# Patient Record
Sex: MALE | Race: WHITE | Employment: FULL TIME | URBAN - METROPOLITAN AREA
[De-identification: names, ages, dates, MRNs, and addresses within clinical notes are randomized per-mention and may not be internally consistent; named-entity substitution may affect disease eponyms.]

---

## 2017-01-20 ENCOUNTER — INFUSION THERAPY VISIT (OUTPATIENT)
Dept: ONCOLOGY | Facility: CLINIC | Age: 52
End: 2017-01-20
Attending: INTERNAL MEDICINE
Payer: COMMERCIAL

## 2017-01-20 ENCOUNTER — CARE COORDINATION (OUTPATIENT)
Dept: ONCOLOGY | Facility: CLINIC | Age: 52
End: 2017-01-20

## 2017-01-20 VITALS
OXYGEN SATURATION: 97 % | HEART RATE: 58 BPM | TEMPERATURE: 98.6 F | BODY MASS INDEX: 30.86 KG/M2 | DIASTOLIC BLOOD PRESSURE: 81 MMHG | WEIGHT: 197.09 LBS | RESPIRATION RATE: 18 BRPM | SYSTOLIC BLOOD PRESSURE: 141 MMHG

## 2017-01-20 DIAGNOSIS — C61 MALIGNANT NEOPLASM OF PROSTATE (H): ICD-10-CM

## 2017-01-20 DIAGNOSIS — C79.51 BONE METASTASIS: ICD-10-CM

## 2017-01-20 DIAGNOSIS — C61 MALIGNANT NEOPLASM OF PROSTATE (H): Primary | ICD-10-CM

## 2017-01-20 LAB
ALBUMIN SERPL-MCNC: 4.2 G/DL (ref 3.4–5)
ALP SERPL-CCNC: 48 U/L (ref 40–150)
ALT SERPL W P-5'-P-CCNC: 31 U/L (ref 0–70)
ANION GAP SERPL CALCULATED.3IONS-SCNC: 5 MMOL/L (ref 3–14)
AST SERPL W P-5'-P-CCNC: 16 U/L (ref 0–45)
BASOPHILS # BLD AUTO: 0 10E9/L (ref 0–0.2)
BASOPHILS NFR BLD AUTO: 0.6 %
BILIRUB SERPL-MCNC: 1 MG/DL (ref 0.2–1.3)
BUN SERPL-MCNC: 17 MG/DL (ref 7–30)
CALCIUM SERPL-MCNC: 9.2 MG/DL (ref 8.5–10.1)
CHLORIDE SERPL-SCNC: 106 MMOL/L (ref 94–109)
CO2 SERPL-SCNC: 30 MMOL/L (ref 20–32)
CREAT SERPL-MCNC: 0.81 MG/DL (ref 0.66–1.25)
DIFFERENTIAL METHOD BLD: ABNORMAL
EOSINOPHIL # BLD AUTO: 0.1 10E9/L (ref 0–0.7)
EOSINOPHIL NFR BLD AUTO: 2 %
ERYTHROCYTE [DISTWIDTH] IN BLOOD BY AUTOMATED COUNT: 12.7 % (ref 10–15)
GFR SERPL CREATININE-BSD FRML MDRD: NORMAL ML/MIN/1.7M2
GLUCOSE SERPL-MCNC: 97 MG/DL (ref 70–99)
HCT VFR BLD AUTO: 35.1 % (ref 40–53)
HGB BLD-MCNC: 11.9 G/DL (ref 13.3–17.7)
IMM GRANULOCYTES # BLD: 0 10E9/L (ref 0–0.4)
IMM GRANULOCYTES NFR BLD: 0.4 %
LDH SERPL L TO P-CCNC: 216 U/L (ref 85–227)
LYMPHOCYTES # BLD AUTO: 2.2 10E9/L (ref 0.8–5.3)
LYMPHOCYTES NFR BLD AUTO: 44.8 %
MCH RBC QN AUTO: 31.1 PG (ref 26.5–33)
MCHC RBC AUTO-ENTMCNC: 33.9 G/DL (ref 31.5–36.5)
MCV RBC AUTO: 92 FL (ref 78–100)
MONOCYTES # BLD AUTO: 0.4 10E9/L (ref 0–1.3)
MONOCYTES NFR BLD AUTO: 7.5 %
NEUTROPHILS # BLD AUTO: 2.2 10E9/L (ref 1.6–8.3)
NEUTROPHILS NFR BLD AUTO: 44.7 %
NRBC # BLD AUTO: 0 10*3/UL
NRBC BLD AUTO-RTO: 0 /100
PLATELET # BLD AUTO: 256 10E9/L (ref 150–450)
POTASSIUM SERPL-SCNC: 4.4 MMOL/L (ref 3.4–5.3)
PROT SERPL-MCNC: 7.4 G/DL (ref 6.8–8.8)
PSA SERPL-MCNC: 75.43 UG/L (ref 0–4)
RBC # BLD AUTO: 3.83 10E12/L (ref 4.4–5.9)
SODIUM SERPL-SCNC: 141 MMOL/L (ref 133–144)
WBC # BLD AUTO: 4.9 10E9/L (ref 4–11)

## 2017-01-20 PROCEDURE — 83615 LACTATE (LD) (LDH) ENZYME: CPT | Performed by: INTERNAL MEDICINE

## 2017-01-20 PROCEDURE — 96401 CHEMO ANTI-NEOPL SQ/IM: CPT

## 2017-01-20 PROCEDURE — 36415 COLL VENOUS BLD VENIPUNCTURE: CPT

## 2017-01-20 PROCEDURE — 25000128 H RX IP 250 OP 636: Mod: ZF | Performed by: INTERNAL MEDICINE

## 2017-01-20 PROCEDURE — 80053 COMPREHEN METABOLIC PANEL: CPT | Performed by: INTERNAL MEDICINE

## 2017-01-20 PROCEDURE — 84153 ASSAY OF PSA TOTAL: CPT | Performed by: INTERNAL MEDICINE

## 2017-01-20 PROCEDURE — 85025 COMPLETE CBC W/AUTO DIFF WBC: CPT | Performed by: INTERNAL MEDICINE

## 2017-01-20 RX ADMIN — DENOSUMAB 120 MG: 120 INJECTION SUBCUTANEOUS at 15:12

## 2017-01-20 NOTE — NURSING NOTE
Chief Complaint   Patient presents with     Lab Only     blood draw   Vitals done and labs drawn peripherally from left arm

## 2017-01-20 NOTE — PROGRESS NOTES
Received phone call from pt requesting to come in for xgeva today. Last received xgeva on 11/2.  States he had been receiving xgeva every 6 weeks but is a little late for this xgeva injection because he wanted to see his dentist first.  Scheduled pt for labs and xgeva injection at 1:30pm today.  Pt will receive his next xgeva injection when he sees Dr. Valladares on 3/1.

## 2017-01-20 NOTE — NURSING NOTE
Patient presents to the [facility] for Xgeva.  Order written by Dr. Valladares was completed today. Name and  verified with patient. See MAR for medication details. Medication was given in the right arm subcutaneously. Patient tolerated injection well and was discharged to home.  Gabby Crandall CMA

## 2017-01-23 NOTE — PROGRESS NOTES
Patient presents to the [facility] infusion for Xgeva.  Order written by Dr. Valladares was completed today. Name and  verified with patient. See MAR for medication details. Medication was given Right arm. Patient tolerated injection well and was discharged to home.  Gabby Crandall CMA

## 2017-02-22 ENCOUNTER — MYC MEDICAL ADVICE (OUTPATIENT)
Dept: ONCOLOGY | Facility: CLINIC | Age: 52
End: 2017-02-22

## 2017-02-22 DIAGNOSIS — E03.9 HYPOTHYROIDISM: Primary | ICD-10-CM

## 2017-03-01 ENCOUNTER — ONCOLOGY VISIT (OUTPATIENT)
Dept: ONCOLOGY | Facility: CLINIC | Age: 52
End: 2017-03-01
Attending: INTERNAL MEDICINE
Payer: COMMERCIAL

## 2017-03-01 VITALS
TEMPERATURE: 97.8 F | SYSTOLIC BLOOD PRESSURE: 136 MMHG | OXYGEN SATURATION: 97 % | HEART RATE: 68 BPM | BODY MASS INDEX: 31.17 KG/M2 | DIASTOLIC BLOOD PRESSURE: 88 MMHG | WEIGHT: 199 LBS | RESPIRATION RATE: 16 BRPM

## 2017-03-01 DIAGNOSIS — C61 MALIGNANT NEOPLASM OF PROSTATE (H): ICD-10-CM

## 2017-03-01 DIAGNOSIS — C79.51 BONE METASTASIS: ICD-10-CM

## 2017-03-01 DIAGNOSIS — E03.9 HYPOTHYROIDISM: ICD-10-CM

## 2017-03-01 DIAGNOSIS — G62.9 NEUROPATHY: Primary | ICD-10-CM

## 2017-03-01 DIAGNOSIS — N52.2 DRUG-INDUCED ERECTILE DYSFUNCTION: ICD-10-CM

## 2017-03-01 LAB
ALBUMIN SERPL-MCNC: 4.3 G/DL (ref 3.4–5)
ALP SERPL-CCNC: 54 U/L (ref 40–150)
ALT SERPL W P-5'-P-CCNC: 34 U/L (ref 0–70)
ANION GAP SERPL CALCULATED.3IONS-SCNC: 9 MMOL/L (ref 3–14)
AST SERPL W P-5'-P-CCNC: 20 U/L (ref 0–45)
BASOPHILS # BLD AUTO: 0 10E9/L (ref 0–0.2)
BASOPHILS NFR BLD AUTO: 0.7 %
BILIRUB SERPL-MCNC: 0.5 MG/DL (ref 0.2–1.3)
BUN SERPL-MCNC: 18 MG/DL (ref 7–30)
CALCIUM SERPL-MCNC: 9.1 MG/DL (ref 8.5–10.1)
CHLORIDE SERPL-SCNC: 105 MMOL/L (ref 94–109)
CO2 SERPL-SCNC: 28 MMOL/L (ref 20–32)
CREAT SERPL-MCNC: 0.89 MG/DL (ref 0.66–1.25)
DIFFERENTIAL METHOD BLD: ABNORMAL
EOSINOPHIL # BLD AUTO: 0.1 10E9/L (ref 0–0.7)
EOSINOPHIL NFR BLD AUTO: 1.7 %
ERYTHROCYTE [DISTWIDTH] IN BLOOD BY AUTOMATED COUNT: 12.6 % (ref 10–15)
GFR SERPL CREATININE-BSD FRML MDRD: NORMAL ML/MIN/1.7M2
GLUCOSE SERPL-MCNC: 85 MG/DL (ref 70–99)
HCT VFR BLD AUTO: 37.4 % (ref 40–53)
HGB BLD-MCNC: 12.9 G/DL (ref 13.3–17.7)
IMM GRANULOCYTES # BLD: 0 10E9/L (ref 0–0.4)
IMM GRANULOCYTES NFR BLD: 0.4 %
LDH SERPL L TO P-CCNC: 205 U/L (ref 85–227)
LYMPHOCYTES # BLD AUTO: 2.2 10E9/L (ref 0.8–5.3)
LYMPHOCYTES NFR BLD AUTO: 41 %
MCH RBC QN AUTO: 31.1 PG (ref 26.5–33)
MCHC RBC AUTO-ENTMCNC: 34.5 G/DL (ref 31.5–36.5)
MCV RBC AUTO: 90 FL (ref 78–100)
MONOCYTES # BLD AUTO: 0.4 10E9/L (ref 0–1.3)
MONOCYTES NFR BLD AUTO: 8.2 %
NEUTROPHILS # BLD AUTO: 2.6 10E9/L (ref 1.6–8.3)
NEUTROPHILS NFR BLD AUTO: 48 %
NRBC # BLD AUTO: 0 10*3/UL
NRBC BLD AUTO-RTO: 0 /100
PLATELET # BLD AUTO: 268 10E9/L (ref 150–450)
POTASSIUM SERPL-SCNC: 5 MMOL/L (ref 3.4–5.3)
PROT SERPL-MCNC: 8.1 G/DL (ref 6.8–8.8)
PSA SERPL-MCNC: 86.81 UG/L (ref 0–4)
RBC # BLD AUTO: 4.15 10E12/L (ref 4.4–5.9)
SODIUM SERPL-SCNC: 141 MMOL/L (ref 133–144)
T4 FREE SERPL-MCNC: 0.82 NG/DL (ref 0.76–1.46)
TSH SERPL DL<=0.005 MIU/L-ACNC: 5.28 MU/L (ref 0.4–4)
WBC # BLD AUTO: 5.3 10E9/L (ref 4–11)

## 2017-03-01 PROCEDURE — 25000128 H RX IP 250 OP 636: Mod: ZF | Performed by: INTERNAL MEDICINE

## 2017-03-01 PROCEDURE — 83615 LACTATE (LD) (LDH) ENZYME: CPT | Performed by: INTERNAL MEDICINE

## 2017-03-01 PROCEDURE — 84153 ASSAY OF PSA TOTAL: CPT | Performed by: INTERNAL MEDICINE

## 2017-03-01 PROCEDURE — 96401 CHEMO ANTI-NEOPL SQ/IM: CPT

## 2017-03-01 PROCEDURE — 84439 ASSAY OF FREE THYROXINE: CPT | Performed by: INTERNAL MEDICINE

## 2017-03-01 PROCEDURE — 85025 COMPLETE CBC W/AUTO DIFF WBC: CPT | Performed by: INTERNAL MEDICINE

## 2017-03-01 PROCEDURE — 99214 OFFICE O/P EST MOD 30 MIN: CPT | Mod: ZP | Performed by: INTERNAL MEDICINE

## 2017-03-01 PROCEDURE — 84443 ASSAY THYROID STIM HORMONE: CPT | Performed by: INTERNAL MEDICINE

## 2017-03-01 PROCEDURE — 96372 THER/PROPH/DIAG INJ SC/IM: CPT

## 2017-03-01 PROCEDURE — 80053 COMPREHEN METABOLIC PANEL: CPT | Performed by: INTERNAL MEDICINE

## 2017-03-01 PROCEDURE — 99212 OFFICE O/P EST SF 10 MIN: CPT | Mod: ZF

## 2017-03-01 RX ORDER — BICALUTAMIDE 50 MG/1
50 TABLET, FILM COATED ORAL DAILY
Qty: 30 TABLET | Refills: 11 | Status: SHIPPED | OUTPATIENT
Start: 2017-03-01 | End: 2017-03-24

## 2017-03-01 RX ADMIN — DENOSUMAB 120 MG: 120 INJECTION SUBCUTANEOUS at 18:04

## 2017-03-01 NOTE — LETTER
3/1/2017       RE: Albert Amaya  [address] [facility] [address]     Dear Colleague,    Thank you for referring your patient, Albert Amaya, to the [facility]. Please see a copy of my visit note below.              [facility]  FOLLOW-UP VISIT NOTE  Date of visit: Mar 1, 2017         REASON FOR VISIT:   Metastatic Prostate CA treated    - s/p 6 cycles of taxotere 75mg/m2   - s/p orchiectomy on 3/18/2016   - s/p Provenge 5/13, 5/27, 6/10    HPI: Albert is a 51 year old gentleman with metastatic prostate cancer.  Albert felt a cramp in his gluteus muscle and could barely walk after doing some remodeling at home and unloading heavy truck at work. He tried flexeril and prednisone initially but eventually presented to ED on 10/5/15. He feels that initially he was nearly labeled as drug seeker since he was in lot of discomfort and flexeril was not working. But during course of ED visits labs were drawn and he had marked elevation in Alk Phosphatase (over 1000). CT did suggest some ileus with some sclerotic bone lesions. He was admitted to the hospital. His PSA was checked and was markedly elevated at 5760 (10/6/15), repeat value 5563.     Urology and Medical Oncology consults were placed. He was unhappy with the progress in hospital and felt no better and left the following day on 10/6. He did follow up with Dr. Freire and had transrectal US guided biopsy of prostate on 10/8/15. They have the results through my chart which confirms prostate adenocarcinoma - enrico 4+3 involving majority (60-90%) portion of every single core.  He started on taxotere on 10/23 and completed 6 cycles of therapy in 2/2016.  He then underwent orchiectomy on 3/18/2016.     Throughout spring of 2016 Curtis PSA started to progress and after three elevations there was concern about him being hormone refractory.  He was started on Provenge and enrolled in the trial including  Indoximod. He presents for the end of study visit today.     INTERVAL HISTORY:   Albert is accompanied by his wife and is being seen for his metastatic disease. Overall things are stable.   He continues to remain active but does not have the same energy as before cancer.     10-point ROS otherwise negative.      MEDS:   Current Outpatient Prescriptions   Medication Sig     tadalafil (CIALIS) 20 MG tablet Take 2 tablets (40 mg) by mouth daily as needed for erectile dysfunction     IBUPROFEN PO Take by mouth as needed for moderate pain     EPINEPHrine (EPIPEN 2-CHARITY) 0.3 MG/0.3ML injection 2-pack Inject 0.3 mLs (0.3 mg) into the muscle once as needed for anaphylaxis     SYNTHROID 50 MCG tablet TAKE 1 TABLET(50 MCG) BY MOUTH DAILY     study - indoximod 200 mg vs placebo, IDS# [id number], (1-MT) capsule Take 6 capsules (1,200 mg) by mouth 2 times daily Take twelve hours apart with or without food. No antacids while on study medication.     sildenafil (VIAGRA) 100 MG tablet Take 1 tablet (100 mg) by mouth daily as needed for erectile dysfunction Take 30 min to 4 hours before intercourse.  Never use with nitroglycerin, terazosin or doxazosin.     Acetaminophen (TYLENOL PO) Take 325 mg by mouth every 8 hours as needed for mild pain or fever     Ginger, Zingiber officinalis, (GINGER ROOT) 250 MG CAPS Take 250 mg by mouth as needed     LORazepam (ATIVAN) 0.5 MG tablet Take 1 tablet (0.5 mg) by mouth every 4 hours as needed (Anxiety, Nausea/Vomiting or Sleep)     Probiotic Product (PROBIOTIC DAILY PO) Take by mouth as needed      No current facility-administered medications for this visit.         EXAM:  ECOG 1  Wt Readings from Last 4 Encounters:   03/01/17 90.3 kg (199 lb)   01/20/17 89.4 kg (197 lb 1.5 oz)   12/21/16 88.9 kg (195 lb 14.4 oz)   11/22/16 88.7 kg (195 lb 8 oz)     Vital signs were reviewed.   Patient alert and oriented x3.   PERRLA. EOMI. No scleral icterus noted. OP without thrush/sores.  Neck exam: No palpable  cervical, supraclavicular nodes.   Heart: RRR no murmurs noted.   Lungs: clear to auscultation bilaterally.  No crackles or wheezing.   Abd: positive bowel sounds in all four quadrants.  No tenderness to palpation.  No hepatomegaly.   Extremities: No lower extremity edema.   MSK: No tenderness to spine   Neuro: grossly intact.   Mood and affect is stable.     Labs/Imaging:  Recent Labs   Lab Test  03/01/17   1603  01/20/17   1406  12/21/16   1643  11/22/16   1221  10/26/16   1150   NA  141  141  137  141  140   POTASSIUM  5.0  4.4  4.5  4.7  4.3   CHLORIDE  105  106  106  107  104   CO2  28  30  24  29  29   ANIONGAP  9  5  8  5  7   BUN  18  17  20  14  13   CR  0.89  0.81  0.87  0.85  0.89   GLC  85  97  98  92  88   WILL  9.1  9.2  9.0  8.4*  8.9     Recent Labs   Lab Test  12/21/16   1643  11/22/16   1221  10/26/16   1150  09/28/16   1137  08/17/16   1355   05/03/16   0955   MAG  2.2  2.4*  2.2  2.3  2.0   < >  2.0   PHOS   --    --    --    --    --    --   4.1    < > = values in this interval not displayed.     Recent Labs   Lab Test  03/01/17   1603  01/20/17   1406  12/21/16   1643  11/22/16   1221  10/26/16   1150   WBC  5.3  4.9  5.0  4.0  3.8*   HGB  12.9*  11.9*  11.8*  11.3*  11.8*   PLT  268  256  257  219  219   MCV  90  92  94  93  93   NEUTROPHIL  48.0  44.7  47.8  43.1  34.2     Recent Labs   Lab Test  03/01/17   1603  01/20/17   1406  12/21/16   1643   BILITOTAL  0.5  1.0  0.5   ALKPHOS  54  48  49   ALT  34  31  34   AST  20  16  24   ALBUMIN  4.3  4.2  4.1   LDH  205  216  301*     TSH   Date Value Ref Range Status   03/01/2017 5.28 (H) 0.40 - 4.00 mU/L Final   11/22/2016 4.62 (H) 0.40 - 4.00 mU/L Final   10/26/2016 5.98 (H) 0.40 - 4.00 mU/L Final     Recent Labs   Lab Test  03/01/17   1603  01/20/17   1406  12/21/16   1643  11/22/16   1222  11/22/16   1221  10/26/16   1150   05/03/16   0955   PSA  86.81*  75.43*  60.68*   --   56.68*  48.57*   < >  67.04*   TESTOSTTOTAL   --    --   <2.*  2*    --    --    --  <2.*    < > = values in this interval not displayed.          ASSESSMENT/PLAN: 51 year old  with a diagnosis of metastatic prostate to the bone.  He is s/p 6 cycles of docetaxol (CHAARTED study) and recent bilateral orchiectomy on 3/18/2016. Completed clinical trial with Provenge followed by Indoximod versus placebo.   ECOG PS 0  Hypothyroidism  Hot flashes  Weight gain    He continues to do well. He has fair appetite and energy. He has been eating healthy. He has not lost any weight despite mostly eating salads and cutting down on red meat. He is not as physically active as this is winter.     He has hot flashes but it is not bothering him.     Jose has been trying a 3 mushroom combination with shitaki and other mushrooms. I do express ignorance on health benefits of mushrooms. We could hold further therapy and follow PSA and labs on mushroom. But she is not keen to follow on this. His PSA has been slowly rising. But it would be hard to know if this is post immunotherapy on study or mushrooms or neither.     His PSA has continued to steadily rise. I would suggest that we start casodex 50 mg po daily. I have filled in prescription. I reviewed gynecomastia. I have asked him to give us a call if he does have this side effect. We could radiate his breast and this would alleviate the pain.     He has an area of tenderness in left breast above the level of nipple. He feels that he has a lipoma since he has been 24 y/o. He would like to have this resected at some point in time. I reviewed actual images and we could not locate the lesion on any of his images.       He is hypothyroid since on immunotherapy study and his TSH is higher at 5.28. I will increase his levothyroxine to 75 mcg daily.     He had last denosumab 3 months ago. He will get his next dose after this clinic visit.     I will see him in 2 months with labs prior to clinic visit.       Over 25 min of direct face to face time spent with  patient with more than 50% time spent in counseling and coordinating care.          Again, thank you for allowing me to participate in the care of your patient.      Sincerely,    Tyrel Valladares MD

## 2017-03-01 NOTE — NURSING NOTE
"Albert Amaya is a 51 year old male who presents for:  Chief Complaint   Patient presents with     Blood Draw     Oncology Clinic Visit     Pt is here for a FU appt on Prostate Ca        Initial Vitals:  /88  Pulse 68  Temp 97.8  F (36.6  C) (Oral)  Resp 16  Wt 90.3 kg (199 lb)  SpO2 97%  BMI 31.17 kg/m2 Estimated body mass index is 31.17 kg/(m^2) as calculated from the following:    Height as of 11/22/16: 1.702 m (5' 7\").    Weight as of this encounter: 90.3 kg (199 lb).. Body surface area is 2.07 meters squared. BP completed using cuff size: NA (Not Taken)  Data Unavailable No LMP for male patient. Allergies and medications reviewed.     Medications: Medication refills not needed today.  Pharmacy name entered into [employer]:    [id number] - [place], [place] - [address] [facility] - [place], [place] - [address] [facility] [place], [place] - [address] [facility] - [place], [place] - [address] [place] [address] [id number] ([place]) - [place], [place] - [address] AT [facility] [address]    Comments: Vitals taken in lab.    6 minutes for nursing intake (face to face time)   Selma Jarrell CMA        "

## 2017-03-01 NOTE — NURSING NOTE
Chief Complaint   Patient presents with     Blood Draw     Vitals done and labs drawn peripherally from right arm.    Lynda Porter, GRIFFINN

## 2017-03-01 NOTE — PROGRESS NOTES
[facility]  FOLLOW-UP VISIT NOTE  Date of visit: Mar 1, 2017         REASON FOR VISIT:   Metastatic Prostate CA treated    - s/p 6 cycles of taxotere 75mg/m2   - s/p orchiectomy on 3/18/2016   - s/p Provenge 5/13, 5/27, 6/10    HPI: Albert is a 51 year old gentleman with metastatic prostate cancer.  Albert felt a cramp in his gluteus muscle and could barely walk after doing some remodeling at home and unloading heavy truck at work. He tried flexeril and prednisone initially but eventually presented to ED on 10/5/15. He feels that initially he was nearly labeled as drug seeker since he was in lot of discomfort and flexeril was not working. But during course of ED visits labs were drawn and he had marked elevation in Alk Phosphatase (over 1000). CT did suggest some ileus with some sclerotic bone lesions. He was admitted to the hospital. His PSA was checked and was markedly elevated at 5760 (10/6/15), repeat value 5563.     Urology and Medical Oncology consults were placed. He was unhappy with the progress in hospital and felt no better and left the following day on 10/6. He did follow up with Dr. Freire and had transrectal US guided biopsy of prostate on 10/8/15. They have the results through my chart which confirms prostate adenocarcinoma - enrico 4+3 involving majority (60-90%) portion of every single core.  He started on taxotere on 10/23 and completed 6 cycles of therapy in 2/2016.  He then underwent orchiectomy on 3/18/2016.     Throughout spring of 2016 Curtis PSA started to progress and after three elevations there was concern about him being hormone refractory.  He was started on Provenge and enrolled in the trial including Indoximod. He presents for the end of study visit today.     INTERVAL HISTORY:   Albert is accompanied by his wife and is being seen for his metastatic disease. Overall things are stable.   He continues to remain active but does not have the same energy as  before cancer.     10-point ROS otherwise negative.      MEDS:   Current Outpatient Prescriptions   Medication Sig     tadalafil (CIALIS) 20 MG tablet Take 2 tablets (40 mg) by mouth daily as needed for erectile dysfunction     IBUPROFEN PO Take by mouth as needed for moderate pain     EPINEPHrine (EPIPEN 2-CHARITY) 0.3 MG/0.3ML injection 2-pack Inject 0.3 mLs (0.3 mg) into the muscle once as needed for anaphylaxis     SYNTHROID 50 MCG tablet TAKE 1 TABLET(50 MCG) BY MOUTH DAILY     study - indoximod 200 mg vs placebo, IDS# 4136, (1-MT) capsule Take 6 capsules (1,200 mg) by mouth 2 times daily Take twelve hours apart with or without food. No antacids while on study medication.     sildenafil (VIAGRA) 100 MG tablet Take 1 tablet (100 mg) by mouth daily as needed for erectile dysfunction Take 30 min to 4 hours before intercourse.  Never use with nitroglycerin, terazosin or doxazosin.     Acetaminophen (TYLENOL PO) Take 325 mg by mouth every 8 hours as needed for mild pain or fever     Ginger, Zingiber officinalis, (GINGER ROOT) 250 MG CAPS Take 250 mg by mouth as needed     LORazepam (ATIVAN) 0.5 MG tablet Take 1 tablet (0.5 mg) by mouth every 4 hours as needed (Anxiety, Nausea/Vomiting or Sleep)     Probiotic Product (PROBIOTIC DAILY PO) Take by mouth as needed      No current facility-administered medications for this visit.         EXAM:  ECOG 1  Wt Readings from Last 4 Encounters:   03/01/17 90.3 kg (199 lb)   01/20/17 89.4 kg (197 lb 1.5 oz)   12/21/16 88.9 kg (195 lb 14.4 oz)   11/22/16 88.7 kg (195 lb 8 oz)     Vital signs were reviewed.   Patient alert and oriented x3.   PERRLA. EOMI. No scleral icterus noted. OP without thrush/sores.  Neck exam: No palpable cervical, supraclavicular nodes.   Heart: RRR no murmurs noted.   Lungs: clear to auscultation bilaterally.  No crackles or wheezing.   Abd: positive bowel sounds in all four quadrants.  No tenderness to palpation.  No hepatomegaly.   Extremities: No lower  extremity edema.   MSK: No tenderness to spine   Neuro: grossly intact.   Mood and affect is stable.     Labs/Imaging:  Recent Labs   Lab Test  03/01/17   1603  01/20/17   1406  12/21/16   1643  11/22/16   1221  10/26/16   1150   NA  141  141  137  141  140   POTASSIUM  5.0  4.4  4.5  4.7  4.3   CHLORIDE  105  106  106  107  104   CO2  28  30  24  29  29   ANIONGAP  9  5  8  5  7   BUN  18  17  20  14  13   CR  0.89  0.81  0.87  0.85  0.89   GLC  85  97  98  92  88   WILL  9.1  9.2  9.0  8.4*  8.9     Recent Labs   Lab Test  12/21/16   1643  11/22/16   1221  10/26/16   1150  09/28/16   1137  08/17/16   1355   05/03/16   0955   MAG  2.2  2.4*  2.2  2.3  2.0   < >  2.0   PHOS   --    --    --    --    --    --   4.1    < > = values in this interval not displayed.     Recent Labs   Lab Test  03/01/17   1603  01/20/17   1406  12/21/16   1643  11/22/16   1221  10/26/16   1150   WBC  5.3  4.9  5.0  4.0  3.8*   HGB  12.9*  11.9*  11.8*  11.3*  11.8*   PLT  268  256  257  219  219   MCV  90  92  94  93  93   NEUTROPHIL  48.0  44.7  47.8  43.1  34.2     Recent Labs   Lab Test  03/01/17   1603  01/20/17   1406  12/21/16   1643   BILITOTAL  0.5  1.0  0.5   ALKPHOS  54  48  49   ALT  34  31  34   AST  20  16  24   ALBUMIN  4.3  4.2  4.1   LDH  205  216  301*     TSH   Date Value Ref Range Status   03/01/2017 5.28 (H) 0.40 - 4.00 mU/L Final   11/22/2016 4.62 (H) 0.40 - 4.00 mU/L Final   10/26/2016 5.98 (H) 0.40 - 4.00 mU/L Final     Recent Labs   Lab Test  03/01/17   1603  01/20/17   1406  12/21/16   1643  11/22/16   1222  11/22/16   1221  10/26/16   1150   05/03/16   0955   PSA  86.81*  75.43*  60.68*   --   56.68*  48.57*   < >  67.04*   TESTOSTTOTAL   --    --   <2.*  2*   --    --    --  <2.*    < > = values in this interval not displayed.          ASSESSMENT/PLAN: 51 year old  with a diagnosis of metastatic prostate to the bone.  He is s/p 6 cycles of docetaxol (CHAARTED study) and recent bilateral orchiectomy on 3/18/2016.  Completed clinical trial with Provenge followed by Indoximod versus placebo.   ECOG PS 0  Hypothyroidism  Hot flashes  Weight gain    He continues to do well. He has fair appetite and energy. He has been eating healthy. He has not lost any weight despite mostly eating salads and cutting down on red meat. He is not as physically active as this is winter.     He has hot flashes but it is not bothering him.     Jose has been trying a 3 mushroom combination with shitaki and other mushrooms. I do express ignorance on health benefits of mushrooms. We could hold further therapy and follow PSA and labs on mushroom. But she is not keen to follow on this. His PSA has been slowly rising. But it would be hard to know if this is post immunotherapy on study or mushrooms or neither.     His PSA has continued to steadily rise. I would suggest that we start casodex 50 mg po daily. I have filled in prescription. I reviewed gynecomastia. I have asked him to give us a call if he does have this side effect. We could radiate his breast and this would alleviate the pain.     He has an area of tenderness in left breast above the level of nipple. He feels that he has a lipoma since he has been 24 y/o. He would like to have this resected at some point in time. I reviewed actual images and we could not locate the lesion on any of his images.       He is hypothyroid since on immunotherapy study and his TSH is higher at 5.28. I will increase his levothyroxine to 75 mcg daily.     He had last denosumab 3 months ago. He will get his next dose after this clinic visit.     I will see him in 2 months with labs prior to clinic visit.       Over 25 min of direct face to face time spent with patient with more than 50% time spent in counseling and coordinating care.

## 2017-03-01 NOTE — MR AVS SNAPSHOT
After Visit Summary   3/1/2017    Albert Amaya    MRN: [id number]           Patient Information     Date Of Birth          1965        Visit Information        Provider Department      3/1/2017 4:15 PM Tyrel Valladares MD [facility]        Today's Diagnoses     Neuropathy (H)    -  1    Drug-induced erectile dysfunction        Bone metastasis (H)        Malignant neoplasm of prostate (H)           Follow-ups after your visit        Your next 10 appointments already scheduled     May 03, 2017  3:45 PM CDT   [facility] Draw with  [facility] DRAW   [facility] Draw ([facility])    [address] [place] [id number] [phone]            May 03, 2017  4:15 PM CDT   (Arrive by 4:00 PM)   Return Visit with Tyrel Valladares MD   [facility] ([facility])    [address] [place] [id number] [phone]              Who to contact     If you have questions or need follow up information about today's clinic visit or your schedule please contact [facility] directly at [phone].  Normal or non-critical lab and imaging results will be communicated to you by MyChart, letter or phone within 4 business days after the clinic has received the results. If you do not hear from us within 7 days, please contact the clinic through investUPhart or phone. If you have a critical or abnormal lab result, we will notify you by phone as soon as possible.  Submit refill requests through Retsly or call your pharmacy and they will forward the refill request to us. Please allow 3 business days for your refill to be completed.          Additional Information About Your Visit        investUPhart Information     Retsly gives you secure access to your electronic health record. If you see a primary care provider, you can also send messages to your care  team and make appointments. If you have questions, please call your primary care clinic.  If you do not have a primary care provider, please call [phone] and they will assist you.        Care EveryWhere ID     This is your Care EveryWhere ID. This could be used by other organizations to access your [facility] medical records  [id number]        Your Vitals Were     Pulse Temperature Respirations Pulse Oximetry BMI (Body Mass Index)       68 97.8  F (36.6  C) (Oral) 16 97% 31.17 kg/m2        Blood Pressure from Last 3 Encounters:   03/01/17 136/88   01/20/17 141/81   12/21/16 128/87    Weight from Last 3 Encounters:   03/01/17 90.3 kg (199 lb)   01/20/17 89.4 kg (197 lb 1.5 oz)   12/21/16 88.9 kg (195 lb 14.4 oz)              We Performed the Following     MD Instruction for Protocol     Nursing Communication 1     Treatment Conditions          Today's Medication Changes          These changes are accurate as of: 3/1/17 11:59 PM.  If you have any questions, ask your nurse or doctor.               Start taking these medicines.        Dose/Directions    bicalutamide 50 MG tablet   Commonly known as:  CASODEX   Used for:  Neuropathy (H), Drug-induced erectile dysfunction   Started by:  Tyrel Valladares MD        Dose:  50 mg   Take 1 tablet (50 mg) by mouth daily   Quantity:  30 tablet   Refills:  11         Stop taking these medicines if you haven't already. Please contact your care team if you have questions.     study - indoximod 200 mg vs placebo (IDS# [id number]) capsule   Commonly known as:  1-MT   Stopped by:  Tyrel Valladares MD                Where to get your medicines      These medications were sent to [facility] - [place], [place] - [address]  [address], [facility] [address]    Hours:  TRANSPLANT PHONE NUMBER [phone] Phone:  [phone]     bicalutamide 50 MG tablet                Primary Care Provider Office Phone # Fax #    [facility] [phone]       [address]  [facility] [address]        Thank you!     Thank you for choosing [facility]  for your care. Our goal is always to provide you with excellent care. Hearing back from our patients is one way we can continue to improve our services. Please take a few minutes to complete the written survey that you may receive in the mail after your visit with us. Thank you!             Your Updated Medication List - Protect others around you: Learn how to safely use, store and throw away your medicines at www.disposemymeds.org.          This list is accurate as of: 3/1/17 11:59 PM.  Always use your most recent med list.                   Brand Name Dispense Instructions for use    bicalutamide 50 MG tablet    CASODEX    30 tablet    Take 1 tablet (50 mg) by mouth daily       EPINEPHrine 0.3 MG/0.3ML injection    EPIPEN 2-CHARITY    0.6 mL    Inject 0.3 mLs (0.3 mg) into the muscle once as needed for anaphylaxis       Ginger Root 250 MG Caps      Take 250 mg by mouth as needed       IBUPROFEN PO      Take by mouth as needed for moderate pain       LORazepam 0.5 MG tablet    ATIVAN    30 tablet    Take 1 tablet (0.5 mg) by mouth every 4 hours as needed (Anxiety, Nausea/Vomiting or Sleep)       PROBIOTIC DAILY PO      Take by mouth as needed       sildenafil 100 MG cap/tab    REVATIO/VIAGRA    6 tablet    Take 1 tablet (100 mg) by mouth daily as needed for erectile dysfunction Take 30 min to 4 hours before intercourse.  Never use with nitroglycerin, terazosin or doxazosin.       SYNTHROID 50 MCG tablet   Generic drug:  levothyroxine     90 tablet    TAKE 1 TABLET(50 MCG) BY MOUTH DAILY       tadalafil 20 MG tablet    CIALIS    30 tablet    Take 2 tablets (40 mg) by mouth daily as needed for erectile dysfunction       TYLENOL PO      Take 325 mg by mouth every 8 hours as needed for mild pain or fever

## 2017-03-02 NOTE — NURSING NOTE
1 Xgeva injection given in left arm subcutaneously. Patient tolerated injection.    Mihai Rich MA

## 2017-03-14 ENCOUNTER — TELEPHONE (OUTPATIENT)
Dept: ONCOLOGY | Facility: CLINIC | Age: 52
End: 2017-03-14

## 2017-03-14 NOTE — TELEPHONE ENCOUNTER
Completed FMLA for intermittent time off for wife Jose. Faxed to [email or web address] [phone] per patient request. Mailed copy to patient.

## 2017-03-22 ENCOUNTER — TELEPHONE (OUTPATIENT)
Dept: ONCOLOGY | Facility: CLINIC | Age: 52
End: 2017-03-22

## 2017-03-22 NOTE — TELEPHONE ENCOUNTER
"Pt and spouse call to report pt has had jazmine water in the toilet after having BMs for the past 2 weeks. Pt says it's not urine causing the discoloration. He reports his stools have \"varied in appearance\" the past 2 weeks as well.  He was unable to describe, just saying they're not normal.  Pt decided he will monitor, (maybe take pictures) and have his wife monitor appearance of BMs as well. He will increase his fluid intake and call back with update in a few days or sooner for new or worsening concerns. He does have fatigue and hot flashes he attributes to casodex.  Will update care team.  "

## 2017-03-23 ENCOUNTER — TELEPHONE (OUTPATIENT)
Dept: ONCOLOGY | Facility: CLINIC | Age: 52
End: 2017-03-23

## 2017-03-23 DIAGNOSIS — C61 MALIGNANT NEOPLASM OF PROSTATE (H): Primary | ICD-10-CM

## 2017-03-23 NOTE — TELEPHONE ENCOUNTER
Patient called yesterday and spoke with Triage RN about having jazmine colored stools for the past 2 weeks and the appearance of his stools have also varied. Wife called today to let us know patient is stopping his casodex because he feels this is causing the symptoms. He is also requesting to have a colonoscopy done.     Per ELVIRA Jara,  it is ok for patient to be seen in clinic tomorrow and to enter an order for colonoscopy, cmp, cbc, and psa.     Patient notified of appt times.     WOODY Swann

## 2017-03-24 ENCOUNTER — APPOINTMENT (OUTPATIENT)
Dept: LAB | Facility: CLINIC | Age: 52
End: 2017-03-24
Attending: PHYSICIAN ASSISTANT
Payer: COMMERCIAL

## 2017-03-24 ENCOUNTER — ONCOLOGY VISIT (OUTPATIENT)
Dept: ONCOLOGY | Facility: CLINIC | Age: 52
End: 2017-03-24
Attending: PHYSICIAN ASSISTANT
Payer: COMMERCIAL

## 2017-03-24 ENCOUNTER — HOSPITAL ENCOUNTER (OUTPATIENT)
Facility: CLINIC | Age: 52
End: 2017-03-24
Attending: COLON & RECTAL SURGERY | Admitting: COLON & RECTAL SURGERY

## 2017-03-24 VITALS
BODY MASS INDEX: 31.03 KG/M2 | RESPIRATION RATE: 16 BRPM | HEART RATE: 55 BPM | OXYGEN SATURATION: 98 % | TEMPERATURE: 97.3 F | SYSTOLIC BLOOD PRESSURE: 122 MMHG | WEIGHT: 198.1 LBS | DIASTOLIC BLOOD PRESSURE: 76 MMHG

## 2017-03-24 DIAGNOSIS — G62.9 NEUROPATHY: ICD-10-CM

## 2017-03-24 DIAGNOSIS — N52.2 DRUG-INDUCED ERECTILE DYSFUNCTION: ICD-10-CM

## 2017-03-24 DIAGNOSIS — C61 MALIGNANT NEOPLASM OF PROSTATE (H): ICD-10-CM

## 2017-03-24 DIAGNOSIS — M25.50 MULTIPLE JOINT PAIN: ICD-10-CM

## 2017-03-24 DIAGNOSIS — R61 NIGHT SWEATS: Primary | ICD-10-CM

## 2017-03-24 LAB
ALBUMIN SERPL-MCNC: 4.1 G/DL (ref 3.4–5)
ALP SERPL-CCNC: 52 U/L (ref 40–150)
ALT SERPL W P-5'-P-CCNC: 29 U/L (ref 0–70)
ANION GAP SERPL CALCULATED.3IONS-SCNC: 7 MMOL/L (ref 3–14)
AST SERPL W P-5'-P-CCNC: 17 U/L (ref 0–45)
BASOPHILS # BLD AUTO: 0 10E9/L (ref 0–0.2)
BASOPHILS NFR BLD AUTO: 0.4 %
BILIRUB SERPL-MCNC: 0.5 MG/DL (ref 0.2–1.3)
BUN SERPL-MCNC: 22 MG/DL (ref 7–30)
CALCIUM SERPL-MCNC: 8.9 MG/DL (ref 8.5–10.1)
CHLORIDE SERPL-SCNC: 106 MMOL/L (ref 94–109)
CO2 SERPL-SCNC: 28 MMOL/L (ref 20–32)
CREAT SERPL-MCNC: 0.88 MG/DL (ref 0.66–1.25)
DIFFERENTIAL METHOD BLD: ABNORMAL
EOSINOPHIL # BLD AUTO: 0.1 10E9/L (ref 0–0.7)
EOSINOPHIL NFR BLD AUTO: 1.8 %
ERYTHROCYTE [DISTWIDTH] IN BLOOD BY AUTOMATED COUNT: 12.8 % (ref 10–15)
GFR SERPL CREATININE-BSD FRML MDRD: ABNORMAL ML/MIN/1.7M2
GLUCOSE SERPL-MCNC: 132 MG/DL (ref 70–99)
HCT VFR BLD AUTO: 35.8 % (ref 40–53)
HGB BLD-MCNC: 12 G/DL (ref 13.3–17.7)
IMM GRANULOCYTES # BLD: 0 10E9/L (ref 0–0.4)
IMM GRANULOCYTES NFR BLD: 0.2 %
LYMPHOCYTES # BLD AUTO: 2.1 10E9/L (ref 0.8–5.3)
LYMPHOCYTES NFR BLD AUTO: 43.2 %
MCH RBC QN AUTO: 30.8 PG (ref 26.5–33)
MCHC RBC AUTO-ENTMCNC: 33.5 G/DL (ref 31.5–36.5)
MCV RBC AUTO: 92 FL (ref 78–100)
MONOCYTES # BLD AUTO: 0.5 10E9/L (ref 0–1.3)
MONOCYTES NFR BLD AUTO: 11.1 %
NEUTROPHILS # BLD AUTO: 2.1 10E9/L (ref 1.6–8.3)
NEUTROPHILS NFR BLD AUTO: 43.3 %
NRBC # BLD AUTO: 0 10*3/UL
NRBC BLD AUTO-RTO: 0 /100
PLATELET # BLD AUTO: 227 10E9/L (ref 150–450)
POTASSIUM SERPL-SCNC: 4.7 MMOL/L (ref 3.4–5.3)
PROT SERPL-MCNC: 7.3 G/DL (ref 6.8–8.8)
PSA SERPL-MCNC: 78.49 UG/L (ref 0–4)
RBC # BLD AUTO: 3.9 10E12/L (ref 4.4–5.9)
SODIUM SERPL-SCNC: 140 MMOL/L (ref 133–144)
WBC # BLD AUTO: 4.9 10E9/L (ref 4–11)

## 2017-03-24 PROCEDURE — 99215 OFFICE O/P EST HI 40 MIN: CPT | Mod: ZP | Performed by: PHYSICIAN ASSISTANT

## 2017-03-24 PROCEDURE — 84153 ASSAY OF PSA TOTAL: CPT | Performed by: PHYSICIAN ASSISTANT

## 2017-03-24 PROCEDURE — 85025 COMPLETE CBC W/AUTO DIFF WBC: CPT | Performed by: PHYSICIAN ASSISTANT

## 2017-03-24 PROCEDURE — 99212 OFFICE O/P EST SF 10 MIN: CPT | Mod: ZF

## 2017-03-24 PROCEDURE — 36415 COLL VENOUS BLD VENIPUNCTURE: CPT

## 2017-03-24 PROCEDURE — 80053 COMPREHEN METABOLIC PANEL: CPT | Performed by: PHYSICIAN ASSISTANT

## 2017-03-24 RX ORDER — VENLAFAXINE HYDROCHLORIDE 37.5 MG/1
37.5 CAPSULE, EXTENDED RELEASE ORAL DAILY
Qty: 30 CAPSULE | Refills: 3 | Status: SHIPPED | OUTPATIENT
Start: 2017-03-24 | End: 2017-05-23

## 2017-03-24 ASSESSMENT — PAIN SCALES - GENERAL: PAINLEVEL: NO PAIN (0)

## 2017-03-24 NOTE — MR AVS SNAPSHOT
After Visit Summary   3/24/2017    Albert Amaya    MRN: [id number]           Patient Information     Date Of Birth          1965        Visit Information        Provider Department      3/24/2017 7:00 AM Anay Dan PA-C [facility]        Today's Diagnoses     Night sweats    -  1    Malignant neoplasm of prostate (H)        Multiple joint pain        Neuropathy (H)        Drug-induced erectile dysfunction           Follow-ups after your visit        Your next 10 appointments already scheduled     Mar 28, 2017   Procedure with Guru Tomasz Liriano MD   [facility] [place], [facility] ([facility])    [address] [id number]   [phone]           The [facility] is located on the corner of [address] [facility] [address] and [facility] [address] on the [facility]. It is easily accessible from virtually any point in the [facility] [place] area, via [address] and [address].            May 03, 2017  3:45 PM CDT   [facility] Draw with  [facility] DRAW   [facility] Draw ([facility])    [address] [place] [address] [id number] [phone]            May 03, 2017  4:15 PM CDT   (Arrive by 4:00 PM)   Return Visit with Tyrel Valladares MD   [facility] ([facility])    [address] [place] [id number] [phone]              Who to contact     If you have questions or need follow up information about today's clinic visit or your schedule please contact [facility] directly at [phone].  Normal or non-critical lab and imaging results will be communicated to you by MyChart, letter or phone within 4 business days after the clinic has received the results. If you do not hear from us within 7 days, please contact the clinic through MyChart or phone. If you have  a critical or abnormal lab result, we will notify you by phone as soon as possible.  Submit refill requests through Chewse or call your pharmacy and they will forward the refill request to us. Please allow 3 business days for your refill to be completed.          Additional Information About Your Visit        Qbixhart Information     Chewse gives you secure access to your electronic health record. If you see a primary care provider, you can also send messages to your care team and make appointments. If you have questions, please call your primary care clinic.  If you do not have a primary care provider, please call [phone] and they will assist you.        Care EveryWhere ID     This is your Care EveryWhere ID. This could be used by other organizations to access your [facility] medical records  [id number]        Your Vitals Were     Pulse Temperature Respirations Pulse Oximetry BMI (Body Mass Index)       55 97.3  F (36.3  C) 16 98% 31.03 kg/m2        Blood Pressure from Last 3 Encounters:   03/24/17 122/76   03/01/17 136/88   01/20/17 141/81    Weight from Last 3 Encounters:   03/24/17 89.9 kg (198 lb 1.6 oz)   03/01/17 90.3 kg (199 lb)   01/20/17 89.4 kg (197 lb 1.5 oz)              We Performed the Following     *CBC with platelets differential     Comprehensive metabolic panel     PSA tumor marker          Today's Medication Changes          These changes are accurate as of: 3/24/17 11:59 PM.  If you have any questions, ask your nurse or doctor.               Start taking these medicines.        Dose/Directions    diclofenac 1 % Gel topical gel   Commonly known as:  VOLTAREN   Used for:  Multiple joint pain   Started by:  Anay Dan PA-C        Apply 2-4 grams to knees 2-3 x day prn pain   Quantity:  100 g   Refills:  1       venlafaxine 37.5 MG 24 hr capsule   Commonly known as:  EFFEXOR-XR   Used for:  Night sweats, Malignant neoplasm of prostate (H)   Started by:  Anay Dan PA-C         Dose:  37.5 mg   Take 1 capsule (37.5 mg) by mouth daily   Quantity:  30 capsule   Refills:  3            Where to get your medicines      These medications were sent to [facility] ([place]) - [place], [place] - [address]  [address], [facility] [address]     Phone:  [phone]     diclofenac 1 % Gel topical gel    venlafaxine 37.5 MG 24 hr capsule                Primary Care Provider Office Phone # Fax #    [facility] [phone]       [address]  [facility] [address]        Thank you!     Thank you for choosing [facility]  for your care. Our goal is always to provide you with excellent care. Hearing back from our patients is one way we can continue to improve our services. Please take a few minutes to complete the written survey that you may receive in the mail after your visit with us. Thank you!             Your Updated Medication List - Protect others around you: Learn how to safely use, store and throw away your medicines at www.disposemymeds.org.          This list is accurate as of: 3/24/17 11:59 PM.  Always use your most recent med list.                   Brand Name Dispense Instructions for use    bicalutamide 50 MG tablet    CASODEX    30 tablet    Take 1 tablet (50 mg) by mouth daily       diclofenac 1 % Gel topical gel    VOLTAREN    100 g    Apply 2-4 grams to knees 2-3 x day prn pain       EPINEPHrine 0.3 MG/0.3ML injection    EPIPEN 2-CHARITY    0.6 mL    Inject 0.3 mLs (0.3 mg) into the muscle once as needed for anaphylaxis       Ginger Root 250 MG Caps      Take 250 mg by mouth as needed       IBUPROFEN PO      Take by mouth as needed for moderate pain       LORazepam 0.5 MG tablet    ATIVAN    30 tablet    Take 1 tablet (0.5 mg) by mouth every 4 hours as needed (Anxiety, Nausea/Vomiting or Sleep)       PROBIOTIC DAILY PO      Take by mouth as needed       sildenafil 100 MG  cap/tab    REVATIO/VIAGRA    6 tablet    Take 1 tablet (100 mg) by mouth daily as needed for erectile dysfunction Take 30 min to 4 hours before intercourse.  Never use with nitroglycerin, terazosin or doxazosin.       SYNTHROID 50 MCG tablet   Generic drug:  levothyroxine     90 tablet    TAKE 1 TABLET(50 MCG) BY MOUTH DAILY       tadalafil 20 MG tablet    CIALIS    30 tablet    Take 2 tablets (40 mg) by mouth daily as needed for erectile dysfunction       TYLENOL PO      Take 325 mg by mouth every 8 hours as needed for mild pain or fever       venlafaxine 37.5 MG 24 hr capsule    EFFEXOR-XR    30 capsule    Take 1 capsule (37.5 mg) by mouth daily

## 2017-03-24 NOTE — NURSING NOTE
Chief Complaint   Patient presents with     Labs Only     venipuncture, vitals checked     Unable to get BP, please recheck in clinic

## 2017-03-24 NOTE — NURSING NOTE
"Albert Amaya is a 51 year old male who presents for:  Chief Complaint   Patient presents with     Labs Only     venipuncture, vitals checked     Oncology Clinic Visit     pt unsure of visit        Initial Vitals:  /76  Pulse 55  Temp 97.3  F (36.3  C)  Resp 16  Wt 89.9 kg (198 lb 1.6 oz)  SpO2 98%  BMI 31.03 kg/m2 Estimated body mass index is 31.03 kg/(m^2) as calculated from the following:    Height as of 11/22/16: 1.702 m (5' 7\").    Weight as of this encounter: 89.9 kg (198 lb 1.6 oz).. Body surface area is 2.06 meters squared. BP completed using cuff size: NA (Not Taken)  No Pain (0) No LMP for male patient. Allergies and medications reviewed.     Medications: Medication refills not needed today.  Pharmacy name entered into EPIC:    [id number] - [place], [place] - [address] [facility] - [place], [place] - [address] [facility] - [place], [place] - [address] [facility] [place], [place] - [address] [place] [address] [id number] ([place]) - [place], [place] - [address] AT [facility] [address]    Comments: pt steffi tee    5 minutes for nursing intake (face to face time)   Mihai Rich CMA        "

## 2017-03-26 RX ORDER — BICALUTAMIDE 50 MG/1
50 TABLET, FILM COATED ORAL DAILY
Qty: 30 TABLET | Refills: 11 | COMMUNITY
Start: 2017-03-26 | End: 2017-05-18

## 2017-03-26 NOTE — PROGRESS NOTES
[facility]  FOLLOW-UP VISIT NOTE  Date of visit: Mar 24, 2017         REASON FOR VISIT:   Metastatic Prostate CA treated    - s/p 6 cycles of taxotere 75mg/m2   - s/p orchiectomy on 3/18/2016   - s/p Provenge 5/13, 5/27, 6/10   - currently taking Casodex 50 mg dialy    HPI: Albert is a 51 year old gentleman with metastatic prostate cancer.  Albert felt a cramp in his gluteus muscle and could barely walk after doing some remodeling at home and unloading heavy truck at work. He tried flexeril and prednisone initially but eventually presented to ED on 10/5/15. He feels that initially he was nearly labeled as drug seeker since he was in lot of discomfort and flexeril was not working. But during course of ED visits labs were drawn and he had marked elevation in Alk Phosphatase (over 1000). CT did suggest some ileus with some sclerotic bone lesions. He was admitted to the hospital. His PSA was checked and was markedly elevated at 5760 (10/6/15), repeat value 5563.     Urology and Medical Oncology consults were placed. He was unhappy with the progress in hospital and felt no better and left the following day on 10/6. He did follow up with Dr. Freire and had transrectal US guided biopsy of prostate on 10/8/15. They have the results through my chart which confirms prostate adenocarcinoma - enrico 4+3 involving majority (60-90%) portion of every single core.  He started on taxotere on 10/23 and completed 6 cycles of therapy in 2/2016.  He then underwent orchiectomy on 3/18/2016.     Throughout spring of 2016 Curtis PSA started to progress and after three elevations there was concern about him being hormone refractory.  He was started on Provenge and enrolled in the trial including Indoximod. He presents for the end of study visit today.     INTERVAL HISTORY:   Albert is accompanied by his wife and is being seen for his metastatic disease. He is concerned with new blood in his stool 3 x this  last week.  He has been taking Casodex for 3 weeks now and he noted it was listed in the side effects.  He denies diarrhea, constipation, rectal pain or any abdominal pain.  He has been feeling well and going to th gym to work out.  Doing cardio and just started lifting weights.  Eating healthy and trying to lose weight.  Also has been taking Ibuprofen 400 mg at bedtime for hip/knee pains, also with gabapentin which isn't working well for his hot flashes.     10-point ROS otherwise negative.      MEDS:   Current Outpatient Prescriptions   Medication Sig     venlafaxine (EFFEXOR-XR) 37.5 MG 24 hr capsule Take 1 capsule (37.5 mg) by mouth daily     diclofenac (VOLTAREN) 1 % GEL topical gel Apply 2-4 grams to knees 2-3 x day prn pain     tadalafil (CIALIS) 20 MG tablet Take 2 tablets (40 mg) by mouth daily as needed for erectile dysfunction     IBUPROFEN PO Take by mouth as needed for moderate pain     EPINEPHrine (EPIPEN 2-CHARITY) 0.3 MG/0.3ML injection 2-pack Inject 0.3 mLs (0.3 mg) into the muscle once as needed for anaphylaxis     SYNTHROID 50 MCG tablet TAKE 1 TABLET(50 MCG) BY MOUTH DAILY     sildenafil (VIAGRA) 100 MG tablet Take 1 tablet (100 mg) by mouth daily as needed for erectile dysfunction Take 30 min to 4 hours before intercourse.  Never use with nitroglycerin, terazosin or doxazosin.     Acetaminophen (TYLENOL PO) Take 325 mg by mouth every 8 hours as needed for mild pain or fever     Ginger, Zingiber officinalis, (GINGER ROOT) 250 MG CAPS Take 250 mg by mouth as needed     LORazepam (ATIVAN) 0.5 MG tablet Take 1 tablet (0.5 mg) by mouth every 4 hours as needed (Anxiety, Nausea/Vomiting or Sleep)     Probiotic Product (PROBIOTIC DAILY PO) Take by mouth as needed      No current facility-administered medications for this visit.         EXAM:  ECOG 1  Wt Readings from Last 4 Encounters:   03/24/17 89.9 kg (198 lb 1.6 oz)   03/01/17 90.3 kg (199 lb)   01/20/17 89.4 kg (197 lb 1.5 oz)   12/21/16 88.9 kg (195  lb 14.4 oz)     Vital signs were reviewed.   Patient alert and oriented x3.   PERRLA. EOMI. No scleral icterus noted. OP without thrush/sores.  Neck exam: No palpable cervical, supraclavicular nodes.   Heart: RRR no murmurs noted.   Lungs: clear to auscultation bilaterally.  No crackles or wheezing.   Abd: positive bowel sounds in all four quadrants.  No tenderness to palpation.  No hepatomegaly.   Extremities: No lower extremity edema.   MSK: No tenderness to spine   Neuro: grossly intact.   Mood and affect is stable.     Labs/Imagin2016 16:43 2017 14:06 3/1/2017 16:03 3/24/2017 07:10   Sodium 137 141 141 140   Potassium 4.5 4.4 5.0 4.7   Chloride 106 106 105 106   Carbon Dioxide 24 30 28 28   Urea Nitrogen 20 17 18 22   Creatinine 0.87 0.81 0.89 0.88   GFR Estimate >90... >90... >90... >90...   GFR Estimate If Black >90... >90... >90... >90...   Calcium 9.0 9.2 9.1 8.9   Anion Gap 8 5 9 7   Magnesium 2.2      Albumin 4.1 4.2 4.3 4.1   Protein Total 7.4 7.4 8.1 7.3   Bilirubin Total 0.5 1.0 0.5 0.5   Alkaline Phosphatase 49 48 54 52   ALT 34 31 34 29   AST 24 16 20 17   Lactate Dehydrogenase 301 (H) 216 205    PSA 60.68 (H) 75.43 (H) 86.81 (H) 78.49 (H)   T4 Free   0.82    TSH   5.28 (H)    Glucose 98 97 85 132 (H)   WBC 5.0 4.9 5.3 4.9   Hemoglobin 11.8 (L) 11.9 (L) 12.9 (L) 12.0 (L)   Hematocrit 34.8 (L) 35.1 (L) 37.4 (L) 35.8 (L)   Platelet Count 257 256 268 227   RBC Count 3.72 (L) 3.83 (L) 4.15 (L) 3.90 (L)   MCV 94 92 90 92       ASSESSMENT/PLAN: 51 year old  with a diagnosis of metastatic prostate to the bone.  He is s/p 6 cycles of docetaxol (CHAARTED study) and recent bilateral orchiectomy on 3/18/2016. Completed clinical trial with Provenge followed by Indoximod versus placebo.  His PSA has been trending up and he was started on Casodex 50 mg in 2017.     ECOG PS 0  Hypothyroidism  Hot flashes  Weight gain  Blood in stool    Albert is concerned with his PSA progression and new  blood in stool.  It appears in the bowel and not in the stool (he brought a picture to review).  There is no pain involved and they are likely internal hemmhoroids.  I am concerned that with the new Ibuprofen at night and lifting weights this is likely the cause.  However, his disease has progressed recently (by psa only- not on imaging) and there exists some concern that this could be disease in the rectum.  We will pursue colonoscopy next week by our GI team to tessa.  OK to stay on casodex, but stop Ibuprofen.  Will switch him to Effexor for his hot flashes and stop the gabapentin.     His PSA has continued to steadily rise. I would suggest that we start casodex 50 mg po daily. I have filled in prescription. I reviewed gynecomastia. I have asked him to give us a call if he does have this side effect. We could radiate his breast and this would alleviate the pain.         Over 40 min of direct face to face time spent with patient with more than 50% time spent in counseling and coordinating care.      Melanie Dan PA-C

## 2017-03-27 ENCOUNTER — TELEPHONE (OUTPATIENT)
Dept: GASTROENTEROLOGY | Facility: CLINIC | Age: 52
End: 2017-03-27

## 2017-03-27 DIAGNOSIS — Z12.11 ENCOUNTER FOR SCREENING COLONOSCOPY: Primary | ICD-10-CM

## 2017-03-27 NOTE — TELEPHONE ENCOUNTER
Patient scheduled for Colonoscopy    Indication for procedure. Malignant neoplasm of prostate    Referring Provider. Anay Dan PA-C    ? Not Needed    Arrival time verified? Yes, 1400    Facility location verified? Yes, [address]    Instructions given regarding prep and procedure    Prep Type Golytely    Are you taking any anticoagulants or blood thinners? No    Instructions given? N/A    Electronic implanted devices? No    Barriers to learning? No    Pre procedure teaching completed? Yes    Transportation from procedure? Wife, Wife will stay with patient after procedure    H&P / Pre op physical completed? N/A

## 2017-03-28 ENCOUNTER — HOSPITAL ENCOUNTER (OUTPATIENT)
Facility: CLINIC | Age: 52
Discharge: HOME | End: 2017-03-28
Attending: INTERNAL MEDICINE | Admitting: INTERNAL MEDICINE
Payer: COMMERCIAL

## 2017-03-28 VITALS
RESPIRATION RATE: 14 BRPM | SYSTOLIC BLOOD PRESSURE: 102 MMHG | DIASTOLIC BLOOD PRESSURE: 88 MMHG | OXYGEN SATURATION: 97 %

## 2017-03-28 LAB — COLONOSCOPY: NORMAL

## 2017-03-28 PROCEDURE — 25000128 H RX IP 250 OP 636: Performed by: INTERNAL MEDICINE

## 2017-03-28 PROCEDURE — 25000125 ZZHC RX 250: Performed by: INTERNAL MEDICINE

## 2017-03-28 PROCEDURE — G0500 MOD SEDAT ENDO SERVICE >5YRS: HCPCS | Performed by: INTERNAL MEDICINE

## 2017-03-28 PROCEDURE — 45378 DIAGNOSTIC COLONOSCOPY: CPT | Performed by: INTERNAL MEDICINE

## 2017-03-28 RX ORDER — FENTANYL CITRATE 50 UG/ML
INJECTION, SOLUTION INTRAMUSCULAR; INTRAVENOUS PRN
Status: DISCONTINUED | OUTPATIENT
Start: 2017-03-28 | End: 2017-04-03 | Stop reason: HOSPADM

## 2017-03-28 NOTE — IP AVS SNAPSHOT
[facility], [place], Endoscopy    [address] [address]    Phone:  [phone]                                       After Visit Summary   3/28/2017    Albert Amaya    MRN: [id number]           After Visit Summary Signature Page     I have received my discharge instructions, and my questions have been answered. I have discussed any challenges I see with this plan with the nurse or doctor.    ..........................................................................................................................................  Patient/Patient Representative Signature      ..........................................................................................................................................  Patient Representative Print Name and Relationship to Patient    ..................................................               ................................................  Date                                            Time    ..........................................................................................................................................  Reviewed by Signature/Title    ...................................................              ..............................................  Date                                                            Time

## 2017-03-28 NOTE — IP AVS SNAPSHOT
MRN:[id number]                      After Visit Summary   3/28/2017    Albert Amaya    MRN: [id number]           Thank you!     Thank you for choosing [facility] for your care. Our goal is always to provide you with excellent care. Hearing back from our patients is one way we can continue to improve our services. Please take a few minutes to complete the written survey that you may receive in the mail after you visit with us. Thank you!        Patient Information     Date Of Birth          1965        About your hospital stay     You were admitted on:  March 28, 2017 You last received care in the:  [facility], Endoscopy    You were discharged on:  March 28, 2017       Who to Call     For medical emergencies, please call 911.  For non-urgent questions about your medical care, please call your primary care provider or clinic, [phone]  For questions related to your surgery, please call your surgery clinic        Attending Provider     Provider Guru Tomasz Ventura MD Gastroenterology       Primary Care Provider Office Phone # Fax #    [facility] [phone]       [address]  [facility] [address]        Your next 10 appointments already scheduled     May 03, 2017  3:45 PM CDT   [facility] with  [facility]   [facility] Lab Draw ([facility])    [address] [place] [address] [phone]            May 03, 2017  4:15 PM CDT   (Arrive by 4:00 PM)   Return Visit with Tyrel Valladares MD   [facility] ([facility])    [address] [place] [address] [phone]              Further instructions from your care team       Discharge Instructions after Colonoscopy  or Sigmoidoscopy    Today you had a _x___ Colonoscopy     Activity and Diet  You were given medicine for pain. You may  be dizzy or sleepy.  For 24 hours:    Do not drive or use heavy equipment.    Do not make important decisions.    Do not drink any alcohol.  You may return to your normal diet and medicines.    Discomfort    Air was placed in your colon during the exam in order to see it. Walking helps to pass the air.    You may take Tylenol (acetaminophen) for pain unless your doctor has told you not to.  Follow-up    As needed    When to call:    Call right away if you have:    Unusual pain in belly or chest pain not relieved with passing air.    More than 1 to 2 Tablespoons of bleeding from your rectum.    Fever above 100.6  F (37.5  C).    If you have severe pain, bleeding, or shortness of breath, go to an emergency room.    If you have questions, call:  Monday to Friday, 7 a.m. to 4:30 p.m.  Endoscopy: [phone] (We may have to call you back)    After hours  Hospital: [phone] (Ask for the GI fellow on call)    Pending Results     No orders found from 3/26/2017 to 3/29/2017.            Admission Information     Date & Time Provider Department Dept. Phone    3/28/2017 Guru Tomasz Liriano MD [facility], [place], Endoscopy [phone]      Your Vitals Were     Blood Pressure Respirations Pulse Oximetry             104/63 15 96%         MyChart Information     [facility] gives you secure access to your electronic health record. If you see a primary care provider, you can also send messages to your care team and make appointments. If you have questions, please call your primary care clinic.  If you do not have a primary care provider, please call [phone] and they will assist you.        Care EveryWhere ID     This is your Care EveryWhere ID. This could be used by other organizations to access your [place] medical records  [id number]           Review of your medicines      UNREVIEWED medicines. Ask your doctor about these medicines        Dose / Directions    bicalutamide 50 MG tablet   Commonly known as:   "CASODEX   Used for:  Neuropathy (H), Drug-induced erectile dysfunction        Dose:  50 mg   Take 1 tablet (50 mg) by mouth daily   Quantity:  30 tablet   Refills:  11       diclofenac 1 % Gel topical gel   Commonly known as:  VOLTAREN   Used for:  Multiple joint pain        Apply 2-4 grams to knees 2-3 x day prn pain   Quantity:  100 g   Refills:  1       EPINEPHrine 0.3 MG/0.3ML injection   Commonly known as:  EPIPEN 2-CHARITY   Used for:  Malignant neoplasm of prostate (H), Bone metastasis (H)        Dose:  0.3 mg   Inject 0.3 mLs (0.3 mg) into the muscle once as needed for anaphylaxis   Quantity:  0.6 mL   Refills:  1       Ginger Root 250 MG Caps        Dose:  250 mg   Take 250 mg by mouth as needed   Refills:  0       IBUPROFEN PO        Take by mouth as needed for moderate pain   Refills:  0       LORazepam 0.5 MG tablet   Commonly known as:  ATIVAN   Used for:  Malignant neoplasm of prostate (H), Bone metastasis (H)        Dose:  0.5 mg   Take 1 tablet (0.5 mg) by mouth every 4 hours as needed (Anxiety, Nausea/Vomiting or Sleep)   Quantity:  30 tablet   Refills:  5       polyethylene glycol 236 G suspension   Commonly known as:  GoLYTELY/NuLYTELY   Used for:  Encounter for screening colonoscopy   Ask about: Should I take this medication?        Dose:  4 L   Take 4,000 mLs (4 L) by mouth once for 1 dose Refer to \"Getting Ready for a Colonoscopy\" instruction handout   Quantity:  4000 mL   Refills:  0       PROBIOTIC DAILY PO   Used for:  Malignant neoplasm of prostate (H), Bone metastasis (H)        Take by mouth as needed   Refills:  0       sildenafil 100 MG cap/tab   Commonly known as:  REVATIO/VIAGRA   Used for:  Erectile dysfunction due to arterial insufficiency, Malignant neoplasm of prostate (H), Bone metastasis (H), Drug-induced erectile dysfunction        Dose:  100 mg   Take 1 tablet (100 mg) by mouth daily as needed for erectile dysfunction Take 30 min to 4 hours before intercourse.  Never use with " nitroglycerin, terazosin or doxazosin.   Quantity:  6 tablet   Refills:  6       SYNTHROID 50 MCG tablet   Used for:  Hypothyroidism   Generic drug:  levothyroxine        TAKE 1 TABLET(50 MCG) BY MOUTH DAILY   Quantity:  90 tablet   Refills:  1       tadalafil 20 MG tablet   Commonly known as:  CIALIS   Used for:  Malignant neoplasm of prostate (H), Bone metastasis (H)        Dose:  40 mg   Take 2 tablets (40 mg) by mouth daily as needed for erectile dysfunction   Quantity:  30 tablet   Refills:  6       TYLENOL PO        Dose:  325 mg   Take 325 mg by mouth every 8 hours as needed for mild pain or fever   Refills:  0       venlafaxine 37.5 MG 24 hr capsule   Commonly known as:  EFFEXOR-XR   Used for:  Night sweats, Malignant neoplasm of prostate (H)        Dose:  37.5 mg   Take 1 capsule (37.5 mg) by mouth daily   Quantity:  30 capsule   Refills:  3                Protect others around you: Learn how to safely use, store and throw away your medicines at www.disposemymeds.org.             Medication List: This is a list of all your medications and when to take them. Check marks below indicate your daily home schedule. Keep this list as a reference.      Medications           Morning Afternoon Evening Bedtime As Needed    bicalutamide 50 MG tablet   Commonly known as:  CASODEX   Take 1 tablet (50 mg) by mouth daily                                diclofenac 1 % Gel topical gel   Commonly known as:  VOLTAREN   Apply 2-4 grams to knees 2-3 x day prn pain                                EPINEPHrine 0.3 MG/0.3ML injection   Commonly known as:  EPIPEN 2-CHARITY   Inject 0.3 mLs (0.3 mg) into the muscle once as needed for anaphylaxis                                Ginger Root 250 MG Caps   Take 250 mg by mouth as needed                                IBUPROFEN PO   Take by mouth as needed for moderate pain                                LORazepam 0.5 MG tablet   Commonly known as:  ATIVAN   Take 1 tablet (0.5 mg) by mouth every  "4 hours as needed (Anxiety, Nausea/Vomiting or Sleep)                                PROBIOTIC DAILY PO   Take by mouth as needed                                sildenafil 100 MG cap/tab   Commonly known as:  REVATIO/VIAGRA   Take 1 tablet (100 mg) by mouth daily as needed for erectile dysfunction Take 30 min to 4 hours before intercourse.  Never use with nitroglycerin, terazosin or doxazosin.                                SYNTHROID 50 MCG tablet   TAKE 1 TABLET(50 MCG) BY MOUTH DAILY   Generic drug:  levothyroxine                                tadalafil 20 MG tablet   Commonly known as:  CIALIS   Take 2 tablets (40 mg) by mouth daily as needed for erectile dysfunction                                TYLENOL PO   Take 325 mg by mouth every 8 hours as needed for mild pain or fever                                venlafaxine 37.5 MG 24 hr capsule   Commonly known as:  EFFEXOR-XR   Take 1 capsule (37.5 mg) by mouth daily                                  ASK your doctor about these medications           Morning Afternoon Evening Bedtime As Needed    polyethylene glycol 236 G suspension   Commonly known as:  GoLYTELY/NuLYTELY   Take 4,000 mLs (4 L) by mouth once for 1 dose Refer to \"Getting Ready for a Colonoscopy\" instruction handout   Ask about: Should I take this medication?                                  "

## 2017-03-28 NOTE — DISCHARGE INSTRUCTIONS
Discharge Instructions after Colonoscopy  or Sigmoidoscopy    Today you had a _x___ Colonoscopy     Activity and Diet  You were given medicine for pain. You may be dizzy or sleepy.  For 24 hours:    Do not drive or use heavy equipment.    Do not make important decisions.    Do not drink any alcohol.  You may return to your normal diet and medicines.    Discomfort    Air was placed in your colon during the exam in order to see it. Walking helps to pass the air.    You may take Tylenol (acetaminophen) for pain unless your doctor has told you not to.  Follow-up    As needed    When to call:    Call right away if you have:    Unusual pain in belly or chest pain not relieved with passing air.    More than 1 to 2 Tablespoons of bleeding from your rectum.    Fever above 100.6  F (37.5  C).    If you have severe pain, bleeding, or shortness of breath, go to an emergency room.    If you have questions, call:  Monday to Friday, 7 a.m. to 4:30 p.m.  Endoscopy: [phone] (We may have to call you back)    After hours  Hospital: [phone] (Ask for the GI fellow on call)

## 2017-04-03 DIAGNOSIS — E03.9 HYPOTHYROIDISM, UNSPECIFIED TYPE: ICD-10-CM

## 2017-04-03 RX ORDER — LEVOTHYROXINE SODIUM 75 UG/1
75 TABLET ORAL DAILY
Qty: 90 TABLET | Refills: 1 | Status: SHIPPED | OUTPATIENT
Start: 2017-04-03 | End: 2018-02-07

## 2017-04-03 NOTE — TELEPHONE ENCOUNTER
Levothyroxine (Synthroid) 75 mcg tablet     Last Written Prescription Date: 9/7/2016  Last Quantity: 90, # refills: 1  Last Office Visit with G, [facility] or [facility] prescribing provider: 3/24/2017 with ELVIRA Castellano  Next Appt: 5/3/2017 with Dr. Valladares        TSH   Date Value Ref Range Status   03/01/2017 5.28 (H) 0.40 - 4.00 mU/L Final

## 2017-05-03 ENCOUNTER — ONCOLOGY VISIT (OUTPATIENT)
Dept: ONCOLOGY | Facility: CLINIC | Age: 52
End: 2017-05-03
Attending: INTERNAL MEDICINE
Payer: COMMERCIAL

## 2017-05-03 ENCOUNTER — APPOINTMENT (OUTPATIENT)
Dept: LAB | Facility: CLINIC | Age: 52
End: 2017-05-03
Attending: INTERNAL MEDICINE
Payer: COMMERCIAL

## 2017-05-03 VITALS
SYSTOLIC BLOOD PRESSURE: 122 MMHG | TEMPERATURE: 97.4 F | DIASTOLIC BLOOD PRESSURE: 86 MMHG | HEART RATE: 96 BPM | RESPIRATION RATE: 16 BRPM | WEIGHT: 196.6 LBS | OXYGEN SATURATION: 95 % | BODY MASS INDEX: 30.79 KG/M2

## 2017-05-03 DIAGNOSIS — C79.51 BONE METASTASIS: ICD-10-CM

## 2017-05-03 DIAGNOSIS — C61 MALIGNANT NEOPLASM OF PROSTATE (H): ICD-10-CM

## 2017-05-03 LAB
ALBUMIN SERPL-MCNC: 4.4 G/DL (ref 3.4–5)
ALP SERPL-CCNC: 52 U/L (ref 40–150)
ALT SERPL W P-5'-P-CCNC: 30 U/L (ref 0–70)
ANION GAP SERPL CALCULATED.3IONS-SCNC: 7 MMOL/L (ref 3–14)
AST SERPL W P-5'-P-CCNC: 19 U/L (ref 0–45)
BASOPHILS # BLD AUTO: 0 10E9/L (ref 0–0.2)
BASOPHILS NFR BLD AUTO: 0.7 %
BILIRUB SERPL-MCNC: 0.6 MG/DL (ref 0.2–1.3)
BUN SERPL-MCNC: 13 MG/DL (ref 7–30)
CALCIUM SERPL-MCNC: 9.1 MG/DL (ref 8.5–10.1)
CHLORIDE SERPL-SCNC: 106 MMOL/L (ref 94–109)
CO2 SERPL-SCNC: 28 MMOL/L (ref 20–32)
CREAT SERPL-MCNC: 0.78 MG/DL (ref 0.66–1.25)
DIFFERENTIAL METHOD BLD: ABNORMAL
EOSINOPHIL # BLD AUTO: 0.2 10E9/L (ref 0–0.7)
EOSINOPHIL NFR BLD AUTO: 2.6 %
ERYTHROCYTE [DISTWIDTH] IN BLOOD BY AUTOMATED COUNT: 12.9 % (ref 10–15)
GFR SERPL CREATININE-BSD FRML MDRD: NORMAL ML/MIN/1.7M2
GLUCOSE SERPL-MCNC: 85 MG/DL (ref 70–99)
HCT VFR BLD AUTO: 36.1 % (ref 40–53)
HGB BLD-MCNC: 11.9 G/DL (ref 13.3–17.7)
IMM GRANULOCYTES # BLD: 0 10E9/L (ref 0–0.4)
IMM GRANULOCYTES NFR BLD: 0.3 %
LDH SERPL L TO P-CCNC: 207 U/L (ref 85–227)
LYMPHOCYTES # BLD AUTO: 2.6 10E9/L (ref 0.8–5.3)
LYMPHOCYTES NFR BLD AUTO: 45.2 %
MCH RBC QN AUTO: 30.3 PG (ref 26.5–33)
MCHC RBC AUTO-ENTMCNC: 33 G/DL (ref 31.5–36.5)
MCV RBC AUTO: 92 FL (ref 78–100)
MONOCYTES # BLD AUTO: 0.4 10E9/L (ref 0–1.3)
MONOCYTES NFR BLD AUTO: 7.2 %
NEUTROPHILS # BLD AUTO: 2.6 10E9/L (ref 1.6–8.3)
NEUTROPHILS NFR BLD AUTO: 44 %
NRBC # BLD AUTO: 0 10*3/UL
NRBC BLD AUTO-RTO: 0 /100
PLATELET # BLD AUTO: 247 10E9/L (ref 150–450)
POTASSIUM SERPL-SCNC: 4.2 MMOL/L (ref 3.4–5.3)
PROT SERPL-MCNC: 7.9 G/DL (ref 6.8–8.8)
PSA SERPL-MCNC: 106 UG/L (ref 0–4)
RBC # BLD AUTO: 3.93 10E12/L (ref 4.4–5.9)
SODIUM SERPL-SCNC: 141 MMOL/L (ref 133–144)
WBC # BLD AUTO: 5.8 10E9/L (ref 4–11)

## 2017-05-03 PROCEDURE — 85025 COMPLETE CBC W/AUTO DIFF WBC: CPT | Performed by: INTERNAL MEDICINE

## 2017-05-03 PROCEDURE — 99215 OFFICE O/P EST HI 40 MIN: CPT | Mod: ZP | Performed by: INTERNAL MEDICINE

## 2017-05-03 PROCEDURE — 99212 OFFICE O/P EST SF 10 MIN: CPT | Mod: ZF

## 2017-05-03 PROCEDURE — 84153 ASSAY OF PSA TOTAL: CPT | Performed by: INTERNAL MEDICINE

## 2017-05-03 PROCEDURE — 36415 COLL VENOUS BLD VENIPUNCTURE: CPT

## 2017-05-03 PROCEDURE — 83615 LACTATE (LD) (LDH) ENZYME: CPT | Performed by: INTERNAL MEDICINE

## 2017-05-03 PROCEDURE — 80053 COMPREHEN METABOLIC PANEL: CPT | Performed by: INTERNAL MEDICINE

## 2017-05-03 ASSESSMENT — PAIN SCALES - GENERAL: PAINLEVEL: NO PAIN (0)

## 2017-05-03 NOTE — PROGRESS NOTES
[facility] CANCER CLINIC  FOLLOW-UP VISIT NOTE  Date of visit: May 3, 2017         REASON FOR VISIT:   Metastatic Prostate CA treated    - s/p 6 cycles of taxotere 75mg/m2   - s/p orchiectomy on 3/18/2016   - s/p Provenge 5/13, 5/27, 6/10   - currently taking Casodex 50 mg dialy    HPI: Albert is a 51 year old gentleman with metastatic prostate cancer.  Albert felt a cramp in his gluteus muscle and could barely walk after doing some remodeling at home and unloading heavy truck at work. He tried flexeril and prednisone initially but eventually presented to ED on 10/5/15. He feels that initially he was nearly labeled as drug seeker since he was in lot of discomfort and flexeril was not working. But during course of ED visits labs were drawn and he had marked elevation in Alk Phosphatase (over 1000). CT did suggest some ileus with some sclerotic bone lesions. He was admitted to the hospital. His PSA was checked and was markedly elevated at 5760 (10/6/15), repeat value 5563.     Urology and Medical Oncology consults were placed. He was unhappy with the progress in hospital and felt no better and left the following day on 10/6. He did follow up with Dr. Freire and had transrectal US guided biopsy of prostate on 10/8/15. They have the results through my chart which confirms prostate adenocarcinoma - enrico 4+3 involving majority (60-90%) portion of every single core.  He started on taxotere on 10/23 and completed 6 cycles of therapy in 2/2016.  He then underwent orchiectomy on 3/18/2016.     Throughout spring of 2016 Curtis PSA started to progress and after three elevations there was concern about him being hormone refractory.  He was started on Provenge and enrolled in the trial including Indoximod. He presents for the end of study visit today.     INTERVAL HISTORY:   Albert is accompanied by his wife and is being seen for his metastatic prostate cancer.     He has been doing well and has no new  complains. He continues to have hot flashes. He has gained some weight.     10-point ROS otherwise negative.      MEDS:   Current Outpatient Prescriptions   Medication Sig     levothyroxine (SYNTHROID/LEVOTHROID) 75 MCG tablet Take 1 tablet (75 mcg) by mouth daily     bicalutamide (CASODEX) 50 MG tablet Take 1 tablet (50 mg) by mouth daily     venlafaxine (EFFEXOR-XR) 37.5 MG 24 hr capsule Take 1 capsule (37.5 mg) by mouth daily     diclofenac (VOLTAREN) 1 % GEL topical gel Apply 2-4 grams to knees 2-3 x day prn pain     tadalafil (CIALIS) 20 MG tablet Take 2 tablets (40 mg) by mouth daily as needed for erectile dysfunction     IBUPROFEN PO Take by mouth as needed for moderate pain     EPINEPHrine (EPIPEN 2-CHARITY) 0.3 MG/0.3ML injection 2-pack Inject 0.3 mLs (0.3 mg) into the muscle once as needed for anaphylaxis     sildenafil (VIAGRA) 100 MG tablet Take 1 tablet (100 mg) by mouth daily as needed for erectile dysfunction Take 30 min to 4 hours before intercourse.  Never use with nitroglycerin, terazosin or doxazosin.     Acetaminophen (TYLENOL PO) Take 325 mg by mouth every 8 hours as needed for mild pain or fever     Ginger, Zingiber officinalis, (GINGER ROOT) 250 MG CAPS Take 250 mg by mouth as needed     LORazepam (ATIVAN) 0.5 MG tablet Take 1 tablet (0.5 mg) by mouth every 4 hours as needed (Anxiety, Nausea/Vomiting or Sleep)     Probiotic Product (PROBIOTIC DAILY PO) Take by mouth as needed      No current facility-administered medications for this visit.         EXAM:  ECOG 1  Wt Readings from Last 4 Encounters:   05/03/17 89.2 kg (196 lb 9.6 oz)   03/24/17 89.9 kg (198 lb 1.6 oz)   03/01/17 90.3 kg (199 lb)   01/20/17 89.4 kg (197 lb 1.5 oz)     Vital signs were reviewed.   Patient alert and oriented x3.   PERRLA. EOMI. No scleral icterus noted. OP without thrush/sores.  Neck exam: No palpable cervical, supraclavicular nodes.   Heart: RRR no murmurs noted.   Lungs: clear to auscultation bilaterally.  No  crackles or wheezing.   Abd: positive bowel sounds in all four quadrants.  No tenderness to palpation.  No hepatomegaly.   Extremities: No lower extremity edema.   MSK: No tenderness to spine   Neuro: grossly intact.   Mood and affect is stable.     Labs/Imaging:  Recent Labs   Lab Test  05/03/17   1529  03/24/17   0710  03/01/17   1603  01/20/17   1406  12/21/16   1643   NA  141  140  141  141  137   POTASSIUM  4.2  4.7  5.0  4.4  4.5   CHLORIDE  106  106  105  106  106   CO2  28  28  28  30  24   ANIONGAP  7  7  9  5  8   BUN  13  22  18  17  20   CR  0.78  0.88  0.89  0.81  0.87   GLC  85  132*  85  97  98   WILL  9.1  8.9  9.1  9.2  9.0     Recent Labs   Lab Test  12/21/16   1643  11/22/16   1221  10/26/16   1150  09/28/16   1137  08/17/16   1355   05/03/16   0955   MAG  2.2  2.4*  2.2  2.3  2.0   < >  2.0   PHOS   --    --    --    --    --    --   4.1    < > = values in this interval not displayed.     Recent Labs   Lab Test  05/03/17   1529  03/24/17   0710  03/01/17   1603  01/20/17   1406  12/21/16   1643   WBC  5.8  4.9  5.3  4.9  5.0   HGB  11.9*  12.0*  12.9*  11.9*  11.8*   PLT  247  227  268  256  257   MCV  92  92  90  92  94   NEUTROPHIL  44.0  43.3  48.0  44.7  47.8     Recent Labs   Lab Test  05/03/17   1529  03/24/17   0710  03/01/17   1603  01/20/17   1406   BILITOTAL  0.6  0.5  0.5  1.0   ALKPHOS  52  52  54  48   ALT  30  29  34  31   AST  19  17  20  16   ALBUMIN  4.4  4.1  4.3  4.2   LDH  207   --   205  216     TSH   Date Value Ref Range Status   03/01/2017 5.28 (H) 0.40 - 4.00 mU/L Final   11/22/2016 4.62 (H) 0.40 - 4.00 mU/L Final   10/26/2016 5.98 (H) 0.40 - 4.00 mU/L Final     No results for input(s): CEA in the last 72781 hours.  Results for orders placed or performed during the hospital encounter of 11/22/16   CT Chest/Abdomen/Pelvis w Contrast    Narrative    EXAMINATION: CT CHEST/ABDOMEN/PELVIS W CONTRAST, 11/22/2016 11:32 AM    TECHNIQUE:  Helical CT images from the thoracic inlet  through the  symphysis pubis were obtained  with contrast. Contrast dose: 119 mL  [id number]    COMPARISON: Bone scan dated 8/15/2016. CT of the chest, abdomen and  pelvis on 8/15/2016.    HISTORY: Prostate cancer. Restaging.    FINDINGS:    Chest: Cardiac size is within normal limits. No pericardial effusion.  No central pulmonary embolism. No pathologically enlarged mediastinal,  hilar or axillary lymph nodes.    No pleural effusion or pneumothorax. Trace bibasilar dependent  opacities consistent with atelectasis. 3 mm wedge-shaped nodule in the  right minor fissure likely represents an intrapulmonary lymph node.    Abdomen and pelvis: The liver, gallbladder, adrenal glands, kidneys,  pancreas and spleen are unremarkable. The stomach, small bowel and  colon are within normal limits. The appendix is normal. No  extraluminal air or free fluid. No suspicious adenopathy. Bladder is  minimally distended and otherwise unremarkable.    Bones and soft tissues: Stable appearance of diffuse sclerosis  throughout the visualized spine, sacrum and pelvis. Prominent  Schmorl's deformities in superior endplates of T6, T8 and T12.  Unchanged diffuse patchy sclerosis of the ribs, scapula and femurs.  Redemonstration of patchy sclerotic foci within the sternum.      Impression    IMPRESSION: In this patient with history of metastatic prostate cancer  stable appearance of diffuse osteoblastic metastases throughout the  axial and visualized appendicular skeleton.    I have personally reviewed the examination and initial interpretation  and I agree with the findings.    PATRICIA WELDON MD     Recent Labs   Lab Test  05/03/17   1529  03/24/17   0710  03/01/17   1603  01/20/17   1406  12/21/16   1643  11/22/16   1222   05/03/16   0955   PSA  106.00*  78.49*  86.81*  75.43*  60.68*   --    < >  67.04*   TESTOST   --    --    --    --   <2*  2*   --   <2*    < > = values in this interval not displayed.          ASSESSMENT/PLAN: 51  year old  with a diagnosis of metastatic prostate to the bone.  He is s/p 6 cycles of docetaxol (CHAARTED study) and recent bilateral orchiectomy on 3/18/2016. Completed clinical trial with Provenge followed by Indoximod versus placebo.  His PSA has been trending up and he was started on Casodex 50 mg in [facility] of 2017.     ECOG PS 0  Hypothyroidism  Hot flashes  Weight gain    I had a lengthy discussion with Albert in presence of his wife. The mood got quite somber after I reviewed the PSA results with him. He had been started on bicalutamide (casodex) in mid March. He seems to be progressing and had no response with this therapy. I think it is reasonable to move to next line of therapy with abiraterone and prednisone.     Abiraterone inhibits CYP 17 alpha hydroxylase:lyase enzyme which is a rate limiting (crucial enzyme) in steroid and therefore androgen biosynthesis. Abiraterone inhibits androgen including testosterone synthesis in adrenal glands, testes and possibly also at tumor site. In a  double-blind study (COU-AA - II ), 1088 chemotherapy naive patients with castration resistant prostate cancer were randomized 1:1 to abiraterone acetate (1000 mg) plus prednisone (5 mg twice daily) or placebo plus prednisone (N Engl J Med. Bandar 10, 2013; 368(2): 138-148). The study was unblinded after first planned interim analysis as treatment with abiraterone acetate-prednisone resulted in a 57% reduction in the risk of radiographic progression or death (hazard ratio [HR], 0.43; 95% confidence interval [CI]: 0.35 to 0.52; P<0.001. In a similar study (COU-AA-I) in patients who had previously progressed on chemotherapy, treatment with abiraterone led to improved overall survival as compared to placebo (14.8 months vs. 10.9 months; hazard ratio, 0.65; 95% confidence interval, 0.54 to 0.77; P<0.001) (N Engl J Med. 2011 May 26;364(21):0633-1307).   Abiraterone is well tolerated. It is recommended to be taken on an empty stomach (no  food 2 hrs prior to and 1 hr after medication). Fatty food might increase the absorption of abiraterone. This is a relatively safe drug and I would suggest taking the drug rather than missing doses. Abiraterone can cause nausea, vomiting, fatigue, hypokalemia, edema, LFT abnormalities - though most of my patients have not complained of a thing.   Prednisone which is prescribed as a physiologic steroid replacement on the other hand is taken with food.     I have consulted our pharmacist to follow him while on therapy as a part of our outpatient drug monitoring program. I will have him return in 4 weeks with labs to ensure that he is tolerating this well and also LFT are stable.     I would recommend restaging scans for him to establish new baseline as his last scans were over 6 months ago. He was not happy with this thought. I explained him that it would serve as a new baseline as his previous PSA was nearly half its current value.     He was also unhappy with the need for prednisone. I explained him that it is physiologic replacement. We could attempt 5 mg dose daily to begin with and follow him closely.     He is worried about the kids and the stress all of this would place on them.     I will see him in 4-6 weeks time with labs to assess how well he is tolerating therapy.     Over 45 min of direct face to face time spent with patient with more than 50% time spent in counseling and coordinating care.

## 2017-05-03 NOTE — NURSING NOTE
"Oncology Rooming Note    May 3, 2017 4:20 PM   Albert Amaya is a 51 year old male who presents for:    Chief Complaint   Patient presents with     Blood Draw     Labs drawn by RN from T. VS taken.      Oncology Clinic Visit     Malignant neoplasm of prostate (H)     Initial Vitals: /86  Pulse 96  Temp 97.4  F (36.3  C) (Oral)  Resp 16  Wt 89.2 kg (196 lb 9.6 oz)  SpO2 95%  BMI 30.79 kg/m2 Estimated body mass index is 30.79 kg/(m^2) as calculated from the following:    Height as of 11/22/16: 1.702 m (5' 7\").    Weight as of this encounter: 89.2 kg (196 lb 9.6 oz). Body surface area is 2.05 meters squared.  No Pain (0) Comment: Data Unavailable   No LMP for male patient.  Allergies reviewed: Yes  Medications reviewed: Yes    Medications: MEDICATION REFILLS NEEDED TODAY. Provider was notified.  Pharmacy name entered into The Rounds:    [id number] - [place], [place] - [address] [facility] - [place], [place] - [address] [facility] - [place], [place] - [address] [facility] - [place], [place] - [address] [place] [address] DRUG STORE [facility] ([place]) - [place], [place] - [address] AT [facility] [address]    Clinical concerns: BP was checked manually with large cuff: 122/86  Dr Valladares was notified.    6 minutes for nursing intake (face to face time)     Enriqueta Shabazz CMA              "

## 2017-05-03 NOTE — NURSING NOTE
Chief Complaint   Patient presents with     Blood Draw     Labs drawn by RN from VPT. VS taken.      Labs drawn from R AC.  Elevated /103- pt refused recheck with automatic VS machine, requesting recheck in clinic with manual cuff. MD notified.   Fabiana Lewis RN

## 2017-05-03 NOTE — MR AVS SNAPSHOT
After Visit Summary   5/3/2017    Albert Amaya    MRN: [id number]           Patient Information     Date Of Birth          1965        Visit Information        Provider Department      5/3/2017 4:15 PM Tyrel Valladares MD [facility]        Today's Diagnoses     Malignant neoplasm of prostate (H)        Bone metastasis (H)           Follow-ups after your visit        Your next 10 appointments already scheduled     May 08, 2017  8:00 AM CDT   NM INJECTION with UUNMINJ1   [facility], [place], Nuclear Medicine ([facility])    [address] [facility] [place] [address] [phone]            May 08, 2017  8:40 AM CDT   CT CHEST/ABDOMEN/PELVIS W CONTRAST with UUCT1   [facility] [place], CT ([facility])    [address] [place] [id number] [phone]           Please bring any scans or X-rays taken at other hospitals, if similar tests were done. Also bring a list of your medicines, including vitamins, minerals and over-the-counter drugs. It is safest to leave personal items at home.  Be sure to tell your doctor:   If you have any allergies.   If there s any chance you are pregnant.   If you are breastfeeding.   If you have any special needs.  You may have contrast for this exam. To prepare:   Do not eat or drink for 2 hours before your exam. If you need to take medicine, you may take it with small sips of water. (We may ask you to take liquid medicine as well.)   The day before your exam, drink extra fluids at least six 8-ounce glasses (unless your doctor tells you to restrict your fluids).  Patients over 70 or patients with diabetes or kidney problems:   If you haven t had a blood test (creatinine test) within the last 30 days, go to your clinic or Diagnostic Imaging Department for this test.  If you have diabetes:   If your kidney function is normal, continue taking your  metformin (Avandamet, Glucophage, Glucovance, Metaglip) on the day of your exam.   If your kidney function is abnormal, wait 48 hours before restarting this medicine.  You will have oral contrast for this exam:   You will drink the contrast at home. Get this from your clinic or Diagnostic Imaging Department. Please follow the directions given.  Please wear loose clothing, such as a sweat suit or jogging clothes. Avoid snaps, zippers and other metal. We may ask you to undress and put on a hospital gown.  If you have any questions, please call the Imaging Department where you will have your exam.            May 08, 2017 11:00 AM CDT   NM BONE SCAN WHOLE BODY with UUNM3   [facility], [place], Nuclear Medicine ([facility])    [address] [facility] [address] [phone]           Please bring a list of your medicines to the exam. (Include vitamins, minerals and over-the-counter drugs.) You should wear comfortable clothes. Leave your valuables at home. Please bring related prior results and films. Tell your doctor:   If you are breastfeeding or may be pregnant.   If you have had a barium test within the past few days. Barium may change the results of certain exams.   If you think you may need sedation (medicine to help you relax).  You may eat and drink as normal.  Drink plenty of fluids and empty bladder frequently between injection and scans.            May 08, 2017 12:00 PM CDT   (Arrive by 11:45 AM)   INJECTION with  Oncology Injection Nurse   [facility] ([facility])    [address] [place] [address]  2nd [facility] [address]   [phone]            Jun 07, 2017  4:15 PM CDT   [facility] Lab Draw with  [place] LAB DRAW   [facility]    [address] [address]   [phone]            Jun 07, 2017  4:45 PM CDT   (Arrive by  4:30 PM)   Return Visit with Tyrel Valladares MD   [facility] ([facility])    [address] [facility]  2nd Floor  [facility] [address] [phone]              Future tests that were ordered for you today     Open Future Orders        Priority Expected Expires Ordered    CT Chest/Abdomen/Pelvis w Contrast Routine 5/3/2017 7/3/2017 5/3/2017    NM Bone Scan Whole Body Routine 5/3/2017 7/3/2017 5/3/2017            Who to contact     If you have questions or need follow up information about today's clinic visit or your schedule please contact [facility] directly at [phone].  Normal or non-critical lab and imaging results will be communicated to you by [employer], letter or phone within 4 business days after the clinic has received the results. If you do not hear from us within 7 days, please contact the clinic through [employer] or phone. If you have a critical or abnormal lab result, we will notify you by phone as soon as possible.  Submit refill requests through [employer] or call your pharmacy and they will forward the refill request to us. Please allow 3 business days for your refill to be completed.          Additional Information About Your Visit        Be[employer]ar Information     [employer] gives you secure access to your electronic health record. If you see a primary care provider, you can also send messages to your care team and make appointments. If you have questions, please call your primary care clinic.  If you do not have a primary care provider, please call [phone] and they will assist you.        Care EveryWhere ID     This is your Care EveryWhere ID. This could be used by other organizations to access your [place] medical records  [id number]        Your Vitals Were     Pulse Temperature Respirations Pulse Oximetry BMI (Body Mass Index)       96 97.4  F (36.3  C) (Oral) 16 95% 30.79 kg/m2        Blood Pressure from Last 3 Encounters:    05/03/17 122/86   03/28/17 102/88   03/24/17 122/76    Weight from Last 3 Encounters:   05/03/17 89.2 kg (196 lb 9.6 oz)   03/24/17 89.9 kg (198 lb 1.6 oz)   03/01/17 90.3 kg (199 lb)              We Performed the Following     CBC with platelets differential     Comprehensive metabolic panel     Lactate Dehydrogenase     PSA tumor marker        Primary Care Provider Office Phone # Fax #    [facility] [phone]       [address]  [facility] [address]        Thank you!     Thank you for choosing [facility]  for your care. Our goal is always to provide you with excellent care. Hearing back from our patients is one way we can continue to improve our services. Please take a few minutes to complete the written survey that you may receive in the mail after your visit with us. Thank you!             Your Updated Medication List - Protect others around you: Learn how to safely use, store and throw away your medicines at www.disposemymeds.org.          This list is accurate as of: 5/3/17  5:32 PM.  Always use your most recent med list.                   Brand Name Dispense Instructions for use    bicalutamide 50 MG tablet    CASODEX    30 tablet    Take 1 tablet (50 mg) by mouth daily       diclofenac 1 % Gel topical gel    VOLTAREN    100 g    Apply 2-4 grams to knees 2-3 x day prn pain       EPINEPHrine 0.3 MG/0.3ML injection    EPIPEN 2-CHARITY    0.6 mL    Inject 0.3 mLs (0.3 mg) into the muscle once as needed for anaphylaxis       Ginger Root 250 MG Caps      Take 250 mg by mouth as needed       IBUPROFEN PO      Take by mouth as needed for moderate pain       levothyroxine 75 MCG tablet    SYNTHROID/LEVOTHROID    90 tablet    Take 1 tablet (75 mcg) by mouth daily       LORazepam 0.5 MG tablet    ATIVAN    30 tablet    Take 1 tablet (0.5 mg) by mouth every 4 hours as needed (Anxiety, Nausea/Vomiting or Sleep)       PROBIOTIC DAILY PO      Take by mouth as needed        sildenafil 100 MG cap/tab    REVATIO/VIAGRA    6 tablet    Take 1 tablet (100 mg) by mouth daily as needed for erectile dysfunction Take 30 min to 4 hours before intercourse.  Never use with nitroglycerin, terazosin or doxazosin.       tadalafil 20 MG tablet    CIALIS    30 tablet    Take 2 tablets (40 mg) by mouth daily as needed for erectile dysfunction       TYLENOL PO      Take 325 mg by mouth every 8 hours as needed for mild pain or fever       venlafaxine 37.5 MG 24 hr capsule    EFFEXOR-XR    30 capsule    Take 1 capsule (37.5 mg) by mouth daily

## 2017-05-03 NOTE — LETTER
5/3/2017       RE: Albert Amaya  [address] [facility] [place] [address]     Dear Colleague,    Thank you for referring your patient, Albert Amaya, to the [facility]. Please see a copy of my visit note below.    [facility]  FOLLOW-UP VISIT NOTE  Date of visit: May 3, 2017         REASON FOR VISIT:   Metastatic Prostate CA treated    - s/p 6 cycles of taxotere 75mg/m2   - s/p orchiectomy on 3/18/2016   - s/p Provenge 5/13, 5/27, 6/10   - currently taking Casodex 50 mg dialy    HPI: Albert is a 51 year old gentleman with metastatic prostate cancer.  Albert felt a cramp in his gluteus muscle and could barely walk after doing some remodeling at home and unloading heavy truck at work. He tried flexeril and prednisone initially but eventually presented to ED on 10/5/15. He feels that initially he was nearly labeled as drug seeker since he was in lot of discomfort and flexeril was not working. But during course of ED visits labs were drawn and he had marked elevation in Alk Phosphatase (over 1000). CT did suggest some ileus with some sclerotic bone lesions. He was admitted to the hospital. His PSA was checked and was markedly elevated at 5760 (10/6/15), repeat value 5563.     Urology and Medical Oncology consults were placed. He was unhappy with the progress in hospital and felt no better and left the following day on 10/6. He did follow up with Dr. Freire and had transrectal US guided biopsy of prostate on 10/8/15. They have the results through my chart which confirms prostate adenocarcinoma - enrico 4+3 involving majority (60-90%) portion of every single core.  He started on taxotere on 10/23 and completed 6 cycles of therapy in 2/2016.  He then underwent orchiectomy on 3/18/2016.     Throughout spring of 2016 Curtis PSA started to progress and after three elevations there was concern about him being hormone refractory.  He was started on Provenge and enrolled  in the trial including Indoximod. He presents for the end of study visit today.     INTERVAL HISTORY:   Albert is accompanied by his wife and is being seen for his metastatic prostate cancer.     He has been doing well and has no new complains. He continues to have hot flashes. He has gained some weight.     10-point ROS otherwise negative.      MEDS:   Current Outpatient Prescriptions   Medication Sig     levothyroxine (SYNTHROID/LEVOTHROID) 75 MCG tablet Take 1 tablet (75 mcg) by mouth daily     bicalutamide (CASODEX) 50 MG tablet Take 1 tablet (50 mg) by mouth daily     venlafaxine (EFFEXOR-XR) 37.5 MG 24 hr capsule Take 1 capsule (37.5 mg) by mouth daily     diclofenac (VOLTAREN) 1 % GEL topical gel Apply 2-4 grams to knees 2-3 x day prn pain     tadalafil (CIALIS) 20 MG tablet Take 2 tablets (40 mg) by mouth daily as needed for erectile dysfunction     IBUPROFEN PO Take by mouth as needed for moderate pain     EPINEPHrine (EPIPEN 2-CHARITY) 0.3 MG/0.3ML injection 2-pack Inject 0.3 mLs (0.3 mg) into the muscle once as needed for anaphylaxis     sildenafil (VIAGRA) 100 MG tablet Take 1 tablet (100 mg) by mouth daily as needed for erectile dysfunction Take 30 min to 4 hours before intercourse.  Never use with nitroglycerin, terazosin or doxazosin.     Acetaminophen (TYLENOL PO) Take 325 mg by mouth every 8 hours as needed for mild pain or fever     Ginger, Zingiber officinalis, (GINGER ROOT) 250 MG CAPS Take 250 mg by mouth as needed     LORazepam (ATIVAN) 0.5 MG tablet Take 1 tablet (0.5 mg) by mouth every 4 hours as needed (Anxiety, Nausea/Vomiting or Sleep)     Probiotic Product (PROBIOTIC DAILY PO) Take by mouth as needed      No current facility-administered medications for this visit.         EXAM:  ECOG 1  Wt Readings from Last 4 Encounters:   05/03/17 89.2 kg (196 lb 9.6 oz)   03/24/17 89.9 kg (198 lb 1.6 oz)   03/01/17 90.3 kg (199 lb)   01/20/17 89.4 kg (197 lb 1.5 oz)     Vital signs were reviewed.   Patient  alert and oriented x3.   PERRLA. EOMI. No scleral icterus noted. OP without thrush/sores.  Neck exam: No palpable cervical, supraclavicular nodes.   Heart: RRR no murmurs noted.   Lungs: clear to auscultation bilaterally.  No crackles or wheezing.   Abd: positive bowel sounds in all four quadrants.  No tenderness to palpation.  No hepatomegaly.   Extremities: No lower extremity edema.   MSK: No tenderness to spine   Neuro: grossly intact.   Mood and affect is stable.     Labs/Imaging:  Recent Labs   Lab Test  05/03/17   1529  03/24/17   0710  03/01/17   1603  01/20/17   1406  12/21/16   1643   NA  141  140  141  141  137   POTASSIUM  4.2  4.7  5.0  4.4  4.5   CHLORIDE  106  106  105  106  106   CO2  28  28  28  30  24   ANIONGAP  7  7  9  5  8   BUN  13  22  18  17  20   CR  0.78  0.88  0.89  0.81  0.87   GLC  85  132*  85  97  98   WILL  9.1  8.9  9.1  9.2  9.0     Recent Labs   Lab Test  12/21/16   1643  11/22/16   1221  10/26/16   1150  09/28/16   1137  08/17/16   1355   05/03/16   0955   MAG  2.2  2.4*  2.2  2.3  2.0   < >  2.0   PHOS   --    --    --    --    --    --   4.1    < > = values in this interval not displayed.     Recent Labs   Lab Test  05/03/17   1529  03/24/17   0710  03/01/17   1603  01/20/17   1406  12/21/16   1643   WBC  5.8  4.9  5.3  4.9  5.0   HGB  11.9*  12.0*  12.9*  11.9*  11.8*   PLT  247  227  268  256  257   MCV  92  92  90  92  94   NEUTROPHIL  44.0  43.3  48.0  44.7  47.8     Recent Labs   Lab Test  05/03/17   1529  03/24/17   0710  03/01/17   1603  01/20/17   1406   BILITOTAL  0.6  0.5  0.5  1.0   ALKPHOS  52  52  54  48   ALT  30  29  34  31   AST  19  17  20  16   ALBUMIN  4.4  4.1  4.3  4.2   LDH  207   --   205  216     TSH   Date Value Ref Range Status   03/01/2017 5.28 (H) 0.40 - 4.00 mU/L Final   11/22/2016 4.62 (H) 0.40 - 4.00 mU/L Final   10/26/2016 5.98 (H) 0.40 - 4.00 mU/L Final     No results for input(s): CEA in the last 25719 hours.  Results for orders placed or  performed during the hospital encounter of 11/22/16   CT Chest/Abdomen/Pelvis w Contrast    Narrative    EXAMINATION: CT CHEST/ABDOMEN/PELVIS W CONTRAST, 11/22/2016 11:32 AM    TECHNIQUE:  Helical CT images from the thoracic inlet through the  symphysis pubis were obtained  with contrast. Contrast dose: 119 mL  Isovue-[id number]    COMPARISON: Bone scan dated 8/15/2016. CT of the chest, abdomen and  pelvis on 8/15/2016.    HISTORY: Prostate cancer. Restaging.    FINDINGS:    Chest: Cardiac size is within normal limits. No pericardial effusion.  No central pulmonary embolism. No pathologically enlarged mediastinal,  hilar or axillary lymph nodes.    No pleural effusion or pneumothorax. Trace bibasilar dependent  opacities consistent with atelectasis. 3 mm wedge-shaped nodule in the  right minor fissure likely represents an intrapulmonary lymph node.    Abdomen and pelvis: The liver, gallbladder, adrenal glands, kidneys,  pancreas and spleen are unremarkable. The stomach, small bowel and  colon are within normal limits. The appendix is normal. No  extraluminal air or free fluid. No suspicious adenopathy. Bladder is  minimally distended and otherwise unremarkable.    Bones and soft tissues: Stable appearance of diffuse sclerosis  throughout the visualized spine, sacrum and pelvis. Prominent  Schmorl's deformities in superior endplates of T6, T8 and T12.  Unchanged diffuse patchy sclerosis of the ribs, scapula and femurs.  Redemonstration of patchy sclerotic foci within the sternum.      Impression    IMPRESSION: In this patient with history of metastatic prostate cancer  stable appearance of diffuse osteoblastic metastases throughout the  axial and visualized appendicular skeleton.    I have personally reviewed the examination and initial interpretation  and I agree with the findings.    PATRICIA WELDON MD     Recent Labs   Lab Test  05/03/17   1529  03/24/17   0710  03/01/17   1603  01/20/17   1406  12/21/16   1643   11/22/16   1222   05/03/16   0955   PSA  106.00*  78.49*  86.81*  75.43*  60.68*   --    < >  67.04*   TESTOST   --    --    --    --   <2*  2*   --   <2*    < > = values in this interval not displayed.          ASSESSMENT/PLAN: 51 year old  with a diagnosis of metastatic prostate to the bone.  He is s/p 6 cycles of docetaxol (CHAARTED study) and recent bilateral orchiectomy on 3/18/2016. Completed clinical trial with Provenge followed by Indoximod versus placebo.  His PSA has been trending up and he was started on Casodex 50 mg in MArch of 2017.     ECOG PS 0  Hypothyroidism  Hot flashes  Weight gain    I had a lengthy discussion with Albert in presence of his wife. The mood got quite somber after I reviewed the PSA results with him. He had been started on bicalutamide (casodex) in mid March. He seems to be progressing and had no response with this therapy. I think it is reasonable to move to next line of therapy with abiraterone and prednisone.     Abiraterone inhibits CYP 17 alpha hydroxylase:lyase enzyme which is a rate limiting (crucial enzyme) in steroid and therefore androgen biosynthesis. Abiraterone inhibits androgen including testosterone synthesis in adrenal glands, testes and possibly also at tumor site. In a  double-blind study (COU-AA - II ), 1088 chemotherapy naive patients with castration resistant prostate cancer were randomized 1:1 to abiraterone acetate (1000 mg) plus prednisone (5 mg twice daily) or placebo plus prednisone (N Engl J Med. Bandar 10, 2013; 368(2): 138-148). The study was unblinded after first planned interim analysis as treatment with abiraterone acetate-prednisone resulted in a 57% reduction in the risk of radiographic progression or death (hazard ratio [HR], 0.43; 95% confidence interval [CI]: 0.35 to 0.52; P<0.001. In a similar study (COU-AA-I) in patients who had previously progressed on chemotherapy, treatment with abiraterone led to improved overall survival as compared to placebo  (14.8 months vs. 10.9 months; hazard ratio, 0.65; 95% confidence interval, 0.54 to 0.77; P<0.001) (N Engl J Med. 2011 May 26;364(21):0952-4941).   Abiraterone is well tolerated. It is recommended to be taken on an empty stomach (no food 2 hrs prior to and 1 hr after medication). Fatty food might increase the absorption of abiraterone. This is a relatively safe drug and I would suggest taking the drug rather than missing doses. Abiraterone can cause nausea, vomiting, fatigue, hypokalemia, edema, LFT abnormalities - though most of my patients have not complained of a thing.   Prednisone which is prescribed as a physiologic steroid replacement on the other hand is taken with food.     I have consulted our pharmacist to follow him while on therapy as a part of our outpatient drug monitoring program. I will have him return in 4 weeks with labs to ensure that he is tolerating this well and also LFT are stable.     I would recommend restaging scans for him to establish new baseline as his last scans were over 6 months ago. He was not happy with this thought. I explained him that it would serve as a new baseline as his previous PSA was nearly half its current value.     He was also unhappy with the need for prednisone. I explained him that it is physiologic replacement. We could attempt 5 mg dose daily to begin with and follow him closely.     He is worried about the kids and the stress all of this would place on them.     I will see him in 4-6 weeks time with labs to assess how well he is tolerating therapy.     Over 45 min of direct face to face time spent with patient with more than 50% time spent in counseling and coordinating care.        Again, thank you for allowing me to participate in the care of your patient.      Sincerely,    Tyrel Valladares MD

## 2017-05-05 ENCOUNTER — TELEPHONE (OUTPATIENT)
Dept: ONCOLOGY | Facility: CLINIC | Age: 52
End: 2017-05-05

## 2017-05-05 DIAGNOSIS — C61 MALIGNANT NEOPLASM OF PROSTATE (H): Primary | ICD-10-CM

## 2017-05-05 DIAGNOSIS — C79.51 BONE METASTASIS: ICD-10-CM

## 2017-05-05 RX ORDER — ABIRATERONE ACETATE 250 MG/1
1000 TABLET ORAL DAILY
Qty: 120 TABLET | Refills: 0 | Status: SHIPPED | OUTPATIENT
Start: 2017-05-05 | End: 2017-06-04

## 2017-05-05 RX ORDER — PREDNISONE 5 MG/1
5 TABLET ORAL
Qty: 30 TABLET | Refills: 0 | Status: CANCELLED | OUTPATIENT
Start: 2017-05-05

## 2017-05-05 RX ORDER — PREDNISONE 5 MG/1
5 TABLET ORAL
Qty: 30 TABLET | Refills: 0 | Status: SHIPPED | OUTPATIENT
Start: 2017-05-05 | End: 2017-12-06

## 2017-05-05 RX ORDER — ABIRATERONE ACETATE 250 MG/1
1000 TABLET ORAL DAILY
Qty: 120 TABLET | Refills: 0 | Status: CANCELLED | OUTPATIENT
Start: 2017-05-05 | End: 2017-06-04

## 2017-05-05 NOTE — TELEPHONE ENCOUNTER
Prior Authorization Approval    Authorization Effective Date: 5/5/2017  Authorization Expiration Date: 11/5/2017  Medication: abiraterone (ZYTIGA) 250 MG tablet - APPROVED  Approved Dose/Quantity: 120 PER 30 DAYS  Reference #:     Insurance Company: [employer] - Phone [phone] Fax [phone]  Expected CoPay: $0.00     CoPay Card Available:      Foundation Assistance Needed:    Which Pharmacy is filling the prescription (Not needed for infusion/clinic administered): [facility] [place], [place] - [address] [place] [address] [phone]  Pharmacy Notified: Yes  Patient Notified: Yes

## 2017-05-08 ENCOUNTER — TELEPHONE (OUTPATIENT)
Dept: ONCOLOGY | Facility: CLINIC | Age: 52
End: 2017-05-08

## 2017-05-08 ENCOUNTER — HOSPITAL ENCOUNTER (OUTPATIENT)
Dept: NUCLEAR MEDICINE | Facility: CLINIC | Age: 52
Setting detail: NUCLEAR MEDICINE
End: 2017-05-08
Attending: INTERNAL MEDICINE
Payer: COMMERCIAL

## 2017-05-08 ENCOUNTER — HOSPITAL ENCOUNTER (OUTPATIENT)
Dept: CT IMAGING | Facility: CLINIC | Age: 52
Discharge: HOME | End: 2017-05-08
Attending: INTERNAL MEDICINE | Admitting: INTERNAL MEDICINE
Payer: COMMERCIAL

## 2017-05-08 DIAGNOSIS — C61 MALIGNANT NEOPLASM OF PROSTATE (H): ICD-10-CM

## 2017-05-08 DIAGNOSIS — C79.51 BONE METASTASIS: ICD-10-CM

## 2017-05-08 PROCEDURE — 34300033 ZZH RX 343: Performed by: INTERNAL MEDICINE

## 2017-05-08 PROCEDURE — 78306 BONE IMAGING WHOLE BODY: CPT

## 2017-05-08 PROCEDURE — A9503 TC99M MEDRONATE: HCPCS | Performed by: INTERNAL MEDICINE

## 2017-05-08 RX ORDER — TC 99M MEDRONATE 20 MG/10ML
20-30 INJECTION, POWDER, LYOPHILIZED, FOR SOLUTION INTRAVENOUS ONCE
Status: COMPLETED | OUTPATIENT
Start: 2017-05-08 | End: 2017-05-08

## 2017-05-08 RX ORDER — IOPAMIDOL 755 MG/ML
120 INJECTION, SOLUTION INTRAVASCULAR ONCE
Status: COMPLETED | OUTPATIENT
Start: 2017-05-08 | End: 2017-05-08

## 2017-05-08 RX ADMIN — TC 99M MEDRONATE 25 MCI.: 20 INJECTION, POWDER, LYOPHILIZED, FOR SOLUTION INTRAVENOUS at 08:37

## 2017-05-08 RX ADMIN — SODIUM CHLORIDE, PRESERVATIVE FREE 80 ML: 5 INJECTION INTRAVENOUS at 08:48

## 2017-05-08 RX ADMIN — IOPAMIDOL 120 ML: 755 INJECTION, SOLUTION INTRAVENOUS at 08:48

## 2017-05-08 NOTE — ORAL ONC MGMT
Left message    Oral Chemotherapy Monitoring Program       Primary Oncologist: Dr. Valladares  Primary Oncology Clinic: [facility]  Cancer Diagnosis: Prostate Cancer     Attempted to contact patient today for an initial teach on oral chemotherapy, Zytiga., for prostate cancer. Today, Albert's daughter answered the phone and indicated that her father would not be home for a couple of hours.  I indicated to her that I would be in contact with him either tonight or tomorrow.  She thanked me for the call.    Keo DOTY.Ph.  [facility]  Oral Chemotherapy Program  [phone]

## 2017-05-10 ENCOUNTER — TELEPHONE (OUTPATIENT)
Dept: ONCOLOGY | Facility: CLINIC | Age: 52
End: 2017-05-10

## 2017-05-10 NOTE — ORAL ONC MGMT
Left message    Oral Chemotherapy Monitoring Program      Primary Oncologist: Dr. Valladares  Primary Oncology Clinic: [facility]  Cancer Diagnosis: Prostate Cancer     Attempted to contact patient today for an initial teach on oral chemotherapy, Zytiga., for prostate cancer. No answer. Left voicemail for patient to please call me back at [phone] when able. No patient or medication names were mentioned while leaving this message.      Keo DOTY.Ph.  [facility]  Oral Chemotherapy Program  [phone]

## 2017-05-11 ENCOUNTER — TELEPHONE (OUTPATIENT)
Dept: ONCOLOGY | Facility: CLINIC | Age: 52
End: 2017-05-11

## 2017-05-11 NOTE — TELEPHONE ENCOUNTER
"Oral Chemotherapy Monitoring Program    Primary Oncologist: Dr. Valladares  Primary Oncology Clinic: [facility]  Cancer Diagnosis: Prostate    Drug: Zytiga 1000mg (4 x 250mg) QD continuously.  Start Date: 5/10/17  Dose is appropriate for patient's renal and hepatic function.  Expected duration of therapy: Until disease progression or unacceptable toxicity    Drug Interaction Assessment: Clinically significant drug interactions found   -Zytiga + Venlafaxine = Zytiga may increase serum concentration of Venlafaxine via CYP2D6 inhibition (category D - consider therapy modification - OK per Dr. Valladares, watch for increased side effects)    Lab Monitoring Plan  C1D1+   CMP, BP C2D1+ Call, CMP, BP C3D1+ Call, CMP, BP C4D1+ Call, CMP, BP C5D1+ Call, CMP, BP C6D1+ Call, CMP, BP   C1D8+  C2D8+  C3D8+  C4D8+  C5D8+  C6D8+    C1D15+ Call, CMP C2D15+ Call, CMP C3D15+  C4D15+  C5D15+  C6D15+    C1D22+  C2D22+  C3D22+  C4D22+  C5D22+  C6D22+      Subjective/Objective:  Albert Amaya is a 51 year old male contacted by phone for an initial visit for oral chemotherapy education.    ORAL CHEMOTHERAPY 5/11/2017   Drug Name Zytiga (Abiraterone)   Current Dosage 1000mg   Current Schedule Daily   Cycle Details Continuous   Start Date of Last Cycle 5/10/2017     BP Readings from Last 1 Encounters:   05/03/17 122/86     Wt Readings from Last 1 Encounters:   05/03/17 89.2 kg (196 lb 9.6 oz)     Estimated body surface area is 2.05 meters squared as calculated from the following:    Height as of 11/22/16: 1.702 m (5' 7\").    Weight as of 5/3/17: 89.2 kg (196 lb 9.6 oz).    Labs:  Lab Results   Component Value Date     05/03/2017      Lab Results   Component Value Date    POTASSIUM 4.2 05/03/2017     Lab Results   Component Value Date    WILL 9.1 05/03/2017     Lab Results   Component Value Date    MAG 2.2 12/21/2016     Lab Results   Component Value Date    PHOS 4.1 05/03/2016     Lab Results   Component Value Date    ALBUMIN 4.4 " 05/03/2017     Lab Results   Component Value Date    BUN 13 05/03/2017     Lab Results   Component Value Date    CR 0.78 05/03/2017     Lab Results   Component Value Date    AST 19 05/03/2017     Lab Results   Component Value Date    ALT 30 05/03/2017     Lab Results   Component Value Date    BILITOTAL 0.6 05/03/2017     Lab Results   Component Value Date    WBC 5.8 05/03/2017     Lab Results   Component Value Date    HGB 11.9 05/03/2017     Lab Results   Component Value Date     05/03/2017     Lab Results   Component Value Date    ANEU 2.6 05/03/2017     Assessment:  Patient is appropriate to start therapy.    Plan:  Basic chemotherapy teaching was reviewed with the patient including indication, start date of therapy, dose, administration, adverse effects, missed doses, food and drug interactions, monitoring, side effect management, office contact information, and safe handling. Written materials were provided and all questions answered.    Follow-Up:  Scheduled labs for 5/25/17. We will follow up in about one week to assess side effects. Patient is scheduled to be seen with Dr. Valladares at the [facility] on 6/7/17.     Linda Delgado, Pharmacy Intern  Oral Chemotherapy Monitoring Program  [facility]  [phone]

## 2017-05-18 ENCOUNTER — ONCOLOGY VISIT (OUTPATIENT)
Dept: ONCOLOGY | Facility: CLINIC | Age: 52
End: 2017-05-18
Attending: PHYSICIAN ASSISTANT
Payer: COMMERCIAL

## 2017-05-18 ENCOUNTER — TELEPHONE (OUTPATIENT)
Dept: ONCOLOGY | Facility: CLINIC | Age: 52
End: 2017-05-18

## 2017-05-18 ENCOUNTER — APPOINTMENT (OUTPATIENT)
Dept: LAB | Facility: CLINIC | Age: 52
End: 2017-05-18
Attending: INTERNAL MEDICINE
Payer: COMMERCIAL

## 2017-05-18 ENCOUNTER — TELEPHONE (OUTPATIENT)
Dept: PHARMACY | Facility: CLINIC | Age: 52
End: 2017-05-18

## 2017-05-18 VITALS
WEIGHT: 198.7 LBS | HEART RATE: 64 BPM | DIASTOLIC BLOOD PRESSURE: 90 MMHG | TEMPERATURE: 96.7 F | HEIGHT: 67 IN | BODY MASS INDEX: 31.19 KG/M2 | SYSTOLIC BLOOD PRESSURE: 144 MMHG | OXYGEN SATURATION: 97 % | RESPIRATION RATE: 16 BRPM

## 2017-05-18 DIAGNOSIS — C61 MALIGNANT NEOPLASM OF PROSTATE (H): Primary | ICD-10-CM

## 2017-05-18 DIAGNOSIS — C61 MALIGNANT NEOPLASM OF PROSTATE (H): ICD-10-CM

## 2017-05-18 DIAGNOSIS — R30.0 DYSURIA: ICD-10-CM

## 2017-05-18 DIAGNOSIS — R30.0 DYSURIA: Primary | ICD-10-CM

## 2017-05-18 LAB
ALBUMIN SERPL-MCNC: 4.3 G/DL (ref 3.4–5)
ALBUMIN UR-MCNC: NEGATIVE MG/DL
ALP SERPL-CCNC: 57 U/L (ref 40–150)
ALT SERPL W P-5'-P-CCNC: 33 U/L (ref 0–70)
ANION GAP SERPL CALCULATED.3IONS-SCNC: 8 MMOL/L (ref 3–14)
APPEARANCE UR: CLEAR
AST SERPL W P-5'-P-CCNC: 23 U/L (ref 0–45)
BASOPHILS # BLD AUTO: 0.1 10E9/L (ref 0–0.2)
BASOPHILS NFR BLD AUTO: 0.8 %
BILIRUB SERPL-MCNC: 0.6 MG/DL (ref 0.2–1.3)
BILIRUB UR QL STRIP: NEGATIVE
BUN SERPL-MCNC: 17 MG/DL (ref 7–30)
CALCIUM SERPL-MCNC: 9.5 MG/DL (ref 8.5–10.1)
CHLORIDE SERPL-SCNC: 106 MMOL/L (ref 94–109)
CO2 SERPL-SCNC: 27 MMOL/L (ref 20–32)
COLOR UR AUTO: ABNORMAL
CREAT SERPL-MCNC: 1.21 MG/DL (ref 0.66–1.25)
DIFFERENTIAL METHOD BLD: ABNORMAL
EOSINOPHIL # BLD AUTO: 0.1 10E9/L (ref 0–0.7)
EOSINOPHIL NFR BLD AUTO: 1.5 %
ERYTHROCYTE [DISTWIDTH] IN BLOOD BY AUTOMATED COUNT: 13 % (ref 10–15)
GFR SERPL CREATININE-BSD FRML MDRD: 63 ML/MIN/1.7M2
GLUCOSE SERPL-MCNC: 124 MG/DL (ref 70–99)
GLUCOSE UR STRIP-MCNC: NEGATIVE MG/DL
HCT VFR BLD AUTO: 36.4 % (ref 40–53)
HGB BLD-MCNC: 12.3 G/DL (ref 13.3–17.7)
HGB UR QL STRIP: NEGATIVE
IMM GRANULOCYTES # BLD: 0 10E9/L (ref 0–0.4)
IMM GRANULOCYTES NFR BLD: 0.6 %
KETONES UR STRIP-MCNC: NEGATIVE MG/DL
LEUKOCYTE ESTERASE UR QL STRIP: NEGATIVE
LYMPHOCYTES # BLD AUTO: 2.2 10E9/L (ref 0.8–5.3)
LYMPHOCYTES NFR BLD AUTO: 32.8 %
MCH RBC QN AUTO: 31.2 PG (ref 26.5–33)
MCHC RBC AUTO-ENTMCNC: 33.8 G/DL (ref 31.5–36.5)
MCV RBC AUTO: 92 FL (ref 78–100)
MONOCYTES # BLD AUTO: 0.4 10E9/L (ref 0–1.3)
MONOCYTES NFR BLD AUTO: 5.9 %
NEUTROPHILS # BLD AUTO: 3.9 10E9/L (ref 1.6–8.3)
NEUTROPHILS NFR BLD AUTO: 58.4 %
NITRATE UR QL: NEGATIVE
NRBC # BLD AUTO: 0 10*3/UL
NRBC BLD AUTO-RTO: 0 /100
PH UR STRIP: 7 PH (ref 5–7)
PLATELET # BLD AUTO: 269 10E9/L (ref 150–450)
POTASSIUM SERPL-SCNC: 4.8 MMOL/L (ref 3.4–5.3)
PROT SERPL-MCNC: 7.8 G/DL (ref 6.8–8.8)
RBC # BLD AUTO: 3.94 10E12/L (ref 4.4–5.9)
RBC #/AREA URNS AUTO: <1 /HPF (ref 0–2)
SODIUM SERPL-SCNC: 141 MMOL/L (ref 133–144)
SP GR UR STRIP: 1.01 (ref 1–1.03)
URN SPEC COLLECT METH UR: ABNORMAL
UROBILINOGEN UR STRIP-MCNC: 4 MG/DL (ref 0–2)
WBC # BLD AUTO: 6.6 10E9/L (ref 4–11)
WBC #/AREA URNS AUTO: <1 /HPF (ref 0–2)

## 2017-05-18 PROCEDURE — 99213 OFFICE O/P EST LOW 20 MIN: CPT | Mod: ZF

## 2017-05-18 PROCEDURE — 85025 COMPLETE CBC W/AUTO DIFF WBC: CPT | Performed by: INTERNAL MEDICINE

## 2017-05-18 PROCEDURE — 99214 OFFICE O/P EST MOD 30 MIN: CPT | Mod: ZP | Performed by: PHYSICIAN ASSISTANT

## 2017-05-18 PROCEDURE — 81001 URINALYSIS AUTO W/SCOPE: CPT | Performed by: INTERNAL MEDICINE

## 2017-05-18 PROCEDURE — 80053 COMPREHEN METABOLIC PANEL: CPT | Performed by: INTERNAL MEDICINE

## 2017-05-18 PROCEDURE — 36415 COLL VENOUS BLD VENIPUNCTURE: CPT

## 2017-05-18 ASSESSMENT — PAIN SCALES - GENERAL: PAINLEVEL: NO PAIN (1)

## 2017-05-18 NOTE — Clinical Note
5/18/2017       RE: Albert Amaya  [address] [facility] [place] [address]     Dear Colleague,    Thank you for referring your patient, Albert Amaya, to the [facility]. Please see a copy of my visit note below.    [facility]  FOLLOW-UP VISIT NOTE  Date of visit: May 18, 2017         REASON FOR VISIT:   Metastatic Prostate CA treated    - s/p 6 cycles of taxotere 75mg/m2   - s/p orchiectomy on 3/18/2016   - s/p Provenge 5/13, 5/27, 6/10   - s/p Casodex 50 mg daily March/April   - Currently taking Zytiga    HPI: Albert is a 51 year old gentleman with metastatic prostate cancer.  Albert felt a cramp in his gluteus muscle and could barely walk after doing some remodeling at home and unloading heavy truck at work. He tried flexeril and prednisone initially but eventually presented to ED on 10/5/15. He feels that initially he was nearly labeled as drug seeker since he was in lot of discomfort and flexeril was not working. But during course of ED visits labs were drawn and he had marked elevation in Alk Phosphatase (over 1000). CT did suggest some ileus with some sclerotic bone lesions. He was admitted to the hospital. His PSA was checked and was markedly elevated at 5760 (10/6/15), repeat value 5563.     Urology and Medical Oncology consults were placed. He was unhappy with the progress in hospital and felt no better and left the following day on 10/6. He did follow up with Dr. Freire and had transrectal US guided biopsy of prostate on 10/8/15. They have the results through my chart which confirms prostate adenocarcinoma - enrico 4+3 involving majority (60-90%) portion of every single core.  He started on taxotere on 10/23 and completed 6 cycles of therapy in 2/2016.  He then underwent orchiectomy on 3/18/2016.       Throughout spring of 2016 Curtis PSA started to progress and after three elevations there was concern about him being hormone refractory.  He was  started on Provenge and enrolled in the trial including Indoximod. PSA trending up and started on Casodex mid March with progression. Switched to Zytiga 5/3/17.    INTERVAL HISTORY:   Albert presents alone today with his wife on speaker phone. States he has been having slight dysuria for 4 days. Wife is concerned for UTI. Denies urgency, frequency, hematuria, suprapubic pain, flank pain, fever, chills, nausea, vomiting. No history of UTI. Taking AZO since last night with some improvement. Also complains of enlarged lymph nodes in the neck, worse on the left. Noticed for 3 days. Noticeable, but not painful. Denies ear plugging, sinus congestion, runny nose, cough, SOB, dyspnea, chest pain. No muscle or joint pain. Hearing and vision unchanged. Having some hot flashes, understands it is due to hormone therapy. Has been feeling lightheaded or dizzy. Stool a little softer, BM's daily. Eating OK. Drinking a lot of coffee and working 12-hour days, not drinking much water. Not sleeping well. Waking up from 1-4 a.m., maybe getting 5 hours per night. Tried earlier dose of Zytiga, and trying Benadryl with some improvement. Has tried melatonin in the past and planning to try again.    MEDS:   Current Outpatient Prescriptions   Medication Sig     predniSONE (DELTASONE) 5 MG tablet Take 1 tablet (5 mg) by mouth daily (with breakfast)     abiraterone (ZYTIGA) 250 MG tablet Take 4 tablets (1,000 mg) by mouth daily for 30 doses Take on empty stomach.     levothyroxine (SYNTHROID/LEVOTHROID) 75 MCG tablet Take 1 tablet (75 mcg) by mouth daily     venlafaxine (EFFEXOR-XR) 37.5 MG 24 hr capsule Take 1 capsule (37.5 mg) by mouth daily     diclofenac (VOLTAREN) 1 % GEL topical gel Apply 2-4 grams to knees 2-3 x day prn pain (Patient not taking: Reported on 5/18/2017)     tadalafil (CIALIS) 20 MG tablet Take 2 tablets (40 mg) by mouth daily as needed for erectile dysfunction (Patient not taking: Reported on 5/18/2017)     IBUPROFEN PO Take  by mouth as needed for moderate pain Reported on 5/18/2017     EPINEPHrine (EPIPEN 2-CHARITY) 0.3 MG/0.3ML injection 2-pack Inject 0.3 mLs (0.3 mg) into the muscle once as needed for anaphylaxis (Patient not taking: Reported on 5/18/2017)     sildenafil (VIAGRA) 100 MG tablet Take 1 tablet (100 mg) by mouth daily as needed for erectile dysfunction Take 30 min to 4 hours before intercourse.  Never use with nitroglycerin, terazosin or doxazosin. (Patient not taking: Reported on 5/18/2017)     Acetaminophen (TYLENOL PO) Take 325 mg by mouth every 8 hours as needed for mild pain or fever Reported on 5/18/2017     Ginger, Zingiber officinalis, (GINGER ROOT) 250 MG CAPS Take 250 mg by mouth as needed Reported on 5/18/2017     LORazepam (ATIVAN) 0.5 MG tablet Take 1 tablet (0.5 mg) by mouth every 4 hours as needed (Anxiety, Nausea/Vomiting or Sleep) (Patient not taking: Reported on 5/18/2017)     Probiotic Product (PROBIOTIC DAILY PO) Take by mouth as needed Reported on 5/18/2017     No current facility-administered medications for this visit.         EXAM:  ECOG 1  Wt Readings from Last 4 Encounters:   05/18/17 90.1 kg (198 lb 11.2 oz)   05/03/17 89.2 kg (196 lb 9.6 oz)   03/24/17 89.9 kg (198 lb 1.6 oz)   03/01/17 90.3 kg (199 lb)     Vital signs were reviewed.   Patient alert and oriented x3.   PERRLA. EOMI. No scleral icterus noted. OP without thrush/sores.  Neck exam: Palpable 0.5 cm anterior cervical node on the left, rubbery, mobile, non-tender.   Heart: RRR no murmurs noted.   Lungs: clear to auscultation bilaterally.  No crackles or wheezing.   Abd: Normal bowel sounds.  No tenderness to palpation. No suprapubic tenderness.  No hepatomegaly.   Extremities: No lower extremity edema.   Back: No CVA tenderness.   Neuro: grossly intact.   Mood and affect is stable.     Labs/Imaging:  Results for PILAR GARCIA (MRN [id number]) as of 5/18/2017 15:50   Ref. Range 5/18/2017 13:42   Sodium Latest Ref Range: 133 - 144 mmol/L  141   Potassium Latest Ref Range: 3.4 - 5.3 mmol/L 4.8   Chloride Latest Ref Range: 94 - 109 mmol/L 106   Carbon Dioxide Latest Ref Range: 20 - 32 mmol/L 27   Urea Nitrogen Latest Ref Range: 7 - 30 mg/dL 17   Creatinine Latest Ref Range: 0.66 - 1.25 mg/dL 1.21   GFR Estimate Latest Ref Range: >60 mL/min/1.7m2 63   GFR Estimate If Black Latest Ref Range: >60 mL/min/1.7m2 77   Calcium Latest Ref Range: 8.5 - 10.1 mg/dL 9.5   Anion Gap Latest Ref Range: 3 - 14 mmol/L 8   Albumin Latest Ref Range: 3.4 - 5.0 g/dL 4.3   Protein Total Latest Ref Range: 6.8 - 8.8 g/dL 7.8   Bilirubin Total Latest Ref Range: 0.2 - 1.3 mg/dL 0.6   Alkaline Phosphatase Latest Ref Range: 40 - 150 U/L 57   ALT Latest Ref Range: 0 - 70 U/L 33   AST Latest Ref Range: 0 - 45 U/L 23   Glucose Latest Ref Range: 70 - 99 mg/dL 124 (H)   WBC Latest Ref Range: 4.0 - 11.0 10e9/L 6.6   Hemoglobin Latest Ref Range: 13.3 - 17.7 g/dL 12.3 (L)   Hematocrit Latest Ref Range: 40.0 - 53.0 % 36.4 (L)   Platelet Count Latest Ref Range: 150 - 450 10e9/L 269   RBC Count Latest Ref Range: 4.4 - 5.9 10e12/L 3.94 (L)   MCV Latest Ref Range: 78 - 100 fl 92   MCH Latest Ref Range: 26.5 - 33.0 pg 31.2   MCHC Latest Ref Range: 31.5 - 36.5 g/dL 33.8   RDW Latest Ref Range: 10.0 - 15.0 % 13.0   Diff Method Unknown Automated Method   % Neutrophils Latest Units: % 58.4   % Lymphocytes Latest Units: % 32.8   % Monocytes Latest Units: % 5.9   % Eosinophils Latest Units: % 1.5   % Basophils Latest Units: % 0.8   % Immature Granulocytes Latest Units: % 0.6   Nucleated RBCs Latest Ref Range: 0 /100 0   Absolute Neutrophil Latest Ref Range: 1.6 - 8.3 10e9/L 3.9   Absolute Lymphocytes Latest Ref Range: 0.8 - 5.3 10e9/L 2.2   Absolute Monocytes Latest Ref Range: 0.0 - 1.3 10e9/L 0.4   Absolute Eosinophils Latest Ref Range: 0.0 - 0.7 10e9/L 0.1   Absolute Basophils Latest Ref Range: 0.0 - 0.2 10e9/L 0.1   Abs Immature Granulocytes Latest Ref Range: 0 - 0.4 10e9/L 0.0   Absolute  Nucleated RBC Unknown 0.0   Color Urine Unknown Leeanne   Appearance Urine Unknown Clear   Glucose Urine Latest Ref Range: NEG mg/dL Negative   Bilirubin Urine Latest Ref Range: NEG  Negative   Ketones Urine Latest Ref Range: NEG mg/dL Negative   Specific Gravity Urine Latest Ref Range: 1.003 - 1.035  1.006   pH Urine Latest Ref Range: 5.0 - 7.0 pH 7.0   Protein Albumin Urine Latest Ref Range: NEG mg/dL Negative   Urobilinogen mg/dL Latest Ref Range: 0.0 - 2.0 mg/dL 4.0 (H)   Nitrite Urine Latest Ref Range: NEG  Negative   Blood Urine Latest Ref Range: NEG  Negative   Leukocyte Esterase Urine Latest Ref Range: NEG  Negative   Source Unknown Midstream Urine   WBC Urine Latest Ref Range: 0 - 2 /HPF <1   RBC Urine Latest Ref Range: 0 - 2 /HPF <1     ASSESSMENT/PLAN:  1. Metastatic prostate cancer: Currently taking Zytiga. Tolerating it well. Hypertensive today at 144/90. This is a known adverse effect of Zytiga. Will continue to monitor. Discussed getting a home BP cuff or taking BP at a pharmacy after sitting for 5 minutes and recording values to bring to next follow-up.   - Continue Zytiga  - F/u with Dr. Valladares 6/7/2017.    2. Dehydration: urinary symptoms likely due to dehydration. Creatinine bumped today. Likely lightheadedness is due to low volume as well. Discussed increasing water intake and decreasing coffee as this can exacerbate the problem. No sign of infection on exam or UA. Suspect urobilinogen is increased 2/2 AZO.     3. Anterior cervical lymph node:  No signs of URI or precipitating infection. Possible the palpable lymph node has not changed significantly but is simply palpated more easily due to tissue dehydration. Continue to monitor at follow-up.    4. Sleep: Discussed trying melatonin for sleep. Decreasing caffeine intake will help this as well.    FLORIDALMA McdowellS      Again, thank you for allowing me to participate in the care of your patient.      Sincerely,    FLORIDALMA CalderonC

## 2017-05-18 NOTE — PROGRESS NOTES
[facility]IN[facility]  FOLLOW-UP VISIT NOTE  Date of visit: May 18, 2017     Cancer History:   Metastatic Prostate CA treated    - s/p 6 cycles of taxotere 75mg/m2   - s/p orchiectomy on 3/18/2016   - s/p Provenge 5/13, 5/27, 6/10   - s/p Casodex 50 mg daily March/April   - Currently taking Zytiga    HPI: Albert is a 51 year old gentleman with metastatic prostate cancer.  Albert felt a cramp in his gluteus muscle and could barely walk after doing some remodeling at home and unloading heavy truck at work. He tried flexeril and prednisone initially but eventually presented to ED on 10/5/15. He feels that initially he was nearly labeled as drug seeker since he was in lot of discomfort and flexeril was not working. But during course of ED visits labs were drawn and he had marked elevation in Alk Phosphatase (over 1000). CT did suggest some ileus with some sclerotic bone lesions. He was admitted to the hospital. His PSA was checked and was markedly elevated at 5760 (10/6/15), repeat value 5563.     Urology and Medical Oncology consults were placed. He was unhappy with the progress in hospital and felt no better and left the following day on 10/6. He did follow up with Dr. Freire and had transrectal US guided biopsy of prostate on 10/8/15. They have the results through my chart which confirms prostate adenocarcinoma - enrico 4+3 involving majority (60-90%) portion of every single core.  He started on taxotere on 10/23 and completed 6 cycles of therapy in 2/2016.  He then underwent orchiectomy on 3/18/2016.       Throughout spring of 2016 Albert's PSA started to progress and after three elevations there was concern about him being hormone refractory.  He was started on Provenge and enrolled in the trial including Indoximod. PSA trending up and started on Casodex mid March with progression. Switched to Zytiga 5/3/17.    INTERVAL HISTORY:   Albert presents alone today with his wife on speaker phone.  States he has been having slight dysuria for 4 days. Wife is concerned for UTI. Denies urgency, frequency, hematuria, suprapubic pain, flank pain, fever, chills, nausea, vomiting. No history of UTI. Taking AZO since last night with some improvement. Also complains of enlarged lymph nodes in the neck, worse on the left. Noticed for 3 days. Noticeable, but not painful. Denies ear plugging, sinus congestion, runny nose, cough, SOB, dyspnea, chest pain. No muscle or joint pain. Hearing and vision unchanged. Having some hot flashes, understands it is due to hormone therapy. Has been feeling lightheaded or dizzy. Stool a little softer, BM's daily. Eating OK. Drinking a lot of coffee and working 12-hour days, not drinking much water. Not sleeping well. Waking up from 1-4 a.m., maybe getting 5 hours per night. Tried earlier dose of Zytiga, and trying Benadryl with some improvement. Has tried melatonin in the past and planning to try again.    MEDS:   Current Outpatient Prescriptions   Medication Sig     predniSONE (DELTASONE) 5 MG tablet Take 1 tablet (5 mg) by mouth daily (with breakfast)     abiraterone (ZYTIGA) 250 MG tablet Take 4 tablets (1,000 mg) by mouth daily for 30 doses Take on empty stomach.     levothyroxine (SYNTHROID/LEVOTHROID) 75 MCG tablet Take 1 tablet (75 mcg) by mouth daily     venlafaxine (EFFEXOR-XR) 37.5 MG 24 hr capsule Take 1 capsule (37.5 mg) by mouth daily     diclofenac (VOLTAREN) 1 % GEL topical gel Apply 2-4 grams to knees 2-3 x day prn pain (Patient not taking: Reported on 5/18/2017)     tadalafil (CIALIS) 20 MG tablet Take 2 tablets (40 mg) by mouth daily as needed for erectile dysfunction (Patient not taking: Reported on 5/18/2017)     IBUPROFEN PO Take by mouth as needed for moderate pain Reported on 5/18/2017     EPINEPHrine (EPIPEN 2-CHARITY) 0.3 MG/0.3ML injection 2-pack Inject 0.3 mLs (0.3 mg) into the muscle once as needed for anaphylaxis (Patient not taking: Reported on 5/18/2017)      "sildenafil (VIAGRA) 100 MG tablet Take 1 tablet (100 mg) by mouth daily as needed for erectile dysfunction Take 30 min to 4 hours before intercourse.  Never use with nitroglycerin, terazosin or doxazosin. (Patient not taking: Reported on 2017)     Acetaminophen (TYLENOL PO) Take 325 mg by mouth every 8 hours as needed for mild pain or fever Reported on 2017     Ginger, Zingiber officinalis, (GINGER ROOT) 250 MG CAPS Take 250 mg by mouth as needed Reported on 2017     LORazepam (ATIVAN) 0.5 MG tablet Take 1 tablet (0.5 mg) by mouth every 4 hours as needed (Anxiety, Nausea/Vomiting or Sleep) (Patient not taking: Reported on 2017)     Probiotic Product (PROBIOTIC DAILY PO) Take by mouth as needed Reported on 2017     No current facility-administered medications for this visit.         EXAM:  ECOG 1  Wt Readings from Last 4 Encounters:   17 90.1 kg (198 lb 11.2 oz)   17 89.2 kg (196 lb 9.6 oz)   17 89.9 kg (198 lb 1.6 oz)   17 90.3 kg (199 lb)   /90 (BP Location: Right arm, Patient Position: Chair, Cuff Size: Adult Regular)  Pulse 64  Temp 96.7  F (35.9  C) (Tympanic)  Resp 16  Ht 1.702 m (5' 7.01\")  Wt 90.1 kg (198 lb 11.2 oz)  SpO2 97%  BMI 31.11 kg/m2  Patient alert and oriented x3.   PERRLA. EOMI. No scleral icterus noted. OP without thrush/sores.  Neck exam: Palpable 0.5 cm anterior cervical node on the left, rubbery, mobile, non-tender.   Heart: RRR no murmurs noted.   Lungs: clear to auscultation bilaterally.  No crackles or wheezing.   Abd: Normal bowel sounds.  No tenderness to palpation. No suprapubic tenderness.  No hepatomegaly.   Extremities: No lower extremity edema.   Back: No CVA tenderness.   Neuro: grossly intact.   Mood and affect is stable.     Labs/Imagin2017 13:42   Sodium 141   Potassium 4.8   Chloride 106   Carbon Dioxide 27   Urea Nitrogen 17   Creatinine 1.21   GFR Estimate 63   GFR Estimate If Black 77   Calcium 9.5 "   Anion Gap 8   Albumin 4.3   Protein Total 7.8   Bilirubin Total 0.6   Alkaline Phosphatase 57   ALT 33   AST 23   Glucose 124 (H)   WBC 6.6   Hemoglobin 12.3 (L)   Hematocrit 36.4 (L)   Platelet Count 269   RBC Count 3.94 (L)   MCV 92   MCH 31.2   MCHC 33.8   RDW 13.0   Diff Method Automated Method   % Neutrophils 58.4   % Lymphocytes 32.8   % Monocytes 5.9   % Eosinophils 1.5   % Basophils 0.8   % Immature Granulocytes 0.6   Nucleated RBCs 0   Absolute Neutrophil 3.9   Absolute Lymphocytes 2.2   Absolute Monocytes 0.4   Absolute Eosinophils 0.1   Absolute Basophils 0.1   Abs Immature Granulocytes 0.0   Absolute Nucleated RBC 0.0   Color Urine Leeanne   Appearance Urine Clear   Glucose Urine Negative   Bilirubin Urine Negative   Ketones Urine Negative   Specific Gravity Urine 1.006   pH Urine 7.0   Protein Albumin Urine Negative   Urobilinogen mg/dL 4.0 (H)   Nitrite Urine Negative   Blood Urine Negative   Leukocyte Esterase Urine Negative   Source Midstream Urine   WBC Urine <1   RBC Urine <1     ASSESSMENT/PLAN:  1. Metastatic prostate cancer: Currently taking Zytiga. Tolerating it well. Hypertensive today at 144/90. This is a known adverse effect of Zytiga. Will continue to monitor. Discussed getting a home BP cuff or taking BP at a pharmacy after sitting for 5 minutes and recording values to bring to next follow-up.   - Continue Zytiga  - F/u with Dr. Valladares 6/7/2017.    2. Dehydration: urinary symptoms likely due to dehydration. Creatinine bumped today. Likely lightheadedness is due to low volume as well. Discussed increasing water intake and decreasing coffee as this can exacerbate the problem. No sign of infection on exam or UA. Suspect urobilinogen is increased 2/2 AZO.     3. Anterior cervical lymph node:  No signs of URI or precipitating infection. Possible the palpable lymph node has not changed significantly but is simply palpated more easily due to tissue dehydration. Continue to monitor at  follow-up.    4. Insomnia: Discussed trying melatonin for sleep. Decreasing caffeine intake will help this as well.    GREG Mcdowell    Patient was seen and examined by me, as noted above. GREG Mcdowell acted as a scribe. I have reviewed and edited the above note.  Cee Gomez PA-C

## 2017-05-18 NOTE — NURSING NOTE
"Oncology Rooming Note    May 18, 2017 1:56 PM   Albert Amaya is a 51 year old male who presents for:    Chief Complaint   Patient presents with     Blood Draw     labs and urine collected from L arm venipuncture, vitals taken,      Oncology Clinic Visit     Prostate CA follow up     Initial Vitals: BP (!) 153/93  Pulse 64  Temp 96.7  F (35.9  C) (Tympanic)  Resp 16  Ht 1.702 m (5' 7.01\")  Wt 90.1 kg (198 lb 11.2 oz)  SpO2 97%  BMI 31.11 kg/m2 Estimated body mass index is 31.11 kg/(m^2) as calculated from the following:    Height as of this encounter: 1.702 m (5' 7.01\").    Weight as of this encounter: 90.1 kg (198 lb 11.2 oz). Body surface area is 2.06 meters squared.  No Pain (1) Comment: Data Unavailable   No LMP for male patient.  Allergies reviewed: Yes  Medications reviewed: Yes    Medications: Medication refills not needed today.  Pharmacy name entered into [facility]:    [id number] - [place], [place] - [address] [facility] - [place], [place] - [address] [facility] - [place], [place] - [address] [facility] - [place], [place] - [address] [place] [address] DRUG STORE [id number] ([place]) - [place], [place] - [address] AT [facility] [address]    Clinical concerns: Blood pressure elevated today. Labs done.     10 minutes for nursing intake (face to face time)     Andreina Cutler LPN            "

## 2017-05-18 NOTE — NURSING NOTE
Chief Complaint   Patient presents with     Blood Draw     labs and urine collected from L arm venipuncture, vitals taken,      Shena Reyes RN

## 2017-05-18 NOTE — MR AVS SNAPSHOT
After Visit Summary   5/18/2017    Albert Amaya    MRN: [id number]           Patient Information     Date Of Birth          1965        Visit Information        Provider Department      5/18/2017 1:40 PM Cee Gomez PA-C [facility]        Today's Diagnoses     Malignant neoplasm of prostate (H)    -  1    Dysuria           Follow-ups after your visit        Your next 10 appointments already scheduled     May 25, 2017  9:00 AM CDT   [facility] with  [facility]   [facility] ([facility])    [address] [place] [id number]   [phone]            Jun 07, 2017  4:15 PM CDT   [facility] with  [facility]   [facility])    [address] [place] [id number]   [phone]            Jun 07, 2017  4:45 PM CDT   (Arrive by 4:30 PM)   Return Visit with Tyrel Valladares MD   [facility] ([facility])    [address] [place] [address]   [phone]              Who to contact     If you have questions or need follow up information about today's clinic visit or your schedule please contact [facility] directly at [phone].  Normal or non-critical lab and imaging results will be communicated to you by MyChart, letter or phone within 4 business days after the clinic has received the results. If you do not hear from us within 7 days, please contact the clinic through MyChart or phone. If you have a critical or abnormal lab result, we will notify you by phone as soon as possible.  Submit refill requests through [employer] or call your pharmacy and they will forward the refill request to us. Please allow 3 business days for your refill to be completed.          Additional Information About Your Visit        [facility]  "Information     [facility] gives you secure access to your electronic health record. If you see a primary care provider, you can also send messages to your care team and make appointments. If you have questions, please call your primary care clinic.  If you do not have a primary care provider, please call [phone] and they will assist you.        Care EveryWhere ID     This is your Care EveryWhere ID. This could be used by other organizations to access your [facility] medical records  [id number]        Your Vitals Were     Pulse Temperature Respirations Height Pulse Oximetry BMI (Body Mass Index)    64 96.7  F (35.9  C) (Tympanic) 16 1.702 m (5' 7.01\") 97% 31.11 kg/m2       Blood Pressure from Last 3 Encounters:   05/18/17 144/90   05/03/17 122/86   03/28/17 102/88    Weight from Last 3 Encounters:   05/18/17 90.1 kg (198 lb 11.2 oz)   05/03/17 89.2 kg (196 lb 9.6 oz)   03/24/17 89.9 kg (198 lb 1.6 oz)              We Performed the Following     CBC with platelets differential     Comprehensive metabolic panel     Routine UA with micro reflex to culture        Primary Care Provider Office Phone # Fax #    [facility] [phone]       [address]  [facility] [address]        Thank you!     Thank you for choosing [facility]  for your care. Our goal is always to provide you with excellent care. Hearing back from our patients is one way we can continue to improve our services. Please take a few minutes to complete the written survey that you may receive in the mail after your visit with us. Thank you!             Your Updated Medication List - Protect others around you: Learn how to safely use, store and throw away your medicines at www.disposemymeds.org.          This list is accurate as of: 5/18/17 11:59 PM.  Always use your most recent med list.                   Brand Name Dispense Instructions for use    abiraterone 250 MG tablet    ZYTIGA    120 tablet    " Take 4 tablets (1,000 mg) by mouth daily for 30 doses Take on empty stomach.       diclofenac 1 % Gel topical gel    VOLTAREN    100 g    Apply 2-4 grams to knees 2-3 x day prn pain       EPINEPHrine 0.3 MG/0.3ML injection    EPIPEN 2-CHARITY    0.6 mL    Inject 0.3 mLs (0.3 mg) into the muscle once as needed for anaphylaxis       Ginger Root 250 MG Caps      Take 250 mg by mouth as needed Reported on 5/18/2017       IBUPROFEN PO      Take by mouth as needed for moderate pain Reported on 5/18/2017       levothyroxine 75 MCG tablet    SYNTHROID/LEVOTHROID    90 tablet    Take 1 tablet (75 mcg) by mouth daily       LORazepam 0.5 MG tablet    ATIVAN    30 tablet    Take 1 tablet (0.5 mg) by mouth every 4 hours as needed (Anxiety, Nausea/Vomiting or Sleep)       predniSONE 5 MG tablet    DELTASONE    30 tablet    Take 1 tablet (5 mg) by mouth daily (with breakfast)       PROBIOTIC DAILY PO      Take by mouth as needed Reported on 5/18/2017       sildenafil 100 MG cap/tab    REVATIO/VIAGRA    6 tablet    Take 1 tablet (100 mg) by mouth daily as needed for erectile dysfunction Take 30 min to 4 hours before intercourse.  Never use with nitroglycerin, terazosin or doxazosin.       tadalafil 20 MG tablet    CIALIS    30 tablet    Take 2 tablets (40 mg) by mouth daily as needed for erectile dysfunction       TYLENOL PO      Take 325 mg by mouth every 8 hours as needed for mild pain or fever Reported on 5/18/2017

## 2017-05-18 NOTE — ORAL ONC MGMT
Oral Chemotherapy Monitoring Program     Primary Oncologist: Dr. Valladares  Primary Oncology Clinic: [facility]  Cancer Diagnosis: Prostate     Drug: Zytiga 1000mg (4 x 250mg) QD continuously.  Start Date: 5/10/17  Dose is appropriate for patient's renal and hepatic function.  Expected duration of therapy: Until disease progression or unacceptable toxicity     Drug Interaction Assessment: Clinically significant drug interactions found  -Zytiga + Venlafaxine = Zytiga may increase serum concentration of Venlafaxine via CYP2D6 inhibition (category D - consider therapy modification - OK per Dr. Valladares, watch for increased side effects)     Lab Monitoring Plan  C1D1+  CMP, BP C2D1+ Call, CMP, BP C3D1+ Call, CMP, BP C4D1+ Call, CMP, BP C5D1+ Call, CMP, BP C6D1+ Call, CMP, BP   C1D8+   C2D8+   C3D8+   C4D8+   C5D8+   C6D8+     C1D15+ Call, CMP C2D15+ Call, CMP C3D15+   C4D15+   C5D15+   C6D15+     C1D22+   C2D22+   C3D22+   C4D22+   C5D22+   C6D22+          Subjective/Objective:  Albert Amaya is a 51 year old male contacted by phone for a follow-up visit for oral chemotherapy.  Upon the initiation of the call, Albert stated that he was glad that I had called.  He indicated that he was just talking to his wife about the fact that he feels he needs to be seen.  He indicated having UTI symptoms (not specified) and swollen neck glands.  Per conversation with triage nurse, Dr. Valladares has been paged.    ORAL CHEMOTHERAPY 5/11/2017 5/18/2017   Drug Name Zytiga (Abiraterone) Zytiga (Abiraterone)   Current Dosage 1000mg 1000mg   Current Schedule Daily Daily   Cycle Details Continuous Continuous   Start Date of Last Cycle 5/10/2017 5/10/2017   Doses missed in last 2 weeks - 0   Adherence Assessment - Adherent       Vitals:  BP:   BP Readings from Last 1 Encounters:   05/03/17 122/86     Wt Readings from Last 1 Encounters:   05/03/17 89.2 kg (196 lb 9.6 oz)     Estimated body surface area is 2.05 meters squared as calculated from the  "following:    Height as of 11/22/16: 1.702 m (5' 7\").    Weight as of 5/3/17: 89.2 kg (196 lb 9.6 oz).    Labs:  Lab Results   Component Value Date     05/03/2017      Lab Results   Component Value Date    POTASSIUM 4.2 05/03/2017     Lab Results   Component Value Date    WILL 9.1 05/03/2017     Lab Results   Component Value Date    ALBUMIN 4.4 05/03/2017       Lab Results   Component Value Date    BUN 13 05/03/2017     Lab Results   Component Value Date    CR 0.78 05/03/2017       Lab Results   Component Value Date    AST 19 05/03/2017     Lab Results   Component Value Date    ALT 30 05/03/2017     Lab Results   Component Value Date    BILITOTAL 0.6 05/03/2017       Lab Results   Component Value Date    WBC 5.8 05/03/2017     Lab Results   Component Value Date    HGB 11.9 05/03/2017     Lab Results   Component Value Date     05/03/2017     Lab Results   Component Value Date    ANEU 2.6 05/03/2017     Assessment:  Albert is experiencing some medical conditions at this time.  It is unknown if it is related to his Zytiga therapy started 5/10/2017.         Plan:  Continue Zytiga therapy unless otherwise indicated by provider.    Follow-Up:  1 week    Keo DOTY.Christel.  [facility]  Oral Chemotherapy Program  [phone]  [email or web address]      "

## 2017-05-18 NOTE — LETTER
5/18/2017      RE: Albert Amaya  [address] [facility] [place] [address]       [facility]  FOLLOW-UP VISIT NOTE  Date of visit: May 18, 2017     Cancer History:   Metastatic Prostate CA treated    - s/p 6 cycles of taxotere 75mg/m2   - s/p orchiectomy on 3/18/2016   - s/p Provenge 5/13, 5/27, 6/10   - s/p Casodex 50 mg daily March/April   - Currently taking Zytiga    HPI: Albert is a 51 year old gentleman with metastatic prostate cancer.  Albert felt a cramp in his gluteus muscle and could barely walk after doing some remodeling at home and unloading heavy truck at work. He tried flexeril and prednisone initially but eventually presented to ED on 10/5/15. He feels that initially he was nearly labeled as drug seeker since he was in lot of discomfort and flexeril was not working. But during course of ED visits labs were drawn and he had marked elevation in Alk Phosphatase (over 1000). CT did suggest some ileus with some sclerotic bone lesions. He was admitted to the hospital. His PSA was checked and was markedly elevated at 5760 (10/6/15), repeat value 5563.     Urology and Medical Oncology consults were placed. He was unhappy with the progress in hospital and felt no better and left the following day on 10/6. He did follow up with Dr. Freire and had transrectal US guided biopsy of prostate on 10/8/15. They have the results through my chart which confirms prostate adenocarcinoma - enrico 4+3 involving majority (60-90%) portion of every single core.  He started on taxotere on 10/23 and completed 6 cycles of therapy in 2/2016.  He then underwent orchiectomy on 3/18/2016.       Throughout spring of 2016 Albert's PSA started to progress and after three elevations there was concern about him being hormone refractory.  He was started on Provenge and enrolled in the trial including Indoximod. PSA trending up and started on Casodex mid March with progression. Switched to Zytiga  5/3/17.    INTERVAL HISTORY:   Albert presents alone today with his wife on speaker phone. States he has been having slight dysuria for 4 days. Wife is concerned for UTI. Denies urgency, frequency, hematuria, suprapubic pain, flank pain, fever, chills, nausea, vomiting. No history of UTI. Taking AZO since last night with some improvement. Also complains of enlarged lymph nodes in the neck, worse on the left. Noticed for 3 days. Noticeable, but not painful. Denies ear plugging, sinus congestion, runny nose, cough, SOB, dyspnea, chest pain. No muscle or joint pain. Hearing and vision unchanged. Having some hot flashes, understands it is due to hormone therapy. Has been feeling lightheaded or dizzy. Stool a little softer, BM's daily. Eating OK. Drinking a lot of coffee and working 12-hour days, not drinking much water. Not sleeping well. Waking up from 1-4 a.m., maybe getting 5 hours per night. Tried earlier dose of Zytiga, and trying Benadryl with some improvement. Has tried melatonin in the past and planning to try again.    MEDS:   Current Outpatient Prescriptions   Medication Sig     predniSONE (DELTASONE) 5 MG tablet Take 1 tablet (5 mg) by mouth daily (with breakfast)     abiraterone (ZYTIGA) 250 MG tablet Take 4 tablets (1,000 mg) by mouth daily for 30 doses Take on empty stomach.     levothyroxine (SYNTHROID/LEVOTHROID) 75 MCG tablet Take 1 tablet (75 mcg) by mouth daily     venlafaxine (EFFEXOR-XR) 37.5 MG 24 hr capsule Take 1 capsule (37.5 mg) by mouth daily     diclofenac (VOLTAREN) 1 % GEL topical gel Apply 2-4 grams to knees 2-3 x day prn pain (Patient not taking: Reported on 5/18/2017)     tadalafil (CIALIS) 20 MG tablet Take 2 tablets (40 mg) by mouth daily as needed for erectile dysfunction (Patient not taking: Reported on 5/18/2017)     IBUPROFEN PO Take by mouth as needed for moderate pain Reported on 5/18/2017     EPINEPHrine (EPIPEN 2-CHARITY) 0.3 MG/0.3ML injection 2-pack Inject 0.3 mLs (0.3 mg) into the  "muscle once as needed for anaphylaxis (Patient not taking: Reported on 2017)     sildenafil (VIAGRA) 100 MG tablet Take 1 tablet (100 mg) by mouth daily as needed for erectile dysfunction Take 30 min to 4 hours before intercourse.  Never use with nitroglycerin, terazosin or doxazosin. (Patient not taking: Reported on 2017)     Acetaminophen (TYLENOL PO) Take 325 mg by mouth every 8 hours as needed for mild pain or fever Reported on 2017     Ginger, Zingiber officinalis, (GINGER ROOT) 250 MG CAPS Take 250 mg by mouth as needed Reported on 2017     LORazepam (ATIVAN) 0.5 MG tablet Take 1 tablet (0.5 mg) by mouth every 4 hours as needed (Anxiety, Nausea/Vomiting or Sleep) (Patient not taking: Reported on 2017)     Probiotic Product (PROBIOTIC DAILY PO) Take by mouth as needed Reported on 2017     No current facility-administered medications for this visit.         EXAM:  ECOG 1  Wt Readings from Last 4 Encounters:   17 90.1 kg (198 lb 11.2 oz)   17 89.2 kg (196 lb 9.6 oz)   17 89.9 kg (198 lb 1.6 oz)   17 90.3 kg (199 lb)   /90 (BP Location: Right arm, Patient Position: Chair, Cuff Size: Adult Regular)  Pulse 64  Temp 96.7  F (35.9  C) (Tympanic)  Resp 16  Ht 1.702 m (5' 7.01\")  Wt 90.1 kg (198 lb 11.2 oz)  SpO2 97%  BMI 31.11 kg/m2  Patient alert and oriented x3.   PERRLA. EOMI. No scleral icterus noted. OP without thrush/sores.  Neck exam: Palpable 0.5 cm anterior cervical node on the left, rubbery, mobile, non-tender.   Heart: RRR no murmurs noted.   Lungs: clear to auscultation bilaterally.  No crackles or wheezing.   Abd: Normal bowel sounds.  No tenderness to palpation. No suprapubic tenderness.  No hepatomegaly.   Extremities: No lower extremity edema.   Back: No CVA tenderness.   Neuro: grossly intact.   Mood and affect is stable.     Labs/Imagin2017 13:42   Sodium 141   Potassium 4.8   Chloride 106   Carbon Dioxide 27   Urea Nitrogen " 17   Creatinine 1.21   GFR Estimate 63   GFR Estimate If Black 77   Calcium 9.5   Anion Gap 8   Albumin 4.3   Protein Total 7.8   Bilirubin Total 0.6   Alkaline Phosphatase 57   ALT 33   AST 23   Glucose 124 (H)   WBC 6.6   Hemoglobin 12.3 (L)   Hematocrit 36.4 (L)   Platelet Count 269   RBC Count 3.94 (L)   MCV 92   MCH 31.2   MCHC 33.8   RDW 13.0   Diff Method Automated Method   % Neutrophils 58.4   % Lymphocytes 32.8   % Monocytes 5.9   % Eosinophils 1.5   % Basophils 0.8   % Immature Granulocytes 0.6   Nucleated RBCs 0   Absolute Neutrophil 3.9   Absolute Lymphocytes 2.2   Absolute Monocytes 0.4   Absolute Eosinophils 0.1   Absolute Basophils 0.1   Abs Immature Granulocytes 0.0   Absolute Nucleated RBC 0.0   Color Urine Leeanne   Appearance Urine Clear   Glucose Urine Negative   Bilirubin Urine Negative   Ketones Urine Negative   Specific Gravity Urine 1.006   pH Urine 7.0   Protein Albumin Urine Negative   Urobilinogen mg/dL 4.0 (H)   Nitrite Urine Negative   Blood Urine Negative   Leukocyte Esterase Urine Negative   Source Midstream Urine   WBC Urine <1   RBC Urine <1     ASSESSMENT/PLAN:  1. Metastatic prostate cancer: Currently taking Zytiga. Tolerating it well. Hypertensive today at 144/90. This is a known adverse effect of Zytiga. Will continue to monitor. Discussed getting a home BP cuff or taking BP at a pharmacy after sitting for 5 minutes and recording values to bring to next follow-up.   - Continue Zytiga  - F/u with Dr. Valladares 6/7/2017.    2. Dehydration: urinary symptoms likely due to dehydration. Creatinine bumped today. Likely lightheadedness is due to low volume as well. Discussed increasing water intake and decreasing coffee as this can exacerbate the problem. No sign of infection on exam or UA. Suspect urobilinogen is increased 2/2 AZO.     3. Anterior cervical lymph node:  No signs of URI or precipitating infection. Possible the palpable lymph node has not changed significantly but is simply  palpated more easily due to tissue dehydration. Continue to monitor at follow-up.    4. Insomnia: Discussed trying melatonin for sleep. Decreasing caffeine intake will help this as well.    GREG Mcdowell    Patient was seen and examined by me, as noted above. GREG Mcdowell acted as a scribe. I have reviewed and edited the above note.  CHARLA Zaragoza PA-C

## 2017-05-18 NOTE — TELEPHONE ENCOUNTER
[facility] Telephone Triage Note    Assessment: Spouse/Partner Wife called in to triage reporting the following symptoms: dsyuria x 2 days. Swollen painful cervical lymph nodes x 2 days to left side.  Denies urgency, frequency with dysuria. Taking cranberry tablets and AZO for dsyuria. Afebrile. No urinary retention noted. No complaints of back pain. Lymph nodes are palpable. Trace edema to bilateral ankles and fluid retention noted to abdomen. Currently taking Zytiga 1000mg daily for 30 doses.     Recommendations: Discussed with Dr. Valladares.  Clinic visit  today with the following procedures/labs:  no procedures,  CBC CMP UA, micro if positive,  no infusion    Follow-Up: Order for above labs/procedures/infusion placed, Patient voiced understanding of advice and/or instructions given.

## 2017-05-23 ENCOUNTER — DOCUMENTATION ONLY (OUTPATIENT)
Dept: ONCOLOGY | Facility: CLINIC | Age: 52
End: 2017-05-23

## 2017-05-23 DIAGNOSIS — C61 MALIGNANT NEOPLASM OF PROSTATE (H): ICD-10-CM

## 2017-05-23 DIAGNOSIS — R61 NIGHT SWEATS: ICD-10-CM

## 2017-05-23 NOTE — PROGRESS NOTES
Prior Authorization Approval    Date Range of Authorization: 5/5/17 - 11/5/17  Medication:  Zytiga  Approved Dose/Quantity: 1000mg, 120/30 days  Diagnosis: Prostate cancer (C61)  Reference #: N/A  Insurance Company: [employer] I/D #: [id number]  Expected CoPay: $ 0  CoPay Card Available: N/A  Foundation Assistance Needed: N/A  Which Pharmacy is filling the prescription (Not needed for infusion/clinic administered): [facility]    If you received a fax notification from an outside pharmacy;  Pharmacy Name: N/A  Pharmacy #: N/A  Pharmacy Fax: N/A

## 2017-05-24 RX ORDER — VENLAFAXINE HYDROCHLORIDE 37.5 MG/1
CAPSULE, EXTENDED RELEASE ORAL
Qty: 90 CAPSULE | Refills: 3 | Status: SHIPPED | OUTPATIENT
Start: 2017-05-24 | End: 2017-09-06

## 2017-05-25 ENCOUNTER — TELEPHONE (OUTPATIENT)
Dept: ONCOLOGY | Facility: CLINIC | Age: 52
End: 2017-05-25

## 2017-05-25 DIAGNOSIS — C61 MALIGNANT NEOPLASM OF PROSTATE (H): ICD-10-CM

## 2017-05-25 DIAGNOSIS — C79.51 BONE METASTASIS: ICD-10-CM

## 2017-05-25 LAB
ALBUMIN SERPL-MCNC: 4 G/DL (ref 3.4–5)
ALP SERPL-CCNC: 47 U/L (ref 40–150)
ALT SERPL W P-5'-P-CCNC: 24 U/L (ref 0–70)
ANION GAP SERPL CALCULATED.3IONS-SCNC: 8 MMOL/L (ref 3–14)
AST SERPL W P-5'-P-CCNC: 17 U/L (ref 0–45)
BASOPHILS # BLD AUTO: 0 10E9/L (ref 0–0.2)
BASOPHILS NFR BLD AUTO: 0.6 %
BILIRUB SERPL-MCNC: 0.8 MG/DL (ref 0.2–1.3)
BUN SERPL-MCNC: 20 MG/DL (ref 7–30)
CALCIUM SERPL-MCNC: 9 MG/DL (ref 8.5–10.1)
CHLORIDE SERPL-SCNC: 109 MMOL/L (ref 94–109)
CO2 SERPL-SCNC: 27 MMOL/L (ref 20–32)
CREAT SERPL-MCNC: 0.91 MG/DL (ref 0.66–1.25)
DIFFERENTIAL METHOD BLD: ABNORMAL
EOSINOPHIL # BLD AUTO: 0.2 10E9/L (ref 0–0.7)
EOSINOPHIL NFR BLD AUTO: 2.9 %
ERYTHROCYTE [DISTWIDTH] IN BLOOD BY AUTOMATED COUNT: 12.9 % (ref 10–15)
GFR SERPL CREATININE-BSD FRML MDRD: 88 ML/MIN/1.7M2
GLUCOSE SERPL-MCNC: 112 MG/DL (ref 70–99)
HCT VFR BLD AUTO: 34.8 % (ref 40–53)
HGB BLD-MCNC: 11.5 G/DL (ref 13.3–17.7)
IMM GRANULOCYTES # BLD: 0 10E9/L (ref 0–0.4)
IMM GRANULOCYTES NFR BLD: 0.3 %
LDH SERPL L TO P-CCNC: 204 U/L (ref 85–227)
LYMPHOCYTES # BLD AUTO: 2.7 10E9/L (ref 0.8–5.3)
LYMPHOCYTES NFR BLD AUTO: 41 %
MCH RBC QN AUTO: 30.5 PG (ref 26.5–33)
MCHC RBC AUTO-ENTMCNC: 33 G/DL (ref 31.5–36.5)
MCV RBC AUTO: 92 FL (ref 78–100)
MONOCYTES # BLD AUTO: 0.6 10E9/L (ref 0–1.3)
MONOCYTES NFR BLD AUTO: 8.9 %
NEUTROPHILS # BLD AUTO: 3 10E9/L (ref 1.6–8.3)
NEUTROPHILS NFR BLD AUTO: 46.3 %
NRBC # BLD AUTO: 0 10*3/UL
NRBC BLD AUTO-RTO: 0 /100
PLATELET # BLD AUTO: 245 10E9/L (ref 150–450)
POTASSIUM SERPL-SCNC: 4.9 MMOL/L (ref 3.4–5.3)
PROT SERPL-MCNC: 7.5 G/DL (ref 6.8–8.8)
PSA SERPL-MCNC: 91 UG/L (ref 0–4)
RBC # BLD AUTO: 3.77 10E12/L (ref 4.4–5.9)
SODIUM SERPL-SCNC: 144 MMOL/L (ref 133–144)
WBC # BLD AUTO: 6.5 10E9/L (ref 4–11)

## 2017-05-25 PROCEDURE — 83615 LACTATE (LD) (LDH) ENZYME: CPT | Performed by: INTERNAL MEDICINE

## 2017-05-25 PROCEDURE — 80053 COMPREHEN METABOLIC PANEL: CPT | Performed by: INTERNAL MEDICINE

## 2017-05-25 PROCEDURE — 84153 ASSAY OF PSA TOTAL: CPT | Performed by: INTERNAL MEDICINE

## 2017-05-25 PROCEDURE — 85025 COMPLETE CBC W/AUTO DIFF WBC: CPT | Performed by: INTERNAL MEDICINE

## 2017-05-25 NOTE — NURSING NOTE
Chief Complaint   Patient presents with     Blood Draw     venipuncture     Labs drawn see flow sheet.  JEFF PASCUAL, CMA

## 2017-05-25 NOTE — ORAL ONC MGMT
Oral Chemotherapy Monitoring Program    Today it was the Author's pleasure to speak with Albert in regards to a Zytiga Refill.   Albert indicated that he has 1 more tablet remaining and that he needs a refill.  [facility] pharmacy was notified in person and the prescription in being filled.    Keo DOTY.Ph.  [facility]  Oral Chemotherapy Program  [phone]  [email or web address]

## 2017-05-26 ENCOUNTER — TELEPHONE (OUTPATIENT)
Dept: ONCOLOGY | Facility: CLINIC | Age: 52
End: 2017-05-26

## 2017-05-26 NOTE — ORAL ONC MGMT
Oral Chemotherapy Monitoring Program      Primary Oncologist: Dr. Valladares  Primary Oncology Clinic: [facility]  Cancer Diagnosis: Prostate      Drug: Zytiga 1000mg (4 x 250mg) QD continuously.  Start Date: 5/10/17  Dose is appropriate for patient's renal and hepatic function.  Expected duration of therapy: Until disease progression or unacceptable toxicity      Drug Interaction Assessment: Clinically significant drug interactions found  -Zytiga + Venlafaxine = Zytiga may increase serum concentration of Venlafaxine via CYP2D6 inhibition (category D - consider therapy modification - OK per Dr. Valladares, watch for increased side effects)      Lab Monitoring Plan  C1D1+  CMP, BP C2D1+ Call, CMP, BP C3D1+ Call, CMP, BP C4D1+ Call, CMP, BP C5D1+ Call, CMP, BP C6D1+ Call, CMP, BP   C1D8+    C2D8+    C3D8+    C4D8+    C5D8+    C6D8+      C1D15+ Call, CMP C2D15+ Call, CMP C3D15+    C4D15+    C5D15+    C6D15+      C1D22+    C2D22+    C3D22+    C4D22+    C5D22+    C6D22+             Subjective/Objective:  Albert Amaya is a 51 year old male contacted by phone for a follow-up visit for oral chemotherapy.  Albert reports doing well today.  Albert is in need of a refill on his medication. [facility] has been contacted to fill prescription.      ORAL CHEMOTHERAPY 5/11/2017 5/18/2017 5/26/2017   Drug Name Zytiga (Abiraterone) Zytiga (Abiraterone) Zytiga (Abiraterone)   Current Dosage 1000mg 1000mg 1000mg   Current Schedule Daily Daily Daily   Cycle Details Continuous Continuous Continuous   Start Date of Last Cycle 5/10/2017 5/10/2017 5/10/2017   Planned next cycle start date - - 6/7/2017   Doses missed in last 2 weeks - 0 0   Adherence Assessment - Adherent Adherent   Adverse Effects - - No AE identified during assessment   Home BPs - - Not applicable   Any new drug interactions? - - No   Is the dose as ordered appropriate for the patient? - - Yes       Vitals:  BP:   BP Readings from Last 1 Encounters:   05/18/17 144/90     Wt Readings  "from Last 1 Encounters:   05/18/17 90.1 kg (198 lb 11.2 oz)     Estimated body surface area is 2.06 meters squared as calculated from the following:    Height as of 5/18/17: 1.702 m (5' 7.01\").    Weight as of 5/18/17: 90.1 kg (198 lb 11.2 oz).    Labs:  Lab Results   Component Value Date     05/25/2017      Lab Results   Component Value Date    POTASSIUM 4.9 05/25/2017     Lab Results   Component Value Date    WILL 9.0 05/25/2017     Lab Results   Component Value Date    ALBUMIN 4.0 05/25/2017       Lab Results   Component Value Date    BUN 20 05/25/2017     Lab Results   Component Value Date    CR 0.91 05/25/2017       Lab Results   Component Value Date    AST 17 05/25/2017     Lab Results   Component Value Date    ALT 24 05/25/2017     Lab Results   Component Value Date    BILITOTAL 0.8 05/25/2017       Lab Results   Component Value Date    WBC 6.5 05/25/2017     Lab Results   Component Value Date    HGB 11.5 05/25/2017     Lab Results   Component Value Date     05/25/2017     Lab Results   Component Value Date    ANEU 3.0 05/25/2017         Assessment:    Patient is tolerating the medication well at this time, no pharmacological interventions are needed.    Plan:  Continue Zytiga therapy unless otherwise indicated by provider.     Follow-Up:  1 week     Keo CASTROPh.  [facility]  Oral Chemotherapy Program  [phone]  [email or web address]  "

## 2017-05-31 DIAGNOSIS — C61 MALIGNANT NEOPLASM OF PROSTATE (H): Primary | ICD-10-CM

## 2017-05-31 DIAGNOSIS — C79.51 BONE METASTASIS: ICD-10-CM

## 2017-05-31 RX ORDER — PREDNISONE 5 MG/1
5 TABLET ORAL
Qty: 30 TABLET | Refills: 0 | Status: SHIPPED | OUTPATIENT
Start: 2017-05-31 | End: 2017-07-19

## 2017-05-31 RX ORDER — ABIRATERONE ACETATE 250 MG/1
1000 TABLET ORAL DAILY
Qty: 120 TABLET | Refills: 0 | Status: SHIPPED | OUTPATIENT
Start: 2017-05-31 | End: 2017-06-30

## 2017-06-07 ENCOUNTER — APPOINTMENT (OUTPATIENT)
Dept: LAB | Facility: CLINIC | Age: 52
End: 2017-06-07
Attending: INTERNAL MEDICINE
Payer: COMMERCIAL

## 2017-06-07 ENCOUNTER — ONCOLOGY VISIT (OUTPATIENT)
Dept: ONCOLOGY | Facility: CLINIC | Age: 52
End: 2017-06-07
Attending: INTERNAL MEDICINE
Payer: COMMERCIAL

## 2017-06-07 VITALS
DIASTOLIC BLOOD PRESSURE: 103 MMHG | WEIGHT: 198 LBS | SYSTOLIC BLOOD PRESSURE: 146 MMHG | TEMPERATURE: 99 F | BODY MASS INDEX: 31 KG/M2 | OXYGEN SATURATION: 97 % | HEART RATE: 62 BPM

## 2017-06-07 DIAGNOSIS — C79.51 BONE METASTASIS: ICD-10-CM

## 2017-06-07 DIAGNOSIS — C61 MALIGNANT NEOPLASM OF PROSTATE (H): Primary | ICD-10-CM

## 2017-06-07 LAB
ALBUMIN SERPL-MCNC: 4.2 G/DL (ref 3.4–5)
ALP SERPL-CCNC: 52 U/L (ref 40–150)
ALT SERPL W P-5'-P-CCNC: 24 U/L (ref 0–70)
ANION GAP SERPL CALCULATED.3IONS-SCNC: 8 MMOL/L (ref 3–14)
AST SERPL W P-5'-P-CCNC: 14 U/L (ref 0–45)
BASOPHILS # BLD AUTO: 0 10E9/L (ref 0–0.2)
BASOPHILS NFR BLD AUTO: 0.5 %
BILIRUB SERPL-MCNC: 0.5 MG/DL (ref 0.2–1.3)
BUN SERPL-MCNC: 19 MG/DL (ref 7–30)
CALCIUM SERPL-MCNC: 8.8 MG/DL (ref 8.5–10.1)
CHLORIDE SERPL-SCNC: 108 MMOL/L (ref 94–109)
CO2 SERPL-SCNC: 25 MMOL/L (ref 20–32)
CREAT SERPL-MCNC: 0.81 MG/DL (ref 0.66–1.25)
DIFFERENTIAL METHOD BLD: ABNORMAL
EOSINOPHIL # BLD AUTO: 0.1 10E9/L (ref 0–0.7)
EOSINOPHIL NFR BLD AUTO: 1.3 %
ERYTHROCYTE [DISTWIDTH] IN BLOOD BY AUTOMATED COUNT: 13.1 % (ref 10–15)
GFR SERPL CREATININE-BSD FRML MDRD: ABNORMAL ML/MIN/1.7M2
GLUCOSE SERPL-MCNC: 110 MG/DL (ref 70–99)
HCT VFR BLD AUTO: 34 % (ref 40–53)
HGB BLD-MCNC: 11.4 G/DL (ref 13.3–17.7)
IMM GRANULOCYTES # BLD: 0 10E9/L (ref 0–0.4)
IMM GRANULOCYTES NFR BLD: 0.5 %
LDH SERPL L TO P-CCNC: 206 U/L (ref 85–227)
LYMPHOCYTES # BLD AUTO: 1.9 10E9/L (ref 0.8–5.3)
LYMPHOCYTES NFR BLD AUTO: 31.2 %
MCH RBC QN AUTO: 31.4 PG (ref 26.5–33)
MCHC RBC AUTO-ENTMCNC: 33.5 G/DL (ref 31.5–36.5)
MCV RBC AUTO: 94 FL (ref 78–100)
MONOCYTES # BLD AUTO: 0.3 10E9/L (ref 0–1.3)
MONOCYTES NFR BLD AUTO: 5.7 %
NEUTROPHILS # BLD AUTO: 3.6 10E9/L (ref 1.6–8.3)
NEUTROPHILS NFR BLD AUTO: 60.8 %
NRBC # BLD AUTO: 0 10*3/UL
NRBC BLD AUTO-RTO: 0 /100
PLATELET # BLD AUTO: 250 10E9/L (ref 150–450)
POTASSIUM SERPL-SCNC: 4.3 MMOL/L (ref 3.4–5.3)
PROT SERPL-MCNC: 7.5 G/DL (ref 6.8–8.8)
PSA SERPL-MCNC: 76.3 UG/L (ref 0–4)
RBC # BLD AUTO: 3.63 10E12/L (ref 4.4–5.9)
SODIUM SERPL-SCNC: 142 MMOL/L (ref 133–144)
WBC # BLD AUTO: 6 10E9/L (ref 4–11)

## 2017-06-07 PROCEDURE — 83615 LACTATE (LD) (LDH) ENZYME: CPT | Performed by: INTERNAL MEDICINE

## 2017-06-07 PROCEDURE — 99214 OFFICE O/P EST MOD 30 MIN: CPT | Mod: ZP | Performed by: INTERNAL MEDICINE

## 2017-06-07 PROCEDURE — 25000128 H RX IP 250 OP 636: Mod: ZF | Performed by: INTERNAL MEDICINE

## 2017-06-07 PROCEDURE — 96372 THER/PROPH/DIAG INJ SC/IM: CPT

## 2017-06-07 PROCEDURE — 36415 COLL VENOUS BLD VENIPUNCTURE: CPT | Performed by: INTERNAL MEDICINE

## 2017-06-07 PROCEDURE — 80053 COMPREHEN METABOLIC PANEL: CPT | Performed by: INTERNAL MEDICINE

## 2017-06-07 PROCEDURE — 85025 COMPLETE CBC W/AUTO DIFF WBC: CPT | Performed by: INTERNAL MEDICINE

## 2017-06-07 PROCEDURE — 84153 ASSAY OF PSA TOTAL: CPT | Performed by: INTERNAL MEDICINE

## 2017-06-07 RX ORDER — MUPIROCIN 20 MG/G
OINTMENT TOPICAL 3 TIMES DAILY
Qty: 22 G | Refills: 0 | Status: SHIPPED | OUTPATIENT
Start: 2017-06-07 | End: 2017-07-19

## 2017-06-07 RX ADMIN — DENOSUMAB 120 MG: 120 INJECTION SUBCUTANEOUS at 17:49

## 2017-06-07 NOTE — MR AVS SNAPSHOT
After Visit Summary   6/7/2017    Albert Amaya    MRN: [id number]           Patient Information     Date Of Birth          1965        Visit Information        Provider Department      6/7/2017 4:45 PM Tyrel Valladares MD [facility]        Today's Diagnoses     Malignant neoplasm of prostate (H)    -  1    Bone metastasis (H)           Follow-ups after your visit        Your next 10 appointments already scheduled     Aug 16, 2017  3:45 PM CDT   [facility] Draw with [facility] DRAW   [facility] Lab Draw ([facility])    [address] [place] [id number] [phone]            Aug 16, 2017  4:15 PM CDT   (Arrive by 4:00 PM)   Return Visit with Tyrel Valladares MD   [facility] ([facility])    [address] [place] [id number] [phone]              Who to contact     If you have questions or need follow up information about today's clinic visit or your schedule please contact [facility] directly at [phone].  Normal or non-critical lab and imaging results will be communicated to you by MyChart, letter or phone within 4 business days after the clinic has received the results. If you do not hear from us within 7 days, please contact the clinic through [employer] or phone. If you have a critical or abnormal lab result, we will notify you by phone as soon as possible.  Submit refill requests through [employer] or call your pharmacy and they will forward the refill request to us. Please allow 3 business days for your refill to be completed.          Additional Information About Your Visit        MyChart Information     [employer] gives you secure access to your electronic health record. If you see a primary care provider, you can also send messages to your care team and make appointments. If you have questions, please call  your primary care clinic.  If you do not have a primary care provider, please call [phone] and they will assist you.        Care EveryWhere ID     This is your Care EveryWhere ID. This could be used by other organizations to access your [facility] medical records  [id number]        Your Vitals Were     Pulse Temperature Pulse Oximetry BMI (Body Mass Index)          62 99  F (37.2  C) (Oral) 97% 31 kg/m2         Blood Pressure from Last 3 Encounters:   06/07/17 (!) 146/103   05/18/17 144/90   05/03/17 122/86    Weight from Last 3 Encounters:   06/07/17 89.8 kg (198 lb)   05/18/17 90.1 kg (198 lb 11.2 oz)   05/03/17 89.2 kg (196 lb 9.6 oz)              We Performed the Following     CBC with platelets differential     Comprehensive metabolic panel     Lactate Dehydrogenase     PSA tumor marker          Today's Medication Changes          These changes are accurate as of: 6/7/17 11:59 PM.  If you have any questions, ask your nurse or doctor.               Start taking these medicines.        Dose/Directions    mupirocin 2 % ointment   Commonly known as:  BACTROBAN   Used for:  Malignant neoplasm of prostate (H), Bone metastasis (H)   Started by:  Tyrel Valladares MD        Apply topically 3 times daily   Quantity:  22 g   Refills:  0            Where to get your medicines      These medications were sent to [facility] [place], [place] - [address]  [address], [facility] [address]    Hours:  TRANSPLANT PHONE NUMBER [phone] Phone:  [phone]     mupirocin 2 % ointment                Primary Care Provider Office Phone # Fax #    [facility] [phone]       [address]  [facility] [address]        Thank you!     Thank you for choosing [facility]  for your care. Our goal is always to provide you with excellent care. Hearing back from our patients is one way we can continue to improve our  services. Please take a few minutes to complete the written survey that you may receive in the mail after your visit with us. Thank you!             Your Updated Medication List - Protect others around you: Learn how to safely use, store and throw away your medicines at www.disposemymeds.org.          This list is accurate as of: 6/7/17 11:59 PM.  Always use your most recent med list.                   Brand Name Dispense Instructions for use    abiraterone 250 MG tablet    ZYTIGA    120 tablet    Take 4 tablets (1,000 mg) by mouth daily for 30 doses Take on empty stomach.       diclofenac 1 % Gel topical gel    VOLTAREN    100 g    Apply 2-4 grams to knees 2-3 x day prn pain       EPINEPHrine 0.3 MG/0.3ML injection    EPIPEN 2-CHARITY    0.6 mL    Inject 0.3 mLs (0.3 mg) into the muscle once as needed for anaphylaxis       Ginger Root 250 MG Caps      Take 250 mg by mouth as needed Reported on 5/18/2017       IBUPROFEN PO      Take by mouth as needed for moderate pain Reported on 5/18/2017       levothyroxine 75 MCG tablet    SYNTHROID/LEVOTHROID    90 tablet    Take 1 tablet (75 mcg) by mouth daily       LORazepam 0.5 MG tablet    ATIVAN    30 tablet    Take 1 tablet (0.5 mg) by mouth every 4 hours as needed (Anxiety, Nausea/Vomiting or Sleep)       mupirocin 2 % ointment    BACTROBAN    22 g    Apply topically 3 times daily       * predniSONE 5 MG tablet    DELTASONE    30 tablet    Take 1 tablet (5 mg) by mouth daily (with breakfast)       * predniSONE 5 MG tablet    DELTASONE    30 tablet    Take 1 tablet (5 mg) by mouth daily (with breakfast)       PROBIOTIC DAILY PO      Take by mouth as needed Reported on 5/18/2017       sildenafil 100 MG cap/tab    REVATIO/VIAGRA    6 tablet    Take 1 tablet (100 mg) by mouth daily as needed for erectile dysfunction Take 30 min to 4 hours before intercourse.  Never use with nitroglycerin, terazosin or doxazosin.       tadalafil 20 MG tablet    CIALIS    30 tablet    Take 2  tablets (40 mg) by mouth daily as needed for erectile dysfunction       TYLENOL PO      Take 325 mg by mouth every 8 hours as needed for mild pain or fever Reported on 5/18/2017       venlafaxine 37.5 MG 24 hr capsule    EFFEXOR-XR    90 capsule    TAKE 1 CAPSULE(37.5 MG) BY MOUTH DAILY       * Notice:  This list has 2 medication(s) that are the same as other medications prescribed for you. Read the directions carefully, and ask your doctor or other care provider to review them with you.

## 2017-06-07 NOTE — NURSING NOTE
Chief Complaint   Patient presents with     Oncology Clinic Visit     Malignant neoplasm of prostate      Blood Draw     Prostate ca, labs collected via venipuncture. Elevated b/p, pt refused a recheck.

## 2017-06-07 NOTE — LETTER
6/7/2017       RE: Albert Amaya  [address] [address]     Dear Colleague,    Thank you for referring your patient, Albert Amaya, to the [facility]. Please see a copy of my visit note below.    [facility]  FOLLOW-UP VISIT NOTE  Date of visit: Jun 7, 2017     Cancer History:   Metastatic Prostate CA treated    - s/p 6 cycles of taxotere 75mg/m2   - s/p orchiectomy on 3/18/2016   - s/p Provenge 5/13, 5/27, 6/10   - s/p Casodex 50 mg daily March/April   - Currently taking Zytiga    HPI: Albert is a 51 year old gentleman with metastatic prostate cancer.  Albert felt a cramp in his gluteus muscle and could barely walk after doing some remodeling at home and unloading heavy truck at work. He tried flexeril and prednisone initially but eventually presented to ED on 10/5/15. He feels that initially he was nearly labeled as drug seeker since he was in lot of discomfort and flexeril was not working. But during course of ED visits labs were drawn and he had marked elevation in Alk Phosphatase (over 1000). CT did suggest some ileus with some sclerotic bone lesions. He was admitted to the hospital. His PSA was checked and was markedly elevated at 5760 (10/6/15), repeat value 5563.     Urology and Medical Oncology consults were placed. He was unhappy with the progress in hospital and felt no better and left the following day on 10/6. He did follow up with Dr. Freire and had transrectal US guided biopsy of prostate on 10/8/15. They have the results through my chart which confirms prostate adenocarcinoma - enrico 4+3 involving majority (60-90%) portion of every single core.  He started on taxotere on 10/23 and completed 6 cycles of therapy in 2/2016.  He then underwent orchiectomy on 3/18/2016.       Throughout spring of 2016 Albert's PSA started to progress and after three elevations there was concern about him being hormone refractory.  He was started on  Provenge and enrolled in the trial including Indoximod. PSA trending up and started on Casodex mid March with progression. Switched to Zytiga 5/3/17.    INTERVAL HISTORY:   Albert presents alone today with his wife on speaker phone.     States he has been having slight dysuria for 4 days. Wife is concerned for UTI. Denies urgency, frequency, hematuria, suprapubic pain, flank pain, fever, chills, nausea, vomiting. No history of UTI. Taking AZO since last night with some improvement. Also complains of enlarged lymph nodes in the neck, worse on the left. Noticed for 3 days. Noticeable, but not painful. Denies ear plugging, sinus congestion, runny nose, cough, SOB, dyspnea, chest pain. No muscle or joint pain. Hearing and vision unchanged. Having some hot flashes, understands it is due to hormone therapy. Has been feeling lightheaded or dizzy. Stool a little softer, BM's daily. Eating OK. Drinking a lot of coffee and working 12-hour days, not drinking much water. Not sleeping well. Waking up from 1-4 a.m., maybe getting 5 hours per night. Tried earlier dose of Zytiga, and trying Benadryl with some improvement. Has tried melatonin in the past and planning to try again.    MEDS:   Current Outpatient Prescriptions   Medication Sig     mupirocin (BACTROBAN) 2 % ointment Apply topically 3 times daily     predniSONE (DELTASONE) 5 MG tablet Take 1 tablet (5 mg) by mouth daily (with breakfast)     abiraterone (ZYTIGA) 250 MG tablet Take 4 tablets (1,000 mg) by mouth daily for 30 doses Take on empty stomach.     venlafaxine (EFFEXOR-XR) 37.5 MG 24 hr capsule TAKE 1 CAPSULE(37.5 MG) BY MOUTH DAILY     predniSONE (DELTASONE) 5 MG tablet Take 1 tablet (5 mg) by mouth daily (with breakfast)     levothyroxine (SYNTHROID/LEVOTHROID) 75 MCG tablet Take 1 tablet (75 mcg) by mouth daily     diclofenac (VOLTAREN) 1 % GEL topical gel Apply 2-4 grams to knees 2-3 x day prn pain     tadalafil (CIALIS) 20 MG tablet Take 2 tablets (40 mg) by  mouth daily as needed for erectile dysfunction     IBUPROFEN PO Take by mouth as needed for moderate pain Reported on 5/18/2017     EPINEPHrine (EPIPEN 2-CHARITY) 0.3 MG/0.3ML injection 2-pack Inject 0.3 mLs (0.3 mg) into the muscle once as needed for anaphylaxis     sildenafil (VIAGRA) 100 MG tablet Take 1 tablet (100 mg) by mouth daily as needed for erectile dysfunction Take 30 min to 4 hours before intercourse.  Never use with nitroglycerin, terazosin or doxazosin.     Acetaminophen (TYLENOL PO) Take 325 mg by mouth every 8 hours as needed for mild pain or fever Reported on 5/18/2017     Ginger, Zingiber officinalis, (GINGER ROOT) 250 MG CAPS Take 250 mg by mouth as needed Reported on 5/18/2017     LORazepam (ATIVAN) 0.5 MG tablet Take 1 tablet (0.5 mg) by mouth every 4 hours as needed (Anxiety, Nausea/Vomiting or Sleep)     Probiotic Product (PROBIOTIC DAILY PO) Take by mouth as needed Reported on 5/18/2017     No current facility-administered medications for this visit.         EXAM:  ECOG 1  Wt Readings from Last 4 Encounters:   06/07/17 89.8 kg (198 lb)   05/18/17 90.1 kg (198 lb 11.2 oz)   05/03/17 89.2 kg (196 lb 9.6 oz)   03/24/17 89.9 kg (198 lb 1.6 oz)   BP (!) 146/103  Pulse 62  Temp 99  F (37.2  C) (Oral)  Wt 89.8 kg (198 lb)  SpO2 97%  BMI 31 kg/m2  Patient alert and oriented x3.   PERRLA. EOMI. No scleral icterus noted. OP without thrush/sores.  Neck exam: Palpable 0.5 cm anterior cervical node on the left, rubbery, mobile, non-tender.   Heart: RRR no murmurs noted.   Lungs: clear to auscultation bilaterally.  No crackles or wheezing.   Abd: Normal bowel sounds.  No tenderness to palpation. No suprapubic tenderness.  No hepatomegaly.   Extremities: No lower extremity edema.   Back: No CVA tenderness.   Neuro: grossly intact.   Mood and affect is stable.     Labs/Imaging:  Recent Labs   Lab Test  06/07/17   1650  05/25/17   0920  05/18/17   1342  05/03/17   1529  03/24/17   0710   NA  142  144  141   141  140   POTASSIUM  4.3  4.9  4.8  4.2  4.7   CHLORIDE  108  109  106  106  106   CO2  25  27  27  28  28   ANIONGAP  8  8  8  7  7   BUN  19  20  17  13  22   CR  0.81  0.91  1.21  0.78  0.88   GLC  110*  112*  124*  85  132*   WILL  8.8  9.0  9.5  9.1  8.9     Recent Labs   Lab Test  12/21/16   1643  11/22/16   1221  10/26/16   1150  09/28/16   1137  08/17/16   1355   05/03/16   0955   MAG  2.2  2.4*  2.2  2.3  2.0   < >  2.0   PHOS   --    --    --    --    --    --   4.1    < > = values in this interval not displayed.     Recent Labs   Lab Test  06/07/17   1650  05/25/17   0920  05/18/17   1342  05/03/17   1529  03/24/17   0710   WBC  6.0  6.5  6.6  5.8  4.9   HGB  11.4*  11.5*  12.3*  11.9*  12.0*   PLT  250  245  269  247  227   MCV  94  92  92  92  92   NEUTROPHIL  60.8  46.3  58.4  44.0  43.3     Recent Labs   Lab Test  06/07/17   1650  05/25/17   0920  05/18/17   1342  05/03/17   1529   BILITOTAL  0.5  0.8  0.6  0.6   ALKPHOS  52  47  57  52   ALT  24  24  33  30   AST  14  17  23  19   ALBUMIN  4.2  4.0  4.3  4.4   LDH  206  204   --   207     TSH   Date Value Ref Range Status   03/01/2017 5.28 (H) 0.40 - 4.00 mU/L Final   11/22/2016 4.62 (H) 0.40 - 4.00 mU/L Final   10/26/2016 5.98 (H) 0.40 - 4.00 mU/L Final       Results for orders placed or performed during the hospital encounter of 05/08/17   CT Chest/Abdomen/Pelvis w Contrast    Narrative    EXAMINATION: CT CHEST/ABDOMEN/PELVIS W CONTRAST, 5/8/2017 9:03 AM    TECHNIQUE:  Helical CT images from the thoracic inlet through the  symphysis pubis were obtained  with contrast. Contrast dose: iopamidol  (ISOVUE-370) solution 120 mL    COMPARISON: CT cap 11/22/16, bone scan 11/22/2016.    HISTORY: Restaging, Malignant neoplasm of prostate, Secondary  malignant neoplasm of bone    FINDINGS:    Chest: Thyroid gland is within normal limits. No cardiomegaly or  pericardial effusion. No central pulmonary embolism. The central  tracheobronchial tree is patent. No  pneumothorax or pleural effusion.  No suspicious mediastinal or hilar lymph nodes. Few subcentimeter  axillary lymph nodes. Calcified 2 mm right lower lobe pulmonary nodule  (series 4, image 76). No suspicious or new pulmonary nodule.    Abdomen and pelvis: 8 mm hepatic segment 5 nonenhancing hypodensity  (series 3, image 24), unchanged since 10/5/2015, likely benign.  11/22/2016., Which measured 4 mm. No additional hepatic lesion.  Gallbladder, adrenal glands and spleen are unremarkable. No enhancing  pancreatic lesion. Likely fatty lobulation of the pancreatic uncinate  process (series 3, image 313). No hydronephrosis, hydroureter,  nephroureterolithiasis or enhancing renal lesion. No suspicious extra  peritoneal, pelvic or abdominal adenopathy. No bowel projection. No  focal bowel thickening. No free air or fluid within the abdomen and  pelvis. Prostate gland is not enlarged.    Bones and soft tissues: Redemonstration of diffuse axial and partially  visualized appendicular osteoblastic osseous metastasis. No acute  fracture. Degenerative changes of lower lumbar spine.      Impression    IMPRESSION: Stable diffuse osteoblastic osseous metastasis without  evidence of new metastasis within the chest, abdomen and pelvis.     I have personally reviewed the examination and initial interpretation  and I agree with the findings.    AILYN LITTLE MD     Recent Labs   Lab Test  06/07/17   1650  05/25/17   0920  05/03/17   1529  03/24/17   0710  03/01/17   1603   12/21/16   1643  11/22/16   1222   05/03/16   0955   PSA  76.30*  91.00*  106.00*  78.49*  86.81*   < >  60.68*   --    < >  67.04*   TESTOSTTOTAL   --    --    --    --    --    --  <2  2*   --  <2    < > = values in this interval not displayed.        ASSESSMENT/PLAN:  1. Metastatic prostate cancer: Currently taking Zytiga. T    olerating it well. Hypertensive today at 146/103. This is a known adverse effect of prednisone. Will continue to monitor.   We had a  lengthy discussion with him with her wife on the phone. She reviewed a list of questions and concerns.   We reviewed actual images from his restaging scans and compared it to previous scans. There are no new lesions. It is hard to appreciate any difference though bone scan lesions have been officially read to have more uptake.     Overall he is tolerating therapy well. There are several markers of response including decline in his LDH. His PSA was not available at the time of visit, but had declined within 2 weeks of therapy. His PSA which showed up after the visit has continued to decline which is a good news. We will continue with this therapy as long as he is gaining any benefit.     I will see him in 8 weeks or so. We will get denosumab every 6-8 weeks.     Over 30 min of direct face to face time spent with patient with more than 50% time spent in counseling and coordinating care.          Again, thank you for allowing me to participate in the care of your patient.      Sincerely,    Tyrel Valladares MD

## 2017-06-08 NOTE — PROGRESS NOTES
[facility] CANCER CLINIC  FOLLOW-UP VISIT NOTE  Date of visit: Jun 7, 2017     Cancer History:   Metastatic Prostate CA treated    - s/p 6 cycles of taxotere 75mg/m2   - s/p orchiectomy on 3/18/2016   - s/p Provenge 5/13, 5/27, 6/10   - s/p Casodex 50 mg daily March/April   - Currently taking Zytiga    HPI: Albert is a 51 year old gentleman with metastatic prostate cancer.  Albert felt a cramp in his gluteus muscle and could barely walk after doing some remodeling at home and unloading heavy truck at work. He tried flexeril and prednisone initially but eventually presented to ED on 10/5/15. He feels that initially he was nearly labeled as drug seeker since he was in lot of discomfort and flexeril was not working. But during course of ED visits labs were drawn and he had marked elevation in Alk Phosphatase (over 1000). CT did suggest some ileus with some sclerotic bone lesions. He was admitted to the hospital. His PSA was checked and was markedly elevated at 5760 (10/6/15), repeat value 5563.     Urology and Medical Oncology consults were placed. He was unhappy with the progress in hospital and felt no better and left the following day on 10/6. He did follow up with Dr. Freire and had transrectal US guided biopsy of prostate on 10/8/15. They have the results through my chart which confirms prostate adenocarcinoma - enrico 4+3 involving majority (60-90%) portion of every single core.  He started on taxotere on 10/23 and completed 6 cycles of therapy in 2/2016.  He then underwent orchiectomy on 3/18/2016.       Throughout spring of 2016 Albert's PSA started to progress and after three elevations there was concern about him being hormone refractory.  He was started on Provenge and enrolled in the trial including Indoximod. PSA trending up and started on Casodex mid March with progression. Switched to Zytiga 5/3/17.    INTERVAL HISTORY:   Albert presents alone today with his wife on speaker phone.      States he has been having slight dysuria for 4 days. Wife is concerned for UTI. Denies urgency, frequency, hematuria, suprapubic pain, flank pain, fever, chills, nausea, vomiting. No history of UTI. Taking AZO since last night with some improvement. Also complains of enlarged lymph nodes in the neck, worse on the left. Noticed for 3 days. Noticeable, but not painful. Denies ear plugging, sinus congestion, runny nose, cough, SOB, dyspnea, chest pain. No muscle or joint pain. Hearing and vision unchanged. Having some hot flashes, understands it is due to hormone therapy. Has been feeling lightheaded or dizzy. Stool a little softer, BM's daily. Eating OK. Drinking a lot of coffee and working 12-hour days, not drinking much water. Not sleeping well. Waking up from 1-4 a.m., maybe getting 5 hours per night. Tried earlier dose of Zytiga, and trying Benadryl with some improvement. Has tried melatonin in the past and planning to try again.    MEDS:   Current Outpatient Prescriptions   Medication Sig     mupirocin (BACTROBAN) 2 % ointment Apply topically 3 times daily     predniSONE (DELTASONE) 5 MG tablet Take 1 tablet (5 mg) by mouth daily (with breakfast)     abiraterone (ZYTIGA) 250 MG tablet Take 4 tablets (1,000 mg) by mouth daily for 30 doses Take on empty stomach.     venlafaxine (EFFEXOR-XR) 37.5 MG 24 hr capsule TAKE 1 CAPSULE(37.5 MG) BY MOUTH DAILY     predniSONE (DELTASONE) 5 MG tablet Take 1 tablet (5 mg) by mouth daily (with breakfast)     levothyroxine (SYNTHROID/LEVOTHROID) 75 MCG tablet Take 1 tablet (75 mcg) by mouth daily     diclofenac (VOLTAREN) 1 % GEL topical gel Apply 2-4 grams to knees 2-3 x day prn pain     tadalafil (CIALIS) 20 MG tablet Take 2 tablets (40 mg) by mouth daily as needed for erectile dysfunction     IBUPROFEN PO Take by mouth as needed for moderate pain Reported on 5/18/2017     EPINEPHrine (EPIPEN 2-CHARITY) 0.3 MG/0.3ML injection 2-pack Inject 0.3 mLs (0.3 mg) into the muscle once  as needed for anaphylaxis     sildenafil (VIAGRA) 100 MG tablet Take 1 tablet (100 mg) by mouth daily as needed for erectile dysfunction Take 30 min to 4 hours before intercourse.  Never use with nitroglycerin, terazosin or doxazosin.     Acetaminophen (TYLENOL PO) Take 325 mg by mouth every 8 hours as needed for mild pain or fever Reported on 5/18/2017     Ginger, Zingiber officinalis, (GINGER ROOT) 250 MG CAPS Take 250 mg by mouth as needed Reported on 5/18/2017     LORazepam (ATIVAN) 0.5 MG tablet Take 1 tablet (0.5 mg) by mouth every 4 hours as needed (Anxiety, Nausea/Vomiting or Sleep)     Probiotic Product (PROBIOTIC DAILY PO) Take by mouth as needed Reported on 5/18/2017     No current facility-administered medications for this visit.         EXAM:  ECOG 1  Wt Readings from Last 4 Encounters:   06/07/17 89.8 kg (198 lb)   05/18/17 90.1 kg (198 lb 11.2 oz)   05/03/17 89.2 kg (196 lb 9.6 oz)   03/24/17 89.9 kg (198 lb 1.6 oz)   BP (!) 146/103  Pulse 62  Temp 99  F (37.2  C) (Oral)  Wt 89.8 kg (198 lb)  SpO2 97%  BMI 31 kg/m2  Patient alert and oriented x3.   PERRLA. EOMI. No scleral icterus noted. OP without thrush/sores.  Neck exam: Palpable 0.5 cm anterior cervical node on the left, rubbery, mobile, non-tender.   Heart: RRR no murmurs noted.   Lungs: clear to auscultation bilaterally.  No crackles or wheezing.   Abd: Normal bowel sounds.  No tenderness to palpation. No suprapubic tenderness.  No hepatomegaly.   Extremities: No lower extremity edema.   Back: No CVA tenderness.   Neuro: grossly intact.   Mood and affect is stable.     Labs/Imaging:  Recent Labs   Lab Test  06/07/17   1650  05/25/17   0920  05/18/17   1342  05/03/17   1529  03/24/17   0710   NA  142  144  141  141  140   POTASSIUM  4.3  4.9  4.8  4.2  4.7   CHLORIDE  108  109  106  106  106   CO2  25  27  27  28  28   ANIONGAP  8  8  8  7  7   BUN  19  20  17  13  22   CR  0.81  0.91  1.21  0.78  0.88   GLC  110*  112*  124*  85  132*   WILL   8.8  9.0  9.5  9.1  8.9     Recent Labs   Lab Test  12/21/16   1643  11/22/16   1221  10/26/16   1150  09/28/16   1137  08/17/16   1355   05/03/16   0955   MAG  2.2  2.4*  2.2  2.3  2.0   < >  2.0   PHOS   --    --    --    --    --    --   4.1    < > = values in this interval not displayed.     Recent Labs   Lab Test  06/07/17   1650 05/25/17   0920  05/18/17   1342  05/03/17   1529  03/24/17   0710   WBC  6.0  6.5  6.6  5.8  4.9   HGB  11.4*  11.5*  12.3*  11.9*  12.0*   PLT  250  245  269  247  227   MCV  94  92  92  92  92   NEUTROPHIL  60.8  46.3  58.4  44.0  43.3     Recent Labs   Lab Test  06/07/17   1650 05/25/17   0920  05/18/17   1342  05/03/17   1529   BILITOTAL  0.5  0.8  0.6  0.6   ALKPHOS  52  47  57  52   ALT  24  24  33  30   AST  14  17  23  19   ALBUMIN  4.2  4.0  4.3  4.4   LDH  206  204   --   207     TSH   Date Value Ref Range Status   03/01/2017 5.28 (H) 0.40 - 4.00 mU/L Final   11/22/2016 4.62 (H) 0.40 - 4.00 mU/L Final   10/26/2016 5.98 (H) 0.40 - 4.00 mU/L Final       Results for orders placed or performed during the hospital encounter of 05/08/17   CT Chest/Abdomen/Pelvis w Contrast    Narrative    EXAMINATION: CT CHEST/ABDOMEN/PELVIS W CONTRAST, 5/8/2017 9:03 AM    TECHNIQUE:  Helical CT images from the thoracic inlet through the  symphysis pubis were obtained  with contrast. Contrast dose: iopamidol  (ISOVUE-370) solution 120 mL    COMPARISON: CT cap 11/22/16, bone scan 11/22/2016.    HISTORY: Restaging, Malignant neoplasm of prostate, Secondary  malignant neoplasm of bone    FINDINGS:    Chest: Thyroid gland is within normal limits. No cardiomegaly or  pericardial effusion. No central pulmonary embolism. The central  tracheobronchial tree is patent. No pneumothorax or pleural effusion.  No suspicious mediastinal or hilar lymph nodes. Few subcentimeter  axillary lymph nodes. Calcified 2 mm right lower lobe pulmonary nodule  (series 4, image 76). No suspicious or new pulmonary  nodule.    Abdomen and pelvis: 8 mm hepatic segment 5 nonenhancing hypodensity  (series 3, image 24), unchanged since 10/5/2015, likely benign.  11/22/2016., Which measured 4 mm. No additional hepatic lesion.  Gallbladder, adrenal glands and spleen are unremarkable. No enhancing  pancreatic lesion. Likely fatty lobulation of the pancreatic uncinate  process (series 3, image 313). No hydronephrosis, hydroureter,  nephroureterolithiasis or enhancing renal lesion. No suspicious extra  peritoneal, pelvic or abdominal adenopathy. No bowel projection. No  focal bowel thickening. No free air or fluid within the abdomen and  pelvis. Prostate gland is not enlarged.    Bones and soft tissues: Redemonstration of diffuse axial and partially  visualized appendicular osteoblastic osseous metastasis. No acute  fracture. Degenerative changes of lower lumbar spine.      Impression    IMPRESSION: Stable diffuse osteoblastic osseous metastasis without  evidence of new metastasis within the chest, abdomen and pelvis.     I have personally reviewed the examination and initial interpretation  and I agree with the findings.    AILYN LITTLE MD     Recent Labs   Lab Test  06/07/17   1650  05/25/17   0920  05/03/17   1529  03/24/17   0710  03/01/17   1603   12/21/16   1643  11/22/16   1222   05/03/16   0955   PSA  76.30*  91.00*  106.00*  78.49*  86.81*   < >  60.68*   --    < >  67.04*   TESTOSTTOTAL   --    --    --    --    --    --  <2  2*   --  <2    < > = values in this interval not displayed.        ASSESSMENT/PLAN:  1. Metastatic prostate cancer: Currently taking Zytiga. T    olerating it well. Hypertensive today at 146/103. This is a known adverse effect of prednisone. Will continue to monitor.   We had a lengthy discussion with him with her wife on the phone. She reviewed a list of questions and concerns.   We reviewed actual images from his restaging scans and compared it to previous scans. There are no new lesions. It is hard  to appreciate any difference though bone scan lesions have been officially read to have more uptake.     Overall he is tolerating therapy well. There are several markers of response including decline in his LDH. His PSA was not available at the time of visit, but had declined within 2 weeks of therapy. His PSA which showed up after the visit has continued to decline which is a good news. We will continue with this therapy as long as he is gaining any benefit.     I will see him in 8 weeks or so. We will get denosumab every 6-8 weeks.     Over 30 min of direct face to face time spent with patient with more than 50% time spent in counseling and coordinating care.

## 2017-06-22 ENCOUNTER — TELEPHONE (OUTPATIENT)
Dept: ONCOLOGY | Facility: CLINIC | Age: 52
End: 2017-06-22

## 2017-06-22 DIAGNOSIS — C79.51 BONE METASTASIS: ICD-10-CM

## 2017-06-22 DIAGNOSIS — C61 MALIGNANT NEOPLASM OF PROSTATE (H): Primary | ICD-10-CM

## 2017-06-22 RX ORDER — PREDNISONE 5 MG/1
5 TABLET ORAL
Qty: 30 TABLET | Refills: 0 | Status: SHIPPED | OUTPATIENT
Start: 2017-06-22 | End: 2017-06-27

## 2017-06-22 RX ORDER — ABIRATERONE ACETATE 250 MG/1
1000 TABLET ORAL DAILY
Qty: 120 TABLET | Refills: 0 | Status: SHIPPED | OUTPATIENT
Start: 2017-06-22 | End: 2017-07-22

## 2017-06-22 NOTE — TELEPHONE ENCOUNTER
Oral Chemotherapy Monitoring Program    Primary Oncologist: Dr. Valladares  Primary Oncology Clinic: [facility]  Cancer Diagnosis: Prostate      Drug: Zytiga 1000mg (4 x 250mg) QD continuously.  Start Date: 5/10/17    Drug Interaction Assessment:   -Zytiga + Venlafaxine = Zytiga may increase serum concentration of Venlafaxine via CYP2D6 inhibition (category D - consider therapy modification - OK per Dr. Valladares, watch for increased side effects)    Lab Monitoring Plan  C1D1+   CMP, BP C2D1+ Call, CMP, BP C3D1+ Call, CMP, BP C4D1+ Call, CMP, BP C5D1+ Call, CMP, BP C6D1+ Call, CMP, BP   C1D8+  C2D8+  C3D8+  C4D8+  C5D8+  C6D8+    C1D15+ Call, CMP C2D15+ Call, CMP C3D15+  C4D15+  C5D15+  C6D15+    C1D22+  C2D22+  C3D22+  C4D22+  C5D22+  C6D22+    CMP Q2 weeks for the first 2 months, then monthly  Check BP monthly     Subjective/Objective:  Albert Amaya is a 51 year old male contacted by phone for a follow-up visit for oral chemotherapy.  Albert reports things are going well with Zytiga therapy. He was having stomach issues yesterday, but thought this was more related to what he ate. He states he thinks the prednisone is working to decrease the bone pain he was having. He has been drinking a lot of coffee and doesn't think he is drinking enough water every day. He doesn't check his BP at home and does not think it is necessary. He thinks his last high BP reading in clinic was due to dehydration. He may stop in and get it checked within the next couple of weeks. He denies edema, diarrhea, nausea, myalgias, and arthralgias.     ORAL CHEMOTHERAPY 5/11/2017 5/18/2017 5/26/2017 6/22/2017   Drug Name Zytiga (Abiraterone) Zytiga (Abiraterone) Zytiga (Abiraterone) Zytiga (Abiraterone)   Current Dosage 1000mg 1000mg 1000mg 1000mg   Current Schedule Daily Daily Daily Daily   Cycle Details Continuous Continuous Continuous Continuous   Start Date of Last Cycle 5/10/2017 5/10/2017 5/10/2017 5/10/2017   Planned next cycle start date -  "- 6/7/2017 -   Doses missed in last 2 weeks - 0 0 0   Adherence Assessment - Adherent Adherent Adherent   Adverse Effects - - No AE identified during assessment No AE identified during assessment   Home BPs - - Not applicable -   Any new drug interactions? - - No No   Is the dose as ordered appropriate for the patient? - - Yes Yes       Vitals:  BP:   BP Readings from Last 1 Encounters:   06/07/17 (!) 146/103     Wt Readings from Last 1 Encounters:   06/07/17 89.8 kg (198 lb)     Estimated body surface area is 2.06 meters squared as calculated from the following:    Height as of 5/18/17: 1.702 m (5' 7.01\").    Weight as of 6/7/17: 89.8 kg (198 lb).    Labs:  Lab Results   Component Value Date     06/07/2017      Lab Results   Component Value Date    POTASSIUM 4.3 06/07/2017     Lab Results   Component Value Date    WILL 8.8 06/07/2017     Lab Results   Component Value Date    ALBUMIN 4.2 06/07/2017     Lab Results   Component Value Date    MAG 2.2 12/21/2016     Lab Results   Component Value Date    PHOS 4.1 05/03/2016     Lab Results   Component Value Date    BUN 19 06/07/2017     Lab Results   Component Value Date    CR 0.81 06/07/2017       Lab Results   Component Value Date    AST 14 06/07/2017     Lab Results   Component Value Date    ALT 24 06/07/2017     Lab Results   Component Value Date    BILITOTAL 0.5 06/07/2017       Lab Results   Component Value Date    WBC 6.0 06/07/2017     Lab Results   Component Value Date    HGB 11.4 06/07/2017     Lab Results   Component Value Date     06/07/2017     Lab Results   Component Value Date    ANEU 3.6 06/07/2017       Assessment:  Albert is tolerating the first month of Zytiga well with no reported side effects.     Plan:  Continue Zytiga 1000 mg daily. Encouraged Albert to drink at least 64 oz of water daily. Also informed Albert that both Zytiga and prednisone can increase his BP and recommended he get his BP checked weekly. [place] [facility] [place]     Follow-Up:  We will follow up in early July with lab results and to assess side effects.    Refill Due:  Before 6/27/17. Message sent to oral chemo pharmacists to send new prescriptions ASAP.    Jemima Baker, Pharmacy Intern  Oral Chemotherapy Monitoring Program  [facility]  [phone]

## 2017-06-27 ENCOUNTER — TELEPHONE (OUTPATIENT)
Dept: ONCOLOGY | Facility: CLINIC | Age: 52
End: 2017-06-27

## 2017-06-27 DIAGNOSIS — C79.51 BONE METASTASIS: ICD-10-CM

## 2017-06-27 DIAGNOSIS — C61 MALIGNANT NEOPLASM OF PROSTATE (H): ICD-10-CM

## 2017-06-27 RX ORDER — PREDNISONE 5 MG/1
5 TABLET ORAL 2 TIMES DAILY
Qty: 60 TABLET | Refills: 0 | Status: SHIPPED | OUTPATIENT
Start: 2017-06-27 | End: 2017-07-19

## 2017-06-27 NOTE — ORAL ONC MGMT
Oral Chemotherapy Monitoring Program     Placed call to patient in follow up of Zytiga therapy. New prednisone prescription e-scribed to [facility] in [place], [place]. He expressed understanding of the new dose and thanked me for the call and care.     Bryan RileyD  [facility]  [phone]  June 27, 2017

## 2017-07-10 DIAGNOSIS — C79.51 BONE METASTASIS: ICD-10-CM

## 2017-07-10 DIAGNOSIS — C61 MALIGNANT NEOPLASM OF PROSTATE (H): ICD-10-CM

## 2017-07-10 LAB
ALBUMIN SERPL-MCNC: 3.9 G/DL (ref 3.4–5)
ALP SERPL-CCNC: 53 U/L (ref 40–150)
ALT SERPL W P-5'-P-CCNC: 24 U/L (ref 0–70)
ANION GAP SERPL CALCULATED.3IONS-SCNC: 6 MMOL/L (ref 3–14)
AST SERPL W P-5'-P-CCNC: 15 U/L (ref 0–45)
BASOPHILS # BLD AUTO: 0 10E9/L (ref 0–0.2)
BASOPHILS NFR BLD AUTO: 0.5 %
BILIRUB SERPL-MCNC: 1.2 MG/DL (ref 0.2–1.3)
BUN SERPL-MCNC: 14 MG/DL (ref 7–30)
CALCIUM SERPL-MCNC: 8.9 MG/DL (ref 8.5–10.1)
CHLORIDE SERPL-SCNC: 106 MMOL/L (ref 94–109)
CO2 SERPL-SCNC: 28 MMOL/L (ref 20–32)
CREAT SERPL-MCNC: 0.92 MG/DL (ref 0.66–1.25)
DIFFERENTIAL METHOD BLD: ABNORMAL
EOSINOPHIL # BLD AUTO: 0.2 10E9/L (ref 0–0.7)
EOSINOPHIL NFR BLD AUTO: 3 %
ERYTHROCYTE [DISTWIDTH] IN BLOOD BY AUTOMATED COUNT: 13.2 % (ref 10–15)
GFR SERPL CREATININE-BSD FRML MDRD: 87 ML/MIN/1.7M2
GLUCOSE SERPL-MCNC: 122 MG/DL (ref 70–99)
HCT VFR BLD AUTO: 35.3 % (ref 40–53)
HGB BLD-MCNC: 11.6 G/DL (ref 13.3–17.7)
IMM GRANULOCYTES # BLD: 0 10E9/L (ref 0–0.4)
IMM GRANULOCYTES NFR BLD: 0.5 %
LDH SERPL L TO P-CCNC: 216 U/L (ref 85–227)
LYMPHOCYTES # BLD AUTO: 2.4 10E9/L (ref 0.8–5.3)
LYMPHOCYTES NFR BLD AUTO: 39 %
MCH RBC QN AUTO: 30.4 PG (ref 26.5–33)
MCHC RBC AUTO-ENTMCNC: 32.9 G/DL (ref 31.5–36.5)
MCV RBC AUTO: 93 FL (ref 78–100)
MONOCYTES # BLD AUTO: 0.4 10E9/L (ref 0–1.3)
MONOCYTES NFR BLD AUTO: 6 %
NEUTROPHILS # BLD AUTO: 3.1 10E9/L (ref 1.6–8.3)
NEUTROPHILS NFR BLD AUTO: 51 %
NRBC # BLD AUTO: 0 10*3/UL
NRBC BLD AUTO-RTO: 0 /100
PLATELET # BLD AUTO: 238 10E9/L (ref 150–450)
POTASSIUM SERPL-SCNC: 3.9 MMOL/L (ref 3.4–5.3)
PROT SERPL-MCNC: 7.1 G/DL (ref 6.8–8.8)
PSA SERPL-MCNC: 42.8 UG/L (ref 0–4)
RBC # BLD AUTO: 3.81 10E12/L (ref 4.4–5.9)
SODIUM SERPL-SCNC: 140 MMOL/L (ref 133–144)
WBC # BLD AUTO: 6 10E9/L (ref 4–11)

## 2017-07-10 PROCEDURE — 36415 COLL VENOUS BLD VENIPUNCTURE: CPT | Performed by: INTERNAL MEDICINE

## 2017-07-10 PROCEDURE — 85025 COMPLETE CBC W/AUTO DIFF WBC: CPT | Performed by: INTERNAL MEDICINE

## 2017-07-10 PROCEDURE — 84153 ASSAY OF PSA TOTAL: CPT | Performed by: INTERNAL MEDICINE

## 2017-07-10 PROCEDURE — 80053 COMPREHEN METABOLIC PANEL: CPT | Performed by: INTERNAL MEDICINE

## 2017-07-10 PROCEDURE — 83615 LACTATE (LD) (LDH) ENZYME: CPT | Performed by: INTERNAL MEDICINE

## 2017-07-10 NOTE — NURSING NOTE
Chief Complaint   Patient presents with     Blood Draw     Labs only     Vitals and labs done peripherally.  See doc flow sheets for details.  Gabby Crandall CMA

## 2017-07-11 ENCOUNTER — TELEPHONE (OUTPATIENT)
Dept: ONCOLOGY | Facility: CLINIC | Age: 52
End: 2017-07-11

## 2017-07-11 NOTE — TELEPHONE ENCOUNTER
Oral Chemotherapy Monitoring Program     Primary Oncologist: Dr. Valladares  Primary Oncology Clinic: [facility]  Cancer Diagnosis: Prostate      Drug: Zytiga 1000mg (4 x 250mg) QD continuously.  Start Date: 5/10/17     Drug Interaction Assessment:   -Zytiga + Venlafaxine = Zytiga may increase serum concentration of Venlafaxine via CYP2D6 inhibition (category D - consider therapy modification - OK per Dr. Valladares, watch for increased side effects)     Lab Monitoring Plan  C1D1+   CMP, BP C2D1+ Call, CMP, BP C3D1+ Call, CMP, BP C4D1+ Call, CMP, BP C5D1+ Call, CMP, BP C6D1+ Call, CMP, BP   C1D8+   C2D8+   C3D8+   C4D8+   C5D8+   C6D8+     C1D15+ Call, CMP C2D15+ Call, CMP C3D15+   C4D15+   C5D15+   C6D15+     C1D22+   C2D22+   C3D22+   C4D22+   C5D22+   C6D22+     CMP Q2 weeks for the first 2 months, then monthly  Check BP monthly      Subjective/Objective:  Albert Amaya is a 51 year old male contacted by phone for a follow-up visit for oral chemotherapy.  Albert reports things are going well with the Zytiga therapy and he was ecstatic to see his latest PSA results from 7/10. There were no concerning lab abnormalities from the labs on July 10th. Albert reports missing one dose of the Zytiga and said he has missed about 3 doses since starting. He said that he is drinking more water (~64 ounces) and it has helped significantly with how he feels throughout the day. Albert stopped taking the increased dose of prednisone and began taking 5 mg again. He says that when he over exerts himself he will take the 10 mg, but most days is okay with the 5 mg. He denies diarrhea, constipation and edema. Albert did mention sun sensitivity but believes it is because he has no testosterone hormone left. Albert will be out of town from August 7th-20th. Albert thanked me for the call.     ORAL CHEMOTHERAPY 5/11/2017 5/18/2017 5/26/2017 6/22/2017 7/11/2017   Drug Name Zytiga (Abiraterone) Zytiga (Abiraterone) Zytiga (Abiraterone) Zytiga (Abiraterone)  "Zytiga (Abiraterone)   Current Dosage 1000mg 1000mg 1000mg 1000mg 1000mg   Current Schedule Daily Daily Daily Daily Daily   Cycle Details Continuous Continuous Continuous Continuous Continuous   Start Date of Last Cycle 5/10/2017 5/10/2017 5/10/2017 5/10/2017 -   Planned next cycle start date - - 6/7/2017 - -   Doses missed in last 2 weeks - 0 0 0 1   Adherence Assessment - Adherent Adherent Adherent Adherent   Adverse Effects - - No AE identified during assessment No AE identified during assessment No AE identified during assessment   Home BPs - - Not applicable - not done   Any new drug interactions? - - No No -   Is the dose as ordered appropriate for the patient? - - Yes Yes -     Vitals:  BP:   BP Readings from Last 1 Encounters:   06/07/17 (!) 146/103     Wt Readings from Last 1 Encounters:   06/07/17 89.8 kg (198 lb)     Estimated body surface area is 2.06 meters squared as calculated from the following:    Height as of 5/18/17: 1.702 m (5' 7.01\").    Weight as of 6/7/17: 89.8 kg (198 lb).    Labs:  Lab Results   Component Value Date     07/10/2017      Lab Results   Component Value Date    POTASSIUM 3.9 07/10/2017     Lab Results   Component Value Date    WILL 8.9 07/10/2017     Lab Results   Component Value Date    ALBUMIN 3.9 07/10/2017     Lab Results   Component Value Date    BUN 14 07/10/2017     Lab Results   Component Value Date    CR 0.92 07/10/2017       Lab Results   Component Value Date    AST 15 07/10/2017     Lab Results   Component Value Date    ALT 24 07/10/2017     Lab Results   Component Value Date    BILITOTAL 1.2 07/10/2017       Lab Results   Component Value Date    WBC 6.0 07/10/2017     Lab Results   Component Value Date    HGB 11.6 07/10/2017     Lab Results   Component Value Date     07/10/2017     Lab Results   Component Value Date    ANEU 3.1 07/10/2017       Assessment:  Patient is tolerating Zytiga therapy well with minimal side effects    Plan:  Continue Zytiga " therapy  -Monitor blood pressure when in clinic. Reading was not taken 7/10    Follow-Up:  Review appointment with Dr. Valladares if scheduled on 8/4 and labs. This maybe rescheduled.     Refill Due:  Now- Only has 2 days remaining     Annie Yeung   Pharmacy Intern  [facility]   [phone]

## 2017-07-19 ENCOUNTER — ALLIED HEALTH/NURSE VISIT (OUTPATIENT)
Dept: PHARMACY | Facility: CLINIC | Age: 52
End: 2017-07-19
Payer: COMMERCIAL

## 2017-07-19 DIAGNOSIS — R61 NIGHT SWEATS: ICD-10-CM

## 2017-07-19 DIAGNOSIS — C61 MALIGNANT NEOPLASM OF PROSTATE (H): Primary | ICD-10-CM

## 2017-07-19 DIAGNOSIS — E03.9 HYPOTHYROIDISM, UNSPECIFIED TYPE: ICD-10-CM

## 2017-07-19 PROCEDURE — 99605 MTMS BY PHARM NP 15 MIN: CPT | Mod: U4 | Performed by: PHARMACIST

## 2017-07-19 PROCEDURE — 99607 MTMS BY PHARM ADDL 15 MIN: CPT | Mod: U4 | Performed by: PHARMACIST

## 2017-07-19 NOTE — PROGRESS NOTES
"SUBJECTIVE/OBJECTIVE:                           Albert Amaya is a 51 year old male called for an initial visit for Medication Therapy Management.  He was referred to me from Dr Tyrel Valladares.     Chief Complaint: medication review.    The primary oncologist for this patient is Dr Tyrel Valladares.  Patient does not have a PCP.    Cancer diagnosis: Prostate cancer    Allergies/ADRs: Reviewed in Epic  Tobacco: No tobacco use     Alcohol: 1 glass of wine every 2 weeks  Caffeine: 2 cups/day of coffee  Activity: vigorous yard work daily  PMH: Reviewed in Epic    Medication Adherence: no issues reported    Prostate cancer:  Current medications include: Zytiga 1000mg ([id number]) PO daily and prednisone 5mg 1-2 times per day.  Patient wants to minimize his steroid intake, and varies the frequency of prednisone based on myalgias/arthralgias, which has been OKd by Dr Valladares.  He denies side effects.  He also uses PRN ibuprofen and PRN acetaminophen to control the pain.    Hot flashes/night sweats:  Current medications include: Effexor 37.5mg daily.  He states this is working well for him with no adverse effects.    Hypothyroidism: Patient is taking Synthroid 75 mcg daily. Patient is having the following symptoms: none.      Patient reports he is not needed/using the following medications: Bactroban ointment, Voltaren gel, Cialis, EpiPen, Viagra, mary grace, lorazepam, or probiotics.      Current labs include:BP Readings from Last 3 Encounters:   06/07/17 (!) 146/103   05/18/17 144/90   05/03/17 122/86     Today's Vitals: There were no vitals taken for this visit. (phone visit) Patient states his BP checked at dentist yesterday was 142/82.        Latest Ht and Wt:  Wt Readings from Last 2 Encounters:   06/07/17 198 lb (89.8 kg)   05/18/17 198 lb 11.2 oz (90.1 kg)     Ht Readings from Last 2 Encounters:   05/18/17 5' 7.01\" (1.702 m)   11/22/16 5' 7\" (1.702 m)        Current labs include:  Lab Results   Component Value Date    BUN 14 07/10/2017 "     Lab Results   Component Value Date    CR 0.92 07/10/2017     Lab Results   Component Value Date    POTASSIUM 3.9 07/10/2017     Lab Results   Component Value Date    ALT 24 07/10/2017     Lab Results   Component Value Date    AST 15 07/10/2017     Lab Results   Component Value Date    BILITOTAL 1.2 07/10/2017     Lab Results   Component Value Date    ALBUMIN 3.9 07/10/2017     Lab Results   Component Value Date    WBC 6.0 07/10/2017     Lab Results   Component Value Date    HGB 11.6 07/10/2017     Lab Results   Component Value Date     07/10/2017     Absolute Neutrophil   Date Value Ref Range Status   07/10/2017 3.1 1.6 - 8.3 10e9/L Final       TSH   Date Value Ref Range Status   03/01/2017 5.28 (H) 0.40 - 4.00 mU/L Final       Most Recent Immunizations   Administered Date(s) Administered     Influenza Vaccine IM 3yrs+ 4 Valent IIV4 10/23/2015       ASSESSMENT:                             Current medications were reviewed today.       Medication Adherence: no issues identified    Prostate Cancer: Stable. No changes needed today.    Hot flashes/night sweats: Stable. Current treatment effective.    Hypothyroidism: Stable. Last TSH is within normal limits.       PLAN:                            No medication changes today.    I spent 30 minutes with this patient today.  A copy of the visit note was provided to the patient's primary care provider.    Will follow up in 2-3 months.    The patient was sent via [employer] a summary of these recommendations as an after visit summary.     Noy Monk, PharmD, BCOP, BCPS  Clinical Pharmacy Specialist/  Oncology Medication Therapy Management Pharmacist  [facility]  Pager [phone]  Phone [phone]

## 2017-07-19 NOTE — MR AVS SNAPSHOT
After Visit Summary   7/19/2017    Albert Amaya    MRN: [id number]           Patient Information     Date Of Birth          1965        Visit Information        Provider Department      7/19/2017 2:30 PM Noy Monk, [facility] Medication Therapy Management        Today's Diagnoses     Malignant neoplasm of prostate (H)    -  1    Night sweats        Hypothyroidism, unspecified type          Care Instructions    Recommendations from today's MTM visit:                                                    MTM (medication therapy management) is a service provided by a clinical pharmacist designed to help you get the most of out of your medicines.   Today we reviewed what your medicines are for, how to know if they are working, that your medicines are safe and how to make your medicine regimen as easy as possible.     1. No medication changes today.    Next MTM visit: 2-3 months    To schedule another MTM appointment, please call the clinic directly or you may call the MTM scheduling line at [phone] or toll-free at [phone].     My Clinical Pharmacist's contact information:                                                      It was a pleasure seeing you today!  Please feel free to contact me with any questions or concerns you have.      Noy Monk, PharmD, BCOP, BCPS  Clinical Pharmacy Specialist/  Oncology Medication Therapy Management Pharmacist  [facility]  Pager [phone]  Phone [phone]          You may receive a survey about the MTM services you received.  I would appreciate your feedback to help me serve you better in the future. Please fill it out and return it when you can. Your comments will be anonymous.                Follow-ups after your visit        Your next 10 appointments already scheduled     Sep 06, 2017  3:15 PM [facility]   [facility] Draw with  [facility] D[facility]   [facility] ([facility])    [address]  [address]  [address]  [facility] [id number] [phone]            Sep 06, 2017  3:45 PM CDT   (Arrive by 3:30 PM)   Return Visit with Tyrel Valladares MD   [facility] ([facility])    [address]  [address] [facility] [id number] [phone]              Who to contact     If you have questions or need follow up information about today's clinic visit or your schedule please contact [facility] MEDICATION THERAPY MANAGEMENT directly at [phone].  Normal or non-critical lab and imaging results will be communicated to you by [employer], letter or phone within 4 business days after the clinic has received the results. If you do not hear from us within 7 days, please contact the clinic through [employer] or phone. If you have a critical or abnormal lab result, we will notify you by phone as soon as possible.  Submit refill requests through "[employer]" or call your pharmacy and they will forward the refill request to us. Please allow 3 business days for your refill to be completed.          Additional Information About Your Visit        MyChart Information     "[employer] gives you secure access to your electronic health record. If you see a primary care provider, you can also send messages to your care team and make appointments. If you have questions, please call your primary care clinic.  If you do not have a primary care provider, please call [phone] and they will assist you.        Care EveryWhere ID     This is your Care EveryWhere ID. This could be used by other organizations to access your [facility] medical records  [id number]         Blood Pressure from Last 3 Encounters:   06/07/17 (!) 146/103   05/18/17 144/90   05/03/17 122/86    Weight from Last 3 Encounters:   06/07/17 198 lb (89.8 kg)   05/18/17 198 lb 11.2 oz (90.1 kg)   05/03/17 196 lb 9.6 oz (89.2 kg)              Today, you had the following     No orders found for display         Today's  Medication Changes          These changes are accurate as of: 7/19/17  3:51 PM.  If you have any questions, ask your nurse or doctor.               These medicines have changed or have updated prescriptions.        Dose/Directions    predniSONE 5 MG tablet   Commonly known as:  DELTASONE   This may have changed:    - additional instructions  - Another medication with the same name was removed. Continue taking this medication, and follow the directions you see here.   Used for:  Malignant neoplasm of prostate (H), Bone metastasis (H)        Dose:  5 mg   Take 1 tablet (5 mg) by mouth daily (with breakfast)   Quantity:  30 tablet   Refills:  0         Stop taking these medicines if you haven't already. Please contact your care team if you have questions.     diclofenac 1 % Gel topical gel   Commonly known as:  VOLTAREN           Barbara Root 250 MG Caps           LORazepam 0.5 MG tablet   Commonly known as:  ATIVAN           mupirocin 2 % ointment   Commonly known as:  BACTROBAN           PROBIOTIC DAILY PO           sildenafil 100 MG cap/tab   Commonly known as:  REVATIO/VIAGRA           tadalafil 20 MG tablet   Commonly known as:  CIALIS                    Primary Care Provider Office Phone # Fax #    [facility] [phone]       [address]  [facility] [address]        Equal Access to Services     KAEL MENDOZA : Hadii valdo kumar hadasho Soomaali, waaxda luqadaha, qaybta kaalmada adeegyada, bob millan. So [facility] [phone].    ATENCIÓN: Si habla español, tiene a rivera disposición servicios gratuitos de asistencia lingüística. Matt al [phone].    We comply with applicable federal civil rights laws and [place] laws. We do not discriminate on the basis of race, color, national origin, age, disability sex, sexual orientation or gender identity.            Thank you!     Thank you for choosing [facility] MEDICATION THERAPY MANAGEMENT  for your care. Our  goal is always to provide you with excellent care. Hearing back from our patients is one way we can continue to improve our services. Please take a few minutes to complete the written survey that you may receive in the mail after your visit with us. Thank you!             Your Updated Medication List - Protect others around you: Learn how to safely use, store and throw away your medicines at www.disposemymeds.org.          This list is accurate as of: 7/19/17  3:51 PM.  Always use your most recent med list.                   Brand Name Dispense Instructions for use Diagnosis    abiraterone 250 MG tablet    ZYTIGA    120 tablet    Take 4 tablets (1,000 mg) by mouth daily for 30 doses Take on empty stomach.    Malignant neoplasm of prostate (H), Bone metastasis (H)       EPINEPHrine 0.3 MG/0.3ML injection 2-pack    EPIPEN 2-CHARITY    0.6 mL    Inject 0.3 mLs (0.3 mg) into the muscle once as needed for anaphylaxis    Malignant neoplasm of prostate (H), Bone metastasis (H)       IBUPROFEN PO      Take by mouth as needed for moderate pain Reported on 5/18/2017        levothyroxine 75 MCG tablet    SYNTHROID/LEVOTHROID    90 tablet    Take 1 tablet (75 mcg) by mouth daily    Hypothyroidism, unspecified type       predniSONE 5 MG tablet    DELTASONE    30 tablet    Take 1 tablet (5 mg) by mouth daily (with breakfast)    Malignant neoplasm of prostate (H), Bone metastasis (H)       TYLENOL PO      Take 325 mg by mouth every 8 hours as needed for mild pain or fever Reported on 5/18/2017        venlafaxine 37.5 MG 24 hr capsule    EFFEXOR-XR    90 capsule    TAKE 1 CAPSULE(37.5 MG) BY MOUTH DAILY    Night sweats, Malignant neoplasm of prostate (H)

## 2017-07-19 NOTE — PATIENT INSTRUCTIONS
Recommendations from today's MTM visit:                                                    MTM (medication therapy management) is a service provided by a clinical pharmacist designed to help you get the most of out of your medicines.   Today we reviewed what your medicines are for, how to know if they are working, that your medicines are safe and how to make your medicine regimen as easy as possible.     1. No medication changes today.    Next MTM visit: 2-3 months    To schedule another MTM appointment, please call the clinic directly or you may call the MTM scheduling line at [phone] or toll-free at [phone].     My Clinical Pharmacist's contact information:                                                      It was a pleasure seeing you today!  Please feel free to contact me with any questions or concerns you have.      Noy Monk, PharmD, BCOP, BCPS  Clinical Pharmacy Specialist/  Oncology Medication Therapy Management Pharmacist  [facility]  Pager [phone]  Phone [phone]          You may receive a survey about the MTM services you received.  I would appreciate your feedback to help me serve you better in the future. Please fill it out and return it when you can. Your comments will be anonymous.

## 2017-07-31 DIAGNOSIS — C61 MALIGNANT NEOPLASM OF PROSTATE (H): Primary | ICD-10-CM

## 2017-07-31 DIAGNOSIS — C79.51 BONE METASTASIS: ICD-10-CM

## 2017-07-31 RX ORDER — PREDNISONE 5 MG/1
5 TABLET ORAL
Qty: 30 TABLET | Refills: 0 | Status: SHIPPED | OUTPATIENT
Start: 2017-07-31 | End: 2017-12-06

## 2017-07-31 RX ORDER — ABIRATERONE ACETATE 250 MG/1
1000 TABLET ORAL DAILY
Qty: 120 TABLET | Refills: 0 | Status: SHIPPED | OUTPATIENT
Start: 2017-07-31 | End: 2017-08-30

## 2017-08-23 ENCOUNTER — TELEPHONE (OUTPATIENT)
Dept: ONCOLOGY | Facility: CLINIC | Age: 52
End: 2017-08-23

## 2017-08-23 NOTE — TELEPHONE ENCOUNTER
Oral Chemotherapy Monitoring Program    Placed call to patient in follow up of Zytiga therapy.    Left message to please call back in follow up of therapy. No patient or drug names were mentioned.    Kristen Weiler, Pharmacy Intern  Oral Chemotherapy Monitoring Program   [facility]  [phone]

## 2017-08-25 ENCOUNTER — TELEPHONE (OUTPATIENT)
Dept: ONCOLOGY | Facility: CLINIC | Age: 52
End: 2017-08-25

## 2017-08-25 DIAGNOSIS — B00.9 HSV (HERPES SIMPLEX VIRUS) INFECTION: Primary | ICD-10-CM

## 2017-08-25 RX ORDER — VALACYCLOVIR HYDROCHLORIDE 1 G/1
1000 TABLET, FILM COATED ORAL 2 TIMES DAILY
Qty: 10 TABLET | Refills: 0 | Status: SHIPPED | OUTPATIENT
Start: 2017-08-25 | End: 2018-03-07

## 2017-08-25 NOTE — TELEPHONE ENCOUNTER
Oral Chemotherapy Monitoring Program    Primary Oncologist: Dr. Valladares  Primary Oncology Clinic: [facility]  Cancer Diagnosis: Prostate Cancer    Start Date: 5/10/17  Drug: Zytiga 1000 mg (4 x 250) QD Continuously    Drug Interaction Assessment: -Zytiga + Venlafaxine = Zytiga may increase serum concentration of Venlafaxine via CYP2D6 inhibition (category D - consider therapy modification - OK per Dr. Valladares, watch for increased side effects)    Lab Monitoring Plan  C1D1+   CMP, BP C2D1+ Call, CMP, BP C3D1+ Call, CMP, BP C4D1+ Call, CMP, BP C5D1+ Call, CMP, BP C6D1+ Call, CMP, BP   C1D8+  C2D8+  C3D8+  C4D8+  C5D8+  C6D8+    C1D15+ Call, CMP C2D15+ Call, CMP C3D15+  C4D15+  C5D15+  C6D15+    C1D22+  C2D22+  C3D22+  C4D22+  C5D22+  C6D22+      Subjective/Objective:  Albert Amaya is a 51 year old male contacted by phone for a follow-up visit for oral chemotherapy.  Albert reports that he is tolerating therapy well. He denies edema and diarrhea. He reports that yesterday his sinuses closed up and he was unsure if that was related to a cold or a side effect of the Zytiga. He reports that he used a nasal spray that he had at home (name unknown) and had relief today. He has recently been traveling to and from [place] and [place], [place] for work and that there has been some confusion with his Zytiga supply. He states that he received his most recent prescription for Zytiga on 8/19 and it was sent via [employer] to [place]. He was not sure how long the supply was for but thinks it might have been a 15 day supply. He has not missed or taken any extra doses of his medication in the last 2 weeks. He states that he wants to obtain a larger supply of Zytiga when he is traveling so much for work but is unsure if insurance will cover. He says that he will bring in the bottle at the next visit to sort out the confusion of his supply. He states this is muscle aches are well controlled with prednisone. He varies how much prednisone he  takes everyday. 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 He states that he stopped taking prednisone a few days ago and has experienced no side effects from stopping. Per Noy Monk's prior note, patient wants to minimize his steroid intake, and varies the frequency of prednisone based on myalgias/arthralgias, which has been OKd by Dr Valladares. He states that he has not recently had labs done because of his traveling and when he called to make an appointment, they could not fit him in for labs prior to his appointment with Dr. Valladares on 9/6.     ORAL CHEMOTHERAPY 5/11/2017 5/18/2017 5/26/2017 6/22/2017 7/11/2017 8/25/2017   Drug Name Zytiga (Abiraterone) Zytiga (Abiraterone) Zytiga (Abiraterone) Zytiga (Abiraterone) Zytiga (Abiraterone) Zytiga (Abiraterone)   Current Dosage 1000mg 1000mg 1000mg 1000mg 1000mg 1000mg   Current Schedule Daily Daily Daily Daily Daily Daily   Cycle Details Continuous Continuous Continuous Continuous Continuous Continuous   Start Date of Last Cycle 5/10/2017 5/10/2017 5/10/2017 5/10/2017 - 5/10/2017   Planned next cycle start date - - 6/7/2017 - - -   Doses missed in last 2 weeks - 0 0 0 1 0   Adherence Assessment - Adherent Adherent Adherent Adherent Adherent   Adverse Effects - - No AE identified during assessment No AE identified during assessment No AE identified during assessment Myalgias;Arthralgias   Pharmacist Intervention(myalgias) - - - - - No   Arthralgias - - - - - Resolved due to intervention   Pharmacist Intervention(arthralgias) - - - - - No   Home BPs - - Not applicable - not done not done   Any new drug interactions? - - No No - No   Is the dose as ordered appropriate for the patient? - - Yes Yes - Yes     Vitals:  BP:   BP Readings from Last 1 Encounters:   06/07/17 (!) 146/103     Wt Readings from Last 1 Encounters:   06/07/17 89.8 kg (198 lb)     Estimated body surface  "area is 2.06 meters squared as calculated from the following:    Height as of 5/18/17: 1.702 m (5' 7.01\").    Weight as of 6/7/17: 89.8 kg (198 lb).    Labs:  Lab Results   Component Value Date     07/10/2017      Lab Results   Component Value Date    POTASSIUM 3.9 07/10/2017     Lab Results   Component Value Date    WILL 8.9 07/10/2017     Lab Results   Component Value Date    ALBUMIN 3.9 07/10/2017     Lab Results   Component Value Date    MAG 2.2 12/21/2016     Lab Results   Component Value Date    PHOS 4.1 05/03/2016     Lab Results   Component Value Date    BUN 14 07/10/2017     Lab Results   Component Value Date    CR 0.92 07/10/2017       Lab Results   Component Value Date    AST 15 07/10/2017     Lab Results   Component Value Date    ALT 24 07/10/2017     Lab Results   Component Value Date    BILITOTAL 1.2 07/10/2017       Lab Results   Component Value Date    WBC 6.0 07/10/2017     Lab Results   Component Value Date    HGB 11.6 07/10/2017     Lab Results   Component Value Date     07/10/2017     Lab Results   Component Value Date    ANEU 3.1 07/10/2017       Assessment:  Albert appears to be tolerating Zytiga therapy well. His use of prednisone should be assessed and discussed. The abrupt stop of the prednisone could possibly cause some side effects since there was not a taper of the medication. A further look into insurance coverage for a potential 30 day supply could also be an option given how much traveling he is doing.      Plan:  Continue Zytiga therapy.    Follow-Up:  We will follow-up with lab results after the 9/6 appointment with Dr. Valladares.    Refill Due:  According to Albert, he believes that he received a 15 day supply of Zytiga on 8/19 and would then be due for a refill on 9/3.    Kristen Weiler, Pharmacy Intern  Oral Chemotherapy Monitoring Program   [facility]  [phone]      "

## 2017-08-28 DIAGNOSIS — C61 MALIGNANT NEOPLASM OF PROSTATE (H): Primary | ICD-10-CM

## 2017-08-28 DIAGNOSIS — C79.51 BONE METASTASIS: ICD-10-CM

## 2017-08-28 RX ORDER — ABIRATERONE ACETATE 250 MG/1
1000 TABLET ORAL DAILY
Qty: 120 TABLET | Refills: 0 | Status: SHIPPED | OUTPATIENT
Start: 2017-08-28 | End: 2017-09-27

## 2017-08-28 RX ORDER — PREDNISONE 5 MG/1
5 TABLET ORAL
Qty: 30 TABLET | Refills: 0 | Status: SHIPPED | OUTPATIENT
Start: 2017-08-28 | End: 2017-12-06

## 2017-09-06 ENCOUNTER — ONCOLOGY VISIT (OUTPATIENT)
Dept: ONCOLOGY | Facility: CLINIC | Age: 52
End: 2017-09-06
Attending: INTERNAL MEDICINE
Payer: COMMERCIAL

## 2017-09-06 ENCOUNTER — APPOINTMENT (OUTPATIENT)
Dept: LAB | Facility: CLINIC | Age: 52
End: 2017-09-06
Attending: INTERNAL MEDICINE
Payer: COMMERCIAL

## 2017-09-06 VITALS
WEIGHT: 198.1 LBS | TEMPERATURE: 98 F | OXYGEN SATURATION: 99 % | BODY MASS INDEX: 31.09 KG/M2 | DIASTOLIC BLOOD PRESSURE: 90 MMHG | HEIGHT: 67 IN | HEART RATE: 68 BPM | SYSTOLIC BLOOD PRESSURE: 135 MMHG | RESPIRATION RATE: 16 BRPM

## 2017-09-06 DIAGNOSIS — C61 MALIGNANT NEOPLASM OF PROSTATE (H): Primary | ICD-10-CM

## 2017-09-06 DIAGNOSIS — R61 NIGHT SWEATS: ICD-10-CM

## 2017-09-06 DIAGNOSIS — C79.51 BONE METASTASIS: ICD-10-CM

## 2017-09-06 LAB
ALBUMIN SERPL-MCNC: 3.6 G/DL (ref 3.4–5)
ALP SERPL-CCNC: 50 U/L (ref 40–150)
ALT SERPL W P-5'-P-CCNC: 23 U/L (ref 0–70)
ANION GAP SERPL CALCULATED.3IONS-SCNC: 6 MMOL/L (ref 3–14)
AST SERPL W P-5'-P-CCNC: 12 U/L (ref 0–45)
BASOPHILS # BLD AUTO: 0 10E9/L (ref 0–0.2)
BASOPHILS NFR BLD AUTO: 0.4 %
BILIRUB SERPL-MCNC: 0.7 MG/DL (ref 0.2–1.3)
BUN SERPL-MCNC: 16 MG/DL (ref 7–30)
CALCIUM SERPL-MCNC: 9 MG/DL (ref 8.5–10.1)
CHLORIDE SERPL-SCNC: 107 MMOL/L (ref 94–109)
CO2 SERPL-SCNC: 28 MMOL/L (ref 20–32)
CREAT SERPL-MCNC: 0.78 MG/DL (ref 0.66–1.25)
DIFFERENTIAL METHOD BLD: ABNORMAL
EOSINOPHIL # BLD AUTO: 0.1 10E9/L (ref 0–0.7)
EOSINOPHIL NFR BLD AUTO: 2.1 %
ERYTHROCYTE [DISTWIDTH] IN BLOOD BY AUTOMATED COUNT: 12.8 % (ref 10–15)
GFR SERPL CREATININE-BSD FRML MDRD: >90 ML/MIN/1.7M2
GLUCOSE SERPL-MCNC: 126 MG/DL (ref 70–99)
HCT VFR BLD AUTO: 34.8 % (ref 40–53)
HGB BLD-MCNC: 11.6 G/DL (ref 13.3–17.7)
IMM GRANULOCYTES # BLD: 0 10E9/L (ref 0–0.4)
IMM GRANULOCYTES NFR BLD: 0.2 %
LDH SERPL L TO P-CCNC: 182 U/L (ref 85–227)
LYMPHOCYTES # BLD AUTO: 2.2 10E9/L (ref 0.8–5.3)
LYMPHOCYTES NFR BLD AUTO: 41.5 %
MCH RBC QN AUTO: 31.2 PG (ref 26.5–33)
MCHC RBC AUTO-ENTMCNC: 33.3 G/DL (ref 31.5–36.5)
MCV RBC AUTO: 94 FL (ref 78–100)
MONOCYTES # BLD AUTO: 0.4 10E9/L (ref 0–1.3)
MONOCYTES NFR BLD AUTO: 7.4 %
NEUTROPHILS # BLD AUTO: 2.6 10E9/L (ref 1.6–8.3)
NEUTROPHILS NFR BLD AUTO: 48.4 %
NRBC # BLD AUTO: 0 10*3/UL
NRBC BLD AUTO-RTO: 0 /100
PLATELET # BLD AUTO: 244 10E9/L (ref 150–450)
POTASSIUM SERPL-SCNC: 4.1 MMOL/L (ref 3.4–5.3)
PROT SERPL-MCNC: 7.1 G/DL (ref 6.8–8.8)
PSA SERPL-MCNC: 14.3 UG/L (ref 0–4)
RBC # BLD AUTO: 3.72 10E12/L (ref 4.4–5.9)
SODIUM SERPL-SCNC: 141 MMOL/L (ref 133–144)
WBC # BLD AUTO: 5.3 10E9/L (ref 4–11)

## 2017-09-06 PROCEDURE — 83615 LACTATE (LD) (LDH) ENZYME: CPT | Performed by: INTERNAL MEDICINE

## 2017-09-06 PROCEDURE — 85025 COMPLETE CBC W/AUTO DIFF WBC: CPT | Performed by: INTERNAL MEDICINE

## 2017-09-06 PROCEDURE — 96372 THER/PROPH/DIAG INJ SC/IM: CPT

## 2017-09-06 PROCEDURE — 99212 OFFICE O/P EST SF 10 MIN: CPT | Mod: ZF

## 2017-09-06 PROCEDURE — 80053 COMPREHEN METABOLIC PANEL: CPT | Performed by: INTERNAL MEDICINE

## 2017-09-06 PROCEDURE — 84153 ASSAY OF PSA TOTAL: CPT | Performed by: INTERNAL MEDICINE

## 2017-09-06 PROCEDURE — 25000128 H RX IP 250 OP 636: Mod: ZF | Performed by: INTERNAL MEDICINE

## 2017-09-06 PROCEDURE — 99213 OFFICE O/P EST LOW 20 MIN: CPT | Mod: ZP | Performed by: INTERNAL MEDICINE

## 2017-09-06 RX ORDER — VENLAFAXINE HYDROCHLORIDE 37.5 MG/1
CAPSULE, EXTENDED RELEASE ORAL
Qty: 90 CAPSULE | Refills: 3 | Status: SHIPPED | OUTPATIENT
Start: 2017-09-06 | End: 2017-12-06

## 2017-09-06 RX ADMIN — DENOSUMAB 120 MG: 120 INJECTION SUBCUTANEOUS at 16:56

## 2017-09-06 ASSESSMENT — PAIN SCALES - GENERAL: PAINLEVEL: NO PAIN (0)

## 2017-09-06 NOTE — NURSING NOTE
"Oncology Rooming Note    September 6, 2017 3:29 PM   Albert Amaya is a 51 year old male who presents for:    Chief Complaint   Patient presents with     Blood Draw     labs drawn by vpt by rn.  vs taken.     Oncology Clinic Visit     Prostate Ca- F/U     Initial Vitals: /90 (BP Location: Right arm, Patient Position: Sitting, Cuff Size: Adult Large)  Pulse 68  Temp 98  F (36.7  C) (Oral)  Resp 16  Ht 1.702 m (5' 7.01\")  Wt 89.9 kg (198 lb 1.6 oz)  SpO2 99%  BMI 31.02 kg/m2 Estimated body mass index is 31.02 kg/(m^2) as calculated from the following:    Height as of this encounter: 1.702 m (5' 7.01\").    Weight as of this encounter: 89.9 kg (198 lb 1.6 oz). Body surface area is 2.06 meters squared.  No Pain (0) Comment: Data Unavailable   No LMP for male patient.  Allergies reviewed: Yes  Medications reviewed: Yes    Medications: Medication refills not needed today.  Pharmacy name entered into [employer]:    [id number] - [place], [place] - [address] [facility] - [place], [place] - [address] [facility] - [place], [place] - [address] [facility] - [place], [place] - [address] [place] [address] [facility] ([place]) - [place], [place] - [address] [facility] [address]    Clinical concerns: no clinical concerns  provider was notified.    7 minutes for nursing intake (face to face time)     Cassidy Carson CMA              "

## 2017-09-06 NOTE — MR AVS SNAPSHOT
After Visit Summary   9/6/2017    Albert Amaya    MRN: [id number]           Patient Information     Date Of Birth          1965        Visit Information        Provider Department      9/6/2017 3:45 PM Tyrel Valladares MD [facility]ly [facility]        Today's Diagnoses     Malignant neoplasm of prostate (H)    -  1    Bone metastasis (H)        Night sweats           Follow-ups after your visit        Your next 10 appointments already scheduled     Dec 06, 2017  3:45 PM CST   [facility] Draw with [facility] LAB DRAW   [facility] Lab Draw ([facility])    [address] [place] [address] [id number] [phone]            Dec 06, 2017  4:15 PM CST   (Arrive by 4:00 PM)   Return Visit with Tyrel Valladares MD   [facility] ([facility])    [address] [place] [address] [id number] [phone]              Who to contact     If you have questions or need follow up information about today's clinic visit or your schedule please contact [facility] directly at [phone].  Normal or non-critical lab and imaging results will be communicated to you by MyChart, letter or phone within 4 business days after the clinic has received the results. If you do not hear from us within 7 days, please contact the clinic through [employer] or phone. If you have a critical or abnormal lab result, we will notify you by phone as soon as possible.  Submit refill requests through [employer] or call your pharmacy and they will forward the refill request to us. Please allow 3 business days for your refill to be completed.          Additional Information About Your Visit        MyChart Information     [employer] gives you secure access to your electronic health record. If you see a primary care provider, you can also send messages to your care team and make appointments. If you have  "questions, please call your primary care clinic.  If you do not have a primary care provider, please call [phone] and they will assist you.        Care EveryWhere ID     This is your Care EveryWhere ID. This could be used by other organizations to access your [facility] medical records  [id number]        Your Vitals Were     Pulse Temperature Respirations Height Pulse Oximetry BMI (Body Mass Index)    68 98  F (36.7  C) (Oral) 16 1.702 m (5' 7.01\") 99% 31.02 kg/m2       Blood Pressure from Last 3 Encounters:   09/06/17 135/90   06/07/17 (!) 146/103   05/18/17 144/90    Weight from Last 3 Encounters:   09/06/17 89.9 kg (198 lb 1.6 oz)   06/07/17 89.8 kg (198 lb)   05/18/17 90.1 kg (198 lb 11.2 oz)              We Performed the Following     CBC with platelets differential     Comprehensive metabolic panel     Lactate Dehydrogenase     PSA tumor marker          Today's Medication Changes          These changes are accurate as of: 9/6/17  5:39 PM.  If you have any questions, ask your nurse or doctor.               These medicines have changed or have updated prescriptions.        Dose/Directions    * predniSONE 5 MG tablet   Commonly known as:  DELTASONE   This may have changed:  additional instructions   Used for:  Malignant neoplasm of prostate (H), Bone metastasis (H)   Changed by:  Bryan Orta RPH        Dose:  5 mg   Take 1 tablet (5 mg) by mouth daily (with breakfast)   Quantity:  30 tablet   Refills:  0       * predniSONE 5 MG tablet   Commonly known as:  DELTASONE   This may have changed:  Another medication with the same name was changed. Make sure you understand how and when to take each.   Used for:  Malignant neoplasm of prostate (H), Bone metastasis (H)   Changed by:  Noy Monk RPH        Dose:  5 mg   Take 1 tablet (5 mg) by mouth daily (with breakfast)   Quantity:  30 tablet   Refills:  0       * predniSONE 5 MG tablet   Commonly known as:  DELTASONE   This may have changed:  Another " medication with the same name was changed. Make sure you understand how and when to take each.   Used for:  Malignant neoplasm of prostate (H), Bone metastasis (H)   Changed by:  Noy Monk RPH        Dose:  5 mg   Take 1 tablet (5 mg) by mouth daily (with breakfast)   Quantity:  30 tablet   Refills:  0       venlafaxine 37.5 MG 24 hr capsule   Commonly known as:  EFFEXOR-XR   This may have changed:  See the new instructions.   Used for:  Night sweats, Malignant neoplasm of prostate (H), Bone metastasis (H)   Changed by:  Tyrel Valladares MD        TAKE 1 CAPSULE(37.5 MG) BY MOUTH DAILY   Quantity:  90 capsule   Refills:  3       * Notice:  This list has 3 medication(s) that are the same as other medications prescribed for you. Read the directions carefully, and ask your doctor or other care provider to review them with you.         Where to get your medicines      These medications were sent to [id number] ([place]) - [facility] [address] AT [facility] [address] [facility] [address]  [address], [facility] [address]     Phone:  [phone]     venlafaxine 37.5 MG 24 hr capsule                Primary Care Provider Office Phone # Fax #    [facility] [phone]       [address]  [facility] [address]        Equal Access to Services     GABRIEL MENDOZA AH: Hadii valdo kumar hadcyndyo Sokendall, waaxda luqadaha, qaybta kaalmada adeegyada, bob guerrero . So [facility] [phone].    ATENCIÓN: Si habla español, tiene a rivera disposición servicios gratuitos de asistencia lingüística. Llame al [phone].    We comply with applicable federal civil rights laws and [place] laws. We do not discriminate on the basis of race, color, national origin, age, disability sex, sexual orientation or gender identity.            Thank you!     Thank you for choosing [facility] CANCER [facility]  for your care. Our goal is always to provide you with  excellent care. Hearing back from our patients is one way we can continue to improve our services. Please take a few minutes to complete the written survey that you may receive in the mail after your visit with us. Thank you!             Your Updated Medication List - Protect others around you: Learn how to safely use, store and throw away your medicines at www.disposemymeds.org.          This list is accurate as of: 9/6/17  5:39 PM.  Always use your most recent med list.                   Brand Name Dispense Instructions for use Diagnosis    abiraterone 250 MG tablet    ZYTIGA    120 tablet    Take 4 tablets (1,000 mg) by mouth daily for 30 doses Take on empty stomach.    Malignant neoplasm of prostate (H), Bone metastasis (H)       EPINEPHrine 0.3 MG/0.3ML injection 2-pack    EPIPEN 2-CHARITY    0.6 mL    Inject 0.3 mLs (0.3 mg) into the muscle once as needed for anaphylaxis    Malignant neoplasm of prostate (H), Bone metastasis (H)       IBUPROFEN PO      Take by mouth as needed for moderate pain Reported on 5/18/2017        levothyroxine 75 MCG tablet    SYNTHROID/LEVOTHROID    90 tablet    Take 1 tablet (75 mcg) by mouth daily    Hypothyroidism, unspecified type       * predniSONE 5 MG tablet    DELTASONE    30 tablet    Take 1 tablet (5 mg) by mouth daily (with breakfast)    Malignant neoplasm of prostate (H), Bone metastasis (H)       * predniSONE 5 MG tablet    DELTASONE    30 tablet    Take 1 tablet (5 mg) by mouth daily (with breakfast)    Malignant neoplasm of prostate (H), Bone metastasis (H)       * predniSONE 5 MG tablet    DELTASONE    30 tablet    Take 1 tablet (5 mg) by mouth daily (with breakfast)    Malignant neoplasm of prostate (H), Bone metastasis (H)       TYLENOL PO      Take 325 mg by mouth every 8 hours as needed for mild pain or fever Reported on 5/18/2017        valACYclovir 1000 mg tablet    VALTREX    10 tablet    Take 1 tablet (1,000 mg) by mouth 2 times daily    HSV (herpes simplex virus)  infection       venlafaxine 37.5 MG 24 hr capsule    EFFEXOR-XR    90 capsule    TAKE 1 CAPSULE(37.5 MG) BY MOUTH DAILY    Night sweats, Malignant neoplasm of prostate (H), Bone metastasis (H)       * Notice:  This list has 3 medication(s) that are the same as other medications prescribed for you. Read the directions carefully, and ask your doctor or other care provider to review them with you.

## 2017-09-06 NOTE — LETTER
9/6/2017       RE: Albert Amaya  [address] [facility] [address]     Dear Colleague,    Thank you for referring your patient, Albert Amaya, to the [facility]IC. Please see a copy of my visit note below.    [facility]  FOLLOW-UP VISIT NOTE  Date of visit: Sep 6, 2017     Cancer History:   Metastatic Prostate CA treated    - s/p 6 cycles of taxotere 75mg/m2   - s/p orchiectomy on 3/18/2016   - s/p Provenge 5/13, 5/27, 6/10   - s/p Casodex 50 mg daily March/April   - Currently taking Zytiga    HPI: Albert is a 51 year old gentleman with metastatic prostate cancer.  Albert felt a cramp in his gluteus muscle and could barely walk after doing some remodeling at home and unloading heavy truck at work. He tried flexeril and prednisone initially but eventually presented to ED on 10/5/15. He feels that initially he was nearly labeled as drug seeker since he was in lot of discomfort and flexeril was not working. But during course of ED visits labs were drawn and he had marked elevation in Alk Phosphatase (over 1000). CT did suggest some ileus with some sclerotic bone lesions. He was admitted to the hospital. His PSA was checked and was markedly elevated at 5760 (10/6/15), repeat value 5563.     Urology and Medical Oncology consults were placed. He was unhappy with the progress in hospital and felt no better and left the following day on 10/6. He did follow up with Dr. Freire and had transrectal US guided biopsy of prostate on 10/8/15. They have the results through my chart which confirms prostate adenocarcinoma - enrico 4+3 involving majority (60-90%) portion of every single core.  He started on taxotere on 10/23 and completed 6 cycles of therapy in 2/2016.  He then underwent orchiectomy on 3/18/2016.       Throughout spring of 2016 Albert's PSA started to progress and after three elevations there was concern about him being hormone refractory.  He was started on  Provenge and enrolled in the trial including Indoximod. PSA trending up and started on Casodex mid March with progression. Switched to Zytiga 5/3/17.    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     He has been doing well and has no new complains. He has fair appetite and energy. He has been very active. He had a good summer. His daughter fell sick last wick and is recovering from pneumonia after a hiking trip to [place].       MEDS:   Current Outpatient Prescriptions   Medication Sig     venlafaxine (EFFEXOR-XR) 37.5 MG 24 hr capsule TAKE 1 CAPSULE(37.5 MG) BY MOUTH DAILY     predniSONE (DELTASONE) 5 MG tablet Take 1 tablet (5 mg) by mouth daily (with breakfast)     abiraterone (ZYTIGA) 250 MG tablet Take 4 tablets (1,000 mg) by mouth daily for 30 doses Take on empty stomach.     valACYclovir (VALTREX) 1000 mg tablet Take 1 tablet (1,000 mg) by mouth 2 times daily     predniSONE (DELTASONE) 5 MG tablet Take 1 tablet (5 mg) by mouth daily (with breakfast)     predniSONE (DELTASONE) 5 MG tablet Take 1 tablet (5 mg) by mouth daily (with breakfast) (Patient taking differently: Take 5 mg by mouth daily (with breakfast) Once daily or twice daily)     levothyroxine (SYNTHROID/LEVOTHROID) 75 MCG tablet Take 1 tablet (75 mcg) by mouth daily     IBUPROFEN PO Take by mouth as needed for moderate pain Reported on 5/18/2017     Acetaminophen (TYLENOL PO) Take 325 mg by mouth every 8 hours as needed for mild pain or fever Reported on 5/18/2017     [DISCONTINUED] venlafaxine (EFFEXOR-XR) 37.5 MG 24 hr capsule TAKE 1 CAPSULE(37.5 MG) BY MOUTH DAILY     EPINEPHrine (EPIPEN 2-CHARITY) 0.3 MG/0.3ML injection 2-pack Inject 0.3 mLs (0.3 mg) into the muscle once as needed for anaphylaxis (Patient not taking: Reported on 9/6/2017)     No current facility-administered medications for this visit.         EXAM:  ECOG 1  Wt Readings from Last 4 Encounters:   09/06/17 89.9 kg (198 lb 1.6 oz)  "  06/07/17 89.8 kg (198 lb)   05/18/17 90.1 kg (198 lb 11.2 oz)   05/03/17 89.2 kg (196 lb 9.6 oz)   /90 (BP Location: Right arm, Patient Position: Sitting, Cuff Size: Adult Large)  Pulse 68  Temp 98  F (36.7  C) (Oral)  Resp 16  Ht 1.702 m (5' 7.01\")  Wt 89.9 kg (198 lb 1.6 oz)  SpO2 99%  BMI 31.02 kg/m2  Patient alert and oriented x3.   PERRLA. EOMI. No scleral icterus noted. OP without thrush/sores.  Neck exam: Palpable 0.5 cm anterior cervical node on the left, rubbery, mobile, non-tender.   Heart: RRR no murmurs noted.   Lungs: clear to auscultation bilaterally.  No crackles or wheezing.   Abd: Normal bowel sounds.  No tenderness to palpation. No suprapubic tenderness.  No hepatomegaly.   Extremities: No lower extremity edema.   Back: No CVA tenderness.   Neuro: grossly intact.   Mood and affect is stable.     Labs/Imaging:    Recent Labs   Lab Test  09/06/17   1518  07/10/17   1519 06/07/17   1650  05/25/17   0920  05/18/17   1342   NA  141  140  142  144  141   POTASSIUM  4.1  3.9  4.3  4.9  4.8   CHLORIDE  107  106  108  109  106   CO2  28  28  25  27  27   ANIONGAP  6  6  8  8  8   BUN  16  14  19  20  17   CR  0.78  0.92  0.81  0.91  1.21   GLC  126*  122*  110*  112*  124*   WILL  9.0  8.9  8.8  9.0  9.5     Recent Labs   Lab Test  12/21/16   1643  11/22/16   1221  10/26/16   1150  09/28/16   1137  08/17/16   1355   05/03/16   0955   MAG  2.2  2.4*  2.2  2.3  2.0   < >  2.0   PHOS   --    --    --    --    --    --   4.1    < > = values in this interval not displayed.     Recent Labs   Lab Test  09/06/17   1518  07/10/17   1519  06/07/17   1650  05/25/17   0920  05/18/17   1342   WBC  5.3  6.0  6.0  6.5  6.6   HGB  11.6*  11.6*  11.4*  11.5*  12.3*   PLT  244  238  250  245  269   MCV  94  93  94  92  92   NEUTROPHIL  48.4  51.0  60.8  46.3  58.4     Recent Labs   Lab Test  09/06/17   1518  07/10/17   1519  06/07/17   1650   BILITOTAL  0.7  1.2  0.5   ALKPHOS  50  53  52   ALT  23  24  24 "   AST  12  15  14   ALBUMIN  3.6  3.9  4.2   LDH  182  216  206     TSH   Date Value Ref Range Status   03/01/2017 5.28 (H) 0.40 - 4.00 mU/L Final   11/22/2016 4.62 (H) 0.40 - 4.00 mU/L Final   10/26/2016 5.98 (H) 0.40 - 4.00 mU/L Final     No results for input(s): CEA in the last 14920 hours.  Results for orders placed or performed during the hospital encounter of 05/08/17   CT Chest/Abdomen/Pelvis w Contrast    Narrative    EXAMINATION: CT CHEST/ABDOMEN/PELVIS W CONTRAST, 5/8/2017 9:03 AM    TECHNIQUE:  Helical CT images from the thoracic inlet through the  symphysis pubis were obtained  with contrast. Contrast dose: iopamidol  (ISOVUE-370) solution 120 mL    COMPARISON: CT cap 11/22/16, bone scan 11/22/2016.    HISTORY: Restaging, Malignant neoplasm of prostate, Secondary  malignant neoplasm of bone    FINDINGS:    Chest: Thyroid gland is within normal limits. No cardiomegaly or  pericardial effusion. No central pulmonary embolism. The central  tracheobronchial tree is patent. No pneumothorax or pleural effusion.  No suspicious mediastinal or hilar lymph nodes. Few subcentimeter  axillary lymph nodes. Calcified 2 mm right lower lobe pulmonary nodule  (series 4, image 76). No suspicious or new pulmonary nodule.    Abdomen and pelvis: 8 mm hepatic segment 5 nonenhancing hypodensity  (series 3, image 24), unchanged since 10/5/2015, likely benign.  11/22/2016., Which measured 4 mm. No additional hepatic lesion.  Gallbladder, adrenal glands and spleen are unremarkable. No enhancing  pancreatic lesion. Likely fatty lobulation of the pancreatic uncinate  process (series 3, image 313). No hydronephrosis, hydroureter,  nephroureterolithiasis or enhancing renal lesion. No suspicious extra  peritoneal, pelvic or abdominal adenopathy. No bowel projection. No  focal bowel thickening. No free air or fluid within the abdomen and  pelvis. Prostate gland is not enlarged.    Bones and soft tissues: Redemonstration of diffuse axial  and partially  visualized appendicular osteoblastic osseous metastasis. No acute  fracture. Degenerative changes of lower lumbar spine.      Impression    IMPRESSION: Stable diffuse osteoblastic osseous metastasis without  evidence of new metastasis within the chest, abdomen and pelvis.     I have personally reviewed the examination and initial interpretation  and I agree with the findings.    AILYN LITTLE MD     Recent Labs   Lab Test  09/06/17   1518  07/10/17   1519  06/07/17   1650  05/25/17   0920  05/03/17   1529   12/21/16   1643  11/22/16   1222   05/03/16   0955   PSA  14.30*  42.80*  76.30*  91.00*  106.00*   < >  60.68*   --    < >  67.04*   TESTOSTTOTAL   --    --    --    --    --    --   <2*  2*   --  <2*    < > = values in this interval not displayed.       ASSESSMENT/PLAN:  1. Metastatic prostate cancer: Currently taking Zytiga.     He has been doing very well on abiraterone. He has no new complains. His PSA has been responding very nicely. He and his wife were both quite excited with the response. With the nice decline so far, I would wish and hope that we would have disease control for over a year and possibly several years with abiraterone. I would repeat a restaging scans to establish a new baseline post maximal response.     I reviewed that we should continue with denosumab. He is little worried with side effects. I doubt that they should be from his denosumab. I will see him again in 3 months with labs prior to visit and denosumab after his visit. He will get his dose today.        Again, thank you for allowing me to participate in the care of your patient.      Sincerely,    Tyrel Valladares MD

## 2017-09-06 NOTE — PROGRESS NOTES
[facility] CANCER CLINIC  FOLLOW-UP VISIT NOTE  Date of visit: Sep 6, 2017     Cancer History:   Metastatic Prostate CA treated    - s/p 6 cycles of taxotere 75mg/m2   - s/p orchiectomy on 3/18/2016   - s/p Provenge 5/13, 5/27, 6/10   - s/p Casodex 50 mg daily March/April   - Currently taking Zytiga    HPI: Albert is a 51 year old gentleman with metastatic prostate cancer.  Albert felt a cramp in his gluteus muscle and could barely walk after doing some remodeling at home and unloading heavy truck at work. He tried flexeril and prednisone initially but eventually presented to ED on 10/5/15. He feels that initially he was nearly labeled as drug seeker since he was in lot of discomfort and flexeril was not working. But during course of ED visits labs were drawn and he had marked elevation in Alk Phosphatase (over 1000). CT did suggest some ileus with some sclerotic bone lesions. He was admitted to the hospital. His PSA was checked and was markedly elevated at 5760 (10/6/15), repeat value 5563.     Urology and Medical Oncology consults were placed. He was unhappy with the progress in hospital and felt no better and left the following day on 10/6. He did follow up with Dr. Freire and had transrectal US guided biopsy of prostate on 10/8/15. They have the results through my chart which confirms prostate adenocarcinoma - enrico 4+3 involving majority (60-90%) portion of every single core.  He started on taxotere on 10/23 and completed 6 cycles of therapy in 2/2016.  He then underwent orchiectomy on 3/18/2016.       Throughout spring of 2016 Albert's PSA started to progress and after three elevations there was concern about him being hormone refractory.  He was started on Provenge and enrolled in the trial including Indoximod. PSA trending up and started on Casodex mid March with progression. Switched to Zytiga 5/3/17.    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     He has been doing well and has no new complains. He has fair appetite and energy. He has been very active. He had a good summer. His daughter fell sick last wick and is recovering from pneumonia after a hiking trip to [place].       MEDS:   Current Outpatient Prescriptions   Medication Sig     venlafaxine (EFFEXOR-XR) 37.5 MG 24 hr capsule TAKE 1 CAPSULE(37.5 MG) BY MOUTH DAILY     predniSONE (DELTASONE) 5 MG tablet Take 1 tablet (5 mg) by mouth daily (with breakfast)     abiraterone (ZYTIGA) 250 MG tablet Take 4 tablets (1,000 mg) by mouth daily for 30 doses Take on empty stomach.     valACYclovir (VALTREX) 1000 mg tablet Take 1 tablet (1,000 mg) by mouth 2 times daily     predniSONE (DELTASONE) 5 MG tablet Take 1 tablet (5 mg) by mouth daily (with breakfast)     predniSONE (DELTASONE) 5 MG tablet Take 1 tablet (5 mg) by mouth daily (with breakfast) (Patient taking differently: Take 5 mg by mouth daily (with breakfast) Once daily or twice daily)     levothyroxine (SYNTHROID/LEVOTHROID) 75 MCG tablet Take 1 tablet (75 mcg) by mouth daily     IBUPROFEN PO Take by mouth as needed for moderate pain Reported on 5/18/2017     Acetaminophen (TYLENOL PO) Take 325 mg by mouth every 8 hours as needed for mild pain or fever Reported on 5/18/2017     [DISCONTINUED] venlafaxine (EFFEXOR-XR) 37.5 MG 24 hr capsule TAKE 1 CAPSULE(37.5 MG) BY MOUTH DAILY     EPINEPHrine (EPIPEN 2-CHARITY) 0.3 MG/0.3ML injection 2-pack Inject 0.3 mLs (0.3 mg) into the muscle once as needed for anaphylaxis (Patient not taking: Reported on 9/6/2017)     No current facility-administered medications for this visit.         EXAM:  ECOG 1  Wt Readings from Last 4 Encounters:   09/06/17 89.9 kg (198 lb 1.6 oz)   06/07/17 89.8 kg (198 lb)   05/18/17 90.1 kg (198 lb 11.2 oz)   05/03/17 89.2 kg (196 lb 9.6 oz)   /90 (BP Location: Right arm, Patient Position: Sitting, Cuff Size: Adult Large)  Pulse 68  Temp 98  F (36.7  C) (Oral)  Resp 16  Ht  "1.702 m (5' 7.01\")  Wt 89.9 kg (198 lb 1.6 oz)  SpO2 99%  BMI 31.02 kg/m2  Patient alert and oriented x3.   PERRLA. EOMI. No scleral icterus noted. OP without thrush/sores.  Neck exam: Palpable 0.5 cm anterior cervical node on the left, rubbery, mobile, non-tender.   Heart: RRR no murmurs noted.   Lungs: clear to auscultation bilaterally.  No crackles or wheezing.   Abd: Normal bowel sounds.  No tenderness to palpation. No suprapubic tenderness.  No hepatomegaly.   Extremities: No lower extremity edema.   Back: No CVA tenderness.   Neuro: grossly intact.   Mood and affect is stable.     Labs/Imaging:    Recent Labs   Lab Test  09/06/17   1518  07/10/17   1519  06/07/17   1650 05/25/17   0920 05/18/17   1342   NA  141  140  142  144  141   POTASSIUM  4.1  3.9  4.3  4.9  4.8   CHLORIDE  107  106  108  109  106   CO2  28  28  25  27  27   ANIONGAP  6  6  8  8  8   BUN  16  14  19  20  17   CR  0.78  0.92  0.81  0.91  1.21   GLC  126*  122*  110*  112*  124*   WILL  9.0  8.9  8.8  9.0  9.5     Recent Labs   Lab Test  12/21/16   1643  11/22/16   1221  10/26/16   1150  09/28/16   1137  08/17/16   1355   05/03/16   0955   MAG  2.2  2.4*  2.2  2.3  2.0   < >  2.0   PHOS   --    --    --    --    --    --   4.1    < > = values in this interval not displayed.     Recent Labs   Lab Test  09/06/17   1518  07/10/17   1519 06/07/17   1650  05/25/17   0920  05/18/17   1342   WBC  5.3  6.0  6.0  6.5  6.6   HGB  11.6*  11.6*  11.4*  11.5*  12.3*   PLT  244  238  250  245  269   MCV  94  93  94  92  92   NEUTROPHIL  48.4  51.0  60.8  46.3  58.4     Recent Labs   Lab Test  09/06/17   1518  07/10/17   1519  06/07/17   1650   BILITOTAL  0.7  1.2  0.5   ALKPHOS  50  53  52   ALT  23  24  24   AST  12  15  14   ALBUMIN  3.6  3.9  4.2   LDH  182  216  206     TSH   Date Value Ref Range Status   03/01/2017 5.28 (H) 0.40 - 4.00 mU/L Final   11/22/2016 4.62 (H) 0.40 - 4.00 mU/L Final   10/26/2016 5.98 (H) 0.40 - 4.00 mU/L Final     No " results for input(s): CEA in the last 34717 hours.  Results for orders placed or performed during the hospital encounter of 05/08/17   CT Chest/Abdomen/Pelvis w Contrast    Narrative    EXAMINATION: CT CHEST/ABDOMEN/PELVIS W CONTRAST, 5/8/2017 9:03 AM    TECHNIQUE:  Helical CT images from the thoracic inlet through the  symphysis pubis were obtained  with contrast. Contrast dose: iopamidol  (ISOVUE-370) solution 120 mL    COMPARISON: CT cap 11/22/16, bone scan 11/22/2016.    HISTORY: Restaging, Malignant neoplasm of prostate, Secondary  malignant neoplasm of bone    FINDINGS:    Chest: Thyroid gland is within normal limits. No cardiomegaly or  pericardial effusion. No central pulmonary embolism. The central  tracheobronchial tree is patent. No pneumothorax or pleural effusion.  No suspicious mediastinal or hilar lymph nodes. Few subcentimeter  axillary lymph nodes. Calcified 2 mm right lower lobe pulmonary nodule  (series 4, image 76). No suspicious or new pulmonary nodule.    Abdomen and pelvis: 8 mm hepatic segment 5 nonenhancing hypodensity  (series 3, image 24), unchanged since 10/5/2015, likely benign.  11/22/2016., Which measured 4 mm. No additional hepatic lesion.  Gallbladder, adrenal glands and spleen are unremarkable. No enhancing  pancreatic lesion. Likely fatty lobulation of the pancreatic uncinate  process (series 3, image 313). No hydronephrosis, hydroureter,  nephroureterolithiasis or enhancing renal lesion. No suspicious extra  peritoneal, pelvic or abdominal adenopathy. No bowel projection. No  focal bowel thickening. No free air or fluid within the abdomen and  pelvis. Prostate gland is not enlarged.    Bones and soft tissues: Redemonstration of diffuse axial and partially  visualized appendicular osteoblastic osseous metastasis. No acute  fracture. Degenerative changes of lower lumbar spine.      Impression    IMPRESSION: Stable diffuse osteoblastic osseous metastasis without  evidence of new  metastasis within the chest, abdomen and pelvis.     I have personally reviewed the examination and initial interpretation  and I agree with the findings.    AILYN LITTLE MD     Recent Labs   Lab Test  09/06/17   1518  07/10/17   1519  06/07/17   1650  05/25/17   0920  05/03/17   1529   12/21/16   1643  11/22/16   1222   05/03/16   0955   PSA  14.30*  42.80*  76.30*  91.00*  106.00*   < >  60.68*   --    < >  67.04*   TESTOSTTOTAL   --    --    --    --    --    --   <2*  2*   --  <2*    < > = values in this interval not displayed.       ASSESSMENT/PLAN:  1. Metastatic prostate cancer: Currently taking Zytiga.     He has been doing very well on abiraterone. He has no new complains. His PSA has been responding very nicely. He and his wife were both quite excited with the response. With the nice decline so far, I would wish and hope that we would have disease control for over a year and possibly several years with abiraterone. I would repeat a restaging scans to establish a new baseline post maximal response.     I reviewed that we should continue with denosumab. He is little worried with side effects. I doubt that they should be from his denosumab. I will see him again in 3 months with labs prior to visit and denosumab after his visit. He will get his dose today.

## 2017-09-06 NOTE — NURSING NOTE
1 Xgeva injection given in left arm subcutaneously. Patient tolerated injection.    Cassidy Carson CMA

## 2017-09-06 NOTE — NURSING NOTE
Chief Complaint   Patient presents with     Blood Draw     labs drawn by vpt by rn.  vs taken.     Labs drawn by vpt by rn.  Vital signs taken.  Pt checked in to next appointment.  Ele Simon RN

## 2017-09-06 NOTE — PROGRESS NOTES
[facility] CANCER CLINIC  FOLLOW-UP VISIT NOTE  Date of visit: Sep 6, 2017     Cancer History:   Metastatic Prostate CA treated    - s/p 6 cycles of taxotere 75mg/m2   - s/p orchiectomy on 3/18/2016   - s/p Provenge 5/13, 5/27, 6/10   - s/p Casodex 50 mg daily March/April   - Currently taking Zytiga    HPI: Albert is a 51 year old gentleman with metastatic prostate cancer.  Albert felt a cramp in his gluteus muscle and could barely walk after doing some remodeling at home and unloading heavy truck at work. He tried flexeril and prednisone initially but eventually presented to ED on 10/5/15. He feels that initially he was nearly labeled as drug seeker since he was in lot of discomfort and flexeril was not working. But during course of ED visits labs were drawn and he had marked elevation in Alk Phosphatase (over 1000). CT did suggest some ileus with some sclerotic bone lesions. He was admitted to the hospital. His PSA was checked and was markedly elevated at 5760 (10/6/15), repeat value 5563.     Urology and Medical Oncology consults were placed. He was unhappy with the progress in hospital and felt no better and left the following day on 10/6. He did follow up with Dr. Freire and had transrectal US guided biopsy of prostate on 10/8/15. They have the results through my chart which confirms prostate adenocarcinoma - ernico 4+3 involving majority (60-90%) portion of every single core.  He started on taxotere on 10/23 and completed 6 cycles of therapy in 2/2016.  He then underwent orchiectomy on 3/18/2016.       Throughout spring of 2016 Albert's PSA started to progress and after three elevations there was concern about him being hormone refractory.  He was started on Provenge and enrolled in the trial including Indoximod. PSA trending up and started on Casodex mid March with progression. Switched to Zytiga 5/3/17.    INTERVAL HISTORY:   Albert presents alone today with his wife on speaker phone.      States he has been having slight dysuria for 4 days. Wife is concerned for UTI. Denies urgency, frequency, hematuria, suprapubic pain, flank pain, fever, chills, nausea, vomiting. No history of UTI. Taking AZO since last night with some improvement. Also complains of enlarged lymph nodes in the neck, worse on the left. Noticed for 3 days. Noticeable, but not painful. Denies ear plugging, sinus congestion, runny nose, cough, SOB, dyspnea, chest pain. No muscle or joint pain. Hearing and vision unchanged. Having some hot flashes, understands it is due to hormone therapy. Has been feeling lightheaded or dizzy. Stool a little softer, BM's daily. Eating OK. Drinking a lot of coffee and working 12-hour days, not drinking much water. Not sleeping well. Waking up from 1-4 a.m., maybe getting 5 hours per night. Tried earlier dose of Zytiga, and trying Benadryl with some improvement. Has tried melatonin in the past and planning to try again.    MEDS:   Current Outpatient Prescriptions   Medication Sig     predniSONE (DELTASONE) 5 MG tablet Take 1 tablet (5 mg) by mouth daily (with breakfast)     abiraterone (ZYTIGA) 250 MG tablet Take 4 tablets (1,000 mg) by mouth daily for 30 doses Take on empty stomach.     valACYclovir (VALTREX) 1000 mg tablet Take 1 tablet (1,000 mg) by mouth 2 times daily     predniSONE (DELTASONE) 5 MG tablet Take 1 tablet (5 mg) by mouth daily (with breakfast)     venlafaxine (EFFEXOR-XR) 37.5 MG 24 hr capsule TAKE 1 CAPSULE(37.5 MG) BY MOUTH DAILY     predniSONE (DELTASONE) 5 MG tablet Take 1 tablet (5 mg) by mouth daily (with breakfast) (Patient taking differently: Take 5 mg by mouth daily (with breakfast) Once daily or twice daily)     levothyroxine (SYNTHROID/LEVOTHROID) 75 MCG tablet Take 1 tablet (75 mcg) by mouth daily     IBUPROFEN PO Take by mouth as needed for moderate pain Reported on 5/18/2017     EPINEPHrine (EPIPEN 2-CHARITY) 0.3 MG/0.3ML injection 2-pack Inject 0.3 mLs (0.3 mg) into the  muscle once as needed for anaphylaxis (Patient not taking: Reported on 7/19/2017)     Acetaminophen (TYLENOL PO) Take 325 mg by mouth every 8 hours as needed for mild pain or fever Reported on 5/18/2017     No current facility-administered medications for this visit.         EXAM:  ECOG 1  Wt Readings from Last 4 Encounters:   06/07/17 89.8 kg (198 lb)   05/18/17 90.1 kg (198 lb 11.2 oz)   05/03/17 89.2 kg (196 lb 9.6 oz)   03/24/17 89.9 kg (198 lb 1.6 oz)   There were no vitals taken for this visit.  Patient alert and oriented x3.   PERRLA. EOMI. No scleral icterus noted. OP without thrush/sores.  Neck exam: Palpable 0.5 cm anterior cervical node on the left, rubbery, mobile, non-tender.   Heart: RRR no murmurs noted.   Lungs: clear to auscultation bilaterally.  No crackles or wheezing.   Abd: Normal bowel sounds.  No tenderness to palpation. No suprapubic tenderness.  No hepatomegaly.   Extremities: No lower extremity edema.   Back: No CVA tenderness.   Neuro: grossly intact.   Mood and affect is stable.     Labs/Imaging:  Recent Labs   Lab Test  06/07/17   1650  05/25/17   0920  05/18/17   1342  05/03/17   1529  03/24/17   0710   NA  142  144  141  141  140   POTASSIUM  4.3  4.9  4.8  4.2  4.7   CHLORIDE  108  109  106  106  106   CO2  25  27  27  28  28   ANIONGAP  8  8  8  7  7   BUN  19  20  17  13  22   CR  0.81  0.91  1.21  0.78  0.88   GLC  110*  112*  124*  85  132*   WILL  8.8  9.0  9.5  9.1  8.9     Recent Labs   Lab Test  12/21/16   1643  11/22/16   1221  10/26/16   1150  09/28/16   1137  08/17/16   1355   05/03/16   0955   MAG  2.2  2.4*  2.2  2.3  2.0   < >  2.0   PHOS   --    --    --    --    --    --   4.1    < > = values in this interval not displayed.     Recent Labs   Lab Test  06/07/17   1650  05/25/17   0920  05/18/17   1342  05/03/17   1529  03/24/17   0710   WBC  6.0  6.5  6.6  5.8  4.9   HGB  11.4*  11.5*  12.3*  11.9*  12.0*   PLT  250  245  269  247  227   MCV  94  92  92  92  92    NEUTROPHIL  60.8  46.3  58.4  44.0  43.3     Recent Labs   Lab Test  06/07/17   1650  05/25/17   0920  05/18/17   1342  05/03/17   1529   BILITOTAL  0.5  0.8  0.6  0.6   ALKPHOS  52  47  57  52   ALT  24  24  33  30   AST  14  17  23  19   ALBUMIN  4.2  4.0  4.3  4.4   LDH  206  204   --   207     TSH   Date Value Ref Range Status   03/01/2017 5.28 (H) 0.40 - 4.00 mU/L Final   11/22/2016 4.62 (H) 0.40 - 4.00 mU/L Final   10/26/2016 5.98 (H) 0.40 - 4.00 mU/L Final       Results for orders placed or performed during the hospital encounter of 05/08/17   CT Chest/Abdomen/Pelvis w Contrast    Narrative    EXAMINATION: CT CHEST/ABDOMEN/PELVIS W CONTRAST, 5/8/2017 9:03 AM    TECHNIQUE:  Helical CT images from the thoracic inlet through the  symphysis pubis were obtained  with contrast. Contrast dose: iopamidol  (ISOVUE-370) solution 120 mL    COMPARISON: CT cap 11/22/16, bone scan 11/22/2016.    HISTORY: Restaging, Malignant neoplasm of prostate, Secondary  malignant neoplasm of bone    FINDINGS:    Chest: Thyroid gland is within normal limits. No cardiomegaly or  pericardial effusion. No central pulmonary embolism. The central  tracheobronchial tree is patent. No pneumothorax or pleural effusion.  No suspicious mediastinal or hilar lymph nodes. Few subcentimeter  axillary lymph nodes. Calcified 2 mm right lower lobe pulmonary nodule  (series 4, image 76). No suspicious or new pulmonary nodule.    Abdomen and pelvis: 8 mm hepatic segment 5 nonenhancing hypodensity  (series 3, image 24), unchanged since 10/5/2015, likely benign.  11/22/2016., Which measured 4 mm. No additional hepatic lesion.  Gallbladder, adrenal glands and spleen are unremarkable. No enhancing  pancreatic lesion. Likely fatty lobulation of the pancreatic uncinate  process (series 3, image 313). No hydronephrosis, hydroureter,  nephroureterolithiasis or enhancing renal lesion. No suspicious extra  peritoneal, pelvic or abdominal adenopathy. No bowel  projection. No  focal bowel thickening. No free air or fluid within the abdomen and  pelvis. Prostate gland is not enlarged.    Bones and soft tissues: Redemonstration of diffuse axial and partially  visualized appendicular osteoblastic osseous metastasis. No acute  fracture. Degenerative changes of lower lumbar spine.      Impression    IMPRESSION: Stable diffuse osteoblastic osseous metastasis without  evidence of new metastasis within the chest, abdomen and pelvis.     I have personally reviewed the examination and initial interpretation  and I agree with the findings.    AILYN LITTLE MD     Recent Labs   Lab Test  06/07/17   1650  05/25/17   0920  05/03/17   1529  03/24/17   0710  03/01/17   1603   12/21/16   1643  11/22/16   1222   05/03/16   0955   PSA  76.30*  91.00*  106.00*  78.49*  86.81*   < >  60.68*   --    < >  67.04*   TESTOSTTOTAL   --    --    --    --    --    --  <2  2*   --  <2    < > = values in this interval not displayed.        ASSESSMENT/PLAN:  1. Metastatic prostate cancer: Currently taking Zytiga. T    olerating it well. Hypertensive today at 146/103. This is a known adverse effect of prednisone. Will continue to monitor.   We had a lengthy discussion with him with her wife on the phone. She reviewed a list of questions and concerns.   We reviewed actual images from his restaging scans and compared it to previous scans. There are no new lesions. It is hard to appreciate any difference though bone scan lesions have been officially read to have more uptake.     Overall he is tolerating therapy well. There are several markers of response including decline in his LDH. His PSA was not available at the time of visit, but had declined within 2 weeks of therapy. His PSA which showed up after the visit has continued to decline which is a good news. We will continue with this therapy as long as he is gaining any benefit.     I will see him in 8 weeks or so. We will get denosumab every 6-8 weeks.      Over 30 min of direct face to face time spent with patient with more than 50% time spent in counseling and coordinating care.

## 2017-09-25 DIAGNOSIS — C79.51 BONE METASTASIS: ICD-10-CM

## 2017-09-25 DIAGNOSIS — C61 MALIGNANT NEOPLASM OF PROSTATE (H): Primary | ICD-10-CM

## 2017-09-25 RX ORDER — ABIRATERONE ACETATE 250 MG/1
1000 TABLET ORAL DAILY
Qty: 120 TABLET | Refills: 0 | Status: SHIPPED | OUTPATIENT
Start: 2017-09-25 | End: 2017-10-25

## 2017-09-25 RX ORDER — PREDNISONE 5 MG/1
5 TABLET ORAL
Qty: 30 TABLET | Refills: 0 | Status: SHIPPED | OUTPATIENT
Start: 2017-09-25 | End: 2017-12-06

## 2017-10-05 ENCOUNTER — TELEPHONE (OUTPATIENT)
Dept: ONCOLOGY | Facility: CLINIC | Age: 52
End: 2017-10-05

## 2017-10-05 NOTE — TELEPHONE ENCOUNTER
Oral Chemotherapy Monitoring Program   Placed call to patient in follow up of Zytiga (abiraterone) therapy.   Left message requesting call back.   No drug names were mentioned.    Adriane Finch RN  [facility]-Therapy Management  [phone]

## 2017-10-09 ENCOUNTER — TELEPHONE (OUTPATIENT)
Dept: PHARMACY | Facility: CLINIC | Age: 52
End: 2017-10-09

## 2017-10-09 NOTE — ORAL ONC MGMT
Oral Chemotherapy Monitoring Program     Placed call to patient in follow up of Zytiga therapy. Routine monthly labs are required. Albert initially agreed to come in today, but called back to say tomorrow will work better. Labs scheduled for 10/10/2017 at 10:45 AM and 11/6/2017 at 1PM.     Bryan Orta PharmD  [facility]  [phone]  October 9, 2017

## 2017-10-10 DIAGNOSIS — C61 MALIGNANT NEOPLASM OF PROSTATE (H): ICD-10-CM

## 2017-10-10 DIAGNOSIS — C79.51 BONE METASTASIS: ICD-10-CM

## 2017-10-10 LAB
ALBUMIN SERPL-MCNC: 3.9 G/DL (ref 3.4–5)
ALP SERPL-CCNC: 53 U/L (ref 40–150)
ALT SERPL W P-5'-P-CCNC: 21 U/L (ref 0–70)
ANION GAP SERPL CALCULATED.3IONS-SCNC: 6 MMOL/L (ref 3–14)
AST SERPL W P-5'-P-CCNC: 14 U/L (ref 0–45)
BASOPHILS # BLD AUTO: 0 10E9/L (ref 0–0.2)
BASOPHILS NFR BLD AUTO: 0.8 %
BILIRUB SERPL-MCNC: 1 MG/DL (ref 0.2–1.3)
BUN SERPL-MCNC: 19 MG/DL (ref 7–30)
CALCIUM SERPL-MCNC: 8.8 MG/DL (ref 8.5–10.1)
CHLORIDE SERPL-SCNC: 108 MMOL/L (ref 94–109)
CO2 SERPL-SCNC: 27 MMOL/L (ref 20–32)
CREAT SERPL-MCNC: 0.86 MG/DL (ref 0.66–1.25)
DIFFERENTIAL METHOD BLD: ABNORMAL
EOSINOPHIL # BLD AUTO: 0.1 10E9/L (ref 0–0.7)
EOSINOPHIL NFR BLD AUTO: 2.5 %
ERYTHROCYTE [DISTWIDTH] IN BLOOD BY AUTOMATED COUNT: 12.7 % (ref 10–15)
GFR SERPL CREATININE-BSD FRML MDRD: >90 ML/MIN/1.7M2
GLUCOSE SERPL-MCNC: 121 MG/DL (ref 70–99)
HCT VFR BLD AUTO: 34.3 % (ref 40–53)
HGB BLD-MCNC: 11.5 G/DL (ref 13.3–17.7)
IMM GRANULOCYTES # BLD: 0 10E9/L (ref 0–0.4)
IMM GRANULOCYTES NFR BLD: 0.4 %
LDH SERPL L TO P-CCNC: 210 U/L (ref 85–227)
LYMPHOCYTES # BLD AUTO: 2.5 10E9/L (ref 0.8–5.3)
LYMPHOCYTES NFR BLD AUTO: 47.1 %
MCH RBC QN AUTO: 31.3 PG (ref 26.5–33)
MCHC RBC AUTO-ENTMCNC: 33.5 G/DL (ref 31.5–36.5)
MCV RBC AUTO: 93 FL (ref 78–100)
MONOCYTES # BLD AUTO: 0.4 10E9/L (ref 0–1.3)
MONOCYTES NFR BLD AUTO: 8.2 %
NEUTROPHILS # BLD AUTO: 2.2 10E9/L (ref 1.6–8.3)
NEUTROPHILS NFR BLD AUTO: 41 %
NRBC # BLD AUTO: 0 10*3/UL
NRBC BLD AUTO-RTO: 0 /100
PLATELET # BLD AUTO: 251 10E9/L (ref 150–450)
POTASSIUM SERPL-SCNC: 4.1 MMOL/L (ref 3.4–5.3)
PROT SERPL-MCNC: 7.5 G/DL (ref 6.8–8.8)
PSA SERPL-MCNC: 12.2 UG/L (ref 0–4)
RBC # BLD AUTO: 3.68 10E12/L (ref 4.4–5.9)
SODIUM SERPL-SCNC: 141 MMOL/L (ref 133–144)
WBC # BLD AUTO: 5.3 10E9/L (ref 4–11)

## 2017-10-10 PROCEDURE — 85025 COMPLETE CBC W/AUTO DIFF WBC: CPT | Performed by: INTERNAL MEDICINE

## 2017-10-10 PROCEDURE — 84153 ASSAY OF PSA TOTAL: CPT | Performed by: INTERNAL MEDICINE

## 2017-10-10 PROCEDURE — 83615 LACTATE (LD) (LDH) ENZYME: CPT | Performed by: INTERNAL MEDICINE

## 2017-10-10 PROCEDURE — 80053 COMPREHEN METABOLIC PANEL: CPT | Performed by: INTERNAL MEDICINE

## 2017-10-10 NOTE — NURSING NOTE
Chief Complaint   Patient presents with     Blood Draw       Ana Paula Huston CMA October 10, 2017 10:57 AM  Labs drawn see flow sheets.

## 2017-10-24 DIAGNOSIS — C79.51 BONE METASTASIS: ICD-10-CM

## 2017-10-24 DIAGNOSIS — C61 MALIGNANT NEOPLASM OF PROSTATE (H): Primary | ICD-10-CM

## 2017-10-24 RX ORDER — PREDNISONE 5 MG/1
5 TABLET ORAL
Qty: 30 TABLET | Refills: 0 | Status: SHIPPED | OUTPATIENT
Start: 2017-10-24 | End: 2017-10-27

## 2017-10-24 RX ORDER — ABIRATERONE ACETATE 250 MG/1
1000 TABLET ORAL DAILY
Qty: 120 TABLET | Refills: 0 | Status: SHIPPED | OUTPATIENT
Start: 2017-10-24 | End: 2017-11-23

## 2017-10-26 ENCOUNTER — TELEPHONE (OUTPATIENT)
Dept: ONCOLOGY | Facility: CLINIC | Age: 52
End: 2017-10-26

## 2017-10-26 NOTE — TELEPHONE ENCOUNTER
Oral Chemotherapy Monitoring Program   Placed call to patient in follow up of Zytiga (abiraterone) therapy.   Left message requesting call back.   No drug names were mentioned.    Adriane Finch RN  [facility]-Therapy Management  [phone]

## 2017-10-27 DIAGNOSIS — C61 MALIGNANT NEOPLASM OF PROSTATE (H): ICD-10-CM

## 2017-10-27 DIAGNOSIS — C79.51 BONE METASTASIS: ICD-10-CM

## 2017-10-27 RX ORDER — PREDNISONE 5 MG/1
5-10 TABLET ORAL
Qty: 60 TABLET | Refills: 0 | Status: SHIPPED | OUTPATIENT
Start: 2017-10-27 | End: 2017-12-06

## 2017-11-06 VITALS — BODY MASS INDEX: 30.64 KG/M2 | WEIGHT: 195.7 LBS

## 2017-11-06 DIAGNOSIS — C79.51 BONE METASTASIS: ICD-10-CM

## 2017-11-06 DIAGNOSIS — C61 MALIGNANT NEOPLASM OF PROSTATE (H): ICD-10-CM

## 2017-11-06 LAB
ALBUMIN SERPL-MCNC: 3.9 G/DL (ref 3.4–5)
ALP SERPL-CCNC: 54 U/L (ref 40–150)
ALT SERPL W P-5'-P-CCNC: 23 U/L (ref 0–70)
ANION GAP SERPL CALCULATED.3IONS-SCNC: 8 MMOL/L (ref 3–14)
AST SERPL W P-5'-P-CCNC: 15 U/L (ref 0–45)
BASOPHILS # BLD AUTO: 0 10E9/L (ref 0–0.2)
BASOPHILS NFR BLD AUTO: 0.4 %
BILIRUB SERPL-MCNC: 0.8 MG/DL (ref 0.2–1.3)
BUN SERPL-MCNC: 18 MG/DL (ref 7–30)
CALCIUM SERPL-MCNC: 9.3 MG/DL (ref 8.5–10.1)
CHLORIDE SERPL-SCNC: 108 MMOL/L (ref 94–109)
CO2 SERPL-SCNC: 27 MMOL/L (ref 20–32)
CREAT SERPL-MCNC: 0.92 MG/DL (ref 0.66–1.25)
DIFFERENTIAL METHOD BLD: ABNORMAL
EOSINOPHIL # BLD AUTO: 0.1 10E9/L (ref 0–0.7)
EOSINOPHIL NFR BLD AUTO: 1.5 %
ERYTHROCYTE [DISTWIDTH] IN BLOOD BY AUTOMATED COUNT: 13 % (ref 10–15)
GFR SERPL CREATININE-BSD FRML MDRD: 87 ML/MIN/1.7M2
GLUCOSE SERPL-MCNC: 173 MG/DL (ref 70–99)
HCT VFR BLD AUTO: 35.7 % (ref 40–53)
HGB BLD-MCNC: 11.6 G/DL (ref 13.3–17.7)
IMM GRANULOCYTES # BLD: 0 10E9/L (ref 0–0.4)
IMM GRANULOCYTES NFR BLD: 0.4 %
LDH SERPL L TO P-CCNC: 213 U/L (ref 85–227)
LYMPHOCYTES # BLD AUTO: 2.7 10E9/L (ref 0.8–5.3)
LYMPHOCYTES NFR BLD AUTO: 37.6 %
MCH RBC QN AUTO: 30.9 PG (ref 26.5–33)
MCHC RBC AUTO-ENTMCNC: 32.5 G/DL (ref 31.5–36.5)
MCV RBC AUTO: 95 FL (ref 78–100)
MONOCYTES # BLD AUTO: 0.5 10E9/L (ref 0–1.3)
MONOCYTES NFR BLD AUTO: 6.3 %
NEUTROPHILS # BLD AUTO: 3.9 10E9/L (ref 1.6–8.3)
NEUTROPHILS NFR BLD AUTO: 53.8 %
NRBC # BLD AUTO: 0 10*3/UL
NRBC BLD AUTO-RTO: 0 /100
PLATELET # BLD AUTO: 263 10E9/L (ref 150–450)
POTASSIUM SERPL-SCNC: 4.3 MMOL/L (ref 3.4–5.3)
PROT SERPL-MCNC: 7.3 G/DL (ref 6.8–8.8)
PSA SERPL-MCNC: 9.57 UG/L (ref 0–4)
RBC # BLD AUTO: 3.75 10E12/L (ref 4.4–5.9)
SODIUM SERPL-SCNC: 143 MMOL/L (ref 133–144)
WBC # BLD AUTO: 7.2 10E9/L (ref 4–11)

## 2017-11-06 PROCEDURE — 85025 COMPLETE CBC W/AUTO DIFF WBC: CPT | Performed by: INTERNAL MEDICINE

## 2017-11-06 PROCEDURE — 80053 COMPREHEN METABOLIC PANEL: CPT | Performed by: INTERNAL MEDICINE

## 2017-11-06 PROCEDURE — 84153 ASSAY OF PSA TOTAL: CPT | Performed by: INTERNAL MEDICINE

## 2017-11-06 PROCEDURE — 83615 LACTATE (LD) (LDH) ENZYME: CPT | Performed by: INTERNAL MEDICINE

## 2017-11-09 ENCOUNTER — TELEPHONE (OUTPATIENT)
Dept: ONCOLOGY | Facility: CLINIC | Age: 52
End: 2017-11-09

## 2017-11-09 NOTE — TELEPHONE ENCOUNTER
Oral Chemotherapy Monitoring Program    Patient currently on Zytiga therapy.    Called to discuss lab results from 11/6/17.    Left message for patient to follow up in regards to the results. No patient or drug names were mentioned.     No concerning abnormalities.    Yusra Rios, [facility]  [phone]

## 2017-11-20 ENCOUNTER — DOCUMENTATION ONLY (OUTPATIENT)
Dept: ONCOLOGY | Facility: CLINIC | Age: 52
End: 2017-11-20

## 2017-11-20 NOTE — PROGRESS NOTES
Form Request Documentation    Date Received in Clinic:  11/20/17  Name/Type of Form: FMLA  Questions that need to be addressed:   FMLA for family member  Date Completed: 11/20/2017  Copy Mailed to patient: Yes on 11/20/2017  Disposition of Form: Fax to [facility] at [phone] on 11/20/17

## 2017-11-28 ENCOUNTER — TELEPHONE (OUTPATIENT)
Dept: ONCOLOGY | Facility: CLINIC | Age: 52
End: 2017-11-28

## 2017-11-28 NOTE — TELEPHONE ENCOUNTER
Oral Chemotherapy Monitoring Program   Placed call to patient in follow up of Zytiga (abiraterone) therapy.   Left message requesting call back.   No drug names were mentioned.    Adriane Finch RN  [facility]-Therapy Management  [phone]

## 2017-11-29 DIAGNOSIS — C61 MALIGNANT NEOPLASM OF PROSTATE (H): Primary | ICD-10-CM

## 2017-11-29 DIAGNOSIS — C79.51 BONE METASTASIS: ICD-10-CM

## 2017-11-29 RX ORDER — ABIRATERONE ACETATE 250 MG/1
1000 TABLET ORAL DAILY
Qty: 120 TABLET | Refills: 0 | Status: SHIPPED | OUTPATIENT
Start: 2017-11-29 | End: 2017-12-29

## 2017-11-29 RX ORDER — PREDNISONE 5 MG/1
5 TABLET ORAL
Qty: 30 TABLET | Refills: 0 | Status: SHIPPED | OUTPATIENT
Start: 2017-11-29 | End: 2018-03-09

## 2017-12-06 ENCOUNTER — APPOINTMENT (OUTPATIENT)
Dept: LAB | Facility: CLINIC | Age: 52
End: 2017-12-06
Attending: INTERNAL MEDICINE
Payer: COMMERCIAL

## 2017-12-06 ENCOUNTER — ONCOLOGY VISIT (OUTPATIENT)
Dept: ONCOLOGY | Facility: CLINIC | Age: 52
End: 2017-12-06
Attending: INTERNAL MEDICINE
Payer: COMMERCIAL

## 2017-12-06 VITALS
SYSTOLIC BLOOD PRESSURE: 149 MMHG | RESPIRATION RATE: 17 BRPM | HEART RATE: 70 BPM | HEIGHT: 67 IN | TEMPERATURE: 98.2 F | OXYGEN SATURATION: 99 % | DIASTOLIC BLOOD PRESSURE: 97 MMHG

## 2017-12-06 DIAGNOSIS — C61 MALIGNANT NEOPLASM OF PROSTATE (H): Primary | ICD-10-CM

## 2017-12-06 DIAGNOSIS — G62.9 NEUROPATHY: ICD-10-CM

## 2017-12-06 DIAGNOSIS — C79.51 BONE METASTASIS: ICD-10-CM

## 2017-12-06 DIAGNOSIS — C61 MALIGNANT NEOPLASM OF PROSTATE (H): ICD-10-CM

## 2017-12-06 DIAGNOSIS — R61 NIGHT SWEATS: ICD-10-CM

## 2017-12-06 LAB
ALBUMIN SERPL-MCNC: 4.1 G/DL (ref 3.4–5)
ALP SERPL-CCNC: 61 U/L (ref 40–150)
ALT SERPL W P-5'-P-CCNC: 20 U/L (ref 0–70)
ANION GAP SERPL CALCULATED.3IONS-SCNC: 7 MMOL/L (ref 3–14)
AST SERPL W P-5'-P-CCNC: 14 U/L (ref 0–45)
BASOPHILS # BLD AUTO: 0 10E9/L (ref 0–0.2)
BASOPHILS NFR BLD AUTO: 0.4 %
BILIRUB SERPL-MCNC: 0.7 MG/DL (ref 0.2–1.3)
BUN SERPL-MCNC: 19 MG/DL (ref 7–30)
CALCIUM SERPL-MCNC: 9.6 MG/DL (ref 8.5–10.1)
CHLORIDE SERPL-SCNC: 106 MMOL/L (ref 94–109)
CO2 SERPL-SCNC: 27 MMOL/L (ref 20–32)
CREAT SERPL-MCNC: 0.92 MG/DL (ref 0.66–1.25)
DIFFERENTIAL METHOD BLD: ABNORMAL
EOSINOPHIL # BLD AUTO: 0.1 10E9/L (ref 0–0.7)
EOSINOPHIL NFR BLD AUTO: 0.6 %
ERYTHROCYTE [DISTWIDTH] IN BLOOD BY AUTOMATED COUNT: 12.8 % (ref 10–15)
GFR SERPL CREATININE-BSD FRML MDRD: 87 ML/MIN/1.7M2
GLUCOSE SERPL-MCNC: 140 MG/DL (ref 70–99)
HCT VFR BLD AUTO: 38.7 % (ref 40–53)
HGB BLD-MCNC: 12.6 G/DL (ref 13.3–17.7)
IMM GRANULOCYTES # BLD: 0.1 10E9/L (ref 0–0.4)
IMM GRANULOCYTES NFR BLD: 0.6 %
LDH SERPL L TO P-CCNC: 201 U/L (ref 85–227)
LYMPHOCYTES # BLD AUTO: 2.1 10E9/L (ref 0.8–5.3)
LYMPHOCYTES NFR BLD AUTO: 22.6 %
MCH RBC QN AUTO: 31 PG (ref 26.5–33)
MCHC RBC AUTO-ENTMCNC: 32.6 G/DL (ref 31.5–36.5)
MCV RBC AUTO: 95 FL (ref 78–100)
MONOCYTES # BLD AUTO: 0.4 10E9/L (ref 0–1.3)
MONOCYTES NFR BLD AUTO: 4 %
NEUTROPHILS # BLD AUTO: 6.8 10E9/L (ref 1.6–8.3)
NEUTROPHILS NFR BLD AUTO: 71.8 %
NRBC # BLD AUTO: 0 10*3/UL
NRBC BLD AUTO-RTO: 0 /100
PLATELET # BLD AUTO: 294 10E9/L (ref 150–450)
POTASSIUM SERPL-SCNC: 4.7 MMOL/L (ref 3.4–5.3)
PROT SERPL-MCNC: 7.8 G/DL (ref 6.8–8.8)
PSA SERPL-MCNC: 9.42 UG/L (ref 0–4)
RBC # BLD AUTO: 4.06 10E12/L (ref 4.4–5.9)
SODIUM SERPL-SCNC: 141 MMOL/L (ref 133–144)
WBC # BLD AUTO: 9.5 10E9/L (ref 4–11)

## 2017-12-06 PROCEDURE — 25000128 H RX IP 250 OP 636: Mod: ZF | Performed by: INTERNAL MEDICINE

## 2017-12-06 PROCEDURE — 99212 OFFICE O/P EST SF 10 MIN: CPT | Mod: ZF

## 2017-12-06 PROCEDURE — 84153 ASSAY OF PSA TOTAL: CPT

## 2017-12-06 PROCEDURE — 90686 IIV4 VACC NO PRSV 0.5 ML IM: CPT | Mod: ZF | Performed by: INTERNAL MEDICINE

## 2017-12-06 PROCEDURE — 80053 COMPREHEN METABOLIC PANEL: CPT

## 2017-12-06 PROCEDURE — 96372 THER/PROPH/DIAG INJ SC/IM: CPT | Mod: ZF

## 2017-12-06 PROCEDURE — G0008 ADMIN INFLUENZA VIRUS VAC: HCPCS

## 2017-12-06 PROCEDURE — 85025 COMPLETE CBC W/AUTO DIFF WBC: CPT

## 2017-12-06 PROCEDURE — 99214 OFFICE O/P EST MOD 30 MIN: CPT | Mod: ZP | Performed by: INTERNAL MEDICINE

## 2017-12-06 PROCEDURE — 83615 LACTATE (LD) (LDH) ENZYME: CPT

## 2017-12-06 RX ORDER — GABAPENTIN 100 MG/1
CAPSULE ORAL
Qty: 90 CAPSULE | Refills: 6 | Status: SHIPPED | OUTPATIENT
Start: 2017-12-06 | End: 2018-03-07

## 2017-12-06 RX ORDER — VENLAFAXINE HYDROCHLORIDE 37.5 MG/1
CAPSULE, EXTENDED RELEASE ORAL
Qty: 90 CAPSULE | Refills: 3 | Status: SHIPPED | OUTPATIENT
Start: 2017-12-06 | End: 2018-03-07

## 2017-12-06 RX ORDER — HYDROMORPHONE HYDROCHLORIDE 2 MG/1
2 TABLET ORAL EVERY 4 HOURS PRN
Qty: 45 TABLET | Refills: 0 | Status: SHIPPED | OUTPATIENT
Start: 2017-12-06 | End: 2018-03-07

## 2017-12-06 RX ADMIN — DENOSUMAB 120 MG: 120 INJECTION SUBCUTANEOUS at 17:47

## 2017-12-06 RX ADMIN — INFLUENZA A VIRUS A/MICHIGAN/45/2015 X-275 (H1N1) ANTIGEN (FORMALDEHYDE INACTIVATED), INFLUENZA A VIRUS A/HONG KONG/4801/2014 X-263B (H3N2) ANTIGEN (FORMALDEHYDE INACTIVATED), INFLUENZA B VIRUS B/PHUKET/3073/2013 ANTIGEN (FORMALDEHYDE INACTIVATED), AND INFLUENZA B VIRUS B/BRISBANE/60/2008 ANTIGEN (FORMALDEHYDE INACTIVATED) 0.5 ML: 15; 15; 15; 15 INJECTION, SUSPENSION INTRAMUSCULAR at 17:46

## 2017-12-06 ASSESSMENT — PAIN SCALES - GENERAL: PAINLEVEL: NO PAIN (0)

## 2017-12-06 NOTE — PROGRESS NOTES
[facility]  FOLLOW-UP VISIT NOTE  Date of visit: Dec 6, 2017     Cancer History:   Metastatic Prostate CA treated    - s/p 6 cycles of taxotere 75mg/m2   - s/p orchiectomy on 3/18/2016   - s/p Provenge 5/13, 5/27, 6/10   - s/p Casodex 50 mg daily March/April   - Currently taking Zytiga    HPI: Albert is a 51 year old gentleman with metastatic prostate cancer.  Albert felt a cramp in his gluteus muscle and could barely walk after doing some remodeling at home and unloading heavy truck at work. He tried flexeril and prednisone initially but eventually presented to ED on 10/5/15. He feels that initially he was nearly labeled as drug seeker since he was in lot of discomfort and flexeril was not working. But during course of ED visits labs were drawn and he had marked elevation in Alk Phosphatase (over 1000). CT did suggest some ileus with some sclerotic bone lesions. He was admitted to the hospital. His PSA was checked and was markedly elevated at 5760 (10/6/15), repeat value 5563.     Urology and Medical Oncology consults were placed. He was unhappy with the progress in hospital and felt no better and left the following day on 10/6. He did follow up with Dr. Freire and had transrectal US guided biopsy of prostate on 10/8/15. They have the results through my chart which confirms prostate adenocarcinoma - enrico 4+3 involving majority (60-90%) portion of every single core.  He started on taxotere on 10/23 and completed 6 cycles of therapy in 2/2016.  He then underwent orchiectomy on 3/18/2016.       Throughout spring of 2016 Albert's PSA started to progress and after three elevations there was concern about him being hormone refractory.  He was started on Provenge and enrolled in the trial including Indoximod. PSA trending up and started on Casodex mid March with progression. Switched to Zytiga 5/3/17.    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     He is accompanied by his wife at this visit. He has been doing well and has no new complains attributable to his disease. He has been remodeling his bedroom as his daughters have left. He twisted his wrist while driving in a screw that got stuck.     He missed his prednisone for a couple of days and had terrible joint ache all over the body. He has not been missing any dose of his abiraterone or prednisone since then.     He has fair appetite and energy. He has been very active. He had a good summer. His daughter fell sick last wick and is recovering from pneumonia after a hiking trip to [place].       MEDS:   Current Outpatient Prescriptions   Medication Sig     gabapentin (NEURONTIN) 100 MG capsule Take 1 capsule (100 mg) by mouth 2-3 times daily for neuropathy     HYDROmorphone (DILAUDID) 2 MG tablet Take 1 tablet (2 mg) by mouth every 4 hours as needed for moderate to severe pain     venlafaxine (EFFEXOR-XR) 37.5 MG 24 hr capsule TAKE 1 CAPSULE(37.5 MG) BY MOUTH DAILY     predniSONE (DELTASONE) 5 MG tablet Take 1 tablet (5 mg) by mouth daily (with breakfast)     abiraterone (ZYTIGA) 250 MG tablet Take 4 tablets (1,000 mg) by mouth daily for 30 doses Take on empty stomach.     valACYclovir (VALTREX) 1000 mg tablet Take 1 tablet (1,000 mg) by mouth 2 times daily     levothyroxine (SYNTHROID/LEVOTHROID) 75 MCG tablet Take 1 tablet (75 mcg) by mouth daily     IBUPROFEN PO Take by mouth as needed for moderate pain Reported on 5/18/2017     Acetaminophen (TYLENOL PO) Take 325 mg by mouth every 8 hours as needed for mild pain or fever Reported on 5/18/2017     EPINEPHrine (EPIPEN 2-CHARITY) 0.3 MG/0.3ML injection 2-pack Inject 0.3 mLs (0.3 mg) into the muscle once as needed for anaphylaxis (Patient not taking: Reported on 9/6/2017)     No current facility-administered medications for this visit.         EXAM:  ECOG 1  Wt Readings from Last 4 Encounters:   11/06/17 88.8 kg (195 lb 11.2 oz)   09/06/17 89.9 kg (198  lb 1.6 oz)   06/07/17 89.8 kg (198 lb)   05/18/17 90.1 kg (198 lb 11.2 oz)   There were no vitals taken for this visit.  Patient alert and oriented x3.   PERRLA. EOMI. No scleral icterus noted. OP without thrush/sores.  Neck exam: Palpable 0.5 cm anterior cervical node on the left, rubbery, mobile, non-tender.   Heart: RRR no murmurs noted.   Lungs: clear to auscultation bilaterally.  No crackles or wheezing.   Abd: Normal bowel sounds.  No tenderness to palpation. No suprapubic tenderness.  No hepatomegaly.   Extremities: No lower extremity edema.   Back: No CVA tenderness.   Neuro: grossly intact.   Mood and affect is stable.     Labs/Imaging:    Recent Labs   Lab Test  12/06/17 1813  11/06/17   1332  10/10/17   1050  09/06/17   1518  07/10/17   1519   NA  141  143  141  141  140   POTASSIUM  4.7  4.3  4.1  4.1  3.9   CHLORIDE  106  108  108  107  106   CO2  27  27  27  28  28   ANIONGAP  7  8  6  6  6   BUN  19  18  19  16  14   CR  0.92  0.92  0.86  0.78  0.92   GLC  140*  173*  121*  126*  122*   WILL  9.6  9.3  8.8  9.0  8.9     Recent Labs   Lab Test  12/21/16   1643  11/22/16   1221  10/26/16   1150  09/28/16   1137  08/17/16   1355   05/03/16   0955   MAG  2.2  2.4*  2.2  2.3  2.0   < >  2.0   PHOS   --    --    --    --    --    --   4.1    < > = values in this interval not displayed.     Recent Labs   Lab Test  12/06/17   1813  11/06/17   1332  10/10/17   1050  09/06/17   1518  07/10/17   1519   WBC  9.5  7.2  5.3  5.3  6.0   HGB  12.6*  11.6*  11.5*  11.6*  11.6*   PLT  294  263  251  244  238   MCV  95  95  93  94  93   NEUTROPHIL  71.8  53.8  41.0  48.4  51.0     Recent Labs   Lab Test  12/06/17   1813  11/06/17   1332  10/10/17   1050   BILITOTAL  0.7  0.8  1.0   ALKPHOS  61  54  53   ALT  20  23  21   AST  14  15  14   ALBUMIN  4.1  3.9  3.9   LDH  201  213  210     TSH   Date Value Ref Range Status   03/01/2017 5.28 (H) 0.40 - 4.00 mU/L Final   11/22/2016 4.62 (H) 0.40 - 4.00 mU/L Final    10/26/2016 5.98 (H) 0.40 - 4.00 mU/L Final     Recent Labs   Lab Test  12/06/17   1813  11/06/17   1332  10/10/17   1050  09/06/17   1518  07/10/17   1519   12/21/16   1643  11/22/16   1222   05/03/16   0955   PSA  9.42*  9.57*  12.20*  14.30*  42.80*   < >  60.68*   --    < >  67.04*   TESTOSTTOTAL   --    --    --    --    --    --  <2  2*   --  <2    < > = values in this interval not displayed.          ASSESSMENT/PLAN:  1. Metastatic prostate cancer with bony metastasis:   Currently taking abiraterone 1000 mg po q day and prednisone 5 mg po qday  Denosumab every 12 weeks   Left wrist sprain vs scaphoid fracture   ECOG PS 0    He has been doing very well on abiraterone. He has no new complains. His PSA has been responding very nicely. He and his wife were both quite excited with the response. With the nice decline so far, I would wish and hope that we would have disease control for over a year and possibly several years with abiraterone. I would repeat a restaging scans to establish a new baseline prior to next visit.     He had significant discomfort after witholding prednisone      I reviewed that we should continue with denosumab.     He had a twisting injury to his wrist and I did get a Xray. Radiology has suggested to repeat Xray in 2-3 week to rule out navicular fracture. I called Albert and suggested that he follow up. He was not excited and would get it if he continues to have pain.     Over 25 min of direct face to face time spent with patient with more than 50% time spent in counseling and coordinating care.

## 2017-12-06 NOTE — LETTER
12/6/2017       RE: Albert Amaya  [address] [facility] [place] [id number]     Dear Colleague,    Thank you for referring your patient, Albert Amaya, to the [facility]. Please see a copy of my visit note below.    [facility]  FOLLOW-UP VISIT NOTE  Date of visit: Dec 6, 2017     Cancer History:   Metastatic Prostate CA treated    - s/p 6 cycles of taxotere 75mg/m2   - s/p orchiectomy on 3/18/2016   - s/p Provenge 5/13, 5/27, 6/10   - s/p Casodex 50 mg daily March/April   - Currently taking Zytiga    HPI: Albert is a 51 year old gentleman with metastatic prostate cancer.  Albert felt a cramp in his gluteus muscle and could barely walk after doing some remodeling at home and unloading heavy truck at work. He tried flexeril and prednisone initially but eventually presented to ED on 10/5/15. He feels that initially he was nearly labeled as drug seeker since he was in lot of discomfort and flexeril was not working. But during course of ED visits labs were drawn and he had marked elevation in Alk Phosphatase (over 1000). CT did suggest some ileus with some sclerotic bone lesions. He was admitted to the hospital. His PSA was checked and was markedly elevated at 5760 (10/6/15), repeat value 5563.     Urology and Medical Oncology consults were placed. He was unhappy with the progress in hospital and felt no better and left the following day on 10/6. He did follow up with Dr. Freire and had transrectal US guided biopsy of prostate on 10/8/15. They have the results through my chart which confirms prostate adenocarcinoma - enrico 4+3 involving majority (60-90%) portion of every single core.  He started on taxotere on 10/23 and completed 6 cycles of therapy in 2/2016.  He then underwent orchiectomy on 3/18/2016.       Throughout spring of 2016 Albert's PSA started to progress and after three elevations there was concern about him being hormone refractory.  He was started  on Provenge and enrolled in the trial including Indoximod. PSA trending up and started on Casodex mid March with progression. Switched to Zytiga 5/3/17.    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     He is accompanied by his wife at this visit. He has been doing well and has no new complains attributable to his disease. He has been remodeling his bedroom as his daughters have left. He twisted his wrist while driving in a screw that got stuck.     He missed his prednisone for a couple of days and had terrible joint ache all over the body. He has not been missing any dose of his abiraterone or prednisone since then.     He has fair appetite and energy. He has been very active. He had a good summer. His daughter fell sick last wick and is recovering from pneumonia after a hiking trip to [place].       MEDS:   Current Outpatient Prescriptions   Medication Sig     gabapentin (NEURONTIN) 100 MG capsule Take 1 capsule (100 mg) by mouth 2-3 times daily for neuropathy     HYDROmorphone (DILAUDID) 2 MG tablet Take 1 tablet (2 mg) by mouth every 4 hours as needed for moderate to severe pain     venlafaxine (EFFEXOR-XR) 37.5 MG 24 hr capsule TAKE 1 CAPSULE(37.5 MG) BY MOUTH DAILY     predniSONE (DELTASONE) 5 MG tablet Take 1 tablet (5 mg) by mouth daily (with breakfast)     abiraterone (ZYTIGA) 250 MG tablet Take 4 tablets (1,000 mg) by mouth daily for 30 doses Take on empty stomach.     valACYclovir (VALTREX) 1000 mg tablet Take 1 tablet (1,000 mg) by mouth 2 times daily     levothyroxine (SYNTHROID/LEVOTHROID) 75 MCG tablet Take 1 tablet (75 mcg) by mouth daily     IBUPROFEN PO Take by mouth as needed for moderate pain Reported on 5/18/2017     Acetaminophen (TYLENOL PO) Take 325 mg by mouth every 8 hours as needed for mild pain or fever Reported on 5/18/2017     EPINEPHrine (EPIPEN 2-CHARITY) 0.3 MG/0.3ML injection 2-pack Inject 0.3 mLs (0.3 mg) into the muscle once as needed for  anaphylaxis (Patient not taking: Reported on 9/6/2017)     No current facility-administered medications for this visit.         EXAM:  ECOG 1  Wt Readings from Last 4 Encounters:   11/06/17 88.8 kg (195 lb 11.2 oz)   09/06/17 89.9 kg (198 lb 1.6 oz)   06/07/17 89.8 kg (198 lb)   05/18/17 90.1 kg (198 lb 11.2 oz)   There were no vitals taken for this visit.  Patient alert and oriented x3.   PERRLA. EOMI. No scleral icterus noted. OP without thrush/sores.  Neck exam: Palpable 0.5 cm anterior cervical node on the left, rubbery, mobile, non-tender.   Heart: RRR no murmurs noted.   Lungs: clear to auscultation bilaterally.  No crackles or wheezing.   Abd: Normal bowel sounds.  No tenderness to palpation. No suprapubic tenderness.  No hepatomegaly.   Extremities: No lower extremity edema.   Back: No CVA tenderness.   Neuro: grossly intact.   Mood and affect is stable.     Labs/Imaging:    Recent Labs   Lab Test  12/06/17   1813  11/06/17   1332  10/10/17   1050  09/06/17   1518  07/10/17   1519   NA  141  143  141  141  140   POTASSIUM  4.7  4.3  4.1  4.1  3.9   CHLORIDE  106  108  108  107  106   CO2  27  27  27  28  28   ANIONGAP  7  8  6  6  6   BUN  19  18  19  16  14   CR  0.92  0.92  0.86  0.78  0.92   GLC  140*  173*  121*  126*  122*   WILL  9.6  9.3  8.8  9.0  8.9     Recent Labs   Lab Test  12/21/16   1643  11/22/16   1221  10/26/16   1150  09/28/16   1137  08/17/16   1355   05/03/16   0955   MAG  2.2  2.4*  2.2  2.3  2.0   < >  2.0   PHOS   --    --    --    --    --    --   4.1    < > = values in this interval not displayed.     Recent Labs   Lab Test  12/06/17   1813  11/06/17   1332  10/10/17   1050  09/06/17   1518  07/10/17   1519   WBC  9.5  7.2  5.3  5.3  6.0   HGB  12.6*  11.6*  11.5*  11.6*  11.6*   PLT  294  263  251  244  238   MCV  95  95  93  94  93   NEUTROPHIL  71.8  53.8  41.0  48.4  51.0     Recent Labs   Lab Test  12/06/17   1813  11/06/17   1332  10/10/17   1050   BILITOTAL  0.7  0.8  1.0    ALKPHOS  61  54  53   ALT  20  23  21   AST  14  15  14   ALBUMIN  4.1  3.9  3.9   LDH  201  213  210     TSH   Date Value Ref Range Status   03/01/2017 5.28 (H) 0.40 - 4.00 mU/L Final   11/22/2016 4.62 (H) 0.40 - 4.00 mU/L Final   10/26/2016 5.98 (H) 0.40 - 4.00 mU/L Final     Recent Labs   Lab Test  12/06/17   1813  11/06/17   1332  10/10/17   1050  09/06/17   1518  07/10/17   1519   12/21/16   1643  11/22/16   1222   05/03/16   0955   PSA  9.42*  9.57*  12.20*  14.30*  42.80*   < >  60.68*   --    < >  67.04*   TESTOSTTOTAL   --    --    --    --    --    --  <2  2*   --  <2    < > = values in this interval not displayed.          ASSESSMENT/PLAN:  1. Metastatic prostate cancer with bony metastasis:   Currently taking abiraterone 1000 mg po q day and prednisone 5 mg po qday  Denosumab every 12 weeks   Left wrist sprain vs scaphoid fracture   ECOG PS 0    He has been doing very well on abiraterone. He has no new complains. His PSA has been responding very nicely. He and his wife were both quite excited with the response. With the nice decline so far, I would wish and hope that we would have disease control for over a year and possibly several years with abiraterone. I would repeat a restaging scans to establish a new baseline prior to next visit.     He had significant discomfort after witholding prednisone      I reviewed that we should continue with denosumab.     He had a twisting injury to his wrist and I did get a Xray. Radiology has suggested to repeat Xray in 2-3 week to rule out navicular fracture. I called Albert and suggested that he follow up. He was not excited and would get it if he continues to have pain.     Over 25 min of direct face to face time spent with patient with more than 50% time spent in counseling and coordinating care.        Again, thank you for allowing me to participate in the care of your patient.      Sincerely,    Tyrel Valladares MD

## 2017-12-06 NOTE — NURSING NOTE
1 Xgeva injection given in LEFT arm subcutaneously. Patient tolerated injection.                Albert Amaya      1.  Has the patient received the information for the influenza vaccine? YES    2.  Does the patient have any of the following contraindications?     Allergy to eggs? No     Allergic reaction to previous influenza vaccines? No     Any other problems to previous influenza vaccines? No     Paralyzed by Guillain-Scandia syndrome? No     Currently pregnant? NO     Current moderate or severe illness? No     Allergy to contact lens solution? No    3.  The vaccine has been administered in the usual fashion and the patient was instructed to wait 20 minutes before leaving the building in the event of an allergic reaction: YES    Vaccination given by SUHA BURKETT CMA.  Recorded by Suha Burkett CMA

## 2017-12-06 NOTE — MR AVS SNAPSHOT
After Visit Summary   12/6/2017    Albert Amaya    MRN: [id number]           Patient Information     Date Of Birth          1965        Visit Information        Provider Department      12/6/2017 4:15 PM Tyrel Valladares MD [facility]        Today's Diagnoses     Malignant neoplasm of prostate (H)    -  1    Bone metastasis (H)        Neuropathy        Night sweats           Follow-ups after your visit        Your next 10 appointments already scheduled     Dec 06, 2017  6:00 PM CST   LAB with [email or web address] LAB   [facility] ([facility])    [address] [place] [id number] [phone]           Please do not eat 10-12 hours before your appointment if you are coming in fasting for labs on lipids, cholesterol, or glucose (sugar). This does not apply to pregnant women. Water, hot tea and black coffee (with nothing added) are okay. Do not drink other fluids, diet soda or chew gum.            Dec 06, 2017  6:15 PM CST   (Arrive by 6:00 PM)   XR WRIST LEFT G/E 3 VIEWS with UCXR1   [facility] [place] [facility] ([facility])    [address] [place] [id number] [phone]           Please bring a list of your current medicines to your exam. (Include vitamins, minerals and over-thecounter medicines.) Leave your valuables at home.  Tell your doctor if there is a chance you may be pregnant.  You do not need to do anything special for this exam.            Jan 08, 2018  9:45 AM CST   M[facility] Lab Draw with UC MASONIC LAB DRAW   [facility] Draw ([facility])    [address] [id number] [phone]            Feb 05, 2018  9:45 AM CST   M[facility]ic Lab Draw with UC MASONIC LAB DRAW   [facility] Lab Draw ([facility])    [address] [place] [id number] [phone]             Mar 07, 2018 10:00 AM CST   (Arrive by 9:45 AM)   NM INJECTION with [id number]   [facility] [place], Nuclear Medicine ([facility])    [address] [facility] [id number] [phone]            Mar 07, 2018 12:40 PM CST   (Arrive by 12:25 PM)   CT CHEST/ABDOMEN/PELVIS W CONTRAST with UUCT1   [facility] [place], [place] ([facility])    [address] [facility] [address] [phone]           Please bring any scans or X-rays taken at other hospitals, if similar tests were done. Also bring a list of your medicines, including vitamins, minerals and over-the-counter drugs. It is safest to leave personal items at home.  Be sure to tell your doctor:   If you have any allergies.   If there s any chance you are pregnant.   If you are breastfeeding.   If you have any special needs.  You may have contrast for this exam. To prepare:   Do not eat or drink for 2 hours before your exam. If you need to take medicine, you may take it with small sips of water. (We may ask you to take liquid medicine as well.)   The day before your exam, drink extra fluids at least six 8-ounce glasses (unless your doctor tells you to restrict your fluids).  Patients over 70 or patients with diabetes or kidney problems:   If you haven t had a blood test (creatinine test) within the last 30 days, go to your clinic or Diagnostic Imaging Department for this test.  If you have diabetes:   If your kidney function is normal, continue taking your metformin (Avandamet, Glucophage, Glucovance, Metaglip) on the day of your exam.   If your kidney function is abnormal, wait 48 hours before restarting this medicine.  You will have oral contrast for this exam:   You will drink the contrast at home. Get this from your clinic or Diagnostic Imaging Department. Please follow the directions given.  Please wear loose clothing, such as a sweat suit or jogging  clothes. Avoid snaps, zippers and other metal. We may ask you to undress and put on a hospital gown.  If you have any questions, please call the Imaging Department where you will have your exam.            Mar 07, 2018  1:00 PM CST   (Arrive by 12:45 PM)   NM BONE SCAN WHOLE BODY with UUNM3   [facility] [place], Nuclear Medicine ([facility])    [address] [facility] [id number] [phone]           Please bring a list of your medicines to the exam. (Include vitamins, minerals and over-the-counter drugs.) You should wear comfortable clothes. Leave your valuables at home. Please bring related prior results and films. Tell your doctor:   If you are breastfeeding or may be pregnant.   If you have had a barium test within the past few days. Barium may change the results of certain exams.   If you think you may need sedation (medicine to help you relax).  You may eat and drink as normal.  Drink plenty of fluids and empty bladder frequently between injection and scans.            Mar 07, 2018  3:45 PM CST   M[facility]ic Lab Draw with  Performance Indicator LAB DRAW   [facility] Lab Draw ([facility])    [address] [place] [id number] [phone]            Mar 07, 2018  4:15 PM CST   (Arrive by 4:00 PM)   Return Visit with Tyrel Valladares MD   [facility] ([facility])    [address] [place] [id number] [phone]              Future tests that were ordered for you today     Open Future Orders        Priority Expected Expires Ordered    CT Chest/Abdomen/Pelvis w Contrast Routine 2/28/2018 12/6/2018 12/6/2017    NM Bone Scan Whole Body Routine 2/28/2018 12/6/2018 12/6/2017    XR Wrist Left G/E 3 Views Routine 12/6/2017 12/6/2018 12/6/2017            Who to contact     If you have questions or need follow up information about today's clinic visit  "or your schedule please contact [facility] directly at [phone].  Normal or non-critical lab and imaging results will be communicated to you by Social Media Simplifiedhart, letter or phone within 4 business days after the clinic has received the results. If you do not hear from us within 7 days, please contact the clinic through ServiceMaxt or phone. If you have a critical or abnormal lab result, we will notify you by phone as soon as possible.  Submit refill requests through Aptidata or call your pharmacy and they will forward the refill request to us. Please allow 3 business days for your refill to be completed.          Additional Information About Your Visit        Aptidata Information     Aptidata gives you secure access to your electronic health record. If you see a primary care provider, you can also send messages to your care team and make appointments. If you have questions, please call your primary care clinic.  If you do not have a primary care provider, please call [phone] and they will assist you.        Care EveryWhere ID     This is your Care EveryWhere ID. This could be used by other organizations to access your [place] medical records  [id number]        Your Vitals Were     Pulse Temperature Respirations Height Pulse Oximetry       70 98.2  F (36.8  C) (Oral) 17 1.702 m (5' 7.01\") 99%        Blood Pressure from Last 3 Encounters:   12/06/17 (!) 149/97   09/06/17 135/90   06/07/17 (!) 146/103    Weight from Last 3 Encounters:   11/06/17 88.8 kg (195 lb 11.2 oz)   09/06/17 89.9 kg (198 lb 1.6 oz)   06/07/17 89.8 kg (198 lb)                 Today's Medication Changes          These changes are accurate as of: 12/6/17  5:49 PM.  If you have any questions, ask your nurse or doctor.               Start taking these medicines.        Dose/Directions    gabapentin 100 MG capsule   Commonly known as:  NEURONTIN   Used for:  Neuropathy, Malignant neoplasm of prostate (H), Bone metastasis (H), Night " sweats   Started by:  Tyrel Valladares MD        Take 1 capsule (100 mg) by mouth 2-3 times daily for neuropathy   Quantity:  90 capsule   Refills:  6       HYDROmorphone 2 MG tablet   Commonly known as:  DILAUDID   Used for:  Malignant neoplasm of prostate (H), Bone metastasis (H), Neuropathy, Night sweats   Started by:  Tyrel Valladares MD        Dose:  2 mg   Take 1 tablet (2 mg) by mouth every 4 hours as needed for moderate to severe pain   Quantity:  45 tablet   Refills:  0         These medicines have changed or have updated prescriptions.        Dose/Directions    predniSONE 5 MG tablet   Commonly known as:  DELTASONE   This may have changed:  Another medication with the same name was removed. Continue taking this medication, and follow the directions you see here.   Used for:  Malignant neoplasm of prostate (H), Bone metastasis (H)   Changed by:  Noy Hdez, [facility]        Dose:  5 mg   Take 1 tablet (5 mg) by mouth daily (with breakfast)   Quantity:  30 tablet   Refills:  0            Where to get your medicines      These medications were sent to [address] [address]    Hours:  TRANSPLANT PHONE NUMBER [phone] Phone:  [phone]     gabapentin 100 MG capsule    venlafaxine 37.5 MG 24 hr capsule         Some of these will need a paper prescription and others can be bought over the counter.  Ask your nurse if you have questions.     Bring a paper prescription for each of these medications     HYDROmorphone 2 MG tablet                Primary Care Provider Office Phone # Fax #    [facility] [phone]       [address] [facility] [address]        Equal Access to Services     GABRIEL MENDOZA : Bennett Moseley, john schafer, bob torres. So [facility] [phone].    ATENCIÓN: Si  patience kirk, tiene a rivera disposición servicios gratuitos de asistencia lingüística. Matt lópez [phone].    We comply with applicable federal civil rights laws and [place] laws. We do not discriminate on the basis of race, color, national origin, age, disability, sex, sexual orientation, or gender identity.            Thank you!     Thank you for choosing [facility]  for your care. Our goal is always to provide you with excellent care. Hearing back from our patients is one way we can continue to improve our services. Please take a few minutes to complete the written survey that you may receive in the mail after your visit with us. Thank you!             Your Updated Medication List - Protect others around you: Learn how to safely use, store and throw away your medicines at www.disposemymeds.org.          This list is accurate as of: 12/6/17  5:49 PM.  Always use your most recent med list.                   Brand Name Dispense Instructions for use Diagnosis    abiraterone 250 MG tablet    ZYTIGA    120 tablet    Take 4 tablets (1,000 mg) by mouth daily for 30 doses Take on empty stomach.    Malignant neoplasm of prostate (H), Bone metastasis (H)       EPINEPHrine 0.3 MG/0.3ML injection 2-pack    EPIPEN 2-CHARITY    0.6 mL    Inject 0.3 mLs (0.3 mg) into the muscle once as needed for anaphylaxis    Malignant neoplasm of prostate (H), Bone metastasis (H)       gabapentin 100 MG capsule    NEURONTIN    90 capsule    Take 1 capsule (100 mg) by mouth 2-3 times daily for neuropathy    Neuropathy, Malignant neoplasm of prostate (H), Bone metastasis (H), Night sweats       HYDROmorphone 2 MG tablet    DILAUDID    45 tablet    Take 1 tablet (2 mg) by mouth every 4 hours as needed for moderate to severe pain    Malignant neoplasm of prostate (H), Bone metastasis (H), Neuropathy, Night sweats       IBUPROFEN PO      Take by mouth as needed for moderate pain Reported on 5/18/2017        levothyroxine 75 MCG  tablet    SYNTHROID/LEVOTHROID    90 tablet    Take 1 tablet (75 mcg) by mouth daily    Hypothyroidism, unspecified type       predniSONE 5 MG tablet    DELTASONE    30 tablet    Take 1 tablet (5 mg) by mouth daily (with breakfast)    Malignant neoplasm of prostate (H), Bone metastasis (H)       TYLENOL PO      Take 325 mg by mouth every 8 hours as needed for mild pain or fever Reported on 5/18/2017        valACYclovir 1000 mg tablet    VALTREX    10 tablet    Take 1 tablet (1,000 mg) by mouth 2 times daily    HSV (herpes simplex virus) infection       venlafaxine 37.5 MG 24 hr capsule    EFFEXOR-XR    90 capsule    TAKE 1 CAPSULE(37.5 MG) BY MOUTH DAILY    Night sweats, Malignant neoplasm of prostate (H), Bone metastasis (H), Neuropathy

## 2017-12-06 NOTE — NURSING NOTE
"Oncology Rooming Note    December 6, 2017 4:43 PM   Albert Amaya is a 51 year old male who presents for:    Chief Complaint   Patient presents with     Oncology Clinic Visit     return patient visit for 2 month follow up related to prostate cancer      Initial Vitals: BP (!) 149/97  Pulse 70  Temp 98.2  F (36.8  C) (Oral)  Resp 17  Ht 1.702 m (5' 7.01\")  SpO2 99% Estimated body mass index is 30.64 kg/(m^2) as calculated from the following:    Height as of 9/6/17: 1.702 m (5' 7.01\").    Weight as of 11/6/17: 88.8 kg (195 lb 11.2 oz). There is no height or weight on file to calculate BSA.  No Pain (0) Comment: Data Unavailable   No LMP for male patient.  Allergies reviewed: Yes  Medications reviewed: Yes    Medications: Medication refills not needed today.  Pharmacy name entered into Aveso:    [id number] - [place], [place] - [address] [facility] - [place], [place] - [address] [facility] [place], [place] - [address] [facility] - [place], [place] - [address] [place] [address] [facility] ([place]) - [place], [place] - [address] [facility] [address]    Clinical concerns: no concerns - needs dilaudid, gabapentin, prednisone, and zytiga refills dr. diane was notified.    6 minutes for nursing intake (face to face time)     Rae Vernon CMA              "

## 2017-12-18 ENCOUNTER — RADIANT APPOINTMENT (OUTPATIENT)
Dept: GENERAL RADIOLOGY | Facility: CLINIC | Age: 52
End: 2017-12-18
Payer: COMMERCIAL

## 2017-12-18 DIAGNOSIS — C61 MALIGNANT NEOPLASM OF PROSTATE (H): ICD-10-CM

## 2017-12-18 DIAGNOSIS — C79.51 BONE METASTASIS: ICD-10-CM

## 2018-01-08 DIAGNOSIS — C79.51 BONE METASTASIS: ICD-10-CM

## 2018-01-08 DIAGNOSIS — C61 MALIGNANT NEOPLASM OF PROSTATE (H): ICD-10-CM

## 2018-01-08 LAB
ALBUMIN SERPL-MCNC: 3.9 G/DL (ref 3.4–5)
ALP SERPL-CCNC: 63 U/L (ref 40–150)
ALT SERPL W P-5'-P-CCNC: 19 U/L (ref 0–70)
ANION GAP SERPL CALCULATED.3IONS-SCNC: 6 MMOL/L (ref 3–14)
AST SERPL W P-5'-P-CCNC: 13 U/L (ref 0–45)
BASOPHILS # BLD AUTO: 0 10E9/L (ref 0–0.2)
BASOPHILS NFR BLD AUTO: 0.6 %
BILIRUB SERPL-MCNC: 0.9 MG/DL (ref 0.2–1.3)
BUN SERPL-MCNC: 15 MG/DL (ref 7–30)
CALCIUM SERPL-MCNC: 9.2 MG/DL (ref 8.5–10.1)
CHLORIDE SERPL-SCNC: 106 MMOL/L (ref 94–109)
CO2 SERPL-SCNC: 27 MMOL/L (ref 20–32)
CREAT SERPL-MCNC: 0.8 MG/DL (ref 0.66–1.25)
DIFFERENTIAL METHOD BLD: ABNORMAL
EOSINOPHIL # BLD AUTO: 0.2 10E9/L (ref 0–0.7)
EOSINOPHIL NFR BLD AUTO: 3.2 %
ERYTHROCYTE [DISTWIDTH] IN BLOOD BY AUTOMATED COUNT: 12.4 % (ref 10–15)
GFR SERPL CREATININE-BSD FRML MDRD: >90 ML/MIN/1.7M2
GLUCOSE SERPL-MCNC: 122 MG/DL (ref 70–99)
HCT VFR BLD AUTO: 37.3 % (ref 40–53)
HGB BLD-MCNC: 12.6 G/DL (ref 13.3–17.7)
IMM GRANULOCYTES # BLD: 0 10E9/L (ref 0–0.4)
IMM GRANULOCYTES NFR BLD: 0.2 %
LDH SERPL L TO P-CCNC: 192 U/L (ref 85–227)
LYMPHOCYTES # BLD AUTO: 2.4 10E9/L (ref 0.8–5.3)
LYMPHOCYTES NFR BLD AUTO: 47.1 %
MCH RBC QN AUTO: 31.2 PG (ref 26.5–33)
MCHC RBC AUTO-ENTMCNC: 33.8 G/DL (ref 31.5–36.5)
MCV RBC AUTO: 92 FL (ref 78–100)
MONOCYTES # BLD AUTO: 0.4 10E9/L (ref 0–1.3)
MONOCYTES NFR BLD AUTO: 6.9 %
NEUTROPHILS # BLD AUTO: 2.1 10E9/L (ref 1.6–8.3)
NEUTROPHILS NFR BLD AUTO: 42 %
NRBC # BLD AUTO: 0 10*3/UL
NRBC BLD AUTO-RTO: 0 /100
PLATELET # BLD AUTO: 243 10E9/L (ref 150–450)
POTASSIUM SERPL-SCNC: 4 MMOL/L (ref 3.4–5.3)
PROT SERPL-MCNC: 7.8 G/DL (ref 6.8–8.8)
PSA SERPL-MCNC: 6.67 UG/L (ref 0–4)
RBC # BLD AUTO: 4.04 10E12/L (ref 4.4–5.9)
SODIUM SERPL-SCNC: 139 MMOL/L (ref 133–144)
WBC # BLD AUTO: 5.1 10E9/L (ref 4–11)

## 2018-01-08 PROCEDURE — 84153 ASSAY OF PSA TOTAL: CPT | Performed by: INTERNAL MEDICINE

## 2018-01-08 PROCEDURE — 80053 COMPREHEN METABOLIC PANEL: CPT | Performed by: INTERNAL MEDICINE

## 2018-01-08 PROCEDURE — 83615 LACTATE (LD) (LDH) ENZYME: CPT | Performed by: INTERNAL MEDICINE

## 2018-01-08 PROCEDURE — 85025 COMPLETE CBC W/AUTO DIFF WBC: CPT | Performed by: INTERNAL MEDICINE

## 2018-01-08 NOTE — NURSING NOTE
Chief Complaint   Patient presents with     Labs Only     labs drawn via venipuncture     There were no vitals taken for this visit.    Labs collected and sent from left antecubital venipuncture. Pt tolerated well.    Liberty Perez RN

## 2018-01-16 DIAGNOSIS — C61 MALIGNANT NEOPLASM OF PROSTATE (H): Primary | ICD-10-CM

## 2018-01-16 DIAGNOSIS — C79.51 BONE METASTASIS: ICD-10-CM

## 2018-01-16 RX ORDER — ABIRATERONE ACETATE 250 MG/1
1000 TABLET ORAL DAILY
Qty: 120 TABLET | Refills: 0 | Status: SHIPPED | OUTPATIENT
Start: 2018-01-16 | End: 2019-01-30

## 2018-01-16 RX ORDER — PREDNISONE 5 MG/1
5 TABLET ORAL
Qty: 30 TABLET | Refills: 0 | Status: SHIPPED | OUTPATIENT
Start: 2018-01-16 | End: 2018-03-07

## 2018-01-23 ENCOUNTER — TELEPHONE (OUTPATIENT)
Dept: ONCOLOGY | Facility: CLINIC | Age: 53
End: 2018-01-23

## 2018-01-23 NOTE — TELEPHONE ENCOUNTER
Oral Chemotherapy Monitoring Program   Placed call to patient in follow up of Zytiga (abiraterone) therapy.   Left message requesting call back.   No drug names were mentioned.  MPR 0.94. Last filled 1/16/18    The Medication Possession Ratio (MPR) is a measure of adherence created using medication dispensing history. It is calculated using the number of day supply obtained over a specific time period (one year or less) divided by the number of days in that time period and reported as a percentage.    MPR = 100%: ideal  MPR < 100%: may have gaps in therapy/missed medication doses  MPR > 100%: may have an excess of medication on hand    Adriane Finch RN  [facility]rapy [facility]  [phone]

## 2018-02-01 ENCOUNTER — TELEPHONE (OUTPATIENT)
Dept: ONCOLOGY | Facility: CLINIC | Age: 53
End: 2018-02-01

## 2018-02-01 NOTE — TELEPHONE ENCOUNTER
"Spouse returned call and information relayed to her that pt should be seen for symptoms or go to primary care if he is unable to come to [facility]. Spouse demanded rationale for not prescribing Tamiflu and writer explained if pt has a common cold or other viral infection that is not the flu, side effects of Tamiflu may make his symptoms worse such as headaches, n/v/d, lightheadedness and needs to be monitored closely with immunocompromised patients. Spouse stated \"this is not okay\". Writer apologized and advised her to give their primary clinic a call and that if pt develops worsening symptoms including fever, sob, chest pain, n/v/d to please call us back. Spouse stated \"I have to go\" and hung up.  "

## 2018-02-01 NOTE — TELEPHONE ENCOUNTER
"Spouse called in to triage reporting pt has symptoms: chills, productive cough \"with junk\", nasal congestion, lack of appetite, body aches and fatigue. Stated she had the \"flu\" for 10 days with similar symptoms though she was not diagnosed and recovered without medications. Pt does not have a fever, n/v/d, lightheadedness, difficulty urinating, abd pain, sob or any new pain. He takes prn tylenol and ibuprofen and scheduled prednisone for bony metastasis and joint pain. Currently on Zytiga. Spouse requesting tamiflu prescription for pt. Writer advised her pt should come to the clinic for a flu swab and lab work today or tomorrow, she stated she is going out of town and no one else is able to bring him to the clinic. Stated \"he feels awful\" and will refuse to come to the clinic. Writer will speak with providers in clinic, Dr. Valladares and Melanie FELIX both out of office today.    Per Ariana FELIX, will not prescribe Tamiflu for above symptoms. Pt should be seen in clinic if he is not feeling well. Called back spouse at number given, left voicemail message to call triage back [phone].  "

## 2018-02-05 ENCOUNTER — TELEPHONE (OUTPATIENT)
Dept: ONCOLOGY | Facility: CLINIC | Age: 53
End: 2018-02-05

## 2018-02-05 DIAGNOSIS — C79.51 BONE METASTASIS: ICD-10-CM

## 2018-02-05 DIAGNOSIS — C61 MALIGNANT NEOPLASM OF PROSTATE (H): ICD-10-CM

## 2018-02-05 LAB
ALBUMIN SERPL-MCNC: 4.1 G/DL (ref 3.4–5)
ALP SERPL-CCNC: 71 U/L (ref 40–150)
ALT SERPL W P-5'-P-CCNC: 17 U/L (ref 0–70)
ANION GAP SERPL CALCULATED.3IONS-SCNC: 7 MMOL/L (ref 3–14)
AST SERPL W P-5'-P-CCNC: 16 U/L (ref 0–45)
BASOPHILS # BLD AUTO: 0 10E9/L (ref 0–0.2)
BASOPHILS NFR BLD AUTO: 0.6 %
BILIRUB SERPL-MCNC: 0.8 MG/DL (ref 0.2–1.3)
BUN SERPL-MCNC: 14 MG/DL (ref 7–30)
CALCIUM SERPL-MCNC: 8.9 MG/DL (ref 8.5–10.1)
CHLORIDE SERPL-SCNC: 104 MMOL/L (ref 94–109)
CO2 SERPL-SCNC: 28 MMOL/L (ref 20–32)
CREAT SERPL-MCNC: 0.78 MG/DL (ref 0.66–1.25)
DIFFERENTIAL METHOD BLD: ABNORMAL
EOSINOPHIL # BLD AUTO: 0.2 10E9/L (ref 0–0.7)
EOSINOPHIL NFR BLD AUTO: 2.6 %
ERYTHROCYTE [DISTWIDTH] IN BLOOD BY AUTOMATED COUNT: 12.7 % (ref 10–15)
GFR SERPL CREATININE-BSD FRML MDRD: >90 ML/MIN/1.7M2
GLUCOSE SERPL-MCNC: 133 MG/DL (ref 70–99)
HCT VFR BLD AUTO: 37.2 % (ref 40–53)
HGB BLD-MCNC: 12.5 G/DL (ref 13.3–17.7)
IMM GRANULOCYTES # BLD: 0 10E9/L (ref 0–0.4)
IMM GRANULOCYTES NFR BLD: 0.6 %
LDH SERPL L TO P-CCNC: 204 U/L (ref 85–227)
LYMPHOCYTES # BLD AUTO: 2.8 10E9/L (ref 0.8–5.3)
LYMPHOCYTES NFR BLD AUTO: 43.9 %
MCH RBC QN AUTO: 30.8 PG (ref 26.5–33)
MCHC RBC AUTO-ENTMCNC: 33.6 G/DL (ref 31.5–36.5)
MCV RBC AUTO: 92 FL (ref 78–100)
MONOCYTES # BLD AUTO: 0.5 10E9/L (ref 0–1.3)
MONOCYTES NFR BLD AUTO: 7 %
NEUTROPHILS # BLD AUTO: 2.9 10E9/L (ref 1.6–8.3)
NEUTROPHILS NFR BLD AUTO: 45.3 %
NRBC # BLD AUTO: 0 10*3/UL
NRBC BLD AUTO-RTO: 0 /100
PLATELET # BLD AUTO: 289 10E9/L (ref 150–450)
POTASSIUM SERPL-SCNC: 4 MMOL/L (ref 3.4–5.3)
PROT SERPL-MCNC: 7.8 G/DL (ref 6.8–8.8)
PSA SERPL-MCNC: 6.71 UG/L (ref 0–4)
RBC # BLD AUTO: 4.06 10E12/L (ref 4.4–5.9)
SODIUM SERPL-SCNC: 139 MMOL/L (ref 133–144)
WBC # BLD AUTO: 6.5 10E9/L (ref 4–11)

## 2018-02-05 PROCEDURE — 85025 COMPLETE CBC W/AUTO DIFF WBC: CPT | Performed by: INTERNAL MEDICINE

## 2018-02-05 PROCEDURE — 84153 ASSAY OF PSA TOTAL: CPT | Performed by: INTERNAL MEDICINE

## 2018-02-05 PROCEDURE — 83615 LACTATE (LD) (LDH) ENZYME: CPT | Performed by: INTERNAL MEDICINE

## 2018-02-05 PROCEDURE — 80053 COMPREHEN METABOLIC PANEL: CPT | Performed by: INTERNAL MEDICINE

## 2018-02-05 NOTE — TELEPHONE ENCOUNTER
Oral Chemotherapy Monitoring Program    Placed call to patient regarding the Zytiga labs on 2/5/18. There were no concerning lab abnormalities. Daughter answered and she would let her dad know that information once he woke from his nap. She thanked me for the call.    Annie Yeung   Pharmacy Intern  [facility]   [phone]

## 2018-02-05 NOTE — NURSING NOTE
Chief Complaint   Patient presents with     Blood Draw     venipuncture     Labs drawn, see flow sheet.  JEFF PASCUAL

## 2018-02-07 DIAGNOSIS — E03.9 HYPOTHYROIDISM: Primary | ICD-10-CM

## 2018-02-07 DIAGNOSIS — C79.51 BONE METASTASIS: ICD-10-CM

## 2018-02-07 DIAGNOSIS — C61 MALIGNANT NEOPLASM OF PROSTATE (H): Primary | ICD-10-CM

## 2018-02-07 DIAGNOSIS — E03.9 HYPOTHYROIDISM, UNSPECIFIED TYPE: ICD-10-CM

## 2018-02-07 RX ORDER — LEVOTHYROXINE SODIUM 75 UG/1
75 TABLET ORAL DAILY
Qty: 30 TABLET | Refills: 0 | Status: SHIPPED | OUTPATIENT
Start: 2018-02-07 | End: 2018-03-07

## 2018-02-07 RX ORDER — PREDNISONE 5 MG/1
5 TABLET ORAL
Qty: 30 TABLET | Refills: 0 | Status: SHIPPED | OUTPATIENT
Start: 2018-02-07 | End: 2018-03-09

## 2018-02-07 RX ORDER — ABIRATERONE ACETATE 250 MG/1
1000 TABLET ORAL DAILY
Qty: 120 TABLET | Refills: 0 | Status: SHIPPED | OUTPATIENT
Start: 2018-02-07 | End: 2019-01-30

## 2018-03-07 ENCOUNTER — ONCOLOGY VISIT (OUTPATIENT)
Dept: ONCOLOGY | Facility: CLINIC | Age: 53
End: 2018-03-07
Attending: INTERNAL MEDICINE
Payer: COMMERCIAL

## 2018-03-07 ENCOUNTER — HOSPITAL ENCOUNTER (OUTPATIENT)
Dept: NUCLEAR MEDICINE | Facility: CLINIC | Age: 53
Setting detail: NUCLEAR MEDICINE
Discharge: HOME | End: 2018-03-07
Attending: INTERNAL MEDICINE | Admitting: INTERNAL MEDICINE
Payer: COMMERCIAL

## 2018-03-07 ENCOUNTER — HOSPITAL ENCOUNTER (OUTPATIENT)
Dept: NUCLEAR MEDICINE | Facility: CLINIC | Age: 53
Setting detail: NUCLEAR MEDICINE
End: 2018-03-07
Attending: INTERNAL MEDICINE
Payer: COMMERCIAL

## 2018-03-07 ENCOUNTER — HOSPITAL ENCOUNTER (OUTPATIENT)
Dept: CT IMAGING | Facility: CLINIC | Age: 53
Discharge: HOME | End: 2018-03-07
Attending: INTERNAL MEDICINE | Admitting: INTERNAL MEDICINE
Payer: COMMERCIAL

## 2018-03-07 VITALS
SYSTOLIC BLOOD PRESSURE: 127 MMHG | RESPIRATION RATE: 16 BRPM | HEART RATE: 78 BPM | BODY MASS INDEX: 30.89 KG/M2 | DIASTOLIC BLOOD PRESSURE: 72 MMHG | OXYGEN SATURATION: 98 % | WEIGHT: 197.31 LBS | TEMPERATURE: 99 F

## 2018-03-07 DIAGNOSIS — C79.51 BONE METASTASIS: ICD-10-CM

## 2018-03-07 DIAGNOSIS — C61 MALIGNANT NEOPLASM OF PROSTATE (H): ICD-10-CM

## 2018-03-07 DIAGNOSIS — G62.9 NEUROPATHY: ICD-10-CM

## 2018-03-07 DIAGNOSIS — R61 NIGHT SWEATS: ICD-10-CM

## 2018-03-07 DIAGNOSIS — E03.9 HYPOTHYROIDISM, UNSPECIFIED TYPE: ICD-10-CM

## 2018-03-07 PROCEDURE — 74177 CT ABD & PELVIS W/CONTRAST: CPT

## 2018-03-07 PROCEDURE — A9503 TC99M MEDRONATE: HCPCS | Performed by: INTERNAL MEDICINE

## 2018-03-07 PROCEDURE — 25000125 ZZHC RX 250: Performed by: RADIOLOGY

## 2018-03-07 PROCEDURE — 99214 OFFICE O/P EST MOD 30 MIN: CPT | Mod: ZP | Performed by: INTERNAL MEDICINE

## 2018-03-07 PROCEDURE — 25000128 H RX IP 250 OP 636: Performed by: RADIOLOGY

## 2018-03-07 PROCEDURE — 78306 BONE IMAGING WHOLE BODY: CPT

## 2018-03-07 PROCEDURE — G0463 HOSPITAL OUTPT CLINIC VISIT: HCPCS

## 2018-03-07 PROCEDURE — 34300033 ZZH RX 343: Performed by: INTERNAL MEDICINE

## 2018-03-07 RX ORDER — LEVOTHYROXINE SODIUM 75 UG/1
75 TABLET ORAL DAILY
Qty: 90 TABLET | Refills: 3 | Status: SHIPPED | OUTPATIENT
Start: 2018-03-07 | End: 2019-04-09

## 2018-03-07 RX ORDER — TC 99M MEDRONATE 20 MG/10ML
20-30 INJECTION, POWDER, LYOPHILIZED, FOR SOLUTION INTRAVENOUS ONCE
Status: COMPLETED | OUTPATIENT
Start: 2018-03-07 | End: 2018-03-07

## 2018-03-07 RX ORDER — VENLAFAXINE HYDROCHLORIDE 37.5 MG/1
CAPSULE, EXTENDED RELEASE ORAL
Qty: 90 CAPSULE | Refills: 3 | Status: SHIPPED | OUTPATIENT
Start: 2018-03-07 | End: 2018-03-15

## 2018-03-07 RX ORDER — PREDNISONE 5 MG/1
5 TABLET ORAL 2 TIMES DAILY
Qty: 60 TABLET | Refills: 3 | Status: SHIPPED | OUTPATIENT
Start: 2018-03-07 | End: 2018-03-09

## 2018-03-07 RX ORDER — IOPAMIDOL 755 MG/ML
120 INJECTION, SOLUTION INTRAVASCULAR ONCE
Status: COMPLETED | OUTPATIENT
Start: 2018-03-07 | End: 2018-03-07

## 2018-03-07 RX ORDER — HYDROMORPHONE HYDROCHLORIDE 2 MG/1
2 TABLET ORAL EVERY 4 HOURS PRN
Qty: 90 TABLET | Refills: 0 | Status: SHIPPED | OUTPATIENT
Start: 2018-03-07 | End: 2018-07-13

## 2018-03-07 RX ORDER — GABAPENTIN 100 MG/1
CAPSULE ORAL
Qty: 90 CAPSULE | Refills: 6 | Status: SHIPPED | OUTPATIENT
Start: 2018-03-07 | End: 2019-05-24

## 2018-03-07 RX ADMIN — IOPAMIDOL 120 ML: 755 INJECTION, SOLUTION INTRAVENOUS at 11:39

## 2018-03-07 RX ADMIN — SODIUM CHLORIDE, PRESERVATIVE FREE 80 ML: 5 INJECTION INTRAVENOUS at 11:39

## 2018-03-07 RX ADMIN — TC 99M MEDRONATE 29.7 MCI.: 20 INJECTION, POWDER, LYOPHILIZED, FOR SOLUTION INTRAVENOUS at 10:30

## 2018-03-07 ASSESSMENT — PAIN SCALES - GENERAL: PAINLEVEL: NO PAIN (0)

## 2018-03-07 NOTE — MR AVS SNAPSHOT
After Visit Summary   3/7/2018    Albert Amaya    MRN: [id number]           Patient Information     Date Of Birth          1965        Visit Information        Provider Department      3/7/2018 4:00 PM Tyrel Valladares MD [facility]        Today's Diagnoses     Malignant neoplasm of prostate (H)        Bone metastasis (H)        Hypothyroidism, unspecified type        Neuropathy        Night sweats           Follow-ups after your visit        Your next 10 appointments already scheduled     Mar 14, 2018  9:45 AM CDT   [facility] with  [facility]   [facility] ([facility])    [address]  [address]  [place] [address]   [phone]            Mar 14, 2018 10:30 AM CDT   (Arrive by 10:15 AM)   INJECTION with  Oncology Injection Nurse   [facility] ([facility])    [address]  [address]  [place] [address]   [phone]            Jun 13, 2018  9:00 AM CDT   [facility] Draw with  [facility] DRAW   [facility] ([facility])    [address]  [address]  [place] [address]   [phone]            Jun 13, 2018  9:30 AM CDT   (Arrive by 9:15 AM)   Return Visit with Tyrel Valladares MD   [facility] ([facility])    [address] [place] [address]  [address]  [facility] [address]   [phone]              Who to contact     If you have questions or need follow up information about today's clinic visit or your schedule please contact [facility] directly at [phone].  Normal or non-critical lab and imaging results will be communicated to you by MyChart, letter or phone within 4 business days after the clinic has received the results. If you do not hear from us within 7 days, please contact the clinic through MyChart or phone. If  you have a critical or abnormal lab result, we will notify you by phone as soon as possible.  Submit refill requests through [employer] or call your pharmacy and they will forward the refill request to us. Please allow 3 business days for your refill to be completed.          Additional Information About Your Visit        [facility] Information     [employer] gives you secure access to your electronic health record. If you see a primary care provider, you can also send messages to your care team and make appointments. If you have questions, please call your primary care clinic.  If you do not have a primary care provider, please call [phone] and they will assist you.        Care EveryWhere ID     This is your Care EveryWhere ID. This could be used by other organizations to access your [facility] medical records  [id number]        Your Vitals Were     Pulse Temperature Respirations Pulse Oximetry BMI (Body Mass Index)       78 99  F (37.2  C) (Oral) 16 98% 30.89 kg/m2        Blood Pressure from Last 3 Encounters:   03/07/18 127/72   12/06/17 (!) 149/97   09/06/17 135/90    Weight from Last 3 Encounters:   03/07/18 89.5 kg (197 lb 5 oz)   11/06/17 88.8 kg (195 lb 11.2 oz)   09/06/17 89.9 kg (198 lb 1.6 oz)              Today, you had the following     No orders found for display         Today's Medication Changes          These changes are accurate as of 3/7/18 11:59 PM.  If you have any questions, ask your nurse or doctor.               These medicines have changed or have updated prescriptions.        Dose/Directions    * predniSONE 5 MG tablet   Commonly known as:  DELTASONE   This may have changed:  Another medication with the same name was changed. Make sure you understand how and when to take each.   Used for:  Malignant neoplasm of prostate (H), Bone metastasis (H)   Changed by:  Tyrel Valladares MD        Dose:  5 mg   Take 1 tablet (5 mg) by mouth daily (with breakfast)   Quantity:  30 tablet   Refills:  0        * predniSONE 5 MG tablet   Commonly known as:  DELTASONE   This may have changed:  Another medication with the same name was changed. Make sure you understand how and when to take each.   Used for:  Malignant neoplasm of prostate (H), Bone metastasis (H)   Changed by:  Tyrel Valladares MD        Dose:  5 mg   Take 1 tablet (5 mg) by mouth daily (with breakfast)   Quantity:  30 tablet   Refills:  0       * predniSONE 5 MG tablet   Commonly known as:  DELTASONE   This may have changed:  when to take this   Used for:  Malignant neoplasm of prostate (H), Bone metastasis (H), Hypothyroidism, unspecified type, Neuropathy, Night sweats   Changed by:  Tyrel Valladares MD        Dose:  5 mg   Take 1 tablet (5 mg) by mouth 2 times daily   Quantity:  60 tablet   Refills:  3       * Notice:  This list has 3 medication(s) that are the same as other medications prescribed for you. Read the directions carefully, and ask your doctor or other care provider to review them with you.      Stop taking these medicines if you haven't already. Please contact your care team if you have questions.     valACYclovir 1000 mg tablet   Commonly known as:  VALTREX   Stopped by:  Tyrel Valladares MD                Where to get your medicines      These medications were sent to [id number] ([place]) - [place], [place] - [address] AT [facility] [address] [facility] [address]  [address], [facility] [address]     Phone:  [phone]     gabapentin 100 MG capsule    levothyroxine 75 MCG tablet    predniSONE 5 MG tablet    venlafaxine 37.5 MG 24 hr capsule         Some of these will need a paper prescription and others can be bought over the counter.  Ask your nurse if you have questions.     Bring a paper prescription for each of these medications     HYDROmorphone 2 MG tablet                Primary Care Provider Office Phone # Fax #    [facility] [phone]       [address] [place] [address]  [facility] [address]        Equal Access to Services     [facility] REJI : Hadii valdo kumar cayden Moseley, wacortesda luqadaha, qaybta kaalmabob santizomariuszjack millan. So [facility] [place] [phone].    ATENCIÓN: Si habla español, tiene a rivera disposición servicios gratuitos de asistencia lingüística. Emmanuelame al [phone].    We comply with applicable federal civil rights laws and [place] laws. We do not discriminate on the basis of race, color, national origin, age, disability, sex, sexual orientation, or gender identity.            Thank you!     Thank you for choosing [facility]  for your care. Our goal is always to provide you with excellent care. Hearing back from our patients is one way we can continue to improve our services. Please take a few minutes to complete the written survey that you may receive in the mail after your visit with us. Thank you!             Your Updated Medication List - Protect others around you: Learn how to safely use, store and throw away your medicines at www.disposemymeds.org.          This list is accurate as of 3/7/18 11:59 PM.  Always use your most recent med list.                   Brand Name Dispense Instructions for use Diagnosis    abiraterone 250 MG tablet    ZYTIGA    120 tablet    Take 4 tablets (1,000 mg) by mouth daily for 30 doses Take on empty stomach.    Malignant neoplasm of prostate (H), Bone metastasis (H)       EPINEPHrine 0.3 MG/0.3ML injection 2-pack    EPIPEN 2-CHARITY    0.6 mL    Inject 0.3 mLs (0.3 mg) into the muscle once as needed for anaphylaxis    Malignant neoplasm of prostate (H), Bone metastasis (H)       gabapentin 100 MG capsule    NEURONTIN    90 capsule    Take 1 capsule (100 mg) by mouth 2-3 times daily for neuropathy    Neuropathy, Malignant neoplasm of prostate (H), Bone metastasis (H), Night sweats       HYDROmorphone 2 MG tablet    DILAUDID    90 tablet    Take 1 tablet (2 mg) by mouth every 4 hours as  needed for moderate to severe pain    Malignant neoplasm of prostate (H), Bone metastasis (H), Neuropathy, Night sweats, Hypothyroidism, unspecified type       IBUPROFEN PO      Take by mouth as needed for moderate pain Reported on 5/18/2017        levothyroxine 75 MCG tablet    SYNTHROID/LEVOTHROID    90 tablet    Take 1 tablet (75 mcg) by mouth daily    Hypothyroidism, unspecified type, Malignant neoplasm of prostate (H), Bone metastasis (H), Neuropathy, Night sweats       * predniSONE 5 MG tablet    DELTASONE    30 tablet    Take 1 tablet (5 mg) by mouth daily (with breakfast)    Malignant neoplasm of prostate (H), Bone metastasis (H)       * predniSONE 5 MG tablet    DELTASONE    30 tablet    Take 1 tablet (5 mg) by mouth daily (with breakfast)    Malignant neoplasm of prostate (H), Bone metastasis (H)       * predniSONE 5 MG tablet    DELTASONE    60 tablet    Take 1 tablet (5 mg) by mouth 2 times daily    Malignant neoplasm of prostate (H), Bone metastasis (H), Hypothyroidism, unspecified type, Neuropathy, Night sweats       TYLENOL PO      Take 325 mg by mouth every 8 hours as needed for mild pain or fever Reported on 5/18/2017        venlafaxine 37.5 MG 24 hr capsule    EFFEXOR-XR    90 capsule    TAKE 1 CAPSULE(37.5 MG) BY MOUTH DAILY    Night sweats, Malignant neoplasm of prostate (H), Bone metastasis (H), Neuropathy       * Notice:  This list has 3 medication(s) that are the same as other medications prescribed for you. Read the directions carefully, and ask your doctor or other care provider to review them with you.

## 2018-03-07 NOTE — LETTER
3/7/2018       RE: Albert Amaya  [address] [place] [address]     Dear Colleague,    Thank you for referring your patient, Albert Amaya, to the [facility]. Please see a copy of my visit note below.    [facility]  FOLLOW-UP VISIT NOTE  Date of visit: Mar 7, 2018     Cancer History:   Metastatic Prostate CA treated    - s/p 6 cycles of taxotere 75mg/m2   - s/p orchiectomy on 3/18/2016   - s/p Provenge 5/13, 5/27, 6/10   - s/p Casodex 50 mg daily March/April   - Currently taking Zytiga    HPI: Albert is a 52 year old gentleman with metastatic prostate cancer.  Albert felt a cramp in his gluteus muscle and could barely walk after doing some remodeling at home and unloading heavy truck at work. He tried flexeril and prednisone initially but eventually presented to ED on 10/5/15. He feels that initially he was nearly labeled as drug seeker since he was in lot of discomfort and flexeril was not working. But during course of ED visits labs were drawn and he had marked elevation in Alk Phosphatase (over 1000). CT did suggest some ileus with some sclerotic bone lesions. He was admitted to the hospital. His PSA was checked and was markedly elevated at 5760 (10/6/15), repeat value 5563.     Urology and Medical Oncology consults were placed. He was unhappy with the progress in hospital and felt no better and left the following day on 10/6. He did follow up with Dr. Freire and had transrectal US guided biopsy of prostate on 10/8/15. They have the results through my chart which confirms prostate adenocarcinoma - enrico 4+3 involving majority (60-90%) portion of every single core.  He started on taxotere on 10/23 and completed 6 cycles of therapy in 2/2016.  He then underwent orchiectomy on 3/18/2016.       Throughout spring of 2016 Albert's PSA started to progress and after three elevations there was concern about him being hormone refractory.  He was started on  Provenge and enrolled in the trial including Indoximod. PSA trending up and started on Casodex mid March with progression. Switched to Zytiga 5/3/17.    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     He is accompanied by his wife at this visit. He has been doing well and has no new complains attributable to his disease. He has been remodeling his bedroom as his daughters have left. He twisted his wrist while driving in a screw that got stuck.     He missed his prednisone for a couple of days and had terrible joint ache all over the body. He has not been missing any dose of his abiraterone or prednisone since then.     He has fair appetite and energy. He has been very active. He had a good summer. His daughter fell sick last wick and is recovering from pneumonia after a hiking trip to [place].       MEDS:   Current Outpatient Prescriptions   Medication Sig     levothyroxine (SYNTHROID/LEVOTHROID) 75 MCG tablet Take 1 tablet (75 mcg) by mouth daily     predniSONE (DELTASONE) 5 MG tablet Take 1 tablet (5 mg) by mouth 2 times daily     HYDROmorphone (DILAUDID) 2 MG tablet Take 1 tablet (2 mg) by mouth every 4 hours as needed for moderate to severe pain     gabapentin (NEURONTIN) 100 MG capsule Take 1 capsule (100 mg) by mouth 2-3 times daily for neuropathy     venlafaxine (EFFEXOR-XR) 37.5 MG 24 hr capsule TAKE 1 CAPSULE(37.5 MG) BY MOUTH DAILY     predniSONE (DELTASONE) 5 MG tablet Take 1 tablet (5 mg) by mouth daily (with breakfast)     abiraterone (ZYTIGA) 250 MG tablet Take 4 tablets (1,000 mg) by mouth daily for 30 doses Take on empty stomach.     IBUPROFEN PO Take by mouth as needed for moderate pain Reported on 5/18/2017     Acetaminophen (TYLENOL PO) Take 325 mg by mouth every 8 hours as needed for mild pain or fever Reported on 5/18/2017     [DISCONTINUED] levothyroxine (SYNTHROID/LEVOTHROID) 75 MCG tablet Take 1 tablet (75 mcg) by mouth daily (Patient not taking:  Reported on 3/7/2018)     [DISCONTINUED] gabapentin (NEURONTIN) 100 MG capsule Take 1 capsule (100 mg) by mouth 2-3 times daily for neuropathy     [DISCONTINUED] venlafaxine (EFFEXOR-XR) 37.5 MG 24 hr capsule TAKE 1 CAPSULE(37.5 MG) BY MOUTH DAILY     predniSONE (DELTASONE) 5 MG tablet Take 1 tablet (5 mg) by mouth daily (with breakfast)     EPINEPHrine (EPIPEN 2-CHARITY) 0.3 MG/0.3ML injection 2-pack Inject 0.3 mLs (0.3 mg) into the muscle once as needed for anaphylaxis (Patient not taking: Reported on 9/6/2017)     No current facility-administered medications for this visit.      Facility-Administered Medications Ordered in Other Visits   Medication     sodium chloride 0.9 % bag 500mL for CT scan flush use        EXAM:  ECOG 1  Wt Readings from Last 4 Encounters:   03/07/18 89.5 kg (197 lb 5 oz)   11/06/17 88.8 kg (195 lb 11.2 oz)   09/06/17 89.9 kg (198 lb 1.6 oz)   06/07/17 89.8 kg (198 lb)   /72  Pulse 78  Temp 99  F (37.2  C) (Oral)  Resp 16  Wt 89.5 kg (197 lb 5 oz)  SpO2 98%  BMI 30.89 kg/m2  Patient alert and oriented x3.   PERRLA. EOMI. No scleral icterus noted. OP without thrush/sores.  Neck exam: Palpable 0.5 cm anterior cervical node on the left, rubbery, mobile, non-tender.   Heart: RRR no murmurs noted.   Lungs: clear to auscultation bilaterally.  No crackles or wheezing.   Abd: Normal bowel sounds.  No tenderness to palpation. No suprapubic tenderness.  No hepatomegaly.   Extremities: No lower extremity edema.   Back: No CVA tenderness.   Neuro: grossly intact.   Mood and affect is stable.     Labs/Imaging:  Recent Labs   Lab Test  03/07/18   1103  02/05/18   1015  01/08/18   1011  12/06/17   1813  11/06/17   1332   NA  140  139  139  141  143   POTASSIUM  4.1  4.0  4.0  4.7  4.3   CHLORIDE  106  104  106  106  108   CO2  25  28  27  27  27   ANIONGAP  10  7  6  7  8   BUN  16  14  15  19  18   CR  0.94  0.78  0.80  0.92  0.92   GLC  129*  133*  122*  140*  173*   WILL  8.4*  8.9  9.2  9.6   9.3     Recent Labs   Lab Test  12/21/16   1643  11/22/16   1221  10/26/16   1150  09/28/16   1137  08/17/16   1355   05/03/16   0955   MAG  2.2  2.4*  2.2  2.3  2.0   < >  2.0   PHOS   --    --    --    --    --    --   4.1    < > = values in this interval not displayed.     Recent Labs   Lab Test  02/05/18   1015  01/08/18   1011  12/06/17   1813  11/06/17   1332  10/10/17   1050   WBC  6.5  5.1  9.5  7.2  5.3   HGB  12.5*  12.6*  12.6*  11.6*  11.5*   PLT  289  243  294  263  251   MCV  92  92  95  95  93   NEUTROPHIL  45.3  42.0  71.8  53.8  41.0     Recent Labs   Lab Test  03/07/18   1103  02/05/18   1015  01/08/18   1011   BILITOTAL  0.8  0.8  0.9   ALKPHOS  56  71  63   ALT  22  17  19   AST  11  16  13   ALBUMIN  4.0  4.1  3.9   LDH  240*  204  192     TSH   Date Value Ref Range Status   03/07/2018 15.90 (H) 0.40 - 4.00 mU/L Final   03/01/2017 5.28 (H) 0.40 - 4.00 mU/L Final   11/22/2016 4.62 (H) 0.40 - 4.00 mU/L Final     No results for input(s): CEA in the last 19442 hours.  Results for orders placed or performed during the hospital encounter of 03/07/18   CT Chest/Abdomen/Pelvis w Contrast    Narrative    EXAMINATION: CT CHEST/ABDOMEN/PELVIS W CONTRAST, 3/7/2018 11:53 AM    TECHNIQUE:  Helical CT images from the thoracic inlet through the  symphysis pubis were obtained  with contrast. Contrast dose: iopamidol  (ISOVUE-370) solution 120 mL    COMPARISON: Bone scan 5/8/2017; CT exam 5/8/2017.    HISTORY: Prostate cancer with metastasis; Malignant neoplasm of  prostate (H); Bone metastasis (H); Neuropathy; Night sweats    FINDINGS:    LUNGS: Mild bilateral lower lobes dependent atelectasis. No suspicious  pulmonary nodules. No new or enlarging pulmonary nodules.    Chest: Thyroid gland is within normal limits. Central tracheobronchial  tree is patent. Thoracic aorta and main pulmonary artery have normal  diameter. Cardiac size within normal limits. No pericardial effusions.  No pleural effusions. No  pneumothorax. No axillary, mediastinal or  hilar lymphadenopathy. No central pulmonary embolism.    Abdomen and pelvis: 7 mm hypoattenuating focus in hepatic segment 7,  too small to characterize, stable, series 3 image 216. No new or  enlarging liver lesions. Patent hepatic vasculature. Focal fat  adjacent to the falciform ligament. No biliary tree dilation.  Gallbladder and spleen are within normal limits. Main pancreatic duct  is not dilated. Homogeneous pancreatic parenchyma. The spleen is not  enlarged.    Adrenal glands are within normal limits. No renal stones or  hydronephrosis bilaterally. No suspicious renal lesions. Partially  distended urinary bladder, unremarkable. Symmetric seminal vesicles.  The prostate is not enlarged. Pelvic phleboliths. Mild prominent fat  within the left inguinal canal. No inguinal lymphadenopathy.  Subcentimeter pelvic lymph nodes with no suspicious features.  Subcentimeter retroperitoneal and mesenteric lymph nodes, not enlarged  by size criteria. No abdominal aortic aneurysm. No free fluid. No free  air. Small fat containing umbilical hernia. Decompressed stomach. No  dilated loops of bowel. No bowel wall thickening. The appendix is  unremarkable.    Bones: Stable diffuse sclerotic metastatic bone lesions throughout the  appendicular and axial skeleton, grossly stable from the prior study.  No acute fractures.      Impression    IMPRESSION: In this patient with history of metastatic prostate  cancer:  1. Stable diffuse sclerotic metastatic bone lesions throughout the  appendicular and axial skeleton.  2. No evidence of metastatic disease within the chest, abdomen and  pelvis.    CHRISTINE HUMPHREYS MD     Recent Labs   Lab Test  03/07/18   1103  02/05/18   1015  01/08/18   1011  12/06/17   1813  11/06/17   1332   12/21/16   1643  11/22/16   1222   05/03/16   0955   PSA  6.51*  6.71*  6.67*  9.42*  9.57*   < >  60.68*   --    < >  67.04*   TESTOSTTOTAL   --    --    --    --     --    --  <2 2  <2    < > = values in this interval not displayed.          ASSESSMENT/PLAN:  1. Metastatic prostate cancer with bony metastasis:   Currently taking abiraterone 1000 mg po q day and prednisone 5 mg po qday  Denosumab every 12 weeks   Left wrist sprain vs scaphoid fracture   ECOG PS 0    He has been doing very well on abiraterone. He has no new complains. His PSA has been responding very nicely. He and his wife were both quite excited with the response. With the nice decline so far, I would wish and hope that we would have disease control for over a year and possibly several years with abiraterone.     We reviewed actual images from his scans done earlier today. It does reveal stable disease. Official read does suggest a radiographic partial response in bones on bone scan.     He had significant discomfort after witholding prednisone and has gradually increased it back to 5 mg twice daily.     I reviewed that we should continue with denosumab. He is tired of waiting today and will come in some other day for his denosumab.     He would like to see me in 3-4+ months.     Over 25 min of direct face to face time spent with patient with more than 50% time spent in counseling and coordinating care.        Again, thank you for allowing me to participate in the care of your patient.      Sincerely,    Tyrel Valladares MD

## 2018-03-07 NOTE — NURSING NOTE
"Oncology Rooming Note    March 7, 2018 3:05 PM   Albert Amaya is a 52 year old male who presents for:    Chief Complaint   Patient presents with     Oncology Clinic Visit     Return for Prostate Ca , CT     Initial Vitals: /72  Pulse 78  Temp 99  F (37.2  C) (Oral)  Resp 16  Wt 89.5 kg (197 lb 5 oz)  SpO2 98%  BMI 30.89 kg/m2 Estimated body mass index is 30.89 kg/(m^2) as calculated from the following:    Height as of 12/6/17: 1.702 m (5' 7.01\").    Weight as of this encounter: 89.5 kg (197 lb 5 oz). Body surface area is 2.06 meters squared.  No Pain (0) Comment: Data Unavailable   No LMP for male patient.  Allergies reviewed: Yes  Medications reviewed: Yes    Medications: Medication refills not needed today.  Pharmacy name entered into EPIC:    [id number] - [place], [place] - [address] [facility] - [place], [place] - [address] [facility] - [place] - [address] [facility] [place] [address] [place] [address] [facility] [address] ([place]) - [place], [place] [address]  [facility] - [place], [place] - [address] [facility] [phone]    Clinical concerns: CT results Refills needed, Synthroid, Gabapentin , effexor  , Prednisone  benedicto  was notified.    10 minutes for nursing intake (face to face time)     Cleo Tai MA              "

## 2018-03-08 NOTE — PROGRESS NOTES
[facility] CANCER CLINIC  FOLLOW-UP VISIT NOTE  Date of visit: Mar 7, 2018     Cancer History:   Metastatic Prostate CA treated    - s/p 6 cycles of taxotere 75mg/m2   - s/p orchiectomy on 3/18/2016   - s/p Provenge 5/13, 5/27, 6/10   - s/p Casodex 50 mg daily March/April   - Currently taking Zytiga    HPI: Albert is a 52 year old gentleman with metastatic prostate cancer.  Albert felt a cramp in his gluteus muscle and could barely walk after doing some remodeling at home and unloading heavy truck at work. He tried flexeril and prednisone initially but eventually presented to ED on 10/5/15. He feels that initially he was nearly labeled as drug seeker since he was in lot of discomfort and flexeril was not working. But during course of ED visits labs were drawn and he had marked elevation in Alk Phosphatase (over 1000). CT did suggest some ileus with some sclerotic bone lesions. He was admitted to the hospital. His PSA was checked and was markedly elevated at 5760 (10/6/15), repeat value 5563.     Urology and Medical Oncology consults were placed. He was unhappy with the progress in hospital and felt no better and left the following day on 10/6. He did follow up with Dr. Freire and had transrectal US guided biopsy of prostate on 10/8/15. They have the results through my chart which confirms prostate adenocarcinoma - enrico 4+3 involving majority (60-90%) portion of every single core.  He started on taxotere on 10/23 and completed 6 cycles of therapy in 2/2016.  He then underwent orchiectomy on 3/18/2016.       Throughout spring of 2016 Albert's PSA started to progress and after three elevations there was concern about him being hormone refractory.  He was started on Provenge and enrolled in the trial including Indoximod. PSA trending up and started on Casodex mid March with progression. Switched to Zytiga 5/3/17.    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     He is accompanied by his wife at this visit. He has been doing well and has no new complains attributable to his disease. He has been remodeling his bedroom as his daughters have left. He twisted his wrist while driving in a screw that got stuck.     He missed his prednisone for a couple of days and had terrible joint ache all over the body. He has not been missing any dose of his abiraterone or prednisone since then.     He has fair appetite and energy. He has been very active. He had a good summer. His daughter fell sick last wick and is recovering from pneumonia after a hiking trip to [place].       MEDS:   Current Outpatient Prescriptions   Medication Sig     levothyroxine (SYNTHROID/LEVOTHROID) 75 MCG tablet Take 1 tablet (75 mcg) by mouth daily     predniSONE (DELTASONE) 5 MG tablet Take 1 tablet (5 mg) by mouth 2 times daily     HYDROmorphone (DILAUDID) 2 MG tablet Take 1 tablet (2 mg) by mouth every 4 hours as needed for moderate to severe pain     gabapentin (NEURONTIN) 100 MG capsule Take 1 capsule (100 mg) by mouth 2-3 times daily for neuropathy     venlafaxine (EFFEXOR-XR) 37.5 MG 24 hr capsule TAKE 1 CAPSULE(37.5 MG) BY MOUTH DAILY     predniSONE (DELTASONE) 5 MG tablet Take 1 tablet (5 mg) by mouth daily (with breakfast)     abiraterone (ZYTIGA) 250 MG tablet Take 4 tablets (1,000 mg) by mouth daily for 30 doses Take on empty stomach.     IBUPROFEN PO Take by mouth as needed for moderate pain Reported on 5/18/2017     Acetaminophen (TYLENOL PO) Take 325 mg by mouth every 8 hours as needed for mild pain or fever Reported on 5/18/2017     [DISCONTINUED] levothyroxine (SYNTHROID/LEVOTHROID) 75 MCG tablet Take 1 tablet (75 mcg) by mouth daily (Patient not taking: Reported on 3/7/2018)     [DISCONTINUED] gabapentin (NEURONTIN) 100 MG capsule Take 1 capsule (100 mg) by mouth 2-3 times daily for neuropathy     [DISCONTINUED] venlafaxine (EFFEXOR-XR) 37.5 MG 24 hr capsule TAKE 1 CAPSULE(37.5 MG) BY  MOUTH DAILY     predniSONE (DELTASONE) 5 MG tablet Take 1 tablet (5 mg) by mouth daily (with breakfast)     EPINEPHrine (EPIPEN 2-CHARITY) 0.3 MG/0.3ML injection 2-pack Inject 0.3 mLs (0.3 mg) into the muscle once as needed for anaphylaxis (Patient not taking: Reported on 9/6/2017)     No current facility-administered medications for this visit.      Facility-Administered Medications Ordered in Other Visits   Medication     sodium chloride 0.9 % bag 500mL for CT scan flush use        EXAM:  ECOG 1  Wt Readings from Last 4 Encounters:   03/07/18 89.5 kg (197 lb 5 oz)   11/06/17 88.8 kg (195 lb 11.2 oz)   09/06/17 89.9 kg (198 lb 1.6 oz)   06/07/17 89.8 kg (198 lb)   /72  Pulse 78  Temp 99  F (37.2  C) (Oral)  Resp 16  Wt 89.5 kg (197 lb 5 oz)  SpO2 98%  BMI 30.89 kg/m2  Patient alert and oriented x3.   PERRLA. EOMI. No scleral icterus noted. OP without thrush/sores.  Neck exam: Palpable 0.5 cm anterior cervical node on the left, rubbery, mobile, non-tender.   Heart: RRR no murmurs noted.   Lungs: clear to auscultation bilaterally.  No crackles or wheezing.   Abd: Normal bowel sounds.  No tenderness to palpation. No suprapubic tenderness.  No hepatomegaly.   Extremities: No lower extremity edema.   Back: No CVA tenderness.   Neuro: grossly intact.   Mood and affect is stable.     Labs/Imaging:  Recent Labs   Lab Test  03/07/18   1103  02/05/18   1015  01/08/18   1011  12/06/17   1813  11/06/17   1332   NA  140  139  139  141  143   POTASSIUM  4.1  4.0  4.0  4.7  4.3   CHLORIDE  106  104  106  106  108   CO2  25  28  27  27  27   ANIONGAP  10  7  6  7  8   BUN  16  14  15  19  18   CR  0.94  0.78  0.80  0.92  0.92   GLC  129*  133*  122*  140*  173*   WILL  8.4*  8.9  9.2  9.6  9.3     Recent Labs   Lab Test  12/21/16   1643  11/22/16   1221  10/26/16   1150  09/28/16   1137  08/17/16   1355   05/03/16   0955   MAG  2.2  2.4*  2.2  2.3  2.0   < >  2.0   PHOS   --    --    --    --    --    --   4.1    < > =  values in this interval not displayed.     Recent Labs   Lab Test  02/05/18   1015  01/08/18   1011  12/06/17   1813  11/06/17   1332  10/10/17   1050   WBC  6.5  5.1  9.5  7.2  5.3   HGB  12.5*  12.6*  12.6*  11.6*  11.5*   PLT  289  243  294  263  251   MCV  92  92  95  95  93   NEUTROPHIL  45.3  42.0  71.8  53.8  41.0     Recent Labs   Lab Test  03/07/18   1103  02/05/18   1015  01/08/18   1011   BILITOTAL  0.8  0.8  0.9   ALKPHOS  56  71  63   ALT  22  17  19   AST  11  16  13   ALBUMIN  4.0  4.1  3.9   LDH  240*  204  192     TSH   Date Value Ref Range Status   03/07/2018 15.90 (H) 0.40 - 4.00 mU/L Final   03/01/2017 5.28 (H) 0.40 - 4.00 mU/L Final   11/22/2016 4.62 (H) 0.40 - 4.00 mU/L Final     No results for input(s): CEA in the last 46965 hours.  Results for orders placed or performed during the hospital encounter of 03/07/18   CT Chest/Abdomen/Pelvis w Contrast    Narrative    EXAMINATION: CT CHEST/ABDOMEN/PELVIS W CONTRAST, 3/7/2018 11:53 AM    TECHNIQUE:  Helical CT images from the thoracic inlet through the  symphysis pubis were obtained  with contrast. Contrast dose: iopamidol  (ISOVUE-370) solution 120 mL    COMPARISON: Bone scan 5/8/2017; CT exam 5/8/2017.    HISTORY: Prostate cancer with metastasis; Malignant neoplasm of  prostate (H); Bone metastasis (H); Neuropathy; Night sweats    FINDINGS:    LUNGS: Mild bilateral lower lobes dependent atelectasis. No suspicious  pulmonary nodules. No new or enlarging pulmonary nodules.    Chest: Thyroid gland is within normal limits. Central tracheobronchial  tree is patent. Thoracic aorta and main pulmonary artery have normal  diameter. Cardiac size within normal limits. No pericardial effusions.  No pleural effusions. No pneumothorax. No axillary, mediastinal or  hilar lymphadenopathy. No central pulmonary embolism.    Abdomen and pelvis: 7 mm hypoattenuating focus in hepatic segment 7,  too small to characterize, stable, series 3 image 216. No new  or  enlarging liver lesions. Patent hepatic vasculature. Focal fat  adjacent to the falciform ligament. No biliary tree dilation.  Gallbladder and spleen are within normal limits. Main pancreatic duct  is not dilated. Homogeneous pancreatic parenchyma. The spleen is not  enlarged.    Adrenal glands are within normal limits. No renal stones or  hydronephrosis bilaterally. No suspicious renal lesions. Partially  distended urinary bladder, unremarkable. Symmetric seminal vesicles.  The prostate is not enlarged. Pelvic phleboliths. Mild prominent fat  within the left inguinal canal. No inguinal lymphadenopathy.  Subcentimeter pelvic lymph nodes with no suspicious features.  Subcentimeter retroperitoneal and mesenteric lymph nodes, not enlarged  by size criteria. No abdominal aortic aneurysm. No free fluid. No free  air. Small fat containing umbilical hernia. Decompressed stomach. No  dilated loops of bowel. No bowel wall thickening. The appendix is  unremarkable.    Bones: Stable diffuse sclerotic metastatic bone lesions throughout the  appendicular and axial skeleton, grossly stable from the prior study.  No acute fractures.      Impression    IMPRESSION: In this patient with history of metastatic prostate  cancer:  1. Stable diffuse sclerotic metastatic bone lesions throughout the  appendicular and axial skeleton.  2. No evidence of metastatic disease within the chest, abdomen and  pelvis.    CHRISTINE HUMPHREYS MD     Recent Labs   Lab Test  03/07/18   1103  02/05/18   1015  01/08/18   1011  12/06/17   1813  11/06/17   1332   12/21/16   1643  11/22/16   1222   05/03/16   0955   PSA  6.51*  6.71*  6.67*  9.42*  9.57*   < >  60.68*   --    < >  67.04*   TESTOSTTOTAL   --    --    --    --    --    --  <2 2  <2    < > = values in this interval not displayed.          ASSESSMENT/PLAN:  1. Metastatic prostate cancer with bony metastasis:   Currently taking abiraterone 1000 mg po q day and prednisone 5 mg po  qday  Denosumab every 12 weeks   Left wrist sprain vs scaphoid fracture   ECOG PS 0    He has been doing very well on abiraterone. He has no new complains. His PSA has been responding very nicely. He and his wife were both quite excited with the response. With the nice decline so far, I would wish and hope that we would have disease control for over a year and possibly several years with abiraterone.     We reviewed actual images from his scans done earlier today. It does reveal stable disease. Official read does suggest a radiographic partial response in bones on bone scan.     He had significant discomfort after witholding prednisone and has gradually increased it back to 5 mg twice daily.     I reviewed that we should continue with denosumab. He is tired of waiting today and will come in some other day for his denosumab.     He would like to see me in 3-4+ months.     Over 25 min of direct face to face time spent with patient with more than 50% time spent in counseling and coordinating care.

## 2018-03-09 DIAGNOSIS — C61 MALIGNANT NEOPLASM OF PROSTATE (H): Primary | ICD-10-CM

## 2018-03-09 DIAGNOSIS — C79.51 BONE METASTASIS: ICD-10-CM

## 2018-03-09 RX ORDER — PREDNISONE 5 MG/1
5 TABLET ORAL
Qty: 30 TABLET | Refills: 0 | Status: SHIPPED | OUTPATIENT
Start: 2018-03-09 | End: 2018-08-22

## 2018-03-09 RX ORDER — ABIRATERONE ACETATE 250 MG/1
1000 TABLET ORAL DAILY
Qty: 120 TABLET | Refills: 0 | Status: SHIPPED | OUTPATIENT
Start: 2018-03-09 | End: 2019-01-30

## 2018-03-14 ENCOUNTER — ALLIED HEALTH/NURSE VISIT (OUTPATIENT)
Dept: ONCOLOGY | Facility: CLINIC | Age: 53
End: 2018-03-14
Attending: INTERNAL MEDICINE
Payer: COMMERCIAL

## 2018-03-14 VITALS
TEMPERATURE: 98.5 F | DIASTOLIC BLOOD PRESSURE: 101 MMHG | HEART RATE: 69 BPM | OXYGEN SATURATION: 97 % | RESPIRATION RATE: 18 BRPM | BODY MASS INDEX: 32.07 KG/M2 | SYSTOLIC BLOOD PRESSURE: 150 MMHG | WEIGHT: 204.8 LBS

## 2018-03-14 DIAGNOSIS — C61 MALIGNANT NEOPLASM OF PROSTATE (H): Primary | ICD-10-CM

## 2018-03-14 DIAGNOSIS — C79.51 BONE METASTASIS: ICD-10-CM

## 2018-03-14 PROCEDURE — 96372 THER/PROPH/DIAG INJ SC/IM: CPT

## 2018-03-14 PROCEDURE — 96402 CHEMO HORMON ANTINEOPL SQ/IM: CPT

## 2018-03-14 PROCEDURE — 25000128 H RX IP 250 OP 636: Mod: ZF | Performed by: INTERNAL MEDICINE

## 2018-03-14 RX ADMIN — DENOSUMAB 120 MG: 120 INJECTION SUBCUTANEOUS at 11:02

## 2018-03-14 ASSESSMENT — PAIN SCALES - GENERAL: PAINLEVEL: NO PAIN (0)

## 2018-03-14 NOTE — MR AVS SNAPSHOT
After Visit Summary   3/14/2018    Albert Amaya    MRN: [id number]           Patient Information     Date Of Birth          1965        Visit Information        Provider Department      3/14/2018 10:30 AM Nurse, [facility]        Today's Diagnoses     Malignant neoplasm of prostate (H)    -  1    Bone metastasis (H)           Follow-ups after your visit        Your next 10 appointments already scheduled     Jun 13, 2018  9:00 AM CDT   [facility]RAW   [facility] Lab Draw ([facility])    [address] [place] [address]  [address]  [facility] [id number] [phone]            Jun 13, 2018  9:30 AM CDT   (Arrive by 9:15 AM)   Return Visit with Tyrel Valladares MD   [facility] ([facility])    [address] [place] [address]  [address]  [facility] [address] [phone]              Who to contact     If you have questions or need follow up information about today's clinic visit or your schedule please contact [facility] directly at [phone].  Normal or non-critical lab and imaging results will be communicated to you by [employer], letter or phone within 4 business days after the clinic has received the results. If you do not hear from us within 7 days, please contact the clinic through [employer] or phone. If you have a critical or abnormal lab result, we will notify you by phone as soon as possible.  Submit refill requests through [employer] or call your pharmacy and they will forward the refill request to us. Please allow 3 business days for your refill to be completed.          Additional Information About Your Visit        MyChart Information     [employer] gives you secure access to your electronic health record. If you see a primary care provider, you can also send messages to your care team and make appointments. If you have questions, please  call your primary care clinic.  If you do not have a primary care provider, please call [phone] and they will assist you.        Care EveryWhere ID     This is your Care EveryWhere ID. This could be used by other organizations to access your [facility] medical records  [id number]        Your Vitals Were     Pulse Temperature Respirations Pulse Oximetry BMI (Body Mass Index)       69 98.5  F (36.9  C) (Oral) 18 97% 32.07 kg/m2        Blood Pressure from Last 3 Encounters:   03/14/18 (!) 150/101   03/07/18 127/72   12/06/17 (!) 149/97    Weight from Last 3 Encounters:   03/14/18 92.9 kg (204 lb 12.8 oz)   03/07/18 89.5 kg (197 lb 5 oz)   11/06/17 88.8 kg (195 lb 11.2 oz)              Today, you had the following     No orders found for display       Primary Care Provider Office Phone # Fax #    [facility] [phone]       [address]  [facility] [address]        Equal Access to Services     [facility]: Hadii aad ku hadasho Sokendall, waaxda luqadaha, qaybta kaalmada yoandy, bob guerrero . So [facility] [place] [facility] [phone].    ATENCIÓN: Si habla español, tiene a rivera disposición servicios gratuitos de asistencia lingüística. [facility] [phone].    We comply with applicable federal civil rights laws and [place] laws. We do not discriminate on the basis of race, color, national origin, age, disability, sex, sexual orientation, or gender identity.            Thank you!     Thank you for choosing [facility] CANCER [facility]  for your care. Our goal is always to provide you with excellent care. Hearing back from our patients is one way we can continue to improve our services. Please take a few minutes to complete the written survey that you may receive in the mail after your visit with us. Thank you!             Your Updated Medication List - Protect others around you: Learn how to safely use, store and throw away your medicines at www.disposemymeds.org.           This list is accurate as of 3/14/18  4:56 PM.  Always use your most recent med list.                   Brand Name Dispense Instructions for use Diagnosis    abiraterone 250 MG tablet    ZYTIGA    120 tablet    Take 4 tablets (1,000 mg) by mouth daily for 30 doses Take on empty stomach.    Malignant neoplasm of prostate (H), Bone metastasis (H)       EPINEPHrine 0.3 MG/0.3ML injection 2-pack    EPIPEN 2-CHARITY    0.6 mL    Inject 0.3 mLs (0.3 mg) into the muscle once as needed for anaphylaxis    Malignant neoplasm of prostate (H), Bone metastasis (H)       gabapentin 100 MG capsule    NEURONTIN    90 capsule    Take 1 capsule (100 mg) by mouth 2-3 times daily for neuropathy    Neuropathy, Malignant neoplasm of prostate (H), Bone metastasis (H), Night sweats       HYDROmorphone 2 MG tablet    DILAUDID    90 tablet    Take 1 tablet (2 mg) by mouth every 4 hours as needed for moderate to severe pain    Malignant neoplasm of prostate (H), Bone metastasis (H), Neuropathy, Night sweats, Hypothyroidism, unspecified type       IBUPROFEN PO      Take by mouth as needed for moderate pain Reported on 5/18/2017        levothyroxine 75 MCG tablet    SYNTHROID/LEVOTHROID    90 tablet    Take 1 tablet (75 mcg) by mouth daily    Hypothyroidism, unspecified type, Malignant neoplasm of prostate (H), Bone metastasis (H), Neuropathy, Night sweats       predniSONE 5 MG tablet    DELTASONE    30 tablet    Take 1 tablet (5 mg) by mouth daily (with breakfast)    Malignant neoplasm of prostate (H), Bone metastasis (H)       TYLENOL PO      Take 325 mg by mouth every 8 hours as needed for mild pain or fever Reported on 5/18/2017        venlafaxine 37.5 MG 24 hr capsule    EFFEXOR-XR    90 capsule    TAKE 1 CAPSULE(37.5 MG) BY MOUTH DAILY    Night sweats, Malignant neoplasm of prostate (H), Bone metastasis (H), Neuropathy

## 2018-03-14 NOTE — NURSING NOTE
Patient presents to the [facility] for Xgeva.  Order written by Dr. Valladares was completed today. Name and  verified with patient. See MAR for medication details. Medication was given in the following sites left arm subcutaneously. Patient tolerated injection well and was discharged to home.  Gabby Crandall CMA

## 2018-03-15 DIAGNOSIS — G62.9 NEUROPATHY: ICD-10-CM

## 2018-03-15 DIAGNOSIS — C79.51 BONE METASTASIS: ICD-10-CM

## 2018-03-15 DIAGNOSIS — R61 NIGHT SWEATS: ICD-10-CM

## 2018-03-15 DIAGNOSIS — C61 MALIGNANT NEOPLASM OF PROSTATE (H): ICD-10-CM

## 2018-03-15 RX ORDER — VENLAFAXINE HYDROCHLORIDE 75 MG/1
CAPSULE, EXTENDED RELEASE ORAL
Qty: 90 CAPSULE | Refills: 3 | Status: SHIPPED | OUTPATIENT
Start: 2018-03-15 | End: 2019-01-30

## 2018-03-22 ENCOUNTER — TELEPHONE (OUTPATIENT)
Dept: ONCOLOGY | Facility: CLINIC | Age: 53
End: 2018-03-22

## 2018-03-22 NOTE — TELEPHONE ENCOUNTER
Oral Chemotherapy Monitoring Program    Primary Oncologist: Dr. Valladares  Primary Oncology Clinic: [facility]  Cancer Diagnosis: Prostate      Drug: Zytiga 1000mg (4 x 250mg) QD continuously.  Start Date: 5/10/17      Drug Interaction Assessment:   -Zytiga + Venlafaxine = Zytiga may increase serum concentration of Venlafaxine via CYP2D6 inhibition (category D - consider therapy modification - OK per Dr. Valladares, watch for increased side effects)      Lab Monitoring Plan  C1D1+   CMP, BP C2D1+ Call, CMP, BP C3D1+ Call, CMP, BP C4D1+ Call, CMP, BP C5D1+ Call, CMP, BP C6D1+ Call, CMP, BP   C1D8+    C2D8+    C3D8+    C4D8+    C5D8+    C6D8+      C1D15+ Call, CMP C2D15+ Call, CMP C3D15+    C4D15+    C5D15+    C6D15+      C1D22+    C2D22+    C3D22+    C4D22+    C5D22+    C6D22+      CMP Q2 weeks for the first 2 months, then monthly  Check BP monthly      Subjective/Objective:  Albert Amaya is a 52 year old male contacted by phone for a follow-up visit for oral chemotherapy.  Albert reports that he is feeling well today.He forgot to take his prednisone for 3 days and felt some muscle aches and increased hot flashes. Pt reports that once he took the prednisone those symptoms went away within 5 hours. Pt also attributes the decrease in hot flashes to an increase in his Effexor. Pt reports that he was having some increased feelings of hopelessness and depression before Effexor was increased. He states that these feeling also come along when he is working 50-60 hours a week and exhausting himself. Writer encouraged patient to try and slow things down when he is starting to feel run down or depressed. Pt verbalized understanding and states that he has been working less hours and feeling less run down. Pt thanked me for the call and will call [facility] with any questions or concerns.     ORAL CHEMOTHERAPY 5/11/2017 5/18/2017 5/26/2017 6/22/2017 7/11/2017 8/25/2017 3/22/2018   Drug Name Zytiga (Abiraterone) Zytiga (Abiraterone)  "Zytiga (Abiraterone) Zytiga (Abiraterone) Zytiga (Abiraterone) Zytiga (Abiraterone) Zytiga (Abiraterone)   Current Dosage 1000mg 1000mg 1000mg 1000mg 1000mg 1000mg 1000mg   Current Schedule Daily Daily Daily Daily Daily Daily Daily   Cycle Details Continuous Continuous Continuous Continuous Continuous Continuous Continuous   Start Date of Last Cycle 5/10/2017 5/10/2017 5/10/2017 5/10/2017 - 5/10/2017 -   Planned next cycle start date - - 6/7/2017 - - - -   Doses missed in last 2 weeks - 0 0 0 1 0 0   Adherence Assessment - Adherent Adherent Adherent Adherent Adherent Adherent   Adverse Effects - - No AE identified during assessment No AE identified during assessment No AE identified during assessment Myalgias;Arthralgias -   Pharmacist Intervention(myalgias) - - - - - No -   Arthralgias - - - - - Resolved due to intervention -   Pharmacist Intervention(arthralgias) - - - - - No -   Home BPs - - Not applicable - not done not done -   Any new drug interactions? - - No No - No -   Is the dose as ordered appropriate for the patient? - - Yes Yes - Yes -       Last PHQ-2 Score on record:   PHQ-2 ( 1999 [employer]) 3/24/2017 3/1/2017   Q1: Little interest or pleasure in doing things 0 0   Q2: Feeling down, depressed or hopeless 0 0   PHQ-2 Score 0 0       Pt reports that depression symptoms are stable since Effexor was increased.      Vitals:  BP:   BP Readings from Last 1 Encounters:   03/14/18 (!) 150/101     Wt Readings from Last 1 Encounters:   03/14/18 92.9 kg (204 lb 12.8 oz)     Estimated body surface area is 2.1 meters squared as calculated from the following:    Height as of 12/6/17: 1.702 m (5' 7.01\").    Weight as of 3/14/18: 92.9 kg (204 lb 12.8 oz).    Labs:  _  Result Component Current Result Ref Range   Sodium 140 (3/7/2018) 133 - 144 mmol/L     _  Result Component Current Result Ref Range   Potassium 4.1 (3/7/2018) 3.4 - 5.3 mmol/L     _  Result Component Current Result Ref Range   Calcium 8.4 (L) (3/7/2018) " 8.5 - 10.1 mg/dL     No results found for Mag within last 30 days.     No results found for Phos within last 30 days.     _  Result Component Current Result Ref Range   Albumin 4.0 (3/7/2018) 3.4 - 5.0 g/dL     _  Result Component Current Result Ref Range   Urea Nitrogen 16 (3/7/2018) 7 - 30 mg/dL     _  Result Component Current Result Ref Range   Creatinine 0.94 (3/7/2018) 0.66 - 1.25 mg/dL       _  Result Component Current Result Ref Range   AST 11 (3/7/2018) 0 - 45 U/L     _  Result Component Current Result Ref Range   ALT 22 (3/7/2018) 0 - 70 U/L     _  Result Component Current Result Ref Range   Bilirubin Total 0.8 (3/7/2018) 0.2 - 1.3 mg/dL       No results found for WBC within last 30 days.     No results found for HGB within last 30 days.     No results found for PLT within last 30 days.     No results found for ANC within last 30 days.               Assessment:  Pt is tolerating current therapy.     Plan:  Continue with current therapy. Reminded patient to take his prednisone daily and to contact his MD if depression worsens.     Follow-Up:  4 weeks for TM assessment.     Refill Due:  approx 4/9    Adriane Finch RN  [facility]  [phone]

## 2018-04-03 DIAGNOSIS — C61 MALIGNANT NEOPLASM OF PROSTATE (H): Primary | ICD-10-CM

## 2018-04-03 DIAGNOSIS — C79.51 BONE METASTASIS: ICD-10-CM

## 2018-04-03 RX ORDER — PREDNISONE 5 MG/1
5 TABLET ORAL
Qty: 30 TABLET | Refills: 0 | Status: SHIPPED | OUTPATIENT
Start: 2018-04-03 | End: 2018-06-13

## 2018-04-03 RX ORDER — ABIRATERONE ACETATE 250 MG/1
1000 TABLET ORAL DAILY
Qty: 120 TABLET | Refills: 0 | Status: SHIPPED | OUTPATIENT
Start: 2018-04-03 | End: 2019-01-30

## 2018-04-18 ENCOUNTER — TELEPHONE (OUTPATIENT)
Dept: ONCOLOGY | Facility: CLINIC | Age: 53
End: 2018-04-18

## 2018-04-18 NOTE — TELEPHONE ENCOUNTER
I made a telephone call to patient to let him know his Zytiga/Prednisone order is ready and to offer shipment. He told me that no one ever calls him and he always has to call the pharmacy to get shipment. In reality, when I have his order ready each month, I call him and leave a voicemail to please call me back to set up delivery. I call every other day, and after the 3rd call which is 6 days from ready status, I stop calling. I have phoned his wife when not able to get in touch with the patient, and she is not pleasant and says she will call me back which she never does. I offered my phone number if he would like to set up shipment each month himself and was told he does not care to ever have my number and feels that we have provided poor service to him. I apologized in regards to his concern and he told me to never call him again.    4/18/18 @ 11:01 AM    If you have any questions, feel free to contact me.    Suzie Washington CPhT  Oral Oncology Clinic Liaison  [facility]  Pager: [phone]  Phone: [phone]

## 2018-04-23 ENCOUNTER — TELEPHONE (OUTPATIENT)
Dept: ONCOLOGY | Facility: CLINIC | Age: 53
End: 2018-04-23

## 2018-04-23 NOTE — TELEPHONE ENCOUNTER
"Oral Chemotherapy Monitoring Program    Primary Oncologist: Dr. Valladares  Primary Oncology Clinic: [facility]  Cancer Diagnosis: Prostate      Drug: Zytiga 1000mg (4 x 250mg) QD continuously.  Start Date: 5/10/17      Drug Interaction Assessment:   -Zytiga + Venlafaxine = Zytiga may increase serum concentration of Venlafaxine via CYP2D6 inhibition (category D - consider therapy modification - OK per Dr. Valladares, watch for increased side effects)      Lab Monitoring Plan  C1D1+   CMP, BP C2D1+ Call, CMP, BP C3D1+ Call, CMP, BP C4D1+ Call, CMP, BP C5D1+ Call, CMP, BP C6D1+ Call, CMP, BP   C1D8+    C2D8+    C3D8+    C4D8+    C5D8+    C6D8+      C1D15+ Call, CMP C2D15+ Call, CMP C3D15+    C4D15+    C5D15+    C6D15+      C1D22+    C2D22+    C3D22+    C4D22+    C5D22+    C6D22+      CMP Q2 weeks for the first 2 months, then monthly  Check BP monthly         Subjective/Objective:  Albert Amaya is a 52 year old male contacted by phone for a follow-up visit for oral chemotherapy.  Albert reports that he is \"getting back to normal\" and feeling well. He reports that he was feeling like a \"lunatic\" when on higher dose of prednisone and Effexor. He states that  lowered dose (he was unsure of dose) and he is now feeling better. He denies any other side effects or concerns. He is requesting to  Zytiga refill today at 2:30. Writer called order to site 19.     ORAL CHEMOTHERAPY 5/18/2017 5/26/2017 6/22/2017 7/11/2017 8/25/2017 3/22/2018 4/23/2018   Drug Name Zytiga (Abiraterone) Zytiga (Abiraterone) Zytiga (Abiraterone) Zytiga (Abiraterone) Zytiga (Abiraterone) Zytiga (Abiraterone) Zytiga (Abiraterone)   Current Dosage 1000mg 1000mg 1000mg 1000mg 1000mg 1000mg 1000mg   Current Schedule Daily Daily Daily Daily Daily Daily Daily   Cycle Details Continuous Continuous Continuous Continuous Continuous Continuous Continuous   Start Date of Last Cycle 5/10/2017 5/10/2017 5/10/2017 - 5/10/2017 - -   Planned next cycle start " "date - 6/7/2017 - - - - -   Doses missed in last 2 weeks 0 0 0 1 0 0 0   Adherence Assessment Adherent Adherent Adherent Adherent Adherent Adherent Adherent   Adverse Effects - No AE identified during assessment No AE identified during assessment No AE identified during assessment Myalgias;Arthralgias - No AE identified during assessment   Pharmacist Intervention(myalgias) - - - - No - -   Arthralgias - - - - Resolved due to intervention - -   Pharmacist Intervention(arthralgias) - - - - No - -   Home BPs - Not applicable - not done not done - -   Any new drug interactions? - No No - No - -   Is the dose as ordered appropriate for the patient? - Yes Yes - Yes - -       Last PHQ-2 Score on record:   PHQ-2 ( 1999 [employer]) 3/24/2017 3/1/2017   Q1: Little interest or pleasure in doing things 0 0   Q2: Feeling down, depressed or hopeless 0 0   PHQ-2 Score 0 0       Patient does not report depression symptoms.      Vitals:  BP:   BP Readings from Last 1 Encounters:   03/14/18 (!) 150/101     Wt Readings from Last 1 Encounters:   03/14/18 92.9 kg (204 lb 12.8 oz)     Estimated body surface area is 2.1 meters squared as calculated from the following:    Height as of 12/6/17: 1.702 m (5' 7.01\").    Weight as of 3/14/18: 92.9 kg (204 lb 12.8 oz).    Labs:  No results found for NA within last 30 days.     No results found for K within last 30 days.     No results found for CA within last 30 days.     No results found for Mag within last 30 days.     No results found for Phos within last 30 days.     No results found for ALBUMIN within last 30 days.     No results found for BUN within last 30 days.     No results found for CR within last 30 days.       No results found for AST within last 30 days.     No results found for ALT within last 30 days.     No results found for BILITOTAL within last 30 days.       No results found for WBC within last 30 days.     No results found for HGB within last 30 days.     No results found for " PLT within last 30 days.     No results found for ANC within last 30 days.               Assessment:  Pt is tolerating current therapy well.     Plan:  Continue with current therapy.     Follow-Up:  4 weeks for TM assessment and refill    Refill Due:  Pt to  today at 1430.    Adriane Finch RN  [facility]  [phone]

## 2018-05-21 DIAGNOSIS — C61 MALIGNANT NEOPLASM OF PROSTATE (H): Primary | ICD-10-CM

## 2018-05-21 DIAGNOSIS — C79.51 BONE METASTASIS: ICD-10-CM

## 2018-05-21 RX ORDER — PREDNISONE 5 MG/1
5 TABLET ORAL
Qty: 30 TABLET | Refills: 0 | Status: CANCELLED | OUTPATIENT
Start: 2018-05-27

## 2018-05-21 RX ORDER — ABIRATERONE ACETATE 250 MG/1
1000 TABLET ORAL DAILY
Qty: 120 TABLET | Refills: 0 | Status: CANCELLED | OUTPATIENT
Start: 2018-05-27 | End: 2018-06-26

## 2018-05-21 RX ORDER — PREDNISONE 5 MG/1
5 TABLET ORAL
Qty: 30 TABLET | Refills: 0 | Status: SHIPPED | OUTPATIENT
Start: 2018-05-21 | End: 2018-06-13

## 2018-05-21 RX ORDER — ABIRATERONE ACETATE 250 MG/1
1000 TABLET ORAL DAILY
Qty: 120 TABLET | Refills: 0 | Status: SHIPPED | OUTPATIENT
Start: 2018-05-21 | End: 2019-01-30

## 2018-05-24 ENCOUNTER — TELEPHONE (OUTPATIENT)
Dept: ONCOLOGY | Facility: CLINIC | Age: 53
End: 2018-05-24

## 2018-05-24 NOTE — ORAL ONC MGMT
Oral Chemotherapy Monitoring Program   Placed call to patient in follow up of Zytiga (abiraterone) therapy.   Left message requesting call back.   No drug names were mentioned.    Adriane Finch RN  [facility]  [phone]

## 2018-06-13 ENCOUNTER — APPOINTMENT (OUTPATIENT)
Dept: LAB | Facility: CLINIC | Age: 53
End: 2018-06-13
Attending: INTERNAL MEDICINE
Payer: COMMERCIAL

## 2018-06-13 ENCOUNTER — ONCOLOGY VISIT (OUTPATIENT)
Dept: ONCOLOGY | Facility: CLINIC | Age: 53
End: 2018-06-13
Attending: INTERNAL MEDICINE
Payer: COMMERCIAL

## 2018-06-13 VITALS
TEMPERATURE: 98 F | WEIGHT: 196.5 LBS | HEART RATE: 64 BPM | SYSTOLIC BLOOD PRESSURE: 150 MMHG | BODY MASS INDEX: 30.77 KG/M2 | DIASTOLIC BLOOD PRESSURE: 110 MMHG | OXYGEN SATURATION: 99 %

## 2018-06-13 DIAGNOSIS — C61 MALIGNANT NEOPLASM OF PROSTATE (H): ICD-10-CM

## 2018-06-13 DIAGNOSIS — E03.4 HYPOTHYROIDISM DUE TO ACQUIRED ATROPHY OF THYROID: Primary | ICD-10-CM

## 2018-06-13 DIAGNOSIS — C79.51 BONE METASTASIS: ICD-10-CM

## 2018-06-13 DIAGNOSIS — R73.9 HYPERGLYCEMIA: ICD-10-CM

## 2018-06-13 LAB
ALBUMIN SERPL-MCNC: 3.9 G/DL (ref 3.4–5)
ALP SERPL-CCNC: 65 U/L (ref 40–150)
ALT SERPL W P-5'-P-CCNC: 20 U/L (ref 0–70)
ANION GAP SERPL CALCULATED.3IONS-SCNC: 9 MMOL/L (ref 3–14)
AST SERPL W P-5'-P-CCNC: 13 U/L (ref 0–45)
BASOPHILS # BLD AUTO: 0 10E9/L (ref 0–0.2)
BASOPHILS NFR BLD AUTO: 0.6 %
BILIRUB SERPL-MCNC: 1.1 MG/DL (ref 0.2–1.3)
BUN SERPL-MCNC: 17 MG/DL (ref 7–30)
CALCIUM SERPL-MCNC: 9 MG/DL (ref 8.5–10.1)
CHLORIDE SERPL-SCNC: 102 MMOL/L (ref 94–109)
CO2 SERPL-SCNC: 24 MMOL/L (ref 20–32)
CREAT SERPL-MCNC: 0.89 MG/DL (ref 0.66–1.25)
DIFFERENTIAL METHOD BLD: ABNORMAL
EOSINOPHIL # BLD AUTO: 0.1 10E9/L (ref 0–0.7)
EOSINOPHIL NFR BLD AUTO: 2.3 %
ERYTHROCYTE [DISTWIDTH] IN BLOOD BY AUTOMATED COUNT: 12.1 % (ref 10–15)
GFR SERPL CREATININE-BSD FRML MDRD: >90 ML/MIN/1.7M2
GLUCOSE SERPL-MCNC: 312 MG/DL (ref 70–99)
HBA1C MFR BLD: 10.3 % (ref 0–5.6)
HCT VFR BLD AUTO: 36.8 % (ref 40–53)
HGB BLD-MCNC: 12.4 G/DL (ref 13.3–17.7)
IMM GRANULOCYTES # BLD: 0 10E9/L (ref 0–0.4)
IMM GRANULOCYTES NFR BLD: 0.4 %
LDH SERPL L TO P-CCNC: 184 U/L (ref 85–227)
LYMPHOCYTES # BLD AUTO: 2.5 10E9/L (ref 0.8–5.3)
LYMPHOCYTES NFR BLD AUTO: 51.1 %
MCH RBC QN AUTO: 30.8 PG (ref 26.5–33)
MCHC RBC AUTO-ENTMCNC: 33.7 G/DL (ref 31.5–36.5)
MCV RBC AUTO: 91 FL (ref 78–100)
MONOCYTES # BLD AUTO: 0.4 10E9/L (ref 0–1.3)
MONOCYTES NFR BLD AUTO: 7.7 %
NEUTROPHILS # BLD AUTO: 1.8 10E9/L (ref 1.6–8.3)
NEUTROPHILS NFR BLD AUTO: 37.9 %
NRBC # BLD AUTO: 0 10*3/UL
NRBC BLD AUTO-RTO: 0 /100
PLATELET # BLD AUTO: 262 10E9/L (ref 150–450)
POTASSIUM SERPL-SCNC: 3.7 MMOL/L (ref 3.4–5.3)
PROT SERPL-MCNC: 7.6 G/DL (ref 6.8–8.8)
PSA SERPL-MCNC: 9.18 UG/L (ref 0–4)
RBC # BLD AUTO: 4.03 10E12/L (ref 4.4–5.9)
SODIUM SERPL-SCNC: 135 MMOL/L (ref 133–144)
T4 FREE SERPL-MCNC: 1.34 NG/DL (ref 0.76–1.46)
TSH SERPL DL<=0.005 MIU/L-ACNC: 4.26 MU/L (ref 0.4–4)
WBC # BLD AUTO: 4.8 10E9/L (ref 4–11)

## 2018-06-13 PROCEDURE — 36415 COLL VENOUS BLD VENIPUNCTURE: CPT

## 2018-06-13 PROCEDURE — 85025 COMPLETE CBC W/AUTO DIFF WBC: CPT | Performed by: INTERNAL MEDICINE

## 2018-06-13 PROCEDURE — 99215 OFFICE O/P EST HI 40 MIN: CPT | Mod: ZP | Performed by: INTERNAL MEDICINE

## 2018-06-13 PROCEDURE — 83615 LACTATE (LD) (LDH) ENZYME: CPT | Performed by: INTERNAL MEDICINE

## 2018-06-13 PROCEDURE — 84153 ASSAY OF PSA TOTAL: CPT | Performed by: INTERNAL MEDICINE

## 2018-06-13 PROCEDURE — 84443 ASSAY THYROID STIM HORMONE: CPT | Performed by: INTERNAL MEDICINE

## 2018-06-13 PROCEDURE — 83036 HEMOGLOBIN GLYCOSYLATED A1C: CPT | Performed by: INTERNAL MEDICINE

## 2018-06-13 PROCEDURE — G0463 HOSPITAL OUTPT CLINIC VISIT: HCPCS | Mod: ZF

## 2018-06-13 PROCEDURE — 84439 ASSAY OF FREE THYROXINE: CPT | Performed by: INTERNAL MEDICINE

## 2018-06-13 PROCEDURE — 80053 COMPREHEN METABOLIC PANEL: CPT | Performed by: INTERNAL MEDICINE

## 2018-06-13 RX ORDER — VALACYCLOVIR HYDROCHLORIDE 1 G/1
TABLET, FILM COATED ORAL
Refills: 0 | COMMUNITY
Start: 2018-03-27 | End: 2019-09-18

## 2018-06-13 RX ORDER — BLOOD-GLUCOSE METER
EACH MISCELLANEOUS
Qty: 1 KIT | Refills: 0 | Status: SHIPPED | OUTPATIENT
Start: 2018-06-13 | End: 2019-01-30

## 2018-06-13 RX ORDER — HYDROCHLOROTHIAZIDE 12.5 MG/1
12.5 TABLET ORAL DAILY
Qty: 90 TABLET | Refills: 1 | Status: SHIPPED | OUTPATIENT
Start: 2018-06-13 | End: 2018-07-13

## 2018-06-13 RX ORDER — HYDROCHLOROTHIAZIDE 12.5 MG/1
12.5 TABLET ORAL DAILY
Qty: 90 TABLET | Refills: 1 | Status: SHIPPED | OUTPATIENT
Start: 2018-06-13 | End: 2018-06-13

## 2018-06-13 RX ORDER — BLOOD-GLUCOSE METER
EACH MISCELLANEOUS
Qty: 1 KIT | Refills: 0 | Status: SHIPPED | OUTPATIENT
Start: 2018-06-13 | End: 2018-06-13

## 2018-06-13 ASSESSMENT — PAIN SCALES - GENERAL: PAINLEVEL: NO PAIN (0)

## 2018-06-13 NOTE — LETTER
6/13/2018       RE: Albert Amaya  [address] [place] [address]     Dear Colleague,    Thank you for referring your patient, Albert Amaya, to the [facility]. Please see a copy of my visit note below.    [facility]  FOLLOW-UP VISIT NOTE  Date of visit: Jun 13, 2018     Cancer History:   Metastatic Prostate CA treated    - s/p 6 cycles of taxotere 75mg/m2   - s/p orchiectomy on 3/18/2016   - s/p Provenge 5/13, 5/27, 6/10   - s/p Casodex 50 mg daily March/April   - Currently taking Zytiga    HPI: Albert is a 52 year old gentleman with metastatic prostate cancer.  Albert felt a cramp in his gluteus muscle and could barely walk after doing some remodeling at home and unloading heavy truck at work. He tried flexeril and prednisone initially but eventually presented to ED on 10/5/15. He feels that initially he was nearly labeled as drug seeker since he was in lot of discomfort and flexeril was not working. But during course of ED visits labs were drawn and he had marked elevation in Alk Phosphatase (over 1000). CT did suggest some ileus with some sclerotic bone lesions. He was admitted to the hospital. His PSA was checked and was markedly elevated at 5760 (10/6/15), repeat value 5563.     Urology and Medical Oncology consults were placed. He was unhappy with the progress in hospital and felt no better and left the following day on 10/6. He did follow up with Dr. Freire and had transrectal US guided biopsy of prostate on 10/8/15. They have the results through my chart which confirms prostate adenocarcinoma - enrico 4+3 involving majority (60-90%) portion of every single core.  He started on taxotere on 10/23 and completed 6 cycles of therapy in 2/2016.  He then underwent orchiectomy on 3/18/2016.       Throughout spring of 2016 Albert's PSA started to progress and after three elevations there was concern about him being hormone refractory.  He was started  on Provenge and enrolled in the trial including Indoximod. PSA trending up and started on Casodex mid March with progression. Switched to Zytiga 5/3/17.    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     He is accompanied by his wife at this visit. He has been doing well and has no new complains attributable to his disease. He had been remodeling his bedroom as his daughters have left. He feels he has been more active during the summer than in the past. His wife points out that he has lost about 10 lbs in weight and is very worried.     He has feeling of blocked ear after showering with water in his external ear.        MEDS:   Current Outpatient Prescriptions   Medication Sig     abiraterone (ZYTIGA) 250 MG tablet Take 4 tablets (1,000 mg) by mouth daily for 30 doses Take on empty stomach.     Acetaminophen (TYLENOL PO) Take 325 mg by mouth every 8 hours as needed for mild pain or fever Reported on 5/18/2017     HYDROmorphone (DILAUDID) 2 MG tablet Take 1 tablet (2 mg) by mouth every 4 hours as needed for moderate to severe pain     levothyroxine (SYNTHROID/LEVOTHROID) 75 MCG tablet Take 1 tablet (75 mcg) by mouth daily     predniSONE (DELTASONE) 5 MG tablet Take 1 tablet (5 mg) by mouth daily (with breakfast)     EPINEPHrine (EPIPEN 2-CHARITY) 0.3 MG/0.3ML injection 2-pack Inject 0.3 mLs (0.3 mg) into the muscle once as needed for anaphylaxis (Patient not taking: Reported on 9/6/2017)     gabapentin (NEURONTIN) 100 MG capsule Take 1 capsule (100 mg) by mouth 2-3 times daily for neuropathy (Patient not taking: Reported on 6/13/2018)     IBUPROFEN PO Take by mouth as needed for moderate pain Reported on 5/18/2017     predniSONE (DELTASONE) 5 MG tablet Take 1 tablet (5 mg) by mouth daily (with breakfast) (Patient not taking: Reported on 6/13/2018)     predniSONE (DELTASONE) 5 MG tablet Take 1 tablet (5 mg) by mouth daily (with breakfast) (Patient not taking: Reported on  6/13/2018)     valACYclovir (VALTREX) 1000 mg tablet TK 1 T PO BID     venlafaxine (EFFEXOR-XR) 75 MG 24 hr capsule TAKE 1 CAPSULE(75 MG) BY MOUTH DAILY (Patient not taking: Reported on 6/13/2018)     No current facility-administered medications for this visit.         EXAM:  ECOG 1  Wt Readings from Last 4 Encounters:   06/13/18 89.1 kg (196 lb 8 oz)   03/14/18 92.9 kg (204 lb 12.8 oz)   03/07/18 89.5 kg (197 lb 5 oz)   11/06/17 88.8 kg (195 lb 11.2 oz)   BP (!) 150/110  Pulse 64  Temp 98  F (36.7  C) (Oral)  Wt 89.1 kg (196 lb 8 oz)  SpO2 99%  BMI 30.77 kg/m2  Patient alert and oriented x3.   PERRLA. EOMI. No scleral icterus noted. OP without thrush/sores.  Neck exam: Palpable 0.5 cm anterior cervical node on the left, rubbery, mobile, non-tender.   Heart: RRR no murmurs noted.   Lungs: clear to auscultation bilaterally.  No crackles or wheezing.   Abd: Normal bowel sounds.  No tenderness to palpation. No suprapubic tenderness.  No hepatomegaly.   Extremities: No lower extremity edema.   Back: No CVA tenderness.   Neuro: grossly intact.   Mood and affect is stable.     Labs/Imaging:  Recent Labs   Lab Test  06/13/18   0938  03/07/18   1103  02/05/18   1015  01/08/18   1011  12/06/17   1813   NA  135  140  139  139  141   POTASSIUM  3.7  4.1  4.0  4.0  4.7   CHLORIDE  102  106  104  106  106   CO2  24  25  28  27  27   ANIONGAP  9  10  7  6  7   BUN  17  16  14  15  19   CR  0.89  0.94  0.78  0.80  0.92   GLC  312*  129*  133*  122*  140*   WILL  9.0  8.4*  8.9  9.2  9.6     Recent Labs   Lab Test  12/21/16   1643  11/22/16   1221  10/26/16   1150  09/28/16   1137  08/17/16   1355   05/03/16   0955   MAG  2.2  2.4*  2.2  2.3  2.0   < >  2.0   PHOS   --    --    --    --    --    --   4.1    < > = values in this interval not displayed.     Recent Labs   Lab Test  06/13/18   0938  02/05/18   1015  01/08/18   1011  12/06/17   1813  11/06/17   1332   WBC  4.8  6.5  5.1  9.5  7.2   HGB  12.4*  12.5*  12.6*  12.6*   11.6*   PLT  262  289  243  294  263   MCV  91  92  92  95  95   NEUTROPHIL  37.9  45.3  42.0  71.8  53.8     Recent Labs   Lab Test  06/13/18   0938  03/07/18   1103  02/05/18   1015   BILITOTAL  1.1  0.8  0.8   ALKPHOS  65  56  71   ALT  20  22  17   AST  13  11  16   ALBUMIN  3.9  4.0  4.1   LDH  184  240*  204     TSH   Date Value Ref Range Status   06/13/2018 4.26 (H) 0.40 - 4.00 mU/L Final   03/07/2018 15.90 (H) 0.40 - 4.00 mU/L Final   03/01/2017 5.28 (H) 0.40 - 4.00 mU/L Final     No results for input(s): CEA in the last 93318 hours.  Results for orders placed or performed during the hospital encounter of 03/07/18   CT Chest/Abdomen/Pelvis w Contrast    Narrative    EXAMINATION: CT CHEST/ABDOMEN/PELVIS W CONTRAST, 3/7/2018 11:53 AM    TECHNIQUE:  Helical CT images from the thoracic inlet through the  symphysis pubis were obtained  with contrast. Contrast dose: iopamidol  (ISOVUE-370) solution 120 mL    COMPARISON: Bone scan 5/8/2017; CT exam 5/8/2017.    HISTORY: Prostate cancer with metastasis; Malignant neoplasm of  prostate (H); Bone metastasis (H); Neuropathy; Night sweats    FINDINGS:    LUNGS: Mild bilateral lower lobes dependent atelectasis. No suspicious  pulmonary nodules. No new or enlarging pulmonary nodules.    Chest: Thyroid gland is within normal limits. Central tracheobronchial  tree is patent. Thoracic aorta and main pulmonary artery have normal  diameter. Cardiac size within normal limits. No pericardial effusions.  No pleural effusions. No pneumothorax. No axillary, mediastinal or  hilar lymphadenopathy. No central pulmonary embolism.    Abdomen and pelvis: 7 mm hypoattenuating focus in hepatic segment 7,  too small to characterize, stable, series 3 image 216. No new or  enlarging liver lesions. Patent hepatic vasculature. Focal fat  adjacent to the falciform ligament. No biliary tree dilation.  Gallbladder and spleen are within normal limits. Main pancreatic duct  is not dilated.  Homogeneous pancreatic parenchyma. The spleen is not  enlarged.    Adrenal glands are within normal limits. No renal stones or  hydronephrosis bilaterally. No suspicious renal lesions. Partially  distended urinary bladder, unremarkable. Symmetric seminal vesicles.  The prostate is not enlarged. Pelvic phleboliths. Mild prominent fat  within the left inguinal canal. No inguinal lymphadenopathy.  Subcentimeter pelvic lymph nodes with no suspicious features.  Subcentimeter retroperitoneal and mesenteric lymph nodes, not enlarged  by size criteria. No abdominal aortic aneurysm. No free fluid. No free  air. Small fat containing umbilical hernia. Decompressed stomach. No  dilated loops of bowel. No bowel wall thickening. The appendix is  unremarkable.    Bones: Stable diffuse sclerotic metastatic bone lesions throughout the  appendicular and axial skeleton, grossly stable from the prior study.  No acute fractures.      Impression    IMPRESSION: In this patient with history of metastatic prostate  cancer:  1. Stable diffuse sclerotic metastatic bone lesions throughout the  appendicular and axial skeleton.  2. No evidence of metastatic disease within the chest, abdomen and  pelvis.    CHRISTINE HUMPHREYS MD     Recent Labs   Lab Test  06/13/18   0938  03/07/18   1103  02/05/18   1015  01/08/18   1011  12/06/17   1813   12/21/16   1643  11/22/16   1222   05/03/16   0955   PSA  9.18*  6.51*  6.71*  6.67*  9.42*   < >  60.68*   --    < >  67.04*   TESTOSTTOTAL   --    --    --    --    --    --  <2 2  <2    < > = values in this interval not displayed.        ASSESSMENT/PLAN:  1. Metastatic prostate cancer with bony metastasis:   Currently taking abiraterone 1000 mg po q day and prednisone 5 mg po qday  Denosumab every 12 weeks   ECOG PS 0  Weight loss of 10 lbs within a month    He has been doing very well on abiraterone. He has no new complains from the medication. Albert had been doing very well and has no complaints.  He does  complain of some diminished hearing in the right ear after taking a shower.  He feels that his ear opens up off and on for short periods of time.  I suggested that they try one of the antihistamines like cetirizine, Benadryl or Claritin.  Nasal corticosteroids might also be helpful.  If this persists, then we would have a more thorough evaluation done in ENT Clinic.      His PSA was not available at the time of clinic visit.  His wife was quite worried about his 10-pound weight loss.  I do not feel that his weight loss is attributable to his disease.  I explained that there can be several causes for the weight loss.  In fact, significant weight loss is only seen in very advanced disease and clearly he would not qualify for very advanced disease.  He had been on prednisone at a dose of 10 mg, which he has again cut down to 5 mg once a day.  He had been on Effexor which can lead to weight gain and discontinuing this could cause a decline in his weight.  He has had pedal edema off and on, which could also explain some of the weight loss.  I am not worried about his weight loss, at least in respect of his prostate cancer.      He had elevated blood pressure at this clinic visit.  This could be from mineralocorticoid excess that is seen with abiraterone therapy.  This is quite possible because he is on prednisone, a 5 mg once a day dose rather than the full recommended dose of 5 mg twice a day.  We could start him on hydrochlorothiazide to control his blood pressure.      His glucose was high at 312 this morning.  He notes that his last meal was last night.  This would make a very high fasting glucose.  This certainly qualifies for diabetes.  I will add hemoglobin A1c to the labs from today.  I have prescribed him diabetic supplies and asked him to monitor his blood sugars, both fasting and rotate at other times of the day, including prelunch, presupper and at bedtime.  I have prescribed him metformin 500 mg twice a day.   He would like to try diet and exercise to see if this can control his elevated blood sugar.  I seriously doubt that it could have a dramatic change in a short period of time.  I did explain to him that there are other purported benefits of metformin beyond control of diabetes.  It is being investigated for treatment of several malignancies.  Metformin will aid in his weight loss and overall help his diabetes, prevent fatty liver and address metabolic syndrome and possibly even his hypertension.      He had quit taking his levothyroxine at the last visit as he ran out of supplies.  He has since restarted it.  At the last visit, his TSH was elevated at 15.9.  His values are pending from today and have come back at 4.26 which, although borderline elevated, is pretty much close to the normal range.  I would not make a change in levothyroxine at this point in time.      His hemoglobin A1c has returned at 10.3, which is significantly elevated.  He has andi diabetes as suspected based from his fasting blood glucose.  I had suggested a followup in 4 weeks and I would be seeing him then.  I would also recommend a consultation in Endocrinology.  I offered him a consultation with a nutritionist, but his wife would make arrangements locally.  His PSA is elevated at 10.3 from 6.5 at the last visit.  In the big picture, this is not a big change, and I would tend to continue with abiraterone at this point in time.  We will consider a switch to an alternate therapy with a more significant rise in his PSA.  He has mild anemia.      I will see him in 4 weeks with labs prior to the clinic visit.         Over 60 min of direct face to face time spent with patient with more than 50% time spent in counseling and coordinating care.        Again, thank you for allowing me to participate in the care of your patient.      Sincerely,    Tyrel Valladares MD

## 2018-06-13 NOTE — MR AVS SNAPSHOT
After Visit Summary   6/13/2018    Albert Amaya    MRN: [id number]           Patient Information     Date Of Birth          1965        Visit Information        Provider Department      6/13/2018 9:30 AM Tyrel Valladares MD [facility]        Today's Diagnoses     Hypothyroidism due to acquired atrophy of thyroid    -  1    Malignant neoplasm of prostate (H)        Bone metastasis (H)        Hyperglycemia           Follow-ups after your visit        Your next 10 appointments already scheduled     Jul 13, 2018  9:45 AM CDT   Return Visit with Tyrel Valladares MD   [facility] ([facility])    [facility]  [address]  [address]  [facility] [id number] [phone]              Future tests that were ordered for you today     Open Standing Orders        Priority Remaining Interval Expires Ordered    TSH with free T4 reflex Routine 24/25 6/13/2019 6/13/2018            Who to contact     If you have questions or need follow up information about today's clinic visit or your schedule please contact [facility] directly at [phone].  Normal or non-critical lab and imaging results will be communicated to you by [email or web address], letter or phone within 4 business days after the clinic has received the results. If you do not hear from us within 7 days, please contact the clinic through UserTestingt or phone. If you have a critical or abnormal lab result, we will notify you by phone as soon as possible.  Submit refill requests through [email or web address] or call your pharmacy and they will forward the refill request to us. Please allow 3 business days for your refill to be completed.          Additional Information About Your Visit        [email or web address] Information     [email or web address] gives you secure access to your electronic health record. If you see a primary care provider, you can also send messages to your care team and make  appointments. If you have questions, please call your primary care clinic.  If you do not have a primary care provider, please call [phone] and they will assist you.        Care EveryWhere ID     This is your Care EveryWhere ID. This could be used by other organizations to access your [place] medical records  [id number]        Your Vitals Were     Pulse Temperature Pulse Oximetry BMI (Body Mass Index)          64 98  F (36.7  C) (Oral) 99% 30.77 kg/m2         Blood Pressure from Last 3 Encounters:   06/13/18 (!) 150/110   03/14/18 (!) 150/101   03/07/18 127/72    Weight from Last 3 Encounters:   06/13/18 89.1 kg (196 lb 8 oz)   03/14/18 92.9 kg (204 lb 12.8 oz)   03/07/18 89.5 kg (197 lb 5 oz)              We Performed the Following     CBC with platelets differential     Comprehensive metabolic panel     Hemoglobin A1c     Lactate Dehydrogenase     PSA tumor marker          Today's Medication Changes          These changes are accurate as of 6/13/18 10:55 AM.  If you have any questions, ask your nurse or doctor.               Start taking these medicines.        Dose/Directions    blood glucose monitoring lancets   Used for:  Malignant neoplasm of prostate (H), Bone metastasis (H), Hypothyroidism due to acquired atrophy of thyroid   Started by:  Tyrel Valladares MD        Use to test blood sugar twice times daily or as directed.   Quantity:  102 each   Refills:  3       blood glucose monitoring meter device kit   Used for:  Malignant neoplasm of prostate (H), Bone metastasis (H), Hypothyroidism due to acquired atrophy of thyroid   Started by:  Tyrel Valladares MD        Use to test blood sugars twice times daily or as directed.   Quantity:  1 kit   Refills:  0       blood glucose monitoring test strip   Commonly known as:  no brand specified   Used for:  Malignant neoplasm of prostate (H), Bone metastasis (H), Hypothyroidism due to acquired atrophy of thyroid   Started by:  Tyrel Valladares MD         Use to test blood sugars twice daily or as directed (fasting, rotate then second time - before lunch, before supper and bedtime)   Quantity:  100 strip   Refills:  3       hydrochlorothiazide 12.5 MG Tabs tablet   Used for:  Malignant neoplasm of prostate (H), Bone metastasis (H), Hypothyroidism due to acquired atrophy of thyroid   Started by:  Tyrel Valladares MD        Dose:  12.5 mg   Take 1 tablet (12.5 mg) by mouth daily   Quantity:  90 tablet   Refills:  1       metFORMIN 500 MG tablet   Commonly known as:  GLUCOPHAGE   Used for:  Malignant neoplasm of prostate (H), Bone metastasis (H), Hypothyroidism due to acquired atrophy of thyroid   Started by:  Tyrel Valladares MD        Dose:  500 mg   Take 1 tablet (500 mg) by mouth 2 times daily (with meals)   Quantity:  60 tablet   Refills:  1         These medicines have changed or have updated prescriptions.        Dose/Directions    predniSONE 5 MG tablet   Commonly known as:  DELTASONE   This may have changed:  Another medication with the same name was removed. Continue taking this medication, and follow the directions you see here.   Used for:  Malignant neoplasm of prostate (H), Bone metastasis (H)   Changed by:  Tyrel Valladares MD        Dose:  5 mg   Take 1 tablet (5 mg) by mouth daily (with breakfast)   Quantity:  30 tablet   Refills:  0            Where to get your medicines      These medications were sent to [facility] - [place], [place] - [address], [place] [address]     Phone:  [phone]     blood glucose monitoring lancets    blood glucose monitoring meter device kit    blood glucose monitoring test strip    hydrochlorothiazide 12.5 MG Tabs tablet    metFORMIN 500 MG tablet                Primary Care Provider Office Phone # Fax #    [place] [employer] [phone]       [address] [facility] [address]        Equal Access to Services     GABRIEL MENDOZA  AH: Bennett pandyacyndysofía Lorelei, waaxda luqadaha, qaybta kajohana pitt, bob shayna meaghanjoanna hassanmariuszjack millan. So [facility] [phone].    ATENCIÓN: Si habla español, tiene a rivera disposición servicios gratuitos de asistencia lingüística. Llame al [phone].    We comply with applicable federal civil rights laws and [place] laws. We do not discriminate on the basis of race, color, national origin, age, disability, sex, sexual orientation, or gender identity.            Thank you!     Thank you for choosing [facility]  for your care. Our goal is always to provide you with excellent care. Hearing back from our patients is one way we can continue to improve our services. Please take a few minutes to complete the written survey that you may receive in the mail after your visit with us. Thank you!             Your Updated Medication List - Protect others around you: Learn how to safely use, store and throw away your medicines at www.disposemymeds.org.          This list is accurate as of 6/13/18 10:55 AM.  Always use your most recent med list.                   Brand Name Dispense Instructions for use Diagnosis    abiraterone 250 MG tablet    ZYTIGA    120 tablet    Take 4 tablets (1,000 mg) by mouth daily for 30 doses Take on empty stomach.    Malignant neoplasm of prostate (H), Bone metastasis (H)       blood glucose monitoring lancets     102 each    Use to test blood sugar twice times daily or as directed.    Malignant neoplasm of prostate (H), Bone metastasis (H), Hypothyroidism due to acquired atrophy of thyroid       blood glucose monitoring meter device kit     1 kit    Use to test blood sugars twice times daily or as directed.    Malignant neoplasm of prostate (H), Bone metastasis (H), Hypothyroidism due to acquired atrophy of thyroid       blood glucose monitoring test strip    no brand specified    100 strip    Use to test blood sugars twice daily or as directed (fasting, rotate  then second time - before lunch, before supper and bedtime)    Malignant neoplasm of prostate (H), Bone metastasis (H), Hypothyroidism due to acquired atrophy of thyroid       EPINEPHrine 0.3 MG/0.3ML injection 2-pack    EPIPEN 2-CHARITY    0.6 mL    Inject 0.3 mLs (0.3 mg) into the muscle once as needed for anaphylaxis    Malignant neoplasm of prostate (H), Bone metastasis (H)       gabapentin 100 MG capsule    NEURONTIN    90 capsule    Take 1 capsule (100 mg) by mouth 2-3 times daily for neuropathy    Neuropathy, Malignant neoplasm of prostate (H), Bone metastasis (H), Night sweats       hydrochlorothiazide 12.5 MG Tabs tablet     90 tablet    Take 1 tablet (12.5 mg) by mouth daily    Malignant neoplasm of prostate (H), Bone metastasis (H), Hypothyroidism due to acquired atrophy of thyroid       HYDROmorphone 2 MG tablet    DILAUDID    90 tablet    Take 1 tablet (2 mg) by mouth every 4 hours as needed for moderate to severe pain    Malignant neoplasm of prostate (H), Bone metastasis (H), Neuropathy, Night sweats, Hypothyroidism, unspecified type       IBUPROFEN PO      Take by mouth as needed for moderate pain Reported on 5/18/2017        levothyroxine 75 MCG tablet    SYNTHROID/LEVOTHROID    90 tablet    Take 1 tablet (75 mcg) by mouth daily    Hypothyroidism, unspecified type, Malignant neoplasm of prostate (H), Bone metastasis (H), Neuropathy, Night sweats       metFORMIN 500 MG tablet    GLUCOPHAGE    60 tablet    Take 1 tablet (500 mg) by mouth 2 times daily (with meals)    Malignant neoplasm of prostate (H), Bone metastasis (H), Hypothyroidism due to acquired atrophy of thyroid       predniSONE 5 MG tablet    DELTASONE    30 tablet    Take 1 tablet (5 mg) by mouth daily (with breakfast)    Malignant neoplasm of prostate (H), Bone metastasis (H)       TYLENOL PO      Take 325 mg by mouth every 8 hours as needed for mild pain or fever Reported on 5/18/2017        valACYclovir 1000 mg tablet    VALTREX     TK 1 T  PO BID    Malignant neoplasm of prostate (H), Bone metastasis (H), Hypothyroidism due to acquired atrophy of thyroid       venlafaxine 75 MG 24 hr capsule    EFFEXOR-XR    90 capsule    TAKE 1 CAPSULE(75 MG) BY MOUTH DAILY    Night sweats, Malignant neoplasm of prostate (H), Bone metastasis (H), Neuropathy

## 2018-06-13 NOTE — PROGRESS NOTES
[facility]IC  FOLLOW-UP VISIT NOTE  Date of visit: Jun 13, 2018     Cancer History:   Metastatic Prostate CA treated    - s/p 6 cycles of taxotere 75mg/m2   - s/p orchiectomy on 3/18/2016   - s/p Provenge 5/13, 5/27, 6/10   - s/p Casodex 50 mg daily March/April   - Currently taking Zytiga    HPI: Albert is a 52 year old gentleman with metastatic prostate cancer.  Albert felt a cramp in his gluteus muscle and could barely walk after doing some remodeling at home and unloading heavy truck at work. He tried flexeril and prednisone initially but eventually presented to ED on 10/5/15. He feels that initially he was nearly labeled as drug seeker since he was in lot of discomfort and flexeril was not working. But during course of ED visits labs were drawn and he had marked elevation in Alk Phosphatase (over 1000). CT did suggest some ileus with some sclerotic bone lesions. He was admitted to the hospital. His PSA was checked and was markedly elevated at 5760 (10/6/15), repeat value 5563.     Urology and Medical Oncology consults were placed. He was unhappy with the progress in hospital and felt no better and left the following day on 10/6. He did follow up with Dr. Freire and had transrectal US guided biopsy of prostate on 10/8/15. They have the results through my chart which confirms prostate adenocarcinoma - enrico 4+3 involving majority (60-90%) portion of every single core.  He started on taxotere on 10/23 and completed 6 cycles of therapy in 2/2016.  He then underwent orchiectomy on 3/18/2016.       Throughout spring of 2016 Albert's PSA started to progress and after three elevations there was concern about him being hormone refractory.  He was started on Provenge and enrolled in the trial including Indoximod. PSA trending up and started on Casodex mid March with progression. Switched to Zytiga 5/3/17.    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     He is accompanied by his wife at this visit. He has been doing well and has no new complains attributable to his disease. He had been remodeling his bedroom as his daughters have left. He feels he has been more active during the summer than in the past. His wife points out that he has lost about 10 lbs in weight and is very worried.     He has feeling of blocked ear after showering with water in his external ear.        MEDS:   Current Outpatient Prescriptions   Medication Sig     abiraterone (ZYTIGA) 250 MG tablet Take 4 tablets (1,000 mg) by mouth daily for 30 doses Take on empty stomach.     Acetaminophen (TYLENOL PO) Take 325 mg by mouth every 8 hours as needed for mild pain or fever Reported on 5/18/2017     HYDROmorphone (DILAUDID) 2 MG tablet Take 1 tablet (2 mg) by mouth every 4 hours as needed for moderate to severe pain     levothyroxine (SYNTHROID/LEVOTHROID) 75 MCG tablet Take 1 tablet (75 mcg) by mouth daily     predniSONE (DELTASONE) 5 MG tablet Take 1 tablet (5 mg) by mouth daily (with breakfast)     EPINEPHrine (EPIPEN 2-CHARITY) 0.3 MG/0.3ML injection 2-pack Inject 0.3 mLs (0.3 mg) into the muscle once as needed for anaphylaxis (Patient not taking: Reported on 9/6/2017)     gabapentin (NEURONTIN) 100 MG capsule Take 1 capsule (100 mg) by mouth 2-3 times daily for neuropathy (Patient not taking: Reported on 6/13/2018)     IBUPROFEN PO Take by mouth as needed for moderate pain Reported on 5/18/2017     predniSONE (DELTASONE) 5 MG tablet Take 1 tablet (5 mg) by mouth daily (with breakfast) (Patient not taking: Reported on 6/13/2018)     predniSONE (DELTASONE) 5 MG tablet Take 1 tablet (5 mg) by mouth daily (with breakfast) (Patient not taking: Reported on 6/13/2018)     valACYclovir (VALTREX) 1000 mg tablet TK 1 T PO BID     venlafaxine (EFFEXOR-XR) 75 MG 24 hr capsule TAKE 1 CAPSULE(75 MG) BY MOUTH DAILY (Patient not taking: Reported on 6/13/2018)     No current facility-administered  medications for this visit.         EXAM:  ECOG 1  Wt Readings from Last 4 Encounters:   06/13/18 89.1 kg (196 lb 8 oz)   03/14/18 92.9 kg (204 lb 12.8 oz)   03/07/18 89.5 kg (197 lb 5 oz)   11/06/17 88.8 kg (195 lb 11.2 oz)   BP (!) 150/110  Pulse 64  Temp 98  F (36.7  C) (Oral)  Wt 89.1 kg (196 lb 8 oz)  SpO2 99%  BMI 30.77 kg/m2  Patient alert and oriented x3.   PERRLA. EOMI. No scleral icterus noted. OP without thrush/sores.  Neck exam: Palpable 0.5 cm anterior cervical node on the left, rubbery, mobile, non-tender.   Heart: RRR no murmurs noted.   Lungs: clear to auscultation bilaterally.  No crackles or wheezing.   Abd: Normal bowel sounds.  No tenderness to palpation. No suprapubic tenderness.  No hepatomegaly.   Extremities: No lower extremity edema.   Back: No CVA tenderness.   Neuro: grossly intact.   Mood and affect is stable.     Labs/Imaging:  Recent Labs   Lab Test  06/13/18   0938  03/07/18   1103  02/05/18   1015  01/08/18   1011  12/06/17   1813   NA  135  140  139  139  141   POTASSIUM  3.7  4.1  4.0  4.0  4.7   CHLORIDE  102  106  104  106  106   CO2  24  25  28  27  27   ANIONGAP  9  10  7  6  7   BUN  17  16  14  15  19   CR  0.89  0.94  0.78  0.80  0.92   GLC  312*  129*  133*  122*  140*   WILL  9.0  8.4*  8.9  9.2  9.6     Recent Labs   Lab Test  12/21/16   1643  11/22/16   1221  10/26/16   1150  09/28/16   1137  08/17/16   1355   05/03/16   0955   MAG  2.2  2.4*  2.2  2.3  2.0   < >  2.0   PHOS   --    --    --    --    --    --   4.1    < > = values in this interval not displayed.     Recent Labs   Lab Test  06/13/18   0938  02/05/18   1015  01/08/18   1011  12/06/17   1813  11/06/17   1332   WBC  4.8  6.5  5.1  9.5  7.2   HGB  12.4*  12.5*  12.6*  12.6*  11.6*   PLT  262  289  243  294  263   MCV  91  92  92  95  95   NEUTROPHIL  37.9  45.3  42.0  71.8  53.8     Recent Labs   Lab Test  06/13/18   0938  03/07/18   1103  02/05/18   1015   BILITOTAL  1.1  0.8  0.8   ALKPHOS  65  56  71    ALT  20  22  17   AST  13  11  16   ALBUMIN  3.9  4.0  4.1   LDH  184  240*  204     TSH   Date Value Ref Range Status   06/13/2018 4.26 (H) 0.40 - 4.00 mU/L Final   03/07/2018 15.90 (H) 0.40 - 4.00 mU/L Final   03/01/2017 5.28 (H) 0.40 - 4.00 mU/L Final     No results for input(s): CEA in the last 55651 hours.  Results for orders placed or performed during the hospital encounter of 03/07/18   CT Chest/Abdomen/Pelvis w Contrast    Narrative    EXAMINATION: CT CHEST/ABDOMEN/PELVIS W CONTRAST, 3/7/2018 11:53 AM    TECHNIQUE:  Helical CT images from the thoracic inlet through the  symphysis pubis were obtained  with contrast. Contrast dose: iopamidol  (ISOVUE-370) solution 120 mL    COMPARISON: Bone scan 5/8/2017; CT exam 5/8/2017.    HISTORY: Prostate cancer with metastasis; Malignant neoplasm of  prostate (H); Bone metastasis (H); Neuropathy; Night sweats    FINDINGS:    LUNGS: Mild bilateral lower lobes dependent atelectasis. No suspicious  pulmonary nodules. No new or enlarging pulmonary nodules.    Chest: Thyroid gland is within normal limits. Central tracheobronchial  tree is patent. Thoracic aorta and main pulmonary artery have normal  diameter. Cardiac size within normal limits. No pericardial effusions.  No pleural effusions. No pneumothorax. No axillary, mediastinal or  hilar lymphadenopathy. No central pulmonary embolism.    Abdomen and pelvis: 7 mm hypoattenuating focus in hepatic segment 7,  too small to characterize, stable, series 3 image 216. No new or  enlarging liver lesions. Patent hepatic vasculature. Focal fat  adjacent to the falciform ligament. No biliary tree dilation.  Gallbladder and spleen are within normal limits. Main pancreatic duct  is not dilated. Homogeneous pancreatic parenchyma. The spleen is not  enlarged.    Adrenal glands are within normal limits. No renal stones or  hydronephrosis bilaterally. No suspicious renal lesions. Partially  distended urinary bladder, unremarkable.  Symmetric seminal vesicles.  The prostate is not enlarged. Pelvic phleboliths. Mild prominent fat  within the left inguinal canal. No inguinal lymphadenopathy.  Subcentimeter pelvic lymph nodes with no suspicious features.  Subcentimeter retroperitoneal and mesenteric lymph nodes, not enlarged  by size criteria. No abdominal aortic aneurysm. No free fluid. No free  air. Small fat containing umbilical hernia. Decompressed stomach. No  dilated loops of bowel. No bowel wall thickening. The appendix is  unremarkable.    Bones: Stable diffuse sclerotic metastatic bone lesions throughout the  appendicular and axial skeleton, grossly stable from the prior study.  No acute fractures.      Impression    IMPRESSION: In this patient with history of metastatic prostate  cancer:  1. Stable diffuse sclerotic metastatic bone lesions throughout the  appendicular and axial skeleton.  2. No evidence of metastatic disease within the chest, abdomen and  pelvis.    CHRISTINE HUMPHREYS MD     Recent Labs   Lab Test  06/13/18   0938  03/07/18   1103  02/05/18   1015  01/08/18   1011  12/06/17   1813   12/21/16   1643  11/22/16   1222   05/03/16   0955   PSA  9.18*  6.51*  6.71*  6.67*  9.42*   < >  60.68*   --    < >  67.04*   TESTOSTTOTAL   --    --    --    --    --    --  <2 2  <2    < > = values in this interval not displayed.        ASSESSMENT/PLAN:  1. Metastatic prostate cancer with bony metastasis:   Currently taking abiraterone 1000 mg po q day and prednisone 5 mg po qday  Denosumab every 12 weeks   ECOG PS 0  Weight loss of 10 lbs within a month    He has been doing very well on abiraterone. He has no new complains from the medication. Albert had been doing very well and has no complaints.  He does complain of some diminished hearing in the right ear after taking a shower.  He feels that his ear opens up off and on for short periods of time.  I suggested that they try one of the antihistamines like cetirizine, Benadryl or  Claritin.  Nasal corticosteroids might also be helpful.  If this persists, then we would have a more thorough evaluation done in ENT Clinic.      His PSA was not available at the time of clinic visit.  His wife was quite worried about his 10-pound weight loss.  I do not feel that his weight loss is attributable to his disease.  I explained that there can be several causes for the weight loss.  In fact, significant weight loss is only seen in very advanced disease and clearly he would not qualify for very advanced disease.  He had been on prednisone at a dose of 10 mg, which he has again cut down to 5 mg once a day.  He had been on Effexor which can lead to weight gain and discontinuing this could cause a decline in his weight.  He has had pedal edema off and on, which could also explain some of the weight loss.  I am not worried about his weight loss, at least in respect of his prostate cancer.      He had elevated blood pressure at this clinic visit.  This could be from mineralocorticoid excess that is seen with abiraterone therapy.  This is quite possible because he is on prednisone, a 5 mg once a day dose rather than the full recommended dose of 5 mg twice a day.  We could start him on hydrochlorothiazide to control his blood pressure.      His glucose was high at 312 this morning.  He notes that his last meal was last night.  This would make a very high fasting glucose.  This certainly qualifies for diabetes.  I will add hemoglobin A1c to the labs from today.  I have prescribed him diabetic supplies and asked him to monitor his blood sugars, both fasting and rotate at other times of the day, including prelunch, presupper and at bedtime.  I have prescribed him metformin 500 mg twice a day.  He would like to try diet and exercise to see if this can control his elevated blood sugar.  I seriously doubt that it could have a dramatic change in a short period of time.  I did explain to him that there are other  purported benefits of metformin beyond control of diabetes.  It is being investigated for treatment of several malignancies.  Metformin will aid in his weight loss and overall help his diabetes, prevent fatty liver and address metabolic syndrome and possibly even his hypertension.      He had quit taking his levothyroxine at the last visit as he ran out of supplies.  He has since restarted it.  At the last visit, his TSH was elevated at 15.9.  His values are pending from today and have come back at 4.26 which, although borderline elevated, is pretty much close to the normal range.  I would not make a change in levothyroxine at this point in time.      His hemoglobin A1c has returned at 10.3, which is significantly elevated.  He has andi diabetes as suspected based from his fasting blood glucose.  I had suggested a followup in 4 weeks and I would be seeing him then.  I would also recommend a consultation in Endocrinology.  I offered him a consultation with a nutritionist, but his wife would make arrangements locally.  His PSA is elevated at 10.3 from 6.5 at the last visit.  In the big picture, this is not a big change, and I would tend to continue with abiraterone at this point in time.  We will consider a switch to an alternate therapy with a more significant rise in his PSA.  He has mild anemia.      I will see him in 4 weeks with labs prior to the clinic visit.         Over 60 min of direct face to face time spent with patient with more than 50% time spent in counseling and coordinating care.

## 2018-06-13 NOTE — NURSING NOTE
Chief Complaint   Patient presents with     Blood Draw     labs drawn via venipuncture by RN     Pulse 64  Temp 98  F (36.7  C) (Oral)  Wt 89.1 kg (196 lb 8 oz)  SpO2 99%  BMI 30.77 kg/m2    Vitals taken. Declined BP via machine.    Labs collected and sent from left antecubital venipuncture. Pt tolerated well. Pt checked in for next appointment.    Liberty Perez RN

## 2018-06-13 NOTE — NURSING NOTE
"Oncology Rooming Note    June 13, 2018 9:47 AM   Albert Amaya is a 52 year old male who presents for:    Chief Complaint   Patient presents with     Blood Draw     labs drawn via venipuncture by RN     Oncology Clinic Visit     Return Prostate Ca     Initial Vitals: Pulse 64  Temp 98  F (36.7  C) (Oral)  Wt 89.1 kg (196 lb 8 oz)  SpO2 99%  BMI 30.77 kg/m2 Estimated body mass index is 30.77 kg/(m^2) as calculated from the following:    Height as of 12/6/17: 1.702 m (5' 7.01\").    Weight as of this encounter: 89.1 kg (196 lb 8 oz). Body surface area is 2.05 meters squared.  No Pain (0) Comment: Data Unavailable   No LMP for male patient.  Allergies reviewed: Yes  Medications reviewed: Yes    Medications: Medication refills not needed today.  Pharmacy name entered into EPIC:    [id number] - [place], [place] - [address] [facility] - [place], [place] - [address] [facility] - [place], [place] - [address] [facility] [place] [address] [place] [address] [facility] [address] ([place]) - [place], [place] - [address]  [facility] - [place], [place] - [address] [facility] [phone]    Clinical concerns: no new concerns     6 minutes for nursing intake (face to face time)     Cynthia Hawk CMA              "

## 2018-06-25 DIAGNOSIS — C79.51 BONE METASTASIS: ICD-10-CM

## 2018-06-25 DIAGNOSIS — C61 MALIGNANT NEOPLASM OF PROSTATE (H): Primary | ICD-10-CM

## 2018-06-25 RX ORDER — PREDNISONE 5 MG/1
5 TABLET ORAL
Qty: 30 TABLET | Refills: 0 | Status: SHIPPED | OUTPATIENT
Start: 2018-06-25 | End: 2018-08-22

## 2018-06-25 RX ORDER — ABIRATERONE ACETATE 250 MG/1
1000 TABLET ORAL DAILY
Qty: 120 TABLET | Refills: 0 | Status: SHIPPED | OUTPATIENT
Start: 2018-06-25 | End: 2019-01-30

## 2018-06-26 ENCOUNTER — MYC MEDICAL ADVICE (OUTPATIENT)
Dept: ONCOLOGY | Facility: CLINIC | Age: 53
End: 2018-06-26

## 2018-07-13 ENCOUNTER — ALLIED HEALTH/NURSE VISIT (OUTPATIENT)
Dept: INFUSION THERAPY | Facility: CLINIC | Age: 53
End: 2018-07-13
Attending: INTERNAL MEDICINE
Payer: COMMERCIAL

## 2018-07-13 ENCOUNTER — NURSE TRIAGE (OUTPATIENT)
Dept: NURSING | Facility: CLINIC | Age: 53
End: 2018-07-13

## 2018-07-13 ENCOUNTER — ONCOLOGY VISIT (OUTPATIENT)
Dept: ONCOLOGY | Facility: CLINIC | Age: 53
End: 2018-07-13
Attending: INTERNAL MEDICINE
Payer: COMMERCIAL

## 2018-07-13 ENCOUNTER — HOSPITAL ENCOUNTER (OUTPATIENT)
Facility: CLINIC | Age: 53
Setting detail: SPECIMEN
Discharge: HOME | End: 2018-07-13
Attending: INTERNAL MEDICINE | Admitting: INTERNAL MEDICINE
Payer: COMMERCIAL

## 2018-07-13 VITALS
WEIGHT: 188.4 LBS | HEART RATE: 68 BPM | DIASTOLIC BLOOD PRESSURE: 96 MMHG | HEIGHT: 67 IN | TEMPERATURE: 97.3 F | BODY MASS INDEX: 29.57 KG/M2 | SYSTOLIC BLOOD PRESSURE: 142 MMHG | OXYGEN SATURATION: 97 % | RESPIRATION RATE: 16 BRPM

## 2018-07-13 DIAGNOSIS — C79.51 BONE METASTASIS: ICD-10-CM

## 2018-07-13 DIAGNOSIS — C61 MALIGNANT NEOPLASM OF PROSTATE (H): Primary | ICD-10-CM

## 2018-07-13 DIAGNOSIS — G62.9 NEUROPATHY: ICD-10-CM

## 2018-07-13 DIAGNOSIS — R61 NIGHT SWEATS: ICD-10-CM

## 2018-07-13 DIAGNOSIS — E03.4 HYPOTHYROIDISM DUE TO ACQUIRED ATROPHY OF THYROID: ICD-10-CM

## 2018-07-13 DIAGNOSIS — C61 MALIGNANT NEOPLASM OF PROSTATE (H): ICD-10-CM

## 2018-07-13 DIAGNOSIS — E03.9 HYPOTHYROIDISM, UNSPECIFIED TYPE: ICD-10-CM

## 2018-07-13 LAB
ALBUMIN SERPL-MCNC: 4.3 G/DL (ref 3.4–5)
ALP SERPL-CCNC: 61 U/L (ref 40–150)
ALT SERPL W P-5'-P-CCNC: 25 U/L (ref 0–70)
ANION GAP SERPL CALCULATED.3IONS-SCNC: 7 MMOL/L (ref 3–14)
AST SERPL W P-5'-P-CCNC: 18 U/L (ref 0–45)
BASOPHILS # BLD AUTO: 0.1 10E9/L (ref 0–0.2)
BASOPHILS NFR BLD AUTO: 0.8 %
BILIRUB SERPL-MCNC: 1.2 MG/DL (ref 0.2–1.3)
BUN SERPL-MCNC: 24 MG/DL (ref 7–30)
CALCIUM SERPL-MCNC: 9.9 MG/DL (ref 8.5–10.1)
CHLORIDE SERPL-SCNC: 102 MMOL/L (ref 94–109)
CO2 SERPL-SCNC: 30 MMOL/L (ref 20–32)
CREAT SERPL-MCNC: 0.88 MG/DL (ref 0.66–1.25)
DIFFERENTIAL METHOD BLD: ABNORMAL
EOSINOPHIL # BLD AUTO: 0.1 10E9/L (ref 0–0.7)
EOSINOPHIL NFR BLD AUTO: 2 %
ERYTHROCYTE [DISTWIDTH] IN BLOOD BY AUTOMATED COUNT: 12.7 % (ref 10–15)
GFR SERPL CREATININE-BSD FRML MDRD: >90 ML/MIN/1.7M2
GLUCOSE SERPL-MCNC: 123 MG/DL (ref 70–99)
HCT VFR BLD AUTO: 37.4 % (ref 40–53)
HGB BLD-MCNC: 12.5 G/DL (ref 13.3–17.7)
IMM GRANULOCYTES # BLD: 0 10E9/L (ref 0–0.4)
IMM GRANULOCYTES NFR BLD: 0.5 %
LDH SERPL L TO P-CCNC: 170 U/L (ref 85–227)
LYMPHOCYTES # BLD AUTO: 2.9 10E9/L (ref 0.8–5.3)
LYMPHOCYTES NFR BLD AUTO: 44.1 %
MCH RBC QN AUTO: 31.2 PG (ref 26.5–33)
MCHC RBC AUTO-ENTMCNC: 33.4 G/DL (ref 31.5–36.5)
MCV RBC AUTO: 93 FL (ref 78–100)
MONOCYTES # BLD AUTO: 0.5 10E9/L (ref 0–1.3)
MONOCYTES NFR BLD AUTO: 6.9 %
NEUTROPHILS # BLD AUTO: 3.1 10E9/L (ref 1.6–8.3)
NEUTROPHILS NFR BLD AUTO: 45.7 %
NRBC # BLD AUTO: 0 10*3/UL
NRBC BLD AUTO-RTO: 0 /100
PLATELET # BLD AUTO: 307 10E9/L (ref 150–450)
POTASSIUM SERPL-SCNC: 3.2 MMOL/L (ref 3.4–5.3)
PROT SERPL-MCNC: 8.1 G/DL (ref 6.8–8.8)
PSA SERPL-MCNC: 9.92 UG/L (ref 0–4)
RBC # BLD AUTO: 4.01 10E12/L (ref 4.4–5.9)
SODIUM SERPL-SCNC: 139 MMOL/L (ref 133–144)
TSH SERPL DL<=0.005 MIU/L-ACNC: 1.57 MU/L (ref 0.4–4)
WBC # BLD AUTO: 6.7 10E9/L (ref 4–11)

## 2018-07-13 PROCEDURE — 84443 ASSAY THYROID STIM HORMONE: CPT | Performed by: INTERNAL MEDICINE

## 2018-07-13 PROCEDURE — 25000128 H RX IP 250 OP 636: Performed by: INTERNAL MEDICINE

## 2018-07-13 PROCEDURE — 96372 THER/PROPH/DIAG INJ SC/IM: CPT

## 2018-07-13 PROCEDURE — 84153 ASSAY OF PSA TOTAL: CPT | Performed by: INTERNAL MEDICINE

## 2018-07-13 PROCEDURE — 80053 COMPREHEN METABOLIC PANEL: CPT | Performed by: INTERNAL MEDICINE

## 2018-07-13 PROCEDURE — 36415 COLL VENOUS BLD VENIPUNCTURE: CPT

## 2018-07-13 PROCEDURE — 83615 LACTATE (LD) (LDH) ENZYME: CPT | Performed by: INTERNAL MEDICINE

## 2018-07-13 PROCEDURE — G0463 HOSPITAL OUTPT CLINIC VISIT: HCPCS | Mod: 25

## 2018-07-13 PROCEDURE — 85025 COMPLETE CBC W/AUTO DIFF WBC: CPT | Performed by: INTERNAL MEDICINE

## 2018-07-13 PROCEDURE — 99215 OFFICE O/P EST HI 40 MIN: CPT | Performed by: INTERNAL MEDICINE

## 2018-07-13 RX ORDER — METFORMIN HYDROCHLORIDE 750 MG/1
1500 TABLET, EXTENDED RELEASE ORAL
Qty: 180 TABLET | Refills: 1 | Status: SHIPPED | OUTPATIENT
Start: 2018-07-13 | End: 2019-01-30

## 2018-07-13 RX ORDER — LISINOPRIL 5 MG/1
5 TABLET ORAL DAILY
Qty: 90 TABLET | Refills: 3 | Status: SHIPPED | OUTPATIENT
Start: 2018-07-13 | End: 2019-01-30

## 2018-07-13 RX ORDER — HYDROCHLOROTHIAZIDE 25 MG/1
12.5 TABLET ORAL DAILY
Qty: 90 TABLET | Refills: 3 | Status: SHIPPED | OUTPATIENT
Start: 2018-07-13 | End: 2019-01-30

## 2018-07-13 RX ORDER — HYDROMORPHONE HYDROCHLORIDE 2 MG/1
2 TABLET ORAL EVERY 4 HOURS PRN
Qty: 60 TABLET | Refills: 0 | Status: SHIPPED | OUTPATIENT
Start: 2018-07-13 | End: 2019-09-18

## 2018-07-13 RX ADMIN — DENOSUMAB 120 MG: 120 INJECTION SUBCUTANEOUS at 11:33

## 2018-07-13 ASSESSMENT — PAIN SCALES - GENERAL: PAINLEVEL: NO PAIN (0)

## 2018-07-13 NOTE — PROGRESS NOTES
[facility]  FOLLOW-UP VISIT NOTE  Date of visit: Jul 13, 2018     Cancer History:   Metastatic Prostate CA treated    - s/p 6 cycles of taxotere 75mg/m2   - s/p orchiectomy on 3/18/2016   - s/p Provenge 5/13, 5/27, 6/10   - s/p Casodex 50 mg daily March/April   - Currently taking Zytiga    HPI: Albert is a 52 year old gentleman with metastatic prostate cancer.  Albert felt a cramp in his gluteus muscle and could barely walk after doing some remodeling at home and unloading heavy truck at work. He tried flexeril and prednisone initially but eventually presented to ED on 10/5/15. He feels that initially he was nearly labeled as drug seeker since he was in lot of discomfort and flexeril was not working. But during course of ED visits labs were drawn and he had marked elevation in Alk Phosphatase (over 1000). CT did suggest some ileus with some sclerotic bone lesions. He was admitted to the hospital. His PSA was checked and was markedly elevated at 5760 (10/6/15), repeat value 5563.     Urology and Medical Oncology consults were placed. He was unhappy with the progress in hospital and felt no better and left the following day on 10/6. He did follow up with Dr. Freire and had transrectal US guided biopsy of prostate on 10/8/15. They have the results through my chart which confirms prostate adenocarcinoma - enrico 4+3 involving majority (60-90%) portion of every single core.  He started on taxotere on 10/23 and completed 6 cycles of therapy in 2/2016.  He then underwent orchiectomy on 3/18/2016.       Throughout spring of 2016 Albert's PSA started to progress and after three elevations there was concern about him being hormone refractory.  He was started on Provenge and enrolled in the trial including Indoximod. PSA trending up and started on Casodex mid March with progression. Switched to Zytiga 5/3/17.    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     He is accompanied by his wife at this visit. He has been doing well and has no new complains attributable to his disease.     Since the last visit we started him on metformin and hydrochlorothiazide.  His blood pressures were elevated and his wife increased the dose after discussions/communication with me to 25 mg daily.  They forgot to get his blood pressure and blood glucose logs, but both of these have been reasonably well-controlled.    He has been very active as in the past and there are several projects that he is working on around his house.    He does get pain in his joints.  These episodes feel like sharp pain, are transient and resolve on their own.  He can get pain in his either shoulders, knees or hips.  He takes Tylenol occasionally and Dilaudid at bedtime.  Lorrie is of the opinion that he is minimizing his pain in his pain medication intake.  She feels he takes Tylenol almost around-the-clock and Dilaudid every night.     MEDS:   Current Outpatient Prescriptions   Medication Sig     abiraterone (ZYTIGA) 250 MG tablet Take 4 tablets (1,000 mg) by mouth daily for 30 doses Take on empty stomach.     Acetaminophen (TYLENOL PO) Take 325 mg by mouth every 8 hours as needed for mild pain or fever Reported on 5/18/2017     blood glucose monitoring (ACCU-CHEK HEAVENLY PLUS) meter device kit Use to test blood sugars twice times daily or as directed.     blood glucose monitoring (ACCU-CHEK MULTICLIX) lancets Use to test blood sugar twice times daily or as directed.     blood glucose monitoring (NO BRAND SPECIFIED) test strip Use to test blood sugars twice daily or as directed (fasting, rotate then second time - before lunch, before supper and bedtime)     EPINEPHrine (EPIPEN 2-CHARITY) 0.3 MG/0.3ML injection 2-pack Inject 0.3 mLs (0.3 mg) into the muscle once as needed for anaphylaxis (Patient not taking: Reported on 9/6/2017)     gabapentin (NEURONTIN) 100 MG capsule Take 1 capsule (100 mg) by mouth 2-3  "times daily for neuropathy (Patient not taking: Reported on 6/13/2018)     hydrochlorothiazide 12.5 MG TABS tablet Take 1 tablet (12.5 mg) by mouth daily     HYDROmorphone (DILAUDID) 2 MG tablet Take 1 tablet (2 mg) by mouth every 4 hours as needed for moderate to severe pain     IBUPROFEN PO Take by mouth as needed for moderate pain Reported on 5/18/2017     levothyroxine (SYNTHROID/LEVOTHROID) 75 MCG tablet Take 1 tablet (75 mcg) by mouth daily     metFORMIN (GLUCOPHAGE) 500 MG tablet Take 1 tablet (500 mg) by mouth 2 times daily (with meals)     predniSONE (DELTASONE) 5 MG tablet Take 1 tablet (5 mg) by mouth daily (with breakfast)     predniSONE (DELTASONE) 5 MG tablet Take 1 tablet (5 mg) by mouth daily (with breakfast)     valACYclovir (VALTREX) 1000 mg tablet TK 1 T PO BID     venlafaxine (EFFEXOR-XR) 75 MG 24 hr capsule TAKE 1 CAPSULE(75 MG) BY MOUTH DAILY (Patient not taking: Reported on 6/13/2018)     No current facility-administered medications for this visit.         EXAM:  ECOG 1  Wt Readings from Last 4 Encounters:   06/13/18 89.1 kg (196 lb 8 oz)   03/14/18 92.9 kg (204 lb 12.8 oz)   03/07/18 89.5 kg (197 lb 5 oz)   11/06/17 88.8 kg (195 lb 11.2 oz)   BP (!) 142/96  Pulse 68  Temp 97.3  F (36.3  C) (Tympanic)  Resp 16  Ht 1.702 m (5' 7\")  Wt 85.5 kg (188 lb 6.4 oz)  SpO2 97%  BMI 29.51 kg/m2  Patient alert and oriented x3.   PERRLA. EOMI. No scleral icterus noted. OP without thrush/sores.  Neck exam: Palpable 0.5 cm anterior cervical node on the left, rubbery, mobile, non-tender.   Heart: RRR no murmurs noted.   Lungs: clear to auscultation bilaterally.  No crackles or wheezing.   Abd: Normal bowel sounds.  No tenderness to palpation. No suprapubic tenderness.  No hepatomegaly.   Extremities: No lower extremity edema.   Back: No CVA tenderness.   Neuro: grossly intact.   Mood and affect is stable.     Labs/Imaging:    Recent Labs   Lab Test  07/13/18   1115  06/13/18   0938  03/07/18   1103  " 02/05/18   1015  01/08/18   1011   NA  139  135  140  139  139   POTASSIUM  3.2*  3.7  4.1  4.0  4.0   CHLORIDE  102  102  106  104  106   CO2  30  24  25  28  27   ANIONGAP  7  9  10  7  6   BUN  24  17  16  14  15   CR  0.88  0.89  0.94  0.78  0.80   GLC  123*  312*  129*  133*  122*   WILL  9.9  9.0  8.4*  8.9  9.2     Recent Labs   Lab Test  12/21/16   1643  11/22/16   1221  10/26/16   1150  09/28/16   1137  08/17/16   1355   05/03/16   0955   MAG  2.2  2.4*  2.2  2.3  2.0   < >  2.0   PHOS   --    --    --    --    --    --   4.1    < > = values in this interval not displayed.     Recent Labs   Lab Test  07/13/18   1115  06/13/18   0938  02/05/18   1015  01/08/18   1011  12/06/17   1813   WBC  6.7  4.8  6.5  5.1  9.5   HGB  12.5*  12.4*  12.5*  12.6*  12.6*   PLT  307  262  289  243  294   MCV  93  91  92  92  95   NEUTROPHIL  45.7  37.9  45.3  42.0  71.8     Recent Labs   Lab Test  07/13/18   1115  06/13/18   0938  03/07/18   1103   BILITOTAL  1.2  1.1  0.8   ALKPHOS  61  65  56   ALT  25  20  22   AST  18  13  11   ALBUMIN  4.3  3.9  4.0   LDH  170  184  240*     TSH   Date Value Ref Range Status   07/13/2018 1.57 0.40 - 4.00 mU/L Final   06/13/2018 4.26 (H) 0.40 - 4.00 mU/L Final   03/07/2018 15.90 (H) 0.40 - 4.00 mU/L Final     Recent Labs   Lab Test  07/13/18   1115  06/13/18   0938  03/07/18   1103  02/05/18   1015  01/08/18   1011   12/21/16   1643  11/22/16   1222   05/03/16   0955   PSA  9.92*  9.18*  6.51*  6.71*  6.67*   < >  60.68*   --    < >  67.04*   TESTOSTTOTAL   --    --    --    --    --    --  <2  2*   --   <2    < > = values in this interval not displayed.             ASSESSMENT/PLAN:  1. Metastatic prostate cancer with bony metastasis:   Currently taking abiraterone 1000 mg po q day and prednisone 5 mg po qday  Denosumab every 12 weeks   Newly diagnosed HTN and DM TII  ECOG PS 0    Albert has been doing well since the last visit.  He has been taking his metformin and hydrochlorothiazide.  He  has been feeling overall better in terms of his health.  He does have some pain in his joints although this does not appear to be cancer related pain.  He does get pain in either of his shoulders/hips/knees.  He has been taking Tylenol/ibuprofen/Dilaudid as needed for his pain.  I have explained to him that Tylenol is safe as long as he keeps below 3 g daily dose.  He can use ibuprofen and often it might end up being more helpful than the other options.  However I would suggest not to take it on a scheduled basis every day as it might lead to renal insufficiency.    I have refilled his Dilaudid as he finds this most helpful during nighttime.    He notes that his blood glucose have improved and they are usually around 130 mg/dL.  I would suggest that we go up on the dose of his metformin.  I will try 750 mg twice a day.  His wife suggests trying the long-acting preparation which is a reasonable idea.  He apparently had some diarrhea with the current formulation.    His blood pressures have been reasonably controlled with systolics around 130s.  I would suggest that we add lisinopril as a nephro protective agent.  We will start with a low-dose of 5 mg once daily.    I encouraged him to focus on diet and exercise for management of his hypertension and diabetes.  He was little unsettled hearing this as he has been quite active around his house.  I feel he has to participate in an exercise regimen to get in shape and actively work out to lose weight which would help both his hypertension and diabetes. I offered him the option of considering an endocrinology expert for referral and managing his diabetes.  He would like to keep his care with me and would like me to refer him only if I am uncomfortable managing his diabetes.      His labs are unavailable at the time of clinic visit.  His PSA is relatively stable at 9.92 from the last visit.    Over 45 min of direct face to face time spent with patient with more than 50%  time spent in counseling and coordinating care.

## 2018-07-13 NOTE — PROGRESS NOTES
Infusion Nursing Note:  Albert Amaya presents today for xgeva.    Patient seen by provider today: Yes:    present during visit today: Not Applicable.    Note: N/A.    Intravenous Access:  No Intravenous access/labs at this visit.    Treatment Conditions:  Lab Results   Component Value Date     06/13/2018                   Lab Results   Component Value Date    POTASSIUM 3.7 06/13/2018           Lab Results   Component Value Date    MAG 2.2 12/21/2016            Lab Results   Component Value Date    CR 0.89 06/13/2018                   Lab Results   Component Value Date    WILL 9.0 06/13/2018                Lab Results   Component Value Date    BILITOTAL 1.1 06/13/2018           Lab Results   Component Value Date    ALBUMIN 3.9 06/13/2018                    Lab Results   Component Value Date    ALT 20 06/13/2018           Lab Results   Component Value Date    AST 13 06/13/2018           Post Infusion Assessment:  Patient tolerated injection without incident.    Discharge Plan:   Patient discharged in stable condition accompanied by: wife.  Departure Mode: Ambulatory.    Shirin Urban RN

## 2018-07-13 NOTE — MR AVS SNAPSHOT
After Visit Summary   7/13/2018    Albert Amaya    MRN: [id number]           Patient Information     Date Of Birth          1965        Visit Information        Provider Department      7/13/2018 11:15 AM [facility] LAB DRAW 1 [facility]        Today's Diagnoses     Malignant neoplasm of prostate (H)    -  1    Bone metastasis (H)           Follow-ups after your visit        Your next 10 appointments already scheduled     Aug 22, 2018  2:30 PM CDT   [facility] Draw with  [facility] DRAW   [facility] ([facility])    [address] [place] [address]  [address]  [facility] [address] [phone]            Aug 22, 2018  4:30 PM CDT   (Arrive by 4:15 PM)   Return Visit with Tyrel Valladares MD   [facility] ([facility])    [address] [place] [address]  [address]  [facility] [id number] [phone]              Who to contact     If you have questions or need follow up information about today's clinic visit or your schedule please contact [facility] directly at [phone].  Normal or non-critical lab and imaging results will be communicated to you by [employer], letter or phone within 4 business days after the clinic has received the results. If you do not hear from us within 7 days, please contact the clinic through [employer] or phone. If you have a critical or abnormal lab result, we will notify you by phone as soon as possible.  Submit refill requests through [employer] or call your pharmacy and they will forward the refill request to us. Please allow 3 business days for your refill to be completed.          Additional Information About Your Visit        MyChart Information     [employer] gives you secure access to your electronic health record. If you see a primary care provider, you can also send messages to your care team and make appointments. If you have  questions, please call your primary care clinic.  If you do not have a primary care provider, please call [phone] and they will assist you.        Care EveryWhere ID     This is your Care EveryWhere ID. This could be used by other organizations to access your [place] medical records  [id number]         Blood Pressure from Last 3 Encounters:   07/13/18 (!) 142/96   06/13/18 (!) 150/110   03/14/18 (!) 150/101    Weight from Last 3 Encounters:   07/13/18 85.5 kg (188 lb 6.4 oz)   06/13/18 89.1 kg (196 lb 8 oz)   03/14/18 92.9 kg (204 lb 12.8 oz)              Today, you had the following     No orders found for display         Today's Medication Changes          These changes are accurate as of 7/13/18 11:42 AM.  If you have any questions, ask your nurse or doctor.               Start taking these medicines.        Dose/Directions    lisinopril 5 MG tablet   Commonly known as:  PRINIVIL/ZESTRIL   Used for:  Malignant neoplasm of prostate (H), Bone metastasis (H), Hypothyroidism due to acquired atrophy of thyroid, Neuropathy, Night sweats, Hypothyroidism, unspecified type   Started by:  Tyrel Valladares MD        Dose:  5 mg   Take 1 tablet (5 mg) by mouth daily   Quantity:  90 tablet   Refills:  3         These medicines have changed or have updated prescriptions.        Dose/Directions    hydrochlorothiazide 25 MG tablet   Commonly known as:  HYDRODIURIL   This may have changed:  medication strength   Used for:  Malignant neoplasm of prostate (H), Bone metastasis (H), Hypothyroidism due to acquired atrophy of thyroid   Changed by:  Tyrel Valladares MD        Dose:  12.5 mg   Take 0.5 tablets (12.5 mg) by mouth daily   Quantity:  90 tablet   Refills:  3       * metFORMIN 500 MG tablet   Commonly known as:  GLUCOPHAGE   This may have changed:  Another medication with the same name was added. Make sure you understand how and when to take each.   Used for:  Malignant neoplasm of prostate (H), Bone  metastasis (H), Hypothyroidism due to acquired atrophy of thyroid   Changed by:  Tyrel Valladares MD        Dose:  500 mg   Take 1 tablet (500 mg) by mouth 2 times daily (with meals)   Quantity:  60 tablet   Refills:  1       * metFORMIN 750 MG 24 hr tablet   Commonly known as:  GLUCOPHAGE-XR   This may have changed:  You were already taking a medication with the same name, and this prescription was added. Make sure you understand how and when to take each.   Used for:  Malignant neoplasm of prostate (H), Bone metastasis (H), Hypothyroidism due to acquired atrophy of thyroid, Neuropathy, Night sweats, Hypothyroidism, unspecified type   Changed by:  Tyrel Valladares MD        Dose:  1500 mg   Take 2 tablets (1,500 mg) by mouth daily (with dinner)   Quantity:  180 tablet   Refills:  1       * Notice:  This list has 2 medication(s) that are the same as other medications prescribed for you. Read the directions carefully, and ask your doctor or other care provider to review them with you.         Where to get your medicines      These medications were sent to [facility] - [place], [place] - [address], [place] [address]     Phone:  [phone]     hydrochlorothiazide 25 MG tablet    lisinopril 5 MG tablet    metFORMIN 750 MG 24 hr tablet         Some of these will need a paper prescription and others can be bought over the counter.  Ask your nurse if you have questions.     Bring a paper prescription for each of these medications     HYDROmorphone 2 MG tablet                Primary Care Provider Office Phone # Fax #    [facility] [phone]       [address] [facility] [address]        Equal Access to Services     [facility] REJI : Bennett Moseley, washalonda schafer, qaybta kaalmichele pitt, bob millan. So [facility] [phone].    ATENCIÓN: Si jennila español, tiene a rivera disposición  servicios gratuitos de asistencia lingüística. Matt lópez [phone].    We comply with applicable federal civil rights laws and [place] laws. We do not discriminate on the basis of race, color, national origin, age, disability, sex, sexual orientation, or gender identity.            Thank you!     Thank you for choosing [facility]  for your care. Our goal is always to provide you with excellent care. Hearing back from our patients is one way we can continue to improve our services. Please take a few minutes to complete the written survey that you may receive in the mail after your visit with us. Thank you!             Your Updated Medication List - Protect others around you: Learn how to safely use, store and throw away your medicines at www.disposemymeds.org.          This list is accurate as of 7/13/18 11:42 AM.  Always use your most recent med list.                   Brand Name Dispense Instructions for use Diagnosis    abiraterone 250 MG tablet    ZYTIGA    120 tablet    Take 4 tablets (1,000 mg) by mouth daily for 30 doses Take on empty stomach.    Malignant neoplasm of prostate (H), Bone metastasis (H)       blood glucose monitoring lancets     102 each    Use to test blood sugar twice times daily or as directed.    Malignant neoplasm of prostate (H), Bone metastasis (H), Hypothyroidism due to acquired atrophy of thyroid       blood glucose monitoring meter device kit     1 kit    Use to test blood sugars twice times daily or as directed.    Malignant neoplasm of prostate (H), Bone metastasis (H), Hypothyroidism due to acquired atrophy of thyroid       blood glucose monitoring test strip    no brand specified    100 strip    Use to test blood sugars twice daily or as directed (fasting, rotate then second time - before lunch, before supper and bedtime)    Malignant neoplasm of prostate (H), Bone metastasis (H), Hypothyroidism due to acquired atrophy of thyroid        EPINEPHrine 0.3 MG/0.3ML injection 2-pack    EPIPEN 2-CHARITY    0.6 mL    Inject 0.3 mLs (0.3 mg) into the muscle once as needed for anaphylaxis    Malignant neoplasm of prostate (H), Bone metastasis (H)       gabapentin 100 MG capsule    NEURONTIN    90 capsule    Take 1 capsule (100 mg) by mouth 2-3 times daily for neuropathy    Neuropathy, Malignant neoplasm of prostate (H), Bone metastasis (H), Night sweats       hydrochlorothiazide 25 MG tablet    HYDRODIURIL    90 tablet    Take 0.5 tablets (12.5 mg) by mouth daily    Malignant neoplasm of prostate (H), Bone metastasis (H), Hypothyroidism due to acquired atrophy of thyroid       HYDROmorphone 2 MG tablet    DILAUDID    60 tablet    Take 1 tablet (2 mg) by mouth every 4 hours as needed for moderate to severe pain    Malignant neoplasm of prostate (H), Bone metastasis (H), Neuropathy, Night sweats, Hypothyroidism, unspecified type, Hypothyroidism due to acquired atrophy of thyroid       IBUPROFEN PO      Take by mouth as needed for moderate pain Reported on 5/18/2017        levothyroxine 75 MCG tablet    SYNTHROID/LEVOTHROID    90 tablet    Take 1 tablet (75 mcg) by mouth daily    Hypothyroidism, unspecified type, Malignant neoplasm of prostate (H), Bone metastasis (H), Neuropathy, Night sweats       lisinopril 5 MG tablet    PRINIVIL/ZESTRIL    90 tablet    Take 1 tablet (5 mg) by mouth daily    Malignant neoplasm of prostate (H), Bone metastasis (H), Hypothyroidism due to acquired atrophy of thyroid, Neuropathy, Night sweats, Hypothyroidism, unspecified type       * metFORMIN 500 MG tablet    GLUCOPHAGE    60 tablet    Take 1 tablet (500 mg) by mouth 2 times daily (with meals)    Malignant neoplasm of prostate (H), Bone metastasis (H), Hypothyroidism due to acquired atrophy of thyroid       * metFORMIN 750 MG 24 hr tablet    GLUCOPHAGE-XR    180 tablet    Take 2 tablets (1,500 mg) by mouth daily (with dinner)    Malignant neoplasm of prostate (H), Bone metastasis  (H), Hypothyroidism due to acquired atrophy of thyroid, Neuropathy, Night sweats, Hypothyroidism, unspecified type       * predniSONE 5 MG tablet    DELTASONE    30 tablet    Take 1 tablet (5 mg) by mouth daily (with breakfast)    Malignant neoplasm of prostate (H), Bone metastasis (H)       * predniSONE 5 MG tablet    DELTASONE    30 tablet    Take 1 tablet (5 mg) by mouth daily (with breakfast)    Malignant neoplasm of prostate (H), Bone metastasis (H)       TYLENOL PO      Take 325 mg by mouth every 8 hours as needed for mild pain or fever Reported on 5/18/2017        valACYclovir 1000 mg tablet    VALTREX     TK 1 T PO BID    Malignant neoplasm of prostate (H), Bone metastasis (H), Hypothyroidism due to acquired atrophy of thyroid       venlafaxine 75 MG 24 hr capsule    EFFEXOR-XR    90 capsule    TAKE 1 CAPSULE(75 MG) BY MOUTH DAILY    Night sweats, Malignant neoplasm of prostate (H), Bone metastasis (H), Neuropathy       * Notice:  This list has 4 medication(s) that are the same as other medications prescribed for you. Read the directions carefully, and ask your doctor or other care provider to review them with you.

## 2018-07-13 NOTE — PATIENT INSTRUCTIONS
Labs today-Atiya Medel today    Return to review results later today    To see me in 4-6 weeks with labs prior to visit scheduled Ariadna Mayorga

## 2018-07-13 NOTE — LETTER
7/13/2018         RE: Albert Amaya  [address] [facility] [place] [address]        Dear Colleague,    Thank you for referring your patient, Albert Amaya, to the [facility]. Please see a copy of my visit note below.    [facility]  FOLLOW-UP VISIT NOTE  Date of visit: Jul 13, 2018     Cancer History:   Metastatic Prostate CA treated    - s/p 6 cycles of taxotere 75mg/m2   - s/p orchiectomy on 3/18/2016   - s/p Provenge 5/13, 5/27, 6/10   - s/p Casodex 50 mg daily March/April   - Currently taking Zytiga    HPI: Albert is a 52 year old gentleman with metastatic prostate cancer.  Albert felt a cramp in his gluteus muscle and could barely walk after doing some remodeling at home and unloading heavy truck at work. He tried flexeril and prednisone initially but eventually presented to ED on 10/5/15. He feels that initially he was nearly labeled as drug seeker since he was in lot of discomfort and flexeril was not working. But during course of ED visits labs were drawn and he had marked elevation in Alk Phosphatase (over 1000). CT did suggest some ileus with some sclerotic bone lesions. He was admitted to the hospital. His PSA was checked and was markedly elevated at 5760 (10/6/15), repeat value 5563.     Urology and Medical Oncology consults were placed. He was unhappy with the progress in hospital and felt no better and left the following day on 10/6. He did follow up with Dr. Freire and had transrectal US guided biopsy of prostate on 10/8/15. They have the results through my chart which confirms prostate adenocarcinoma - enrico 4+3 involving majority (60-90%) portion of every single core.  He started on taxotere on 10/23 and completed 6 cycles of therapy in 2/2016.  He then underwent orchiectomy on 3/18/2016.       Throughout spring of 2016 Albert's PSA started to progress and after three elevations there was concern about him being hormone refractory.   He was started on Provenge and enrolled in the trial including Indoximod. PSA trending up and started on Casodex mid March with progression. Switched to Zytiga 5/3/17.    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     He is accompanied by his wife at this visit. He has been doing well and has no new complains attributable to his disease.     Since the last visit we started him on metformin and hydrochlorothiazide.  His blood pressures were elevated and his wife increased the dose after discussions/communication with me to 25 mg daily.  They forgot to get his blood pressure and blood glucose logs, but both of these have been reasonably well-controlled.    He has been very active as in the past and there are several projects that he is working on around his house.    He does get pain in his joints.  These episodes feel like sharp pain, are transient and resolve on their own.  He can get pain in his either shoulders, knees or hips.  He takes Tylenol occasionally and Dilaudid at bedtime.  Lorrie is of the opinion that he is minimizing his pain in his pain medication intake.  She feels he takes Tylenol almost around-the-clock and Dilaudid every night.     MEDS:   Current Outpatient Prescriptions   Medication Sig     abiraterone (ZYTIGA) 250 MG tablet Take 4 tablets (1,000 mg) by mouth daily for 30 doses Take on empty stomach.     Acetaminophen (TYLENOL PO) Take 325 mg by mouth every 8 hours as needed for mild pain or fever Reported on 5/18/2017     blood glucose monitoring (ACCU-CHEK HEAVENLY PLUS) meter device kit Use to test blood sugars twice times daily or as directed.     blood glucose monitoring (ACCU-CHEK MULTICLIX) lancets Use to test blood sugar twice times daily or as directed.     blood glucose monitoring (NO BRAND SPECIFIED) test strip Use to test blood sugars twice daily or as directed (fasting, rotate then second time - before lunch, before supper and bedtime)  "    EPINEPHrine (EPIPEN 2-CHARITY) 0.3 MG/0.3ML injection 2-pack Inject 0.3 mLs (0.3 mg) into the muscle once as needed for anaphylaxis (Patient not taking: Reported on 9/6/2017)     gabapentin (NEURONTIN) 100 MG capsule Take 1 capsule (100 mg) by mouth 2-3 times daily for neuropathy (Patient not taking: Reported on 6/13/2018)     hydrochlorothiazide 12.5 MG TABS tablet Take 1 tablet (12.5 mg) by mouth daily     HYDROmorphone (DILAUDID) 2 MG tablet Take 1 tablet (2 mg) by mouth every 4 hours as needed for moderate to severe pain     IBUPROFEN PO Take by mouth as needed for moderate pain Reported on 5/18/2017     levothyroxine (SYNTHROID/LEVOTHROID) 75 MCG tablet Take 1 tablet (75 mcg) by mouth daily     metFORMIN (GLUCOPHAGE) 500 MG tablet Take 1 tablet (500 mg) by mouth 2 times daily (with meals)     predniSONE (DELTASONE) 5 MG tablet Take 1 tablet (5 mg) by mouth daily (with breakfast)     predniSONE (DELTASONE) 5 MG tablet Take 1 tablet (5 mg) by mouth daily (with breakfast)     valACYclovir (VALTREX) 1000 mg tablet TK 1 T PO BID     venlafaxine (EFFEXOR-XR) 75 MG 24 hr capsule TAKE 1 CAPSULE(75 MG) BY MOUTH DAILY (Patient not taking: Reported on 6/13/2018)     No current facility-administered medications for this visit.         EXAM:  ECOG 1  Wt Readings from Last 4 Encounters:   06/13/18 89.1 kg (196 lb 8 oz)   03/14/18 92.9 kg (204 lb 12.8 oz)   03/07/18 89.5 kg (197 lb 5 oz)   11/06/17 88.8 kg (195 lb 11.2 oz)   BP (!) 142/96  Pulse 68  Temp 97.3  F (36.3  C) (Tympanic)  Resp 16  Ht 1.702 m (5' 7\")  Wt 85.5 kg (188 lb 6.4 oz)  SpO2 97%  BMI 29.51 kg/m2  Patient alert and oriented x3.   PERRLA. EOMI. No scleral icterus noted. OP without thrush/sores.  Neck exam: Palpable 0.5 cm anterior cervical node on the left, rubbery, mobile, non-tender.   Heart: RRR no murmurs noted.   Lungs: clear to auscultation bilaterally.  No crackles or wheezing.   Abd: Normal bowel sounds.  No tenderness to palpation. No " suprapubic tenderness.  No hepatomegaly.   Extremities: No lower extremity edema.   Back: No CVA tenderness.   Neuro: grossly intact.   Mood and affect is stable.     Labs/Imaging:    Recent Labs   Lab Test  07/13/18   1115  06/13/18   0938  03/07/18   1103  02/05/18   1015  01/08/18   1011   NA  139  135  140  139  139   POTASSIUM  3.2*  3.7  4.1  4.0  4.0   CHLORIDE  102  102  106  104  106   CO2  30  24  25  28  27   ANIONGAP  7  9  10  7  6   BUN  24  17  16  14  15   CR  0.88  0.89  0.94  0.78  0.80   GLC  123*  312*  129*  133*  122*   WILL  9.9  9.0  8.4*  8.9  9.2     Recent Labs   Lab Test  12/21/16   1643  11/22/16   1221  10/26/16   1150  09/28/16   1137  08/17/16   1355   05/03/16   0955   MAG  2.2  2.4*  2.2  2.3  2.0   < >  2.0   PHOS   --    --    --    --    --    --   4.1    < > = values in this interval not displayed.     Recent Labs   Lab Test  07/13/18   1115  06/13/18   0938  02/05/18   1015  01/08/18   1011  12/06/17   1813   WBC  6.7  4.8  6.5  5.1  9.5   HGB  12.5*  12.4*  12.5*  12.6*  12.6*   PLT  307  262  289  243  294   MCV  93  91  92  92  95   NEUTROPHIL  45.7  37.9  45.3  42.0  71.8     Recent Labs   Lab Test  07/13/18   1115  06/13/18   0938  03/07/18   1103   BILITOTAL  1.2  1.1  0.8   ALKPHOS  61  65  56   ALT  25  20  22   AST  18  13  11   ALBUMIN  4.3  3.9  4.0   LDH  170  184  240*     TSH   Date Value Ref Range Status   07/13/2018 1.57 0.40 - 4.00 mU/L Final   06/13/2018 4.26 (H) 0.40 - 4.00 mU/L Final   03/07/2018 15.90 (H) 0.40 - 4.00 mU/L Final     Recent Labs   Lab Test  07/13/18   1115  06/13/18   0938  03/07/18   1103  02/05/18   1015  01/08/18   1011   12/21/16   1643  11/22/16   1222   05/03/16   0955   PSA  9.92*  9.18*  6.51*  6.71*  6.67*   < >  60.68*   --    < >  67.04*   TESTOSTTOTAL   --    --    --    --    --    --  <2  2*   --   <2    < > = values in this interval not displayed.             ASSESSMENT/PLAN:  1. Metastatic prostate cancer with bony metastasis:    Currently taking abiraterone 1000 mg po q day and prednisone 5 mg po qday  Denosumab every 12 weeks   Newly diagnosed HTN and DM TII  ECOG PS 0    Albert has been doing well since the last visit.  He has been taking his metformin and hydrochlorothiazide.  He has been feeling overall better in terms of his health.  He does have some pain in his joints although this does not appear to be cancer related pain.  He does get pain in either of his shoulders/hips/knees.  He has been taking Tylenol/ibuprofen/Dilaudid as needed for his pain.  I have explained to him that Tylenol is safe as long as he keeps below 3 g daily dose.  He can use ibuprofen and often it might end up being more helpful than the other options.  However I would suggest not to take it on a scheduled basis every day as it might lead to renal insufficiency.    I have refilled his Dilaudid as he finds this most helpful during nighttime.    He notes that his blood glucose have improved and they are usually around 130 mg/dL.  I would suggest that we go up on the dose of his metformin.  I will try 750 mg twice a day.  His wife suggests trying the long-acting preparation which is a reasonable idea.  He apparently had some diarrhea with the current formulation.    His blood pressures have been reasonably controlled with systolics around 130s.  I would suggest that we add lisinopril as a nephro protective agent.  We will start with a low-dose of 5 mg once daily.    I encouraged him to focus on diet and exercise for management of his hypertension and diabetes.  He was little unsettled hearing this as he has been quite active around his house.  I feel he has to participate in an exercise regimen to get in shape and actively work out to lose weight which would help both his hypertension and diabetes. I offered him the option of considering an endocrinology expert for referral and managing his diabetes.  He would like to keep his care with me and would like me to  refer him only if I am uncomfortable managing his diabetes.      His labs are unavailable at the time of clinic visit.  His PSA is relatively stable at 9.92 from the last visit.    Over 45 min of direct face to face time spent with patient with more than 50% time spent in counseling and coordinating care.        Again, thank you for allowing me to participate in the care of your patient.        Sincerely,        Tyrel Valladares MD

## 2018-07-13 NOTE — NURSING NOTE
"Oncology Rooming Note    July 13, 2018 10:13 AM   Albert Amaya is a 52 year old male who presents for:    Chief Complaint   Patient presents with     Oncology Clinic Visit     Malignant neoplasm of prostate Bone metastasis      Initial Vitals: Ht 1.702 m (5' 7\")  Wt 85.5 kg (188 lb 6.4 oz)  BMI 29.51 kg/m2 Estimated body mass index is 29.51 kg/(m^2) as calculated from the following:    Height as of this encounter: 1.702 m (5' 7\").    Weight as of this encounter: 85.5 kg (188 lb 6.4 oz). Body surface area is 2.01 meters squared.  No Pain (0) Comment: Data Unavailable   No LMP for male patient.  Allergies reviewed: Yes  Medications reviewed: Yes    Medications: Medication refills not needed today.  Pharmacy name entered into EPIC:    [id number] - [place], [place] - [address] [facility] - [place], [place] - [address] [facility] - [place], [place] - [address] [facility] - [place], [place] - [address] [place] [address] DR[facility] STORE [id number] ([place] - [place], [place] - [address] [facility] [address]  [facility] - [place], [place] - [address] [facility] [phone]    Clinical concerns: Follow-up    8 minutes for nursing intake (face to face time)   DISCHARGE PLAN:  Next appointments: See patient instruction section  Departure Mode: Ambulatory  Accompanied by: Lorrie-Wife  0 minutes for nursing discharge (face to face time)   Atiya Sifuentes                "

## 2018-07-13 NOTE — MR AVS SNAPSHOT
After Visit Summary   7/13/2018    Albert Amaya    MRN: [id number]           Patient Information     Date Of Birth          1965        Visit Information        Provider Department      7/13/2018 9:45 AM Tyrel Valladares MD [facility]        Today's Diagnoses     Malignant neoplasm of prostate (H)        Bone metastasis (H)        Hypothyroidism due to acquired atrophy of thyroid        Neuropathy        Night sweats        Hypothyroidism, unspecified type          Care Instructions    Labs today-Atiya    Xgeva today    Return to review results later today    To see me in 4-6 weeks with labs prior to visit scheduled Ariadna Mayorga          Follow-ups after your visit        Your next 10 appointments already scheduled     Aug 22, 2018  2:30 PM CDT   [facility] Draw with  [facility] DRAW   [facility] ([facility])    [address] [place] [address]  [address] [place] [address] [id number] [phone]            Aug 22, 2018  4:30 PM CDT   (Arrive by 4:15 PM)   Return Visit with Tyrel Valladares MD   [facility] ([facility])    [address] [place] [address]  [address] [place] [id number] [phone]              Who to contact     If you have questions or need follow up information about today's clinic visit or your schedule please contact [facility] directly at [phone].  Normal or non-critical lab and imaging results will be communicated to you by MyChart, letter or phone within 4 business days after the clinic has received the results. If you do not hear from us within 7 days, please contact the clinic through MyChart or phone. If you have a critical or abnormal lab result, we will notify you by phone as soon as possible.  Submit refill requests through [employer] or call your pharmacy and they will forward the refill request to us. Please allow 3 business days  "for your refill to be completed.          Additional Information About Your Visit        [employer] Information     [employer] gives you secure access to your electronic health record. If you see a primary care provider, you can also send messages to your care team and make appointments. If you have questions, please call your primary care clinic.  If you do not have a primary care provider, please call [phone] and they will assist you.        Care EveryWhere ID     This is your Care EveryWhere ID. This could be used by other organizations to access your [place] medical records  [id number]        Your Vitals Were     Pulse Temperature Respirations Height Pulse Oximetry BMI (Body Mass Index)    68 97.3  F (36.3  C) (Tympanic) 16 1.702 m (5' 7\") 97% 29.51 kg/m2       Blood Pressure from Last 3 Encounters:   07/13/18 (!) 142/96   06/13/18 (!) 150/110   03/14/18 (!) 150/101    Weight from Last 3 Encounters:   07/13/18 85.5 kg (188 lb 6.4 oz)   06/13/18 89.1 kg (196 lb 8 oz)   03/14/18 92.9 kg (204 lb 12.8 oz)              We Performed the Following     CBC with platelets differential     Comprehensive metabolic panel     Lactate Dehydrogenase     PSA tumor marker     TSH with free T4 reflex          Today's Medication Changes          These changes are accurate as of 7/13/18 11:40 AM.  If you have any questions, ask your nurse or doctor.               Start taking these medicines.        Dose/Directions    lisinopril 5 MG tablet   Commonly known as:  PRINIVIL/ZESTRIL   Used for:  Malignant neoplasm of prostate (H), Bone metastasis (H), Hypothyroidism due to acquired atrophy of thyroid, Neuropathy, Night sweats, Hypothyroidism, unspecified type   Started by:  Tyrel Valladares MD        Dose:  5 mg   Take 1 tablet (5 mg) by mouth daily   Quantity:  90 tablet   Refills:  3         These medicines have changed or have updated prescriptions.        Dose/Directions    hydrochlorothiazide 25 MG tablet   Commonly known " as:  HYDRODIURIL   This may have changed:  medication strength   Used for:  Malignant neoplasm of prostate (H), Bone metastasis (H), Hypothyroidism due to acquired atrophy of thyroid   Changed by:  Tyrel Valladares MD        Dose:  12.5 mg   Take 0.5 tablets (12.5 mg) by mouth daily   Quantity:  90 tablet   Refills:  3       * metFORMIN 500 MG tablet   Commonly known as:  GLUCOPHAGE   This may have changed:  Another medication with the same name was added. Make sure you understand how and when to take each.   Used for:  Malignant neoplasm of prostate (H), Bone metastasis (H), Hypothyroidism due to acquired atrophy of thyroid   Changed by:  Tyrel Valladares MD        Dose:  500 mg   Take 1 tablet (500 mg) by mouth 2 times daily (with meals)   Quantity:  60 tablet   Refills:  1       * metFORMIN 750 MG 24 hr tablet   Commonly known as:  GLUCOPHAGE-XR   This may have changed:  You were already taking a medication with the same name, and this prescription was added. Make sure you understand how and when to take each.   Used for:  Malignant neoplasm of prostate (H), Bone metastasis (H), Hypothyroidism due to acquired atrophy of thyroid, Neuropathy, Night sweats, Hypothyroidism, unspecified type   Changed by:  Tyrel Valladares MD        Dose:  1500 mg   Take 2 tablets (1,500 mg) by mouth daily (with dinner)   Quantity:  180 tablet   Refills:  1       * Notice:  This list has 2 medication(s) that are the same as other medications prescribed for you. Read the directions carefully, and ask your doctor or other care provider to review them with you.         Where to get your medicines      These medications were sent to [facility] - [place], [place] - [address], [place] [address]     Phone:  [phone]     hydrochlorothiazide 25 MG tablet    lisinopril 5 MG tablet    metFORMIN 750 MG 24 hr tablet         Some of these will need a paper prescription and others  can be bought over the counter.  Ask your nurse if you have questions.     Bring a paper prescription for each of these medications     HYDROmorphone 2 MG tablet               Information about OPIOIDS     PRESCRIPTION OPIOIDS: WHAT YOU NEED TO KNOW   We gave you an opioid (narcotic) pain medicine. It is important to manage your pain, but opioids are not always the best choice. You should first try all the other options your care team gave you. Take this medicine for as short a time (and as few doses) as possible.     These medicines have risks:    DO NOT drive when on new or higher doses of pain medicine. These medicines can affect your alertness and reaction times, and you could be arrested for driving under the influence (DUI). If you need to use opioids long-term, talk to your care team about driving.    DO NOT operate heave machinery    DO NOT do any other dangerous activities while taking these medicines.     DO NOT drink any alcohol while taking these medicines.      If the opioid prescribed includes acetaminophen, DO NOT take with any other medicines that contain acetaminophen. Read all labels carefully. Look for the word  acetaminophen  or  Tylenol.  Ask your pharmacist if you have questions or are unsure.    You can get addicted to pain medicines, especially if you have a history of addiction (chemical, alcohol or substance dependence). Talk to your care team about ways to reduce this risk.    Store your pills in a secure place, locked if possible. We will not replace any lost or stolen medicine. If you don t finish your medicine, please throw away (dispose) as directed by your pharmacist. The [place] Pollution Control Agency has more information about safe disposal: https://www.pca.[facility].[place].us/living-green/managing-unwanted-medications.     All opioids tend to cause constipation. Drink plenty of water and eat foods that have a lot of fiber, such as fruits, vegetables, prune juice, apple juice and  high-fiber cereal. Take a laxative (Miralax, milk of magnesia, Colace, Senna) if you don t move your bowels at least every other day.          Primary Care Provider Office Phone # Fax #    [facility] [phone]       [address]  [facility] [address]        Equal Access to Services     GABRIEL MENDOZA : Hadii aad ku hadasho Soomaali, waaxda luqadaha, qaybta kaalmada adeegyada, waxnatan corral haytoshian tessa hassanmariuszjack guerrero . So [facility] [phone].    ATENCIÓN: Si habla español, tiene a rivera disposición servicios gratuitos de asistencia lingüística. Llame al [phone].    We comply with applicable federal civil rights laws and [place] laws. We do not discriminate on the basis of race, color, national origin, age, disability, sex, sexual orientation, or gender identity.            Thank you!     Thank you for choosing [facility]  for your care. Our goal is always to provide you with excellent care. Hearing back from our patients is one way we can continue to improve our services. Please take a few minutes to complete the written survey that you may receive in the mail after your visit with us. Thank you!             Your Updated Medication List - Protect others around you: Learn how to safely use, store and throw away your medicines at www.disposemymeds.org.          This list is accurate as of 7/13/18 11:40 AM.  Always use your most recent med list.                   Brand Name Dispense Instructions for use Diagnosis    abiraterone 250 MG tablet    ZYTIGA    120 tablet    Take 4 tablets (1,000 mg) by mouth daily for 30 doses Take on empty stomach.    Malignant neoplasm of prostate (H), Bone metastasis (H)       blood glucose monitoring lancets     102 each    Use to test blood sugar twice times daily or as directed.    Malignant neoplasm of prostate (H), Bone metastasis (H), Hypothyroidism due to acquired atrophy of thyroid       blood glucose monitoring meter  device kit     1 kit    Use to test blood sugars twice times daily or as directed.    Malignant neoplasm of prostate (H), Bone metastasis (H), Hypothyroidism due to acquired atrophy of thyroid       blood glucose monitoring test strip    no brand specified    100 strip    Use to test blood sugars twice daily or as directed (fasting, rotate then second time - before lunch, before supper and bedtime)    Malignant neoplasm of prostate (H), Bone metastasis (H), Hypothyroidism due to acquired atrophy of thyroid       EPINEPHrine 0.3 MG/0.3ML injection 2-pack    EPIPEN 2-CHARITY    0.6 mL    Inject 0.3 mLs (0.3 mg) into the muscle once as needed for anaphylaxis    Malignant neoplasm of prostate (H), Bone metastasis (H)       gabapentin 100 MG capsule    NEURONTIN    90 capsule    Take 1 capsule (100 mg) by mouth 2-3 times daily for neuropathy    Neuropathy, Malignant neoplasm of prostate (H), Bone metastasis (H), Night sweats       hydrochlorothiazide 25 MG tablet    HYDRODIURIL    90 tablet    Take 0.5 tablets (12.5 mg) by mouth daily    Malignant neoplasm of prostate (H), Bone metastasis (H), Hypothyroidism due to acquired atrophy of thyroid       HYDROmorphone 2 MG tablet    DILAUDID    60 tablet    Take 1 tablet (2 mg) by mouth every 4 hours as needed for moderate to severe pain    Malignant neoplasm of prostate (H), Bone metastasis (H), Neuropathy, Night sweats, Hypothyroidism, unspecified type, Hypothyroidism due to acquired atrophy of thyroid       IBUPROFEN PO      Take by mouth as needed for moderate pain Reported on 5/18/2017        levothyroxine 75 MCG tablet    SYNTHROID/LEVOTHROID    90 tablet    Take 1 tablet (75 mcg) by mouth daily    Hypothyroidism, unspecified type, Malignant neoplasm of prostate (H), Bone metastasis (H), Neuropathy, Night sweats       lisinopril 5 MG tablet    PRINIVIL/ZESTRIL    90 tablet    Take 1 tablet (5 mg) by mouth daily    Malignant neoplasm of prostate (H), Bone metastasis (H),  Hypothyroidism due to acquired atrophy of thyroid, Neuropathy, Night sweats, Hypothyroidism, unspecified type       * metFORMIN 500 MG tablet    GLUCOPHAGE    60 tablet    Take 1 tablet (500 mg) by mouth 2 times daily (with meals)    Malignant neoplasm of prostate (H), Bone metastasis (H), Hypothyroidism due to acquired atrophy of thyroid       * metFORMIN 750 MG 24 hr tablet    GLUCOPHAGE-XR    180 tablet    Take 2 tablets (1,500 mg) by mouth daily (with dinner)    Malignant neoplasm of prostate (H), Bone metastasis (H), Hypothyroidism due to acquired atrophy of thyroid, Neuropathy, Night sweats, Hypothyroidism, unspecified type       * predniSONE 5 MG tablet    DELTASONE    30 tablet    Take 1 tablet (5 mg) by mouth daily (with breakfast)    Malignant neoplasm of prostate (H), Bone metastasis (H)       * predniSONE 5 MG tablet    DELTASONE    30 tablet    Take 1 tablet (5 mg) by mouth daily (with breakfast)    Malignant neoplasm of prostate (H), Bone metastasis (H)       TYLENOL PO      Take 325 mg by mouth every 8 hours as needed for mild pain or fever Reported on 5/18/2017        valACYclovir 1000 mg tablet    VALTREX     TK 1 T PO BID    Malignant neoplasm of prostate (H), Bone metastasis (H), Hypothyroidism due to acquired atrophy of thyroid       venlafaxine 75 MG 24 hr capsule    EFFEXOR-XR    90 capsule    TAKE 1 CAPSULE(75 MG) BY MOUTH DAILY    Night sweats, Malignant neoplasm of prostate (H), Bone metastasis (H), Neuropathy       * Notice:  This list has 4 medication(s) that are the same as other medications prescribed for you. Read the directions carefully, and ask your doctor or other care provider to review them with you.

## 2018-07-13 NOTE — TELEPHONE ENCOUNTER
Regarding: eneida called in to get lab results for PT per pt seen today 7/13/18 [phone] good number for rad  ----- Message from Ruth Araiza sent at 7/13/2018  5:19 PM CDT -----  Reason for call:  Other   Patient called regarding (reason for call): call back  Additional comments: eneida called in to get lab results for PT per pt seen today 7/13/18 [phone] good number for callback    Phone number to reach patient:  Cell number on file:  Telephone Information:  Mobile         [phone]       Best Time:  Today     Can we leave a detailed message on this number?  YES

## 2018-07-30 DIAGNOSIS — C79.51 BONE METASTASIS: ICD-10-CM

## 2018-07-30 DIAGNOSIS — C61 MALIGNANT NEOPLASM OF PROSTATE (H): Primary | ICD-10-CM

## 2018-07-30 RX ORDER — PREDNISONE 5 MG/1
5 TABLET ORAL
Qty: 30 TABLET | Refills: 0 | Status: SHIPPED | OUTPATIENT
Start: 2018-07-30 | End: 2018-11-19

## 2018-07-30 RX ORDER — ABIRATERONE ACETATE 250 MG/1
1000 TABLET ORAL DAILY
Qty: 120 TABLET | Refills: 0 | Status: SHIPPED | OUTPATIENT
Start: 2018-07-30 | End: 2019-01-30

## 2018-08-22 ENCOUNTER — ONCOLOGY VISIT (OUTPATIENT)
Dept: ONCOLOGY | Facility: CLINIC | Age: 53
End: 2018-08-22
Attending: INTERNAL MEDICINE
Payer: COMMERCIAL

## 2018-08-22 VITALS
BODY MASS INDEX: 29.3 KG/M2 | RESPIRATION RATE: 18 BRPM | HEIGHT: 67 IN | DIASTOLIC BLOOD PRESSURE: 86 MMHG | TEMPERATURE: 99.1 F | HEART RATE: 92 BPM | OXYGEN SATURATION: 100 % | SYSTOLIC BLOOD PRESSURE: 133 MMHG | WEIGHT: 186.7 LBS

## 2018-08-22 DIAGNOSIS — E03.4 HYPOTHYROIDISM DUE TO ACQUIRED ATROPHY OF THYROID: ICD-10-CM

## 2018-08-22 DIAGNOSIS — C61 MALIGNANT NEOPLASM OF PROSTATE (H): ICD-10-CM

## 2018-08-22 DIAGNOSIS — C79.51 BONE METASTASIS: Primary | ICD-10-CM

## 2018-08-22 DIAGNOSIS — C79.51 BONE METASTASIS: ICD-10-CM

## 2018-08-22 LAB
ALBUMIN SERPL-MCNC: 3.7 G/DL (ref 3.4–5)
ALP SERPL-CCNC: 55 U/L (ref 40–150)
ALT SERPL W P-5'-P-CCNC: 17 U/L (ref 0–70)
ANION GAP SERPL CALCULATED.3IONS-SCNC: 6 MMOL/L (ref 3–14)
AST SERPL W P-5'-P-CCNC: 10 U/L (ref 0–45)
BASOPHILS # BLD AUTO: 0 10E9/L (ref 0–0.2)
BASOPHILS NFR BLD AUTO: 0.4 %
BILIRUB SERPL-MCNC: 0.9 MG/DL (ref 0.2–1.3)
BUN SERPL-MCNC: 13 MG/DL (ref 7–30)
CALCIUM SERPL-MCNC: 9.2 MG/DL (ref 8.5–10.1)
CHLORIDE SERPL-SCNC: 103 MMOL/L (ref 94–109)
CO2 SERPL-SCNC: 32 MMOL/L (ref 20–32)
CREAT SERPL-MCNC: 0.83 MG/DL (ref 0.66–1.25)
DIFFERENTIAL METHOD BLD: ABNORMAL
EOSINOPHIL # BLD AUTO: 0.2 10E9/L (ref 0–0.7)
EOSINOPHIL NFR BLD AUTO: 1.9 %
ERYTHROCYTE [DISTWIDTH] IN BLOOD BY AUTOMATED COUNT: 13.2 % (ref 10–15)
GFR SERPL CREATININE-BSD FRML MDRD: >90 ML/MIN/1.7M2
GLUCOSE SERPL-MCNC: 90 MG/DL (ref 70–99)
HCT VFR BLD AUTO: 34.2 % (ref 40–53)
HGB BLD-MCNC: 11.5 G/DL (ref 13.3–17.7)
IMM GRANULOCYTES # BLD: 0 10E9/L (ref 0–0.4)
IMM GRANULOCYTES NFR BLD: 0.4 %
LDH SERPL L TO P-CCNC: 151 U/L (ref 85–227)
LYMPHOCYTES # BLD AUTO: 2.9 10E9/L (ref 0.8–5.3)
LYMPHOCYTES NFR BLD AUTO: 38.1 %
MCH RBC QN AUTO: 30.7 PG (ref 26.5–33)
MCHC RBC AUTO-ENTMCNC: 33.6 G/DL (ref 31.5–36.5)
MCV RBC AUTO: 91 FL (ref 78–100)
MONOCYTES # BLD AUTO: 0.5 10E9/L (ref 0–1.3)
MONOCYTES NFR BLD AUTO: 6.5 %
NEUTROPHILS # BLD AUTO: 4.1 10E9/L (ref 1.6–8.3)
NEUTROPHILS NFR BLD AUTO: 52.7 %
NRBC # BLD AUTO: 0 10*3/UL
NRBC BLD AUTO-RTO: 0 /100
PLATELET # BLD AUTO: 266 10E9/L (ref 150–450)
POTASSIUM SERPL-SCNC: 3.4 MMOL/L (ref 3.4–5.3)
PROT SERPL-MCNC: 7.3 G/DL (ref 6.8–8.8)
PSA SERPL-MCNC: 11.8 UG/L (ref 0–4)
RBC # BLD AUTO: 3.74 10E12/L (ref 4.4–5.9)
SODIUM SERPL-SCNC: 140 MMOL/L (ref 133–144)
TSH SERPL DL<=0.005 MIU/L-ACNC: 1.88 MU/L (ref 0.4–4)
WBC # BLD AUTO: 7.7 10E9/L (ref 4–11)

## 2018-08-22 PROCEDURE — 80053 COMPREHEN METABOLIC PANEL: CPT | Performed by: INTERNAL MEDICINE

## 2018-08-22 PROCEDURE — 84153 ASSAY OF PSA TOTAL: CPT | Performed by: INTERNAL MEDICINE

## 2018-08-22 PROCEDURE — 83615 LACTATE (LD) (LDH) ENZYME: CPT | Performed by: INTERNAL MEDICINE

## 2018-08-22 PROCEDURE — 85025 COMPLETE CBC W/AUTO DIFF WBC: CPT | Performed by: INTERNAL MEDICINE

## 2018-08-22 PROCEDURE — 36415 COLL VENOUS BLD VENIPUNCTURE: CPT

## 2018-08-22 PROCEDURE — 99214 OFFICE O/P EST MOD 30 MIN: CPT | Mod: ZP | Performed by: INTERNAL MEDICINE

## 2018-08-22 PROCEDURE — 96372 THER/PROPH/DIAG INJ SC/IM: CPT | Mod: ZF

## 2018-08-22 PROCEDURE — 84443 ASSAY THYROID STIM HORMONE: CPT | Performed by: INTERNAL MEDICINE

## 2018-08-22 PROCEDURE — 25000128 H RX IP 250 OP 636: Mod: ZF | Performed by: INTERNAL MEDICINE

## 2018-08-22 RX ORDER — HYDROCHLOROTHIAZIDE 12.5 MG/1
TABLET ORAL
COMMUNITY
Start: 2018-06-13 | End: 2018-08-22 | Stop reason: DRUGHIGH

## 2018-08-22 RX ADMIN — DENOSUMAB 120 MG: 120 INJECTION SUBCUTANEOUS at 17:48

## 2018-08-22 ASSESSMENT — PAIN SCALES - GENERAL: PAINLEVEL: NO PAIN (0)

## 2018-08-22 NOTE — MR AVS SNAPSHOT
After Visit Summary   8/22/2018    Albert Amaya    MRN: [id number]           Patient Information     Date Of Birth          1965        Visit Information        Provider Department      8/22/2018 4:30 PM Tyrel Valladares MD [facility]        Today's Diagnoses     Bone metastasis (H)    -  1    Malignant neoplasm of prostate (H)           Follow-ups after your visit        Your next 10 appointments already scheduled     Oct 10, 2018  2:00 PM CDT   [facility] Lab Draw with [facility] LAB DRAW   [facility] ([facility])    [address] [place] [address]  [address]  [facility] [address] [phone]            Oct 10, 2018  2:30 PM CDT   (Arrive by 2:15 PM)   INJECTION with  Oncology Injection Nurse   [facility] ([facility])    [address] [place] [address]  [address]  [facility] [address] [phone]              Who to contact     If you have questions or need follow up information about today's clinic visit or your schedule please contact [facility] directly at [phone].  Normal or non-critical lab and imaging results will be communicated to you by [employer]hart, letter or phone within 4 business days after the clinic has received the results. If you do not hear from us within 7 days, please contact the clinic through [employer] or phone. If you have a critical or abnormal lab result, we will notify you by phone as soon as possible.  Submit refill requests through [employer] or call your pharmacy and they will forward the refill request to us. Please allow 3 business days for your refill to be completed.          Additional Information About Your Visit        MyChart Information     [employer] gives you secure access to your electronic health record. If you see a primary care provider, you can also send messages to your care team and make appointments. If you have questions, please call  "your primary care clinic.  If you do not have a primary care provider, please call [phone] and they will assist you.        Care EveryWhere ID     This is your Care EveryWhere ID. This could be used by other organizations to access your [place] medical records  [id number]        Your Vitals Were     Pulse Temperature Respirations Height Pulse Oximetry BMI (Body Mass Index)    92 99.1  F (37.3  C) (Oral) 18 1.702 m (5' 7.01\") 100% 29.23 kg/m2       Blood Pressure from Last 3 Encounters:   08/22/18 133/86   07/13/18 (!) 142/96   06/13/18 (!) 150/110    Weight from Last 3 Encounters:   08/22/18 84.7 kg (186 lb 11.2 oz)   07/13/18 85.5 kg (188 lb 6.4 oz)   06/13/18 89.1 kg (196 lb 8 oz)              Today, you had the following     No orders found for display         Today's Medication Changes          These changes are accurate as of 8/22/18  5:49 PM.  If you have any questions, ask your nurse or doctor.               These medicines have changed or have updated prescriptions.        Dose/Directions    hydrochlorothiazide 25 MG tablet   Commonly known as:  HYDRODIURIL   This may have changed:    - how much to take  - Another medication with the same name was removed. Continue taking this medication, and follow the directions you see here.   Used for:  Malignant neoplasm of prostate (H), Bone metastasis (H), Hypothyroidism due to acquired atrophy of thyroid        Dose:  12.5 mg   Take 0.5 tablets (12.5 mg) by mouth daily   Quantity:  90 tablet   Refills:  3       metFORMIN 750 MG 24 hr tablet   Commonly known as:  GLUCOPHAGE-XR   This may have changed:  Another medication with the same name was removed. Continue taking this medication, and follow the directions you see here.   Used for:  Malignant neoplasm of prostate (H), Bone metastasis (H), Hypothyroidism due to acquired atrophy of thyroid, Neuropathy, Night sweats, Hypothyroidism, unspecified type   Changed by:  Tyrel Valladares MD        Dose:  1500 mg "   Take 2 tablets (1,500 mg) by mouth daily (with dinner)   Quantity:  180 tablet   Refills:  1                Primary Care Provider Office Phone # Fax #    [facility] [phone]       [address]  [facility] [address]        Equal Access to Services     GABRIEL MENDOZA : Bennett kumar hadcyndyo Soomaali, waaxda luqadaha, qaybta kaalmada adeegyada, bob corral meaghanjoanna hassanmariuszjack millan. So [facility] [phone].    ATENCIÓN: Si habla español, tiene a rivera disposición servicios gratuitos de asistencia lingüística. Llame al [phone].    We comply with applicable federal civil rights laws and [place] laws. We do not discriminate on the basis of race, color, national origin, age, disability, sex, sexual orientation, or gender identity.            Thank you!     Thank you for choosing [facility]  for your care. Our goal is always to provide you with excellent care. Hearing back from our patients is one way we can continue to improve our services. Please take a few minutes to complete the written survey that you may receive in the mail after your visit with us. Thank you!             Your Updated Medication List - Protect others around you: Learn how to safely use, store and throw away your medicines at www.disposemymeds.org.          This list is accurate as of 8/22/18  5:49 PM.  Always use your most recent med list.                   Brand Name Dispense Instructions for use Diagnosis    abiraterone 250 MG tablet    ZYTIGA    120 tablet    Take 4 tablets (1,000 mg) by mouth daily for 30 doses Take on empty stomach.    Malignant neoplasm of prostate (H), Bone metastasis (H)       blood glucose monitoring lancets     102 each    Use to test blood sugar twice times daily or as directed.    Malignant neoplasm of prostate (H), Bone metastasis (H), Hypothyroidism due to acquired atrophy of thyroid       blood glucose monitoring meter device kit     1 kit    Use to test  blood sugars twice times daily or as directed.    Malignant neoplasm of prostate (H), Bone metastasis (H), Hypothyroidism due to acquired atrophy of thyroid       blood glucose monitoring test strip    no brand specified    100 strip    Use to test blood sugars twice daily or as directed (fasting, rotate then second time - before lunch, before supper and bedtime)    Malignant neoplasm of prostate (H), Bone metastasis (H), Hypothyroidism due to acquired atrophy of thyroid       EPINEPHrine 0.3 MG/0.3ML injection 2-pack    EPIPEN 2-CHARITY    0.6 mL    Inject 0.3 mLs (0.3 mg) into the muscle once as needed for anaphylaxis    Malignant neoplasm of prostate (H), Bone metastasis (H)       gabapentin 100 MG capsule    NEURONTIN    90 capsule    Take 1 capsule (100 mg) by mouth 2-3 times daily for neuropathy    Neuropathy, Malignant neoplasm of prostate (H), Bone metastasis (H), Night sweats       hydrochlorothiazide 25 MG tablet    HYDRODIURIL    90 tablet    Take 0.5 tablets (12.5 mg) by mouth daily    Malignant neoplasm of prostate (H), Bone metastasis (H), Hypothyroidism due to acquired atrophy of thyroid       HYDROmorphone 2 MG tablet    DILAUDID    60 tablet    Take 1 tablet (2 mg) by mouth every 4 hours as needed for moderate to severe pain    Malignant neoplasm of prostate (H), Bone metastasis (H), Neuropathy, Night sweats, Hypothyroidism, unspecified type, Hypothyroidism due to acquired atrophy of thyroid       IBUPROFEN PO      Take by mouth as needed for moderate pain Reported on 5/18/2017        levothyroxine 75 MCG tablet    SYNTHROID/LEVOTHROID    90 tablet    Take 1 tablet (75 mcg) by mouth daily    Hypothyroidism, unspecified type, Malignant neoplasm of prostate (H), Bone metastasis (H), Neuropathy, Night sweats       lisinopril 5 MG tablet    PRINIVIL/ZESTRIL    90 tablet    Take 1 tablet (5 mg) by mouth daily    Malignant neoplasm of prostate (H), Bone metastasis (H), Hypothyroidism due to acquired atrophy  of thyroid, Neuropathy, Night sweats, Hypothyroidism, unspecified type       metFORMIN 750 MG 24 hr tablet    GLUCOPHAGE-XR    180 tablet    Take 2 tablets (1,500 mg) by mouth daily (with dinner)    Malignant neoplasm of prostate (H), Bone metastasis (H), Hypothyroidism due to acquired atrophy of thyroid, Neuropathy, Night sweats, Hypothyroidism, unspecified type       predniSONE 5 MG tablet    DELTASONE    30 tablet    Take 1 tablet (5 mg) by mouth daily (with breakfast)    Malignant neoplasm of prostate (H), Bone metastasis (H)       TYLENOL PO      Take 325 mg by mouth every 8 hours as needed for mild pain or fever Reported on 5/18/2017        valACYclovir 1000 mg tablet    VALTREX     TK 1 T PO BID    Malignant neoplasm of prostate (H), Bone metastasis (H), Hypothyroidism due to acquired atrophy of thyroid       venlafaxine 75 MG 24 hr capsule    EFFEXOR-XR    90 capsule    TAKE 1 CAPSULE(75 MG) BY MOUTH DAILY    Night sweats, Malignant neoplasm of prostate (H), Bone metastasis (H), Neuropathy

## 2018-08-22 NOTE — LETTER
8/22/2018       RE: Albert Amaya  [address] [facility] [place] [address]     Dear Colleague,    Thank you for referring your patient, Albert Amaya, to the [facility]. Please see a copy of my visit note below.    [facility]  FOLLOW-UP VISIT NOTE  Date of visit: Aug 22, 2018     Cancer History:   Metastatic Prostate CA treated    - s/p 6 cycles of taxotere 75mg/m2   - s/p orchiectomy on 3/18/2016   - s/p Provenge 5/13, 5/27, 6/10   - s/p Casodex 50 mg daily March/April   - Currently taking Zytiga    HPI: Albert is a 52 year old gentleman with metastatic prostate cancer.  Albert felt a cramp in his gluteus muscle and could barely walk after doing some remodeling at home and unloading heavy truck at work. He tried flexeril and prednisone initially but eventually presented to ED on 10/5/15. He feels that initially he was nearly labeled as drug seeker since he was in lot of discomfort and flexeril was not working. But during course of ED visits labs were drawn and he had marked elevation in Alk Phosphatase (over 1000). CT did suggest some ileus with some sclerotic bone lesions. He was admitted to the hospital. His PSA was checked and was markedly elevated at 5760 (10/6/15), repeat value 5563.     Urology and Medical Oncology consults were placed. He was unhappy with the progress in hospital and felt no better and left the following day on 10/6. He did follow up with Dr. Freire and had transrectal US guided biopsy of prostate on 10/8/15. They have the results through my chart which confirms prostate adenocarcinoma - enrico 4+3 involving majority (60-90%) portion of every single core.  He started on taxotere on 10/23 and completed 6 cycles of therapy in 2/2016.  He then underwent orchiectomy on 3/18/2016.       Throughout spring of 2016 Albert's PSA started to progress and after three elevations there was concern about him being hormone refractory.  He was started  on Provenge and enrolled in the trial including Indoximod. PSA trending up and started on Casodex mid March with progression. Switched to Zytiga 5/3/17.    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     He is accompanied by his wife at this visit. He has been doing well and has no new complains attributable to his disease.     Since the last visit we started him on metformin and hydrochlorothiazide.  His blood pressures were elevated and his wife increased the dose after discussions/communication with me to 25 mg daily.  They forgot to get his blood pressure and blood glucose logs, but both of these have been reasonably well-controlled.    He has been very active as in the past and there are several projects that he is working on around his house.    He does get pain in his joints.  These episodes feel like sharp pain, are transient and resolve on their own.  He can get pain in his either shoulders, knees or hips.  He takes Tylenol occasionally and Dilaudid at bedtime.  Lorrie is of the opinion that he is minimizing his pain in his pain medication intake.  She feels he takes Tylenol almost around-the-clock and Dilaudid every night.     MEDS:   Current Outpatient Prescriptions   Medication Sig     abiraterone (ZYTIGA) 250 MG tablet Take 4 tablets (1,000 mg) by mouth daily for 30 doses Take on empty stomach.     Acetaminophen (TYLENOL PO) Take 325 mg by mouth every 8 hours as needed for mild pain or fever Reported on 5/18/2017     blood glucose monitoring (ACCU-CHEK HEAVENLY PLUS) meter device kit Use to test blood sugars twice times daily or as directed.     blood glucose monitoring (ACCU-CHEK MULTICLIX) lancets Use to test blood sugar twice times daily or as directed.     blood glucose monitoring (NO BRAND SPECIFIED) test strip Use to test blood sugars twice daily or as directed (fasting, rotate then second time - before lunch, before supper and bedtime)     EPINEPHrine  "(EPIPEN 2-CHARITY) 0.3 MG/0.3ML injection 2-pack Inject 0.3 mLs (0.3 mg) into the muscle once as needed for anaphylaxis     hydrochlorothiazide (HYDRODIURIL) 25 MG tablet Take 0.5 tablets (12.5 mg) by mouth daily (Patient taking differently: Take 25 mg by mouth daily )     HYDROmorphone (DILAUDID) 2 MG tablet Take 1 tablet (2 mg) by mouth every 4 hours as needed for moderate to severe pain     IBUPROFEN PO Take by mouth as needed for moderate pain Reported on 5/18/2017     levothyroxine (SYNTHROID/LEVOTHROID) 75 MCG tablet Take 1 tablet (75 mcg) by mouth daily     lisinopril (PRINIVIL/ZESTRIL) 5 MG tablet Take 1 tablet (5 mg) by mouth daily     metFORMIN (GLUCOPHAGE-XR) 750 MG 24 hr tablet Take 2 tablets (1,500 mg) by mouth daily (with dinner)     predniSONE (DELTASONE) 5 MG tablet Take 1 tablet (5 mg) by mouth daily (with breakfast)     gabapentin (NEURONTIN) 100 MG capsule Take 1 capsule (100 mg) by mouth 2-3 times daily for neuropathy (Patient not taking: Reported on 8/22/2018)     valACYclovir (VALTREX) 1000 mg tablet TK 1 T PO BID     venlafaxine (EFFEXOR-XR) 75 MG 24 hr capsule TAKE 1 CAPSULE(75 MG) BY MOUTH DAILY (Patient not taking: Reported on 8/22/2018)     [DISCONTINUED] hydrochlorothiazide 12.5 MG TABS tablet      [DISCONTINUED] metFORMIN (GLUCOPHAGE) 500 MG tablet Take 1 tablet (500 mg) by mouth 2 times daily (with meals)     No current facility-administered medications for this visit.         EXAM:  ECOG 1  Wt Readings from Last 4 Encounters:   08/22/18 84.7 kg (186 lb 11.2 oz)   07/13/18 85.5 kg (188 lb 6.4 oz)   06/13/18 89.1 kg (196 lb 8 oz)   03/14/18 92.9 kg (204 lb 12.8 oz)   /86 (BP Location: Right arm, Patient Position: Sitting, Cuff Size: Adult Regular)  Pulse 92  Temp 99.1  F (37.3  C) (Oral)  Resp 18  Ht 1.702 m (5' 7.01\")  Wt 84.7 kg (186 lb 11.2 oz)  SpO2 100%  BMI 29.23 kg/m2  Patient alert and oriented x3.   PERRLA. EOMI. No scleral icterus noted. OP without thrush/sores.  Neck " exam: Palpable 0.5 cm anterior cervical node on the left, rubbery, mobile, non-tender.   Heart: RRR no murmurs noted.   Lungs: clear to auscultation bilaterally.  No crackles or wheezing.   Abd: Normal bowel sounds.  No tenderness to palpation. No suprapubic tenderness.  No hepatomegaly.   Extremities: No lower extremity edema.   Back: No CVA tenderness.   Neuro: grossly intact.   Mood and affect is stable.     Labs/Imaging:    Recent Labs   Lab Test  08/22/18   1437  07/13/18   1115  06/13/18   0938  03/07/18   1103  02/05/18   1015   NA  140  139  135  140  139   POTASSIUM  3.4  3.2*  3.7  4.1  4.0   CHLORIDE  103  102  102  106  104   CO2  32  30  24  25  28   ANIONGAP  6  7  9  10  7   BUN  13  24  17  16  14   CR  0.83  0.88  0.89  0.94  0.78   GLC  90  123*  312*  129*  133*   WILL  9.2  9.9  9.0  8.4*  8.9     Recent Labs   Lab Test  12/21/16   1643  11/22/16   1221  10/26/16   1150  09/28/16   1137  08/17/16   1355   05/03/16   0955   MAG  2.2  2.4*  2.2  2.3  2.0   < >  2.0   PHOS   --    --    --    --    --    --   4.1    < > = values in this interval not displayed.     Recent Labs   Lab Test  08/22/18   1437  07/13/18   1115  06/13/18   0938  02/05/18   1015  01/08/18   1011   WBC  7.7  6.7  4.8  6.5  5.1   HGB  11.5*  12.5*  12.4*  12.5*  12.6*   PLT  266  307  262  289  243   MCV  91  93  91  92  92   NEUTROPHIL  52.7  45.7  37.9  45.3  42.0     Recent Labs   Lab Test  08/22/18   1437  07/13/18   1115  06/13/18   0938   BILITOTAL  0.9  1.2  1.1   ALKPHOS  55  61  65   ALT  17  25  20   AST  10  18  13   ALBUMIN  3.7  4.3  3.9   LDH  151  170  184     TSH   Date Value Ref Range Status   08/22/2018 1.88 0.40 - 4.00 mU/L Final   07/13/2018 1.57 0.40 - 4.00 mU/L Final   06/13/2018 4.26 (H) 0.40 - 4.00 mU/L Final     Recent Labs   Lab Test  08/22/18   1437  07/13/18   1115  06/13/18   0938  03/07/18   1103  02/05/18   1015   12/21/16   1643  11/22/16   1222   05/03/16   0955   PSA  11.80*  9.92*  9.18*   6.51*  6.71*   < >  60.68*   --    < >  67.04*   TESTOSTTOTAL   --    --    --    --    --    --   <2  2*   --   <2    < > = values in this interval not displayed.           ASSESSMENT/PLAN:  1. Metastatic prostate cancer with bony metastasis:   Currently taking abiraterone 1000 mg po q day and prednisone 5 mg po qday  Denosumab every 12 weeks   Newly diagnosed HTN and DM TII  ECOG PS 0    Albert has been doing well since the last visit.  He has been taking his metformin and hydrochlorothiazide.  He has been feeling overall better in terms of his health. He does not have any new concerns or difficulty with abiraterone so far. His PSA was available towards the end of visit and has been stable though it is numerically higher. His PSA was closer to 75 at the time of start of abiraterone therapy. It has nicely declined and has been slowly rising since March of this year when it was at its lower at 6.51 - though has not doubled as yet. His PSA at diagnosis was 5759.     If his PSA begins to rise more steeply we will have to change therapies. We could reconsider docetaxel at that time.     He will get his denosumab today. I will see him in 6 weeks with labs prior to visit and denosumab after visit.     As always he and his wife had come prepared with questions which were answered.     Over 25 min of direct face to face time spent with patient with more than 50% time spent in counseling and coordinating care.        Again, thank you for allowing me to participate in the care of your patient.      Sincerely,    Tyrel Valladares MD

## 2018-08-22 NOTE — PROGRESS NOTES
[facility]IN[facility]  FOLLOW-UP VISIT NOTE  Date of visit: Aug 22, 2018     Cancer History:   Metastatic Prostate CA treated    - s/p 6 cycles of taxotere 75mg/m2   - s/p orchiectomy on 3/18/2016   - s/p Provenge 5/13, 5/27, 6/10   - s/p Casodex 50 mg daily March/April   - Currently taking Zytiga    HPI: Albert is a 52 year old gentleman with metastatic prostate cancer.  Albert felt a cramp in his gluteus muscle and could barely walk after doing some remodeling at home and unloading heavy truck at work. He tried flexeril and prednisone initially but eventually presented to ED on 10/5/15. He feels that initially he was nearly labeled as drug seeker since he was in lot of discomfort and flexeril was not working. But during course of ED visits labs were drawn and he had marked elevation in Alk Phosphatase (over 1000). CT did suggest some ileus with some sclerotic bone lesions. He was admitted to the hospital. His PSA was checked and was markedly elevated at 5760 (10/6/15), repeat value 5563.     Urology and Medical Oncology consults were placed. He was unhappy with the progress in hospital and felt no better and left the following day on 10/6. He did follow up with Dr. Freire and had transrectal US guided biopsy of prostate on 10/8/15. They have the results through my chart which confirms prostate adenocarcinoma - enrico 4+3 involving majority (60-90%) portion of every single core.  He started on taxotere on 10/23 and completed 6 cycles of therapy in 2/2016.  He then underwent orchiectomy on 3/18/2016.       Throughout spring of 2016 Albert's PSA started to progress and after three elevations there was concern about him being hormone refractory.  He was started on Provenge and enrolled in the trial including Indoximod. PSA trending up and started on Casodex mid March with progression. Switched to Zytiga 5/3/17.    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     He is accompanied by his wife at this visit. He has been doing well and has no new complains attributable to his disease.     Since the last visit we started him on metformin and hydrochlorothiazide.  His blood pressures were elevated and his wife increased the dose after discussions/communication with me to 25 mg daily.  They forgot to get his blood pressure and blood glucose logs, but both of these have been reasonably well-controlled.    He has been very active as in the past and there are several projects that he is working on around his house.    He does get pain in his joints.  These episodes feel like sharp pain, are transient and resolve on their own.  He can get pain in his either shoulders, knees or hips.  He takes Tylenol occasionally and Dilaudid at bedtime.  Lorrie is of the opinion that he is minimizing his pain in his pain medication intake.  She feels he takes Tylenol almost around-the-clock and Dilaudid every night.     MEDS:   Current Outpatient Prescriptions   Medication Sig     abiraterone (ZYTIGA) 250 MG tablet Take 4 tablets (1,000 mg) by mouth daily for 30 doses Take on empty stomach.     Acetaminophen (TYLENOL PO) Take 325 mg by mouth every 8 hours as needed for mild pain or fever Reported on 5/18/2017     blood glucose monitoring (ACCU-CHEK HEAVENLY PLUS) meter device kit Use to test blood sugars twice times daily or as directed.     blood glucose monitoring (ACCU-CHEK MULTICLIX) lancets Use to test blood sugar twice times daily or as directed.     blood glucose monitoring (NO BRAND SPECIFIED) test strip Use to test blood sugars twice daily or as directed (fasting, rotate then second time - before lunch, before supper and bedtime)     EPINEPHrine (EPIPEN 2-CHARITY) 0.3 MG/0.3ML injection 2-pack Inject 0.3 mLs (0.3 mg) into the muscle once as needed for anaphylaxis     hydrochlorothiazide (HYDRODIURIL) 25 MG tablet Take 0.5 tablets (12.5 mg) by mouth daily (Patient taking differently:  "Take 25 mg by mouth daily )     HYDROmorphone (DILAUDID) 2 MG tablet Take 1 tablet (2 mg) by mouth every 4 hours as needed for moderate to severe pain     IBUPROFEN PO Take by mouth as needed for moderate pain Reported on 5/18/2017     levothyroxine (SYNTHROID/LEVOTHROID) 75 MCG tablet Take 1 tablet (75 mcg) by mouth daily     lisinopril (PRINIVIL/ZESTRIL) 5 MG tablet Take 1 tablet (5 mg) by mouth daily     metFORMIN (GLUCOPHAGE-XR) 750 MG 24 hr tablet Take 2 tablets (1,500 mg) by mouth daily (with dinner)     predniSONE (DELTASONE) 5 MG tablet Take 1 tablet (5 mg) by mouth daily (with breakfast)     gabapentin (NEURONTIN) 100 MG capsule Take 1 capsule (100 mg) by mouth 2-3 times daily for neuropathy (Patient not taking: Reported on 8/22/2018)     valACYclovir (VALTREX) 1000 mg tablet TK 1 T PO BID     venlafaxine (EFFEXOR-XR) 75 MG 24 hr capsule TAKE 1 CAPSULE(75 MG) BY MOUTH DAILY (Patient not taking: Reported on 8/22/2018)     [DISCONTINUED] hydrochlorothiazide 12.5 MG TABS tablet      [DISCONTINUED] metFORMIN (GLUCOPHAGE) 500 MG tablet Take 1 tablet (500 mg) by mouth 2 times daily (with meals)     No current facility-administered medications for this visit.         EXAM:  ECOG 1  Wt Readings from Last 4 Encounters:   08/22/18 84.7 kg (186 lb 11.2 oz)   07/13/18 85.5 kg (188 lb 6.4 oz)   06/13/18 89.1 kg (196 lb 8 oz)   03/14/18 92.9 kg (204 lb 12.8 oz)   /86 (BP Location: Right arm, Patient Position: Sitting, Cuff Size: Adult Regular)  Pulse 92  Temp 99.1  F (37.3  C) (Oral)  Resp 18  Ht 1.702 m (5' 7.01\")  Wt 84.7 kg (186 lb 11.2 oz)  SpO2 100%  BMI 29.23 kg/m2  Patient alert and oriented x3.   PERRLA. EOMI. No scleral icterus noted. OP without thrush/sores.  Neck exam: Palpable 0.5 cm anterior cervical node on the left, rubbery, mobile, non-tender.   Heart: RRR no murmurs noted.   Lungs: clear to auscultation bilaterally.  No crackles or wheezing.   Abd: Normal bowel sounds.  No tenderness to " palpation. No suprapubic tenderness.  No hepatomegaly.   Extremities: No lower extremity edema.   Back: No CVA tenderness.   Neuro: grossly intact.   Mood and affect is stable.     Labs/Imaging:    Recent Labs   Lab Test  08/22/18   1437  07/13/18   1115  06/13/18   0938  03/07/18   1103  02/05/18   1015   NA  140  139  135  140  139   POTASSIUM  3.4  3.2*  3.7  4.1  4.0   CHLORIDE  103  102  102  106  104   CO2  32  30  24  25  28   ANIONGAP  6  7  9  10  7   BUN  13  24  17  16  14   CR  0.83  0.88  0.89  0.94  0.78   GLC  90  123*  312*  129*  133*   WILL  9.2  9.9  9.0  8.4*  8.9     Recent Labs   Lab Test  12/21/16   1643  11/22/16   1221  10/26/16   1150  09/28/16   1137  08/17/16   1355   05/03/16   0955   MAG  2.2  2.4*  2.2  2.3  2.0   < >  2.0   PHOS   --    --    --    --    --    --   4.1    < > = values in this interval not displayed.     Recent Labs   Lab Test  08/22/18   1437  07/13/18   1115  06/13/18   0938  02/05/18   1015  01/08/18   1011   WBC  7.7  6.7  4.8  6.5  5.1   HGB  11.5*  12.5*  12.4*  12.5*  12.6*   PLT  266  307  262  289  243   MCV  91  93  91  92  92   NEUTROPHIL  52.7  45.7  37.9  45.3  42.0     Recent Labs   Lab Test  08/22/18   1437  07/13/18   1115  06/13/18   0938   BILITOTAL  0.9  1.2  1.1   ALKPHOS  55  61  65   ALT  17  25  20   AST  10  18  13   ALBUMIN  3.7  4.3  3.9   LDH  151  170  184     TSH   Date Value Ref Range Status   08/22/2018 1.88 0.40 - 4.00 mU/L Final   07/13/2018 1.57 0.40 - 4.00 mU/L Final   06/13/2018 4.26 (H) 0.40 - 4.00 mU/L Final     Recent Labs   Lab Test  08/22/18   1437  07/13/18   1115  06/13/18   0938  03/07/18   1103  02/05/18   1015   12/21/16   1643  11/22/16   1222   05/03/16   0955   PSA  11.80*  9.92*  9.18*  6.51*  6.71*   < >  60.68*   --    < >  67.04*   TESTOSTTOTAL   --    --    --    --    --    --   <2  2*   --   <2    < > = values in this interval not displayed.           ASSESSMENT/PLAN:  1. Metastatic prostate cancer with bony  metastasis:   Currently taking abiraterone 1000 mg po q day and prednisone 5 mg po qday  Denosumab every 12 weeks   Newly diagnosed HTN and DM TII  ECOG PS 0    Albert has been doing well since the last visit.  He has been taking his metformin and hydrochlorothiazide.  He has been feeling overall better in terms of his health. He does not have any new concerns or difficulty with abiraterone so far. His PSA was available towards the end of visit and has been stable though it is numerically higher. His PSA was closer to 75 at the time of start of abiraterone therapy. It has nicely declined and has been slowly rising since March of this year when it was at its lower at 6.51 - though has not doubled as yet. His PSA at diagnosis was 5759.     If his PSA begins to rise more steeply we will have to change therapies. We could reconsider docetaxel at that time.     He will get his denosumab today. I will see him in 6 weeks with labs prior to visit and denosumab after visit.     As always he and his wife had come prepared with questions which were answered.     Over 25 min of direct face to face time spent with patient with more than 50% time spent in counseling and coordinating care.

## 2018-08-22 NOTE — NURSING NOTE
"Oncology Rooming Note    August 22, 2018 4:33 PM   Albert Amaya is a 52 year old male who presents for:    Chief Complaint   Patient presents with     Oncology Clinic Visit     return prostate     Initial Vitals: /86 (BP Location: Right arm, Patient Position: Sitting, Cuff Size: Adult Regular)  Pulse 92  Temp 99.1  F (37.3  C) (Oral)  Resp 18  Ht 1.702 m (5' 7.01\")  Wt 84.7 kg (186 lb 11.2 oz)  SpO2 100%  BMI 29.23 kg/m2 Estimated body mass index is 29.23 kg/(m^2) as calculated from the following:    Height as of this encounter: 1.702 m (5' 7.01\").    Weight as of this encounter: 84.7 kg (186 lb 11.2 oz). Body surface area is 2 meters squared.  No Pain (0) Comment: Data Unavailable   No LMP for male patient.  Allergies reviewed: Yes  Medications reviewed: Yes    Medications: Dilaudid  - needed  Pharmacy name entered into EPIC:    [id number] - [place], [place] - [address] [facility] - [place], [place] - [address] [facility] [place], [place] [address] [facility] [place] [address] [place] [address] DRUG STORE [id number] ([place]) - [place] - [address]  [place] [facility] - [place], [place] - [address] [facility] [phone]    Clinical concerns: non e     6 minutes for nursing intake (face to face time)     Aby Mcdowell RN              "

## 2018-08-22 NOTE — NURSING NOTE
Chief Complaint   Patient presents with     Blood Draw     Labs only     Vitals done, labs drawn by venipuncture.  See doc flow sheets for details.  Gabby Crandall CMA

## 2018-08-27 DIAGNOSIS — C61 MALIGNANT NEOPLASM OF PROSTATE (H): Primary | ICD-10-CM

## 2018-08-27 DIAGNOSIS — C79.51 BONE METASTASIS: ICD-10-CM

## 2018-08-27 RX ORDER — ABIRATERONE ACETATE 250 MG/1
1000 TABLET ORAL DAILY
Qty: 120 TABLET | Refills: 0 | Status: SHIPPED | OUTPATIENT
Start: 2018-08-27 | End: 2019-01-30

## 2018-08-27 RX ORDER — PREDNISONE 5 MG/1
5 TABLET ORAL
Qty: 30 TABLET | Refills: 0 | Status: SHIPPED | OUTPATIENT
Start: 2018-08-27 | End: 2018-11-19

## 2018-08-31 ENCOUNTER — TELEPHONE (OUTPATIENT)
Dept: ONCOLOGY | Facility: CLINIC | Age: 53
End: 2018-08-31

## 2018-08-31 NOTE — ORAL ONC MGMT
"Oral Chemotherapy Monitoring Program    Primary Oncologist: Dr. Valladares  Primary Oncology Clinic: [facility]  Cancer Diagnosis: Prostate      Drug: Zytiga 1000mg (4 x 250mg) QD continuously.  Start Date: 5/10/17      Drug Interaction Assessment:   -Zytiga + Venlafaxine = Zytiga may increase serum concentration of Venlafaxine via CYP2D6 inhibition (category D - consider therapy modification - OK per Dr. Valladares, watch for increased side effects)      Lab Monitoring Plan  C1D1+   CMP, BP C2D1+ Call, CMP, BP C3D1+ Call, CMP, BP C4D1+ Call, CMP, BP C5D1+ Call, CMP, BP C6D1+ Call, CMP, BP   C1D8+    C2D8+    C3D8+    C4D8+    C5D8+    C6D8+      C1D15+ Call, CMP C2D15+ Call, CMP C3D15+    C4D15+    C5D15+    C6D15+      C1D22+    C2D22+    C3D22+    C4D22+    C5D22+    C6D22+      CMP Q2 weeks for the first 2 months, then monthly  Check BP monthly         Subjective/Objective:  Albert Amaya is a 52 year old male contacted by phone for a follow-up visit for oral chemotherapy.  Patient reports that he had a rough day yesterday but is feeling better today. Overall he reports that joint pain has been improving. He went backpacking with his son last month and reports that he had to take approximately 6 dilaudid during that trip due to increased activity.  Normally he takes a dilaudid at night every few weeks for pain. He is taking ibuprofen and dilaudid as needed. Pt is concerned with his gradual increase in PSA and is going to talk to  about restarting taxol. He states that blood pressures and blood sugars have been \"spot on\" but did not have journal with him.     ORAL CHEMOTHERAPY 6/22/2017 7/11/2017 8/25/2017 3/22/2018 4/23/2018 5/24/2018 8/31/2018   Drug Name Zytiga (Abiraterone) Zytiga (Abiraterone) Zytiga (Abiraterone) Zytiga (Abiraterone) Zytiga (Abiraterone) Zytiga (Abiraterone) Zytiga (Abiraterone)   Current Dosage 1000mg 1000mg 1000mg 1000mg 1000mg 1000mg 1000mg   Current Schedule Daily Daily Daily Daily " "Daily Daily Daily   Cycle Details Continuous Continuous Continuous Continuous Continuous Continuous Continuous   Start Date of Last Cycle 5/10/2017 - 5/10/2017 - - - -   Planned next cycle start date - - - - - - -   Doses missed in last 2 weeks 0 1 0 0 0 0 0   Adherence Assessment Adherent Adherent Adherent Adherent Adherent Adherent Adherent   Adverse Effects No AE identified during assessment No AE identified during assessment Myalgias;Arthralgias - No AE identified during assessment No AE identified during assessment No AE identified during assessment   Pharmacist Intervention(myalgias) - - No - - - -   Arthralgias - - Resolved due to intervention - - - -   Pharmacist Intervention(arthralgias) - - No - - - -   Home BPs - not done not done - - - -   Any new drug interactions? No - No - - - -   Is the dose as ordered appropriate for the patient? Yes - Yes - - - -       Last PHQ-2 Score on record:   PHQ-2 ( 1999 [employer]) 3/24/2017 3/1/2017   Q1: Little interest or pleasure in doing things 0 0   Q2: Feeling down, depressed or hopeless 0 0   PHQ-2 Score 0 0       Patient does not report depression symptoms.      Vitals:  BP:   BP Readings from Last 1 Encounters:   08/22/18 133/86     Wt Readings from Last 1 Encounters:   08/22/18 84.7 kg (186 lb 11.2 oz)     Estimated body surface area is 2 meters squared as calculated from the following:    Height as of 8/22/18: 1.702 m (5' 7.01\").    Weight as of 8/22/18: 84.7 kg (186 lb 11.2 oz).    Labs:  _  Result Component Current Result Ref Range   Sodium 140 (8/22/2018) 133 - 144 mmol/L     _  Result Component Current Result Ref Range   Potassium 3.4 (8/22/2018) 3.4 - 5.3 mmol/L     _  Result Component Current Result Ref Range   Calcium 9.2 (8/22/2018) 8.5 - 10.1 mg/dL     No results found for Mag within last 30 days.     No results found for Phos within last 30 days.     _  Result Component Current Result Ref Range   Albumin 3.7 (8/22/2018) 3.4 - 5.0 g/dL     _  Result " Component Current Result Ref Range   Urea Nitrogen 13 (8/22/2018) 7 - 30 mg/dL     _  Result Component Current Result Ref Range   Creatinine 0.83 (8/22/2018) 0.66 - 1.25 mg/dL       _  Result Component Current Result Ref Range   AST 10 (8/22/2018) 0 - 45 U/L     _  Result Component Current Result Ref Range   ALT 17 (8/22/2018) 0 - 70 U/L     _  Result Component Current Result Ref Range   Bilirubin Total 0.9 (8/22/2018) 0.2 - 1.3 mg/dL       _  Result Component Current Result Ref Range   WBC 7.7 (8/22/2018) 4.0 - 11.0 10e9/L     _  Result Component Current Result Ref Range   Hemoglobin 11.5 (L) (8/22/2018) 13.3 - 17.7 g/dL     _  Result Component Current Result Ref Range   Platelet Count 266 (8/22/2018) 150 - 450 10e9/L     _  Result Component Current Result Ref Range   Absolute Neutrophil 4.1 (8/22/2018) 1.6 - 8.3 10e9/L               Assessment:  Pt is tolerating current therapy well.     Plan:  Continue with current therapy.     Follow-Up:  4 weeks for TM assessment. Appt with  on 10/10    Refill Due:  Pt just received zytiga and prednisone delivery today. Next due approx 10/1    Adriane Finch RN  [facility]-Therapy Management  [phone]

## 2018-09-23 DIAGNOSIS — C79.51 BONE METASTASIS: ICD-10-CM

## 2018-09-23 DIAGNOSIS — C61 MALIGNANT NEOPLASM OF PROSTATE (H): Primary | ICD-10-CM

## 2018-09-23 RX ORDER — PREDNISONE 5 MG/1
5 TABLET ORAL
Qty: 30 TABLET | Refills: 0 | Status: SHIPPED | OUTPATIENT
Start: 2018-09-23 | End: 2018-11-19

## 2018-09-23 RX ORDER — ABIRATERONE ACETATE 250 MG/1
1000 TABLET ORAL DAILY
Qty: 120 TABLET | Refills: 0 | Status: SHIPPED | OUTPATIENT
Start: 2018-09-23 | End: 2019-01-30

## 2018-10-10 ENCOUNTER — ALLIED HEALTH/NURSE VISIT (OUTPATIENT)
Dept: ONCOLOGY | Facility: CLINIC | Age: 53
End: 2018-10-10
Attending: INTERNAL MEDICINE
Payer: COMMERCIAL

## 2018-10-10 ENCOUNTER — APPOINTMENT (OUTPATIENT)
Dept: LAB | Facility: CLINIC | Age: 53
End: 2018-10-10
Attending: INTERNAL MEDICINE
Payer: COMMERCIAL

## 2018-10-10 VITALS
HEART RATE: 71 BPM | WEIGHT: 190.8 LBS | BODY MASS INDEX: 29.88 KG/M2 | DIASTOLIC BLOOD PRESSURE: 87 MMHG | TEMPERATURE: 98.2 F | OXYGEN SATURATION: 99 % | SYSTOLIC BLOOD PRESSURE: 133 MMHG

## 2018-10-10 DIAGNOSIS — C61 MALIGNANT NEOPLASM OF PROSTATE (H): Primary | ICD-10-CM

## 2018-10-10 DIAGNOSIS — E03.4 HYPOTHYROIDISM DUE TO ACQUIRED ATROPHY OF THYROID: ICD-10-CM

## 2018-10-10 DIAGNOSIS — C79.51 BONE METASTASIS: ICD-10-CM

## 2018-10-10 LAB
ALBUMIN SERPL-MCNC: 4.2 G/DL (ref 3.4–5)
ALP SERPL-CCNC: 50 U/L (ref 40–150)
ALT SERPL W P-5'-P-CCNC: 20 U/L (ref 0–70)
ANION GAP SERPL CALCULATED.3IONS-SCNC: 8 MMOL/L (ref 3–14)
AST SERPL W P-5'-P-CCNC: 11 U/L (ref 0–45)
BASOPHILS # BLD AUTO: 0 10E9/L (ref 0–0.2)
BASOPHILS NFR BLD AUTO: 0.5 %
BILIRUB SERPL-MCNC: 1 MG/DL (ref 0.2–1.3)
BUN SERPL-MCNC: 15 MG/DL (ref 7–30)
CALCIUM SERPL-MCNC: 9.3 MG/DL (ref 8.5–10.1)
CHLORIDE SERPL-SCNC: 104 MMOL/L (ref 94–109)
CO2 SERPL-SCNC: 26 MMOL/L (ref 20–32)
CREAT SERPL-MCNC: 0.79 MG/DL (ref 0.66–1.25)
DIFFERENTIAL METHOD BLD: ABNORMAL
EOSINOPHIL # BLD AUTO: 0 10E9/L (ref 0–0.7)
EOSINOPHIL NFR BLD AUTO: 0.6 %
ERYTHROCYTE [DISTWIDTH] IN BLOOD BY AUTOMATED COUNT: 13.3 % (ref 10–15)
GFR SERPL CREATININE-BSD FRML MDRD: >90 ML/MIN/1.7M2
GLUCOSE SERPL-MCNC: 111 MG/DL (ref 70–99)
HCT VFR BLD AUTO: 34.9 % (ref 40–53)
HGB BLD-MCNC: 11.6 G/DL (ref 13.3–17.7)
IMM GRANULOCYTES # BLD: 0 10E9/L (ref 0–0.4)
IMM GRANULOCYTES NFR BLD: 0.3 %
LDH SERPL L TO P-CCNC: 190 U/L (ref 85–227)
LYMPHOCYTES # BLD AUTO: 2.1 10E9/L (ref 0.8–5.3)
LYMPHOCYTES NFR BLD AUTO: 33.4 %
MCH RBC QN AUTO: 30.9 PG (ref 26.5–33)
MCHC RBC AUTO-ENTMCNC: 33.2 G/DL (ref 31.5–36.5)
MCV RBC AUTO: 93 FL (ref 78–100)
MONOCYTES # BLD AUTO: 0.3 10E9/L (ref 0–1.3)
MONOCYTES NFR BLD AUTO: 4.3 %
NEUTROPHILS # BLD AUTO: 3.8 10E9/L (ref 1.6–8.3)
NEUTROPHILS NFR BLD AUTO: 60.9 %
NRBC # BLD AUTO: 0 10*3/UL
NRBC BLD AUTO-RTO: 0 /100
PLATELET # BLD AUTO: 297 10E9/L (ref 150–450)
POTASSIUM SERPL-SCNC: 3.8 MMOL/L (ref 3.4–5.3)
PROT SERPL-MCNC: 7.8 G/DL (ref 6.8–8.8)
PSA SERPL-MCNC: 14.3 UG/L (ref 0–4)
RBC # BLD AUTO: 3.75 10E12/L (ref 4.4–5.9)
SODIUM SERPL-SCNC: 138 MMOL/L (ref 133–144)
TSH SERPL DL<=0.005 MIU/L-ACNC: 3.99 MU/L (ref 0.4–4)
WBC # BLD AUTO: 6.2 10E9/L (ref 4–11)

## 2018-10-10 PROCEDURE — 84153 ASSAY OF PSA TOTAL: CPT | Performed by: INTERNAL MEDICINE

## 2018-10-10 PROCEDURE — 25000128 H RX IP 250 OP 636: Mod: ZF | Performed by: INTERNAL MEDICINE

## 2018-10-10 PROCEDURE — 85025 COMPLETE CBC W/AUTO DIFF WBC: CPT | Performed by: INTERNAL MEDICINE

## 2018-10-10 PROCEDURE — 36415 COLL VENOUS BLD VENIPUNCTURE: CPT

## 2018-10-10 PROCEDURE — 80053 COMPREHEN METABOLIC PANEL: CPT | Performed by: INTERNAL MEDICINE

## 2018-10-10 PROCEDURE — 84443 ASSAY THYROID STIM HORMONE: CPT | Performed by: INTERNAL MEDICINE

## 2018-10-10 PROCEDURE — 83615 LACTATE (LD) (LDH) ENZYME: CPT | Performed by: INTERNAL MEDICINE

## 2018-10-10 PROCEDURE — 96372 THER/PROPH/DIAG INJ SC/IM: CPT

## 2018-10-10 RX ADMIN — DENOSUMAB 120 MG: 120 INJECTION SUBCUTANEOUS at 15:25

## 2018-10-10 ASSESSMENT — PAIN SCALES - GENERAL: PAINLEVEL: NO PAIN (0)

## 2018-10-10 NOTE — MR AVS SNAPSHOT
After Visit Summary   10/10/2018    Albert Amaya    MRN: [id number]           Patient Information     Date Of Birth          1965        Visit Information        Provider Department      10/10/2018 2:30 PM Nurse,  Oncology Injection [facility]        Today's Diagnoses     Malignant neoplasm of prostate (H)    -  1    Bone metastasis (H)        Hypothyroidism due to acquired atrophy of thyroid           Follow-ups after your visit        Who to contact     If you have questions or need follow up information about today's clinic visit or your schedule please contact [facility] directly at [phone].  Normal or non-critical lab and imaging results will be communicated to you by OncoHealthhart, letter or phone within 4 business days after the clinic has received the results. If you do not hear from us within 7 days, please contact the clinic through ioGeneticst or phone. If you have a critical or abnormal lab result, we will notify you by phone as soon as possible.  Submit refill requests through F[employer] or call your pharmacy and they will forward the refill request to us. Please allow 3 business days for your refill to be completed.          Additional Information About Your Visit        MyChart Information     F[employer] gives you secure access to your electronic health record. If you see a primary care provider, you can also send messages to your care team and make appointments. If you have questions, please call your primary care clinic.  If you do not have a primary care provider, please call [phone] and they will assist you.        Care EveryWhere ID     This is your Care EveryWhere ID. This could be used by other organizations to access your [facility] medical records  [id number]        Your Vitals Were     Pulse Temperature Pulse Oximetry BMI (Body Mass Index)          71 98.2  F (36.8  C) (Oral) 99% 29.88 kg/m2         Blood Pressure from Last 3  Encounters:   10/10/18 133/87   08/22/18 133/86   07/13/18 (!) 142/96    Weight from Last 3 Encounters:   10/10/18 86.5 kg (190 lb 12.8 oz)   08/22/18 84.7 kg (186 lb 11.2 oz)   07/13/18 85.5 kg (188 lb 6.4 oz)              We Performed the Following     CBC with platelets differential     Comprehensive metabolic panel     Lactate Dehydrogenase     PSA tumor marker     TSH with free T4 reflex          Today's Medication Changes          These changes are accurate as of 10/10/18  3:45 PM.  If you have any questions, ask your nurse or doctor.               These medicines have changed or have updated prescriptions.        Dose/Directions    hydrochlorothiazide 25 MG tablet   Commonly known as:  HYDRODIURIL   This may have changed:  how much to take   Used for:  Malignant neoplasm of prostate (H), Bone metastasis (H), Hypothyroidism due to acquired atrophy of thyroid        Dose:  12.5 mg   Take 0.5 tablets (12.5 mg) by mouth daily   Quantity:  90 tablet   Refills:  3                Primary Care Provider Office Phone # Fax #    [facility] [phone]       [address]  [facility] [address]        Equal Access to Services     GABRIEL MENDOZA AH: Hadii valdo rodarte Sokendall, waaxda luqadaha, qaybta kaalmada adestephaneyada, bob millan. So [facility] [phone].    ATENCIÓN: Si habla español, tiene a rivera disposición servicios gratuitos de asistencia lingüística. Llame al [phone].    We comply with applicable federal civil rights laws and [place] laws. We do not discriminate on the basis of race, color, national origin, age, disability, sex, sexual orientation, or gender identity.            Thank you!     Thank you for choosing [facility]  for your care. Our goal is always to provide you with excellent care. Hearing back from our patients is one way we can continue to improve our services. Please take a few minutes to complete the written  survey that you may receive in the mail after your visit with us. Thank you!             Your Updated Medication List - Protect others around you: Learn how to safely use, store and throw away your medicines at www.disposemymeds.org.          This list is accurate as of 10/10/18  3:45 PM.  Always use your most recent med list.                   Brand Name Dispense Instructions for use Diagnosis    abiraterone 250 MG tablet    ZYTIGA    120 tablet    Take 4 tablets (1,000 mg) by mouth daily for 30 doses Take on empty stomach.    Malignant neoplasm of prostate (H), Bone metastasis (H)       blood glucose monitoring lancets     102 each    Use to test blood sugar twice times daily or as directed.    Malignant neoplasm of prostate (H), Bone metastasis (H), Hypothyroidism due to acquired atrophy of thyroid       blood glucose monitoring meter device kit     1 kit    Use to test blood sugars twice times daily or as directed.    Malignant neoplasm of prostate (H), Bone metastasis (H), Hypothyroidism due to acquired atrophy of thyroid       blood glucose monitoring test strip    no brand specified    100 strip    Use to test blood sugars twice daily or as directed (fasting, rotate then second time - before lunch, before supper and bedtime)    Malignant neoplasm of prostate (H), Bone metastasis (H), Hypothyroidism due to acquired atrophy of thyroid       EPINEPHrine 0.3 MG/0.3ML injection 2-pack    EPIPEN 2-CHARITY    0.6 mL    Inject 0.3 mLs (0.3 mg) into the muscle once as needed for anaphylaxis    Malignant neoplasm of prostate (H), Bone metastasis (H)       gabapentin 100 MG capsule    NEURONTIN    90 capsule    Take 1 capsule (100 mg) by mouth 2-3 times daily for neuropathy    Neuropathy, Malignant neoplasm of prostate (H), Bone metastasis (H), Night sweats       hydrochlorothiazide 25 MG tablet    HYDRODIURIL    90 tablet    Take 0.5 tablets (12.5 mg) by mouth daily    Malignant neoplasm of prostate (H), Bone metastasis  (H), Hypothyroidism due to acquired atrophy of thyroid       HYDROmorphone 2 MG tablet    DILAUDID    60 tablet    Take 1 tablet (2 mg) by mouth every 4 hours as needed for moderate to severe pain    Malignant neoplasm of prostate (H), Bone metastasis (H), Neuropathy, Night sweats, Hypothyroidism, unspecified type, Hypothyroidism due to acquired atrophy of thyroid       IBUPROFEN PO      Take by mouth as needed for moderate pain Reported on 5/18/2017        levothyroxine 75 MCG tablet    SYNTHROID/LEVOTHROID    90 tablet    Take 1 tablet (75 mcg) by mouth daily    Hypothyroidism, unspecified type, Malignant neoplasm of prostate (H), Bone metastasis (H), Neuropathy, Night sweats       lisinopril 5 MG tablet    PRINIVIL/ZESTRIL    90 tablet    Take 1 tablet (5 mg) by mouth daily    Malignant neoplasm of prostate (H), Bone metastasis (H), Hypothyroidism due to acquired atrophy of thyroid, Neuropathy, Night sweats, Hypothyroidism, unspecified type       metFORMIN 750 MG 24 hr tablet    GLUCOPHAGE-XR    180 tablet    Take 2 tablets (1,500 mg) by mouth daily (with dinner)    Malignant neoplasm of prostate (H), Bone metastasis (H), Hypothyroidism due to acquired atrophy of thyroid, Neuropathy, Night sweats, Hypothyroidism, unspecified type       * predniSONE 5 MG tablet    DELTASONE    30 tablet    Take 1 tablet (5 mg) by mouth daily (with breakfast)    Malignant neoplasm of prostate (H), Bone metastasis (H)       * predniSONE 5 MG tablet    DELTASONE    30 tablet    Take 1 tablet (5 mg) by mouth daily (with breakfast)    Malignant neoplasm of prostate (H), Bone metastasis (H)       * predniSONE 5 MG tablet    DELTASONE    30 tablet    Take 1 tablet (5 mg) by mouth daily (with breakfast)    Malignant neoplasm of prostate (H), Bone metastasis (H)       TYLENOL PO      Take 325 mg by mouth every 8 hours as needed for mild pain or fever Reported on 5/18/2017        valACYclovir 1000 mg tablet    VALTREX     TK 1 T PO BID     Malignant neoplasm of prostate (H), Bone metastasis (H), Hypothyroidism due to acquired atrophy of thyroid       venlafaxine 75 MG 24 hr capsule    EFFEXOR-XR    90 capsule    TAKE 1 CAPSULE(75 MG) BY MOUTH DAILY    Night sweats, Malignant neoplasm of prostate (H), Bone metastasis (H), Neuropathy       * Notice:  This list has 3 medication(s) that are the same as other medications prescribed for you. Read the directions carefully, and ask your doctor or other care provider to review them with you.

## 2018-10-10 NOTE — PROGRESS NOTES
Chief Complaint   Patient presents with     Blood Draw     Venipuncture labs collected by RN.      Imm/Inj     patient with Malignant neoplasm of prostate - here for an Xgeva injection     Patient arrived to clinic for an Xgeva injection today.  Patient declined to speak with an RN today.   Results reviewed, labs MET treatment parameters, ok to proceed with treatment.  Xgeva injection given to abdominal tissue without incident.  Patient discharged in stable condition accompanied by: wife.  Patient will return for next appointment when they make an appointment.    AVS not printed per patient - they use [employer].

## 2018-10-12 ENCOUNTER — MYC MEDICAL ADVICE (OUTPATIENT)
Dept: ONCOLOGY | Facility: CLINIC | Age: 53
End: 2018-10-12

## 2018-10-22 DIAGNOSIS — C61 MALIGNANT NEOPLASM OF PROSTATE (H): Primary | ICD-10-CM

## 2018-10-22 DIAGNOSIS — C79.51 BONE METASTASIS: ICD-10-CM

## 2018-10-22 RX ORDER — PREDNISONE 5 MG/1
5 TABLET ORAL
Qty: 30 TABLET | Refills: 0 | Status: SHIPPED | OUTPATIENT
Start: 2018-10-22 | End: 2018-11-19

## 2018-10-22 RX ORDER — ABIRATERONE ACETATE 250 MG/1
1000 TABLET ORAL DAILY
Qty: 120 TABLET | Refills: 0 | Status: SHIPPED | OUTPATIENT
Start: 2018-10-22 | End: 2018-11-19

## 2018-10-30 ENCOUNTER — TELEPHONE (OUTPATIENT)
Dept: ONCOLOGY | Facility: CLINIC | Age: 53
End: 2018-10-30

## 2018-10-30 NOTE — TELEPHONE ENCOUNTER
Contacted patient because his wife was asking if clinical trial would be paying for prostate biopsy scheduled for 11/5/18. While looking into this it was determined that he is, currently, not eligible for trial as he has not had chemotherapy in the castration-resistant setting. I provided him with additional information (verbal) about the trial, about inclusion criteria that needs to be met, about necessity for a genetic mutation, and about next steps should he become eligible. He was very thankful for the call and for the study information. I confirmed that he would like me to cancel the prostate biopsy, which I did. Also left  for Linda Griffith RN letting her know about his decision.  I communicated with him that he or his wife should reach out to Dr. Valladares's clinic (or by [facility]) to determine next treatment.

## 2018-11-05 ENCOUNTER — TELEPHONE (OUTPATIENT)
Dept: ONCOLOGY | Facility: CLINIC | Age: 53
End: 2018-11-05

## 2018-11-07 ENCOUNTER — CARE COORDINATION (OUTPATIENT)
Dept: ONCOLOGY | Facility: CLINIC | Age: 53
End: 2018-11-07

## 2018-11-07 NOTE — PROGRESS NOTES
Patient's wife Lorrie called and wanted to re-schedule patient's biopsy, writer updated scheduling team to coordinate that appt.  Patient's wife was also wondering about if his labs should be drawn soon, per Dr. Valladares - it is patient's prerogative if he wishes to get labs and Xgeva this week.  Dr. Valladares said he will explain the next steps to the patient and wife at the next visit on 12/5/18. Dr. Valladares said it would be fine to wait for labs until then if they wish.      In regards to the biopsy, once the patient is accepted into the trial and the biopsy will be covered by the study.  Writer left patient's wife Lorrie a message with this information as well.   Shirin Lagunas RN BSN   [facility]  Nurse Coordinator

## 2018-11-07 NOTE — TELEPHONE ENCOUNTER
Prior Authorization Not Needed per Insurance    Medication: HYDROmorphone (DILAUDID) 2 MG tablet-PA not needed  Insurance Company: [employer] - Phone [phone] Fax [phone]  Expected CoPay:      Pharmacy Filling the Rx: [id number] ([place]) - [place], [place] - [address] AT [facility] [address]  Pharmacy Notified: Yes-Pharmacy has a paid claim  Patient Notified: No-Pharmacy has contacted patient.      This did not require a PA. Pharmacy was billing wrong day supply

## 2018-11-19 DIAGNOSIS — C61 MALIGNANT NEOPLASM OF PROSTATE (H): Primary | ICD-10-CM

## 2018-11-19 DIAGNOSIS — C79.51 BONE METASTASIS: ICD-10-CM

## 2018-11-19 RX ORDER — PREDNISONE 5 MG/1
5 TABLET ORAL
Qty: 30 TABLET | Refills: 0 | Status: SHIPPED | OUTPATIENT
Start: 2018-11-19 | End: 2018-12-21

## 2018-11-19 RX ORDER — ABIRATERONE ACETATE 250 MG/1
1000 TABLET ORAL DAILY
Qty: 120 TABLET | Refills: 2 | Status: SHIPPED | OUTPATIENT
Start: 2018-11-19 | End: 2019-01-30

## 2018-11-30 ENCOUNTER — TELEPHONE (OUTPATIENT)
Dept: ONCOLOGY | Facility: CLINIC | Age: 53
End: 2018-11-30

## 2018-11-30 NOTE — TELEPHONE ENCOUNTER
Oral Chemotherapy Monitoring Program   Placed call to patient in follow up of Zytiga (abiraterone) therapy.   Left message requesting call back.   No drug names were mentioned.    Adriane Finch RN  [facility]-Therapy Management  [phone]

## 2018-12-05 ENCOUNTER — ONCOLOGY VISIT (OUTPATIENT)
Dept: ONCOLOGY | Facility: CLINIC | Age: 53
End: 2018-12-05
Attending: INTERNAL MEDICINE
Payer: COMMERCIAL

## 2018-12-05 ENCOUNTER — APPOINTMENT (OUTPATIENT)
Dept: LAB | Facility: CLINIC | Age: 53
End: 2018-12-05
Attending: INTERNAL MEDICINE
Payer: COMMERCIAL

## 2018-12-05 VITALS
HEIGHT: 67 IN | BODY MASS INDEX: 29.32 KG/M2 | SYSTOLIC BLOOD PRESSURE: 130 MMHG | DIASTOLIC BLOOD PRESSURE: 78 MMHG | OXYGEN SATURATION: 98 % | RESPIRATION RATE: 16 BRPM | TEMPERATURE: 98.4 F | WEIGHT: 186.8 LBS | HEART RATE: 76 BPM

## 2018-12-05 DIAGNOSIS — C79.51 BONE METASTASIS: ICD-10-CM

## 2018-12-05 DIAGNOSIS — E03.4 HYPOTHYROIDISM DUE TO ACQUIRED ATROPHY OF THYROID: ICD-10-CM

## 2018-12-05 DIAGNOSIS — C61 MALIGNANT NEOPLASM OF PROSTATE (H): ICD-10-CM

## 2018-12-05 LAB
ALBUMIN SERPL-MCNC: 4.1 G/DL (ref 3.4–5)
ALBUMIN UR-MCNC: NEGATIVE MG/DL
ALP SERPL-CCNC: 54 U/L (ref 40–150)
ALT SERPL W P-5'-P-CCNC: 22 U/L (ref 0–70)
ANION GAP SERPL CALCULATED.3IONS-SCNC: 8 MMOL/L (ref 3–14)
APPEARANCE UR: CLEAR
AST SERPL W P-5'-P-CCNC: 13 U/L (ref 0–45)
BASOPHILS # BLD AUTO: 0 10E9/L (ref 0–0.2)
BASOPHILS NFR BLD AUTO: 0.3 %
BILIRUB SERPL-MCNC: 0.8 MG/DL (ref 0.2–1.3)
BILIRUB UR QL STRIP: NEGATIVE
BUN SERPL-MCNC: 29 MG/DL (ref 7–30)
CALCIUM SERPL-MCNC: 9.2 MG/DL (ref 8.5–10.1)
CHLORIDE SERPL-SCNC: 104 MMOL/L (ref 94–109)
CO2 SERPL-SCNC: 26 MMOL/L (ref 20–32)
COLOR UR AUTO: ABNORMAL
CREAT SERPL-MCNC: 1.5 MG/DL (ref 0.66–1.25)
CREAT UR-MCNC: 108 MG/DL
DIFFERENTIAL METHOD BLD: ABNORMAL
EOSINOPHIL # BLD AUTO: 0.1 10E9/L (ref 0–0.7)
EOSINOPHIL NFR BLD AUTO: 0.9 %
EOSINOPHIL SPEC QL WRIGHT STN: NORMAL
ERYTHROCYTE [DISTWIDTH] IN BLOOD BY AUTOMATED COUNT: 12.9 % (ref 10–15)
FRACT EXCRET NA UR+SERPL-RTO: 0.8 %
GFR SERPL CREATININE-BSD FRML MDRD: 49 ML/MIN/1.7M2
GLUCOSE SERPL-MCNC: 96 MG/DL (ref 70–99)
GLUCOSE UR STRIP-MCNC: NEGATIVE MG/DL
HCT VFR BLD AUTO: 36.1 % (ref 40–53)
HGB BLD-MCNC: 12.1 G/DL (ref 13.3–17.7)
HGB UR QL STRIP: ABNORMAL
IMM GRANULOCYTES # BLD: 0.1 10E9/L (ref 0–0.4)
IMM GRANULOCYTES NFR BLD: 0.5 %
KETONES UR STRIP-MCNC: NEGATIVE MG/DL
LDH SERPL L TO P-CCNC: 174 U/L (ref 85–227)
LEUKOCYTE ESTERASE UR QL STRIP: NEGATIVE
LYMPHOCYTES # BLD AUTO: 2.6 10E9/L (ref 0.8–5.3)
LYMPHOCYTES NFR BLD AUTO: 25.9 %
MCH RBC QN AUTO: 31.3 PG (ref 26.5–33)
MCHC RBC AUTO-ENTMCNC: 33.5 G/DL (ref 31.5–36.5)
MCV RBC AUTO: 93 FL (ref 78–100)
MONOCYTES # BLD AUTO: 0.5 10E9/L (ref 0–1.3)
MONOCYTES NFR BLD AUTO: 5.3 %
MUCOUS THREADS #/AREA URNS LPF: PRESENT /LPF
NEUTROPHILS # BLD AUTO: 6.6 10E9/L (ref 1.6–8.3)
NEUTROPHILS NFR BLD AUTO: 67.1 %
NITRATE UR QL: NEGATIVE
NRBC # BLD AUTO: 0 10*3/UL
NRBC BLD AUTO-RTO: 0 /100
PH UR STRIP: 5 PH (ref 5–7)
PLATELET # BLD AUTO: 305 10E9/L (ref 150–450)
POTASSIUM SERPL-SCNC: 4.5 MMOL/L (ref 3.4–5.3)
PROT SERPL-MCNC: 7.6 G/DL (ref 6.8–8.8)
PSA SERPL-MCNC: 17.4 UG/L (ref 0–4)
RBC # BLD AUTO: 3.87 10E12/L (ref 4.4–5.9)
RBC #/AREA URNS AUTO: 3 /HPF (ref 0–2)
SODIUM SERPL-SCNC: 139 MMOL/L (ref 133–144)
SODIUM UR-SCNC: 75 MMOL/L
SOURCE: ABNORMAL
SP GR UR STRIP: 1.02 (ref 1–1.03)
SPECIMEN SOURCE: NORMAL
TSH SERPL DL<=0.005 MIU/L-ACNC: 0.86 MU/L (ref 0.4–4)
UROBILINOGEN UR STRIP-MCNC: 0 MG/DL (ref 0–2)
WBC # BLD AUTO: 9.9 10E9/L (ref 4–11)
WBC #/AREA URNS AUTO: <1 /HPF (ref 0–5)

## 2018-12-05 PROCEDURE — 99215 OFFICE O/P EST HI 40 MIN: CPT | Mod: ZP | Performed by: INTERNAL MEDICINE

## 2018-12-05 PROCEDURE — G0463 HOSPITAL OUTPT CLINIC VISIT: HCPCS | Mod: ZF

## 2018-12-05 PROCEDURE — 89190 NASAL SMEAR FOR EOSINOPHILS: CPT | Performed by: INTERNAL MEDICINE

## 2018-12-05 PROCEDURE — 82570 ASSAY OF URINE CREATININE: CPT | Performed by: INTERNAL MEDICINE

## 2018-12-05 PROCEDURE — 84300 ASSAY OF URINE SODIUM: CPT | Performed by: INTERNAL MEDICINE

## 2018-12-05 PROCEDURE — 85025 COMPLETE CBC W/AUTO DIFF WBC: CPT | Performed by: INTERNAL MEDICINE

## 2018-12-05 PROCEDURE — 83615 LACTATE (LD) (LDH) ENZYME: CPT | Performed by: INTERNAL MEDICINE

## 2018-12-05 PROCEDURE — 80053 COMPREHEN METABOLIC PANEL: CPT | Performed by: INTERNAL MEDICINE

## 2018-12-05 PROCEDURE — 84153 ASSAY OF PSA TOTAL: CPT | Performed by: INTERNAL MEDICINE

## 2018-12-05 PROCEDURE — 81001 URINALYSIS AUTO W/SCOPE: CPT | Performed by: INTERNAL MEDICINE

## 2018-12-05 PROCEDURE — 84443 ASSAY THYROID STIM HORMONE: CPT | Performed by: INTERNAL MEDICINE

## 2018-12-05 PROCEDURE — 36415 COLL VENOUS BLD VENIPUNCTURE: CPT

## 2018-12-05 RX ORDER — DIPHENHYDRAMINE HCL 25 MG
25 TABLET ORAL EVERY 6 HOURS PRN
COMMUNITY

## 2018-12-05 ASSESSMENT — PAIN SCALES - GENERAL: PAINLEVEL: NO PAIN (0)

## 2018-12-05 NOTE — NURSING NOTE
"Oncology Rooming Note    December 5, 2018 4:25 PM   Albert Amaya is a 52 year old male who presents for:    Chief Complaint   Patient presents with     Blood Draw     labs drawn via vpt by rn. vs taken      RECHECK     onc MET PROSTATE ca     Initial Vitals: Pulse 76  Temp 98.4  F (36.9  C) (Oral)  Resp 16  Wt 84.7 kg (186 lb 12.8 oz)  SpO2 98%  BMI 29.25 kg/m2 Estimated body mass index is 29.25 kg/(m^2) as calculated from the following:    Height as of 8/22/18: 1.702 m (5' 7.01\").    Weight as of this encounter: 84.7 kg (186 lb 12.8 oz). Body surface area is 2 meters squared.  No Pain (0) Comment: Data Unavailable   No LMP for male patient.  Allergies reviewed: Yes  Medications reviewed: Yes    Medications: Medication refills not needed today.  Pharmacy name entered into [employer]:    [id number] - [place], [place] - [address] [facility] - [place], [place] - [address] [facility] [place], [place] - [address] [facility] [place] [address] [id number] ([place]) [place] - [address] [facility] [address]  [facility] - [place], [place] - [address] [facility] [phone]    Clinical concerns: NONE      6 minutes for nursing intake (face to face time)     Baylee MASOUD Wolff              "

## 2018-12-05 NOTE — LETTER
12/5/2018       RE: Albert Amaya  [address] [facility] [place] [address]     Dear Colleague,    Thank you for referring your patient, Albert Amaya, to the [facility]. Please see a copy of my visit note below.    [facility]  FOLLOW-UP VISIT NOTE  Date of visit: Dec 5, 2018     Cancer History:   Metastatic Prostate CA treated    - s/p 6 cycles of taxotere 75mg/m2   - s/p orchiectomy on 3/18/2016   - s/p Provenge 5/13, 5/27, 6/10   - s/p Casodex 50 mg daily March/April   - Currently taking Zytiga    HPI: Albert is a 52 year old gentleman with metastatic prostate cancer.  Albert felt a cramp in his gluteus muscle and could barely walk after doing some remodeling at home and unloading heavy truck at work. He tried flexeril and prednisone initially but eventually presented to ED on 10/5/15. He feels that initially he was nearly labeled as drug seeker since he was in lot of discomfort and flexeril was not working. But during course of ED visits labs were drawn and he had marked elevation in Alk Phosphatase (over 1000). CT did suggest some ileus with some sclerotic bone lesions. He was admitted to the hospital. His PSA was checked and was markedly elevated at 5760 (10/6/15), repeat value 5563.     Urology and Medical Oncology consults were placed. He was unhappy with the progress in hospital and felt no better and left the following day on 10/6. He did follow up with Dr. Freire and had transrectal US guided biopsy of prostate on 10/8/15. They have the results through my chart which confirms prostate adenocarcinoma - enrico 4+3 involving majority (60-90%) portion of every single core.  He started on taxotere on 10/23 and completed 6 cycles of therapy in 2/2016.  He then underwent orchiectomy on 3/18/2016.       Throughout spring of 2016 Albert's PSA started to progress and after three elevations there was concern about him being hormone refractory.  He was started  on Provenge and enrolled in the trial including Indoximod. PSA trending up and started on Casodex mid March with progression. Switched to Zytiga 5/3/17.    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     He is accompanied by his wife at this visit. He has been doing well and has no new complains attributable to his disease.     Since the last visit we started him on metformin and hydrochlorothiazide.  His blood pressures were elevated and his wife increased the dose after discussions/communication with me to 25 mg daily.  They forgot to get his blood pressure and blood glucose logs, but both of these have been reasonably well-controlled.    He has been very active as in the past and there are several projects that he is working on around his house.    He continues to have rising PSA and we reviewed option of the TRITON2 AND TRITON3 study. He is here to review the same.      MEDS:   Current Outpatient Medications   Medication Sig     abiraterone (ZYTIGA) 250 MG tablet Take 4 tablets (1,000 mg) by mouth daily Take on empty stomach.     Acetaminophen (TYLENOL PO) Take 325 mg by mouth every 8 hours as needed for mild pain or fever Reported on 5/18/2017     blood glucose monitoring (ACCU-CHEK HEAVENLY PLUS) meter device kit Use to test blood sugars twice times daily or as directed.     blood glucose monitoring (ACCU-CHEK MULTICLIX) lancets Use to test blood sugar twice times daily or as directed.     blood glucose monitoring (NO BRAND SPECIFIED) test strip Use to test blood sugars twice daily or as directed (fasting, rotate then second time - before lunch, before supper and bedtime)     calcium carbonate 600 mg-vitamin D 400 units (CALTRATE) 600-400 MG-UNIT per tablet Take 2 tablets by mouth     diphenhydrAMINE (BENADRYL) 25 MG tablet Take 25 mg by mouth     EPINEPHrine (EPIPEN 2-CHARITY) 0.3 MG/0.3ML injection 2-pack Inject 0.3 mLs (0.3 mg) into the muscle once as needed for  "anaphylaxis     gabapentin (NEURONTIN) 100 MG capsule Take 1 capsule (100 mg) by mouth 2-3 times daily for neuropathy     hydrochlorothiazide (HYDRODIURIL) 25 MG tablet Take 0.5 tablets (12.5 mg) by mouth daily (Patient taking differently: Take 25 mg by mouth daily )     HYDROmorphone (DILAUDID) 2 MG tablet Take 1 tablet (2 mg) by mouth every 4 hours as needed for moderate to severe pain     IBUPROFEN PO Take by mouth as needed for moderate pain Reported on 5/18/2017     levothyroxine (SYNTHROID/LEVOTHROID) 75 MCG tablet Take 1 tablet (75 mcg) by mouth daily     lisinopril (PRINIVIL/ZESTRIL) 5 MG tablet Take 1 tablet (5 mg) by mouth daily     metFORMIN (GLUCOPHAGE) 500 MG tablet      metFORMIN (GLUCOPHAGE-XR) 750 MG 24 hr tablet Take 2 tablets (1,500 mg) by mouth daily (with dinner)     predniSONE (DELTASONE) 5 MG tablet Take 1 tablet (5 mg) by mouth daily (with breakfast)     valACYclovir (VALTREX) 1000 mg tablet TK 1 T PO BID     venlafaxine (EFFEXOR-XR) 75 MG 24 hr capsule TAKE 1 CAPSULE(75 MG) BY MOUTH DAILY     No current facility-administered medications for this visit.         EXAM:  ECOG 1  Wt Readings from Last 4 Encounters:   12/12/18 85.6 kg (188 lb 12.8 oz)   12/05/18 84.7 kg (186 lb 12.8 oz)   10/10/18 86.5 kg (190 lb 12.8 oz)   08/22/18 84.7 kg (186 lb 11.2 oz)   /78 (BP Location: Right arm, Patient Position: Sitting, Cuff Size: Adult Regular)   Pulse 76   Temp 98.4  F (36.9  C) (Oral)   Resp 16   Ht 1.702 m (5' 7.01\")   Wt 84.7 kg (186 lb 12.8 oz)   SpO2 98%   BMI 29.25 kg/m     Patient alert and oriented x3.   PERRLA. EOMI. No scleral icterus noted. OP without thrush/sores.  Neck exam: Palpable 0.5 cm anterior cervical node on the left, rubbery, mobile, non-tender.   Heart: RRR no murmurs noted.   Lungs: clear to auscultation bilaterally.  No crackles or wheezing.   Abd: Normal bowel sounds.  No tenderness to palpation. No suprapubic tenderness.  No hepatomegaly.   Extremities: No lower " extremity edema.   Back: No CVA tenderness.   Neuro: grossly intact.   Mood and affect is stable.     Labs/Imaging:    Recent Labs   Lab Test 12/12/18  1232 12/05/18  1556 10/10/18  1411 08/22/18  1437 07/13/18  1115    139 138 140 139   POTASSIUM 4.0 4.5 3.8 3.4 3.2*   CHLORIDE 103 104 104 103 102   CO2 26 26 26 32 30   ANIONGAP 9 8 8 6 7   BUN 20 29 15 13 24   CR 0.75 1.50* 0.79 0.83 0.88   * 96 111* 90 123*   WILL 9.3 9.2 9.3 9.2 9.9     Recent Labs   Lab Test 12/21/16  1643 11/22/16  1221 10/26/16  1150 09/28/16  1137 08/17/16  1355  05/03/16  0955   MAG 2.2 2.4* 2.2 2.3 2.0   < > 2.0   PHOS  --   --   --   --   --   --  4.1    < > = values in this interval not displayed.     Recent Labs   Lab Test 12/12/18  1232 12/05/18  1556 10/10/18  1411 08/22/18  1437 07/13/18  1115   WBC 8.3 9.9 6.2 7.7 6.7   HGB 12.1* 12.1* 11.6* 11.5* 12.5*    305 297 266 307   MCV 93 93 93 91 93   NEUTROPHIL 74.4 67.1 60.9 52.7 45.7     Recent Labs   Lab Test 12/12/18  1232 12/05/18  1556 10/10/18  1411   BILITOTAL 0.6 0.8 1.0   ALKPHOS 57 54 50   ALT 21 22 20   AST 12 13 11   ALBUMIN 3.9 4.1 4.2    174 190     TSH   Date Value Ref Range Status   12/05/2018 0.86 0.40 - 4.00 mU/L Final   10/10/2018 3.99 0.40 - 4.00 mU/L Final   08/22/2018 1.88 0.40 - 4.00 mU/L Final     No results for input(s): CEA in the last 45470 hours.  Results for orders placed or performed during the hospital encounter of 03/07/18   CT Chest/Abdomen/Pelvis w Contrast    Narrative    EXAMINATION: CT CHEST/ABDOMEN/PELVIS W CONTRAST, 3/7/2018 11:53 AM    TECHNIQUE:  Helical CT images from the thoracic inlet through the  symphysis pubis were obtained  with contrast. Contrast dose: iopamidol  (ISOVUE-370) solution 120 mL    COMPARISON: Bone scan 5/8/2017; CT exam 5/8/2017.    HISTORY: Prostate cancer with metastasis; Malignant neoplasm of  prostate (H); Bone metastasis (H); Neuropathy; Night sweats    FINDINGS:    LUNGS: Mild bilateral lower  lobes dependent atelectasis. No suspicious  pulmonary nodules. No new or enlarging pulmonary nodules.    Chest: Thyroid gland is within normal limits. Central tracheobronchial  tree is patent. Thoracic aorta and main pulmonary artery have normal  diameter. Cardiac size within normal limits. No pericardial effusions.  No pleural effusions. No pneumothorax. No axillary, mediastinal or  hilar lymphadenopathy. No central pulmonary embolism.    Abdomen and pelvis: 7 mm hypoattenuating focus in hepatic segment 7,  too small to characterize, stable, series 3 image 216. No new or  enlarging liver lesions. Patent hepatic vasculature. Focal fat  adjacent to the falciform ligament. No biliary tree dilation.  Gallbladder and spleen are within normal limits. Main pancreatic duct  is not dilated. Homogeneous pancreatic parenchyma. The spleen is not  enlarged.    Adrenal glands are within normal limits. No renal stones or  hydronephrosis bilaterally. No suspicious renal lesions. Partially  distended urinary bladder, unremarkable. Symmetric seminal vesicles.  The prostate is not enlarged. Pelvic phleboliths. Mild prominent fat  within the left inguinal canal. No inguinal lymphadenopathy.  Subcentimeter pelvic lymph nodes with no suspicious features.  Subcentimeter retroperitoneal and mesenteric lymph nodes, not enlarged  by size criteria. No abdominal aortic aneurysm. No free fluid. No free  air. Small fat containing umbilical hernia. Decompressed stomach. No  dilated loops of bowel. No bowel wall thickening. The appendix is  unremarkable.    Bones: Stable diffuse sclerotic metastatic bone lesions throughout the  appendicular and axial skeleton, grossly stable from the prior study.  No acute fractures.      Impression    IMPRESSION: In this patient with history of metastatic prostate  cancer:  1. Stable diffuse sclerotic metastatic bone lesions throughout the  appendicular and axial skeleton.  2. No evidence of metastatic disease  within the chest, abdomen and  pelvis.    CHRISTINE HUMPHREYS MD     Recent Labs   Lab Test 12/12/18  1232 12/05/18  1556 10/10/18  1411 08/22/18  1437 07/13/18  1115  12/21/16  1643 11/22/16  1222  05/03/16  0955   PSA 17.00* 17.40* 14.30* 11.80* 9.92*   < > 60.68*  --    < > 67.04*   TESTOSTTOTAL  --   --   --   --   --   --  <2 2*  --  <2    < > = values in this interval not displayed.      ASSESSMENT/PLAN:  1. Metastatic prostate cancer with bony metastasis:   Currently taking abiraterone 1000 mg po q day and prednisone 5 mg po qday  Denosumab every 12 weeks   Newly diagnosed HTN and DM TII  ECOG PS 0    Albert has been doing well since the last visit.  He has been taking his metformin and hydrochlorothiazide.      His creatinine is significantly higher at 1.5. He denies nausea, vomiting, diarrhea or any other reason which would explain his elevated creatinine. He does not feel dehydrated. This is a little concerning. I explained that we should have him take fluids liberally and repeat the labs. I will also get urinalysis including FeNa. I will get renal US to rule out post obstructive renal failure. Dehydration (pre-renal renal failure) will be evident based on urine FeNa and post obstructive renal failure should be evident on renal US - hydronephrosis.     We also extensively reviewed the Triton 2 and Triton 3 studies. Triton 3 study is not open for enrollment at this time. I reviewed the clinical trial in great details with him. In a study of similar class of agent - olaparib published in NEJ (N Engl J Med 2015; 373:9627-8223) about 33% of similar patients did have one of the mutations involving homologous DNA repair enzyme. 14 of the 16 patients with one of the mutations had a treatment response. Anemia (in 10 of the 50 patients [20%]) and fatigue (in 6 [12%]) were the most common grade 3 or 4 adverse events, findings that are consistent with previous studies of this class of agents. The current Triton 2  study selects patients with one of these mutations which is checked at screening (BRCA1/2, DALILA, CHK2, RAD51 and others). This can be done from a fresh biopsy or from next generation sequencing of circulating tumor cells. Triton to is a single arm phase II study post chemotherapy for castration resistant prostate cancer, while Triton 3 is randomized phase III study in chemotherapy naive patient who have progressed on one line of 2nd generation anti-androgen agent like abiraterone in his case. He would qualify for Triton 3 but not for Triton 2 as he has not received docetaxel in post chemotherapy setting. He will have to sign the consent form for the study for the biopsy to be covered by study. I explained him that the biopsy is often covered but the next generation sequencing can be denied outside of a trial and can be expensive.     His PSA has been slowly but steadily rising. I printed his PSA values for him. His PSA event at the time of start of abiraterone was 106 ng/ml on 5/3/17. His PSA has not doubled since hitting the estephanie value of 9.92 on 7/13/18. I would favor continuing with abiraterone for now.      He will get his denosumab today. I will see him in 6 weeks with labs prior to visit and denosumab after visit.     As always he and his wife had come prepared with questions which were answered.     Addendum: His FeNa suggested pre-renal azotemia and his renal US was negative for hydronephrosis. Both suggesting dehydration. His follow up BMP has normalized.     Over 45 min of direct face to face time spent with patient with more than 50% time spent in counseling and coordinating care.        Again, thank you for allowing me to participate in the care of your patient.      Sincerely,    Tyrel Valladares MD

## 2018-12-05 NOTE — NURSING NOTE
Chief Complaint   Patient presents with     Blood Draw     labs drawn via vpt by rn. vs taken      Blood drawn via vpt by RN. VS taken.  Labs drawn by rn in lab.  Krysta Quintanilla RN

## 2018-12-06 ENCOUNTER — MYC MEDICAL ADVICE (OUTPATIENT)
Dept: ONCOLOGY | Facility: CLINIC | Age: 53
End: 2018-12-06

## 2018-12-06 DIAGNOSIS — C79.51 BONE METASTASIS: ICD-10-CM

## 2018-12-06 DIAGNOSIS — C61 MALIGNANT NEOPLASM OF PROSTATE (H): Primary | ICD-10-CM

## 2018-12-07 ENCOUNTER — CARE COORDINATION (OUTPATIENT)
Dept: ONCOLOGY | Facility: CLINIC | Age: 53
End: 2018-12-07

## 2018-12-07 NOTE — PROGRESS NOTES
"Spoke with patient's wife regarding lab results. Dr. Valladares will call to discuss ROGER further.  Lorrie states patient is feeling well and that he verbalized to her yesterday \"I feel like I'm cancer free.\"  Lorrie states she just wants to do best by him and his family, she doesn't want blindside anyone with any information.  Expressed we will be as transparent as we can.   Shirin Lagunas RN BSN   [facility]  Nurse Coordinator    "

## 2018-12-12 ENCOUNTER — NURSE TRIAGE (OUTPATIENT)
Dept: ONCOLOGY | Facility: CLINIC | Age: 53
End: 2018-12-12

## 2018-12-12 ENCOUNTER — CARE COORDINATION (OUTPATIENT)
Dept: ONCOLOGY | Facility: CLINIC | Age: 53
End: 2018-12-12

## 2018-12-12 VITALS
SYSTOLIC BLOOD PRESSURE: 168 MMHG | TEMPERATURE: 98.9 F | OXYGEN SATURATION: 96 % | HEART RATE: 83 BPM | WEIGHT: 188.8 LBS | DIASTOLIC BLOOD PRESSURE: 93 MMHG | BODY MASS INDEX: 29.56 KG/M2

## 2018-12-12 DIAGNOSIS — C61 MALIGNANT NEOPLASM OF PROSTATE (H): Primary | ICD-10-CM

## 2018-12-12 DIAGNOSIS — E03.4 HYPOTHYROIDISM DUE TO ACQUIRED ATROPHY OF THYROID: ICD-10-CM

## 2018-12-12 DIAGNOSIS — C61 MALIGNANT NEOPLASM OF PROSTATE (H): ICD-10-CM

## 2018-12-12 DIAGNOSIS — R06.02 SOB (SHORTNESS OF BREATH): Primary | ICD-10-CM

## 2018-12-12 DIAGNOSIS — C79.51 BONE METASTASIS: ICD-10-CM

## 2018-12-12 LAB
ALBUMIN SERPL-MCNC: 3.9 G/DL (ref 3.4–5)
ALP SERPL-CCNC: 57 U/L (ref 40–150)
ALT SERPL W P-5'-P-CCNC: 21 U/L (ref 0–70)
ANION GAP SERPL CALCULATED.3IONS-SCNC: 9 MMOL/L (ref 3–14)
AST SERPL W P-5'-P-CCNC: 12 U/L (ref 0–45)
BASOPHILS # BLD AUTO: 0.1 10E9/L (ref 0–0.2)
BASOPHILS NFR BLD AUTO: 0.6 %
BILIRUB SERPL-MCNC: 0.6 MG/DL (ref 0.2–1.3)
BUN SERPL-MCNC: 20 MG/DL (ref 7–30)
CALCIUM SERPL-MCNC: 9.3 MG/DL (ref 8.5–10.1)
CHLORIDE SERPL-SCNC: 103 MMOL/L (ref 94–109)
CO2 SERPL-SCNC: 26 MMOL/L (ref 20–32)
CREAT SERPL-MCNC: 0.75 MG/DL (ref 0.66–1.25)
DIFFERENTIAL METHOD BLD: ABNORMAL
EOSINOPHIL # BLD AUTO: 0.1 10E9/L (ref 0–0.7)
EOSINOPHIL NFR BLD AUTO: 1.2 %
ERYTHROCYTE [DISTWIDTH] IN BLOOD BY AUTOMATED COUNT: 13 % (ref 10–15)
GFR SERPL CREATININE-BSD FRML MDRD: >90 ML/MIN/1.7M2
GLUCOSE SERPL-MCNC: 102 MG/DL (ref 70–99)
HCT VFR BLD AUTO: 36.8 % (ref 40–53)
HGB BLD-MCNC: 12.1 G/DL (ref 13.3–17.7)
IMM GRANULOCYTES # BLD: 0.1 10E9/L (ref 0–0.4)
IMM GRANULOCYTES NFR BLD: 0.6 %
LDH SERPL L TO P-CCNC: 183 U/L (ref 85–227)
LYMPHOCYTES # BLD AUTO: 1.6 10E9/L (ref 0.8–5.3)
LYMPHOCYTES NFR BLD AUTO: 18.8 %
MCH RBC QN AUTO: 30.5 PG (ref 26.5–33)
MCHC RBC AUTO-ENTMCNC: 32.9 G/DL (ref 31.5–36.5)
MCV RBC AUTO: 93 FL (ref 78–100)
MONOCYTES # BLD AUTO: 0.4 10E9/L (ref 0–1.3)
MONOCYTES NFR BLD AUTO: 4.4 %
NEUTROPHILS # BLD AUTO: 6.2 10E9/L (ref 1.6–8.3)
NEUTROPHILS NFR BLD AUTO: 74.4 %
NRBC # BLD AUTO: 0 10*3/UL
NRBC BLD AUTO-RTO: 0 /100
NT-PROBNP SERPL-MCNC: 96 PG/ML (ref 0–125)
PLATELET # BLD AUTO: 308 10E9/L (ref 150–450)
POTASSIUM SERPL-SCNC: 4 MMOL/L (ref 3.4–5.3)
PROT SERPL-MCNC: 7.6 G/DL (ref 6.8–8.8)
PSA SERPL-MCNC: 17 UG/L (ref 0–4)
RBC # BLD AUTO: 3.97 10E12/L (ref 4.4–5.9)
SODIUM SERPL-SCNC: 138 MMOL/L (ref 133–144)
TROPONIN I SERPL-MCNC: <0.015 UG/L (ref 0–0.04)
WBC # BLD AUTO: 8.3 10E9/L (ref 4–11)

## 2018-12-12 PROCEDURE — 80053 COMPREHEN METABOLIC PANEL: CPT | Performed by: INTERNAL MEDICINE

## 2018-12-12 PROCEDURE — 84153 ASSAY OF PSA TOTAL: CPT | Performed by: INTERNAL MEDICINE

## 2018-12-12 PROCEDURE — 83615 LACTATE (LD) (LDH) ENZYME: CPT | Performed by: INTERNAL MEDICINE

## 2018-12-12 PROCEDURE — 83880 ASSAY OF NATRIURETIC PEPTIDE: CPT | Performed by: INTERNAL MEDICINE

## 2018-12-12 PROCEDURE — 85025 COMPLETE CBC W/AUTO DIFF WBC: CPT | Performed by: INTERNAL MEDICINE

## 2018-12-12 PROCEDURE — 84484 ASSAY OF TROPONIN QUANT: CPT | Performed by: INTERNAL MEDICINE

## 2018-12-12 NOTE — PROGRESS NOTES
Left voicemail for patient's wife Lorrie regarding labs being released to [facility], encouraged to call with any questions or concerns.   Shirin Lagunas, RN BSN   [facility]  Nurse Coordinator

## 2018-12-12 NOTE — PROGRESS NOTES
Lorrie, patient's wife called earlier stating she was anxious about Albert's labs as he was expressing he was not feeling well.  Informed Lrorie that his labs were fine, nothing abnormal.  She requested writer call him in approximately 45 minutes.  At this time is has been 45 minutes, writer called Albert, he did not answer.  Left him a detailed message regarding his labs.  Advised to call nurse triage line at [phone] with any questions or concerns.   Shirin Lagunas RN BSN   [facility]  Nurse Coordinator

## 2018-12-12 NOTE — TELEPHONE ENCOUNTER
[facility] Telephone Triage Note    Assessment: Patient called in to triage reporting the following symptoms: patient reporting that he has gained 4-5 lbs in one week and is requesting labs. Pt discontinued his HCTZ and lisinopril last week sometime. Patient denies any cough/SOB and only has slight edema in hands.     Action: Writer paged Dr Tyrel Valladares at [phone]    Recommendations: Discussed with Dr Valladares.  Addition of pro BNP to existing labs patient is scheduled for today at 1425.    Follow-Up: Order for above labs/procedures/infusion placed, Patient voiced understanding of advice and/or instructions given.

## 2018-12-17 NOTE — PROGRESS NOTES
[facility] CANCER CLINIC  FOLLOW-UP VISIT NOTE  Date of visit: Dec 5, 2018     Cancer History:   Metastatic Prostate CA treated    - s/p 6 cycles of taxotere 75mg/m2   - s/p orchiectomy on 3/18/2016   - s/p Provenge 5/13, 5/27, 6/10   - s/p Casodex 50 mg daily March/April   - Currently taking Zytiga    HPI: Albert is a 52 year old gentleman with metastatic prostate cancer.  Albert felt a cramp in his gluteus muscle and could barely walk after doing some remodeling at home and unloading heavy truck at work. He tried flexeril and prednisone initially but eventually presented to ED on 10/5/15. He feels that initially he was nearly labeled as drug seeker since he was in lot of discomfort and flexeril was not working. But during course of ED visits labs were drawn and he had marked elevation in Alk Phosphatase (over 1000). CT did suggest some ileus with some sclerotic bone lesions. He was admitted to the hospital. His PSA was checked and was markedly elevated at 5760 (10/6/15), repeat value 5563.     Urology and Medical Oncology consults were placed. He was unhappy with the progress in hospital and felt no better and left the following day on 10/6. He did follow up with Dr. Freire and had transrectal US guided biopsy of prostate on 10/8/15. They have the results through my chart which confirms prostate adenocarcinoma - enrico 4+3 involving majority (60-90%) portion of every single core.  He started on taxotere on 10/23 and completed 6 cycles of therapy in 2/2016.  He then underwent orchiectomy on 3/18/2016.       Throughout spring of 2016 Albert's PSA started to progress and after three elevations there was concern about him being hormone refractory.  He was started on Provenge and enrolled in the trial including Indoximod. PSA trending up and started on Casodex mid March with progression. Switched to Zytiga 5/3/17.    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     He is accompanied by his wife at this visit. He has been doing well and has no new complains attributable to his disease.     Since the last visit we started him on metformin and hydrochlorothiazide.  His blood pressures were elevated and his wife increased the dose after discussions/communication with me to 25 mg daily.  They forgot to get his blood pressure and blood glucose logs, but both of these have been reasonably well-controlled.    He has been very active as in the past and there are several projects that he is working on around his house.    He continues to have rising PSA and we reviewed option of the TRITON2 AND TRITON3 study. He is here to review the same.      MEDS:   Current Outpatient Medications   Medication Sig     abiraterone (ZYTIGA) 250 MG tablet Take 4 tablets (1,000 mg) by mouth daily Take on empty stomach.     Acetaminophen (TYLENOL PO) Take 325 mg by mouth every 8 hours as needed for mild pain or fever Reported on 5/18/2017     blood glucose monitoring (ACCU-CHEK HAEVENLY PLUS) meter device kit Use to test blood sugars twice times daily or as directed.     blood glucose monitoring (ACCU-CHEK MULTICLIX) lancets Use to test blood sugar twice times daily or as directed.     blood glucose monitoring (NO BRAND SPECIFIED) test strip Use to test blood sugars twice daily or as directed (fasting, rotate then second time - before lunch, before supper and bedtime)     calcium carbonate 600 mg-vitamin D 400 units (CALTRATE) 600-400 MG-UNIT per tablet Take 2 tablets by mouth     diphenhydrAMINE (BENADRYL) 25 MG tablet Take 25 mg by mouth     EPINEPHrine (EPIPEN 2-CHARITY) 0.3 MG/0.3ML injection 2-pack Inject 0.3 mLs (0.3 mg) into the muscle once as needed for anaphylaxis     gabapentin (NEURONTIN) 100 MG capsule Take 1 capsule (100 mg) by mouth 2-3 times daily for neuropathy     hydrochlorothiazide (HYDRODIURIL) 25 MG tablet Take 0.5 tablets (12.5 mg) by mouth daily (Patient taking differently: Take  "25 mg by mouth daily )     HYDROmorphone (DILAUDID) 2 MG tablet Take 1 tablet (2 mg) by mouth every 4 hours as needed for moderate to severe pain     IBUPROFEN PO Take by mouth as needed for moderate pain Reported on 5/18/2017     levothyroxine (SYNTHROID/LEVOTHROID) 75 MCG tablet Take 1 tablet (75 mcg) by mouth daily     lisinopril (PRINIVIL/ZESTRIL) 5 MG tablet Take 1 tablet (5 mg) by mouth daily     metFORMIN (GLUCOPHAGE) 500 MG tablet      metFORMIN (GLUCOPHAGE-XR) 750 MG 24 hr tablet Take 2 tablets (1,500 mg) by mouth daily (with dinner)     predniSONE (DELTASONE) 5 MG tablet Take 1 tablet (5 mg) by mouth daily (with breakfast)     valACYclovir (VALTREX) 1000 mg tablet TK 1 T PO BID     venlafaxine (EFFEXOR-XR) 75 MG 24 hr capsule TAKE 1 CAPSULE(75 MG) BY MOUTH DAILY     No current facility-administered medications for this visit.         EXAM:  ECOG 1  Wt Readings from Last 4 Encounters:   12/12/18 85.6 kg (188 lb 12.8 oz)   12/05/18 84.7 kg (186 lb 12.8 oz)   10/10/18 86.5 kg (190 lb 12.8 oz)   08/22/18 84.7 kg (186 lb 11.2 oz)   /78 (BP Location: Right arm, Patient Position: Sitting, Cuff Size: Adult Regular)   Pulse 76   Temp 98.4  F (36.9  C) (Oral)   Resp 16   Ht 1.702 m (5' 7.01\")   Wt 84.7 kg (186 lb 12.8 oz)   SpO2 98%   BMI 29.25 kg/m    Patient alert and oriented x3.   PERRLA. EOMI. No scleral icterus noted. OP without thrush/sores.  Neck exam: Palpable 0.5 cm anterior cervical node on the left, rubbery, mobile, non-tender.   Heart: RRR no murmurs noted.   Lungs: clear to auscultation bilaterally.  No crackles or wheezing.   Abd: Normal bowel sounds.  No tenderness to palpation. No suprapubic tenderness.  No hepatomegaly.   Extremities: No lower extremity edema.   Back: No CVA tenderness.   Neuro: grossly intact.   Mood and affect is stable.     Labs/Imaging:    Recent Labs   Lab Test 12/12/18  1232 12/05/18  1556 10/10/18  1411 08/22/18  1437 07/13/18  1115    139 138 140 139 "   POTASSIUM 4.0 4.5 3.8 3.4 3.2*   CHLORIDE 103 104 104 103 102   CO2 26 26 26 32 30   ANIONGAP 9 8 8 6 7   BUN 20 29 15 13 24   CR 0.75 1.50* 0.79 0.83 0.88   * 96 111* 90 123*   WILL 9.3 9.2 9.3 9.2 9.9     Recent Labs   Lab Test 12/21/16  1643 11/22/16  1221 10/26/16  1150 09/28/16  1137 08/17/16  1355  05/03/16  0955   MAG 2.2 2.4* 2.2 2.3 2.0   < > 2.0   PHOS  --   --   --   --   --   --  4.1    < > = values in this interval not displayed.     Recent Labs   Lab Test 12/12/18  1232 12/05/18  1556 10/10/18  1411 08/22/18  1437 07/13/18  1115   WBC 8.3 9.9 6.2 7.7 6.7   HGB 12.1* 12.1* 11.6* 11.5* 12.5*    305 297 266 307   MCV 93 93 93 91 93   NEUTROPHIL 74.4 67.1 60.9 52.7 45.7     Recent Labs   Lab Test 12/12/18  1232 12/05/18  1556 10/10/18  1411   BILITOTAL 0.6 0.8 1.0   ALKPHOS 57 54 50   ALT 21 22 20   AST 12 13 11   ALBUMIN 3.9 4.1 4.2    174 190     TSH   Date Value Ref Range Status   12/05/2018 0.86 0.40 - 4.00 mU/L Final   10/10/2018 3.99 0.40 - 4.00 mU/L Final   08/22/2018 1.88 0.40 - 4.00 mU/L Final     No results for input(s): CEA in the last 91913 hours.  Results for orders placed or performed during the hospital encounter of 03/07/18   CT Chest/Abdomen/Pelvis w Contrast    Narrative    EXAMINATION: CT CHEST/ABDOMEN/PELVIS W CONTRAST, 3/7/2018 11:53 AM    TECHNIQUE:  Helical CT images from the thoracic inlet through the  symphysis pubis were obtained  with contrast. Contrast dose: iopamidol  (ISOVUE-370) solution 120 mL    COMPARISON: Bone scan 5/8/2017; CT exam 5/8/2017.    HISTORY: Prostate cancer with metastasis; Malignant neoplasm of  prostate (H); Bone metastasis (H); Neuropathy; Night sweats    FINDINGS:    LUNGS: Mild bilateral lower lobes dependent atelectasis. No suspicious  pulmonary nodules. No new or enlarging pulmonary nodules.    Chest: Thyroid gland is within normal limits. Central tracheobronchial  tree is patent. Thoracic aorta and main pulmonary artery have  normal  diameter. Cardiac size within normal limits. No pericardial effusions.  No pleural effusions. No pneumothorax. No axillary, mediastinal or  hilar lymphadenopathy. No central pulmonary embolism.    Abdomen and pelvis: 7 mm hypoattenuating focus in hepatic segment 7,  too small to characterize, stable, series 3 image 216. No new or  enlarging liver lesions. Patent hepatic vasculature. Focal fat  adjacent to the falciform ligament. No biliary tree dilation.  Gallbladder and spleen are within normal limits. Main pancreatic duct  is not dilated. Homogeneous pancreatic parenchyma. The spleen is not  enlarged.    Adrenal glands are within normal limits. No renal stones or  hydronephrosis bilaterally. No suspicious renal lesions. Partially  distended urinary bladder, unremarkable. Symmetric seminal vesicles.  The prostate is not enlarged. Pelvic phleboliths. Mild prominent fat  within the left inguinal canal. No inguinal lymphadenopathy.  Subcentimeter pelvic lymph nodes with no suspicious features.  Subcentimeter retroperitoneal and mesenteric lymph nodes, not enlarged  by size criteria. No abdominal aortic aneurysm. No free fluid. No free  air. Small fat containing umbilical hernia. Decompressed stomach. No  dilated loops of bowel. No bowel wall thickening. The appendix is  unremarkable.    Bones: Stable diffuse sclerotic metastatic bone lesions throughout the  appendicular and axial skeleton, grossly stable from the prior study.  No acute fractures.      Impression    IMPRESSION: In this patient with history of metastatic prostate  cancer:  1. Stable diffuse sclerotic metastatic bone lesions throughout the  appendicular and axial skeleton.  2. No evidence of metastatic disease within the chest, abdomen and  pelvis.    CHRISTINE HUMPHREYS MD     Recent Labs   Lab Test 12/12/18  1232 12/05/18  1556 10/10/18  1411 08/22/18  1437 07/13/18  1115  12/21/16  1643 11/22/16  1222  05/03/16  0955   PSA 17.00* 17.40* 14.30*  11.80* 9.92*   < > 60.68*  --    < > 67.04*   TESTOSTTOTAL  --   --   --   --   --   --  <2 2*  --  <2    < > = values in this interval not displayed.      ASSESSMENT/PLAN:  1. Metastatic prostate cancer with bony metastasis:   Currently taking abiraterone 1000 mg po q day and prednisone 5 mg po qday  Denosumab every 12 weeks   Newly diagnosed HTN and DM TII  ECOG PS 0    Albert has been doing well since the last visit.  He has been taking his metformin and hydrochlorothiazide.      His creatinine is significantly higher at 1.5. He denies nausea, vomiting, diarrhea or any other reason which would explain his elevated creatinine. He does not feel dehydrated. This is a little concerning. I explained that we should have him take fluids liberally and repeat the labs. I will also get urinalysis including FeNa. I will get renal US to rule out post obstructive renal failure. Dehydration (pre-renal renal failure) will be evident based on urine FeNa and post obstructive renal failure should be evident on renal US - hydronephrosis.     We also extensively reviewed the Triton 2 and Triton 3 studies. Triton 3 study is not open for enrollment at this time. I reviewed the clinical trial in great details with him. In a study of similar class of agent - olaparib published in NEJ (N Engl J Med 2015; 373:1733-4030) about 33% of similar patients did have one of the mutations involving homologous DNA repair enzyme. 14 of the 16 patients with one of the mutations had a treatment response. Anemia (in 10 of the 50 patients [20%]) and fatigue (in 6 [12%]) were the most common grade 3 or 4 adverse events, findings that are consistent with previous studies of this class of agents. The current Triton 2 study selects patients with one of these mutations which is checked at screening (BRCA1/2, DALILA, CHK2, RAD51 and others). This can be done from a fresh biopsy or from next generation sequencing of circulating tumor cells. Triton to is a single  arm phase II study post chemotherapy for castration resistant prostate cancer, while Triton 3 is randomized phase III study in chemotherapy naive patient who have progressed on one line of 2nd generation anti-androgen agent like abiraterone in his case. He would qualify for Triton 3 but not for Triton 2 as he has not received docetaxel in post chemotherapy setting. He will have to sign the consent form for the study for the biopsy to be covered by study. I explained him that the biopsy is often covered but the next generation sequencing can be denied outside of a trial and can be expensive.     His PSA has been slowly but steadily rising. I printed his PSA values for him. His PSA event at the time of start of abiraterone was 106 ng/ml on 5/3/17. His PSA has not doubled since hitting the estephanie value of 9.92 on 7/13/18. I would favor continuing with abiraterone for now.      He will get his denosumab today. I will see him in 6 weeks with labs prior to visit and denosumab after visit.     As always he and his wife had come prepared with questions which were answered.     Addendum: His FeNa suggested pre-renal azotemia and his renal US was negative for hydronephrosis. Both suggesting dehydration. His follow up BMP has normalized.     Over 45 min of direct face to face time spent with patient with more than 50% time spent in counseling and coordinating care.

## 2018-12-18 ENCOUNTER — TELEPHONE (OUTPATIENT)
Dept: ONCOLOGY | Facility: CLINIC | Age: 53
End: 2018-12-18

## 2018-12-18 ENCOUNTER — MYC MEDICAL ADVICE (OUTPATIENT)
Dept: ONCOLOGY | Facility: CLINIC | Age: 53
End: 2018-12-18

## 2018-12-18 NOTE — TELEPHONE ENCOUNTER
[facility] Prior Authorization Team   Phone: [phone]  Fax: [phone]  PA Initiation    Medication: ZYTIGA-PA PENDING  Insurance Company: [employer] - Phone [phone] Fax [phone]  Pharmacy Filling the Rx: [facility] - [place], [place] - [address]  Filling Pharmacy Phone: [phone]  Filling Pharmacy Fax: [phone]  Start Date: 12/18/2018

## 2018-12-19 ENCOUNTER — CARE COORDINATION (OUTPATIENT)
Dept: ONCOLOGY | Facility: CLINIC | Age: 53
End: 2018-12-19

## 2018-12-19 DIAGNOSIS — I10 ESSENTIAL HYPERTENSION: ICD-10-CM

## 2018-12-19 DIAGNOSIS — C61 MALIGNANT NEOPLASM OF PROSTATE (H): Primary | ICD-10-CM

## 2018-12-19 DIAGNOSIS — I10 ESSENTIAL HYPERTENSION: Primary | ICD-10-CM

## 2018-12-19 RX ORDER — AMLODIPINE BESYLATE 5 MG/1
5 TABLET ORAL DAILY
Qty: 30 TABLET | Refills: 3 | Status: SHIPPED | OUTPATIENT
Start: 2018-12-19 | End: 2018-12-19

## 2018-12-19 NOTE — PROGRESS NOTES
RNCC received a [facility] [place] ([employer]: [place]) message from patient's wife Lorrie yesterday regarding patient's high blood pressures.  She has been medicating him with Lisinopril and hydrochlorothiazide that Dr. Valladares had stopped on 12/5/18 because of elevated creatinine levels.      Per Dr. Valladares patient should resume Lisinopril, discontinue hydrochlorothiazide and he mentioned the possibility of Amlodipine 5mg if blood pressure is not controlled with lisinopril.  Patient's wife read back these instructions and understands them, she is a cardiology RN at [employer].      Lorrie would like a follow up [employer] [employer] ([employer] [place] message regarding scans and kidney ultrasound.  Ultrasound was scheduled for 12/5/18 but Dr. Valladares had told Albert his elevated creatinine was likely due to dehydration and Albert thought he no longer needed the ultrasound.     Shirin Lagunas RN BSN   [facility]  Nurse Coordinator

## 2018-12-19 NOTE — TELEPHONE ENCOUNTER
[facility] Prior Authorization Team   Phone: [phone]  Fax: [phone]  Prior Authorization Approval    Authorization Effective Date: 11/19/2018  Authorization Expiration Date: 12/19/2019  Medication: ABIRATERONE (ZYTIGA)-PA APPROVED  Approved Dose/Quantity: 100MG/DAILY  Reference #: CMM KEY: [id number]   Insurance Company: [employer] - Phone [phone] Fax [phone]  Expected CoPay: $0     CoPay Card Available:      Foundation Assistance Needed:    Which Pharmacy is filling the prescription (Not needed for infusion/clinic administered): [facility] - [place], [place] - [facility] [address]  Pharmacy Notified: Yes  Patient Notified: Yes

## 2018-12-20 ENCOUNTER — CARE COORDINATION (OUTPATIENT)
Dept: ONCOLOGY | Facility: CLINIC | Age: 53
End: 2018-12-20

## 2018-12-20 RX ORDER — AMLODIPINE BESYLATE 5 MG/1
TABLET ORAL
Qty: 90 TABLET | Refills: 0 | Status: SHIPPED | OUTPATIENT
Start: 2018-12-20 | End: 2019-04-09

## 2018-12-20 NOTE — PROGRESS NOTES
RNCC spoke with patient's wife Lorrie regarding CT scan and bone scan appointments tomorrow to ensure she is able to make it to the appointments and she states she is, she will make arrangements to get it all worked out.  Lorrie is very grateful for all the hard work we have done for them getting this in before the end of the year.      Shirin Lagunas RN BSN   [facility]  Nurse Coordinator

## 2018-12-21 ENCOUNTER — HOSPITAL ENCOUNTER (OUTPATIENT)
Dept: NUCLEAR MEDICINE | Facility: CLINIC | Age: 53
Setting detail: NUCLEAR MEDICINE
Discharge: HOME | End: 2018-12-21
Attending: INTERNAL MEDICINE | Admitting: INTERNAL MEDICINE
Payer: COMMERCIAL

## 2018-12-21 ENCOUNTER — HOSPITAL ENCOUNTER (OUTPATIENT)
Dept: CT IMAGING | Facility: CLINIC | Age: 53
Discharge: HOME | End: 2018-12-21
Attending: INTERNAL MEDICINE | Admitting: INTERNAL MEDICINE
Payer: COMMERCIAL

## 2018-12-21 ENCOUNTER — HOSPITAL ENCOUNTER (OUTPATIENT)
Dept: NUCLEAR MEDICINE | Facility: CLINIC | Age: 53
Setting detail: NUCLEAR MEDICINE
End: 2018-12-21
Attending: INTERNAL MEDICINE
Payer: COMMERCIAL

## 2018-12-21 DIAGNOSIS — C79.51 BONE METASTASIS: ICD-10-CM

## 2018-12-21 DIAGNOSIS — C61 MALIGNANT NEOPLASM OF PROSTATE (H): ICD-10-CM

## 2018-12-21 PROCEDURE — 25000128 H RX IP 250 OP 636: Performed by: STUDENT IN AN ORGANIZED HEALTH CARE EDUCATION/TRAINING PROGRAM

## 2018-12-21 PROCEDURE — 34300033 ZZH RX 343: Performed by: INTERNAL MEDICINE

## 2018-12-21 PROCEDURE — 74178 CT ABD&PLV WO CNTR FLWD CNTR: CPT

## 2018-12-21 PROCEDURE — A9503 TC99M MEDRONATE: HCPCS | Performed by: INTERNAL MEDICINE

## 2018-12-21 PROCEDURE — 78306 BONE IMAGING WHOLE BODY: CPT

## 2018-12-21 RX ORDER — PREDNISONE 5 MG/1
5 TABLET ORAL
Qty: 30 TABLET | Refills: 0 | Status: SHIPPED | OUTPATIENT
Start: 2018-12-21 | End: 2019-01-25

## 2018-12-21 RX ORDER — IOPAMIDOL 755 MG/ML
115 INJECTION, SOLUTION INTRAVASCULAR ONCE
Status: COMPLETED | OUTPATIENT
Start: 2018-12-21 | End: 2018-12-21

## 2018-12-21 RX ORDER — TC 99M MEDRONATE 20 MG/10ML
20-30 INJECTION, POWDER, LYOPHILIZED, FOR SOLUTION INTRAVENOUS ONCE
Status: COMPLETED | OUTPATIENT
Start: 2018-12-21 | End: 2018-12-21

## 2018-12-21 RX ADMIN — IOPAMIDOL 115 ML: 755 INJECTION, SOLUTION INTRAVENOUS at 13:09

## 2018-12-21 RX ADMIN — TC 99M MEDRONATE 26 MCI.: 20 INJECTION, POWDER, LYOPHILIZED, FOR SOLUTION INTRAVENOUS at 12:30

## 2018-12-30 ENCOUNTER — NURSE TRIAGE (OUTPATIENT)
Dept: NURSING | Facility: CLINIC | Age: 53
End: 2018-12-30

## 2018-12-30 DIAGNOSIS — C61 PROSTATE CANCER (H): Primary | ICD-10-CM

## 2018-12-30 RX ORDER — PREDNISONE 5 MG/1
5 TABLET ORAL DAILY
Qty: 7 TABLET | Refills: 0 | Status: SHIPPED | OUTPATIENT
Start: 2018-12-30 | End: 2019-01-30

## 2018-12-30 NOTE — TELEPHONE ENCOUNTER
Wife calling. States his medications, which were sent to [facility] on 12/21 have not arrived. He does not have prednisone for tomorrow morning and his pain is greatly increased without his prednisone.    Order for 7 tabs of prednisone sent to [facility] in [place]. Called wife to  pills.    Protocol and care advice reviewed  Caller states understanding of the recommended disposition    Advised to call back if further questions or concerns          Additional Information    Caller requesting a refill, no triage required, and triager able to refill per unit policy    Protocols used: MEDICATION QUESTION CALL-ADULT-

## 2019-01-25 DIAGNOSIS — C61 MALIGNANT NEOPLASM OF PROSTATE (H): Primary | ICD-10-CM

## 2019-01-25 RX ORDER — PREDNISONE 5 MG/1
5 TABLET ORAL DAILY
Qty: 30 TABLET | Refills: 1 | Status: SHIPPED | OUTPATIENT
Start: 2019-01-25 | End: 2019-03-12

## 2019-01-30 ENCOUNTER — ONCOLOGY VISIT (OUTPATIENT)
Dept: ONCOLOGY | Facility: CLINIC | Age: 54
End: 2019-01-30
Attending: INTERNAL MEDICINE
Payer: COMMERCIAL

## 2019-01-30 ENCOUNTER — APPOINTMENT (OUTPATIENT)
Dept: LAB | Facility: CLINIC | Age: 54
End: 2019-01-30
Attending: INTERNAL MEDICINE
Payer: COMMERCIAL

## 2019-01-30 VITALS
DIASTOLIC BLOOD PRESSURE: 85 MMHG | BODY MASS INDEX: 30.95 KG/M2 | OXYGEN SATURATION: 95 % | HEIGHT: 67 IN | WEIGHT: 197.2 LBS | HEART RATE: 71 BPM | TEMPERATURE: 98.7 F | SYSTOLIC BLOOD PRESSURE: 127 MMHG

## 2019-01-30 DIAGNOSIS — C79.51 BONE METASTASIS: ICD-10-CM

## 2019-01-30 DIAGNOSIS — R61 NIGHT SWEATS: ICD-10-CM

## 2019-01-30 DIAGNOSIS — C61 MALIGNANT NEOPLASM OF PROSTATE (H): Primary | ICD-10-CM

## 2019-01-30 DIAGNOSIS — G62.9 NEUROPATHY: ICD-10-CM

## 2019-01-30 DIAGNOSIS — E03.4 HYPOTHYROIDISM DUE TO ACQUIRED ATROPHY OF THYROID: ICD-10-CM

## 2019-01-30 DIAGNOSIS — E03.9 HYPOTHYROIDISM, UNSPECIFIED TYPE: ICD-10-CM

## 2019-01-30 LAB
ALBUMIN SERPL-MCNC: 4 G/DL (ref 3.4–5)
ALP SERPL-CCNC: 49 U/L (ref 40–150)
ALT SERPL W P-5'-P-CCNC: 20 U/L (ref 0–70)
ANION GAP SERPL CALCULATED.3IONS-SCNC: 8 MMOL/L (ref 3–14)
AST SERPL W P-5'-P-CCNC: 14 U/L (ref 0–45)
BASOPHILS # BLD AUTO: 0.1 10E9/L (ref 0–0.2)
BASOPHILS NFR BLD AUTO: 0.7 %
BILIRUB SERPL-MCNC: 0.9 MG/DL (ref 0.2–1.3)
BUN SERPL-MCNC: 17 MG/DL (ref 7–30)
CALCIUM SERPL-MCNC: 9.2 MG/DL (ref 8.5–10.1)
CHLORIDE SERPL-SCNC: 107 MMOL/L (ref 94–109)
CO2 SERPL-SCNC: 25 MMOL/L (ref 20–32)
CREAT SERPL-MCNC: 0.75 MG/DL (ref 0.66–1.25)
DIFFERENTIAL METHOD BLD: ABNORMAL
EOSINOPHIL # BLD AUTO: 0.1 10E9/L (ref 0–0.7)
EOSINOPHIL NFR BLD AUTO: 0.9 %
ERYTHROCYTE [DISTWIDTH] IN BLOOD BY AUTOMATED COUNT: 13.2 % (ref 10–15)
GFR SERPL CREATININE-BSD FRML MDRD: >90 ML/MIN/{1.73_M2}
GLUCOSE SERPL-MCNC: 135 MG/DL (ref 70–99)
HCT VFR BLD AUTO: 32.8 % (ref 40–53)
HGB BLD-MCNC: 11.3 G/DL (ref 13.3–17.7)
IMM GRANULOCYTES # BLD: 0 10E9/L (ref 0–0.4)
IMM GRANULOCYTES NFR BLD: 0.4 %
LDH SERPL L TO P-CCNC: 186 U/L (ref 85–227)
LYMPHOCYTES # BLD AUTO: 2.4 10E9/L (ref 0.8–5.3)
LYMPHOCYTES NFR BLD AUTO: 34.5 %
MCH RBC QN AUTO: 31.3 PG (ref 26.5–33)
MCHC RBC AUTO-ENTMCNC: 34.5 G/DL (ref 31.5–36.5)
MCV RBC AUTO: 91 FL (ref 78–100)
MONOCYTES # BLD AUTO: 0.4 10E9/L (ref 0–1.3)
MONOCYTES NFR BLD AUTO: 5.4 %
NEUTROPHILS # BLD AUTO: 4.1 10E9/L (ref 1.6–8.3)
NEUTROPHILS NFR BLD AUTO: 58.1 %
NRBC # BLD AUTO: 0 10*3/UL
NRBC BLD AUTO-RTO: 0 /100
PLATELET # BLD AUTO: 270 10E9/L (ref 150–450)
POTASSIUM SERPL-SCNC: 3.7 MMOL/L (ref 3.4–5.3)
PROT SERPL-MCNC: 7.2 G/DL (ref 6.8–8.8)
PSA SERPL-MCNC: 22.7 UG/L (ref 0–4)
RBC # BLD AUTO: 3.61 10E12/L (ref 4.4–5.9)
SODIUM SERPL-SCNC: 140 MMOL/L (ref 133–144)
WBC # BLD AUTO: 7 10E9/L (ref 4–11)

## 2019-01-30 PROCEDURE — 96372 THER/PROPH/DIAG INJ SC/IM: CPT

## 2019-01-30 PROCEDURE — G0463 HOSPITAL OUTPT CLINIC VISIT: HCPCS | Mod: ZF

## 2019-01-30 PROCEDURE — 25000128 H RX IP 250 OP 636: Mod: ZF | Performed by: INTERNAL MEDICINE

## 2019-01-30 PROCEDURE — 99215 OFFICE O/P EST HI 40 MIN: CPT | Mod: ZP | Performed by: INTERNAL MEDICINE

## 2019-01-30 PROCEDURE — 80053 COMPREHEN METABOLIC PANEL: CPT | Performed by: INTERNAL MEDICINE

## 2019-01-30 PROCEDURE — 84443 ASSAY THYROID STIM HORMONE: CPT | Performed by: INTERNAL MEDICINE

## 2019-01-30 PROCEDURE — 84153 ASSAY OF PSA TOTAL: CPT | Performed by: INTERNAL MEDICINE

## 2019-01-30 PROCEDURE — 36415 COLL VENOUS BLD VENIPUNCTURE: CPT

## 2019-01-30 PROCEDURE — 85025 COMPLETE CBC W/AUTO DIFF WBC: CPT | Performed by: INTERNAL MEDICINE

## 2019-01-30 PROCEDURE — 83615 LACTATE (LD) (LDH) ENZYME: CPT | Performed by: INTERNAL MEDICINE

## 2019-01-30 RX ORDER — METFORMIN HYDROCHLORIDE 750 MG/1
1500 TABLET, EXTENDED RELEASE ORAL
Qty: 180 TABLET | Refills: 3 | Status: SHIPPED | OUTPATIENT
Start: 2019-01-30 | End: 2020-02-04

## 2019-01-30 RX ADMIN — DENOSUMAB 120 MG: 120 INJECTION SUBCUTANEOUS at 16:12

## 2019-01-30 ASSESSMENT — PAIN SCALES - GENERAL: PAINLEVEL: NO PAIN (0)

## 2019-01-30 ASSESSMENT — MIFFLIN-ST. JEOR: SCORE: 1703.12

## 2019-01-30 NOTE — LETTER
1/30/2019       RE: Albert Amaya  [address] [facility] [place] [address]     Dear Colleague,    Thank you for referring your patient, Albert Amaya, to the [facility]. Please see a copy of my visit note below.    Chief Complaint   Patient presents with     Blood Draw     vitals and blood drawn by LPN. Pt checked into appt.      Nicole Gonzales LPN    [facility]  FOLLOW-UP VISIT NOTE  Date of visit: Jan 30, 2019     Cancer History:   Metastatic Prostate CA treated    - s/p 6 cycles of taxotere 75mg/m2   - s/p orchiectomy on 3/18/2016   - s/p Provenge 5/13, 5/27, 6/10   - s/p Casodex 50 mg daily March/April   - Currently taking Zytiga    HPI: Albert is a 53 year old gentleman with metastatic prostate cancer.  Albert felt a cramp in his gluteus muscle and could barely walk after doing some remodeling at home and unloading heavy truck at work. He tried flexeril and prednisone initially but eventually presented to ED on 10/5/15. He feels that initially he was nearly labeled as drug seeker since he was in lot of discomfort and flexeril was not working. But during course of ED visits labs were drawn and he had marked elevation in Alk Phosphatase (over 1000). CT did suggest some ileus with some sclerotic bone lesions. He was admitted to the hospital. His PSA was checked and was markedly elevated at 5760 (10/6/15), repeat value 5563.     Urology and Medical Oncology consults were placed. He was unhappy with the progress in hospital and felt no better and left the following day on 10/6. He did follow up with Dr. Freire and had transrectal US guided biopsy of prostate on 10/8/15. They have the results through my chart which confirms prostate adenocarcinoma - enrico 4+3 involving majority (60-90%) portion of every single core.  He started on taxotere on 10/23 and completed 6 cycles of therapy in 2/2016.  He then underwent orchiectomy on 3/18/2016.       Throughout  spring of 2016 Albert's PSA started to progress and after three elevations there was concern about him being hormone refractory.  He was started on Provenge and enrolled in the trial including Indoximod. PSA trending up and started on Casodex mid March with progression. Switched to Zytiga 5/3/17.    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     He is accompanied by his wife at this visit. He has been doing well and has no new complains attributable to his disease.     He continues to take metformin for his DM TII.  His blood pressures were elevated and his wife increased the dose after discussions/communication with me to 25 mg daily.  They forgot to get his blood pressure and blood glucose logs, but both of these have been reasonably well-controlled.    He has been very active as in the past and there are several projects that he is working on around his house.    He continues to have rising PSA and we reviewed option of the TRITON2 AND TRITON3 study. He is here to review the same.      MEDS:   Current Outpatient Medications   Medication Sig     Abiraterone Acetate (YONSA) 125 MG TABS Take 125 mg by mouth daily Take 4 tablets Daily     Acetaminophen (TYLENOL PO) Take 325 mg by mouth every 8 hours as needed for mild pain or fever Reported on 5/18/2017     amLODIPine (NORVASC) 5 MG tablet TAKE 1 TABLET(5 MG) BY MOUTH DAILY     diphenhydrAMINE (BENADRYL) 25 MG tablet Take 25 mg by mouth     HYDROmorphone (DILAUDID) 2 MG tablet Take 1 tablet (2 mg) by mouth every 4 hours as needed for moderate to severe pain     IBUPROFEN PO Take by mouth as needed for moderate pain Reported on 5/18/2017     levothyroxine (SYNTHROID/LEVOTHROID) 75 MCG tablet Take 1 tablet (75 mcg) by mouth daily     metFORMIN (GLUCOPHAGE-XR) 750 MG 24 hr tablet Take 2 tablets (1,500 mg) by mouth daily (with dinner)     predniSONE (DELTASONE) 5 MG tablet Take 5 mg by mouth daily.     blood glucose monitoring  "(ACCU-CHEK MULTICLIX) lancets Use to test blood sugar twice times daily or as directed.     blood glucose monitoring (NO BRAND SPECIFIED) test strip Use to test blood sugars twice daily or as directed (fasting, rotate then second time - before lunch, before supper and bedtime)     calcium carbonate 600 mg-vitamin D 400 units (CALTRATE) 600-400 MG-UNIT per tablet Take 2 tablets by mouth     EPINEPHrine (EPIPEN 2-CHARITY) 0.3 MG/0.3ML injection 2-pack Inject 0.3 mLs (0.3 mg) into the muscle once as needed for anaphylaxis (Patient not taking: Reported on 1/30/2019)     gabapentin (NEURONTIN) 100 MG capsule Take 1 capsule (100 mg) by mouth 2-3 times daily for neuropathy (Patient not taking: Reported on 1/30/2019)     valACYclovir (VALTREX) 1000 mg tablet TK 1 T PO BID     Current Facility-Administered Medications   Medication     denosumab (XGEVA) injection 120 mg        EXAM:  ECOG 1  Wt Readings from Last 4 Encounters:   01/30/19 89.4 kg (197 lb 3.2 oz)   12/12/18 85.6 kg (188 lb 12.8 oz)   12/05/18 84.7 kg (186 lb 12.8 oz)   10/10/18 86.5 kg (190 lb 12.8 oz)   /85   Pulse 71   Temp 98.7  F (37.1  C) (Oral)   Ht 1.702 m (5' 7\")   Wt 89.4 kg (197 lb 3.2 oz)   SpO2 95%   BMI 30.89 kg/m     Patient alert and oriented x3.   PERRLA. EOMI. No scleral icterus noted. OP without thrush/sores.  Neck exam: Palpable 0.5 cm anterior cervical node on the left, rubbery, mobile, non-tender.   Heart: RRR no murmurs noted.   Lungs: clear to auscultation bilaterally.  No crackles or wheezing.   Abd: Normal bowel sounds.  No tenderness to palpation. No suprapubic tenderness.  No hepatomegaly.   Extremities: No lower extremity edema.   Back: No CVA tenderness.   Neuro: grossly intact.   Mood and affect is stable.     Labs/Imaging:    Recent Labs   Lab Test 01/30/19  1434 12/12/18  1232 12/05/18  1556 10/10/18  1411 08/22/18  1437    138 139 138 140   POTASSIUM 3.7 4.0 4.5 3.8 3.4   CHLORIDE 107 103 104 104 103   CO2 25 26 26 " 26 32   ANIONGAP 8 9 8 8 6   BUN 17 20 29 15 13   CR 0.75 0.75 1.50* 0.79 0.83   * 102* 96 111* 90   WILL 9.2 9.3 9.2 9.3 9.2     Recent Labs   Lab Test 12/21/16  1643 11/22/16  1221 10/26/16  1150 09/28/16  1137 08/17/16  1355  05/03/16  0955   MAG 2.2 2.4* 2.2 2.3 2.0   < > 2.0   PHOS  --   --   --   --   --   --  4.1    < > = values in this interval not displayed.     Recent Labs   Lab Test 01/30/19  1434 12/12/18  1232 12/05/18  1556 10/10/18  1411 08/22/18  1437   WBC 7.0 8.3 9.9 6.2 7.7   HGB 11.3* 12.1* 12.1* 11.6* 11.5*    308 305 297 266   MCV 91 93 93 93 91   NEUTROPHIL 58.1 74.4 67.1 60.9 52.7     Recent Labs   Lab Test 01/30/19  1434 12/12/18  1232 12/05/18  1556   BILITOTAL 0.9 0.6 0.8   ALKPHOS 49 57 54   ALT 20 21 22   AST 14 12 13   ALBUMIN 4.0 3.9 4.1    183 174     TSH   Date Value Ref Range Status   12/05/2018 0.86 0.40 - 4.00 mU/L Final   10/10/2018 3.99 0.40 - 4.00 mU/L Final   08/22/2018 1.88 0.40 - 4.00 mU/L Final     No results for input(s): CEA in the last 71145 hours.  Results for orders placed or performed during the hospital encounter of 12/21/18   CT Chest abdomen pelvis w & w/o contrast    Narrative    EXAMINATION: CT CHEST ABDOMEN PELVIS W/O & W CONTRAST,  12/21/2018 1:10 PM    TECHNIQUE:  Helical CT images from the thoracic inlet through the  symphysis pubis were obtained  with contrast. Contrast dose: 115ml  isovue 370    COMPARISON: 3/7/2018.    HISTORY: See the Clinical Information for Interpreting Provider;  Prostate cancer; Malignant neoplasm of prostate (H)    FINDINGS:    LUNGS: Bilateral lower lobes dependent atelectasis. No acute airspace  opacities. No suspicious pulmonary nodules.    Chest: Partially visualized thyroid gland is mildly heterogeneous.  Central tracheobronchial tree is patent. Thoracic aorta and main  pulmonary artery with normal diameter. Cardiac size at the upper  limits of normal, stable. No pericardial effusions. No pleural  effusions.  No pneumothorax. No thoracic lymphadenopathy. Minimal fluid  in the superior aortic recess.    Abdomen and pelvis: Hepatic steatosis with focal fat deposition along  the falciform ligament. No suspicious liver lesions. Patent hepatic  vasculature. No biliary tree dilation. Unremarkable gallbladder.  Homogeneous pancreatic parenchyma. Main pancreatic duct is not  dilated. The spleen is not enlarged. No focal splenic lesions.    Unremarkable adrenal glands. No renal stones or hydronephrosis  bilaterally. Unremarkable urinary bladder. Heterogeneous prostate, not  enlarged. Symmetric seminal vesicles. Pelvic phleboliths. Prominent  fat in the left inguinal canal.    No inguinal lymphadenopathy. Subcentimeter pelvic lymph nodes with no  convincing worrisome features. No free fluid. No free air. No  abdominal aortic aneurysm. Mild atherosclerotic calcifications of the  abdominal aorta and its major branches. Subcentimeter retroperitoneal  and mesenteric lymph nodes, not enlarged by size criteria. Sliding  hiatal hernia. Otherwise decompressed stomach. Colonic diverticulosis.  No associated CT findings of acute diverticulitis. Unremarkable  appendix. No dilated loops of bowel. No bowel wall thickening.  Moderate colonic stool burden.    Bones: Extensive sclerotic metastatic bone lesions throughout the  axial and appendicular skeletal which appear overall grossly stable  from the prior study. Scattered Schmorl nodes with degenerative  changes of the spine.      Impression    IMPRESSION:  1. Overall grossly stable diffuse sclerotic metastatic bone lesions  throughout the appendicular and axial skeleton.  2. No convincing evidence for metastatic disease within the chest,  abdomen and pelvis.    CHRISTINE HUMPHREYS MD     EXAMINATION: NM BONE SCAN WHOLE BODY  Whole-body bone scan, 12/21/2018 3:30 PM      HISTORY: Prostate cancer, advanced disease, CRPC, metastatic, post  systemic therapy, assess tx response; Malignant neoplasm  of prostate  (H)      ADDITIONAL INFORMATION: none     COMPARISON: Nuclear medicine bone scan 3/7/2018, CT cap 12/21/2018,  3/7/2018.     TECHNIQUE: The patient received 26 mCi of Tc-99m MDP intravenously.  Whole body bone images were obtained at 3 hours.     FINDINGS: Increased focal uptake in the T11 vertebral body. Low level  radiotracer uptake throughout the axial skeleton, unchanged the prior  exams. Likely degenerative uptake in the right manubrial clavicular  joint and the right facet joint of the upper cervical spine.  Questionable increased uptake in the L4 vertebral body.                                                                      IMPRESSION:   In this patient with history of metastatic prostate cancer, there is  scintigraphic evidence of progression of disease in the T11 vertebral  body. Additionally, there is questionable increased uptake in the L4  vertebral body.  2. Unchanged diffuse low-level radiotracer uptake throughout the axial  skeleton, compatible with the patient's known diffuse osseous  metastatic disease.     I have personally reviewed the examination and initial interpretation  and I agree with the findings.     MIREYA ORO MD       ASSESSMENT/PLAN:  1. Metastatic prostate cancer with bony metastasis:   Currently taking abiraterone 1000 mg po q day and prednisone 5 mg po qday  Denosumab every 12 weeks   Newly diagnosed HTN and DM TII  ECOG PS 0    Albert has been doing well since the last visit.  He has been taking his metformin and amlodipine.  We discontinued his hydrochlorothiazide due to his renal insufficiency at the last visit.     Since the last visit, he has completed a restaging CT scan of the chest, abdomen and pelvis and bone scan.  We reviewed the images of both his CT scan and bone scan and compared it to the previous scan done about 9 months previously.  I do not see any new disease anywhere.  He continues to have extensive bony metastasis.  I am not surprised about  the results of the scan despite a slowly rising PSA.  In the big scheme of things, his PSA has declined from an initial value close to 7000 at diagnosis and still has barely made it to double digits at 17 at last visit and 21 at this time.  His PSA at the start of Zytiga therapy was close to 110.  It is still significantly lower than at the start of therapy in 2017.  He is having an excellent quality of life at this time and has no symptoms or complaints.  Our goal of palliative therapy is to preserve his quality of life, and we are achieving this at this time.      I again reviewed the TRITON 2-3 studies.  He is not eligible for the TRITON 2 study, as he has not had chemotherapy in the castration-resistant setting.  He did get chemotherapy as a part of chemohormonal therapy for hormone-sensitive prostate cancer.  He would be eligible for the TRITON 3 study, which is open now in chemotherapy-naive patients.  However, I explained to them that this is a randomized study, which randomizes patients to either the study drug rucaparib or enzalutamide/abiraterone that the patient has not been previously exposed to.  Lorrie was completely opposed to the idea of a randomized study, and she has heard of terrible side effects from enzalutamide.  Several of her known contacts who have been on enzalutamide had morbid fatigue on this medication.  We do not have an immediate reason to make this change.  I would suggest that we change therapy when there is a more rapid rise in his PSA.      Albert had questions on MuTaTo therapy developed in [place], which came to his attention as they claimed it cured cancer.  Right at the outset it sounds unrealistic, as every therapy that is usually being developed is developed for a specific cancer type.  I have not known of a therapy that works across all known cancers at various stages of disease.  We can clearly cure so many cancers by either surgery or radiation, and so that would not be a  way to cure the cancer.  We reviewed on this MuTaTo-multi-target toxin therapy.  Most of the articles were in newspapers rather than scientific publications.  The therapy is only in early stages of development, and the claims are more to raise funding for research than true validity of effectiveness of this therapy.      I would like to see him in about 2 months' time with repeat labs prior to the clinic visit.       He will get his denosumab today. I will see him in 8 weeks with labs prior to visit and denosumab after visit.     As always he and his wife had come prepared with questions which were answered.      Over 45 min of direct face to face time spent with patient with more than 50% time spent in counseling and coordinating care.        Again, thank you for allowing me to participate in the care of your patient.      Sincerely,    Tyrel Valladares MD

## 2019-01-30 NOTE — PROGRESS NOTES
[facility] CANCER CLINIC  FOLLOW-UP VISIT NOTE  Date of visit: Jan 30, 2019     Cancer History:   Metastatic Prostate CA treated    - s/p 6 cycles of taxotere 75mg/m2   - s/p orchiectomy on 3/18/2016   - s/p Provenge 5/13, 5/27, 6/10   - s/p Casodex 50 mg daily March/April   - Currently taking Zytiga    HPI: Albert is a 53 year old gentleman with metastatic prostate cancer.  Albert felt a cramp in his gluteus muscle and could barely walk after doing some remodeling at home and unloading heavy truck at work. He tried flexeril and prednisone initially but eventually presented to ED on 10/5/15. He feels that initially he was nearly labeled as drug seeker since he was in lot of discomfort and flexeril was not working. But during course of ED visits labs were drawn and he had marked elevation in Alk Phosphatase (over 1000). CT did suggest some ileus with some sclerotic bone lesions. He was admitted to the hospital. His PSA was checked and was markedly elevated at 5760 (10/6/15), repeat value 5563.     Urology and Medical Oncology consults were placed. He was unhappy with the progress in hospital and felt no better and left the following day on 10/6. He did follow up with Dr. Freire and had transrectal US guided biopsy of prostate on 10/8/15. They have the results through my chart which confirms prostate adenocarcinoma - enrico 4+3 involving majority (60-90%) portion of every single core.  He started on taxotere on 10/23 and completed 6 cycles of therapy in 2/2016.  He then underwent orchiectomy on 3/18/2016.       Throughout spring of 2016 Albert's PSA started to progress and after three elevations there was concern about him being hormone refractory.  He was started on Provenge and enrolled in the trial including Indoximod. PSA trending up and started on Casodex mid March with progression. Switched to Zytiga 5/3/17.    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     He is accompanied by his wife at this visit. He has been doing well and has no new complains attributable to his disease.     He continues to take metformin for his DM TII.  His blood pressures were elevated and his wife increased the dose after discussions/communication with me to 25 mg daily.  They forgot to get his blood pressure and blood glucose logs, but both of these have been reasonably well-controlled.    He has been very active as in the past and there are several projects that he is working on around his house.    He continues to have rising PSA and we reviewed option of the TRITON2 AND TRITON3 study. He is here to review the same.      MEDS:   Current Outpatient Medications   Medication Sig     Abiraterone Acetate (YONSA) 125 MG TABS Take 125 mg by mouth daily Take 4 tablets Daily     Acetaminophen (TYLENOL PO) Take 325 mg by mouth every 8 hours as needed for mild pain or fever Reported on 5/18/2017     amLODIPine (NORVASC) 5 MG tablet TAKE 1 TABLET(5 MG) BY MOUTH DAILY     diphenhydrAMINE (BENADRYL) 25 MG tablet Take 25 mg by mouth     HYDROmorphone (DILAUDID) 2 MG tablet Take 1 tablet (2 mg) by mouth every 4 hours as needed for moderate to severe pain     IBUPROFEN PO Take by mouth as needed for moderate pain Reported on 5/18/2017     levothyroxine (SYNTHROID/LEVOTHROID) 75 MCG tablet Take 1 tablet (75 mcg) by mouth daily     metFORMIN (GLUCOPHAGE-XR) 750 MG 24 hr tablet Take 2 tablets (1,500 mg) by mouth daily (with dinner)     predniSONE (DELTASONE) 5 MG tablet Take 5 mg by mouth daily.     blood glucose monitoring (ACCU-CHEK MULTICLIX) lancets Use to test blood sugar twice times daily or as directed.     blood glucose monitoring (NO BRAND SPECIFIED) test strip Use to test blood sugars twice daily or as directed (fasting, rotate then second time - before lunch, before supper and bedtime)     calcium carbonate 600 mg-vitamin D 400 units (CALTRATE) 600-400 MG-UNIT per tablet Take 2 tablets  "by mouth     EPINEPHrine (EPIPEN 2-CHARITY) 0.3 MG/0.3ML injection 2-pack Inject 0.3 mLs (0.3 mg) into the muscle once as needed for anaphylaxis (Patient not taking: Reported on 1/30/2019)     gabapentin (NEURONTIN) 100 MG capsule Take 1 capsule (100 mg) by mouth 2-3 times daily for neuropathy (Patient not taking: Reported on 1/30/2019)     valACYclovir (VALTREX) 1000 mg tablet TK 1 T PO BID     Current Facility-Administered Medications   Medication     denosumab (XGEVA) injection 120 mg        EXAM:  ECOG 1  Wt Readings from Last 4 Encounters:   01/30/19 89.4 kg (197 lb 3.2 oz)   12/12/18 85.6 kg (188 lb 12.8 oz)   12/05/18 84.7 kg (186 lb 12.8 oz)   10/10/18 86.5 kg (190 lb 12.8 oz)   /85   Pulse 71   Temp 98.7  F (37.1  C) (Oral)   Ht 1.702 m (5' 7\")   Wt 89.4 kg (197 lb 3.2 oz)   SpO2 95%   BMI 30.89 kg/m    Patient alert and oriented x3.   PERRLA. EOMI. No scleral icterus noted. OP without thrush/sores.  Neck exam: Palpable 0.5 cm anterior cervical node on the left, rubbery, mobile, non-tender.   Heart: RRR no murmurs noted.   Lungs: clear to auscultation bilaterally.  No crackles or wheezing.   Abd: Normal bowel sounds.  No tenderness to palpation. No suprapubic tenderness.  No hepatomegaly.   Extremities: No lower extremity edema.   Back: No CVA tenderness.   Neuro: grossly intact.   Mood and affect is stable.     Labs/Imaging:    Recent Labs   Lab Test 01/30/19  1434 12/12/18  1232 12/05/18  1556 10/10/18  1411 08/22/18  1437    138 139 138 140   POTASSIUM 3.7 4.0 4.5 3.8 3.4   CHLORIDE 107 103 104 104 103   CO2 25 26 26 26 32   ANIONGAP 8 9 8 8 6   BUN 17 20 29 15 13   CR 0.75 0.75 1.50* 0.79 0.83   * 102* 96 111* 90   WILL 9.2 9.3 9.2 9.3 9.2     Recent Labs   Lab Test 12/21/16  1643 11/22/16  1221 10/26/16  1150 09/28/16  1137 08/17/16  1355  05/03/16  0955   MAG 2.2 2.4* 2.2 2.3 2.0   < > 2.0   PHOS  --   --   --   --   --   --  4.1    < > = values in this interval not displayed. "     Recent Labs   Lab Test 01/30/19  1434 12/12/18  1232 12/05/18  1556 10/10/18  1411 08/22/18  1437   WBC 7.0 8.3 9.9 6.2 7.7   HGB 11.3* 12.1* 12.1* 11.6* 11.5*    308 305 297 266   MCV 91 93 93 93 91   NEUTROPHIL 58.1 74.4 67.1 60.9 52.7     Recent Labs   Lab Test 01/30/19  1434 12/12/18  1232 12/05/18  1556   BILITOTAL 0.9 0.6 0.8   ALKPHOS 49 57 54   ALT 20 21 22   AST 14 12 13   ALBUMIN 4.0 3.9 4.1    183 174     TSH   Date Value Ref Range Status   12/05/2018 0.86 0.40 - 4.00 mU/L Final   10/10/2018 3.99 0.40 - 4.00 mU/L Final   08/22/2018 1.88 0.40 - 4.00 mU/L Final     No results for input(s): CEA in the last 69787 hours.  Results for orders placed or performed during the hospital encounter of 12/21/18   CT Chest abdomen pelvis w & w/o contrast    Narrative    EXAMINATION: CT CHEST ABDOMEN PELVIS W/O & W CONTRAST,  12/21/2018 1:10 PM    TECHNIQUE:  Helical CT images from the thoracic inlet through the  symphysis pubis were obtained  with contrast. Contrast dose: 115ml  isovue 370    COMPARISON: 3/7/2018.    HISTORY: See the Clinical Information for Interpreting Provider;  Prostate cancer; Malignant neoplasm of prostate (H)    FINDINGS:    LUNGS: Bilateral lower lobes dependent atelectasis. No acute airspace  opacities. No suspicious pulmonary nodules.    Chest: Partially visualized thyroid gland is mildly heterogeneous.  Central tracheobronchial tree is patent. Thoracic aorta and main  pulmonary artery with normal diameter. Cardiac size at the upper  limits of normal, stable. No pericardial effusions. No pleural  effusions. No pneumothorax. No thoracic lymphadenopathy. Minimal fluid  in the superior aortic recess.    Abdomen and pelvis: Hepatic steatosis with focal fat deposition along  the falciform ligament. No suspicious liver lesions. Patent hepatic  vasculature. No biliary tree dilation. Unremarkable gallbladder.  Homogeneous pancreatic parenchyma. Main pancreatic duct is not  dilated.  The spleen is not enlarged. No focal splenic lesions.    Unremarkable adrenal glands. No renal stones or hydronephrosis  bilaterally. Unremarkable urinary bladder. Heterogeneous prostate, not  enlarged. Symmetric seminal vesicles. Pelvic phleboliths. Prominent  fat in the left inguinal canal.    No inguinal lymphadenopathy. Subcentimeter pelvic lymph nodes with no  convincing worrisome features. No free fluid. No free air. No  abdominal aortic aneurysm. Mild atherosclerotic calcifications of the  abdominal aorta and its major branches. Subcentimeter retroperitoneal  and mesenteric lymph nodes, not enlarged by size criteria. Sliding  hiatal hernia. Otherwise decompressed stomach. Colonic diverticulosis.  No associated CT findings of acute diverticulitis. Unremarkable  appendix. No dilated loops of bowel. No bowel wall thickening.  Moderate colonic stool burden.    Bones: Extensive sclerotic metastatic bone lesions throughout the  axial and appendicular skeletal which appear overall grossly stable  from the prior study. Scattered Schmorl nodes with degenerative  changes of the spine.      Impression    IMPRESSION:  1. Overall grossly stable diffuse sclerotic metastatic bone lesions  throughout the appendicular and axial skeleton.  2. No convincing evidence for metastatic disease within the chest,  abdomen and pelvis.    CHRISTINE HUMPHREYS MD     EXAMINATION: NM BONE SCAN WHOLE BODY  Whole-body bone scan, 12/21/2018 3:30 PM      HISTORY: Prostate cancer, advanced disease, CRPC, metastatic, post  systemic therapy, assess tx response; Malignant neoplasm of prostate  (H)      ADDITIONAL INFORMATION: none     COMPARISON: Nuclear medicine bone scan 3/7/2018, CT cap 12/21/2018,  3/7/2018.     TECHNIQUE: The patient received 26 mCi of Tc-99m MDP intravenously.  Whole body bone images were obtained at 3 hours.     FINDINGS: Increased focal uptake in the T11 vertebral body. Low level  radiotracer uptake throughout the axial  skeleton, unchanged the prior  exams. Likely degenerative uptake in the right manubrial clavicular  joint and the right facet joint of the upper cervical spine.  Questionable increased uptake in the L4 vertebral body.                                                                      IMPRESSION:   In this patient with history of metastatic prostate cancer, there is  scintigraphic evidence of progression of disease in the T11 vertebral  body. Additionally, there is questionable increased uptake in the L4  vertebral body.  2. Unchanged diffuse low-level radiotracer uptake throughout the axial  skeleton, compatible with the patient's known diffuse osseous  metastatic disease.     I have personally reviewed the examination and initial interpretation  and I agree with the findings.     MIREYA ORO MD       ASSESSMENT/PLAN:  1. Metastatic prostate cancer with bony metastasis:   Currently taking abiraterone 1000 mg po q day and prednisone 5 mg po qday  Denosumab every 12 weeks   Newly diagnosed HTN and DM TII  ECOG PS 0    Albert has been doing well since the last visit.  He has been taking his metformin and amlodipine.  We discontinued his hydrochlorothiazide due to his renal insufficiency at the last visit.     Since the last visit, he has completed a restaging CT scan of the chest, abdomen and pelvis and bone scan.  We reviewed the images of both his CT scan and bone scan and compared it to the previous scan done about 9 months previously.  I do not see any new disease anywhere.  He continues to have extensive bony metastasis.  I am not surprised about the results of the scan despite a slowly rising PSA.  In the big scheme of things, his PSA has declined from an initial value close to 7000 at diagnosis and still has barely made it to double digits at 17 at last visit and 21 at this time.  His PSA at the start of Zytiga therapy was close to 110.  It is still significantly lower than at the start of therapy in 2017.   He is having an excellent quality of life at this time and has no symptoms or complaints.  Our goal of palliative therapy is to preserve his quality of life, and we are achieving this at this time.      I again reviewed the TRITON 2-3 studies.  He is not eligible for the TRITON 2 study, as he has not had chemotherapy in the castration-resistant setting.  He did get chemotherapy as a part of chemohormonal therapy for hormone-sensitive prostate cancer.  He would be eligible for the TRITON 3 study, which is open now in chemotherapy-naive patients.  However, I explained to them that this is a randomized study, which randomizes patients to either the study drug rucaparib or enzalutamide/abiraterone that the patient has not been previously exposed to.  Lorrie was completely opposed to the idea of a randomized study, and she has heard of terrible side effects from enzalutamide.  Several of her known contacts who have been on enzalutamide had morbid fatigue on this medication.  We do not have an immediate reason to make this change.  I would suggest that we change therapy when there is a more rapid rise in his PSA.      Albert had questions on MuTaTo therapy developed in [place], which came to his attention as they claimed it cured cancer.  Right at the outset it sounds unrealistic, as every therapy that is usually being developed is developed for a specific cancer type.  I have not known of a therapy that works across all known cancers at various stages of disease.  We can clearly cure so many cancers by either surgery or radiation, and so that would not be a way to cure the cancer.  We reviewed on this MuTaTo-multi-target toxin therapy.  Most of the articles were in newspapers rather than scientific publications.  The therapy is only in early stages of development, and the claims are more to raise funding for research than true validity of effectiveness of this therapy.      I would like to see him in about 2 months' time  with repeat labs prior to the clinic visit.       He will get his denosumab today. I will see him in 8 weeks with labs prior to visit and denosumab after visit.     As always he and his wife had come prepared with questions which were answered.      Over 45 min of direct face to face time spent with patient with more than 50% time spent in counseling and coordinating care.

## 2019-01-30 NOTE — PROGRESS NOTES
Chief Complaint   Patient presents with     Blood Draw     vitals and blood drawn by LPN. Pt checked into appt.      Nicole Gonzales LPN

## 2019-01-30 NOTE — NURSING NOTE
"Oncology Rooming Note    January 30, 2019 2:55 PM   Albert Amaya is a 53 year old male who presents for:    Chief Complaint   Patient presents with     Blood Draw     vitals and blood drawn by LPN. Pt checked into appt.      Oncology Clinic Visit     Return Prostate Ca     Initial Vitals: /85   Pulse 71   Temp 98.7  F (37.1  C) (Oral)   Ht 1.702 m (5' 7\")   Wt 89.4 kg (197 lb 3.2 oz)   SpO2 95%   BMI 30.89 kg/m   Estimated body mass index is 30.89 kg/m  as calculated from the following:    Height as of this encounter: 1.702 m (5' 7\").    Weight as of this encounter: 89.4 kg (197 lb 3.2 oz). Body surface area is 2.06 meters squared.  No Pain (0) Comment: Data Unavailable   No LMP for male patient.  Allergies reviewed: Yes  Medications reviewed: Yes    Medications: MEDICATION REFILLS NEEDED TODAY. Provider was notified.  Pharmacy name entered into hhgregg:    [id number] - [place], [place] - [address] [facility] - [place], [place] - [address] [facility] - [place], [place] - [address] [facility] - [place] [address] [place] [address] [id number] ([place]) - [place] - [address]  [place] - [place], [place] - [phone]  [facility] - [place], [place] - [address]    Clinical concerns: Refill on Metformin;      6 minutes for nursing intake (face to face time)     Cynthia Hawk CMA              "

## 2019-02-12 ENCOUNTER — TELEPHONE (OUTPATIENT)
Dept: ONCOLOGY | Facility: CLINIC | Age: 54
End: 2019-02-12

## 2019-02-12 DIAGNOSIS — C61 MALIGNANT NEOPLASM OF PROSTATE (H): Primary | ICD-10-CM
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
## 2019-02-12 DIAGNOSIS — C79.51 BONE METASTASIS: ICD-10-CM
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
## 2019-02-12 NOTE — TELEPHONE ENCOUNTER
"Pt called to report he is on his way to the Emergency Department at [facility]. Pt states he did not think it was necessary but his boss mentioned to him, \"do realize you are not tracking and having difficulty finding your words?\"     Pt states he did not realize this and now is heading to the ER to be examined. Writer stated this is encouraging to follow-up and receive a full examination.    Pt said he will keep his Care Team updated. No further questions or concerns at this time.     Vanessa Samuels, EMANUELN, RN, OCN  RN Cancer Care Coordinator  [facility]  [phone]    "
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
## 2019-02-12 NOTE — TELEPHONE ENCOUNTER
Dr. Valladares spoke to Pt and recommended Pt go to emergency room to have MRI of cervical spine w & w/o contrast and CT Chest/Abdomen/Pelvis w& w/o contrast.     Pt requested local scans and desires not to go to emergency department. Prior Authorization was declined as out patient as this is not related to his cancer.   Vanessa Samuels, EMANUELN, RN, OCN  RN Cancer Care Coordinator  [facility]  [phone]

## 2019-02-13 ENCOUNTER — CARE COORDINATION (OUTPATIENT)
Dept: ONCOLOGY | Facility: CLINIC | Age: 54
End: 2019-02-13
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
## 2019-02-15 ENCOUNTER — CARE COORDINATION (OUTPATIENT)
Dept: ONCOLOGY | Facility: CLINIC | Age: 54
End: 2019-02-15

## 2019-02-15 ENCOUNTER — TELEPHONE (OUTPATIENT)
Dept: PHARMACY | Facility: CLINIC | Age: 54
End: 2019-02-15

## 2019-02-15 RX ORDER — ABIRATERONE ACETATE 250 MG/1
1000 TABLET ORAL DAILY
COMMUNITY
End: 2019-03-12

## 2019-02-15 NOTE — ORAL ONC MGMT
Oral Chemotherapy Monitoring Program    Primary Oncologist: Dr. Valladares  Primary Oncology Clinic: [facility]  Cancer Diagnosis: castration resistant prostate cancer    Therapy History:  Zytiga/pred started: May 10, 2017    Drug Interaction Assessment: no significant drug interactions  2/15 med list: Abiraterone Acetate-Acetaminophen-AmLODIPine-Calcium Carbonate and Vitamin D-DiphenhydrAMINE-Gabapentin-HYDROmorphone-Ibuprofen-Levothyroxine-MetFORMIN-PredniSONE-ValACYclovir    Lab Monitoring Plan  Lab monitoring at visits  Subjective/Objective:  Albert Amaya is a 53 year old male contacted by phone for a follow-up visit for oral chemotherapy. He confirmed correct dosing of 4, 250mg tablets taken once daily on an empty stomach. Patient denies any new/worsening side effects, missed doses, or medication changes. At an oncology visit, he was advised that it's ok to take abiraterone with food so he had questions regarding that information.     ORAL CHEMOTHERAPY 7/11/2017 8/25/2017 3/22/2018 4/23/2018 5/24/2018 8/31/2018 2/15/2019   Drug Name Zytiga (Abiraterone) Zytiga (Abiraterone) Zytiga (Abiraterone) Zytiga (Abiraterone) Zytiga (Abiraterone) Zytiga (Abiraterone) Zytiga (Abiraterone)   Current Dosage 1000mg 1000mg 1000mg 1000mg 1000mg 1000mg 1000mg   Current Schedule Daily Daily Daily Daily Daily Daily Daily   Cycle Details Continuous Continuous Continuous Continuous Continuous Continuous Continuous   Start Date of Last Cycle - 5/10/2017 - - - - 1/18/2019   Planned next cycle start date - - - - - - 2/17/2019   Doses missed in last 2 weeks 1 0 0 0 0 0 0   Adherence Assessment Adherent Adherent Adherent Adherent Adherent Adherent Adherent   Adverse Effects No AE identified during assessment Myalgias;Arthralgias - No AE identified during assessment No AE identified during assessment No AE identified during assessment No AE identified during assessment   Pharmacist Intervention(myalgias) - No - - - - -   Arthralgias  "- Resolved due to intervention - - - - -   Pharmacist Intervention(arthralgias) - No - - - - -   Home BPs not done not done - - - - -   Any new drug interactions? - No - - - - No   Is the dose as ordered appropriate for the patient? - Yes - - - - Yes       Last PHQ-2 Score on record:   PHQ-2 ( 1999 [employer]) 3/24/2017 3/1/2017   Q1: Little interest or pleasure in doing things 0 0   Q2: Feeling down, depressed or hopeless 0 0   PHQ-2 Score 0 0       Vitals:  BP:   BP Readings from Last 1 Encounters:   01/30/19 127/85     Wt Readings from Last 1 Encounters:   01/30/19 89.4 kg (197 lb 3.2 oz)     Estimated body surface area is 2.06 meters squared as calculated from the following:    Height as of 1/30/19: 1.702 m (5' 7\").    Weight as of 1/30/19: 89.4 kg (197 lb 3.2 oz).    Labs:  _  Result Component Current Result Ref Range   Sodium 140 (1/30/2019) 133 - 144 mmol/L     _  Result Component Current Result Ref Range   Potassium 3.7 (1/30/2019) 3.4 - 5.3 mmol/L     _  Result Component Current Result Ref Range   Calcium 9.2 (1/30/2019) 8.5 - 10.1 mg/dL     No results found for Mag within last 30 days.     No results found for Phos within last 30 days.     _  Result Component Current Result Ref Range   Albumin 4.0 (1/30/2019) 3.4 - 5.0 g/dL     _  Result Component Current Result Ref Range   Urea Nitrogen 17 (1/30/2019) 7 - 30 mg/dL     _  Result Component Current Result Ref Range   Creatinine 0.75 (1/30/2019) 0.66 - 1.25 mg/dL       _  Result Component Current Result Ref Range   AST 14 (1/30/2019) 0 - 45 U/L     _  Result Component Current Result Ref Range   ALT 20 (1/30/2019) 0 - 70 U/L     _  Result Component Current Result Ref Range   Bilirubin Total 0.9 (1/30/2019) 0.2 - 1.3 mg/dL       _  Result Component Current Result Ref Range   WBC 7.0 (1/30/2019) 4.0 - 11.0 10e9/L     _  Result Component Current Result Ref Range   Hemoglobin 11.3 (L) (1/30/2019) 13.3 - 17.7 g/dL     _  Result Component Current Result Ref Range "   Platelet Count 270 (1/30/2019) 150 - 450 10e9/L     _  Result Component Current Result Ref Range   Absolute Neutrophil 4.1 (1/30/2019) 1.6 - 8.3 10e9/L           Assessment:  Albert is tolerating abiraterone without noticeable side effects.     Albert referenced Yonsa and Zytiga interchangeably multiple times during our conversation. Provided counseling that Zytiga and generic abiraterone are the same compound but manufactured by different companies. These two need to be taken on an empty stomach as this is how it was studied in clinical trials and if taken with food, the systemic absorption increases without providing additional therapeutic benefit. Yonsa is a different formulation of abiraterone that is not consider equivalent to Zytiga/generic abiraterone. Yonsa can be taken with food without effect on pharmacokinetics. I advised to continue to take on an empty stomach. Albert did switch to the generic abiraterone in January and has not had any perceivable side effects. Albert verbalized understanding and confirmed he would continue taking on an empty stomach.     Adherence: PDC = 0.93 which is increased since October. No issues with adherence.     Plan:  Continue abiraterone 1000mg daily on an empty stomach.     Follow-Up:  3/12/19: refill is due, will receive call from pharmacy liaison  3/27/19: review appt with Dr. Valladares    Refill Due:  Albert will  prednisone/abiraterone at [facility] either 2/15 or 2/16. Called [facility] to coordinate.     Brandan Dewitt, PharmD, MS  Hematology/Oncology Clinical Pharmacist  [facility]  [facility]

## 2019-02-27 ENCOUNTER — DOCUMENTATION ONLY (OUTPATIENT)
Dept: ONCOLOGY | Facility: CLINIC | Age: 54
End: 2019-02-27

## 2019-02-27 NOTE — PROGRESS NOTES
Form Request Documentation    Date Received in Clinic:  2/23/19  Name/Type of Form: Spousal FMLA form  Questions that need to be addressed:   Current Employment Status: full time with intermittent leave  Date Completed: 2/27/2019  Copy Mailed to patient: Yes on 2/27/2019  Disposition of Form: Copy placed in family forms folder

## 2019-03-12 DIAGNOSIS — C79.51 BONE METASTASIS: ICD-10-CM

## 2019-03-12 DIAGNOSIS — C61 MALIGNANT NEOPLASM OF PROSTATE (H): ICD-10-CM

## 2019-03-12 DIAGNOSIS — C61 MALIGNANT NEOPLASM OF PROSTATE (H): Primary | ICD-10-CM

## 2019-03-12 RX ORDER — PREDNISONE 5 MG/1
5 TABLET ORAL
Qty: 30 TABLET | Refills: 2 | Status: SHIPPED | OUTPATIENT
Start: 2019-03-12 | End: 2019-03-14

## 2019-03-12 RX ORDER — ABIRATERONE ACETATE 250 MG/1
1000 TABLET ORAL DAILY
Qty: 120 TABLET | Refills: 2 | Status: SHIPPED | OUTPATIENT
Start: 2019-03-12 | End: 2019-09-18

## 2019-03-13 RX ORDER — PREDNISONE 5 MG/1
5 TABLET ORAL DAILY
Qty: 30 TABLET | Refills: 1 | OUTPATIENT
Start: 2019-03-13

## 2019-03-13 NOTE — TELEPHONE ENCOUNTER
Rx filled by pharmacist 3/12/19. Duplicate refill request.     Isis Campos, RN   [facility]

## 2019-03-14 DIAGNOSIS — C79.51 BONE METASTASIS: ICD-10-CM

## 2019-03-14 DIAGNOSIS — C61 MALIGNANT NEOPLASM OF PROSTATE (H): ICD-10-CM

## 2019-03-14 RX ORDER — PREDNISONE 5 MG/1
5 TABLET ORAL
Qty: 90 TABLET | Refills: 2 | Status: SHIPPED | OUTPATIENT
Start: 2019-03-14 | End: 2019-06-26

## 2019-03-27 ENCOUNTER — APPOINTMENT (OUTPATIENT)
Dept: LAB | Facility: CLINIC | Age: 54
End: 2019-03-27
Attending: INTERNAL MEDICINE
Payer: COMMERCIAL

## 2019-03-27 ENCOUNTER — ONCOLOGY VISIT (OUTPATIENT)
Dept: ONCOLOGY | Facility: CLINIC | Age: 54
End: 2019-03-27
Attending: INTERNAL MEDICINE
Payer: COMMERCIAL

## 2019-03-27 VITALS
BODY MASS INDEX: 29.73 KG/M2 | HEART RATE: 72 BPM | TEMPERATURE: 98.6 F | DIASTOLIC BLOOD PRESSURE: 78 MMHG | SYSTOLIC BLOOD PRESSURE: 122 MMHG | RESPIRATION RATE: 16 BRPM | WEIGHT: 189.4 LBS | HEIGHT: 67 IN | OXYGEN SATURATION: 96 %

## 2019-03-27 DIAGNOSIS — C79.51 BONE METASTASIS: ICD-10-CM

## 2019-03-27 DIAGNOSIS — E03.4 HYPOTHYROIDISM DUE TO ACQUIRED ATROPHY OF THYROID: ICD-10-CM

## 2019-03-27 DIAGNOSIS — C61 MALIGNANT NEOPLASM OF PROSTATE (H): Primary | ICD-10-CM

## 2019-03-27 LAB
ALBUMIN SERPL-MCNC: 4.3 G/DL (ref 3.4–5)
ALP SERPL-CCNC: 51 U/L (ref 40–150)
ALT SERPL W P-5'-P-CCNC: 20 U/L (ref 0–70)
ANION GAP SERPL CALCULATED.3IONS-SCNC: 7 MMOL/L (ref 3–14)
AST SERPL W P-5'-P-CCNC: 16 U/L (ref 0–45)
BASOPHILS # BLD AUTO: 0 10E9/L (ref 0–0.2)
BASOPHILS NFR BLD AUTO: 0.3 %
BILIRUB SERPL-MCNC: 0.9 MG/DL (ref 0.2–1.3)
BUN SERPL-MCNC: 17 MG/DL (ref 7–30)
CALCIUM SERPL-MCNC: 9.5 MG/DL (ref 8.5–10.1)
CHLORIDE SERPL-SCNC: 107 MMOL/L (ref 94–109)
CO2 SERPL-SCNC: 25 MMOL/L (ref 20–32)
CREAT SERPL-MCNC: 0.79 MG/DL (ref 0.66–1.25)
DIFFERENTIAL METHOD BLD: ABNORMAL
EOSINOPHIL # BLD AUTO: 0 10E9/L (ref 0–0.7)
EOSINOPHIL NFR BLD AUTO: 0.5 %
ERYTHROCYTE [DISTWIDTH] IN BLOOD BY AUTOMATED COUNT: 13.1 % (ref 10–15)
GFR SERPL CREATININE-BSD FRML MDRD: >90 ML/MIN/{1.73_M2}
GLUCOSE SERPL-MCNC: 144 MG/DL (ref 70–99)
HCT VFR BLD AUTO: 37.6 % (ref 40–53)
HGB BLD-MCNC: 12.1 G/DL (ref 13.3–17.7)
IMM GRANULOCYTES # BLD: 0 10E9/L (ref 0–0.4)
IMM GRANULOCYTES NFR BLD: 0.3 %
LDH SERPL L TO P-CCNC: 202 U/L (ref 85–227)
LYMPHOCYTES # BLD AUTO: 1.7 10E9/L (ref 0.8–5.3)
LYMPHOCYTES NFR BLD AUTO: 27.8 %
MCH RBC QN AUTO: 30.3 PG (ref 26.5–33)
MCHC RBC AUTO-ENTMCNC: 32.2 G/DL (ref 31.5–36.5)
MCV RBC AUTO: 94 FL (ref 78–100)
MONOCYTES # BLD AUTO: 0.4 10E9/L (ref 0–1.3)
MONOCYTES NFR BLD AUTO: 7.4 %
NEUTROPHILS # BLD AUTO: 3.8 10E9/L (ref 1.6–8.3)
NEUTROPHILS NFR BLD AUTO: 63.7 %
NRBC # BLD AUTO: 0 10*3/UL
NRBC BLD AUTO-RTO: 0 /100
PLATELET # BLD AUTO: 298 10E9/L (ref 150–450)
POTASSIUM SERPL-SCNC: 3.7 MMOL/L (ref 3.4–5.3)
PROT SERPL-MCNC: 7.6 G/DL (ref 6.8–8.8)
PSA SERPL-MCNC: 29.2 UG/L (ref 0–4)
RBC # BLD AUTO: 4 10E12/L (ref 4.4–5.9)
SODIUM SERPL-SCNC: 139 MMOL/L (ref 133–144)
WBC # BLD AUTO: 5.9 10E9/L (ref 4–11)

## 2019-03-27 PROCEDURE — 96372 THER/PROPH/DIAG INJ SC/IM: CPT

## 2019-03-27 PROCEDURE — 84443 ASSAY THYROID STIM HORMONE: CPT | Performed by: INTERNAL MEDICINE

## 2019-03-27 PROCEDURE — 83615 LACTATE (LD) (LDH) ENZYME: CPT | Performed by: INTERNAL MEDICINE

## 2019-03-27 PROCEDURE — 99215 OFFICE O/P EST HI 40 MIN: CPT | Mod: ZP | Performed by: INTERNAL MEDICINE

## 2019-03-27 PROCEDURE — 36415 COLL VENOUS BLD VENIPUNCTURE: CPT

## 2019-03-27 PROCEDURE — 84153 ASSAY OF PSA TOTAL: CPT | Performed by: INTERNAL MEDICINE

## 2019-03-27 PROCEDURE — 85025 COMPLETE CBC W/AUTO DIFF WBC: CPT | Performed by: INTERNAL MEDICINE

## 2019-03-27 PROCEDURE — 25000128 H RX IP 250 OP 636: Mod: ZF | Performed by: INTERNAL MEDICINE

## 2019-03-27 PROCEDURE — 80053 COMPREHEN METABOLIC PANEL: CPT | Performed by: INTERNAL MEDICINE

## 2019-03-27 PROCEDURE — G0463 HOSPITAL OUTPT CLINIC VISIT: HCPCS | Mod: ZF

## 2019-03-27 RX ADMIN — DENOSUMAB 120 MG: 120 INJECTION SUBCUTANEOUS at 18:47

## 2019-03-27 ASSESSMENT — PAIN SCALES - GENERAL: PAINLEVEL: NO PAIN (0)

## 2019-03-27 ASSESSMENT — MIFFLIN-ST. JEOR: SCORE: 1667.74
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
## 2019-03-27 NOTE — NURSING NOTE
"Oncology Rooming Note    March 27, 2019 4:42 PM   Albert Amaya is a 53 year old male who presents for:    Chief Complaint   Patient presents with     Blood Draw     Labs drawn via  by RN in lab. Line flushed with saline and heparin. VS taken.      Oncology Clinic Visit     Return Prostate Ca     Initial Vitals: Pulse 72   Temp 98.6  F (37  C) (Oral)   Resp 16   Ht 1.702 m (5' 7\")   Wt 85.9 kg (189 lb 6.4 oz)   SpO2 96%   BMI 29.66 kg/m   Estimated body mass index is 29.66 kg/m  as calculated from the following:    Height as of this encounter: 1.702 m (5' 7\").    Weight as of this encounter: 85.9 kg (189 lb 6.4 oz). Body surface area is 2.02 meters squared.  No Pain (0) Comment: Data Unavailable   No LMP for male patient.  Allergies reviewed: Yes  Medications reviewed: Yes    Medications: Medication refills not needed today.  Pharmacy name entered into [employer]:    [id number] - [place], [place] - [address] [facility] - [place], [place] - [address] [facility] [place], [place] - [address] [facility] [place] [address] [place] [address] [id number] ([place]) - [place] - [address]  [place] - [place], [place] - [phone]  [facility] - [place], [place] - [address]    Clinical concerns: Lab results;        Cynthia Hawk CMA              "

## 2019-03-27 NOTE — NURSING NOTE
Chief Complaint   Patient presents with     Blood Draw     Labs drawn via  by RN in lab. Line flushed with saline and heparin. VS taken.      Linsey Rivers RN
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
## 2019-04-09 ENCOUNTER — MYC REFILL (OUTPATIENT)
Dept: ONCOLOGY | Facility: CLINIC | Age: 54
End: 2019-04-09

## 2019-04-09 DIAGNOSIS — C79.51 BONE METASTASIS: ICD-10-CM

## 2019-04-09 DIAGNOSIS — C61 MALIGNANT NEOPLASM OF PROSTATE (H): ICD-10-CM

## 2019-04-09 DIAGNOSIS — G62.9 NEUROPATHY: ICD-10-CM

## 2019-04-09 DIAGNOSIS — E03.9 HYPOTHYROIDISM, UNSPECIFIED TYPE: ICD-10-CM

## 2019-04-09 DIAGNOSIS — I10 ESSENTIAL HYPERTENSION: ICD-10-CM

## 2019-04-09 DIAGNOSIS — R61 NIGHT SWEATS: ICD-10-CM

## 2019-04-09 RX ORDER — LEVOTHYROXINE SODIUM 75 UG/1
75 TABLET ORAL DAILY
Qty: 90 TABLET | Refills: 1 | Status: SHIPPED | OUTPATIENT
Start: 2019-04-09 | End: 2020-01-06

## 2019-04-09 RX ORDER — AMLODIPINE BESYLATE 5 MG/1
5 TABLET ORAL DAILY
Qty: 90 TABLET | Refills: 1 | Status: SHIPPED | OUTPATIENT
Start: 2019-04-09 | End: 2019-11-05

## 2019-04-09 NOTE — TELEPHONE ENCOUNTER
Results for PILAR GARCIA (MRN [id number]) as of 4/9/2019 12:42   Ref. Range 6/13/2018 09:38 7/13/2018 11:15 8/22/2018 14:37 10/10/2018 14:11 12/5/2018 15:56   TSH Latest Ref Range: 0.40 - 4.00 mU/L 4.26 (H) 1.57 1.88 3.99 0.86

## 2019-05-10 ENCOUNTER — TELEPHONE (OUTPATIENT)
Dept: PHARMACY | Facility: CLINIC | Age: 54
End: 2019-05-10

## 2019-05-10 NOTE — ORAL ONC MGMT
Oral Chemotherapy Monitoring Program     Primary Oncologist: Dr. Valladares  Primary Oncology Clinic: [facility]  Cancer Diagnosis: castration resistant prostate cancer     Therapy History:  Zytiga/pred started: May 10, 2017     Drug Interaction Assessment: no significant drug interactions  2/15 med list: Abiraterone Acetate-Acetaminophen-AmLODIPine-Calcium Carbonate and Vitamin D-DiphenhydrAMINE-Gabapentin-HYDROmorphone-Ibuprofen-Levothyroxine-MetFORMIN-PredniSONE-ValACYclovir     Lab Monitoring Plan  Lab monitoring at visits    Subjective/Objective:  Albert Amaya is a 53 year old male contacted by phone for a follow-up visit for oral chemotherapy. He confirmed correct dosing of zytiga and prednisone. Patient denies any new/worsening side effects, missed doses, or medication changes. He has upcoming appointments in June for treatment planning purposes.     ORAL CHEMOTHERAPY 8/25/2017 3/22/2018 4/23/2018 5/24/2018 8/31/2018 2/15/2019 5/10/2019   Drug Name Zytiga (Abiraterone) Zytiga (Abiraterone) Zytiga (Abiraterone) Zytiga (Abiraterone) Zytiga (Abiraterone) Zytiga (Abiraterone) Zytiga (Abiraterone)   Current Dosage 1000mg 1000mg 1000mg 1000mg 1000mg 1000mg 1000mg   Current Schedule Daily Daily Daily Daily Daily Daily Daily   Cycle Details Continuous Continuous Continuous Continuous Continuous Continuous Continuous   Start Date of Last Cycle 5/10/2017 - - - - 1/18/2019 4/19/2019   Planned next cycle start date - - - - - 2/17/2019 5/18/2019   Doses missed in last 2 weeks 0 0 0 0 0 0 0   Adherence Assessment Adherent Adherent Adherent Adherent Adherent Adherent Adherent   Adverse Effects Myalgias;Arthralgias - No AE identified during assessment No AE identified during assessment No AE identified during assessment No AE identified during assessment No AE identified during assessment   Pharmacist Intervention(myalgias) No - - - - - -   Arthralgias Resolved due to intervention - - - - - -   Pharmacist  "Intervention(arthralgias) No - - - - - -   Home BPs not done - - - - - -   Any new drug interactions? No - - - - No No   Is the dose as ordered appropriate for the patient? Yes - - - - Yes Yes   Has the patient been assessed within the past 6 months for depression? - - - - - - Yes   Has the patient missed any days of school, work, or other routine activity? - - - - - - No       Vitals:  BP:   BP Readings from Last 1 Encounters:   03/27/19 122/78     Wt Readings from Last 1 Encounters:   03/27/19 85.9 kg (189 lb 6.4 oz)     Estimated body surface area is 2.02 meters squared as calculated from the following:    Height as of 3/27/19: 1.702 m (5' 7\").    Weight as of 3/27/19: 85.9 kg (189 lb 6.4 oz).      Assessment:  Albert is tolerating abiraterone without noticeable side effects. Based on our conversation, Albert is aware and concerned that his disease may be progressing. I offered him support throughout the conversation and that Dr. Valladares has potential other options if it were to be the case after June scans/labs.     Plan:  Continue abiraterone 1000mg daily with pred 5 mg daily    Follow-Up:  6/13: monthly liaison call  6/26: review scans, labs, and appt with Dr. Valladares    Refill Due:  Abiraterone/pred to deliver from [facility] on 5/16    Brandan Dewitt, PharmD, MS  Hematology/Oncology Clinical Pharmacist  [facility]  [facility]  [phone]      "

## 2019-05-23 ENCOUNTER — TELEPHONE (OUTPATIENT)
Dept: ONCOLOGY | Facility: CLINIC | Age: 54
End: 2019-05-23

## 2019-05-23 NOTE — TELEPHONE ENCOUNTER
[facility] Telephone Triage Note    Assessment: Spouse/Partner Lorrie called in to triage reporting the following symptoms: Pt having increased lethargy, congested at times (denies cough or fever), pale, and more forgetful. Lorrie is unsure what to expect for when Albert declines, and is wondering if he could be starting to decline now? Wondering if he should see Dr. Valladares/CHARLA or have labs?     Recommendations: Discussed with Dr. Valladares. OK to add to 10:15 opening at  tomorrow 5/24/19 with labs prior (standing lab orders in chart). Writer spoke to Karley in triage at  to confirm appointment. Karley will add to schedule. Lorrie notified and confirmed appointment time.     Follow-Up: Instructed patient to seek care immediately for worsening symptoms, including: fever, chest pain, shortness of breath, dizziness. Patient voiced understanding of advice and/or instructions given. Encounter routed to [facility].     Isis Campos RN   [facility] Triage

## 2019-05-24 ENCOUNTER — ONCOLOGY VISIT (OUTPATIENT)
Dept: ONCOLOGY | Facility: CLINIC | Age: 54
End: 2019-05-24
Attending: INTERNAL MEDICINE
Payer: COMMERCIAL

## 2019-05-24 ENCOUNTER — HOSPITAL ENCOUNTER (OUTPATIENT)
Facility: CLINIC | Age: 54
Setting detail: SPECIMEN
Discharge: HOME | End: 2019-05-24
Attending: INTERNAL MEDICINE | Admitting: INTERNAL MEDICINE
Payer: COMMERCIAL

## 2019-05-24 VITALS
HEIGHT: 67 IN | BODY MASS INDEX: 30.61 KG/M2 | SYSTOLIC BLOOD PRESSURE: 134 MMHG | OXYGEN SATURATION: 95 % | WEIGHT: 195 LBS | TEMPERATURE: 97.4 F | RESPIRATION RATE: 16 BRPM | HEART RATE: 69 BPM | DIASTOLIC BLOOD PRESSURE: 93 MMHG

## 2019-05-24 DIAGNOSIS — E03.4 HYPOTHYROIDISM DUE TO ACQUIRED ATROPHY OF THYROID: ICD-10-CM

## 2019-05-24 DIAGNOSIS — C61 MALIGNANT NEOPLASM OF PROSTATE (H): ICD-10-CM

## 2019-05-24 DIAGNOSIS — C79.51 BONE METASTASIS: ICD-10-CM

## 2019-05-24 LAB
ALBUMIN SERPL-MCNC: 4 G/DL (ref 3.4–5)
ALP SERPL-CCNC: 57 U/L (ref 40–150)
ALT SERPL W P-5'-P-CCNC: 19 U/L (ref 0–70)
ANION GAP SERPL CALCULATED.3IONS-SCNC: 5 MMOL/L (ref 3–14)
AST SERPL W P-5'-P-CCNC: 10 U/L (ref 0–45)
BASOPHILS # BLD AUTO: 0 10E9/L (ref 0–0.2)
BASOPHILS NFR BLD AUTO: 0.5 %
BILIRUB SERPL-MCNC: 0.7 MG/DL (ref 0.2–1.3)
BUN SERPL-MCNC: 22 MG/DL (ref 7–30)
CALCIUM SERPL-MCNC: 8.9 MG/DL (ref 8.5–10.1)
CHLORIDE SERPL-SCNC: 107 MMOL/L (ref 94–109)
CO2 SERPL-SCNC: 28 MMOL/L (ref 20–32)
CREAT SERPL-MCNC: 0.79 MG/DL (ref 0.66–1.25)
DIFFERENTIAL METHOD BLD: ABNORMAL
EOSINOPHIL # BLD AUTO: 0.1 10E9/L (ref 0–0.7)
EOSINOPHIL NFR BLD AUTO: 1.9 %
ERYTHROCYTE [DISTWIDTH] IN BLOOD BY AUTOMATED COUNT: 12.7 % (ref 10–15)
GFR SERPL CREATININE-BSD FRML MDRD: >90 ML/MIN/{1.73_M2}
GLUCOSE SERPL-MCNC: 102 MG/DL (ref 70–99)
HCT VFR BLD AUTO: 36.9 % (ref 40–53)
HGB BLD-MCNC: 12.2 G/DL (ref 13.3–17.7)
IMM GRANULOCYTES # BLD: 0.1 10E9/L (ref 0–0.4)
IMM GRANULOCYTES NFR BLD: 0.8 %
LDH SERPL L TO P-CCNC: 187 U/L (ref 85–227)
LYMPHOCYTES # BLD AUTO: 2.5 10E9/L (ref 0.8–5.3)
LYMPHOCYTES NFR BLD AUTO: 33.2 %
MCH RBC QN AUTO: 30.7 PG (ref 26.5–33)
MCHC RBC AUTO-ENTMCNC: 33.1 G/DL (ref 31.5–36.5)
MCV RBC AUTO: 93 FL (ref 78–100)
MONOCYTES # BLD AUTO: 0.6 10E9/L (ref 0–1.3)
MONOCYTES NFR BLD AUTO: 7.8 %
NEUTROPHILS # BLD AUTO: 4.2 10E9/L (ref 1.6–8.3)
NEUTROPHILS NFR BLD AUTO: 55.8 %
NRBC # BLD AUTO: 0 10*3/UL
NRBC BLD AUTO-RTO: 0 /100
PLATELET # BLD AUTO: 296 10E9/L (ref 150–450)
POTASSIUM SERPL-SCNC: 4.4 MMOL/L (ref 3.4–5.3)
PROT SERPL-MCNC: 7.7 G/DL (ref 6.8–8.8)
PSA SERPL-MCNC: 36.4 UG/L (ref 0–4)
RBC # BLD AUTO: 3.98 10E12/L (ref 4.4–5.9)
SODIUM SERPL-SCNC: 140 MMOL/L (ref 133–144)
T4 FREE SERPL-MCNC: 1.35 NG/DL (ref 0.76–1.46)
TSH SERPL DL<=0.005 MIU/L-ACNC: 5.24 MU/L (ref 0.4–4)
WBC # BLD AUTO: 7.6 10E9/L (ref 4–11)

## 2019-05-24 PROCEDURE — 85025 COMPLETE CBC W/AUTO DIFF WBC: CPT | Performed by: INTERNAL MEDICINE

## 2019-05-24 PROCEDURE — 84153 ASSAY OF PSA TOTAL: CPT | Performed by: INTERNAL MEDICINE

## 2019-05-24 PROCEDURE — 83615 LACTATE (LD) (LDH) ENZYME: CPT | Performed by: INTERNAL MEDICINE

## 2019-05-24 PROCEDURE — 36415 COLL VENOUS BLD VENIPUNCTURE: CPT

## 2019-05-24 PROCEDURE — G0463 HOSPITAL OUTPT CLINIC VISIT: HCPCS

## 2019-05-24 PROCEDURE — 84443 ASSAY THYROID STIM HORMONE: CPT | Performed by: INTERNAL MEDICINE

## 2019-05-24 PROCEDURE — 99214 OFFICE O/P EST MOD 30 MIN: CPT | Performed by: INTERNAL MEDICINE

## 2019-05-24 PROCEDURE — 80053 COMPREHEN METABOLIC PANEL: CPT | Performed by: INTERNAL MEDICINE

## 2019-05-24 PROCEDURE — 84439 ASSAY OF FREE THYROXINE: CPT | Performed by: INTERNAL MEDICINE

## 2019-05-24 ASSESSMENT — MIFFLIN-ST. JEOR: SCORE: 1693.14

## 2019-05-24 ASSESSMENT — PAIN SCALES - GENERAL: PAINLEVEL: NO PAIN (0)

## 2019-05-24 NOTE — LETTER
5/24/2019         RE: Albert Amaya  [address] [facility] [address]        Dear Colleague,    Thank you for referring your patient, Albert Amaya, to the [facility]. Please see a copy of my visit note below.    [facility] CANCER CLINIC  FOLLOW-UP VISIT NOTE  Date of visit: May 24, 2019     Cancer History:   Metastatic Prostate CA treated    - s/p 6 cycles of taxotere 75mg/m2   - s/p orchiectomy on 3/18/2016   - s/p Provenge 5/13, 5/27, 6/10   - s/p Casodex 50 mg daily March/April   - Currently taking Zytiga    HPI: Albert is a 53 year old gentleman with metastatic prostate cancer.  Albert felt a cramp in his gluteus muscle and could barely walk after doing some remodeling at home and unloading heavy truck at work. He tried flexeril and prednisone initially but eventually presented to ED on 10/5/15. He feels that initially he was nearly labeled as drug seeker since he was in lot of discomfort and flexeril was not working. But during course of ED visits labs were drawn and he had marked elevation in Alk Phosphatase (over 1000). CT did suggest some ileus with some sclerotic bone lesions. He was admitted to the hospital. His PSA was checked and was markedly elevated at 5760 (10/6/15), repeat value 5563.     Urology and Medical Oncology consults were placed. He was unhappy with the progress in hospital and felt no better and left the following day on 10/6. He did follow up with Dr. Freire and had transrectal US guided biopsy of prostate on 10/8/15. They have the results through my chart which confirms prostate adenocarcinoma - enrico 4+3 involving majority (60-90%) portion of every single core.  He started on taxotere on 10/23 and completed 6 cycles of therapy in 2/2016.  He then underwent orchiectomy on 3/18/2016.       Throughout spring of 2016 Albert's PSA started to progress and after three elevations there was concern about him being hormone refractory.   He was started on Provenge and enrolled in the trial including Indoximod. PSA trending up and started on Casodex mid March with progression. Switched to Zytiga 5/3/17.    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     He is accompanied by his wife at this visit. He has not been doing well for a week now. He had congestion in his sinuses and his chest. He had marked fatigue which is quite unusual for him. He could not work and would sleep half the day. He notes that he can still fall asleep any time and even during the clinic visit.      MEDS:   Current Outpatient Medications   Medication Sig     Acetaminophen (TYLENOL PO) Take 325 mg by mouth every 8 hours as needed for mild pain or fever Reported on 5/18/2017     amLODIPine (NORVASC) 5 MG tablet Take 1 tablet (5 mg) by mouth daily TAKE 1 TABLET(5 MG) BY MOUTH DAILY     blood glucose monitoring (ACCU-CHEK MULTICLIX) lancets Use to test blood sugar twice times daily or as directed.     blood glucose monitoring (NO BRAND SPECIFIED) test strip Use to test blood sugars twice daily or as directed (fasting, rotate then second time - before lunch, before supper and bedtime)     diphenhydrAMINE (BENADRYL) 25 MG tablet Take 25 mg by mouth     EPINEPHrine (EPIPEN 2-CHARITY) 0.3 MG/0.3ML injection 2-pack Inject 0.3 mLs (0.3 mg) into the muscle once as needed for anaphylaxis (Patient not taking: Reported on 1/30/2019)     HYDROmorphone (DILAUDID) 2 MG tablet Take 1 tablet (2 mg) by mouth every 4 hours as needed for moderate to severe pain     IBUPROFEN PO Take by mouth as needed for moderate pain Reported on 5/18/2017     levothyroxine (SYNTHROID/LEVOTHROID) 75 MCG tablet Take 1 tablet (75 mcg) by mouth daily     metFORMIN (GLUCOPHAGE-XR) 750 MG 24 hr tablet Take 2 tablets (1,500 mg) by mouth daily (with dinner)     predniSONE (DELTASONE) 5 MG tablet Take 5 mg by mouth daily (with breakfast).     valACYclovir (VALTREX) 1000 mg tablet TK  "1 T PO BID     No current facility-administered medications for this visit.         EXAM:  ECOG 1  Wt Readings from Last 4 Encounters:   05/24/19 88.5 kg (195 lb)   03/27/19 85.9 kg (189 lb 6.4 oz)   01/30/19 89.4 kg (197 lb 3.2 oz)   12/12/18 85.6 kg (188 lb 12.8 oz)   BP (!) 134/93   Pulse 69   Temp 97.4  F (36.3  C) (Tympanic)   Resp 16   Ht 1.702 m (5' 7\")   Wt 88.5 kg (195 lb)   SpO2 95%   BMI 30.54 kg/m     Patient alert and oriented x3.   PERRLA. EOMI. No scleral icterus noted. OP without thrush/sores.  Neck exam: Palpable 0.5 cm anterior cervical node on the left, rubbery, mobile, non-tender.   Heart: RRR no murmurs noted.   Lungs: clear to auscultation bilaterally.  No crackles or wheezing.   Abd: Normal bowel sounds.  No tenderness to palpation. No suprapubic tenderness.  No hepatomegaly.   Extremities: No lower extremity edema.   Back: No CVA tenderness.   Neuro: grossly intact.   Mood and affect is stable.     Labs/Imaging:  Recent Labs   Lab Test 05/24/19  1025 03/27/19  1604 01/30/19  1434 12/12/18  1232 12/05/18  1556    139 140 138 139   POTASSIUM 4.4 3.7 3.7 4.0 4.5   CHLORIDE 107 107 107 103 104   CO2 28 25 25 26 26   ANIONGAP 5 7 8 9 8   BUN 22 17 17 20 29   CR 0.79 0.79 0.75 0.75 1.50*   * 144* 135* 102* 96   WILL 8.9 9.5 9.2 9.3 9.2     Recent Labs   Lab Test 12/21/16  1643 11/22/16  1221 10/26/16  1150 09/28/16  1137 08/17/16  1355  05/03/16  0955   MAG 2.2 2.4* 2.2 2.3 2.0   < > 2.0   PHOS  --   --   --   --   --   --  4.1    < > = values in this interval not displayed.     Recent Labs   Lab Test 05/24/19  1025 03/27/19  1604 01/30/19  1434 12/12/18  1232 12/05/18  1556   WBC 7.6 5.9 7.0 8.3 9.9   HGB 12.2* 12.1* 11.3* 12.1* 12.1*    298 270 308 305   MCV 93 94 91 93 93   NEUTROPHIL 55.8 63.7 58.1 74.4 67.1     Recent Labs   Lab Test 05/24/19  1025 03/27/19  1604 01/30/19  1434   BILITOTAL 0.7 0.9 0.9   ALKPHOS 57 51 49   ALT 19 20 20   AST 10 16 14   ALBUMIN 4.0 4.3 " 4.0    202 186     TSH   Date Value Ref Range Status   12/05/2018 0.86 0.40 - 4.00 mU/L Final   10/10/2018 3.99 0.40 - 4.00 mU/L Final   08/22/2018 1.88 0.40 - 4.00 mU/L Final      ASSESSMENT/PLAN:  1. Metastatic prostate cancer with bony metastasis:   Currently taking abiraterone 1000 mg po q day and prednisone 5 mg po qday  Denosumab every 12 weeks   Newly diagnosed HTN and DM TII  ECOG PS 0    Albert is being seen after a week of feeling very poorly. He has marked congestion in his sinuses and his chest. He has fatigue and is unable to work. He has been sleeping during most of the day and at night.     I did get a CBC and CMP for him and it is completely unchanged and at baseline. It is quite likely that he has some sort of viral illness with marked malaise. In the absence of any systemic response - no fevers, leukocytosis, I would not start him on any antibiotics. It is quite likely that his symptoms would improve on their own.     His PSA was pending at the time of visit. I reminded him that it would be higher. I did get it just to rule out that it was not his prostate cancer causing all his symptoms. His PSA is indeed a little higher but not out of what was expected for him as he has slowly progressive disease for a long time now.     We will continue to follow up with previously scheduled appointments for him.     As always he and his wife had come prepared with questions which were answered.      Over 25 min of direct face to face time spent with patient with more than 50% time spent in counseling and coordinating care.        Again, thank you for allowing me to participate in the care of your patient.        Sincerely,        Tyrel Valladares MD

## 2019-05-24 NOTE — NURSING NOTE
"Oncology Rooming Note    May 24, 2019 10:28 AM   Albert Amaya is a 53 year old male who presents for:    Chief Complaint   Patient presents with     Oncology Clinic Visit     Follow-up     Initial Vitals: Wt 88.5 kg (195 lb)   BMI 30.54 kg/m   Estimated body mass index is 30.54 kg/m  as calculated from the following:    Height as of 3/27/19: 1.702 m (5' 7\").    Weight as of this encounter: 88.5 kg (195 lb). Body surface area is 2.05 meters squared.  No Pain (0) Comment: Data Unavailable   No LMP for male patient.  Allergies reviewed: Yes  Medications reviewed: Yes    Medications: Medication refills not needed today.  Pharmacy name entered into [email or web address]:    [id number] - [place], [place] - [address] [facility] - [place], [place] - [address] [facility] [place], [place] - [address] [facility] [place], [place] [address] [place] [address] [facility] [address] ([place]) - [place] - [address]  [place] [facility] - [place], [place] - [phone]  [place] [facility] - [place], [place] - [address]    Clinical concerns: Follow-up, labs drawn today.  Atiya Sifuentes MA              "

## 2019-05-24 NOTE — PROGRESS NOTES
[facility] CANCER CLINIC  FOLLOW-UP VISIT NOTE  Date of visit: May 24, 2019     Cancer History:   Metastatic Prostate CA treated    - s/p 6 cycles of taxotere 75mg/m2   - s/p orchiectomy on 3/18/2016   - s/p Provenge 5/13, 5/27, 6/10   - s/p Casodex 50 mg daily March/April   - Currently taking Zytiga    HPI: Albert is a 53 year old gentleman with metastatic prostate cancer.  Albert felt a cramp in his gluteus muscle and could barely walk after doing some remodeling at home and unloading heavy truck at work. He tried flexeril and prednisone initially but eventually presented to ED on 10/5/15. He feels that initially he was nearly labeled as drug seeker since he was in lot of discomfort and flexeril was not working. But during course of ED visits labs were drawn and he had marked elevation in Alk Phosphatase (over 1000). CT did suggest some ileus with some sclerotic bone lesions. He was admitted to the hospital. His PSA was checked and was markedly elevated at 5760 (10/6/15), repeat value 5563.     Urology and Medical Oncology consults were placed. He was unhappy with the progress in hospital and felt no better and left the following day on 10/6. He did follow up with Dr. Freire and had transrectal US guided biopsy of prostate on 10/8/15. They have the results through my chart which confirms prostate adenocarcinoma - enrico 4+3 involving majority (60-90%) portion of every single core.  He started on taxotere on 10/23 and completed 6 cycles of therapy in 2/2016.  He then underwent orchiectomy on 3/18/2016.       Throughout spring of 2016 Albert's PSA started to progress and after three elevations there was concern about him being hormone refractory.  He was started on Provenge and enrolled in the trial including Indoximod. PSA trending up and started on Casodex mid March with progression. Switched to Zytiga 5/3/17.    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     He is accompanied by his wife at this visit. He has not been doing well for a week now. He had congestion in his sinuses and his chest. He had marked fatigue which is quite unusual for him. He could not work and would sleep half the day. He notes that he can still fall asleep any time and even during the clinic visit.      MEDS:   Current Outpatient Medications   Medication Sig     Acetaminophen (TYLENOL PO) Take 325 mg by mouth every 8 hours as needed for mild pain or fever Reported on 5/18/2017     amLODIPine (NORVASC) 5 MG tablet Take 1 tablet (5 mg) by mouth daily TAKE 1 TABLET(5 MG) BY MOUTH DAILY     blood glucose monitoring (ACCU-CHEK MULTICLIX) lancets Use to test blood sugar twice times daily or as directed.     blood glucose monitoring (NO BRAND SPECIFIED) test strip Use to test blood sugars twice daily or as directed (fasting, rotate then second time - before lunch, before supper and bedtime)     diphenhydrAMINE (BENADRYL) 25 MG tablet Take 25 mg by mouth     EPINEPHrine (EPIPEN 2-CHARITY) 0.3 MG/0.3ML injection 2-pack Inject 0.3 mLs (0.3 mg) into the muscle once as needed for anaphylaxis (Patient not taking: Reported on 1/30/2019)     HYDROmorphone (DILAUDID) 2 MG tablet Take 1 tablet (2 mg) by mouth every 4 hours as needed for moderate to severe pain     IBUPROFEN PO Take by mouth as needed for moderate pain Reported on 5/18/2017     levothyroxine (SYNTHROID/LEVOTHROID) 75 MCG tablet Take 1 tablet (75 mcg) by mouth daily     metFORMIN (GLUCOPHAGE-XR) 750 MG 24 hr tablet Take 2 tablets (1,500 mg) by mouth daily (with dinner)     predniSONE (DELTASONE) 5 MG tablet Take 5 mg by mouth daily (with breakfast).     valACYclovir (VALTREX) 1000 mg tablet TK 1 T PO BID     No current facility-administered medications for this visit.         EXAM:  ECOG 1  Wt Readings from Last 4 Encounters:   05/24/19 88.5 kg (195 lb)   03/27/19 85.9 kg (189 lb 6.4 oz)   01/30/19 89.4 kg (197 lb 3.2 oz)   12/12/18 85.6 kg  "(188 lb 12.8 oz)   BP (!) 134/93   Pulse 69   Temp 97.4  F (36.3  C) (Tympanic)   Resp 16   Ht 1.702 m (5' 7\")   Wt 88.5 kg (195 lb)   SpO2 95%   BMI 30.54 kg/m    Patient alert and oriented x3.   PERRLA. EOMI. No scleral icterus noted. OP without thrush/sores.  Neck exam: Palpable 0.5 cm anterior cervical node on the left, rubbery, mobile, non-tender.   Heart: RRR no murmurs noted.   Lungs: clear to auscultation bilaterally.  No crackles or wheezing.   Abd: Normal bowel sounds.  No tenderness to palpation. No suprapubic tenderness.  No hepatomegaly.   Extremities: No lower extremity edema.   Back: No CVA tenderness.   Neuro: grossly intact.   Mood and affect is stable.     Labs/Imaging:  Recent Labs   Lab Test 05/24/19  1025 03/27/19  1604 01/30/19  1434 12/12/18  1232 12/05/18  1556    139 140 138 139   POTASSIUM 4.4 3.7 3.7 4.0 4.5   CHLORIDE 107 107 107 103 104   CO2 28 25 25 26 26   ANIONGAP 5 7 8 9 8   BUN 22 17 17 20 29   CR 0.79 0.79 0.75 0.75 1.50*   * 144* 135* 102* 96   WILL 8.9 9.5 9.2 9.3 9.2     Recent Labs   Lab Test 12/21/16  1643 11/22/16  1221 10/26/16  1150 09/28/16  1137 08/17/16  1355  05/03/16  0955   MAG 2.2 2.4* 2.2 2.3 2.0   < > 2.0   PHOS  --   --   --   --   --   --  4.1    < > = values in this interval not displayed.     Recent Labs   Lab Test 05/24/19  1025 03/27/19  1604 01/30/19  1434 12/12/18  1232 12/05/18  1556   WBC 7.6 5.9 7.0 8.3 9.9   HGB 12.2* 12.1* 11.3* 12.1* 12.1*    298 270 308 305   MCV 93 94 91 93 93   NEUTROPHIL 55.8 63.7 58.1 74.4 67.1     Recent Labs   Lab Test 05/24/19  1025 03/27/19  1604 01/30/19  1434   BILITOTAL 0.7 0.9 0.9   ALKPHOS 57 51 49   ALT 19 20 20   AST 10 16 14   ALBUMIN 4.0 4.3 4.0    202 186     TSH   Date Value Ref Range Status   12/05/2018 0.86 0.40 - 4.00 mU/L Final   10/10/2018 3.99 0.40 - 4.00 mU/L Final   08/22/2018 1.88 0.40 - 4.00 mU/L Final      ASSESSMENT/PLAN:  1. Metastatic prostate cancer with bony " metastasis:   Currently taking abiraterone 1000 mg po q day and prednisone 5 mg po qday  Denosumab every 12 weeks   Newly diagnosed HTN and DM TII  ECOG PS 0    Albert is being seen after a week of feeling very poorly. He has marked congestion in his sinuses and his chest. He has fatigue and is unable to work. He has been sleeping during most of the day and at night.     I did get a CBC and CMP for him and it is completely unchanged and at baseline. It is quite likely that he has some sort of viral illness with marked malaise. In the absence of any systemic response - no fevers, leukocytosis, I would not start him on any antibiotics. It is quite likely that his symptoms would improve on their own.     His PSA was pending at the time of visit. I reminded him that it would be higher. I did get it just to rule out that it was not his prostate cancer causing all his symptoms. His PSA is indeed a little higher but not out of what was expected for him as he has slowly progressive disease for a long time now.     We will continue to follow up with previously scheduled appointments for him.     As always he and his wife had come prepared with questions which were answered.      Over 25 min of direct face to face time spent with patient with more than 50% time spent in counseling and coordinating care.

## 2019-06-06 DIAGNOSIS — C61 MALIGNANT NEOPLASM OF PROSTATE (H): Primary | ICD-10-CM

## 2019-06-06 DIAGNOSIS — C79.51 BONE METASTASIS: ICD-10-CM

## 2019-06-06 RX ORDER — ABIRATERONE ACETATE 250 MG/1
1000 TABLET ORAL DAILY
Qty: 120 TABLET | Refills: 2 | Status: SHIPPED | OUTPATIENT
Start: 2019-06-06 | End: 2019-09-18

## 2019-06-06 RX ORDER — PREDNISONE 5 MG/1
5 TABLET ORAL
Qty: 30 TABLET | Refills: 2 | Status: SHIPPED | OUTPATIENT
Start: 2019-06-06 | End: 2019-09-18

## 2019-06-26 ENCOUNTER — ONCOLOGY VISIT (OUTPATIENT)
Dept: ONCOLOGY | Facility: CLINIC | Age: 54
End: 2019-06-26
Attending: INTERNAL MEDICINE
Payer: COMMERCIAL

## 2019-06-26 ENCOUNTER — HOSPITAL ENCOUNTER (OUTPATIENT)
Dept: CT IMAGING | Facility: CLINIC | Age: 54
Discharge: HOME | End: 2019-06-26
Attending: INTERNAL MEDICINE | Admitting: INTERNAL MEDICINE
Payer: COMMERCIAL

## 2019-06-26 ENCOUNTER — HOSPITAL ENCOUNTER (OUTPATIENT)
Dept: NUCLEAR MEDICINE | Facility: CLINIC | Age: 54
Setting detail: NUCLEAR MEDICINE
End: 2019-06-26
Attending: INTERNAL MEDICINE
Payer: COMMERCIAL

## 2019-06-26 VITALS
WEIGHT: 196.5 LBS | RESPIRATION RATE: 16 BRPM | SYSTOLIC BLOOD PRESSURE: 116 MMHG | TEMPERATURE: 98.3 F | HEIGHT: 67 IN | DIASTOLIC BLOOD PRESSURE: 81 MMHG | OXYGEN SATURATION: 96 % | HEART RATE: 71 BPM | BODY MASS INDEX: 30.84 KG/M2

## 2019-06-26 DIAGNOSIS — E03.4 HYPOTHYROIDISM DUE TO ACQUIRED ATROPHY OF THYROID: ICD-10-CM

## 2019-06-26 DIAGNOSIS — C61 MALIGNANT NEOPLASM OF PROSTATE (H): ICD-10-CM

## 2019-06-26 DIAGNOSIS — C79.51 BONE METASTASIS: ICD-10-CM

## 2019-06-26 LAB
ALBUMIN SERPL-MCNC: 3.9 G/DL (ref 3.4–5)
ALP SERPL-CCNC: 55 U/L (ref 40–150)
ALT SERPL W P-5'-P-CCNC: 21 U/L (ref 0–70)
ANION GAP SERPL CALCULATED.3IONS-SCNC: 7 MMOL/L (ref 3–14)
AST SERPL W P-5'-P-CCNC: 12 U/L (ref 0–45)
BASOPHILS # BLD AUTO: 0 10E9/L (ref 0–0.2)
BASOPHILS NFR BLD AUTO: 0.4 %
BILIRUB SERPL-MCNC: 0.6 MG/DL (ref 0.2–1.3)
BUN SERPL-MCNC: 20 MG/DL (ref 7–30)
CALCIUM SERPL-MCNC: 9.1 MG/DL (ref 8.5–10.1)
CHLORIDE SERPL-SCNC: 103 MMOL/L (ref 94–109)
CO2 SERPL-SCNC: 25 MMOL/L (ref 20–32)
CREAT SERPL-MCNC: 0.87 MG/DL (ref 0.66–1.25)
DIFFERENTIAL METHOD BLD: ABNORMAL
EOSINOPHIL # BLD AUTO: 0.1 10E9/L (ref 0–0.7)
EOSINOPHIL NFR BLD AUTO: 0.7 %
ERYTHROCYTE [DISTWIDTH] IN BLOOD BY AUTOMATED COUNT: 13.3 % (ref 10–15)
GFR SERPL CREATININE-BSD FRML MDRD: >90 ML/MIN/{1.73_M2}
GLUCOSE SERPL-MCNC: 113 MG/DL (ref 70–99)
HCT VFR BLD AUTO: 36.5 % (ref 40–53)
HGB BLD-MCNC: 11.4 G/DL (ref 13.3–17.7)
IMM GRANULOCYTES # BLD: 0 10E9/L (ref 0–0.4)
IMM GRANULOCYTES NFR BLD: 0.4 %
LDH SERPL L TO P-CCNC: 158 U/L (ref 85–227)
LYMPHOCYTES # BLD AUTO: 2.2 10E9/L (ref 0.8–5.3)
LYMPHOCYTES NFR BLD AUTO: 26.5 %
MCH RBC QN AUTO: 29.9 PG (ref 26.5–33)
MCHC RBC AUTO-ENTMCNC: 31.2 G/DL (ref 31.5–36.5)
MCV RBC AUTO: 96 FL (ref 78–100)
MONOCYTES # BLD AUTO: 0.4 10E9/L (ref 0–1.3)
MONOCYTES NFR BLD AUTO: 5 %
NEUTROPHILS # BLD AUTO: 5.5 10E9/L (ref 1.6–8.3)
NEUTROPHILS NFR BLD AUTO: 67 %
NRBC # BLD AUTO: 0 10*3/UL
NRBC BLD AUTO-RTO: 0 /100
PLATELET # BLD AUTO: 303 10E9/L (ref 150–450)
POTASSIUM SERPL-SCNC: 4.1 MMOL/L (ref 3.4–5.3)
PROT SERPL-MCNC: 7.3 G/DL (ref 6.8–8.8)
PSA SERPL-MCNC: 41 UG/L (ref 0–4)
RBC # BLD AUTO: 3.81 10E12/L (ref 4.4–5.9)
SODIUM SERPL-SCNC: 136 MMOL/L (ref 133–144)
TSH SERPL DL<=0.005 MIU/L-ACNC: 2.04 MU/L (ref 0.4–4)
WBC # BLD AUTO: 8.2 10E9/L (ref 4–11)

## 2019-06-26 PROCEDURE — 36415 COLL VENOUS BLD VENIPUNCTURE: CPT | Performed by: INTERNAL MEDICINE

## 2019-06-26 PROCEDURE — G0463 HOSPITAL OUTPT CLINIC VISIT: HCPCS | Mod: 25

## 2019-06-26 PROCEDURE — A9503 TC99M MEDRONATE: HCPCS | Performed by: INTERNAL MEDICINE

## 2019-06-26 PROCEDURE — 85025 COMPLETE CBC W/AUTO DIFF WBC: CPT | Performed by: INTERNAL MEDICINE

## 2019-06-26 PROCEDURE — 71260 CT THORAX DX C+: CPT

## 2019-06-26 PROCEDURE — 34300033 ZZH RX 343: Performed by: INTERNAL MEDICINE

## 2019-06-26 PROCEDURE — 83615 LACTATE (LD) (LDH) ENZYME: CPT | Performed by: INTERNAL MEDICINE

## 2019-06-26 PROCEDURE — 84153 ASSAY OF PSA TOTAL: CPT | Performed by: INTERNAL MEDICINE

## 2019-06-26 PROCEDURE — 99215 OFFICE O/P EST HI 40 MIN: CPT | Mod: ZP | Performed by: INTERNAL MEDICINE

## 2019-06-26 PROCEDURE — 74177 CT ABD & PELVIS W/CONTRAST: CPT

## 2019-06-26 PROCEDURE — 25000128 H RX IP 250 OP 636: Performed by: RADIOLOGY

## 2019-06-26 PROCEDURE — 80053 COMPREHEN METABOLIC PANEL: CPT | Performed by: INTERNAL MEDICINE

## 2019-06-26 PROCEDURE — 84443 ASSAY THYROID STIM HORMONE: CPT | Performed by: INTERNAL MEDICINE

## 2019-06-26 PROCEDURE — 78306 BONE IMAGING WHOLE BODY: CPT

## 2019-06-26 RX ORDER — PREDNISONE 5 MG/1
5 TABLET ORAL 2 TIMES DAILY
Qty: 180 TABLET | Refills: 3 | Status: SHIPPED | OUTPATIENT
Start: 2019-06-26 | End: 2019-09-18

## 2019-06-26 RX ORDER — TC 99M MEDRONATE 20 MG/10ML
20-30 INJECTION, POWDER, LYOPHILIZED, FOR SOLUTION INTRAVENOUS ONCE
Status: COMPLETED | OUTPATIENT
Start: 2019-06-26 | End: 2019-06-26

## 2019-06-26 RX ORDER — IOPAMIDOL 755 MG/ML
119 INJECTION, SOLUTION INTRAVASCULAR ONCE
Status: COMPLETED | OUTPATIENT
Start: 2019-06-26 | End: 2019-06-26

## 2019-06-26 RX ADMIN — IOPAMIDOL 119 ML: 755 INJECTION, SOLUTION INTRAVENOUS at 11:43

## 2019-06-26 RX ADMIN — TC 99M MEDRONATE 26.5 MCI.: 20 INJECTION, POWDER, LYOPHILIZED, FOR SOLUTION INTRAVENOUS at 11:14

## 2019-06-26 ASSESSMENT — MIFFLIN-ST. JEOR: SCORE: 1700.07

## 2019-06-26 ASSESSMENT — PAIN SCALES - GENERAL: PAINLEVEL: MODERATE PAIN (4)

## 2019-06-26 NOTE — NURSING NOTE
"Oncology Rooming Note    June 26, 2019 3:41 PM   Albert Amaya is a 53 year old male who presents for:    Chief Complaint   Patient presents with     Oncology Clinic Visit     F/U Prostate Ca with Mets     Initial Vitals: /81 (BP Location: Right arm, Patient Position: Sitting, Cuff Size: Adult Regular)   Pulse 71   Temp 98.3  F (36.8  C) (Oral)   Resp 16   Ht 1.702 m (5' 7.01\")   Wt 89.1 kg (196 lb 8 oz)   SpO2 96%   BMI 30.77 kg/m   Estimated body mass index is 30.77 kg/m  as calculated from the following:    Height as of this encounter: 1.702 m (5' 7.01\").    Weight as of this encounter: 89.1 kg (196 lb 8 oz). Body surface area is 2.05 meters squared.  Moderate Pain (4) Comment: Data Unavailable   No LMP for male patient.  Allergies reviewed: Yes  Medications reviewed: Yes    Medications: MEDICATION REFILLS NEEDED TODAY. Provider was notified.  Pharmacy name entered into iloho:    [id number] - [place], [place] - [address] [facility] - [place], [place] - [address] [facility] [place] - [address] [facility] [place], [place] - [address] [address] ([place]) - [place] - [address] [facility] [address]  [place] [facility] - [place], [place] - [address] [facility] [phone]  [place] [facility] - [place], [place] - [address] [facility]     Clinical concerns: Yes, Patient complains of neck and hip pain. Dr Valladares was notified.      SAGRARIO WHITAKER LPN            "

## 2019-06-26 NOTE — LETTER
6/26/2019       RE: Albert mAaya  [address] [facility] [place] [address]     Dear Colleague,    Thank you for referring your patient, Albert Amaya, to the [facility]. Please see a copy of my visit note below.    [facility]  FOLLOW-UP VISIT NOTE  Date of visit: Jun 26, 2019     Cancer History:   Metastatic Prostate CA treated    - s/p 6 cycles of taxotere 75mg/m2   - s/p orchiectomy on 3/18/2016   - s/p Provenge 5/13, 5/27, 6/10   - s/p Casodex 50 mg daily March/April   - Currently taking Zytiga    HPI: Albert is a 53 year old gentleman with metastatic prostate cancer.  Albert felt a cramp in his gluteus muscle and could barely walk after doing some remodeling at home and unloading heavy truck at work. He tried flexeril and prednisone initially but eventually presented to ED on 10/5/15. He feels that initially he was nearly labeled as drug seeker since he was in lot of discomfort and flexeril was not working. But during course of ED visits labs were drawn and he had marked elevation in Alk Phosphatase (over 1000). CT did suggest some ileus with some sclerotic bone lesions. He was admitted to the hospital. His PSA was checked and was markedly elevated at 5760 (10/6/15), repeat value 5563.     Urology and Medical Oncology consults were placed. He was unhappy with the progress in hospital and felt no better and left the following day on 10/6. He did follow up with Dr. Freire and had transrectal US guided biopsy of prostate on 10/8/15. They have the results through my chart which confirms prostate adenocarcinoma - enrico 4+3 involving majority (60-90%) portion of every single core.  He started on taxotere on 10/23 and completed 6 cycles of therapy in 2/2016.  He then underwent orchiectomy on 3/18/2016.       Throughout spring of 2016 Albert's PSA started to progress and after three elevations there was concern about him being hormone refractory.  He was started  on Provenge and enrolled in the trial including Indoximod. PSA trending up and started on Casodex mid March with progression. Switched to Zytiga 5/3/17.    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     He is accompanied by his wife and his daughter at this visit. He has pain in his gluteus muscle on the left and in the neck. The is not very bothersome for him. It gets better with tylenol and ibuprofen. His wife points out that he has to take these around the clock to keep the pain under control. She points out that he is very reluctant to take dilaudid which he has available. He notes that he would fall asleep with dilaudid but now he does not gets the sedation but still has a good pain control.      MEDS:   Current Outpatient Medications   Medication Sig     abiraterone (ZYTIGA) 250 MG tablet Take 4 tablets (1,000 mg) by mouth daily Take on empty stomach.     Acetaminophen (TYLENOL PO) Take 325 mg by mouth every 8 hours as needed for mild pain or fever Reported on 5/18/2017     amLODIPine (NORVASC) 5 MG tablet Take 1 tablet (5 mg) by mouth daily TAKE 1 TABLET(5 MG) BY MOUTH DAILY     blood glucose monitoring (ACCU-CHEK MULTICLIX) lancets Use to test blood sugar twice times daily or as directed.     blood glucose monitoring (NO BRAND SPECIFIED) test strip Use to test blood sugars twice daily or as directed (fasting, rotate then second time - before lunch, before supper and bedtime)     diphenhydrAMINE (BENADRYL) 25 MG tablet Take 25 mg by mouth     HYDROmorphone (DILAUDID) 2 MG tablet Take 1 tablet (2 mg) by mouth every 4 hours as needed for moderate to severe pain     IBUPROFEN PO Take by mouth as needed for moderate pain Reported on 5/18/2017     levothyroxine (SYNTHROID/LEVOTHROID) 75 MCG tablet Take 1 tablet (75 mcg) by mouth daily     metFORMIN (GLUCOPHAGE-XR) 750 MG 24 hr tablet Take 2 tablets (1,500 mg) by mouth daily (with dinner)     predniSONE (DELTASONE) 5 MG  "tablet Take 1 tablet (5 mg) by mouth 2 times daily With breakfast and lunch     EPINEPHrine (EPIPEN 2-CHARITY) 0.3 MG/0.3ML injection 2-pack Inject 0.3 mLs (0.3 mg) into the muscle once as needed for anaphylaxis (Patient not taking: Reported on 1/30/2019)     predniSONE (DELTASONE) 5 MG tablet Take 1 tablet (5 mg) by mouth daily (with breakfast) (Patient not taking: Reported on 6/26/2019)     valACYclovir (VALTREX) 1000 mg tablet TK 1 T PO BID     No current facility-administered medications for this visit.         EXAM:  ECOG 1  Wt Readings from Last 4 Encounters:   06/26/19 89.1 kg (196 lb 8 oz)   05/24/19 88.5 kg (195 lb)   03/27/19 85.9 kg (189 lb 6.4 oz)   01/30/19 89.4 kg (197 lb 3.2 oz)   /81 (BP Location: Right arm, Patient Position: Sitting, Cuff Size: Adult Regular)   Pulse 71   Temp 98.3  F (36.8  C) (Oral)   Resp 16   Ht 1.702 m (5' 7.01\")   Wt 89.1 kg (196 lb 8 oz)   SpO2 96%   BMI 30.77 kg/m     Patient alert and oriented x3.   PERRLA. EOMI. No scleral icterus noted. OP without thrush/sores.  Neck exam: Palpable 0.5 cm anterior cervical node on the left, rubbery, mobile, non-tender.   Heart: RRR no murmurs noted.   Lungs: clear to auscultation bilaterally.  No crackles or wheezing.   Abd: Normal bowel sounds.  No tenderness to palpation. No suprapubic tenderness.  No hepatomegaly.   Extremities: No lower extremity edema.   Back: No CVA tenderness.   Neuro: grossly intact.   Mood and affect is stable.     Labs/Imaging:  Recent Labs   Lab Test 06/26/19  1212 05/24/19  1025 03/27/19  1604 01/30/19  1434 12/12/18  1232    140 139 140 138   POTASSIUM 4.1 4.4 3.7 3.7 4.0   CHLORIDE 103 107 107 107 103   CO2 25 28 25 25 26   ANIONGAP 7 5 7 8 9   BUN 20 22 17 17 20   CR 0.87 0.79 0.79 0.75 0.75   * 102* 144* 135* 102*   WILL 9.1 8.9 9.5 9.2 9.3     Recent Labs   Lab Test 12/21/16  1643 11/22/16  1221 10/26/16  1150 09/28/16  1137 08/17/16  1355  05/03/16  0955   MAG 2.2 2.4* 2.2 2.3 2.0   " < > 2.0   PHOS  --   --   --   --   --   --  4.1    < > = values in this interval not displayed.     Recent Labs   Lab Test 06/26/19  1212 05/24/19  1025 03/27/19  1604 01/30/19  1434 12/12/18  1232   WBC 8.2 7.6 5.9 7.0 8.3   HGB 11.4* 12.2* 12.1* 11.3* 12.1*    296 298 270 308   MCV 96 93 94 91 93   NEUTROPHIL 67.0 55.8 63.7 58.1 74.4     Recent Labs   Lab Test 06/26/19  1212 05/24/19  1025 03/27/19  1604   BILITOTAL 0.6 0.7 0.9   ALKPHOS 55 57 51   ALT 21 19 20   AST 12 10 16   ALBUMIN 3.9 4.0 4.3    187 202     TSH   Date Value Ref Range Status   06/26/2019 2.04 0.40 - 4.00 mU/L Final   05/24/2019 5.24 (H) 0.40 - 4.00 mU/L Final   12/05/2018 0.86 0.40 - 4.00 mU/L Final     No results for input(s): CEA in the last 51584 hours.  Results for orders placed or performed during the hospital encounter of 06/26/19   CT Chest/Abdomen/Pelvis w Contrast    Narrative    EXAMINATION: CT CHEST/ABDOMEN/PELVIS W CONTRAST, 6/26/2019 11:59 AM    TECHNIQUE:  Helical CT images from the lung apices through the  symphysis pubis were obtained with contrast.  Coronal and sagittal  reformatted images were generated at a workstation for further  assessment.    CONTRAST:  119 ml Isovue 370.    COMPARISON: CT chest abdomen pelvis 12/21/2018    HISTORY: Prostate cancer with extensive bony metastasis and slowly  rising PSA; Malignant neoplasm of prostate (H); Bone metastasis (H);  Hypothyroidism due to acquired atrophy of thyroid    FINDINGS:    Lines and tubes: None.    Mediastinum/Neck Base: No thyroid nodules. Central tracheobronchial  tree is patent. Heart size is normal. No pericardial effusion.  Normal  thoracic vasculature. No thoracic lymphadenopathy.   Lungs: No consolidation. No pleural effusion or pneumothorax. No new  or enlarging pulmonary nodule.    Liver: No suspicious liver lesions. Portal veins appear patent.  Parenchymal hypodensity adjacent to the fissure for ligamentum teres  without mass effect, likely  focal fatty infiltration.  Gallbladder: No gallstones. No evidence of acute cholecystitis.  Spleen: Normal size.  Pancreas: No suspicious pancreatic lesions. The pancreatic duct is not  dilated.  Adrenal glands: No adrenal nodules.   Kidneys: No hydronephrosis or obstructing renal stones.    Bladder / Pelvic organs: Unremarkable.  Bowel: No bowel wall thickening. The appendix is unremarkable. No  abnormal bowel loop dilatation  Lymph nodes: No retroperitoneal, mesenteric, or pelvic  lymphadenopathy.  Peritoneum / Retroperitoneum: No free fluid or air within the abdomen.  Vessels: No infrarenal aortic aneurysm.     Bones and soft tissues: Extensive sclerotic osseous metastasis  predominantly involving the axial skeleton. Scattered Schmorl's node  with degenerative changes of the spine.       Impression    IMPRESSION:   1. Extensive sclerotic osseous metastasis, not significantly changed  compared to CT chest, abdomen and pelvis dated 12/21/2018.  2  No new metastatic disease in the chest, abdomen or pelvis.    I have personally reviewed the examination and initial interpretation  and I agree with the findings.    NIXON GIVENS MD     EXAMINATION: Whole-body bone scan, 6/26/2019 2:39 PM      HISTORY: Prostate cancer with extensive bony metastases and slowly  rising prostate-specific antigen      ADDITIONAL INFORMATION: none     COMPARISON: 12/21/2018     TECHNIQUE: The patient received 26.5 mCi of Tc-99m MDP intravenously.  Whole body bone images were obtained at 3 hours.     FINDINGS: Interval increase in the focal uptake in the T11 vertebral  body. There is increased focal uptake in the L4 vertebral body, within  interval increase in sclerosis L4 vertebral body seen on CT.     Degenerative changes in the shoulders, sternoclavicular joints, knees.                                                                      IMPRESSION: Interval increase in the focal uptake of the T11 and L4  vertebral bodies with associated  increased size of sclerotic lesions  in L4 seen on CT.      I have personally reviewed the examination and initial interpretation  and I agree with the findings.     PATRICIA WELDON MD     Recent Labs   Lab Test 06/26/19  1212 05/24/19  1025 03/27/19  1604 01/30/19  1434 12/12/18  1232  12/21/16  1643 11/22/16  1222  05/03/16  0955   PSA 41.00* 36.40* 29.20* 22.70* 17.00*   < > 60.68*  --    < > 67.04*   TESTOSTTOTAL  --   --   --   --   --   --  <2.* 2*  --  <2.*    < > = values in this interval not displayed.     Recent Labs   Lab Test 06/26/19  1212 05/24/19  1025 03/27/19  1604 01/30/19  1434 12/12/18  1232   PSA 41.00* 36.40* 29.20* 22.70* 17.00*   ALKPHOS 55 57 51 49 57    187 202 186 183        ASSESSMENT/PLAN:  1. Metastatic prostate cancer with bony metastasis:   Currently taking abiraterone 1000 mg po q day and prednisone 5 mg po qday  Denosumab every 12 weeks   Newly diagnosed HTN and DM TII  ECOG PS 0  I had a lengthy discussion with Albert, who is accompanied by his wife and his middle daughter, Lynda, at this clinic visit.  He has been doing well overall, as per him.  However, his wife is a little unhappy with the constant pain he is in.  He has pain in his lower back that extends into his glutes.  He has been taking ibuprofen and Tylenol, which have been helping his pain.  He is very reluctant to take Dilaudid.  His wife is insisting that he continues to suffer with the fear of opiate addiction.  She is requesting if OxyContin could help his pain control.  I explained to them that if the pain is well controlled with both Tylenol and ibuprofen at this time, then that should be adequate.  His renal and liver functions have been completely normal and unchanged.  Both of these are very safe medications.      It would also depend if the multiple sites of pain signify disease progression, then we might have to consider a change in our treatment strategy.  If there is a focal point which is a source  of pain, we could try to radiate the lesion.      He is coming in with both labs and restaging scans.  His PSA has only marginally increased from 36.4 at last visit on 05/24/2019 to 41 at this visit.  His bone scan does suggest increased uptake in T11 and L4 vertebral bodies.  We looked at the axial images.  While the increased uptake in T11 is apparent, I cannot identify the uptake in L4 that has been noted on the official read.  On the CT scan, he has sclerosis almost covering the entire skeleton, and there is hardly any bone that is not involved by malignancy.  This would make radiation extremely challenging.  As for the nature of pain, we would have nothing else to guide us.      We again reviewed that our next line of treatment would be either chemotherapy or a clinical trial with TRITON3.  It is not very common to have one of the mutations with DNA repair enzyme, but if he has a mutation, then he would be randomized to either rucaparib or enzalutamide.  I am not a strong advocate of enzalutamide right after abiraterone, as these 2 drugs are at the androgen pathway, and usually the 2nd drug is not very effective.  He is not excited about the prospect of having to go through docetaxel.  We would have the option of cabazitaxel, as he has been previously exposed to 6 cycles of docetaxel.  In my experience, cabazitaxel is a much better product than docetaxel.  However, it might still not be as easy as his current time on abiraterone.  Both he and his wife are quite concerned about cognitive issues that he has been struggling with since his chemotherapy.  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  His wife is still concerned about significant short-term memory loss.  There has been a lot of recent literature on cognitive deficits with the androgen deprivation therapy, but it appears that we do not have too many treatment choices to work with over here.  Despite the  fact that if current androgen deprivation therapy or previous chemotherapy contributed to some neurocognitive decline, we would probably not have done things any differently.  These matter more for patients who are in their 80s in poor health who have competing causes of mortality and the need to treatment is questioned.  In his case, he presented with a PSA of over 6000 with extensive disease, and we had little alternative.  He has a very aggressive disease even at this time, and any slowing down of treatment could lead to disease progression; in fact, he is already progressing on the current regimen.  He would like to continue with the current abiraterone therapy and not change at this time.      His wife wanted to have a followup within 4 weeks, and he was favoring about 3 months.  He became tearful as he does not wish to make a switch to chemotherapy during the summertime and wishes to enjoy the summer.  This is reasonable.  However, his wife was also quite anxious.  We eventually decided on following up in 6-8 weeks with labs prior to the clinic visit.  Again, they know to give us a call if there is any change in clinical status.         As always he and his wife had come prepared with questions which were answered.      Over 45 min of direct face to face time spent with patient with more than 50% time spent in counseling and coordinating care.        Again, thank you for allowing me to participate in the care of your patient.      Sincerely,    Tyrel Valladares MD

## 2019-06-27 NOTE — PROGRESS NOTES
[facility]  FOLLOW-UP VISIT NOTE  Date of visit: Jun 26, 2019     Cancer History:   Metastatic Prostate CA treated    - s/p 6 cycles of taxotere 75mg/m2   - s/p orchiectomy on 3/18/2016   - s/p Provenge 5/13, 5/27, 6/10   - s/p Casodex 50 mg daily March/April   - Currently taking Zytiga    HPI: Albert is a 53 year old gentleman with metastatic prostate cancer.  Albert felt a cramp in his gluteus muscle and could barely walk after doing some remodeling at home and unloading heavy truck at work. He tried flexeril and prednisone initially but eventually presented to ED on 10/5/15. He feels that initially he was nearly labeled as drug seeker since he was in lot of discomfort and flexeril was not working. But during course of ED visits labs were drawn and he had marked elevation in Alk Phosphatase (over 1000). CT did suggest some ileus with some sclerotic bone lesions. He was admitted to the hospital. His PSA was checked and was markedly elevated at 5760 (10/6/15), repeat value 5563.     Urology and Medical Oncology consults were placed. He was unhappy with the progress in hospital and felt no better and left the following day on 10/6. He did follow up with Dr. Freire and had transrectal US guided biopsy of prostate on 10/8/15. They have the results through my chart which confirms prostate adenocarcinoma - enrico 4+3 involving majority (60-90%) portion of every single core.  He started on taxotere on 10/23 and completed 6 cycles of therapy in 2/2016.  He then underwent orchiectomy on 3/18/2016.       Throughout spring of 2016 Albert's PSA started to progress and after three elevations there was concern about him being hormone refractory.  He was started on Provenge and enrolled in the trial including Indoximod. PSA trending up and started on Casodex mid March with progression. Switched to Zytiga 5/3/17.    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     He is accompanied by his wife and his daughter at this visit. He has pain in his gluteus muscle on the left and in the neck. The is not very bothersome for him. It gets better with tylenol and ibuprofen. His wife points out that he has to take these around the clock to keep the pain under control. She points out that he is very reluctant to take dilaudid which he has available. He notes that he would fall asleep with dilaudid but now he does not gets the sedation but still has a good pain control.      MEDS:   Current Outpatient Medications   Medication Sig     abiraterone (ZYTIGA) 250 MG tablet Take 4 tablets (1,000 mg) by mouth daily Take on empty stomach.     Acetaminophen (TYLENOL PO) Take 325 mg by mouth every 8 hours as needed for mild pain or fever Reported on 5/18/2017     amLODIPine (NORVASC) 5 MG tablet Take 1 tablet (5 mg) by mouth daily TAKE 1 TABLET(5 MG) BY MOUTH DAILY     blood glucose monitoring (ACCU-CHEK MULTICLIX) lancets Use to test blood sugar twice times daily or as directed.     blood glucose monitoring (NO BRAND SPECIFIED) test strip Use to test blood sugars twice daily or as directed (fasting, rotate then second time - before lunch, before supper and bedtime)     diphenhydrAMINE (BENADRYL) 25 MG tablet Take 25 mg by mouth     HYDROmorphone (DILAUDID) 2 MG tablet Take 1 tablet (2 mg) by mouth every 4 hours as needed for moderate to severe pain     IBUPROFEN PO Take by mouth as needed for moderate pain Reported on 5/18/2017     levothyroxine (SYNTHROID/LEVOTHROID) 75 MCG tablet Take 1 tablet (75 mcg) by mouth daily     metFORMIN (GLUCOPHAGE-XR) 750 MG 24 hr tablet Take 2 tablets (1,500 mg) by mouth daily (with dinner)     predniSONE (DELTASONE) 5 MG tablet Take 1 tablet (5 mg) by mouth 2 times daily With breakfast and lunch     EPINEPHrine (EPIPEN 2-CHARITY) 0.3 MG/0.3ML injection 2-pack Inject 0.3 mLs (0.3 mg) into the muscle once as needed for anaphylaxis (Patient not taking:  "Reported on 1/30/2019)     predniSONE (DELTASONE) 5 MG tablet Take 1 tablet (5 mg) by mouth daily (with breakfast) (Patient not taking: Reported on 6/26/2019)     valACYclovir (VALTREX) 1000 mg tablet TK 1 T PO BID     No current facility-administered medications for this visit.         EXAM:  ECOG 1  Wt Readings from Last 4 Encounters:   06/26/19 89.1 kg (196 lb 8 oz)   05/24/19 88.5 kg (195 lb)   03/27/19 85.9 kg (189 lb 6.4 oz)   01/30/19 89.4 kg (197 lb 3.2 oz)   /81 (BP Location: Right arm, Patient Position: Sitting, Cuff Size: Adult Regular)   Pulse 71   Temp 98.3  F (36.8  C) (Oral)   Resp 16   Ht 1.702 m (5' 7.01\")   Wt 89.1 kg (196 lb 8 oz)   SpO2 96%   BMI 30.77 kg/m    Patient alert and oriented x3.   PERRLA. EOMI. No scleral icterus noted. OP without thrush/sores.  Neck exam: Palpable 0.5 cm anterior cervical node on the left, rubbery, mobile, non-tender.   Heart: RRR no murmurs noted.   Lungs: clear to auscultation bilaterally.  No crackles or wheezing.   Abd: Normal bowel sounds.  No tenderness to palpation. No suprapubic tenderness.  No hepatomegaly.   Extremities: No lower extremity edema.   Back: No CVA tenderness.   Neuro: grossly intact.   Mood and affect is stable.     Labs/Imaging:  Recent Labs   Lab Test 06/26/19  1212 05/24/19  1025 03/27/19  1604 01/30/19  1434 12/12/18  1232    140 139 140 138   POTASSIUM 4.1 4.4 3.7 3.7 4.0   CHLORIDE 103 107 107 107 103   CO2 25 28 25 25 26   ANIONGAP 7 5 7 8 9   BUN 20 22 17 17 20   CR 0.87 0.79 0.79 0.75 0.75   * 102* 144* 135* 102*   WILL 9.1 8.9 9.5 9.2 9.3     Recent Labs   Lab Test 12/21/16  1643 11/22/16  1221 10/26/16  1150 09/28/16  1137 08/17/16  1355  05/03/16  0955   MAG 2.2 2.4* 2.2 2.3 2.0   < > 2.0   PHOS  --   --   --   --   --   --  4.1    < > = values in this interval not displayed.     Recent Labs   Lab Test 06/26/19  1212 05/24/19  1025 03/27/19  1604 01/30/19  1434 12/12/18  1232   WBC 8.2 7.6 5.9 7.0 8.3   HGB " 11.4* 12.2* 12.1* 11.3* 12.1*    296 298 270 308   MCV 96 93 94 91 93   NEUTROPHIL 67.0 55.8 63.7 58.1 74.4     Recent Labs   Lab Test 06/26/19  1212 05/24/19  1025 03/27/19  1604   BILITOTAL 0.6 0.7 0.9   ALKPHOS 55 57 51   ALT 21 19 20   AST 12 10 16   ALBUMIN 3.9 4.0 4.3    187 202     TSH   Date Value Ref Range Status   06/26/2019 2.04 0.40 - 4.00 mU/L Final   05/24/2019 5.24 (H) 0.40 - 4.00 mU/L Final   12/05/2018 0.86 0.40 - 4.00 mU/L Final     No results for input(s): CEA in the last 39209 hours.  Results for orders placed or performed during the hospital encounter of 06/26/19   CT Chest/Abdomen/Pelvis w Contrast    Narrative    EXAMINATION: CT CHEST/ABDOMEN/PELVIS W CONTRAST, 6/26/2019 11:59 AM    TECHNIQUE:  Helical CT images from the lung apices through the  symphysis pubis were obtained with contrast.  Coronal and sagittal  reformatted images were generated at a workstation for further  assessment.    CONTRAST:  119 ml Isovue 370.    COMPARISON: CT chest abdomen pelvis 12/21/2018    HISTORY: Prostate cancer with extensive bony metastasis and slowly  rising PSA; Malignant neoplasm of prostate (H); Bone metastasis (H);  Hypothyroidism due to acquired atrophy of thyroid    FINDINGS:    Lines and tubes: None.    Mediastinum/Neck Base: No thyroid nodules. Central tracheobronchial  tree is patent. Heart size is normal. No pericardial effusion.  Normal  thoracic vasculature. No thoracic lymphadenopathy.   Lungs: No consolidation. No pleural effusion or pneumothorax. No new  or enlarging pulmonary nodule.    Liver: No suspicious liver lesions. Portal veins appear patent.  Parenchymal hypodensity adjacent to the fissure for ligamentum teres  without mass effect, likely focal fatty infiltration.  Gallbladder: No gallstones. No evidence of acute cholecystitis.  Spleen: Normal size.  Pancreas: No suspicious pancreatic lesions. The pancreatic duct is not  dilated.  Adrenal glands: No adrenal nodules.    Kidneys: No hydronephrosis or obstructing renal stones.    Bladder / Pelvic organs: Unremarkable.  Bowel: No bowel wall thickening. The appendix is unremarkable. No  abnormal bowel loop dilatation  Lymph nodes: No retroperitoneal, mesenteric, or pelvic  lymphadenopathy.  Peritoneum / Retroperitoneum: No free fluid or air within the abdomen.  Vessels: No infrarenal aortic aneurysm.     Bones and soft tissues: Extensive sclerotic osseous metastasis  predominantly involving the axial skeleton. Scattered Schmorl's node  with degenerative changes of the spine.       Impression    IMPRESSION:   1. Extensive sclerotic osseous metastasis, not significantly changed  compared to CT chest, abdomen and pelvis dated 12/21/2018.  2  No new metastatic disease in the chest, abdomen or pelvis.    I have personally reviewed the examination and initial interpretation  and I agree with the findings.    NIXON GIVENS MD     EXAMINATION: Whole-body bone scan, 6/26/2019 2:39 PM      HISTORY: Prostate cancer with extensive bony metastases and slowly  rising prostate-specific antigen      ADDITIONAL INFORMATION: none     COMPARISON: 12/21/2018     TECHNIQUE: The patient received 26.5 mCi of Tc-99m MDP intravenously.  Whole body bone images were obtained at 3 hours.     FINDINGS: Interval increase in the focal uptake in the T11 vertebral  body. There is increased focal uptake in the L4 vertebral body, within  interval increase in sclerosis L4 vertebral body seen on CT.     Degenerative changes in the shoulders, sternoclavicular joints, knees.                                                                      IMPRESSION: Interval increase in the focal uptake of the T11 and L4  vertebral bodies with associated increased size of sclerotic lesions  in L4 seen on CT.      I have personally reviewed the examination and initial interpretation  and I agree with the findings.     PATRICIA WELDON MD     Recent Labs   Lab Test 06/26/19  1212  05/24/19  1025 03/27/19  1604 01/30/19  1434 12/12/18  1232  12/21/16  1643 11/22/16  1222  05/03/16  0955   PSA 41.00* 36.40* 29.20* 22.70* 17.00*   < > 60.68*  --    < > 67.04*   TESTOSTTOTAL  --   --   --   --   --   --  <2.* 2*  --  <2.*    < > = values in this interval not displayed.     Recent Labs   Lab Test 06/26/19  1212 05/24/19  1025 03/27/19  1604 01/30/19  1434 12/12/18  1232   PSA 41.00* 36.40* 29.20* 22.70* 17.00*   ALKPHOS 55 57 51 49 57    187 202 186 183        ASSESSMENT/PLAN:  1. Metastatic prostate cancer with bony metastasis:   Currently taking abiraterone 1000 mg po q day and prednisone 5 mg po qday  Denosumab every 12 weeks   Newly diagnosed HTN and DM TII  ECOG PS 0  I had a lengthy discussion with Albert, who is accompanied by his wife and his middle daughter, Lynda, at this clinic visit.  He has been doing well overall, as per him.  However, his wife is a little unhappy with the constant pain he is in.  He has pain in his lower back that extends into his glutes.  He has been taking ibuprofen and Tylenol, which have been helping his pain.  He is very reluctant to take Dilaudid.  His wife is insisting that he continues to suffer with the fear of opiate addiction.  She is requesting if OxyContin could help his pain control.  I explained to them that if the pain is well controlled with both Tylenol and ibuprofen at this time, then that should be adequate.  His renal and liver functions have been completely normal and unchanged.  Both of these are very safe medications.      It would also depend if the multiple sites of pain signify disease progression, then we might have to consider a change in our treatment strategy.  If there is a focal point which is a source of pain, we could try to radiate the lesion.      He is coming in with both labs and restaging scans.  His PSA has only marginally increased from 36.4 at last visit on 05/24/2019 to 41 at this visit.  His bone scan does suggest  increased uptake in T11 and L4 vertebral bodies.  We looked at the axial images.  While the increased uptake in T11 is apparent, I cannot identify the uptake in L4 that has been noted on the official read.  On the CT scan, he has sclerosis almost covering the entire skeleton, and there is hardly any bone that is not involved by malignancy.  This would make radiation extremely challenging.  As for the nature of pain, we would have nothing else to guide us.      We again reviewed that our next line of treatment would be either chemotherapy or a clinical trial with TRITON3.  It is not very common to have one of the mutations with DNA repair enzyme, but if he has a mutation, then he would be randomized to either rucaparib or enzalutamide.  I am not a strong advocate of enzalutamide right after abiraterone, as these 2 drugs are at the androgen pathway, and usually the 2nd drug is not very effective.  He is not excited about the prospect of having to go through docetaxel.  We would have the option of cabazitaxel, as he has been previously exposed to 6 cycles of docetaxel.  In my experience, cabazitaxel is a much better product than docetaxel.  However, it might still not be as easy as his current time on abiraterone.  Both he and his wife are quite concerned about cognitive issues that he has been struggling with since his chemotherapy.  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  His wife is still concerned about significant short-term memory loss.  There has been a lot of recent literature on cognitive deficits with the androgen deprivation therapy, but it appears that we do not have too many treatment choices to work with over here.  Despite the fact that if current androgen deprivation therapy or previous chemotherapy contributed to some neurocognitive decline, we would probably not have done things any differently.  These matter more for patients who are in their 80s  in poor health who have competing causes of mortality and the need to treatment is questioned.  In his case, he presented with a PSA of over 6000 with extensive disease, and we had little alternative.  He has a very aggressive disease even at this time, and any slowing down of treatment could lead to disease progression; in fact, he is already progressing on the current regimen.  He would like to continue with the current abiraterone therapy and not change at this time.      His wife wanted to have a followup within 4 weeks, and he was favoring about 3 months.  He became tearful as he does not wish to make a switch to chemotherapy during the summertime and wishes to enjoy the summer.  This is reasonable.  However, his wife was also quite anxious.  We eventually decided on following up in 6-8 weeks with labs prior to the clinic visit.  Again, they know to give us a call if there is any change in clinical status.         As always he and his wife had come prepared with questions which were answered.      Over 45 min of direct face to face time spent with patient with more than 50% time spent in counseling and coordinating care.

## 2019-07-08 ENCOUNTER — PATIENT OUTREACH (OUTPATIENT)
Dept: ONCOLOGY | Facility: CLINIC | Age: 54
End: 2019-07-08

## 2019-07-08 ENCOUNTER — TELEPHONE (OUTPATIENT)
Dept: ONCOLOGY | Facility: CLINIC | Age: 54
End: 2019-07-08

## 2019-07-08 NOTE — PROGRESS NOTES
Oncology RN Care Coordination Note:     Patient's wife, Lorrie, left writer a voicemail stating Albert had a few questions:  -enrolling in the study?  How to get the process started?    -Disability? Social Security benefits?     Writer will contact the  and Dr. Valladares regarding those questions and social work regarding disability/SSI questions as well as triage regarding FMLA/disability paperwork.     Writer discussed at length with patient's wife how he is coping and handling his disease process.  She states he is content and ready when his time should come.  Lorrie states the children are at different stages with this, some have come to terms and others are not ready for him to pass away when that time should come.  Lorrie states she is coping ok with Albert's status, she has been helping him to make arrangements as he doesn't want anything left for her to do when his time comes, he wants everything to be taken care of.      Albert would like to quit taking Zytiga, writer will discuss this with Dr. Valladares and connect with patient's wife when she hears back from Dr. Valladares.      Shirin Lagunas, RN BSN   [facility]  Nurse Coordinator

## 2019-07-08 NOTE — TELEPHONE ENCOUNTER
Called and spoke with Albert Amaya regarding information about clinical trial [id number].  Albert verbalized that he is interesting in obtaining information about the trial and said to email the pre-screening consent form to [email or web address].  Email was sent today.  Will call Albert back in a few days to discuss further details about the trial.    Ana María cMkee  Clinical Research Coordinator-RN  Clinical Trials Office/[facility]  Desk Phone: [phone]   Pager: [phone]

## 2019-07-10 ENCOUNTER — PATIENT OUTREACH (OUTPATIENT)
Dept: CARE COORDINATION | Facility: CLINIC | Age: 54
End: 2019-07-10

## 2019-07-10 NOTE — PROGRESS NOTES
Social Work Note: Telephone Call  Oncology Clinic    Data/Intervention:  Patient Name:  Albert Amaya  /Age:  1965 (53 year old)    Call From:  Social work contacted patient over the phone.  Reason for Call:  Questions about SSDI    Assessment:  Social work spoke with patient about SSDI application process and gave education on navigating online application and social security administration website.  Social work gave education on anticipated timeline for process, compassionate allowance list eligibility and provided patient with contact information for clinic, social security administration phone line and [employer] for any additional needed assistance.  Patient indicated appreciation for education and indicated feeling confident in applying.    Plan:  SW contact information give to patient for any other identified needs.     Soo Yeon Han, MSW, LICSW  Pager: [phone]  Phone: [phone]

## 2019-07-11 ENCOUNTER — TELEPHONE (OUTPATIENT)
Dept: ONCOLOGY | Facility: CLINIC | Age: 54
End: 2019-07-11

## 2019-07-11 NOTE — TELEPHONE ENCOUNTER
Called and spoke with Albert regarding informed consent form for clinical trial [id number].  Albert expressed that he is very interested in signing the informed consent form for pre-screening procedures.  Albert is going to talk with his wife about coming in next week and will call me either tomorrow or Monday.  All questions and concerns addressed at this time.    Ana María Mckee  Clinical Research Coordinator-RN  Clinical Trials Office/[facility]  Desk Phone: [phone]   Pager: [phone]

## 2019-07-17 ENCOUNTER — RESEARCH ENCOUNTER (OUTPATIENT)
Dept: ONCOLOGY | Facility: CLINIC | Age: 54
End: 2019-07-17

## 2019-07-17 DIAGNOSIS — C61 MALIGNANT NEOPLASM OF PROSTATE (H): ICD-10-CM

## 2019-07-17 DIAGNOSIS — C79.51 BONE METASTASIS: ICD-10-CM

## 2019-07-17 DIAGNOSIS — E03.4 HYPOTHYROIDISM DUE TO ACQUIRED ATROPHY OF THYROID: ICD-10-CM

## 2019-07-17 LAB
ALBUMIN SERPL-MCNC: 4.3 G/DL (ref 3.4–5)
ALP SERPL-CCNC: 51 U/L (ref 40–150)
ALT SERPL W P-5'-P-CCNC: 21 U/L (ref 0–70)
ANION GAP SERPL CALCULATED.3IONS-SCNC: 5 MMOL/L (ref 3–14)
AST SERPL W P-5'-P-CCNC: 12 U/L (ref 0–45)
BASOPHILS # BLD AUTO: 0.1 10E9/L (ref 0–0.2)
BASOPHILS NFR BLD AUTO: 0.6 %
BILIRUB SERPL-MCNC: 0.6 MG/DL (ref 0.2–1.3)
BUN SERPL-MCNC: 20 MG/DL (ref 7–30)
CALCIUM SERPL-MCNC: 9.5 MG/DL (ref 8.5–10.1)
CHLORIDE SERPL-SCNC: 105 MMOL/L (ref 94–109)
CO2 SERPL-SCNC: 28 MMOL/L (ref 20–32)
CREAT SERPL-MCNC: 0.82 MG/DL (ref 0.66–1.25)
DIFFERENTIAL METHOD BLD: ABNORMAL
EOSINOPHIL # BLD AUTO: 0.1 10E9/L (ref 0–0.7)
EOSINOPHIL NFR BLD AUTO: 0.8 %
ERYTHROCYTE [DISTWIDTH] IN BLOOD BY AUTOMATED COUNT: 13.4 % (ref 10–15)
GFR SERPL CREATININE-BSD FRML MDRD: >90 ML/MIN/{1.73_M2}
GLUCOSE SERPL-MCNC: 130 MG/DL (ref 70–99)
HCT VFR BLD AUTO: 37.2 % (ref 40–53)
HGB BLD-MCNC: 12 G/DL (ref 13.3–17.7)
IMM GRANULOCYTES # BLD: 0 10E9/L (ref 0–0.4)
IMM GRANULOCYTES NFR BLD: 0.4 %
LDH SERPL L TO P-CCNC: 179 U/L (ref 85–227)
LYMPHOCYTES # BLD AUTO: 2 10E9/L (ref 0.8–5.3)
LYMPHOCYTES NFR BLD AUTO: 24.1 %
MCH RBC QN AUTO: 30.4 PG (ref 26.5–33)
MCHC RBC AUTO-ENTMCNC: 32.3 G/DL (ref 31.5–36.5)
MCV RBC AUTO: 94 FL (ref 78–100)
MONOCYTES # BLD AUTO: 0.4 10E9/L (ref 0–1.3)
MONOCYTES NFR BLD AUTO: 4.5 %
NEUTROPHILS # BLD AUTO: 5.9 10E9/L (ref 1.6–8.3)
NEUTROPHILS NFR BLD AUTO: 69.6 %
NRBC # BLD AUTO: 0 10*3/UL
NRBC BLD AUTO-RTO: 0 /100
PLATELET # BLD AUTO: 295 10E9/L (ref 150–450)
POTASSIUM SERPL-SCNC: 4.4 MMOL/L (ref 3.4–5.3)
PROT SERPL-MCNC: 7.7 G/DL (ref 6.8–8.8)
PSA SERPL-MCNC: 42.3 UG/L (ref 0–4)
RBC # BLD AUTO: 3.95 10E12/L (ref 4.4–5.9)
SODIUM SERPL-SCNC: 137 MMOL/L (ref 133–144)
TSH SERPL DL<=0.005 MIU/L-ACNC: 1.29 MU/L (ref 0.4–4)
WBC # BLD AUTO: 8.5 10E9/L (ref 4–11)

## 2019-07-17 PROCEDURE — 80053 COMPREHEN METABOLIC PANEL: CPT | Performed by: INTERNAL MEDICINE

## 2019-07-17 PROCEDURE — 85025 COMPLETE CBC W/AUTO DIFF WBC: CPT | Performed by: INTERNAL MEDICINE

## 2019-07-17 PROCEDURE — 84153 ASSAY OF PSA TOTAL: CPT | Performed by: INTERNAL MEDICINE

## 2019-07-17 PROCEDURE — 84443 ASSAY THYROID STIM HORMONE: CPT | Performed by: INTERNAL MEDICINE

## 2019-07-17 PROCEDURE — 83615 LACTATE (LD) (LDH) ENZYME: CPT | Performed by: INTERNAL MEDICINE

## 2019-07-17 NOTE — NURSING NOTE
Chief Complaint   Patient presents with     Blood Draw     labs drawn via venipuncture by RN     There were no vitals taken for this visit.    Labs collected and sent from right antecubital venipuncture in lab by RN. Pt tolerated well.    Liberty Perez RN

## 2019-07-18 NOTE — PROGRESS NOTES
[id number]: Informed Consent Note     The consent form, including purpose, risks and benefits, was reviewed with Albert Amaya, and all questions were answered before he signed the consent form. The patient understands that the study involves an active treatment phase as well as a post-treatment follow up phase.     Present during the discussion was  were Albert Amaya, Lorrie Amaya, and writer.  A copy of the signed form was provided to the patient. No procedures specific to this study were performed prior to the patient signing the consent form.    Consent Version Date: Version 2.1, dated 11/27/2018  Consent obtained by: Ana María Mckee RN    Date: 07/17/2019  HIPAA authorization signed?: yes  HIPAA authorization version date: 07.18.2016  Ana María Mckee  Clinical Research Coordinator-RN  Clinical Trials Office/[facility]  Desk Phone: [phone]   Pager: [phone]

## 2019-07-26 ENCOUNTER — PRE VISIT (OUTPATIENT)
Dept: ONCOLOGY | Facility: CLINIC | Age: 54
End: 2019-07-26

## 2019-07-26 NOTE — TELEPHONE ENCOUNTER
Contacted patient to get him set up for biopsy and to review prep. Patient stated that he will not do a prostate biopsy on a Tuesday and to please contact the doctor to get it set up for another day, preferably a Friday. Message sent to the physician.  Kenyatta Haines LPN

## 2019-07-29 ENCOUNTER — PATIENT OUTREACH (OUTPATIENT)
Dept: ONCOLOGY | Facility: CLINIC | Age: 54
End: 2019-07-29

## 2019-07-29 NOTE — PROGRESS NOTES
Oncology RN Care Coordination Note:     Writer called patient after he left a voicemail this morning, he states he would still like to get his prostate biopsy tomorrow.  Writer sent an urgent message to Precious Haines LPN and Ana María Mckee RN letting them know patient would like to schedule this.      Shirin Lagunas RN BSN   [facility]  Nurse Coordinator

## 2019-07-29 NOTE — TELEPHONE ENCOUNTER
Talked to patient. Not prepped for biopsy. Prefers a Friday. Informed patient that we are still waiting for Dr. Cotter to reply. Patient stated understanding and agreement with plan.  Kenyatta Haines LPN

## 2019-08-07 ENCOUNTER — APPOINTMENT (OUTPATIENT)
Dept: LAB | Facility: CLINIC | Age: 54
End: 2019-08-07
Attending: INTERNAL MEDICINE
Payer: COMMERCIAL

## 2019-08-07 ENCOUNTER — ONCOLOGY VISIT (OUTPATIENT)
Dept: ONCOLOGY | Facility: CLINIC | Age: 54
End: 2019-08-07
Attending: INTERNAL MEDICINE
Payer: COMMERCIAL

## 2019-08-07 VITALS
TEMPERATURE: 98.2 F | OXYGEN SATURATION: 95 % | RESPIRATION RATE: 16 BRPM | HEART RATE: 73 BPM | DIASTOLIC BLOOD PRESSURE: 86 MMHG | HEIGHT: 67 IN | SYSTOLIC BLOOD PRESSURE: 131 MMHG | WEIGHT: 197.1 LBS | BODY MASS INDEX: 30.93 KG/M2

## 2019-08-07 DIAGNOSIS — C61 MALIGNANT NEOPLASM OF PROSTATE (H): ICD-10-CM

## 2019-08-07 DIAGNOSIS — C79.51 BONE METASTASIS: ICD-10-CM

## 2019-08-07 DIAGNOSIS — E03.4 HYPOTHYROIDISM DUE TO ACQUIRED ATROPHY OF THYROID: ICD-10-CM

## 2019-08-07 LAB
ALBUMIN SERPL-MCNC: 4.2 G/DL (ref 3.4–5)
ALP SERPL-CCNC: 53 U/L (ref 40–150)
ALT SERPL W P-5'-P-CCNC: 36 U/L (ref 0–70)
ANION GAP SERPL CALCULATED.3IONS-SCNC: 4 MMOL/L (ref 3–14)
AST SERPL W P-5'-P-CCNC: 16 U/L (ref 0–45)
BASOPHILS # BLD AUTO: 0.1 10E9/L (ref 0–0.2)
BASOPHILS NFR BLD AUTO: 0.6 %
BILIRUB SERPL-MCNC: 0.6 MG/DL (ref 0.2–1.3)
BUN SERPL-MCNC: 25 MG/DL (ref 7–30)
CALCIUM SERPL-MCNC: 8.8 MG/DL (ref 8.5–10.1)
CHLORIDE SERPL-SCNC: 104 MMOL/L (ref 94–109)
CO2 SERPL-SCNC: 26 MMOL/L (ref 20–32)
CREAT SERPL-MCNC: 0.79 MG/DL (ref 0.66–1.25)
DIFFERENTIAL METHOD BLD: ABNORMAL
EOSINOPHIL # BLD AUTO: 0.1 10E9/L (ref 0–0.7)
EOSINOPHIL NFR BLD AUTO: 0.6 %
ERYTHROCYTE [DISTWIDTH] IN BLOOD BY AUTOMATED COUNT: 13.2 % (ref 10–15)
GFR SERPL CREATININE-BSD FRML MDRD: >90 ML/MIN/{1.73_M2}
GLUCOSE SERPL-MCNC: 99 MG/DL (ref 70–99)
HCT VFR BLD AUTO: 36 % (ref 40–53)
HGB BLD-MCNC: 11.8 G/DL (ref 13.3–17.7)
IMM GRANULOCYTES # BLD: 0 10E9/L (ref 0–0.4)
IMM GRANULOCYTES NFR BLD: 0.4 %
LDH SERPL L TO P-CCNC: 190 U/L (ref 85–227)
LYMPHOCYTES # BLD AUTO: 2.3 10E9/L (ref 0.8–5.3)
LYMPHOCYTES NFR BLD AUTO: 27.1 %
MCH RBC QN AUTO: 31.1 PG (ref 26.5–33)
MCHC RBC AUTO-ENTMCNC: 32.8 G/DL (ref 31.5–36.5)
MCV RBC AUTO: 95 FL (ref 78–100)
MONOCYTES # BLD AUTO: 0.5 10E9/L (ref 0–1.3)
MONOCYTES NFR BLD AUTO: 5.4 %
NEUTROPHILS # BLD AUTO: 5.5 10E9/L (ref 1.6–8.3)
NEUTROPHILS NFR BLD AUTO: 65.9 %
NRBC # BLD AUTO: 0 10*3/UL
NRBC BLD AUTO-RTO: 0 /100
PLATELET # BLD AUTO: 287 10E9/L (ref 150–450)
POTASSIUM SERPL-SCNC: 4.2 MMOL/L (ref 3.4–5.3)
PROT SERPL-MCNC: 7.8 G/DL (ref 6.8–8.8)
PSA SERPL-MCNC: 28.1 UG/L (ref 0–4)
RBC # BLD AUTO: 3.79 10E12/L (ref 4.4–5.9)
SODIUM SERPL-SCNC: 134 MMOL/L (ref 133–144)
WBC # BLD AUTO: 8.3 10E9/L (ref 4–11)

## 2019-08-07 PROCEDURE — 84443 ASSAY THYROID STIM HORMONE: CPT | Performed by: INTERNAL MEDICINE

## 2019-08-07 PROCEDURE — G0463 HOSPITAL OUTPT CLINIC VISIT: HCPCS | Mod: ZF

## 2019-08-07 PROCEDURE — 83615 LACTATE (LD) (LDH) ENZYME: CPT | Performed by: INTERNAL MEDICINE

## 2019-08-07 PROCEDURE — 84153 ASSAY OF PSA TOTAL: CPT | Performed by: INTERNAL MEDICINE

## 2019-08-07 PROCEDURE — 85025 COMPLETE CBC W/AUTO DIFF WBC: CPT | Performed by: INTERNAL MEDICINE

## 2019-08-07 PROCEDURE — 36415 COLL VENOUS BLD VENIPUNCTURE: CPT

## 2019-08-07 PROCEDURE — 99215 OFFICE O/P EST HI 40 MIN: CPT | Mod: ZP | Performed by: INTERNAL MEDICINE

## 2019-08-07 PROCEDURE — 80053 COMPREHEN METABOLIC PANEL: CPT | Performed by: INTERNAL MEDICINE

## 2019-08-07 ASSESSMENT — MIFFLIN-ST. JEOR: SCORE: 1702.82

## 2019-08-07 ASSESSMENT — PAIN SCALES - GENERAL: PAINLEVEL: NO PAIN (0)

## 2019-08-07 NOTE — PROGRESS NOTES
[facility] CANCER [facility]INIC  FOLLOW-UP VISIT NOTE  Date of visit: Aug 7, 2019     Cancer History:   Metastatic Prostate CA treated    - s/p 6 cycles of taxotere 75mg/m2   - s/p orchiectomy on 3/18/2016   - s/p Provenge 5/13, 5/27, 6/10   - s/p Casodex 50 mg daily March/April   - Currently taking Zytiga    HPI: Albert is a 53 year old gentleman with metastatic prostate cancer.  Albert felt a cramp in his gluteus muscle and could barely walk after doing some remodeling at home and unloading heavy truck at work. He tried flexeril and prednisone initially but eventually presented to ED on 10/5/15. He feels that initially he was nearly labeled as drug seeker since he was in lot of discomfort and flexeril was not working. But during course of ED visits labs were drawn and he had marked elevation in Alk Phosphatase (over 1000). CT did suggest some ileus with some sclerotic bone lesions. He was admitted to the hospital. His PSA was checked and was markedly elevated at 5760 (10/6/15), repeat value 5563.     Urology and Medical Oncology consults were placed. He was unhappy with the progress in hospital and felt no better and left the following day on 10/6. He did follow up with Dr. Freire and had transrectal US guided biopsy of prostate on 10/8/15. They have the results through my chart which confirms prostate adenocarcinoma - enrico 4+3 involving majority (60-90%) portion of every single core.  He started on taxotere on 10/23 and completed 6 cycles of therapy in 2/2016.  He then underwent orchiectomy on 3/18/2016.       Throughout spring of 2016 Albert's PSA started to progress and after three elevations there was concern about him being hormone refractory.  He was started on Provenge and enrolled in the trial including Indoximod. PSA trending up and started on Casodex mid March with progression. Switched to Zytiga 5/3/17.    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     He is accompanied by his wife at this visit.     His pain has significantly improved. He had been out working in his yard about 10 days ago when he felt very poorly. He was very dizzy and had nausea. He had marked fatigue. His dizziness, fatigue and nausea persisted for nearly a week. He held his abiraterone with this episode and has been off for the last 10 days or so. He has been feeling a lot better for the last 4 days. His appetite is improved, he has more energy and is not dizzy anymore.     His pain in neck and gluteus muscle on the left has nearly resolved.       MEDS:   Current Outpatient Medications   Medication Sig     Acetaminophen (TYLENOL PO) Take 325 mg by mouth every 8 hours as needed for mild pain or fever Reported on 5/18/2017     amLODIPine (NORVASC) 5 MG tablet Take 1 tablet (5 mg) by mouth daily TAKE 1 TABLET(5 MG) BY MOUTH DAILY     blood glucose monitoring (ACCU-CHEK MULTICLIX) lancets Use to test blood sugar twice times daily or as directed.     blood glucose monitoring (NO BRAND SPECIFIED) test strip Use to test blood sugars twice daily or as directed (fasting, rotate then second time - before lunch, before supper and bedtime)     diphenhydrAMINE (BENADRYL) 25 MG tablet Take 25 mg by mouth     HYDROmorphone (DILAUDID) 2 MG tablet Take 1 tablet (2 mg) by mouth every 4 hours as needed for moderate to severe pain     levothyroxine (SYNTHROID/LEVOTHROID) 75 MCG tablet Take 1 tablet (75 mcg) by mouth daily     predniSONE (DELTASONE) 5 MG tablet Take 1 tablet (5 mg) by mouth 2 times daily With breakfast and lunch     EPINEPHrine (EPIPEN 2-CHARITY) 0.3 MG/0.3ML injection 2-pack Inject 0.3 mLs (0.3 mg) into the muscle once as needed for anaphylaxis (Patient not taking: Reported on 8/7/2019)     IBUPROFEN PO Take by mouth as needed for moderate pain Reported on 5/18/2017     metFORMIN (GLUCOPHAGE-XR) 750 MG 24 hr tablet Take 2 tablets (1,500 mg) by mouth daily (with dinner) (Patient not  "taking: Reported on 8/7/2019)     predniSONE (DELTASONE) 5 MG tablet Take 1 tablet (5 mg) by mouth daily (with breakfast) (Patient not taking: Reported on 6/26/2019)     valACYclovir (VALTREX) 1000 mg tablet TK 1 T PO BID     No current facility-administered medications for this visit.         EXAM:  ECOG 1  Wt Readings from Last 4 Encounters:   08/07/19 89.4 kg (197 lb 1.6 oz)   06/26/19 89.1 kg (196 lb 8 oz)   05/24/19 88.5 kg (195 lb)   03/27/19 85.9 kg (189 lb 6.4 oz)   /86 (BP Location: Right arm, Patient Position: Sitting, Cuff Size: Adult Regular)   Pulse 73   Temp 98.2  F (36.8  C) (Oral)   Resp 16   Ht 1.702 m (5' 7.01\")   Wt 89.4 kg (197 lb 1.6 oz)   SpO2 95%   BMI 30.86 kg/m    Patient alert and oriented x3.   PERRLA. EOMI. No scleral icterus noted. OP without thrush/sores.  Neck exam: Palpable 0.5 cm anterior cervical node on the left, rubbery, mobile, non-tender.   Heart: RRR no murmurs noted.   Lungs: clear to auscultation bilaterally.  No crackles or wheezing.   Abd: Normal bowel sounds.  No tenderness to palpation. No suprapubic tenderness.  No hepatomegaly.   Extremities: No lower extremity edema.   Back: No CVA tenderness.   Neuro: grossly intact.   Mood and affect is stable.     Labs/Imaging:  Recent Labs   Lab Test 08/07/19  1635 07/17/19  1601 06/26/19  1212 05/24/19  1025 03/27/19  1604    137 136 140 139   POTASSIUM 4.2 4.4 4.1 4.4 3.7   CHLORIDE 104 105 103 107 107   CO2 26 28 25 28 25   ANIONGAP 4 5 7 5 7   BUN 25 20 20 22 17   CR 0.79 0.82 0.87 0.79 0.79   GLC 99 130* 113* 102* 144*   WILL 8.8 9.5 9.1 8.9 9.5     Recent Labs   Lab Test 12/21/16  1643 11/22/16  1221 10/26/16  1150 09/28/16  1137 08/17/16  1355  05/03/16  0955   MAG 2.2 2.4* 2.2 2.3 2.0   < > 2.0   PHOS  --   --   --   --   --   --  4.1    < > = values in this interval not displayed.     Recent Labs   Lab Test 08/07/19  1635 07/17/19  1601 06/26/19  1212 05/24/19  1025 03/27/19  1604   WBC 8.3 8.5 8.2 7.6 " 5.9   HGB 11.8* 12.0* 11.4* 12.2* 12.1*    295 303 296 298   MCV 95 94 96 93 94   NEUTROPHIL 65.9 69.6 67.0 55.8 63.7     Recent Labs   Lab Test 08/07/19  1635 07/17/19  1601 06/26/19  1212   BILITOTAL 0.6 0.6 0.6   ALKPHOS 53 51 55   ALT 36 21 21   AST 16 12 12   ALBUMIN 4.2 4.3 3.9    179 158     TSH   Date Value Ref Range Status   07/17/2019 1.29 0.40 - 4.00 mU/L Final   06/26/2019 2.04 0.40 - 4.00 mU/L Final   05/24/2019 5.24 (H) 0.40 - 4.00 mU/L Final     No results for input(s): CEA in the last 96046 hours.  Results for orders placed or performed during the hospital encounter of 06/26/19   CT Chest/Abdomen/Pelvis w Contrast    Narrative    EXAMINATION: CT CHEST/ABDOMEN/PELVIS W CONTRAST, 6/26/2019 11:59 AM    TECHNIQUE:  Helical CT images from the lung apices through the  symphysis pubis were obtained with contrast.  Coronal and sagittal  reformatted images were generated at a workstation for further  assessment.    CONTRAST:  119 ml Isovue 370.    COMPARISON: CT chest abdomen pelvis 12/21/2018    HISTORY: Prostate cancer with extensive bony metastasis and slowly  rising PSA; Malignant neoplasm of prostate (H); Bone metastasis (H);  Hypothyroidism due to acquired atrophy of thyroid    FINDINGS:    Lines and tubes: None.    Mediastinum/Neck Base: No thyroid nodules. Central tracheobronchial  tree is patent. Heart size is normal. No pericardial effusion.  Normal  thoracic vasculature. No thoracic lymphadenopathy.   Lungs: No consolidation. No pleural effusion or pneumothorax. No new  or enlarging pulmonary nodule.    Liver: No suspicious liver lesions. Portal veins appear patent.  Parenchymal hypodensity adjacent to the fissure for ligamentum teres  without mass effect, likely focal fatty infiltration.  Gallbladder: No gallstones. No evidence of acute cholecystitis.  Spleen: Normal size.  Pancreas: No suspicious pancreatic lesions. The pancreatic duct is not  dilated.  Adrenal glands: No adrenal  nodules.   Kidneys: No hydronephrosis or obstructing renal stones.    Bladder / Pelvic organs: Unremarkable.  Bowel: No bowel wall thickening. The appendix is unremarkable. No  abnormal bowel loop dilatation  Lymph nodes: No retroperitoneal, mesenteric, or pelvic  lymphadenopathy.  Peritoneum / Retroperitoneum: No free fluid or air within the abdomen.  Vessels: No infrarenal aortic aneurysm.     Bones and soft tissues: Extensive sclerotic osseous metastasis  predominantly involving the axial skeleton. Scattered Schmorl's node  with degenerative changes of the spine.       Impression    IMPRESSION:   1. Extensive sclerotic osseous metastasis, not significantly changed  compared to CT chest, abdomen and pelvis dated 12/21/2018.  2  No new metastatic disease in the chest, abdomen or pelvis.    I have personally reviewed the examination and initial interpretation  and I agree with the findings.    NIXON GIVENS MD     Recent Labs   Lab Test 08/07/19  1635 07/17/19  1601 06/26/19  1212 05/24/19  1025 03/27/19  1604  12/21/16  1643 11/22/16  1222  05/03/16  0955   PSA 28.10* 42.30* 41.00* 36.40* 29.20*   < > 60.68*  --    < > 67.04*   TESTOSTTOTAL  --   --   --   --   --   --  <2.* 2*  --  <2.*    < > = values in this interval not displayed.     Recent Labs   Lab Test 08/07/19  1635 07/17/19  1601 06/26/19  1212 05/24/19  1025 03/27/19  1604   PSA 28.10* 42.30* 41.00* 36.40* 29.20*   ALKPHOS 53 51 55 57 51    179 158 187 202           ASSESSMENT/PLAN:  1. Metastatic prostate cancer with bony metastasis:   Currently taking abiraterone 1000 mg po q day and prednisone 5 mg po qday  Denosumab every 12 weeks   Newly diagnosed HTN and DM TII  ECOG PS 0    I had a lengthy discussion with Albert, who is accompanied by his wife Lorrie at this clinic visit.   We had extensively reviewed participation in TRITON3 clinical trial at the last clinic visit.  I explained to him that our urologists had offered him several dates as  options for the prostate biopsy that would be needed for the next-generation sequencing.  I encouraged him to be more flexible in his schedule.  He expressed that he did not realize all the complexities and had only suggested that Friday was preferable over Tuesday.  When he later was willing to have a biopsy done on Tuesday, it could not be done as the was spot was already filled.  He further notes that he would have preferred a biopsy today, which was offered by Dr. Sanchez as per his Epic in basket to me.  He has agreed for the biopsy this coming Monday at 1 p.m. offered by Dr. Sanchez.  We again briefly reviewed the TRITON3 clinical trial.  The current consent is only for prostate biopsy and next-generation sequencing.  If he indeed has one of the important mutations involving the DNA homologous repair enzymes, he would be a candidate for the study.  Patients with one of these mutations tend to respond extremely well to the PARP inhibitors.  If he does not have this mutation, then we can treat him as standard of care is currently.      I disclosed to him that his PSA has significantly dropped since the last visit.  His PSA on 07/17 was 42 and has dropped to 28 at this clinic visit.  This was quite surprising and baffling for him and his wife.  I am not sure about the cause for his PSA dropped.  Further, he has not been taking his abiraterone for the last 10+ days.  He notes that, in fact, he is feeling a whole lot better now that he has discontinued his abiraterone and also his metformin.  Interestingly, his blood glucose was also completely normal despite being off metformin for 10 days.  I do not have a good explanation for either of these.  It is not uncommon to have PSA fluctuations.  This has been a nice drop which is a little unusual after prolonged disease progression.  He wanted to know how it would affect his therapy.  I was more willing to continue with abiraterone at this clinic visit given the  significant decline in his PSA.  This again was very confusing for him.  He had several complaints at the last clinic visit including progressive pain in his left buttock and the base of his neck.  We had looked at both the CT scans and bone scans and compared them to previous images at the last visit.  He has extensive disease that involves almost every single bone.  He has widespread sclerosis and there is very little normal bone left.  There is uniform uptake in all of the bones with slightly increased uptake in the T11 and L4 vertebrae noted in the last visit.  With a consistent rise in his PSA overall over several months, bone scan suggesting some disease progression and the actual spine and worsening pain, I was convinced about his disease progression.  Surprisingly, his symptoms are better and so is his PSA after holding abiraterone for the last 10 days.  I would be open to the idea of continuing abiraterone or even holding it for a short period of time and repeating the PSA to really confirm that it was not an inaccurate reading.  He is interested in holding off therapy at this time.  I will recheck his PSA at the end of August.  He does not wish to schedule a clinic visit at that time.  We will review the information after that PSA value and come up with a more definitive plan.  In the meantime, we will continue with a biopsy on 08/11 at 1 p.m.        Over 45 min of direct face to face time spent with patient with more than 50% time spent in counseling and coordinating care.

## 2019-08-07 NOTE — NURSING NOTE
Chief Complaint   Patient presents with     Blood Draw     labs drawn with vpt by rn.  vs taken     Labs drawn with vpt by rn.  Pt tolerated well.  VS taken.  Pt checked in for next appt.    Sweetie Meehan RN

## 2019-08-07 NOTE — NURSING NOTE
"Oncology Rooming Note    August 7, 2019 5:21 PM   Albert Amaya is a 53 year old male who presents for:    Chief Complaint   Patient presents with     Blood Draw     labs drawn with vpt by rn.  vs taken     Oncology Clinic Visit     RETURN VISIT; PROSTATE CA FOLLOW UP      Initial Vitals: /86 (BP Location: Right arm, Patient Position: Sitting, Cuff Size: Adult Regular)   Pulse 73   Temp 98.2  F (36.8  C) (Oral)   Resp 16   Ht 1.702 m (5' 7.01\")   Wt 89.4 kg (197 lb 1.6 oz)   SpO2 95%   BMI 30.86 kg/m   Estimated body mass index is 30.86 kg/m  as calculated from the following:    Height as of this encounter: 1.702 m (5' 7.01\").    Weight as of this encounter: 89.4 kg (197 lb 1.6 oz). Body surface area is 2.06 meters squared.  No Pain (0) Comment: Data Unavailable   No LMP for male patient.  Allergies reviewed: Yes  Medications reviewed: Yes    Medications: Medication refills not needed today.  Pharmacy name entered into [employer]:    [id number] - [place], [place] - [address] [facility] - [place], [place] - [address] [facility] [place] - [address] [facility] [place] [address] [place] [address] [id number] - [place], [place] - [address]  [facility] - [place], [place] - [phone]  [facility] - [place], [place] - [address]    Clinical concerns: No new concerns today. Patient does have FMLA paperwork that needs to be filled out. Please contact Lorrie for the fax number where to send the FMLA paperwork.  CHASIDY Valladares was notified.      Rocio Enrique              "

## 2019-08-07 NOTE — LETTER
8/7/2019       RE: Albert Amaya  [address] [address]     Dear Colleague,    Thank you for referring your patient, Albert Amaya, to the [facility]. Please see a copy of my visit note below.    [facility]  FOLLOW-UP VISIT NOTE  Date of visit: Aug 7, 2019     Cancer History:   Metastatic Prostate CA treated    - s/p 6 cycles of taxotere 75mg/m2   - s/p orchiectomy on 3/18/2016   - s/p Provenge 5/13, 5/27, 6/10   - s/p Casodex 50 mg daily March/April   - Currently taking Zytiga    HPI: Albert is a 53 year old gentleman with metastatic prostate cancer.  Albert felt a cramp in his gluteus muscle and could barely walk after doing some remodeling at home and unloading heavy truck at work. He tried flexeril and prednisone initially but eventually presented to ED on 10/5/15. He feels that initially he was nearly labeled as drug seeker since he was in lot of discomfort and flexeril was not working. But during course of ED visits labs were drawn and he had marked elevation in Alk Phosphatase (over 1000). CT did suggest some ileus with some sclerotic bone lesions. He was admitted to the hospital. His PSA was checked and was markedly elevated at 5760 (10/6/15), repeat value 5563.     Urology and Medical Oncology consults were placed. He was unhappy with the progress in hospital and felt no better and left the following day on 10/6. He did follow up with Dr. Freire and had transrectal US guided biopsy of prostate on 10/8/15. They have the results through my chart which confirms prostate adenocarcinoma - enrico 4+3 involving majority (60-90%) portion of every single core.  He started on taxotere on 10/23 and completed 6 cycles of therapy in 2/2016.  He then underwent orchiectomy on 3/18/2016.       Throughout spring of 2016 Albert's PSA started to progress and after three elevations there was concern about him being hormone refractory.  He was started  on Provenge and enrolled in the trial including Indoximod. PSA trending up and started on Casodex mid March with progression. Switched to Zytiga 5/3/17.    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     He is accompanied by his wife at this visit.     His pain has significantly improved. He had been out working in his yard about 10 days ago when he felt very poorly. He was very dizzy and had nausea. He had marked fatigue. His dizziness, fatigue and nausea persisted for nearly a week. He held his abiraterone with this episode and has been off for the last 10 days or so. He has been feeling a lot better for the last 4 days. His appetite is improved, he has more energy and is not dizzy anymore.     His pain in neck and gluteus muscle on the left has nearly resolved.       MEDS:   Current Outpatient Medications   Medication Sig     Acetaminophen (TYLENOL PO) Take 325 mg by mouth every 8 hours as needed for mild pain or fever Reported on 5/18/2017     amLODIPine (NORVASC) 5 MG tablet Take 1 tablet (5 mg) by mouth daily TAKE 1 TABLET(5 MG) BY MOUTH DAILY     blood glucose monitoring (ACCU-CHEK MULTICLIX) lancets Use to test blood sugar twice times daily or as directed.     blood glucose monitoring (NO BRAND SPECIFIED) test strip Use to test blood sugars twice daily or as directed (fasting, rotate then second time - before lunch, before supper and bedtime)     diphenhydrAMINE (BENADRYL) 25 MG tablet Take 25 mg by mouth     HYDROmorphone (DILAUDID) 2 MG tablet Take 1 tablet (2 mg) by mouth every 4 hours as needed for moderate to severe pain     levothyroxine (SYNTHROID/LEVOTHROID) 75 MCG tablet Take 1 tablet (75 mcg) by mouth daily     predniSONE (DELTASONE) 5 MG tablet Take 1 tablet (5 mg) by mouth 2 times daily With breakfast and lunch     EPINEPHrine (EPIPEN 2-CHARITY) 0.3 MG/0.3ML injection 2-pack Inject 0.3 mLs (0.3 mg) into the muscle once as needed for anaphylaxis (Patient  "not taking: Reported on 8/7/2019)     IBUPROFEN PO Take by mouth as needed for moderate pain Reported on 5/18/2017     metFORMIN (GLUCOPHAGE-XR) 750 MG 24 hr tablet Take 2 tablets (1,500 mg) by mouth daily (with dinner) (Patient not taking: Reported on 8/7/2019)     predniSONE (DELTASONE) 5 MG tablet Take 1 tablet (5 mg) by mouth daily (with breakfast) (Patient not taking: Reported on 6/26/2019)     valACYclovir (VALTREX) 1000 mg tablet TK 1 T PO BID     No current facility-administered medications for this visit.         EXAM:  ECOG 1  Wt Readings from Last 4 Encounters:   08/07/19 89.4 kg (197 lb 1.6 oz)   06/26/19 89.1 kg (196 lb 8 oz)   05/24/19 88.5 kg (195 lb)   03/27/19 85.9 kg (189 lb 6.4 oz)   /86 (BP Location: Right arm, Patient Position: Sitting, Cuff Size: Adult Regular)   Pulse 73   Temp 98.2  F (36.8  C) (Oral)   Resp 16   Ht 1.702 m (5' 7.01\")   Wt 89.4 kg (197 lb 1.6 oz)   SpO2 95%   BMI 30.86 kg/m     Patient alert and oriented x3.   PERRLA. EOMI. No scleral icterus noted. OP without thrush/sores.  Neck exam: Palpable 0.5 cm anterior cervical node on the left, rubbery, mobile, non-tender.   Heart: RRR no murmurs noted.   Lungs: clear to auscultation bilaterally.  No crackles or wheezing.   Abd: Normal bowel sounds.  No tenderness to palpation. No suprapubic tenderness.  No hepatomegaly.   Extremities: No lower extremity edema.   Back: No CVA tenderness.   Neuro: grossly intact.   Mood and affect is stable.     Labs/Imaging:  Recent Labs   Lab Test 08/07/19  1635 07/17/19  1601 06/26/19  1212 05/24/19  1025 03/27/19  1604    137 136 140 139   POTASSIUM 4.2 4.4 4.1 4.4 3.7   CHLORIDE 104 105 103 107 107   CO2 26 28 25 28 25   ANIONGAP 4 5 7 5 7   BUN 25 20 20 22 17   CR 0.79 0.82 0.87 0.79 0.79   GLC 99 130* 113* 102* 144*   WILL 8.8 9.5 9.1 8.9 9.5     Recent Labs   Lab Test 12/21/16  1643 11/22/16  1221 10/26/16  1150 09/28/16  1137 08/17/16  1355  05/03/16  0955   MAG 2.2 2.4* 2.2 " 2.3 2.0   < > 2.0   PHOS  --   --   --   --   --   --  4.1    < > = values in this interval not displayed.     Recent Labs   Lab Test 08/07/19  1635 07/17/19  1601 06/26/19  1212 05/24/19  1025 03/27/19  1604   WBC 8.3 8.5 8.2 7.6 5.9   HGB 11.8* 12.0* 11.4* 12.2* 12.1*    295 303 296 298   MCV 95 94 96 93 94   NEUTROPHIL 65.9 69.6 67.0 55.8 63.7     Recent Labs   Lab Test 08/07/19  1635 07/17/19  1601 06/26/19  1212   BILITOTAL 0.6 0.6 0.6   ALKPHOS 53 51 55   ALT 36 21 21   AST 16 12 12   ALBUMIN 4.2 4.3 3.9    179 158     TSH   Date Value Ref Range Status   07/17/2019 1.29 0.40 - 4.00 mU/L Final   06/26/2019 2.04 0.40 - 4.00 mU/L Final   05/24/2019 5.24 (H) 0.40 - 4.00 mU/L Final     No results for input(s): CEA in the last 56604 hours.  Results for orders placed or performed during the hospital encounter of 06/26/19   CT Chest/Abdomen/Pelvis w Contrast    Narrative    EXAMINATION: CT CHEST/ABDOMEN/PELVIS W CONTRAST, 6/26/2019 11:59 AM    TECHNIQUE:  Helical CT images from the lung apices through the  symphysis pubis were obtained with contrast.  Coronal and sagittal  reformatted images were generated at a workstation for further  assessment.    CONTRAST:  119 ml Isovue 370.    COMPARISON: CT chest abdomen pelvis 12/21/2018    HISTORY: Prostate cancer with extensive bony metastasis and slowly  rising PSA; Malignant neoplasm of prostate (H); Bone metastasis (H);  Hypothyroidism due to acquired atrophy of thyroid    FINDINGS:    Lines and tubes: None.    Mediastinum/Neck Base: No thyroid nodules. Central tracheobronchial  tree is patent. Heart size is normal. No pericardial effusion.  Normal  thoracic vasculature. No thoracic lymphadenopathy.   Lungs: No consolidation. No pleural effusion or pneumothorax. No new  or enlarging pulmonary nodule.    Liver: No suspicious liver lesions. Portal veins appear patent.  Parenchymal hypodensity adjacent to the fissure for ligamentum teres  without mass effect,  likely focal fatty infiltration.  Gallbladder: No gallstones. No evidence of acute cholecystitis.  Spleen: Normal size.  Pancreas: No suspicious pancreatic lesions. The pancreatic duct is not  dilated.  Adrenal glands: No adrenal nodules.   Kidneys: No hydronephrosis or obstructing renal stones.    Bladder / Pelvic organs: Unremarkable.  Bowel: No bowel wall thickening. The appendix is unremarkable. No  abnormal bowel loop dilatation  Lymph nodes: No retroperitoneal, mesenteric, or pelvic  lymphadenopathy.  Peritoneum / Retroperitoneum: No free fluid or air within the abdomen.  Vessels: No infrarenal aortic aneurysm.     Bones and soft tissues: Extensive sclerotic osseous metastasis  predominantly involving the axial skeleton. Scattered Schmorl's node  with degenerative changes of the spine.       Impression    IMPRESSION:   1. Extensive sclerotic osseous metastasis, not significantly changed  compared to CT chest, abdomen and pelvis dated 12/21/2018.  2  No new metastatic disease in the chest, abdomen or pelvis.    I have personally reviewed the examination and initial interpretation  and I agree with the findings.    NIXON GIVENS MD     Recent Labs   Lab Test 08/07/19  1635 07/17/19  1601 06/26/19  1212 05/24/19  1025 03/27/19  1604  12/21/16  1643 11/22/16  1222  05/03/16  0955   PSA 28.10* 42.30* 41.00* 36.40* 29.20*   < > 60.68*  --    < > 67.04*   TESTOSTTOTAL  --   --   --   --   --   --  <2.* 2*  --  <2.*    < > = values in this interval not displayed.     Recent Labs   Lab Test 08/07/19  1635 07/17/19  1601 06/26/19  1212 05/24/19  1025 03/27/19  1604   PSA 28.10* 42.30* 41.00* 36.40* 29.20*   ALKPHOS 53 51 55 57 51    179 158 187 202           ASSESSMENT/PLAN:  1. Metastatic prostate cancer with bony metastasis:   Currently taking abiraterone 1000 mg po q day and prednisone 5 mg po qday  Denosumab every 12 weeks   Newly diagnosed HTN and DM TII  ECOG PS 0    I had a lengthy discussion with Albert  who is accompanied by his wife Lorrie at this clinic visit.   We had extensively reviewed participation in TRITON3 clinical trial at the last clinic visit.  I explained to him that our urologists had offered him several dates as options for the prostate biopsy that would be needed for the next-generation sequencing.  I encouraged him to be more flexible in his schedule.  He expressed that he did not realize all the complexities and had only suggested that Friday was preferable over Tuesday.  When he later was willing to have a biopsy done on Tuesday, it could not be done as the was spot was already filled.  He further notes that he would have preferred a biopsy today, which was offered by Dr. Sanchez as per his Epic in basket to me.  He has agreed for the biopsy this coming Monday at 1 p.m. offered by Dr. Sanchez.  We again briefly reviewed the TRITON3 clinical trial.  The current consent is only for prostate biopsy and next-generation sequencing.  If he indeed has one of the important mutations involving the DNA homologous repair enzymes, he would be a candidate for the study.  Patients with one of these mutations tend to respond extremely well to the PARP inhibitors.  If he does not have this mutation, then we can treat him as standard of care is currently.      I disclosed to him that his PSA has significantly dropped since the last visit.  His PSA on 07/17 was 42 and has dropped to 28 at this clinic visit.  This was quite surprising and baffling for him and his wife.  I am not sure about the cause for his PSA dropped.  Further, he has not been taking his abiraterone for the last 10+ days.  He notes that, in fact, he is feeling a whole lot better now that he has discontinued his abiraterone and also his metformin.  Interestingly, his blood glucose was also completely normal despite being off metformin for 10 days.  I do not have a good explanation for either of these.  It is not uncommon to have PSA fluctuations.   This has been a nice drop which is a little unusual after prolonged disease progression.  He wanted to know how it would affect his therapy.  I was more willing to continue with abiraterone at this clinic visit given the significant decline in his PSA.  This again was very confusing for him.  He had several complaints at the last clinic visit including progressive pain in his left buttock and the base of his neck.  We had looked at both the CT scans and bone scans and compared them to previous images at the last visit.  He has extensive disease that involves almost every single bone.  He has widespread sclerosis and there is very little normal bone left.  There is uniform uptake in all of the bones with slightly increased uptake in the T11 and L4 vertebrae noted in the last visit.  With a consistent rise in his PSA overall over several months, bone scan suggesting some disease progression and the actual spine and worsening pain, I was convinced about his disease progression.  Surprisingly, his symptoms are better and so is his PSA after holding abiraterone for the last 10 days.  I would be open to the idea of continuing abiraterone or even holding it for a short period of time and repeating the PSA to really confirm that it was not an inaccurate reading.  He is interested in holding off therapy at this time.  I will recheck his PSA at the end of August.  He does not wish to schedule a clinic visit at that time.  We will review the information after that PSA value and come up with a more definitive plan.  In the meantime, we will continue with a biopsy on 08/11 at 1 p.m.        Over 45 min of direct face to face time spent with patient with more than 50% time spent in counseling and coordinating care.        Again, thank you for allowing me to participate in the care of your patient.      Sincerely,    Tyrel Valladares MD

## 2019-08-08 DIAGNOSIS — C61 MALIGNANT NEOPLASM OF PROSTATE (H): Primary | ICD-10-CM

## 2019-08-08 DIAGNOSIS — Z79.2 PROPHYLACTIC ANTIBIOTIC: Primary | ICD-10-CM

## 2019-08-08 RX ORDER — CIPROFLOXACIN 500 MG/1
500 TABLET, FILM COATED ORAL 2 TIMES DAILY
Qty: 6 TABLET | Refills: 0 | Status: SHIPPED | OUTPATIENT
Start: 2019-08-08 | End: 2019-09-18

## 2019-08-08 RX ORDER — CIPROFLOXACIN 500 MG/1
500 TABLET, FILM COATED ORAL 2 TIMES DAILY
Qty: 6 TABLET | Refills: 0 | Status: CANCELLED | OUTPATIENT
Start: 2019-08-08

## 2019-08-09 ENCOUNTER — TELEPHONE (OUTPATIENT)
Dept: ONCOLOGY | Facility: CLINIC | Age: 54
End: 2019-08-09

## 2019-08-10 NOTE — PROGRESS NOTES
ORAL CHEMOTHERAPY DISCONTINUATION       Primary Oncologist:  Dr. Valladares  Primary Oncology Clinic: [facility]  Cancer Diagnosis:  Prostate Cancer  Therapy History:  Start Date: 5/10/17  Drug: Zytiga 1000 mg (4 x 250) QD Continuously  7/25: progression on Zytiga and will be starting a clinical trial  [id number]  Therapy Ended On:  7/25/2019  Reason For Discontinuation: disease progression    Additional Notes:  Thank you for the opportunity to be a part in the care of this patient's oral chemotherapy. The oncology pharmacy will no longer be following this patient for oral chemotherapy. If there are any questions or the plan changes, feel free to contact us.    Annie Yeung   Pharmacy Intern  [facility]   [phone]

## 2019-08-12 ENCOUNTER — TRANSFERRED RECORDS (OUTPATIENT)
Dept: HEALTH INFORMATION MANAGEMENT | Facility: CLINIC | Age: 54
End: 2019-08-12

## 2019-08-12 ENCOUNTER — OFFICE VISIT (OUTPATIENT)
Dept: UROLOGY | Facility: CLINIC | Age: 54
End: 2019-08-12
Payer: COMMERCIAL

## 2019-08-12 ENCOUNTER — ALLIED HEALTH/NURSE VISIT (OUTPATIENT)
Dept: UROLOGY | Facility: CLINIC | Age: 54
End: 2019-08-12
Payer: COMMERCIAL

## 2019-08-12 VITALS
OXYGEN SATURATION: 99 % | DIASTOLIC BLOOD PRESSURE: 82 MMHG | HEIGHT: 68 IN | BODY MASS INDEX: 29.7 KG/M2 | SYSTOLIC BLOOD PRESSURE: 112 MMHG | HEART RATE: 66 BPM | WEIGHT: 196 LBS

## 2019-08-12 DIAGNOSIS — C61 PROSTATE CANCER (H): Primary | ICD-10-CM

## 2019-08-12 DIAGNOSIS — Z79.2 PROPHYLACTIC ANTIBIOTIC: Primary | ICD-10-CM

## 2019-08-12 DIAGNOSIS — Z79.2 PROPHYLACTIC ANTIBIOTIC: ICD-10-CM

## 2019-08-12 PROCEDURE — 88305 TISSUE EXAM BY PATHOLOGIST: CPT | Performed by: UROLOGY

## 2019-08-12 PROCEDURE — 55700 HC BIOPSY PROSTATE NEEDLE/PUNCH: CPT | Performed by: UROLOGY

## 2019-08-12 PROCEDURE — 96372 THER/PROPH/DIAG INJ SC/IM: CPT | Mod: 59 | Performed by: UROLOGY

## 2019-08-12 PROCEDURE — 76872 US TRANSRECTAL: CPT | Performed by: UROLOGY

## 2019-08-12 RX ORDER — GENTAMICIN 40 MG/ML
80 INJECTION, SOLUTION INTRAMUSCULAR; INTRAVENOUS ONCE
Status: DISCONTINUED | OUTPATIENT
Start: 2019-08-12 | End: 2019-08-12 | Stop reason: HOSPADM

## 2019-08-12 RX ORDER — LIDOCAINE HYDROCHLORIDE 10 MG/ML
20 INJECTION, SOLUTION INFILTRATION; PERINEURAL ONCE
Status: DISCONTINUED | OUTPATIENT
Start: 2019-08-12 | End: 2019-08-12 | Stop reason: HOSPADM

## 2019-08-12 ASSESSMENT — PAIN SCALES - GENERAL: PAINLEVEL: NO PAIN (0)

## 2019-08-12 ASSESSMENT — MIFFLIN-ST. JEOR: SCORE: 1713.55

## 2019-08-12 NOTE — PROGRESS NOTES
Albert Amaya comes into clinic today at the request of Dr. Sanchez Ordering Provider for Med Injection only (80 mg of Gentamicin).     Please see the op note for details regarding the medication, administration and procedure.     This service provided today was under the supervising provider of the day Dr. Sanchez, who was available if needed.     Norman Hassan, EMT  August 12, 2019

## 2019-08-12 NOTE — PATIENT INSTRUCTIONS
[facility]  Transrectal Ultrasound  Post Operative Information    The physician who performed your Transrectal Ultrasound is Dr. Sanchez (telephone number [phone]).  Please contact this doctor if you have any problems or questions.  If unable to reach your doctor, please return to the Emergency Department.      Take one antibiotic the evening of the procedure and then as directed on your prescription.    Drink at least 6-8 glasses of fluids for the first 48 hours.    Avoid heavy lifting and strenuous activity for 48 hours.    Avoid sexual intercourse for the first 24 hours.    No aspirin or ibuprofen products (Motrin, Advil, Nuprin, ect.) for one week.  You may take acetaminophen (Tylenol) for pain.    You may notice a small amount of blood on the tissue after a bowel movement.    You may pass blood with clots in your urine following the procedure.  The amount will decrease with time but may be visible for up to two weeks.     You make have blood in your semen for 4 weeks after the procedure.    You may experience mild perineal (groin area) discomfort after the procedure.    Please call you doctor if you have any of the follow symptoms:  Fever  Increase in the amount of blood passed  Severe discomfort or pain

## 2019-08-12 NOTE — NURSING NOTE
"Chief Complaint   Patient presents with     Prostate Cancer     Pt is here for a TRUS w/bx due to hx prostate cancer and elevated PSA     Elevated PSA       Patient Active Problem List   Diagnosis     Malignant neoplasm of prostate (H)     Bone metastasis (H)     Hypothyroidism due to acquired atrophy of thyroid       Allergies   Allergen Reactions     Aspirin Swelling     Tongue swelling     Salicylates Swelling     tongue       /88   Pulse 65   Ht 1.727 m (5' 8\")   Wt 88.9 kg (196 lb)   SpO2 96%   BMI 29.80 kg/m          Procedure was explained to pt prior to performing said procedure. The patient signed the consent form and all questions were answered prior to the procedure. Any pre-procedural antibiotics were given according to the performing physicians recommendation. Pt's information was confirmed on samples and samples were sent for analysis. Pt reviewed information on labels sent with patient and confirmed the accuracy of all the labels.    Antibiotic taken?  No but ok'd by doc  Aspirin or other blood thinning medications discontinued 7-10 days:  y  Time of Fleet's enema:  1030am  Patient given Gentamicin intramuscular injection 30 minutes prior to procedure Yes    12 samples were taken from the right and left, medial and later base, mid, and apex of the prostate respectively. 0 additional samples were taken. Vitals were repeated prior to pt leaving and instructions for post TRUS care were explained to the pt.     The following medication was given:     MEDICATION:  Lidocaine 1% Soln  ROUTE: Prostate  SITE: Prostate  DOSE: 20ml  LOT #: [id number]  : [employer]  EXPIRATION DATE: 02/2021  NDC#: 8916986207   Was there drug waste? No  Multi-dose vial: No    The following medication was given:     MEDICATION:  Gentamicin  ROUTE: IM  SITE: RUQ - Gluteus  DOSE: 2 ml (80mg)  LOT #: [id number]  : [employer]  EXPIRATION DATE: 06/2020  NDC#: 54914692698   Was there drug waste? " No  Multi-dose vial: No        Norman Hassan EMT-B  8/12/2019

## 2019-08-12 NOTE — LETTER
8/12/2019       RE: Albert Amaya  [address] [facility] [place] [address]     Dear Colleague,    Thank you for referring your patient, Albert Amaya, to the [facility] [place] [facility]. Please see a copy of my visit note below.    PHYSICIANS NOTE: TRANSRECTAL ULTRASOUND AND BIOPSY PROCEDURE: 8/12/2019   MARISELA    Sign In   History and Physical Exam reviewed and is unchanged.     Primary Diagnosis: Elevated psa (primary encounter diagnosis)   Prostate cancer     Informed Consent Discussed: Yes. Risks, benefits, alternatives and personnel discussed with patient who consents to proceed.     Sign in Communication: Completed   Time Out: Team Confirms the Correct Patient,   Correct Procedure; Transrectal Ultrasound and Biopsy, Correct Site and Site Marking (not applicable), Correct Position.   Affirmation of Time Out: YES   Sign Out: Sign Out Discussion: Completed   Patient history and ROS confirmed unchanged from last office visit.   Family history for prostate cancer is: unknown   Audible Time Out: Yes     TRUS PROCEDURE WITH BIOPSY     The patient was placed in the lateral decubitus position.     Digital rectal exam was normal and without stool in the rectal vault.     The ultrasound probe was placed into the rectum and the prostate visualized.   Approximately ten cc plain lidocaine was injected bilaterally into the perioprostatic nerves.     The prostate was visualized in both planes and no hypoechoic lesion identified.     The total prostate volume was 12.5 gm     The patient underwent biopsy removing 12 total cores.     PSA   Date Value Ref Range Status   08/07/2019 28.10 (H) 0 - 4 ug/L Final     Comment:     Assay Method:  Chemiluminescence using Siemens Vista analyzer       ----------     Complications: None     Follow-up: We will CONTACT the patient.    Jerod Sanchez MD

## 2019-08-12 NOTE — PROGRESS NOTES
PHYSICIANS NOTE: TRANSRECTAL ULTRASOUND AND BIOPSY PROCEDURE: 8/12/2019   MARISELA    Sign In   History and Physical Exam reviewed and is unchanged.     Primary Diagnosis: Elevated psa (primary encounter diagnosis)   Prostate cancer     Informed Consent Discussed: Yes. Risks, benefits, alternatives and personnel discussed with patient who consents to proceed.     Sign in Communication: Completed   Time Out: Team Confirms the Correct Patient,   Correct Procedure; Transrectal Ultrasound and Biopsy, Correct Site and Site Marking (not applicable), Correct Position.   Affirmation of Time Out: YES   Sign Out: Sign Out Discussion: Completed   Patient history and ROS confirmed unchanged from last office visit.   Family history for prostate cancer is: unknown   Audible Time Out: Yes     TRUS PROCEDURE WITH BIOPSY     The patient was placed in the lateral decubitus position.     Digital rectal exam was normal and without stool in the rectal vault.     The ultrasound probe was placed into the rectum and the prostate visualized.   Approximately ten cc plain lidocaine was injected bilaterally into the perioprostatic nerves.     The prostate was visualized in both planes and no hypoechoic lesion identified.     The total prostate volume was 12.5 gm     The patient underwent biopsy removing 12 total cores.     PSA   Date Value Ref Range Status   08/07/2019 28.10 (H) 0 - 4 ug/L Final     Comment:     Assay Method:  Chemiluminescence using Siemens Vista analyzer       ----------     Complications: None     Follow-up: We will CONTACT the patient.    Jerod Sanchez MD

## 2019-08-14 LAB — COPATH REPORT: NORMAL

## 2019-08-17 NOTE — ORAL ONC MGMT
"Oral Chemotherapy Monitoring Program  Primary Oncologist: Dr. Valladares  Primary Oncology Clinic: [facility]  Cancer Diagnosis: Prostate      Drug: Zytiga 1000mg (4 x 250mg) QD continuously.  Start Date: 5/10/17      Drug Interaction Assessment:   -Zytiga + Venlafaxine = Zytiga may increase serum concentration of Venlafaxine via CYP2D6 inhibition (category D - consider therapy modification - OK per Dr. Valladares, watch for increased side effects)      Lab Monitoring Plan  C1D1+   CMP, BP C2D1+ Call, CMP, BP C3D1+ Call, CMP, BP C4D1+ Call, CMP, BP C5D1+ Call, CMP, BP C6D1+ Call, CMP, BP   C1D8+    C2D8+    C3D8+    C4D8+    C5D8+    C6D8+      C1D15+ Call, CMP C2D15+ Call, CMP C3D15+    C4D15+    C5D15+    C6D15+      C1D22+    C2D22+    C3D22+    C4D22+    C5D22+    C6D22+      CMP Q2 weeks for the first 2 months, then monthly  Check BP monthly       Subjective/Objective:  Albert Amaya is a 52 year old male contacted by phone for a follow-up visit for oral chemotherapy.  Albert returned writers phone call and reports that things are going well with Zytiga. He states that since stopping the Effexor he is feeling much better. He denies any feelings of depression or feeling like a \"lunatic\" since stopping Effexor. He also reports that he stopped taking Dilaudid because that made him feel run down. He is taking ibuprofen and tylenol for left sided joint pain. He reports that this regimen is controlling his pain well. He denies any other side effects or concerns related to Zytiga. He is taking prednisone 5mg once daily with breakfast. He thanked me for the call and states that he would like to  his Zytiga and prednisone from site 19 on 5/30.     ORAL CHEMOTHERAPY 5/26/2017 6/22/2017 7/11/2017 8/25/2017 3/22/2018 4/23/2018 5/24/2018   Drug Name Zytiga (Abiraterone) Zytiga (Abiraterone) Zytiga (Abiraterone) Zytiga (Abiraterone) Zytiga (Abiraterone) Zytiga (Abiraterone) Zytiga (Abiraterone)   Current Dosage 1000mg " "1000mg 1000mg 1000mg 1000mg 1000mg 1000mg   Current Schedule Daily Daily Daily Daily Daily Daily Daily   Cycle Details Continuous Continuous Continuous Continuous Continuous Continuous Continuous   Start Date of Last Cycle 5/10/2017 5/10/2017 - 5/10/2017 - - -   Planned next cycle start date 6/7/2017 - - - - - -   Doses missed in last 2 weeks 0 0 1 0 0 0 0   Adherence Assessment Adherent Adherent Adherent Adherent Adherent Adherent Adherent   Adverse Effects No AE identified during assessment No AE identified during assessment No AE identified during assessment Myalgias;Arthralgias - No AE identified during assessment No AE identified during assessment   Pharmacist Intervention(myalgias) - - - No - - -   Arthralgias - - - Resolved due to intervention - - -   Pharmacist Intervention(arthralgias) - - - No - - -   Home BPs Not applicable - not done not done - - -   Any new drug interactions? No No - No - - -   Is the dose as ordered appropriate for the patient? Yes Yes - Yes - - -       Last PHQ-2 Score on record:   PHQ-2 ( [facility]) 3/24/2017 3/1/2017   Q1: Little interest or pleasure in doing things 0 0   Q2: Feeling down, depressed or hopeless 0 0   PHQ-2 Score 0 0       Patient does not report depression symptoms.      Vitals:  BP:   BP Readings from Last 1 Encounters:   03/14/18 (!) 150/101     Wt Readings from Last 1 Encounters:   03/14/18 92.9 kg (204 lb 12.8 oz)     Estimated body surface area is 2.1 meters squared as calculated from the following:    Height as of 12/6/17: 1.702 m (5' 7.01\").    Weight as of 3/14/18: 92.9 kg (204 lb 12.8 oz).    Labs:  No results found for NA within last 30 days.     No results found for K within last 30 days.     No results found for CA within last 30 days.     No results found for Mag within last 30 days.     No results found for Phos within last 30 days.     No results found for ALBUMIN within last 30 days.     No results found for BUN within last 30 days.     No results " found for CR within last 30 days.       No results found for AST within last 30 days.     No results found for ALT within last 30 days.     No results found for BILITOTAL within last 30 days.       No results found for WBC within last 30 days.     No results found for HGB within last 30 days.     No results found for PLT within last 30 days.     No results found for ANC within last 30 days.               Assessment:  Pt is tolerating current therapy well.     Plan:  Continue with current therapy.     Follow-Up:  4 weeks for TM assessment    Refill Due:  Rx ready at site [facility]. Pt to  on 5/30. Next due approx 6/29.    Adriane Finch RN  [facility]  [phone]       ADMIT

## 2019-08-20 ENCOUNTER — DOCUMENTATION ONLY (OUTPATIENT)
Dept: ONCOLOGY | Facility: CLINIC | Age: 54
End: 2019-08-20

## 2019-08-20 NOTE — PROGRESS NOTES
LA for spouse paperwork completed, signed and faxed to [facility] @ [phone]. A copy was made, filed and original mailed to patient at home address.    Enriqueta Shabazz CMA

## 2019-08-27 DIAGNOSIS — E03.4 HYPOTHYROIDISM DUE TO ACQUIRED ATROPHY OF THYROID: ICD-10-CM

## 2019-08-27 DIAGNOSIS — C79.51 BONE METASTASIS: ICD-10-CM

## 2019-08-27 DIAGNOSIS — C61 MALIGNANT NEOPLASM OF PROSTATE (H): ICD-10-CM

## 2019-08-27 LAB
ALBUMIN SERPL-MCNC: 4 G/DL (ref 3.4–5)
ALP SERPL-CCNC: 51 U/L (ref 40–150)
ALT SERPL W P-5'-P-CCNC: 36 U/L (ref 0–70)
ANION GAP SERPL CALCULATED.3IONS-SCNC: 4 MMOL/L (ref 3–14)
AST SERPL W P-5'-P-CCNC: 15 U/L (ref 0–45)
BASOPHILS # BLD AUTO: 0 10E9/L (ref 0–0.2)
BASOPHILS NFR BLD AUTO: 0.7 %
BILIRUB SERPL-MCNC: 0.5 MG/DL (ref 0.2–1.3)
BUN SERPL-MCNC: 19 MG/DL (ref 7–30)
CALCIUM SERPL-MCNC: 9.6 MG/DL (ref 8.5–10.1)
CHLORIDE SERPL-SCNC: 105 MMOL/L (ref 94–109)
CO2 SERPL-SCNC: 30 MMOL/L (ref 20–32)
CREAT SERPL-MCNC: 1.01 MG/DL (ref 0.66–1.25)
DIFFERENTIAL METHOD BLD: ABNORMAL
EOSINOPHIL # BLD AUTO: 0.1 10E9/L (ref 0–0.7)
EOSINOPHIL NFR BLD AUTO: 1.9 %
ERYTHROCYTE [DISTWIDTH] IN BLOOD BY AUTOMATED COUNT: 12.9 % (ref 10–15)
GFR SERPL CREATININE-BSD FRML MDRD: 85 ML/MIN/{1.73_M2}
GLUCOSE SERPL-MCNC: 94 MG/DL (ref 70–99)
HCT VFR BLD AUTO: 34.1 % (ref 40–53)
HGB BLD-MCNC: 11.5 G/DL (ref 13.3–17.7)
IMM GRANULOCYTES # BLD: 0 10E9/L (ref 0–0.4)
IMM GRANULOCYTES NFR BLD: 0.2 %
LDH SERPL L TO P-CCNC: 189 U/L (ref 85–227)
LYMPHOCYTES # BLD AUTO: 2.3 10E9/L (ref 0.8–5.3)
LYMPHOCYTES NFR BLD AUTO: 39.5 %
MCH RBC QN AUTO: 31.1 PG (ref 26.5–33)
MCHC RBC AUTO-ENTMCNC: 33.7 G/DL (ref 31.5–36.5)
MCV RBC AUTO: 92 FL (ref 78–100)
MONOCYTES # BLD AUTO: 0.5 10E9/L (ref 0–1.3)
MONOCYTES NFR BLD AUTO: 7.8 %
NEUTROPHILS # BLD AUTO: 3 10E9/L (ref 1.6–8.3)
NEUTROPHILS NFR BLD AUTO: 49.9 %
NRBC # BLD AUTO: 0 10*3/UL
NRBC BLD AUTO-RTO: 0 /100
PLATELET # BLD AUTO: 291 10E9/L (ref 150–450)
POTASSIUM SERPL-SCNC: 4.6 MMOL/L (ref 3.4–5.3)
PROT SERPL-MCNC: 7.5 G/DL (ref 6.8–8.8)
PSA SERPL-MCNC: 38.2 UG/L (ref 0–4)
RBC # BLD AUTO: 3.7 10E12/L (ref 4.4–5.9)
SODIUM SERPL-SCNC: 139 MMOL/L (ref 133–144)
WBC # BLD AUTO: 5.9 10E9/L (ref 4–11)

## 2019-08-27 PROCEDURE — 83615 LACTATE (LD) (LDH) ENZYME: CPT | Performed by: INTERNAL MEDICINE

## 2019-08-27 PROCEDURE — 84153 ASSAY OF PSA TOTAL: CPT | Performed by: INTERNAL MEDICINE

## 2019-08-27 PROCEDURE — 80053 COMPREHEN METABOLIC PANEL: CPT | Performed by: INTERNAL MEDICINE

## 2019-08-27 PROCEDURE — 85025 COMPLETE CBC W/AUTO DIFF WBC: CPT | Performed by: INTERNAL MEDICINE

## 2019-08-27 PROCEDURE — 36415 COLL VENOUS BLD VENIPUNCTURE: CPT

## 2019-08-27 NOTE — NURSING NOTE
Chief Complaint   Patient presents with     Blood Draw     LAbs drawn via  by RN in lab. Lab appt only.      Linsey Rivers RN

## 2019-08-28 ENCOUNTER — PATIENT OUTREACH (OUTPATIENT)
Dept: ONCOLOGY | Facility: CLINIC | Age: 54
End: 2019-08-28

## 2019-08-28 NOTE — PROGRESS NOTES
Oncology RN Care Coordination Note:     Patient's wife called for PSA results.  Patient isn't seeing Dr. Valladares until 09/18/2019.     Writer released PSA and LDH results to [employer].     Shirin Lagunas, RN BSN   [facility]  Nurse Coordinator

## 2019-09-05 ENCOUNTER — TELEPHONE (OUTPATIENT)
Dept: ONCOLOGY | Facility: CLINIC | Age: 54
End: 2019-09-05

## 2019-09-05 DIAGNOSIS — C61 MALIGNANT NEOPLASM OF PROSTATE (H): Primary | ICD-10-CM

## 2019-09-05 NOTE — TELEPHONE ENCOUNTER
Called and spoke with Albert Amaya who participates in clinical trial [id number].  Albert agreed to return to clinic today to re-draw pre-screening blood draw.  No other questions or concerns regarding the clinical trial at this time.    Ana María Mckee  Clinical Research Coordinator-RN  Clinical Trials Office/[facility]  Desk Phone: [phone]   Pager: [phone]

## 2019-09-05 NOTE — NURSING NOTE
Chief Complaint   Patient presents with     Blood Draw     Labs drawn via VPT by RN in lab.      Labs collected from venipuncture by RN.     Ria BENITEZ RN PHN BSN  BMT/Oncology Lab

## 2019-09-18 ENCOUNTER — ONCOLOGY VISIT (OUTPATIENT)
Dept: ONCOLOGY | Facility: CLINIC | Age: 54
End: 2019-09-18
Attending: INTERNAL MEDICINE
Payer: COMMERCIAL

## 2019-09-18 ENCOUNTER — APPOINTMENT (OUTPATIENT)
Dept: LAB | Facility: CLINIC | Age: 54
End: 2019-09-18
Attending: INTERNAL MEDICINE
Payer: COMMERCIAL

## 2019-09-18 VITALS
TEMPERATURE: 98.8 F | OXYGEN SATURATION: 98 % | HEART RATE: 67 BPM | DIASTOLIC BLOOD PRESSURE: 85 MMHG | WEIGHT: 195.8 LBS | RESPIRATION RATE: 16 BRPM | SYSTOLIC BLOOD PRESSURE: 130 MMHG | BODY MASS INDEX: 29.77 KG/M2

## 2019-09-18 DIAGNOSIS — C61 MALIGNANT NEOPLASM OF PROSTATE (H): ICD-10-CM

## 2019-09-18 DIAGNOSIS — C79.51 BONE METASTASIS: ICD-10-CM

## 2019-09-18 DIAGNOSIS — G62.9 NEUROPATHY: ICD-10-CM

## 2019-09-18 DIAGNOSIS — E03.4 HYPOTHYROIDISM DUE TO ACQUIRED ATROPHY OF THYROID: ICD-10-CM

## 2019-09-18 DIAGNOSIS — R61 NIGHT SWEATS: ICD-10-CM

## 2019-09-18 DIAGNOSIS — E03.9 HYPOTHYROIDISM, UNSPECIFIED TYPE: ICD-10-CM

## 2019-09-18 PROBLEM — E11.9 DIABETES MELLITUS, TYPE II (H): Status: ACTIVE | Noted: 2018-12-28

## 2019-09-18 LAB
ALBUMIN SERPL-MCNC: 4 G/DL (ref 3.4–5)
ALP SERPL-CCNC: 54 U/L (ref 40–150)
ALT SERPL W P-5'-P-CCNC: 27 U/L (ref 0–70)
ANION GAP SERPL CALCULATED.3IONS-SCNC: 5 MMOL/L (ref 3–14)
AST SERPL W P-5'-P-CCNC: 12 U/L (ref 0–45)
BASOPHILS # BLD AUTO: 0 10E9/L (ref 0–0.2)
BASOPHILS NFR BLD AUTO: 0.5 %
BILIRUB SERPL-MCNC: 0.5 MG/DL (ref 0.2–1.3)
BUN SERPL-MCNC: 13 MG/DL (ref 7–30)
CALCIUM SERPL-MCNC: 9.1 MG/DL (ref 8.5–10.1)
CHLORIDE SERPL-SCNC: 107 MMOL/L (ref 94–109)
CO2 SERPL-SCNC: 25 MMOL/L (ref 20–32)
CREAT SERPL-MCNC: 0.84 MG/DL (ref 0.66–1.25)
DIFFERENTIAL METHOD BLD: ABNORMAL
EOSINOPHIL # BLD AUTO: 0.1 10E9/L (ref 0–0.7)
EOSINOPHIL NFR BLD AUTO: 1.7 %
ERYTHROCYTE [DISTWIDTH] IN BLOOD BY AUTOMATED COUNT: 12.7 % (ref 10–15)
GFR SERPL CREATININE-BSD FRML MDRD: >90 ML/MIN/{1.73_M2}
GLUCOSE SERPL-MCNC: 108 MG/DL (ref 70–99)
HCT VFR BLD AUTO: 33.8 % (ref 40–53)
HGB BLD-MCNC: 11.3 G/DL (ref 13.3–17.7)
IMM GRANULOCYTES # BLD: 0 10E9/L (ref 0–0.4)
IMM GRANULOCYTES NFR BLD: 0.2 %
LDH SERPL L TO P-CCNC: 203 U/L (ref 85–227)
LYMPHOCYTES # BLD AUTO: 2.1 10E9/L (ref 0.8–5.3)
LYMPHOCYTES NFR BLD AUTO: 36.9 %
MCH RBC QN AUTO: 30.7 PG (ref 26.5–33)
MCHC RBC AUTO-ENTMCNC: 33.4 G/DL (ref 31.5–36.5)
MCV RBC AUTO: 92 FL (ref 78–100)
MONOCYTES # BLD AUTO: 0.5 10E9/L (ref 0–1.3)
MONOCYTES NFR BLD AUTO: 8.6 %
NEUTROPHILS # BLD AUTO: 3 10E9/L (ref 1.6–8.3)
NEUTROPHILS NFR BLD AUTO: 52.1 %
NRBC # BLD AUTO: 0 10*3/UL
NRBC BLD AUTO-RTO: 0 /100
PLATELET # BLD AUTO: 270 10E9/L (ref 150–450)
POTASSIUM SERPL-SCNC: 4.4 MMOL/L (ref 3.4–5.3)
PROT SERPL-MCNC: 7.5 G/DL (ref 6.8–8.8)
PSA SERPL-MCNC: 43 UG/L (ref 0–4)
RBC # BLD AUTO: 3.68 10E12/L (ref 4.4–5.9)
SODIUM SERPL-SCNC: 137 MMOL/L (ref 133–144)
WBC # BLD AUTO: 5.7 10E9/L (ref 4–11)

## 2019-09-18 PROCEDURE — 80053 COMPREHEN METABOLIC PANEL: CPT | Performed by: INTERNAL MEDICINE

## 2019-09-18 PROCEDURE — 85025 COMPLETE CBC W/AUTO DIFF WBC: CPT | Performed by: INTERNAL MEDICINE

## 2019-09-18 PROCEDURE — 36415 COLL VENOUS BLD VENIPUNCTURE: CPT

## 2019-09-18 PROCEDURE — G0463 HOSPITAL OUTPT CLINIC VISIT: HCPCS | Mod: ZF

## 2019-09-18 PROCEDURE — 84153 ASSAY OF PSA TOTAL: CPT | Performed by: INTERNAL MEDICINE

## 2019-09-18 PROCEDURE — 99215 OFFICE O/P EST HI 40 MIN: CPT | Mod: ZP | Performed by: INTERNAL MEDICINE

## 2019-09-18 PROCEDURE — 83615 LACTATE (LD) (LDH) ENZYME: CPT | Performed by: INTERNAL MEDICINE

## 2019-09-18 RX ORDER — HYDROMORPHONE HYDROCHLORIDE 2 MG/1
2 TABLET ORAL EVERY 4 HOURS PRN
Qty: 60 TABLET | Refills: 0 | Status: SHIPPED | OUTPATIENT
Start: 2019-09-18 | End: 2020-06-09

## 2019-09-18 RX ORDER — PROCHLORPERAZINE MALEATE 5 MG
5 TABLET ORAL EVERY 6 HOURS PRN
Qty: 45 TABLET | Refills: 3 | Status: SHIPPED | OUTPATIENT
Start: 2019-09-18 | End: 2020-02-04

## 2019-09-18 ASSESSMENT — PAIN SCALES - GENERAL: PAINLEVEL: NO PAIN (0)

## 2019-09-18 NOTE — NURSING NOTE
Chief Complaint   Patient presents with     Blood Draw     labs drawn via vpt by rn. vs taken      Blood drawn via vpt by RN in lab. VS taken. Pt checked into next appointment.   Krysta Ruff RN

## 2019-09-18 NOTE — NURSING NOTE
"Oncology Rooming Note    September 18, 2019 1:08 PM   Albert Amaya is a 53 year old male who presents for:    Chief Complaint   Patient presents with     Blood Draw     labs drawn via vpt by rn. vs taken      Oncology Clinic Visit     Return Prostate Ca     Initial Vitals: /85 (BP Location: Right arm, Patient Position: Sitting, Cuff Size: Adult Regular)   Pulse 67   Temp 98.8  F (37.1  C) (Oral)   Resp 16   Wt 88.8 kg (195 lb 12.8 oz)   SpO2 98%   BMI 29.77 kg/m   Estimated body mass index is 29.77 kg/m  as calculated from the following:    Height as of 8/12/19: 1.727 m (5' 8\").    Weight as of this encounter: 88.8 kg (195 lb 12.8 oz). Body surface area is 2.06 meters squared.  No Pain (0) Comment: Data Unavailable   No LMP for male patient.  Allergies reviewed: Yes  Medications reviewed: Yes    Medications: Medication refills not needed today.  Pharmacy name entered into ZEB:    [id number] - [place], [place] - [address] [facility] - [place], [place] - [address] [facility] - [place], [place] - [address] [facility] [place] [address] [place] [address] [id number] - [place] - [address]  [facility] - [place], [place] - [address]  [facility] - [place], [place] - [address] [facility] [facility]    Clinical concerns: Pt would like to discuss issues directly with provider. Dr Valladares was notified.      Enriqueta Shabazz CMA              "

## 2019-09-18 NOTE — PROGRESS NOTES
[facility]IN[facility]  FOLLOW-UP VISIT NOTE  Date of visit: Sep 18, 2019     Cancer History:   Metastatic Prostate CA treated    - s/p 6 cycles of taxotere 75mg/m2   - s/p orchiectomy on 3/18/2016   - s/p Provenge 5/13, 5/27, 6/10   - s/p Casodex 50 mg daily March/April   - s/p Zytiga    HPI: Albert is a 53 year old gentleman with metastatic prostate cancer.  Albert felt a cramp in his gluteus muscle and could barely walk after doing some remodeling at home and unloading heavy truck at work. He tried flexeril and prednisone initially but eventually presented to ED on 10/5/15. He feels that initially he was nearly labeled as drug seeker since he was in lot of discomfort and flexeril was not working. But during course of ED visits labs were drawn and he had marked elevation in Alk Phosphatase (over 1000). CT did suggest some ileus with some sclerotic bone lesions. He was admitted to the hospital. His PSA was checked and was markedly elevated at 5760 (10/6/15), repeat value 5563.     Urology and Medical Oncology consults were placed. He was unhappy with the progress in hospital and felt no better and left the following day on 10/6. He did follow up with Dr. Freire and had transrectal US guided biopsy of prostate on 10/8/15. They have the results through my chart which confirms prostate adenocarcinoma - enrico 4+3 involving majority (60-90%) portion of every single core.  He started on taxotere on 10/23 and completed 6 cycles of therapy in 2/2016.  He then underwent orchiectomy on 3/18/2016.       Throughout spring of 2016 Albert's PSA started to progress and after three elevations there was concern about him being hormone refractory.  He was started on Provenge and enrolled in the trial including Indoximod. PSA trending up and started on Casodex mid March with progression. Switched to Zytiga 5/3/17.    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     He  is accompanied by his wife at this visit. He was quite positive at this visit. He has stopped his abiraterone a few days prior to the last visit and has not restarted it. He notes that his fatigue has markedly improved being off abiraterone. His pain in neck and gluteus muscle on the left has nearly resolved.  He had been very active at a function last week and was wiped out after that. He could not have imagined participating like that while on abiraterone.     His wife pointed out a number of symptoms for him. He had pain in upper mid back between both the scapulae with stiffening of muscles in upper back. He had pain in the back that radiated all the way to the front at same level. He had pain in his left arm that extended down to his ring and little fingers. Most this pain has improved - though there are up and down times. He has been using tylenol and ibuprofen and does get dyspepsia from his ibuprofen use. He has been avoiding his dilaudid.      MEDS:   Current Outpatient Medications   Medication Sig     Acetaminophen (TYLENOL PO) Take 325 mg by mouth every 8 hours as needed for mild pain or fever Reported on 5/18/2017     amLODIPine (NORVASC) 5 MG tablet Take 1 tablet (5 mg) by mouth daily TAKE 1 TABLET(5 MG) BY MOUTH DAILY     HYDROmorphone (DILAUDID) 2 MG tablet Take 1 tablet (2 mg) by mouth every 4 hours as needed for moderate to severe pain     IBUPROFEN PO Take by mouth as needed for moderate pain Reported on 5/18/2017     levothyroxine (SYNTHROID/LEVOTHROID) 75 MCG tablet Take 1 tablet (75 mcg) by mouth daily     metFORMIN (GLUCOPHAGE-XR) 750 MG 24 hr tablet Take 2 tablets (1,500 mg) by mouth daily (with dinner)     blood glucose monitoring (ACCU-CHEK MULTICLIX) lancets Use to test blood sugar twice times daily or as directed. (Patient not taking: Reported on 8/12/2019)     blood glucose monitoring (NO BRAND SPECIFIED) test strip Use to test blood sugars twice daily or as directed (fasting, rotate then  second time - before lunch, before supper and bedtime) (Patient not taking: Reported on 8/12/2019)     diphenhydrAMINE (BENADRYL) 25 MG tablet Take 25 mg by mouth     EPINEPHrine (EPIPEN 2-CHARITY) 0.3 MG/0.3ML injection 2-pack Inject 0.3 mLs (0.3 mg) into the muscle once as needed for anaphylaxis (Patient not taking: Reported on 8/7/2019)     No current facility-administered medications for this visit.         EXAM:  ECOG 1  Wt Readings from Last 4 Encounters:   09/18/19 88.8 kg (195 lb 12.8 oz)   08/12/19 88.9 kg (196 lb)   08/07/19 89.4 kg (197 lb 1.6 oz)   06/26/19 89.1 kg (196 lb 8 oz)   /85 (BP Location: Right arm, Patient Position: Sitting, Cuff Size: Adult Regular)   Pulse 67   Temp 98.8  F (37.1  C) (Oral)   Resp 16   Wt 88.8 kg (195 lb 12.8 oz)   SpO2 98%   BMI 29.77 kg/m    Patient alert and oriented x3.   PERRLA. EOMI. No scleral icterus noted. OP without thrush/sores.  Neck exam: Palpable 0.5 cm anterior cervical node on the left, rubbery, mobile, non-tender.   Heart: RRR no murmurs noted.   Lungs: clear to auscultation bilaterally.  No crackles or wheezing.   Abd: Normal bowel sounds.  No tenderness to palpation. No suprapubic tenderness.  No hepatomegaly.   Extremities: No lower extremity edema.   Back: No CVA tenderness.   Neuro: grossly intact.   Mood and affect is stable.     Labs/Imaging:  Recent Labs   Lab Test 09/18/19  1217 08/27/19  1600 08/07/19  1635 07/17/19  1601 06/26/19  1212    139 134 137 136   POTASSIUM 4.4 4.6 4.2 4.4 4.1   CHLORIDE 107 105 104 105 103   CO2 25 30 26 28 25   ANIONGAP 5 4 4 5 7   BUN 13 19 25 20 20   CR 0.84 1.01 0.79 0.82 0.87   * 94 99 130* 113*   WILL 9.1 9.6 8.8 9.5 9.1     Recent Labs   Lab Test 12/21/16  1643 11/22/16  1221 10/26/16  1150 09/28/16  1137 08/17/16  1355  05/03/16  0955   MAG 2.2 2.4* 2.2 2.3 2.0   < > 2.0   PHOS  --   --   --   --   --   --  4.1    < > = values in this interval not displayed.     Recent Labs   Lab Test  09/18/19  1217 08/27/19  1600 08/07/19  1635 07/17/19  1601 06/26/19  1212   WBC 5.7 5.9 8.3 8.5 8.2   HGB 11.3* 11.5* 11.8* 12.0* 11.4*    291 287 295 303   MCV 92 92 95 94 96   NEUTROPHIL 52.1 49.9 65.9 69.6 67.0     Recent Labs   Lab Test 09/18/19  1217 08/27/19  1600 08/07/19  1635   BILITOTAL 0.5 0.5 0.6   ALKPHOS 54 51 53   ALT 27 36 36   AST 12 15 16   ALBUMIN 4.0 4.0 4.2    189 190     TSH   Date Value Ref Range Status   07/17/2019 1.29 0.40 - 4.00 mU/L Final   06/26/2019 2.04 0.40 - 4.00 mU/L Final   05/24/2019 5.24 (H) 0.40 - 4.00 mU/L Final     No results for input(s): CEA in the last 04801 hours.  Results for orders placed or performed during the hospital encounter of 06/26/19   CT Chest/Abdomen/Pelvis w Contrast    Narrative    EXAMINATION: CT CHEST/ABDOMEN/PELVIS W CONTRAST, 6/26/2019 11:59 AM    TECHNIQUE:  Helical CT images from the lung apices through the  symphysis pubis were obtained with contrast.  Coronal and sagittal  reformatted images were generated at a workstation for further  assessment.    CONTRAST:  119 ml Isovue 370.    COMPARISON: CT chest abdomen pelvis 12/21/2018    HISTORY: Prostate cancer with extensive bony metastasis and slowly  rising PSA; Malignant neoplasm of prostate (H); Bone metastasis (H);  Hypothyroidism due to acquired atrophy of thyroid    FINDINGS:    Lines and tubes: None.    Mediastinum/Neck Base: No thyroid nodules. Central tracheobronchial  tree is patent. Heart size is normal. No pericardial effusion.  Normal  thoracic vasculature. No thoracic lymphadenopathy.   Lungs: No consolidation. No pleural effusion or pneumothorax. No new  or enlarging pulmonary nodule.    Liver: No suspicious liver lesions. Portal veins appear patent.  Parenchymal hypodensity adjacent to the fissure for ligamentum teres  without mass effect, likely focal fatty infiltration.  Gallbladder: No gallstones. No evidence of acute cholecystitis.  Spleen: Normal size.  Pancreas: No  suspicious pancreatic lesions. The pancreatic duct is not  dilated.  Adrenal glands: No adrenal nodules.   Kidneys: No hydronephrosis or obstructing renal stones.    Bladder / Pelvic organs: Unremarkable.  Bowel: No bowel wall thickening. The appendix is unremarkable. No  abnormal bowel loop dilatation  Lymph nodes: No retroperitoneal, mesenteric, or pelvic  lymphadenopathy.  Peritoneum / Retroperitoneum: No free fluid or air within the abdomen.  Vessels: No infrarenal aortic aneurysm.     Bones and soft tissues: Extensive sclerotic osseous metastasis  predominantly involving the axial skeleton. Scattered Schmorl's node  with degenerative changes of the spine.       Impression    IMPRESSION:   1. Extensive sclerotic osseous metastasis, not significantly changed  compared to CT chest, abdomen and pelvis dated 12/21/2018.  2  No new metastatic disease in the chest, abdomen or pelvis.    I have personally reviewed the examination and initial interpretation  and I agree with the findings.    NIXON GIVENS MD     Recent Labs   Lab Test 09/18/19  1217 08/27/19  1600 08/07/19  1635 07/17/19  1601 06/26/19  1212  12/21/16  1643 11/22/16  1222  05/03/16  0955   PSA 43.00* 38.20* 28.10* 42.30* 41.00*   < > 60.68*  --    < > 67.04*   TESTOSTTOTAL  --   --   --   --   --   --  <2.* 2*  --  <2.*    < > = values in this interval not displayed.     Recent Labs   Lab Test 09/18/19  1217 08/27/19  1600 08/07/19  1635 07/17/19  1601 06/26/19  1212   PSA 43.00* 38.20* 28.10* 42.30* 41.00*   ALKPHOS 54 51 53 51 55    189 190 179 158            ASSESSMENT/PLAN:  1. Metastatic prostate cancer with bony metastasis:   Currently taking abiraterone 1000 mg po q day and prednisone 5 mg po qday  Denosumab every 12 weeks   Newly diagnosed HTN and DM TII  ECOG PS 0    I had a lengthy discussion with Albert, who is accompanied by his wife Lorrie at this clinic visit.  He had biopsy of prostate and also blood collection for CTC assay to  determine presence of mutations in DNA homologous repair enzymes. Neither of the results are available. His sample for CTC was inadequate. I am not sure why it was inadequate, if it was merely matter of inadequately filled tube during phlebotomy or failure of assay. Results from his [facility] one testing are not available at this time. This assay was requested under name of Dr. Deluna and so I do not have any access to the status of assay.      We again reviewed that if he indeed has one of the important mutations involving the DNA homologous repair enzymes, he would be a candidate for the study.  Patients with one of these mutations tend to respond extremely well to the PARP inhibitors. As part of study he would randomized in a 2:1 fashion to the study drug - rucaparib or standard of care - our choice.  If he does not have this mutation, then we can treat him as standard of care is currently.        We again reviewed his PSA trend off late.  His PSA on 07/17 was 42 and dropped to 28 at last clinic visit on 8/7/19 after holding abiraterone. His subsequent PSA values have been 38 on 8/27/19 and 43 today. I do not have a good explanation for this.  It is not uncommon to have PSA fluctuations.  This has been a nice drop which is a little unusual after prolonged disease progression.  Such responses have been well described after bicalutamide withdrawal but not after holding abiraterone. I have experienced this once for a patient. His subsequent PSA values have been inching up and have made it to 43, about the same that he had 8 weeks ago. It is possible that we could again have a brief response to restarting abiraterone, but in my experience the response to restarting bicalutamide after a withdrawal response is of shorter duration as compared to the previous withdrawal response. His actual response was very brief - probably 2 weeks and 4 weeks out he is clearly progressing.     I will plan to see him in about 6-8 weeks  with labs prior to visit. If his PSA continues to steadily rise, we would re challenge him with abiraterone. However if he is eligible for trial, we would call him and get him started at the earliest.      Over 45 min of direct face to face time spent with patient with more than 50% time spent in counseling and coordinating care.

## 2019-09-18 NOTE — LETTER
9/18/2019       RE: Albert Amaya  [address] [facility] [place] [address]     Dear Colleague,    Thank you for referring your patient, Albert Amaya, to the [facility]. Please see a copy of my visit note below.    [facility]  FOLLOW-UP VISIT NOTE  Date of visit: Sep 18, 2019     Cancer History:   Metastatic Prostate CA treated    - s/p 6 cycles of taxotere 75mg/m2   - s/p orchiectomy on 3/18/2016   - s/p Provenge 5/13, 5/27, 6/10   - s/p Casodex 50 mg daily March/April   - s/p Zytiga    HPI: Albert is a 53 year old gentleman with metastatic prostate cancer.  Albert felt a cramp in his gluteus muscle and could barely walk after doing some remodeling at home and unloading heavy truck at work. He tried flexeril and prednisone initially but eventually presented to ED on 10/5/15. He feels that initially he was nearly labeled as drug seeker since he was in lot of discomfort and flexeril was not working. But during course of ED visits labs were drawn and he had marked elevation in Alk Phosphatase (over 1000). CT did suggest some ileus with some sclerotic bone lesions. He was admitted to the hospital. His PSA was checked and was markedly elevated at 5760 (10/6/15), repeat value 5563.     Urology and Medical Oncology consults were placed. He was unhappy with the progress in hospital and felt no better and left the following day on 10/6. He did follow up with Dr. Freire and had transrectal US guided biopsy of prostate on 10/8/15. They have the results through my chart which confirms prostate adenocarcinoma - enrico 4+3 involving majority (60-90%) portion of every single core.  He started on taxotere on 10/23 and completed 6 cycles of therapy in 2/2016.  He then underwent orchiectomy on 3/18/2016.       Throughout spring of 2016 Albert's PSA started to progress and after three elevations there was concern about him being hormone refractory.  He was started on Provenge  and enrolled in the trial including Indoximod. PSA trending up and started on Casodex mid March with progression. Switched to Zytiga 5/3/17.    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     He is accompanied by his wife at this visit. He was quite positive at this visit. He has stopped his abiraterone a few days prior to the last visit and has not restarted it. He notes that his fatigue has markedly improved being off abiraterone. His pain in neck and gluteus muscle on the left has nearly resolved.  He had been very active at a function last week and was wiped out after that. He could not have imagined participating like that while on abiraterone.     His wife pointed out a number of symptoms for him. He had pain in upper mid back between both the scapulae with stiffening of muscles in upper back. He had pain in the back that radiated all the way to the front at same level. He had pain in his left arm that extended down to his ring and little fingers. Most this pain has improved - though there are up and down times. He has been using tylenol and ibuprofen and does get dyspepsia from his ibuprofen use. He has been avoiding his dilaudid.      MEDS:   Current Outpatient Medications   Medication Sig     Acetaminophen (TYLENOL PO) Take 325 mg by mouth every 8 hours as needed for mild pain or fever Reported on 5/18/2017     amLODIPine (NORVASC) 5 MG tablet Take 1 tablet (5 mg) by mouth daily TAKE 1 TABLET(5 MG) BY MOUTH DAILY     HYDROmorphone (DILAUDID) 2 MG tablet Take 1 tablet (2 mg) by mouth every 4 hours as needed for moderate to severe pain     IBUPROFEN PO Take by mouth as needed for moderate pain Reported on 5/18/2017     levothyroxine (SYNTHROID/LEVOTHROID) 75 MCG tablet Take 1 tablet (75 mcg) by mouth daily     metFORMIN (GLUCOPHAGE-XR) 750 MG 24 hr tablet Take 2 tablets (1,500 mg) by mouth daily (with dinner)     blood glucose monitoring (ACCU-CHEK MULTICLIX)  lancets Use to test blood sugar twice times daily or as directed. (Patient not taking: Reported on 8/12/2019)     blood glucose monitoring (NO BRAND SPECIFIED) test strip Use to test blood sugars twice daily or as directed (fasting, rotate then second time - before lunch, before supper and bedtime) (Patient not taking: Reported on 8/12/2019)     diphenhydrAMINE (BENADRYL) 25 MG tablet Take 25 mg by mouth     EPINEPHrine (EPIPEN 2-CHARITY) 0.3 MG/0.3ML injection 2-pack Inject 0.3 mLs (0.3 mg) into the muscle once as needed for anaphylaxis (Patient not taking: Reported on 8/7/2019)     No current facility-administered medications for this visit.         EXAM:  ECOG 1  Wt Readings from Last 4 Encounters:   09/18/19 88.8 kg (195 lb 12.8 oz)   08/12/19 88.9 kg (196 lb)   08/07/19 89.4 kg (197 lb 1.6 oz)   06/26/19 89.1 kg (196 lb 8 oz)   /85 (BP Location: Right arm, Patient Position: Sitting, Cuff Size: Adult Regular)   Pulse 67   Temp 98.8  F (37.1  C) (Oral)   Resp 16   Wt 88.8 kg (195 lb 12.8 oz)   SpO2 98%   BMI 29.77 kg/m     Patient alert and oriented x3.   PERRLA. EOMI. No scleral icterus noted. OP without thrush/sores.  Neck exam: Palpable 0.5 cm anterior cervical node on the left, rubbery, mobile, non-tender.   Heart: RRR no murmurs noted.   Lungs: clear to auscultation bilaterally.  No crackles or wheezing.   Abd: Normal bowel sounds.  No tenderness to palpation. No suprapubic tenderness.  No hepatomegaly.   Extremities: No lower extremity edema.   Back: No CVA tenderness.   Neuro: grossly intact.   Mood and affect is stable.     Labs/Imaging:  Recent Labs   Lab Test 09/18/19  1217 08/27/19  1600 08/07/19  1635 07/17/19  1601 06/26/19  1212    139 134 137 136   POTASSIUM 4.4 4.6 4.2 4.4 4.1   CHLORIDE 107 105 104 105 103   CO2 25 30 26 28 25   ANIONGAP 5 4 4 5 7   BUN 13 19 25 20 20   CR 0.84 1.01 0.79 0.82 0.87   * 94 99 130* 113*   WILL 9.1 9.6 8.8 9.5 9.1     Recent Labs   Lab Test  12/21/16  1643 11/22/16  1221 10/26/16  1150 09/28/16  1137 08/17/16  1355  05/03/16  0955   MAG 2.2 2.4* 2.2 2.3 2.0   < > 2.0   PHOS  --   --   --   --   --   --  4.1    < > = values in this interval not displayed.     Recent Labs   Lab Test 09/18/19  1217 08/27/19  1600 08/07/19  1635 07/17/19  1601 06/26/19  1212   WBC 5.7 5.9 8.3 8.5 8.2   HGB 11.3* 11.5* 11.8* 12.0* 11.4*    291 287 295 303   MCV 92 92 95 94 96   NEUTROPHIL 52.1 49.9 65.9 69.6 67.0     Recent Labs   Lab Test 09/18/19  1217 08/27/19  1600 08/07/19  1635   BILITOTAL 0.5 0.5 0.6   ALKPHOS 54 51 53   ALT 27 36 36   AST 12 15 16   ALBUMIN 4.0 4.0 4.2    189 190     TSH   Date Value Ref Range Status   07/17/2019 1.29 0.40 - 4.00 mU/L Final   06/26/2019 2.04 0.40 - 4.00 mU/L Final   05/24/2019 5.24 (H) 0.40 - 4.00 mU/L Final     No results for input(s): CEA in the last 51955 hours.  Results for orders placed or performed during the hospital encounter of 06/26/19   CT Chest/Abdomen/Pelvis w Contrast    Narrative    EXAMINATION: CT CHEST/ABDOMEN/PELVIS W CONTRAST, 6/26/2019 11:59 AM    TECHNIQUE:  Helical CT images from the lung apices through the  symphysis pubis were obtained with contrast.  Coronal and sagittal  reformatted images were generated at a workstation for further  assessment.    CONTRAST:  119 ml Isovue 370.    COMPARISON: CT chest abdomen pelvis 12/21/2018    HISTORY: Prostate cancer with extensive bony metastasis and slowly  rising PSA; Malignant neoplasm of prostate (H); Bone metastasis (H);  Hypothyroidism due to acquired atrophy of thyroid    FINDINGS:    Lines and tubes: None.    Mediastinum/Neck Base: No thyroid nodules. Central tracheobronchial  tree is patent. Heart size is normal. No pericardial effusion.  Normal  thoracic vasculature. No thoracic lymphadenopathy.   Lungs: No consolidation. No pleural effusion or pneumothorax. No new  or enlarging pulmonary nodule.    Liver: No suspicious liver lesions. Portal  veins appear patent.  Parenchymal hypodensity adjacent to the fissure for ligamentum teres  without mass effect, likely focal fatty infiltration.  Gallbladder: No gallstones. No evidence of acute cholecystitis.  Spleen: Normal size.  Pancreas: No suspicious pancreatic lesions. The pancreatic duct is not  dilated.  Adrenal glands: No adrenal nodules.   Kidneys: No hydronephrosis or obstructing renal stones.    Bladder / Pelvic organs: Unremarkable.  Bowel: No bowel wall thickening. The appendix is unremarkable. No  abnormal bowel loop dilatation  Lymph nodes: No retroperitoneal, mesenteric, or pelvic  lymphadenopathy.  Peritoneum / Retroperitoneum: No free fluid or air within the abdomen.  Vessels: No infrarenal aortic aneurysm.     Bones and soft tissues: Extensive sclerotic osseous metastasis  predominantly involving the axial skeleton. Scattered Schmorl's node  with degenerative changes of the spine.       Impression    IMPRESSION:   1. Extensive sclerotic osseous metastasis, not significantly changed  compared to CT chest, abdomen and pelvis dated 12/21/2018.  2  No new metastatic disease in the chest, abdomen or pelvis.    I have personally reviewed the examination and initial interpretation  and I agree with the findings.    NIXON GIVENS MD     Recent Labs   Lab Test 09/18/19  1217 08/27/19  1600 08/07/19  1635 07/17/19  1601 06/26/19  1212  12/21/16  1643 11/22/16  1222  05/03/16  0955   PSA 43.00* 38.20* 28.10* 42.30* 41.00*   < > 60.68*  --    < > 67.04*   TESTOSTTOTAL  --   --   --   --   --   --  <2.* 2*  --  <2.*    < > = values in this interval not displayed.     Recent Labs   Lab Test 09/18/19  1217 08/27/19  1600 08/07/19  1635 07/17/19  1601 06/26/19  1212   PSA 43.00* 38.20* 28.10* 42.30* 41.00*   ALKPHOS 54 51 53 51 55    189 190 179 158         ASSESSMENT/PLAN:  1. Metastatic prostate cancer with bony metastasis:   Currently taking abiraterone 1000 mg po q day and prednisone 5 mg po  qday  Denosumab every 12 weeks   Newly diagnosed HTN and DM TII  ECOG PS 0    I had a lengthy discussion with Albert, who is accompanied by his wife Lorrie at this clinic visit.  He had biopsy of prostate and also blood collection for CTC assay to determine presence of mutations in DNA homologous repair enzymes. Neither of the results are available. His sample for CTC was inadequate. I am not sure why it was inadequate, if it was merely matter of inadequately filled tube during phlebotomy or failure of assay. Results from his [facility] testing are not available at this time. This assay was requested under name of Dr. Deluna and so I do not have any access to the status of assay.      We again reviewed that if he indeed has one of the important mutations involving the DNA homologous repair enzymes, he would be a candidate for the study.  Patients with one of these mutations tend to respond extremely well to the PARP inhibitors. As part of study he would randomized in a 2:1 fashion to the study drug - rucaparib or standard of care - our choice.  If he does not have this mutation, then we can treat him as standard of care is currently.      We again reviewed his PSA trend off late.  His PSA on 07/17 was 42 and dropped to 28 at last clinic visit on 8/7/19 after holding abiraterone. His subsequent PSA values have been 38 on 8/27/19 and 43 today. I do not have a good explanation for this.  It is not uncommon to have PSA fluctuations.  This has been a nice drop which is a little unusual after prolonged disease progression.  Such responses have been well described after bicalutamide withdrawal but not after holding abiraterone. I have experienced this once for a patient. His subsequent PSA values have been inching up and have made it to 43, about the same that he had 8 weeks ago. It is possible that we could again have a brief response to restarting abiraterone, but in my experience the response to restarting  bicalutamide after a withdrawal response is of shorter duration as compared to the previous withdrawal response. His actual response was very brief - probably 2 weeks and 4 weeks out he is clearly progressing.     I will plan to see him in about 6-8 weeks with labs prior to visit. If his PSA continues to steadily rise, we would re challenge him with abiraterone. However if he is eligible for trial, we would call him and get him started at the earliest.      Over 45 min of direct face to face time spent with patient with more than 50% time spent in counseling and coordinating care.      Again, thank you for allowing me to participate in the care of your patient.      Sincerely,    Tyrel Valladares MD

## 2019-10-02 ENCOUNTER — TELEPHONE (OUTPATIENT)
Dept: ONCOLOGY | Facility: CLINIC | Age: 54
End: 2019-10-02

## 2019-10-02 NOTE — TELEPHONE ENCOUNTER
Called and spoke to Albert Amaya who is in the pre-screening phase for clinical trial [id number] Triton 3.  Informed Albert that he does not have the gene alterations necessary to continue onto the screening process for this clinical trial.  Albert verbalized understanding and will reach out to Dr. CAT Valladares for the next steps.  No further questions or concerns at this time.    Ana María Mckee  Clinical Research Coordinator-RN  Clinical Trials Office/[facility]  Desk Phone: [phone]   Pager: [phone]

## 2019-10-07 ENCOUNTER — TELEPHONE (OUTPATIENT)
Dept: ONCOLOGY | Facility: CLINIC | Age: 54
End: 2019-10-07

## 2019-10-07 NOTE — TELEPHONE ENCOUNTER
"[facility] Telephone Triage Note    Assessment: Spouse/Partner Jose called in to triage reporting the following symptoms: Jan reports that patient has had blood in his stool for \"a few\" days with an increase as of today and additional lethargy. No recent bowel issues reported though patient has complained of abdominal pain and was eating a bland, low-producing diet.    Recommendations: Writer recommended that patien be brought to the nearest ER for urgent evaluation. Writer explained that an increase in bleeding accompanied by feelings of lethargy are concerning and should be evaluated forpossible PRBC transfusion or other appropriate measures..    Follow-Up: Instructed patient to seek care immediately for worsening symptoms, including: fever, chest pain, shortness of breath, dizziness. Patient voiced understanding of advice and/or instructions given.       "

## 2019-11-05 DIAGNOSIS — I10 ESSENTIAL HYPERTENSION: ICD-10-CM

## 2019-11-05 RX ORDER — AMLODIPINE BESYLATE 5 MG/1
TABLET ORAL
Qty: 90 TABLET | Refills: 1 | Status: SHIPPED | OUTPATIENT
Start: 2019-11-05 | End: 2020-06-12

## 2019-11-06 ENCOUNTER — ONCOLOGY VISIT (OUTPATIENT)
Dept: ONCOLOGY | Facility: CLINIC | Age: 54
End: 2019-11-06
Attending: INTERNAL MEDICINE
Payer: COMMERCIAL

## 2019-11-06 ENCOUNTER — APPOINTMENT (OUTPATIENT)
Dept: LAB | Facility: CLINIC | Age: 54
End: 2019-11-06
Attending: INTERNAL MEDICINE
Payer: COMMERCIAL

## 2019-11-06 VITALS
RESPIRATION RATE: 16 BRPM | BODY MASS INDEX: 30.3 KG/M2 | HEART RATE: 72 BPM | SYSTOLIC BLOOD PRESSURE: 141 MMHG | DIASTOLIC BLOOD PRESSURE: 97 MMHG | TEMPERATURE: 98.6 F | OXYGEN SATURATION: 97 % | WEIGHT: 199.3 LBS

## 2019-11-06 DIAGNOSIS — C79.51 BONE METASTASIS: ICD-10-CM

## 2019-11-06 DIAGNOSIS — E03.4 HYPOTHYROIDISM DUE TO ACQUIRED ATROPHY OF THYROID: ICD-10-CM

## 2019-11-06 DIAGNOSIS — C61 MALIGNANT NEOPLASM OF PROSTATE (H): ICD-10-CM

## 2019-11-06 LAB
ALBUMIN SERPL-MCNC: 4.2 G/DL (ref 3.4–5)
ALP SERPL-CCNC: 51 U/L (ref 40–150)
ALT SERPL W P-5'-P-CCNC: 32 U/L (ref 0–70)
ANION GAP SERPL CALCULATED.3IONS-SCNC: 6 MMOL/L (ref 3–14)
AST SERPL W P-5'-P-CCNC: 15 U/L (ref 0–45)
BASOPHILS # BLD AUTO: 0.1 10E9/L (ref 0–0.2)
BASOPHILS NFR BLD AUTO: 0.9 %
BILIRUB SERPL-MCNC: 0.6 MG/DL (ref 0.2–1.3)
BUN SERPL-MCNC: 15 MG/DL (ref 7–30)
CALCIUM SERPL-MCNC: 9.2 MG/DL (ref 8.5–10.1)
CHLORIDE SERPL-SCNC: 107 MMOL/L (ref 94–109)
CO2 SERPL-SCNC: 26 MMOL/L (ref 20–32)
CREAT SERPL-MCNC: 0.93 MG/DL (ref 0.66–1.25)
DIFFERENTIAL METHOD BLD: ABNORMAL
EOSINOPHIL # BLD AUTO: 0.1 10E9/L (ref 0–0.7)
EOSINOPHIL NFR BLD AUTO: 2.3 %
ERYTHROCYTE [DISTWIDTH] IN BLOOD BY AUTOMATED COUNT: 13.1 % (ref 10–15)
GFR SERPL CREATININE-BSD FRML MDRD: >90 ML/MIN/{1.73_M2}
GLUCOSE SERPL-MCNC: 94 MG/DL (ref 70–99)
HCT VFR BLD AUTO: 33.6 % (ref 40–53)
HGB BLD-MCNC: 11.3 G/DL (ref 13.3–17.7)
IMM GRANULOCYTES # BLD: 0 10E9/L (ref 0–0.4)
IMM GRANULOCYTES NFR BLD: 0.5 %
LDH SERPL L TO P-CCNC: 172 U/L (ref 85–227)
LYMPHOCYTES # BLD AUTO: 2.1 10E9/L (ref 0.8–5.3)
LYMPHOCYTES NFR BLD AUTO: 38.3 %
MCH RBC QN AUTO: 30.8 PG (ref 26.5–33)
MCHC RBC AUTO-ENTMCNC: 33.6 G/DL (ref 31.5–36.5)
MCV RBC AUTO: 92 FL (ref 78–100)
MONOCYTES # BLD AUTO: 0.4 10E9/L (ref 0–1.3)
MONOCYTES NFR BLD AUTO: 7.9 %
NEUTROPHILS # BLD AUTO: 2.8 10E9/L (ref 1.6–8.3)
NEUTROPHILS NFR BLD AUTO: 50.1 %
NRBC # BLD AUTO: 0 10*3/UL
NRBC BLD AUTO-RTO: 0 /100
PLATELET # BLD AUTO: 278 10E9/L (ref 150–450)
POTASSIUM SERPL-SCNC: 4.1 MMOL/L (ref 3.4–5.3)
PROT SERPL-MCNC: 7.4 G/DL (ref 6.8–8.8)
PSA SERPL-MCNC: 52.8 UG/L (ref 0–4)
RBC # BLD AUTO: 3.67 10E12/L (ref 4.4–5.9)
SODIUM SERPL-SCNC: 138 MMOL/L (ref 133–144)
WBC # BLD AUTO: 5.6 10E9/L (ref 4–11)

## 2019-11-06 PROCEDURE — 99215 OFFICE O/P EST HI 40 MIN: CPT | Mod: ZP | Performed by: INTERNAL MEDICINE

## 2019-11-06 PROCEDURE — 85025 COMPLETE CBC W/AUTO DIFF WBC: CPT | Performed by: INTERNAL MEDICINE

## 2019-11-06 PROCEDURE — 80053 COMPREHEN METABOLIC PANEL: CPT | Performed by: INTERNAL MEDICINE

## 2019-11-06 PROCEDURE — 84153 ASSAY OF PSA TOTAL: CPT | Performed by: INTERNAL MEDICINE

## 2019-11-06 PROCEDURE — G0463 HOSPITAL OUTPT CLINIC VISIT: HCPCS | Mod: ZF

## 2019-11-06 PROCEDURE — 36415 COLL VENOUS BLD VENIPUNCTURE: CPT

## 2019-11-06 PROCEDURE — 83615 LACTATE (LD) (LDH) ENZYME: CPT | Performed by: INTERNAL MEDICINE

## 2019-11-06 RX ORDER — VALACYCLOVIR HYDROCHLORIDE 1 G/1
1000 TABLET, FILM COATED ORAL 3 TIMES DAILY
COMMUNITY
End: 2020-02-18

## 2019-11-06 ASSESSMENT — PAIN SCALES - GENERAL: PAINLEVEL: NO PAIN (0)

## 2019-11-06 NOTE — PROGRESS NOTES
[facility]  FOLLOW-UP VISIT NOTE  Date of visit: Nov 6, 2019     Cancer History:   Metastatic Prostate CA treated    - s/p 6 cycles of taxotere 75mg/m2   - s/p orchiectomy on 3/18/2016   - s/p Provenge 5/13, 5/27, 6/10   - s/p Casodex 50 mg daily March/April   - s/p Zytiga    HPI: Albert is a 53 year old gentleman with metastatic prostate cancer.  Albert felt a cramp in his gluteus muscle and could barely walk after doing some remodeling at home and unloading heavy truck at work. He tried flexeril and prednisone initially but eventually presented to ED on 10/5/15. He feels that initially he was nearly labeled as drug seeker since he was in lot of discomfort and flexeril was not working. But during course of ED visits labs were drawn and he had marked elevation in Alk Phosphatase (over 1000). CT did suggest some ileus with some sclerotic bone lesions. He was admitted to the hospital. His PSA was checked and was markedly elevated at 5760 (10/6/15), repeat value 5563.     Urology and Medical Oncology consults were placed. He was unhappy with the progress in hospital and felt no better and left the following day on 10/6. He did follow up with Dr. Freire and had transrectal US guided biopsy of prostate on 10/8/15. They have the results through my chart which confirms prostate adenocarcinoma - enrico 4+3 involving majority (60-90%) portion of every single core.  He started on taxotere on 10/23 and completed 6 cycles of therapy in 2/2016.  He then underwent orchiectomy on 3/18/2016.       Throughout spring of 2016 Albert's PSA started to progress and after three elevations there was concern about him being hormone refractory.  He was started on Provenge and enrolled in the trial including Indoximod. PSA trending up and started on Casodex mid March with progression. Switched to Zytiga 5/3/17.    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     He  is accompanied by his wife at this visit. He was quite positive at this visit. He has stopped his abiraterone a few days prior to the last visit and has not restarted it. He notes that his fatigue has markedly improved being off abiraterone. His pain in neck and gluteus muscle on the left has nearly resolved.  He had been very active at a function last week and was wiped out after that. He could not have imagined participating like that while on abiraterone.     His wife pointed out a number of symptoms for him. He had pain in upper mid back between both the scapulae with stiffening of muscles in upper back. He had pain in the back that radiated all the way to the front at same level. He had pain in his left arm that extended down to his ring and little fingers. Most this pain has improved - though there are up and down times. He has been using tylenol and ibuprofen and does get dyspepsia from his ibuprofen use. He has been avoiding his dilaudid.      MEDS:   Current Outpatient Medications   Medication Sig     Acetaminophen (TYLENOL PO) Take 325 mg by mouth every 8 hours as needed for mild pain or fever Reported on 5/18/2017     amLODIPine (NORVASC) 5 MG tablet TAKE ONE TABLET BY MOUTH EVERY DAY     diphenhydrAMINE (BENADRYL) 25 MG tablet Take 25 mg by mouth     HYDROmorphone (DILAUDID) 2 MG tablet Take 1 tablet (2 mg) by mouth every 4 hours as needed for moderate to severe pain     IBUPROFEN PO Take by mouth as needed for moderate pain Reported on 5/18/2017     levothyroxine (SYNTHROID/LEVOTHROID) 75 MCG tablet Take 1 tablet (75 mcg) by mouth daily     metFORMIN (GLUCOPHAGE-XR) 750 MG 24 hr tablet Take 2 tablets (1,500 mg) by mouth daily (with dinner)     prochlorperazine (COMPAZINE) 5 MG tablet Take 1 tablet (5 mg) by mouth every 6 hours as needed for nausea or vomiting     blood glucose monitoring (ACCU-CHEK MULTICLIX) lancets Use to test blood sugar twice times daily or as directed.     blood glucose monitoring  (NO BRAND SPECIFIED) test strip Use to test blood sugars twice daily or as directed (fasting, rotate then second time - before lunch, before supper and bedtime) (Patient not taking: Reported on 8/12/2019)     EPINEPHrine (EPIPEN 2-CHARITY) 0.3 MG/0.3ML injection 2-pack Inject 0.3 mLs (0.3 mg) into the muscle once as needed for anaphylaxis (Patient not taking: Reported on 8/7/2019)     No current facility-administered medications for this visit.         EXAM:  ECOG 1  Wt Readings from Last 4 Encounters:   11/06/19 90.4 kg (199 lb 4.8 oz)   09/18/19 88.8 kg (195 lb 12.8 oz)   08/12/19 88.9 kg (196 lb)   08/07/19 89.4 kg (197 lb 1.6 oz)   BP (!) 141/97 (BP Location: Right arm, Patient Position: Sitting, Cuff Size: Adult Regular)   Pulse 72   Temp 98.6  F (37  C) (Oral)   Resp 16   Wt 90.4 kg (199 lb 4.8 oz)   SpO2 97%   BMI 30.30 kg/m    Patient alert and oriented x3.   PERRLA. EOMI. No scleral icterus noted. OP without thrush/sores.  Neck exam: Palpable 0.5 cm anterior cervical node on the left, rubbery, mobile, non-tender.   Heart: RRR no murmurs noted.   Lungs: clear to auscultation bilaterally.  No crackles or wheezing.   Abd: Normal bowel sounds.  No tenderness to palpation. No suprapubic tenderness.  No hepatomegaly.   Extremities: No lower extremity edema.   Back: No CVA tenderness.   Neuro: grossly intact.   Mood and affect is stable.     Labs/Imaging:  Recent Labs   Lab Test 11/06/19  1338 09/18/19  1217 08/27/19  1600 08/07/19  1635 07/17/19  1601    137 139 134 137   POTASSIUM 4.1 4.4 4.6 4.2 4.4   CHLORIDE 107 107 105 104 105   CO2 26 25 30 26 28   ANIONGAP 6 5 4 4 5   BUN 15 13 19 25 20   CR 0.93 0.84 1.01 0.79 0.82   GLC 94 108* 94 99 130*   WILL 9.2 9.1 9.6 8.8 9.5     Recent Labs   Lab Test 12/21/16  1643 11/22/16  1221 10/26/16  1150 09/28/16  1137 08/17/16  1355  05/03/16  0955   MAG 2.2 2.4* 2.2 2.3 2.0   < > 2.0   PHOS  --   --   --   --   --   --  4.1    < > = values in this interval not  displayed.     Recent Labs   Lab Test 11/06/19  1338 09/18/19  1217 08/27/19  1600 08/07/19  1635 07/17/19  1601   WBC 5.6 5.7 5.9 8.3 8.5   HGB 11.3* 11.3* 11.5* 11.8* 12.0*    270 291 287 295   MCV 92 92 92 95 94   NEUTROPHIL 50.1 52.1 49.9 65.9 69.6     Recent Labs   Lab Test 11/06/19  1338 09/18/19  1217 08/27/19  1600   BILITOTAL 0.6 0.5 0.5   ALKPHOS 51 54 51   ALT 32 27 36   AST 15 12 15   ALBUMIN 4.2 4.0 4.0    203 189     TSH   Date Value Ref Range Status   07/17/2019 1.29 0.40 - 4.00 mU/L Final   06/26/2019 2.04 0.40 - 4.00 mU/L Final   05/24/2019 5.24 (H) 0.40 - 4.00 mU/L Final     No results for input(s): CEA in the last 68107 hours.  Results for orders placed or performed during the hospital encounter of 06/26/19   CT Chest/Abdomen/Pelvis w Contrast    Narrative    EXAMINATION: CT CHEST/ABDOMEN/PELVIS W CONTRAST, 6/26/2019 11:59 AM    TECHNIQUE:  Helical CT images from the lung apices through the  symphysis pubis were obtained with contrast.  Coronal and sagittal  reformatted images were generated at a workstation for further  assessment.    CONTRAST:  119 ml Isovue 370.    COMPARISON: CT chest abdomen pelvis 12/21/2018    HISTORY: Prostate cancer with extensive bony metastasis and slowly  rising PSA; Malignant neoplasm of prostate (H); Bone metastasis (H);  Hypothyroidism due to acquired atrophy of thyroid    FINDINGS:    Lines and tubes: None.    Mediastinum/Neck Base: No thyroid nodules. Central tracheobronchial  tree is patent. Heart size is normal. No pericardial effusion.  Normal  thoracic vasculature. No thoracic lymphadenopathy.   Lungs: No consolidation. No pleural effusion or pneumothorax. No new  or enlarging pulmonary nodule.    Liver: No suspicious liver lesions. Portal veins appear patent.  Parenchymal hypodensity adjacent to the fissure for ligamentum teres  without mass effect, likely focal fatty infiltration.  Gallbladder: No gallstones. No evidence of acute  cholecystitis.  Spleen: Normal size.  Pancreas: No suspicious pancreatic lesions. The pancreatic duct is not  dilated.  Adrenal glands: No adrenal nodules.   Kidneys: No hydronephrosis or obstructing renal stones.    Bladder / Pelvic organs: Unremarkable.  Bowel: No bowel wall thickening. The appendix is unremarkable. No  abnormal bowel loop dilatation  Lymph nodes: No retroperitoneal, mesenteric, or pelvic  lymphadenopathy.  Peritoneum / Retroperitoneum: No free fluid or air within the abdomen.  Vessels: No infrarenal aortic aneurysm.     Bones and soft tissues: Extensive sclerotic osseous metastasis  predominantly involving the axial skeleton. Scattered Schmorl's node  with degenerative changes of the spine.       Impression    IMPRESSION:   1. Extensive sclerotic osseous metastasis, not significantly changed  compared to CT chest, abdomen and pelvis dated 12/21/2018.  2  No new metastatic disease in the chest, abdomen or pelvis.    I have personally reviewed the examination and initial interpretation  and I agree with the findings.    NIXON GIVENS MD     Recent Labs   Lab Test 11/06/19  1338 09/18/19  1217 08/27/19  1600 08/07/19  1635 07/17/19  1601  12/21/16  1643 11/22/16  1222  05/03/16  0955   PSA 52.80* 43.00* 38.20* 28.10* 42.30*   < > 60.68*  --    < > 67.04*   TESTOSTTOTAL  --   --   --   --   --   --  <2.* 2*  --  <2.*    < > = values in this interval not displayed.       ASSESSMENT/PLAN:  1. Metastatic prostate cancer with bony metastasis:   Currently taking abiraterone 1000 mg po q day and prednisone 5 mg po qday  Denosumab every 12 weeks   Newly diagnosed HTN and DM TII  ECOG PS 0    I had a lengthy discussion with Albert, who is accompanied by his wife Lorrie at this clinic visit.  He had biopsy of prostate and also blood collection for CTC assay to determine presence of mutations in DNA homologous repair enzymes.   Albert certainly does not have any other actionable mutations.  The only mutation  with known significance he has is RAD50.  I explained to him that about 30 odd mutations have a targetable drug that is developed and approved for a range of cancers other than prostate.  Even if he had one of these mutations besides the DNA homologous repair of enzyme deficiency, we could try to get a corresponding drug approved for him.  Unfortunately, he does not have any of those mutations for which we have a drug in development or improved.  The [facility] analysis looks for close to 500 mutations that have been associated with malignancy.  Many of these might not end up being a true  mutation and only be pathogenic mutations.      He had several questions for me.  He wanted to know if the tumor is homogeneous and if he has several different strains of tumor, and I would readdress the other strains that he has.  Clearly, cancer is not homogeneous.  Although it is monoclonal in origin, there is a lot of heterogeneity both within the primary site between the different metastatic sites and even within a given metastatic site.  This is simply based on the fact that the tumor does not have the same level of fidelity as do abnormal cells.  Tumor cells are rapidly dividing and keep requiring mutations over time.  There are billions of tumor cells in the body, which contribute to the presence of new mutations.  Some of these mutations confer resistance or escape mechanisms for the tumor.  These strains that are resistant to therapy eventually survive and predominate over time.  If a second strain is very aggressive, then it will eventually predominate and would come to our notice, and we would treat it.      For the moment, his prostate cancer is relatively stable.  His PSA value has recently gone up from 43 on 09/18 to 52.8 at this clinic visit.  In fact, he discontinued his abiraterone when his PSA was around 42 in July.  This is only a minor change since then.  He had his PSA decline after stopping the  medication to 2, and it has been rising since then.  He wanted to know if his tumor cells will seek out other visceral organs for metastasis.  I explained to him that with his PSA of 7000, he had a very aggressive disease.  This has already metastasized to most of his bones in his body.  He had a deluge of cancer cells in his bloodstream.  It picked up over the mainly preferred site for cancer spread, which is in the bones for prostate.  With the persistent flood of cancer cells, some of them might eventually seed at other sites and can show up in the liver or the lungs.  This is the natural course of disease progression and is seen in very advanced prostate cancer.  I will see him in about a couple of months' time with repeat labs prior to the clinic visit.  I will see him on 01/14.  I explained to him and I showed him not to be very anxious, as the current change has been only marginal in the past 2 months.       Over 60 min of direct face to face time spent with patient with more than 50% time spent in counseling and coordinating care.

## 2019-11-06 NOTE — LETTER
11/6/2019     RE: Albert Amaya  [address] [facility] [id number]     Dear Colleague,    Thank you for referring your patient, Albert Amaya, to the [facility]. Please see a copy of my visit note below.    [facility]  FOLLOW-UP VISIT NOTE  Date of visit: Nov 6, 2019     Cancer History:   Metastatic Prostate CA treated    - s/p 6 cycles of taxotere 75mg/m2   - s/p orchiectomy on 3/18/2016   - s/p Provenge 5/13, 5/27, 6/10   - s/p Casodex 50 mg daily March/April   - s/p Zytiga    HPI: Albert is a 53 year old gentleman with metastatic prostate cancer.  Albert felt a cramp in his gluteus muscle and could barely walk after doing some remodeling at home and unloading heavy truck at work. He tried flexeril and prednisone initially but eventually presented to ED on 10/5/15. He feels that initially he was nearly labeled as drug seeker since he was in lot of discomfort and flexeril was not working. But during course of ED visits labs were drawn and he had marked elevation in Alk Phosphatase (over 1000). CT did suggest some ileus with some sclerotic bone lesions. He was admitted to the hospital. His PSA was checked and was markedly elevated at 5760 (10/6/15), repeat value 5563.     Urology and Medical Oncology consults were placed. He was unhappy with the progress in hospital and felt no better and left the following day on 10/6. He did follow up with Dr. Freire and had transrectal US guided biopsy of prostate on 10/8/15. They have the results through my chart which confirms prostate adenocarcinoma - enrico 4+3 involving majority (60-90%) portion of every single core.  He started on taxotere on 10/23 and completed 6 cycles of therapy in 2/2016.  He then underwent orchiectomy on 3/18/2016.       Throughout spring of 2016 Albert's PSA started to progress and after three elevations there was concern about him being hormone refractory.  He was started on Provenge and  enrolled in the trial including Indoximod. PSA trending up and started on Casodex mid March with progression. Switched to Zytiga 5/3/17.    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     He is accompanied by his wife at this visit. He was quite positive at this visit. He has stopped his abiraterone a few days prior to the last visit and has not restarted it. He notes that his fatigue has markedly improved being off abiraterone. His pain in neck and gluteus muscle on the left has nearly resolved.  He had been very active at a function last week and was wiped out after that. He could not have imagined participating like that while on abiraterone.     His wife pointed out a number of symptoms for him. He had pain in upper mid back between both the scapulae with stiffening of muscles in upper back. He had pain in the back that radiated all the way to the front at same level. He had pain in his left arm that extended down to his ring and little fingers. Most this pain has improved - though there are up and down times. He has been using tylenol and ibuprofen and does get dyspepsia from his ibuprofen use. He has been avoiding his dilaudid.      MEDS:   Current Outpatient Medications   Medication Sig     Acetaminophen (TYLENOL PO) Take 325 mg by mouth every 8 hours as needed for mild pain or fever Reported on 5/18/2017     amLODIPine (NORVASC) 5 MG tablet TAKE ONE TABLET BY MOUTH EVERY DAY     diphenhydrAMINE (BENADRYL) 25 MG tablet Take 25 mg by mouth     HYDROmorphone (DILAUDID) 2 MG tablet Take 1 tablet (2 mg) by mouth every 4 hours as needed for moderate to severe pain     IBUPROFEN PO Take by mouth as needed for moderate pain Reported on 5/18/2017     levothyroxine (SYNTHROID/LEVOTHROID) 75 MCG tablet Take 1 tablet (75 mcg) by mouth daily     metFORMIN (GLUCOPHAGE-XR) 750 MG 24 hr tablet Take 2 tablets (1,500 mg) by mouth daily (with dinner)     prochlorperazine (COMPAZINE)  5 MG tablet Take 1 tablet (5 mg) by mouth every 6 hours as needed for nausea or vomiting     blood glucose monitoring (ACCU-CHEK MULTICLIX) lancets Use to test blood sugar twice times daily or as directed.     blood glucose monitoring (NO BRAND SPECIFIED) test strip Use to test blood sugars twice daily or as directed (fasting, rotate then second time - before lunch, before supper and bedtime) (Patient not taking: Reported on 8/12/2019)     EPINEPHrine (EPIPEN 2-CHAIRTY) 0.3 MG/0.3ML injection 2-pack Inject 0.3 mLs (0.3 mg) into the muscle once as needed for anaphylaxis (Patient not taking: Reported on 8/7/2019)     No current facility-administered medications for this visit.         EXAM:  ECOG 1  Wt Readings from Last 4 Encounters:   11/06/19 90.4 kg (199 lb 4.8 oz)   09/18/19 88.8 kg (195 lb 12.8 oz)   08/12/19 88.9 kg (196 lb)   08/07/19 89.4 kg (197 lb 1.6 oz)   BP (!) 141/97 (BP Location: Right arm, Patient Position: Sitting, Cuff Size: Adult Regular)   Pulse 72   Temp 98.6  F (37  C) (Oral)   Resp 16   Wt 90.4 kg (199 lb 4.8 oz)   SpO2 97%   BMI 30.30 kg/m     Patient alert and oriented x3.   PERRLA. EOMI. No scleral icterus noted. OP without thrush/sores.  Neck exam: Palpable 0.5 cm anterior cervical node on the left, rubbery, mobile, non-tender.   Heart: RRR no murmurs noted.   Lungs: clear to auscultation bilaterally.  No crackles or wheezing.   Abd: Normal bowel sounds.  No tenderness to palpation. No suprapubic tenderness.  No hepatomegaly.   Extremities: No lower extremity edema.   Back: No CVA tenderness.   Neuro: grossly intact.   Mood and affect is stable.     Labs/Imaging:  Recent Labs   Lab Test 11/06/19  1338 09/18/19  1217 08/27/19  1600 08/07/19  1635 07/17/19  1601    137 139 134 137   POTASSIUM 4.1 4.4 4.6 4.2 4.4   CHLORIDE 107 107 105 104 105   CO2 26 25 30 26 28   ANIONGAP 6 5 4 4 5   BUN 15 13 19 25 20   CR 0.93 0.84 1.01 0.79 0.82   GLC 94 108* 94 99 130*   WILL 9.2 9.1 9.6 8.8 9.5      Recent Labs   Lab Test 12/21/16  1643 11/22/16  1221 10/26/16  1150 09/28/16  1137 08/17/16  1355  05/03/16  0955   MAG 2.2 2.4* 2.2 2.3 2.0   < > 2.0   PHOS  --   --   --   --   --   --  4.1    < > = values in this interval not displayed.     Recent Labs   Lab Test 11/06/19  1338 09/18/19  1217 08/27/19  1600 08/07/19  1635 07/17/19  1601   WBC 5.6 5.7 5.9 8.3 8.5   HGB 11.3* 11.3* 11.5* 11.8* 12.0*    270 291 287 295   MCV 92 92 92 95 94   NEUTROPHIL 50.1 52.1 49.9 65.9 69.6     Recent Labs   Lab Test 11/06/19  1338 09/18/19  1217 08/27/19  1600   BILITOTAL 0.6 0.5 0.5   ALKPHOS 51 54 51   ALT 32 27 36   AST 15 12 15   ALBUMIN 4.2 4.0 4.0    203 189     TSH   Date Value Ref Range Status   07/17/2019 1.29 0.40 - 4.00 mU/L Final   06/26/2019 2.04 0.40 - 4.00 mU/L Final   05/24/2019 5.24 (H) 0.40 - 4.00 mU/L Final     No results for input(s): CEA in the last 74821 hours.  Results for orders placed or performed during the hospital encounter of 06/26/19   CT Chest/Abdomen/Pelvis w Contrast    Narrative    EXAMINATION: CT CHEST/ABDOMEN/PELVIS W CONTRAST, 6/26/2019 11:59 AM    TECHNIQUE:  Helical CT images from the lung apices through the  symphysis pubis were obtained with contrast.  Coronal and sagittal  reformatted images were generated at a workstation for further  assessment.    CONTRAST:  119 ml Isovue 370.    COMPARISON: CT chest abdomen pelvis 12/21/2018    HISTORY: Prostate cancer with extensive bony metastasis and slowly  rising PSA; Malignant neoplasm of prostate (H); Bone metastasis (H);  Hypothyroidism due to acquired atrophy of thyroid    FINDINGS:    Lines and tubes: None.    Mediastinum/Neck Base: No thyroid nodules. Central tracheobronchial  tree is patent. Heart size is normal. No pericardial effusion.  Normal  thoracic vasculature. No thoracic lymphadenopathy.   Lungs: No consolidation. No pleural effusion or pneumothorax. No new  or enlarging pulmonary nodule.    Liver: No suspicious  liver lesions. Portal veins appear patent.  Parenchymal hypodensity adjacent to the fissure for ligamentum teres  without mass effect, likely focal fatty infiltration.  Gallbladder: No gallstones. No evidence of acute cholecystitis.  Spleen: Normal size.  Pancreas: No suspicious pancreatic lesions. The pancreatic duct is not  dilated.  Adrenal glands: No adrenal nodules.   Kidneys: No hydronephrosis or obstructing renal stones.    Bladder / Pelvic organs: Unremarkable.  Bowel: No bowel wall thickening. The appendix is unremarkable. No  abnormal bowel loop dilatation  Lymph nodes: No retroperitoneal, mesenteric, or pelvic  lymphadenopathy.  Peritoneum / Retroperitoneum: No free fluid or air within the abdomen.  Vessels: No infrarenal aortic aneurysm.     Bones and soft tissues: Extensive sclerotic osseous metastasis  predominantly involving the axial skeleton. Scattered Schmorl's node  with degenerative changes of the spine.       Impression    IMPRESSION:   1. Extensive sclerotic osseous metastasis, not significantly changed  compared to CT chest, abdomen and pelvis dated 12/21/2018.  2  No new metastatic disease in the chest, abdomen or pelvis.    I have personally reviewed the examination and initial interpretation  and I agree with the findings.    NIXON GIVENS MD     Recent Labs   Lab Test 11/06/19  1338 09/18/19  1217 08/27/19  1600 08/07/19  1635 07/17/19  1601  12/21/16  1643 11/22/16  1222  05/03/16  0955   PSA 52.80* 43.00* 38.20* 28.10* 42.30*   < > 60.68*  --    < > 67.04*   TESTOSTTOTAL  --   --   --   --   --   --  <2.* 2*  --  <2.*    < > = values in this interval not displayed.       ASSESSMENT/PLAN:  1. Metastatic prostate cancer with bony metastasis:   Currently taking abiraterone 1000 mg po q day and prednisone 5 mg po qday  Denosumab every 12 weeks   Newly diagnosed HTN and DM TII  ECOG PS 0    I had a lengthy discussion with Albert, who is accompanied by his wife Lorrie at this clinic visit.  He  had biopsy of prostate and also blood collection for CTC assay to determine presence of mutations in DNA homologous repair enzymes.   Albert certainly does not have any other actionable mutations.  The only mutation with known significance he has is RAD50.  I explained to him that about 30 odd mutations have a targetable drug that is developed and approved for a range of cancers other than prostate.  Even if he had one of these mutations besides the DNA homologous repair of enzyme deficiency, we could try to get a corresponding drug approved for him.  Unfortunately, he does not have any of those mutations for which we have a drug in development or improved.  The [employer] analysis looks for close to 500 mutations that have been associated with malignancy.  Many of these might not end up being a true  mutation and only be pathogenic mutations.      He had several questions for me.  He wanted to know if the tumor is homogeneous and if he has several different strains of tumor, and I would readdress the other strains that he has.  Clearly, cancer is not homogeneous.  Although it is monoclonal in origin, there is a lot of heterogeneity both within the primary site between the different metastatic sites and even within a given metastatic site.  This is simply based on the fact that the tumor does not have the same level of fidelity as do abnormal cells.  Tumor cells are rapidly dividing and keep requiring mutations over time.  There are billions of tumor cells in the body, which contribute to the presence of new mutations.  Some of these mutations confer resistance or escape mechanisms for the tumor.  These strains that are resistant to therapy eventually survive and predominate over time.  If a second strain is very aggressive, then it will eventually predominate and would come to our notice, and we would treat it.      For the moment, his prostate cancer is relatively stable.  His PSA value has recently gone up  from 43 on 09/18 to 52.8 at this clinic visit.  In fact, he discontinued his abiraterone when his PSA was around 42 in July.  This is only a minor change since then.  He had his PSA decline after stopping the medication to 2, and it has been rising since then.  He wanted to know if his tumor cells will seek out other visceral organs for metastasis.  I explained to him that with his PSA of 7000, he had a very aggressive disease.  This has already metastasized to most of his bones in his body.  He had a deluge of cancer cells in his bloodstream.  It picked up over the mainly preferred site for cancer spread, which is in the bones for prostate.  With the persistent flood of cancer cells, some of them might eventually seed at other sites and can show up in the liver or the lungs.  This is the natural course of disease progression and is seen in very advanced prostate cancer.  I will see him in about a couple of months' time with repeat labs prior to the clinic visit.  I will see him on 01/14.  I explained to him and I showed him not to be very anxious, as the current change has been only marginal in the past 2 months.     Over 60 min of direct face to face time spent with patient with more than 50% time spent in counseling and coordinating care.      Again, thank you for allowing me to participate in the care of your patient.      Sincerely,    Tyrel Valladares MD

## 2019-11-06 NOTE — NURSING NOTE
"Oncology Rooming Note    November 6, 2019 4:38 PM   Albert Amaya is a 53 year old male who presents for:    Chief Complaint   Patient presents with     Blood Draw     Labs drawn via  by RN in lab. VS taken.     Oncology Clinic Visit     Return Prostate Ca     Initial Vitals: BP (!) 141/97 (BP Location: Right arm, Patient Position: Sitting, Cuff Size: Adult Regular)   Pulse 72   Temp 98.6  F (37  C) (Oral)   Resp 16   Wt 90.4 kg (199 lb 4.8 oz)   SpO2 97%   BMI 30.30 kg/m   Estimated body mass index is 30.3 kg/m  as calculated from the following:    Height as of 8/12/19: 1.727 m (5' 8\").    Weight as of this encounter: 90.4 kg (199 lb 4.8 oz). Body surface area is 2.08 meters squared.  No Pain (0) Comment: Data Unavailable   No LMP for male patient.  Allergies reviewed: Yes  Medications reviewed: Yes    Medications: Medication refills not needed today.  Pharmacy name entered into [facility]:    [facility] - [place], [place] - [address] [facility] - [place], [place] - [address] [facility] [place] [place] [address] [place] [address] [id number] - [place], [place] - [address] AT [facility] [address]    Clinical concerns: Lab results       Cynthia Hawk CMA              "

## 2019-11-06 NOTE — NURSING NOTE
Chief Complaint   Patient presents with     Blood Draw     Labs drawn via  by RN in lab. VS taken.     Porsche Youngblood RN

## 2020-01-01 ENCOUNTER — INFUSION THERAPY VISIT (OUTPATIENT)
Dept: ONCOLOGY | Facility: CLINIC | Age: 55
End: 2020-01-01
Attending: PHYSICIAN ASSISTANT
Payer: COMMERCIAL

## 2020-01-01 ENCOUNTER — HEALTH MAINTENANCE LETTER (OUTPATIENT)
Age: 55
End: 2020-01-01

## 2020-01-01 ENCOUNTER — APPOINTMENT (OUTPATIENT)
Dept: LAB | Facility: CLINIC | Age: 55
End: 2020-01-01
Attending: INTERNAL MEDICINE
Payer: COMMERCIAL

## 2020-01-01 VITALS
OXYGEN SATURATION: 97 % | WEIGHT: 184.1 LBS | DIASTOLIC BLOOD PRESSURE: 91 MMHG | TEMPERATURE: 98.1 F | RESPIRATION RATE: 16 BRPM | SYSTOLIC BLOOD PRESSURE: 142 MMHG | BODY MASS INDEX: 28 KG/M2 | HEART RATE: 69 BPM

## 2020-01-01 DIAGNOSIS — T45.1X5A CHEMOTHERAPY-INDUCED NEUTROPENIA (H): ICD-10-CM

## 2020-01-01 DIAGNOSIS — C61 MALIGNANT NEOPLASM OF PROSTATE (H): Primary | ICD-10-CM

## 2020-01-01 DIAGNOSIS — C79.51 BONE METASTASIS: Primary | ICD-10-CM

## 2020-01-01 DIAGNOSIS — R53.83 OTHER FATIGUE: Primary | ICD-10-CM

## 2020-01-01 DIAGNOSIS — E03.9 HYPOTHYROIDISM, UNSPECIFIED TYPE: ICD-10-CM

## 2020-01-01 DIAGNOSIS — C61 MALIGNANT NEOPLASM OF PROSTATE (H): ICD-10-CM

## 2020-01-01 DIAGNOSIS — R53.83 OTHER FATIGUE: ICD-10-CM

## 2020-01-01 DIAGNOSIS — D70.1 CHEMOTHERAPY-INDUCED NEUTROPENIA (H): ICD-10-CM

## 2020-01-01 LAB
ALBUMIN SERPL-MCNC: 3.8 G/DL (ref 3.4–5)
ALP SERPL-CCNC: 60 U/L (ref 40–150)
ALT SERPL W P-5'-P-CCNC: 28 U/L (ref 0–70)
ANION GAP SERPL CALCULATED.3IONS-SCNC: 4 MMOL/L (ref 3–14)
AST SERPL W P-5'-P-CCNC: 16 U/L (ref 0–45)
BASOPHILS # BLD AUTO: 0.1 10E9/L (ref 0–0.2)
BASOPHILS NFR BLD AUTO: 0.8 %
BILIRUB SERPL-MCNC: 0.5 MG/DL (ref 0.2–1.3)
BUN SERPL-MCNC: 18 MG/DL (ref 7–30)
CALCIUM SERPL-MCNC: 9 MG/DL (ref 8.5–10.1)
CHLORIDE SERPL-SCNC: 110 MMOL/L (ref 94–109)
CO2 SERPL-SCNC: 27 MMOL/L (ref 20–32)
CORTIS SERPL-MCNC: 10.6 UG/DL (ref 4–22)
CREAT SERPL-MCNC: 0.88 MG/DL (ref 0.66–1.25)
DIFFERENTIAL METHOD BLD: ABNORMAL
EOSINOPHIL # BLD AUTO: 0.1 10E9/L (ref 0–0.7)
EOSINOPHIL NFR BLD AUTO: 1.5 %
ERYTHROCYTE [DISTWIDTH] IN BLOOD BY AUTOMATED COUNT: 14.2 % (ref 10–15)
GFR SERPL CREATININE-BSD FRML MDRD: >90 ML/MIN/{1.73_M2}
GLUCOSE SERPL-MCNC: 113 MG/DL (ref 70–99)
HBA1C MFR BLD: 6.1 % (ref 0–5.6)
HCT VFR BLD AUTO: 35.7 % (ref 40–53)
HGB BLD-MCNC: 11.6 G/DL (ref 13.3–17.7)
IMM GRANULOCYTES # BLD: 0 10E9/L (ref 0–0.4)
IMM GRANULOCYTES NFR BLD: 0.3 %
LYMPHOCYTES # BLD AUTO: 2.2 10E9/L (ref 0.8–5.3)
LYMPHOCYTES NFR BLD AUTO: 36.7 %
MCH RBC QN AUTO: 29.9 PG (ref 26.5–33)
MCHC RBC AUTO-ENTMCNC: 32.5 G/DL (ref 31.5–36.5)
MCV RBC AUTO: 92 FL (ref 78–100)
MONOCYTES # BLD AUTO: 0.4 10E9/L (ref 0–1.3)
MONOCYTES NFR BLD AUTO: 7.4 %
NEUTROPHILS # BLD AUTO: 3.2 10E9/L (ref 1.6–8.3)
NEUTROPHILS NFR BLD AUTO: 53.3 %
NRBC # BLD AUTO: 0 10*3/UL
NRBC BLD AUTO-RTO: 0 /100
PLATELET # BLD AUTO: 291 10E9/L (ref 150–450)
POTASSIUM SERPL-SCNC: 5.3 MMOL/L (ref 3.4–5.3)
PROT SERPL-MCNC: 7.4 G/DL (ref 6.8–8.8)
PSA SERPL-MCNC: 40 UG/L (ref 0–4)
RBC # BLD AUTO: 3.88 10E12/L (ref 4.4–5.9)
SODIUM SERPL-SCNC: 141 MMOL/L (ref 133–144)
TSH SERPL DL<=0.005 MIU/L-ACNC: 2.69 MU/L (ref 0.4–4)
WBC # BLD AUTO: 6 10E9/L (ref 4–11)

## 2020-01-01 PROCEDURE — 999N000127 HC STATISTIC PERIPHERAL IV START W US GUIDANCE

## 2020-01-01 PROCEDURE — 84153 ASSAY OF PSA TOTAL: CPT | Performed by: INTERNAL MEDICINE

## 2020-01-01 PROCEDURE — 85025 COMPLETE CBC W/AUTO DIFF WBC: CPT | Performed by: INTERNAL MEDICINE

## 2020-01-01 PROCEDURE — 83036 HEMOGLOBIN GLYCOSYLATED A1C: CPT | Performed by: PHYSICIAN ASSISTANT

## 2020-01-01 PROCEDURE — 82533 TOTAL CORTISOL: CPT | Performed by: PHYSICIAN ASSISTANT

## 2020-01-01 PROCEDURE — 36415 COLL VENOUS BLD VENIPUNCTURE: CPT

## 2020-01-01 PROCEDURE — 84443 ASSAY THYROID STIM HORMONE: CPT | Performed by: INTERNAL MEDICINE

## 2020-01-01 PROCEDURE — 84443 ASSAY THYROID STIM HORMONE: CPT | Performed by: PHYSICIAN ASSISTANT

## 2020-01-01 PROCEDURE — 258N000003 HC RX IP 258 OP 636: Performed by: INTERNAL MEDICINE

## 2020-01-01 PROCEDURE — 250N000009 HC RX 250: Performed by: INTERNAL MEDICINE

## 2020-01-01 PROCEDURE — 250N000011 HC RX IP 250 OP 636: Performed by: INTERNAL MEDICINE

## 2020-01-01 PROCEDURE — 999N001193 HC VIDEO/TELEPHONE VISIT; NO CHARGE

## 2020-01-01 PROCEDURE — 96375 TX/PRO/DX INJ NEW DRUG ADDON: CPT

## 2020-01-01 PROCEDURE — 96413 CHEMO IV INFUSION 1 HR: CPT

## 2020-01-01 PROCEDURE — 80053 COMPREHEN METABOLIC PANEL: CPT | Performed by: INTERNAL MEDICINE

## 2020-01-01 PROCEDURE — 99214 OFFICE O/P EST MOD 30 MIN: CPT | Mod: TEL | Performed by: PHYSICIAN ASSISTANT

## 2020-01-01 RX ADMIN — DIPHENHYDRAMINE HYDROCHLORIDE 25 MG: 50 INJECTION, SOLUTION INTRAMUSCULAR; INTRAVENOUS at 14:06

## 2020-01-01 RX ADMIN — SODIUM CHLORIDE 250 ML: 9 INJECTION, SOLUTION INTRAVENOUS at 13:41

## 2020-01-01 RX ADMIN — SODIUM CHLORIDE 42 MG: 900 INJECTION, SOLUTION INTRAVENOUS at 14:32

## 2020-01-01 RX ADMIN — FAMOTIDINE 20 MG: 10 INJECTION INTRAVENOUS at 13:43

## 2020-01-01 RX ADMIN — DEXAMETHASONE SODIUM PHOSPHATE: 10 INJECTION, SOLUTION INTRAMUSCULAR; INTRAVENOUS at 13:48

## 2020-01-05 DIAGNOSIS — C79.51 BONE METASTASIS: ICD-10-CM

## 2020-01-05 DIAGNOSIS — C61 MALIGNANT NEOPLASM OF PROSTATE (H): ICD-10-CM

## 2020-01-05 DIAGNOSIS — R61 NIGHT SWEATS: ICD-10-CM

## 2020-01-05 DIAGNOSIS — E03.9 HYPOTHYROIDISM, UNSPECIFIED TYPE: ICD-10-CM

## 2020-01-05 DIAGNOSIS — G62.9 NEUROPATHY: ICD-10-CM

## 2020-01-06 RX ORDER — LEVOTHYROXINE SODIUM 75 MCG
TABLET ORAL
Qty: 90 TABLET | Refills: 1 | Status: SHIPPED | OUTPATIENT
Start: 2020-01-06 | End: 2020-01-07

## 2020-01-06 NOTE — TELEPHONE ENCOUNTER
Results for PILAR GARCIA (MRN [id number]) as of 1/6/2020 14:02   Ref. Range 10/10/2018 14:11 12/5/2018 15:56 5/24/2019 10:25 6/26/2019 12:12 7/17/2019 16:01   TSH Latest Ref Range: 0.40 - 4.00 mU/L 3.99 0.86 5.24 (H) 2.04 1.29

## 2020-01-07 ENCOUNTER — APPOINTMENT (OUTPATIENT)
Dept: LAB | Facility: CLINIC | Age: 55
End: 2020-01-07
Attending: INTERNAL MEDICINE
Payer: COMMERCIAL

## 2020-01-07 ENCOUNTER — ONCOLOGY VISIT (OUTPATIENT)
Dept: ONCOLOGY | Facility: CLINIC | Age: 55
End: 2020-01-07
Attending: INTERNAL MEDICINE
Payer: COMMERCIAL

## 2020-01-07 ENCOUNTER — PATIENT OUTREACH (OUTPATIENT)
Dept: ONCOLOGY | Facility: CLINIC | Age: 55
End: 2020-01-07

## 2020-01-07 VITALS
DIASTOLIC BLOOD PRESSURE: 86 MMHG | BODY MASS INDEX: 30.27 KG/M2 | WEIGHT: 199.1 LBS | TEMPERATURE: 98.3 F | HEART RATE: 58 BPM | OXYGEN SATURATION: 99 % | SYSTOLIC BLOOD PRESSURE: 128 MMHG | RESPIRATION RATE: 16 BRPM

## 2020-01-07 DIAGNOSIS — R61 NIGHT SWEATS: ICD-10-CM

## 2020-01-07 DIAGNOSIS — C61 MALIGNANT NEOPLASM OF PROSTATE (H): Primary | ICD-10-CM

## 2020-01-07 DIAGNOSIS — E03.4 HYPOTHYROIDISM DUE TO ACQUIRED ATROPHY OF THYROID: ICD-10-CM

## 2020-01-07 DIAGNOSIS — E03.9 HYPOTHYROIDISM, UNSPECIFIED TYPE: ICD-10-CM

## 2020-01-07 DIAGNOSIS — C79.51 BONE METASTASIS: ICD-10-CM

## 2020-01-07 DIAGNOSIS — G62.9 NEUROPATHY: ICD-10-CM

## 2020-01-07 DIAGNOSIS — C61 MALIGNANT NEOPLASM OF PROSTATE (H): ICD-10-CM

## 2020-01-07 LAB
ALBUMIN SERPL-MCNC: 4 G/DL (ref 3.4–5)
ALP SERPL-CCNC: 102 U/L (ref 40–150)
ALT SERPL W P-5'-P-CCNC: 47 U/L (ref 0–70)
ANION GAP SERPL CALCULATED.3IONS-SCNC: 5 MMOL/L (ref 3–14)
AST SERPL W P-5'-P-CCNC: 21 U/L (ref 0–45)
BASOPHILS # BLD AUTO: 0 10E9/L (ref 0–0.2)
BASOPHILS NFR BLD AUTO: 0.5 %
BILIRUB SERPL-MCNC: 0.4 MG/DL (ref 0.2–1.3)
BUN SERPL-MCNC: 22 MG/DL (ref 7–30)
CALCIUM SERPL-MCNC: 9.9 MG/DL (ref 8.5–10.1)
CHLORIDE SERPL-SCNC: 106 MMOL/L (ref 94–109)
CO2 SERPL-SCNC: 27 MMOL/L (ref 20–32)
CREAT SERPL-MCNC: 0.82 MG/DL (ref 0.66–1.25)
DIFFERENTIAL METHOD BLD: ABNORMAL
EOSINOPHIL # BLD AUTO: 0.2 10E9/L (ref 0–0.7)
EOSINOPHIL NFR BLD AUTO: 2.9 %
ERYTHROCYTE [DISTWIDTH] IN BLOOD BY AUTOMATED COUNT: 13.2 % (ref 10–15)
GFR SERPL CREATININE-BSD FRML MDRD: >90 ML/MIN/{1.73_M2}
GLUCOSE SERPL-MCNC: 104 MG/DL (ref 70–99)
HCT VFR BLD AUTO: 35.5 % (ref 40–53)
HGB BLD-MCNC: 11.8 G/DL (ref 13.3–17.7)
IMM GRANULOCYTES # BLD: 0 10E9/L (ref 0–0.4)
IMM GRANULOCYTES NFR BLD: 0.3 %
LDH SERPL L TO P-CCNC: 202 U/L (ref 85–227)
LYMPHOCYTES # BLD AUTO: 2.4 10E9/L (ref 0.8–5.3)
LYMPHOCYTES NFR BLD AUTO: 36.3 %
MCH RBC QN AUTO: 30.8 PG (ref 26.5–33)
MCHC RBC AUTO-ENTMCNC: 33.2 G/DL (ref 31.5–36.5)
MCV RBC AUTO: 93 FL (ref 78–100)
MONOCYTES # BLD AUTO: 0.6 10E9/L (ref 0–1.3)
MONOCYTES NFR BLD AUTO: 8.5 %
NEUTROPHILS # BLD AUTO: 3.4 10E9/L (ref 1.6–8.3)
NEUTROPHILS NFR BLD AUTO: 51.5 %
NRBC # BLD AUTO: 0 10*3/UL
NRBC BLD AUTO-RTO: 0 /100
PLATELET # BLD AUTO: 292 10E9/L (ref 150–450)
POTASSIUM SERPL-SCNC: 4.6 MMOL/L (ref 3.4–5.3)
PROT SERPL-MCNC: 7.6 G/DL (ref 6.8–8.8)
PSA SERPL-MCNC: 90.1 UG/L (ref 0–4)
RBC # BLD AUTO: 3.83 10E12/L (ref 4.4–5.9)
SODIUM SERPL-SCNC: 138 MMOL/L (ref 133–144)
T4 FREE SERPL-MCNC: 0.75 NG/DL (ref 0.76–1.46)
TSH SERPL DL<=0.005 MIU/L-ACNC: 10.16 MU/L (ref 0.4–4)
WBC # BLD AUTO: 6.6 10E9/L (ref 4–11)

## 2020-01-07 PROCEDURE — 84439 ASSAY OF FREE THYROXINE: CPT | Performed by: INTERNAL MEDICINE

## 2020-01-07 PROCEDURE — G0463 HOSPITAL OUTPT CLINIC VISIT: HCPCS | Mod: ZF

## 2020-01-07 PROCEDURE — 99215 OFFICE O/P EST HI 40 MIN: CPT | Mod: ZP | Performed by: INTERNAL MEDICINE

## 2020-01-07 PROCEDURE — 36415 COLL VENOUS BLD VENIPUNCTURE: CPT

## 2020-01-07 PROCEDURE — 84443 ASSAY THYROID STIM HORMONE: CPT | Performed by: INTERNAL MEDICINE

## 2020-01-07 PROCEDURE — 84153 ASSAY OF PSA TOTAL: CPT | Performed by: INTERNAL MEDICINE

## 2020-01-07 PROCEDURE — 80053 COMPREHEN METABOLIC PANEL: CPT | Performed by: INTERNAL MEDICINE

## 2020-01-07 PROCEDURE — 85025 COMPLETE CBC W/AUTO DIFF WBC: CPT | Performed by: INTERNAL MEDICINE

## 2020-01-07 PROCEDURE — 83615 LACTATE (LD) (LDH) ENZYME: CPT | Performed by: INTERNAL MEDICINE

## 2020-01-07 RX ORDER — DIPHENHYDRAMINE HYDROCHLORIDE 50 MG/ML
50 INJECTION INTRAMUSCULAR; INTRAVENOUS
Status: CANCELLED
Start: 2020-01-14

## 2020-01-07 RX ORDER — METHYLPREDNISOLONE SODIUM SUCCINATE 125 MG/2ML
125 INJECTION, POWDER, LYOPHILIZED, FOR SOLUTION INTRAMUSCULAR; INTRAVENOUS
Status: CANCELLED
Start: 2020-02-04

## 2020-01-07 RX ORDER — ALBUTEROL SULFATE 0.83 MG/ML
2.5 SOLUTION RESPIRATORY (INHALATION)
Status: CANCELLED | OUTPATIENT
Start: 2020-02-04

## 2020-01-07 RX ORDER — SODIUM CHLORIDE 9 MG/ML
1000 INJECTION, SOLUTION INTRAVENOUS CONTINUOUS PRN
Status: CANCELLED
Start: 2020-01-14

## 2020-01-07 RX ORDER — NALOXONE HYDROCHLORIDE 0.4 MG/ML
.1-.4 INJECTION, SOLUTION INTRAMUSCULAR; INTRAVENOUS; SUBCUTANEOUS
Status: CANCELLED | OUTPATIENT
Start: 2020-02-04

## 2020-01-07 RX ORDER — EPINEPHRINE 0.3 MG/.3ML
0.3 INJECTION SUBCUTANEOUS EVERY 5 MIN PRN
Status: CANCELLED | OUTPATIENT
Start: 2020-02-04

## 2020-01-07 RX ORDER — ALBUTEROL SULFATE 0.83 MG/ML
2.5 SOLUTION RESPIRATORY (INHALATION)
Status: CANCELLED | OUTPATIENT
Start: 2020-01-14

## 2020-01-07 RX ORDER — ALBUTEROL SULFATE 90 UG/1
1-2 AEROSOL, METERED RESPIRATORY (INHALATION)
Status: CANCELLED
Start: 2020-02-04

## 2020-01-07 RX ORDER — LORAZEPAM 2 MG/ML
0.5 INJECTION INTRAMUSCULAR EVERY 4 HOURS PRN
Status: CANCELLED
Start: 2020-01-14

## 2020-01-07 RX ORDER — SODIUM CHLORIDE 9 MG/ML
1000 INJECTION, SOLUTION INTRAVENOUS CONTINUOUS PRN
Status: CANCELLED
Start: 2020-02-04

## 2020-01-07 RX ORDER — ALBUTEROL SULFATE 90 UG/1
1-2 AEROSOL, METERED RESPIRATORY (INHALATION)
Status: CANCELLED
Start: 2020-01-14

## 2020-01-07 RX ORDER — LEVOTHYROXINE SODIUM 88 UG/1
88 TABLET ORAL DAILY
Qty: 30 TABLET | Refills: 11 | Status: SHIPPED | OUTPATIENT
Start: 2020-01-07 | End: 2020-04-28

## 2020-01-07 RX ORDER — METHYLPREDNISOLONE SODIUM SUCCINATE 125 MG/2ML
125 INJECTION, POWDER, LYOPHILIZED, FOR SOLUTION INTRAMUSCULAR; INTRAVENOUS
Status: CANCELLED
Start: 2020-01-14

## 2020-01-07 RX ORDER — EPINEPHRINE 0.3 MG/.3ML
0.3 INJECTION SUBCUTANEOUS EVERY 5 MIN PRN
Status: CANCELLED | OUTPATIENT
Start: 2020-01-14

## 2020-01-07 RX ORDER — DIPHENHYDRAMINE HYDROCHLORIDE 50 MG/ML
50 INJECTION INTRAMUSCULAR; INTRAVENOUS
Status: CANCELLED
Start: 2020-02-04

## 2020-01-07 RX ORDER — NALOXONE HYDROCHLORIDE 0.4 MG/ML
.1-.4 INJECTION, SOLUTION INTRAMUSCULAR; INTRAVENOUS; SUBCUTANEOUS
Status: CANCELLED | OUTPATIENT
Start: 2020-01-14

## 2020-01-07 RX ORDER — LORAZEPAM 2 MG/ML
0.5 INJECTION INTRAMUSCULAR EVERY 4 HOURS PRN
Status: CANCELLED
Start: 2020-02-04

## 2020-01-07 RX ORDER — MEPERIDINE HYDROCHLORIDE 25 MG/ML
25 INJECTION INTRAMUSCULAR; INTRAVENOUS; SUBCUTANEOUS EVERY 30 MIN PRN
Status: CANCELLED | OUTPATIENT
Start: 2020-01-14

## 2020-01-07 RX ORDER — EPINEPHRINE 1 MG/ML
0.3 INJECTION, SOLUTION INTRAMUSCULAR; SUBCUTANEOUS EVERY 5 MIN PRN
Status: CANCELLED | OUTPATIENT
Start: 2020-02-04

## 2020-01-07 RX ORDER — MEPERIDINE HYDROCHLORIDE 25 MG/ML
25 INJECTION INTRAMUSCULAR; INTRAVENOUS; SUBCUTANEOUS EVERY 30 MIN PRN
Status: CANCELLED | OUTPATIENT
Start: 2020-02-04

## 2020-01-07 RX ORDER — EPINEPHRINE 1 MG/ML
0.3 INJECTION, SOLUTION INTRAMUSCULAR; SUBCUTANEOUS EVERY 5 MIN PRN
Status: CANCELLED | OUTPATIENT
Start: 2020-01-14

## 2020-01-07 ASSESSMENT — PAIN SCALES - GENERAL: PAINLEVEL: NO PAIN (0)

## 2020-01-07 NOTE — PROGRESS NOTES
"Oncology RN Care Coordination Note:     Met with Albert and Lorrie to discuss chemotherapy and resources available at the [facility]. Provided patient with \"My Cancer Guidebook\" and [facility] printouts on Cabazitaxel . Reviewed administration, side effects (including myelosuppression, nausea/vomiting, diarrhea/constipation, hair loss, mouth sores) and ongoing symptom management by APPs in clinic. Provided phone numbers for triage and after hours care. Highlighted steps to expect when getting chemotherapy (check in, labs, pre- meds, infusion), Discussed that chemo treatment may be delayed a day or two due to blood counts, infusion schedule or patient's need to modify. Included a one page resource of symptoms and when to contact the Cancer Clinic with questions or concerns.      Lorrie states Albert isn't going to take the prednisone with his treatment, he asked if this was going to help or hinder his treatment?  He said prednisone makes him very emotional.  I explained he if he needs it, it can easily be added back in.    Answered all Albert's questions to his stated satisfaction.    I walked with Albert and Lorrie out to scheduling, they were emotional and scared of the uncertainty of what the future holds.  I spent time talking with them and reassuring them.  I let Lorrie know I'd be gone tomorrow through Monday, returning Tuesday, but someone would be covering my working.  I gave Albert a big hug and reassured him we are here if he needs anything.                Shirin Lagunas, RN BSN   [facility]  Nurse Coordinator        "

## 2020-01-07 NOTE — NURSING NOTE
"Oncology Rooming Note    January 7, 2020 10:48 AM   Albert Amaya is a 54 year old male who presents for:    Chief Complaint   Patient presents with     Blood Draw     Labs drawn via  by RN in lab. VS taken. Patient checked in for next appt.     Oncology Clinic Visit     Return Prostate Ca     Initial Vitals: BP (!) 137/102 (BP Location: Right arm, Patient Position: Sitting, Cuff Size: Adult Regular)   Pulse 58   Temp 98.3  F (36.8  C) (Oral)   Resp 16   Wt 90.3 kg (199 lb 1.6 oz)   SpO2 99%   BMI 30.27 kg/m   Estimated body mass index is 30.27 kg/m  as calculated from the following:    Height as of 8/12/19: 1.727 m (5' 8\").    Weight as of this encounter: 90.3 kg (199 lb 1.6 oz). Body surface area is 2.08 meters squared.  No Pain (0) Comment: Data Unavailable   No LMP for male patient.  Allergies reviewed: Yes  Medications reviewed: Yes    Medications: MEDICATION REFILLS NEEDED TODAY. Provider was notified.  Pharmacy name entered into [facility]: [facility] - [place], [place] - [address] [place] [address]    Clinical concerns: Refill on Synthroid; Fatigue for weeks; intermittent nausea; intermittent insomnia; aches and pains; dry mouth; diarrhea; back pain; some dizziness; low energy; fluid in joints and abdominal bloating;       Cynthia Hawk CMA              "

## 2020-01-07 NOTE — PROGRESS NOTES
"Oncology RN Care Coordination Note:     I was requested to meet with Albert and Lorrie again after they completed his scheduling with Ashley Justice, clinic coordinator.     We went to sit over by the windows for some privacy and we spent approximately 20 minutes discussing his options.     Albert said \"the first time you hear something like this, you don't hear it.  The second time you hear it, you start hearing a little bit.  Now, the third time you've said it, I am really hearing what you are saying.\"     I encouraged Albert and Lorrie to continue asking questions as this is an emotional process.  Albert said he has had the best 4-5 months since he stopped treatment and he is extremely concerned about his quality of life, especially chemo brain.    We discussed the palliative care team and their role in managing symptoms caused by chemotherapy.  I will place a referral and a request for the next available appointment.  Both Lorrie and Albert were very open to meeting with their team and excited to see what options they have to help with potential side effects.    Shirin Lagunas, RN BSN   [facility]  Nurse Coordinator        "

## 2020-01-07 NOTE — NURSING NOTE
Chief Complaint   Patient presents with     Blood Draw     Labs drawn via  by RN in lab. VS taken. Patient checked in for next appt.     Labs collected from venipuncture by RN. Vitals taken. Checked in for appointment. /102, pt asymptomatic.    Annie Jasmine RN

## 2020-01-07 NOTE — LETTER
RE: Albert Amaya  [address] [facility] [id number]     Dear Colleague,    Thank you for referring your patient, Albert Amaya, to the [facility]. Please see a copy of my visit note below.    [facility]  FOLLOW-UP VISIT NOTE  Date of visit: Jan 7, 2020     Cancer History:   Metastatic Prostate CA treated    - s/p 6 cycles of taxotere 75mg/m2   - s/p orchiectomy on 3/18/2016   - s/p Provenge 5/13, 5/27, 6/10   - s/p Casodex 50 mg daily March/April   - s/p Zytiga    HPI: Albert is a 54 year old gentleman with metastatic prostate cancer.  Albert felt a cramp in his gluteus muscle and could barely walk after doing some remodeling at home and unloading heavy truck at work. He tried flexeril and prednisone initially but eventually presented to ED on 10/5/15. He feels that initially he was nearly labeled as drug seeker since he was in lot of discomfort and flexeril was not working. But during course of ED visits labs were drawn and he had marked elevation in Alk Phosphatase (over 1000). CT did suggest some ileus with some sclerotic bone lesions. He was admitted to the hospital. His PSA was checked and was markedly elevated at 5760 (10/6/15), repeat value 5563.     Urology and Medical Oncology consults were placed. He was unhappy with the progress in hospital and felt no better and left the following day on 10/6. He did follow up with Dr. Freire and had transrectal US guided biopsy of prostate on 10/8/15. They have the results through my chart which confirms prostate adenocarcinoma - enrico 4+3 involving majority (60-90%) portion of every single core.  He started on taxotere on 10/23 and completed 6 cycles of therapy in 2/2016.  He then underwent orchiectomy on 3/18/2016.       Throughout spring of 2016 Albert's PSA started to progress and after three elevations there was concern about him being hormone refractory.  He was started on Provenge and enrolled  in the trial including Indoximod. PSA trending up and started on Casodex mid March with progression. Switched to Zytiga 5/3/17.   He held his abiraterone in July 2019 with rising PSA and fatigue. He had a decline in PSA after holding his abiraterone. This has been on hold and he is being followed without any additional intervention. He had orchiectomy done previously and does not need ADT.       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     He is accompanied by his wife at this visit. He has not been feeling well overall. He had a visit in a few weeks but called in to be seen sooner. He had been working 60 hrs every week for the last 5-6 weeks since our last visit. But he is very tired. He denies any other complains. His wife pointed out that he is now having pain in his left hip.  He is worried about progressive disease.      MEDS:   Current Outpatient Medications   Medication Sig     Acetaminophen (TYLENOL PO) Take 325 mg by mouth every 8 hours as needed for mild pain or fever Reported on 5/18/2017     amLODIPine (NORVASC) 5 MG tablet TAKE ONE TABLET BY MOUTH EVERY DAY     blood glucose monitoring (ACCU-CHEK MULTICLIX) lancets Use to test blood sugar twice times daily or as directed.     blood glucose monitoring (NO BRAND SPECIFIED) test strip Use to test blood sugars twice daily or as directed (fasting, rotate then second time - before lunch, before supper and bedtime) (Patient not taking: Reported on 8/12/2019)     diphenhydrAMINE (BENADRYL) 25 MG tablet Take 25 mg by mouth     EPINEPHrine (EPIPEN 2-CHARITY) 0.3 MG/0.3ML injection 2-pack Inject 0.3 mLs (0.3 mg) into the muscle once as needed for anaphylaxis (Patient not taking: Reported on 8/7/2019)     HYDROmorphone (DILAUDID) 2 MG tablet Take 1 tablet (2 mg) by mouth every 4 hours as needed for moderate to severe pain     IBUPROFEN PO Take by mouth as needed for moderate pain Reported on 5/18/2017     metFORMIN  (GLUCOPHAGE-XR) 750 MG 24 hr tablet Take 2 tablets (1,500 mg) by mouth daily (with dinner)     prochlorperazine (COMPAZINE) 5 MG tablet Take 1 tablet (5 mg) by mouth every 6 hours as needed for nausea or vomiting     SYNTHROID 75 MCG tablet TAKE ONE TABLET BY MOUTH EVERY DAY     valACYclovir (VALTREX) 1000 mg tablet Take 1,000 mg by mouth 3 times daily     No current facility-administered medications for this visit.         EXAM:  ECOG 1  Wt Readings from Last 4 Encounters:   01/07/20 90.3 kg (199 lb 1.6 oz)   11/06/19 90.4 kg (199 lb 4.8 oz)   09/18/19 88.8 kg (195 lb 12.8 oz)   08/12/19 88.9 kg (196 lb)   BP (!) 137/102 (BP Location: Right arm, Patient Position: Sitting, Cuff Size: Adult Regular)   Pulse 58   Temp 98.3  F (36.8  C) (Oral)   Resp 16   Wt 90.3 kg (199 lb 1.6 oz)   SpO2 99%   BMI 30.27 kg/m     Patient alert and oriented x3.   PERRLA. EOMI. No scleral icterus noted. OP without thrush/sores.  Neck exam: Palpable 0.5 cm anterior cervical node on the left, rubbery, mobile, non-tender.   Heart: RRR no murmurs noted.   Lungs: clear to auscultation bilaterally.  No crackles or wheezing.   Abd: Normal bowel sounds.  No tenderness to palpation. No suprapubic tenderness.  No hepatomegaly.   Extremities: No lower extremity edema.   Back: No CVA tenderness.   Neuro: grossly intact.   Mood and affect is stable.     Labs/Imaging:  Recent Labs   Lab Test 11/06/19  1338 09/18/19  1217 08/27/19  1600 08/07/19  1635 07/17/19  1601    137 139 134 137   POTASSIUM 4.1 4.4 4.6 4.2 4.4   CHLORIDE 107 107 105 104 105   CO2 26 25 30 26 28   ANIONGAP 6 5 4 4 5   BUN 15 13 19 25 20   CR 0.93 0.84 1.01 0.79 0.82   GLC 94 108* 94 99 130*   WILL 9.2 9.1 9.6 8.8 9.5     Recent Labs   Lab Test 12/21/16  1643 11/22/16  1221 10/26/16  1150 09/28/16  1137 08/17/16  1355  05/03/16  0955   MAG 2.2 2.4* 2.2 2.3 2.0   < > 2.0   PHOS  --   --   --   --   --   --  4.1    < > = values in this interval not displayed.     Recent  Labs   Lab Test 01/07/20  1014 11/06/19  1338 09/18/19  1217 08/27/19  1600 08/07/19  1635   WBC 6.6 5.6 5.7 5.9 8.3   HGB 11.8* 11.3* 11.3* 11.5* 11.8*    278 270 291 287   MCV 93 92 92 92 95   NEUTROPHIL 51.5 50.1 52.1 49.9 65.9     Recent Labs   Lab Test 11/06/19  1338 09/18/19  1217 08/27/19  1600   BILITOTAL 0.6 0.5 0.5   ALKPHOS 51 54 51   ALT 32 27 36   AST 15 12 15   ALBUMIN 4.2 4.0 4.0    203 189     TSH   Date Value Ref Range Status   07/17/2019 1.29 0.40 - 4.00 mU/L Final   06/26/2019 2.04 0.40 - 4.00 mU/L Final   05/24/2019 5.24 (H) 0.40 - 4.00 mU/L Final     No results for input(s): CEA in the last 66177 hours.  Results for orders placed or performed during the hospital encounter of 06/26/19   CT Chest/Abdomen/Pelvis w Contrast    Narrative    EXAMINATION: CT CHEST/ABDOMEN/PELVIS W CONTRAST, 6/26/2019 11:59 AM    TECHNIQUE:  Helical CT images from the lung apices through the  symphysis pubis were obtained with contrast.  Coronal and sagittal  reformatted images were generated at a workstation for further  assessment.    CONTRAST:  119 ml Isovue 370.    COMPARISON: CT chest abdomen pelvis 12/21/2018    HISTORY: Prostate cancer with extensive bony metastasis and slowly  rising PSA; Malignant neoplasm of prostate (H); Bone metastasis (H);  Hypothyroidism due to acquired atrophy of thyroid    FINDINGS:    Lines and tubes: None.    Mediastinum/Neck Base: No thyroid nodules. Central tracheobronchial  tree is patent. Heart size is normal. No pericardial effusion.  Normal  thoracic vasculature. No thoracic lymphadenopathy.   Lungs: No consolidation. No pleural effusion or pneumothorax. No new  or enlarging pulmonary nodule.    Liver: No suspicious liver lesions. Portal veins appear patent.  Parenchymal hypodensity adjacent to the fissure for ligamentum teres  without mass effect, likely focal fatty infiltration.  Gallbladder: No gallstones. No evidence of acute cholecystitis.  Spleen: Normal  size.  Pancreas: No suspicious pancreatic lesions. The pancreatic duct is not  dilated.  Adrenal glands: No adrenal nodules.   Kidneys: No hydronephrosis or obstructing renal stones.    Bladder / Pelvic organs: Unremarkable.  Bowel: No bowel wall thickening. The appendix is unremarkable. No  abnormal bowel loop dilatation  Lymph nodes: No retroperitoneal, mesenteric, or pelvic  lymphadenopathy.  Peritoneum / Retroperitoneum: No free fluid or air within the abdomen.  Vessels: No infrarenal aortic aneurysm.     Bones and soft tissues: Extensive sclerotic osseous metastasis  predominantly involving the axial skeleton. Scattered Schmorl's node  with degenerative changes of the spine.       Impression    IMPRESSION:   1. Extensive sclerotic osseous metastasis, not significantly changed  compared to CT chest, abdomen and pelvis dated 12/21/2018.  2  No new metastatic disease in the chest, abdomen or pelvis.    I have personally reviewed the examination and initial interpretation  and I agree with the findings.    NIXON GIVENS MD     Recent Labs   Lab Test 11/06/19  1338 09/18/19  1217 08/27/19  1600 08/07/19  1635 07/17/19  1601  12/21/16  1643 11/22/16  1222  05/03/16  0955   PSA 52.80* 43.00* 38.20* 28.10* 42.30*   < > 60.68*  --    < > 67.04*   TESTOSTTOTAL  --   --   --   --   --   --  <2* 2*  --  <2*    < > = values in this interval not displayed.     Recent Labs   Lab Test 11/06/19  1338 09/18/19  1217 08/27/19  1600 08/07/19  1635 07/17/19  1601   PSA 52.80* 43.00* 38.20* 28.10* 42.30*   ALKPHOS 51 54 51 53 51    203 189 190 179       ASSESSMENT/PLAN:  1. Metastatic prostate cancer with bony metastasis:   Currently taking abiraterone 1000 mg po q day and prednisone 5 mg po qday  Denosumab every 12 weeks   Newly diagnosed HTN and DM TII  ECOG PS 0    I had a lengthy discussion with Albert, who is accompanied by his wife Lorrie at this clinic visit.  He had biopsy of prostate and also blood collection for CTC  assay to determine presence of mutations in DNA homologous repair enzymes. Albert does not have actionable mutations.     His PSA was pending at the time of visit. We reviewed different possibilities and then decided to meet again once his PSA was available. His PSA has indeed gone up from 52 to 90 at this visit. This is much steeper rise than in the past. He now has a doubling time closer to 3 months.     We reviewed options including restarting him on abiraterone. He had rising PSA while on abiraterone though it was rising very slowly and in fact went down after stopping his abiraterone. I suspect that we will have a transient response at best.     I would recommend cabazitaxel as the next step. We could come back to abiraterone at a later date. Cabazitaxel is a next-generation taxane whose efficacy was tested in Phase III TROPIC trial where it was compared to mitoxantrone with prednisone(Lancet. 2010 Oct 2;376(5748):1456-29). The most common clinically significant was neutropenia seen in about 82% of patients (8% had neutropenic fevers). Treatment with cabazitaxel was associated with a 2.7 month improvent in survival with HR of 0 70 (95% CI 0 59-0 83, p<0 0001). We could start at 20 mg/m2 for him and see how he does.  We will have options of increasing to full dose if he is tolerating it very well. In an randomized, open label multinational phase III study, 1200 pts with mCRPC progressing after treatment with docetaxel, cabazitaxel at 20 mg/m2 was determined to be non-inferior for OS compared with 25 mg/m2 dose with improved overall safety profile (J Clin Oncol 34, 2016, suppl; abstr 5008).     We reviewed the side effects, rationale, palliative intent. I reviewed the myelosuppression and fatigue as the main concerns. However he is most worried about chemo brain. I think he would do much better with cabazitaxel.     His wife points out that he had a lot of difficulty with his steroids. He used to go in shopping  dimas. I explained the rationale for prednisone. I personally do not feel that it is helpful after progression on abiraterone, though the trials have always included prednisone (the trials had been designed prior to approval of abiraterone). I will use cabazitaxel 20 mg/m  without prednisone.     I will get restaging CT scans and bone scans to establish a new baseline. I will have him see Ms. Anay Garrisonhardt or myself to review the scan results and start chemotherapy.     Over 45 min of direct face to face time spent with patient with more than 50% time spent in counseling and coordinating care.      Again, thank you for allowing me to participate in the care of your patient.      Sincerely,    Tyrel Valladares MD

## 2020-01-07 NOTE — PROGRESS NOTES
[facility] CANCER CLINIC  FOLLOW-UP VISIT NOTE  Date of visit: Jan 7, 2020     Cancer History:   Metastatic Prostate CA treated    - s/p 6 cycles of taxotere 75mg/m2   - s/p orchiectomy on 3/18/2016   - s/p Provenge 5/13, 5/27, 6/10   - s/p Casodex 50 mg daily March/April   - s/p Zytiga    HPI: Albert is a 54 year old gentleman with metastatic prostate cancer.  Albert felt a cramp in his gluteus muscle and could barely walk after doing some remodeling at home and unloading heavy truck at work. He tried flexeril and prednisone initially but eventually presented to ED on 10/5/15. He feels that initially he was nearly labeled as drug seeker since he was in lot of discomfort and flexeril was not working. But during course of ED visits labs were drawn and he had marked elevation in Alk Phosphatase (over 1000). CT did suggest some ileus with some sclerotic bone lesions. He was admitted to the hospital. His PSA was checked and was markedly elevated at 5760 (10/6/15), repeat value 5563.     Urology and Medical Oncology consults were placed. He was unhappy with the progress in hospital and felt no better and left the following day on 10/6. He did follow up with Dr. Freire and had transrectal US guided biopsy of prostate on 10/8/15. They have the results through my chart which confirms prostate adenocarcinoma - enrico 4+3 involving majority (60-90%) portion of every single core.  He started on taxotere on 10/23 and completed 6 cycles of therapy in 2/2016.  He then underwent orchiectomy on 3/18/2016.       Throughout spring of 2016 Albert's PSA started to progress and after three elevations there was concern about him being hormone refractory.  He was started on Provenge and enrolled in the trial including Indoximod. PSA trending up and started on Casodex mid March with progression. Switched to Zytiga 5/3/17.   He held his abiraterone in July 2019 with rising PSA and fatigue. He had a decline in PSA after  holding his abiraterone. This has been on hold and he is being followed without any additional intervention. He had orchiectomy done previously and does not need ADT.       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     He is accompanied by his wife at this visit. He has not been feeling well overall. He had a visit in a few weeks but called in to be seen sooner. He had been working 60 hrs every week for the last 5-6 weeks since our last visit. But he is very tired. He denies any other complains. His wife pointed out that he is now having pain in his left hip.  He is worried about progressive disease.      MEDS:   Current Outpatient Medications   Medication Sig     Acetaminophen (TYLENOL PO) Take 325 mg by mouth every 8 hours as needed for mild pain or fever Reported on 5/18/2017     amLODIPine (NORVASC) 5 MG tablet TAKE ONE TABLET BY MOUTH EVERY DAY     blood glucose monitoring (ACCU-CHEK MULTICLIX) lancets Use to test blood sugar twice times daily or as directed.     blood glucose monitoring (NO BRAND SPECIFIED) test strip Use to test blood sugars twice daily or as directed (fasting, rotate then second time - before lunch, before supper and bedtime) (Patient not taking: Reported on 8/12/2019)     diphenhydrAMINE (BENADRYL) 25 MG tablet Take 25 mg by mouth     EPINEPHrine (EPIPEN 2-CHARITY) 0.3 MG/0.3ML injection 2-pack Inject 0.3 mLs (0.3 mg) into the muscle once as needed for anaphylaxis (Patient not taking: Reported on 8/7/2019)     HYDROmorphone (DILAUDID) 2 MG tablet Take 1 tablet (2 mg) by mouth every 4 hours as needed for moderate to severe pain     IBUPROFEN PO Take by mouth as needed for moderate pain Reported on 5/18/2017     metFORMIN (GLUCOPHAGE-XR) 750 MG 24 hr tablet Take 2 tablets (1,500 mg) by mouth daily (with dinner)     prochlorperazine (COMPAZINE) 5 MG tablet Take 1 tablet (5 mg) by mouth every 6 hours as needed for nausea or vomiting     SYNTHROID 75  MCG tablet TAKE ONE TABLET BY MOUTH EVERY DAY     valACYclovir (VALTREX) 1000 mg tablet Take 1,000 mg by mouth 3 times daily     No current facility-administered medications for this visit.         EXAM:  ECOG 1  Wt Readings from Last 4 Encounters:   01/07/20 90.3 kg (199 lb 1.6 oz)   11/06/19 90.4 kg (199 lb 4.8 oz)   09/18/19 88.8 kg (195 lb 12.8 oz)   08/12/19 88.9 kg (196 lb)   BP (!) 137/102 (BP Location: Right arm, Patient Position: Sitting, Cuff Size: Adult Regular)   Pulse 58   Temp 98.3  F (36.8  C) (Oral)   Resp 16   Wt 90.3 kg (199 lb 1.6 oz)   SpO2 99%   BMI 30.27 kg/m    Patient alert and oriented x3.   PERRLA. EOMI. No scleral icterus noted. OP without thrush/sores.  Neck exam: Palpable 0.5 cm anterior cervical node on the left, rubbery, mobile, non-tender.   Heart: RRR no murmurs noted.   Lungs: clear to auscultation bilaterally.  No crackles or wheezing.   Abd: Normal bowel sounds.  No tenderness to palpation. No suprapubic tenderness.  No hepatomegaly.   Extremities: No lower extremity edema.   Back: No CVA tenderness.   Neuro: grossly intact.   Mood and affect is stable.     Labs/Imaging:  Recent Labs   Lab Test 11/06/19  1338 09/18/19  1217 08/27/19  1600 08/07/19  1635 07/17/19  1601    137 139 134 137   POTASSIUM 4.1 4.4 4.6 4.2 4.4   CHLORIDE 107 107 105 104 105   CO2 26 25 30 26 28   ANIONGAP 6 5 4 4 5   BUN 15 13 19 25 20   CR 0.93 0.84 1.01 0.79 0.82   GLC 94 108* 94 99 130*   WILL 9.2 9.1 9.6 8.8 9.5     Recent Labs   Lab Test 12/21/16  1643 11/22/16  1221 10/26/16  1150 09/28/16  1137 08/17/16  1355  05/03/16  0955   MAG 2.2 2.4* 2.2 2.3 2.0   < > 2.0   PHOS  --   --   --   --   --   --  4.1    < > = values in this interval not displayed.     Recent Labs   Lab Test 01/07/20  1014 11/06/19  1338 09/18/19  1217 08/27/19  1600 08/07/19  1635   WBC 6.6 5.6 5.7 5.9 8.3   HGB 11.8* 11.3* 11.3* 11.5* 11.8*    278 270 291 287   MCV 93 92 92 92 95   NEUTROPHIL 51.5 50.1 52.1 49.9  65.9     Recent Labs   Lab Test 11/06/19  1338 09/18/19  1217 08/27/19  1600   BILITOTAL 0.6 0.5 0.5   ALKPHOS 51 54 51   ALT 32 27 36   AST 15 12 15   ALBUMIN 4.2 4.0 4.0    203 189     TSH   Date Value Ref Range Status   07/17/2019 1.29 0.40 - 4.00 mU/L Final   06/26/2019 2.04 0.40 - 4.00 mU/L Final   05/24/2019 5.24 (H) 0.40 - 4.00 mU/L Final     No results for input(s): CEA in the last 99779 hours.  Results for orders placed or performed during the hospital encounter of 06/26/19   CT Chest/Abdomen/Pelvis w Contrast    Narrative    EXAMINATION: CT CHEST/ABDOMEN/PELVIS W CONTRAST, 6/26/2019 11:59 AM    TECHNIQUE:  Helical CT images from the lung apices through the  symphysis pubis were obtained with contrast.  Coronal and sagittal  reformatted images were generated at a workstation for further  assessment.    CONTRAST:  119 ml Isovue 370.    COMPARISON: CT chest abdomen pelvis 12/21/2018    HISTORY: Prostate cancer with extensive bony metastasis and slowly  rising PSA; Malignant neoplasm of prostate (H); Bone metastasis (H);  Hypothyroidism due to acquired atrophy of thyroid    FINDINGS:    Lines and tubes: None.    Mediastinum/Neck Base: No thyroid nodules. Central tracheobronchial  tree is patent. Heart size is normal. No pericardial effusion.  Normal  thoracic vasculature. No thoracic lymphadenopathy.   Lungs: No consolidation. No pleural effusion or pneumothorax. No new  or enlarging pulmonary nodule.    Liver: No suspicious liver lesions. Portal veins appear patent.  Parenchymal hypodensity adjacent to the fissure for ligamentum teres  without mass effect, likely focal fatty infiltration.  Gallbladder: No gallstones. No evidence of acute cholecystitis.  Spleen: Normal size.  Pancreas: No suspicious pancreatic lesions. The pancreatic duct is not  dilated.  Adrenal glands: No adrenal nodules.   Kidneys: No hydronephrosis or obstructing renal stones.    Bladder / Pelvic organs: Unremarkable.  Bowel: No  bowel wall thickening. The appendix is unremarkable. No  abnormal bowel loop dilatation  Lymph nodes: No retroperitoneal, mesenteric, or pelvic  lymphadenopathy.  Peritoneum / Retroperitoneum: No free fluid or air within the abdomen.  Vessels: No infrarenal aortic aneurysm.     Bones and soft tissues: Extensive sclerotic osseous metastasis  predominantly involving the axial skeleton. Scattered Schmorl's node  with degenerative changes of the spine.       Impression    IMPRESSION:   1. Extensive sclerotic osseous metastasis, not significantly changed  compared to CT chest, abdomen and pelvis dated 12/21/2018.  2  No new metastatic disease in the chest, abdomen or pelvis.    I have personally reviewed the examination and initial interpretation  and I agree with the findings.    NIXON GIVENS MD     Recent Labs   Lab Test 11/06/19  1338 09/18/19  1217 08/27/19  1600 08/07/19  1635 07/17/19  1601  12/21/16  1643 11/22/16  1222  05/03/16  0955   PSA 52.80* 43.00* 38.20* 28.10* 42.30*   < > 60.68*  --    < > 67.04*   TESTOSTTOTAL  --   --   --   --   --   --  <2* 2*  --  <2*    < > = values in this interval not displayed.     Recent Labs   Lab Test 11/06/19  1338 09/18/19  1217 08/27/19  1600 08/07/19  1635 07/17/19  1601   PSA 52.80* 43.00* 38.20* 28.10* 42.30*   ALKPHOS 51 54 51 53 51    203 189 190 179       ASSESSMENT/PLAN:  1. Metastatic prostate cancer with bony metastasis:   Currently taking abiraterone 1000 mg po q day and prednisone 5 mg po qday  Denosumab every 12 weeks   Newly diagnosed HTN and DM TII  ECOG PS 0    I had a lengthy discussion with Alebrt, who is accompanied by his wife Lorrie at this clinic visit.  He had biopsy of prostate and also blood collection for CTC assay to determine presence of mutations in DNA homologous repair enzymes. Albert does not have actionable mutations.     His PSA was pending at the time of visit. We reviewed different possibilities and then decided to meet again once  his PSA was available. His PSA has indeed gone up from 52 to 90 at this visit. This is much steeper rise than in the past. He now has a doubling time closer to 3 months.     We reviewed options including restarting him on abiraterone. He had rising PSA while on abiraterone though it was rising very slowly and in fact went down after stopping his abiraterone. I suspect that we will have a transient response at best.     I would recommend cabazitaxel as the next step. We could come back to abiraterone at a later date. Cabazitaxel is a next-generation taxane whose efficacy was tested in Phase III TROPIC trial where it was compared to mitoxantrone with prednisone(Lancet. 2010 Oct 2;376(1262):1676-13). The most common clinically significant was neutropenia seen in about 82% of patients (8% had neutropenic fevers). Treatment with cabazitaxel was associated with a 2.7 month improvent in survival with HR of 0 70 (95% CI 0 59-0 83, p<0 0001). We could start at 20 mg/m2 for him and see how he does.  We will have options of increasing to full dose if he is tolerating it very well. In an randomized, open label multinational phase III study, 1200 pts with mCRPC progressing after treatment with docetaxel, cabazitaxel at 20 mg/m2 was determined to be non-inferior for OS compared with 25 mg/m2 dose with improved overall safety profile (J Clin Oncol 34, 2016, suppl; abstr 5008).     We reviewed the side effects, rationale, palliative intent. I reviewed the myelosuppression and fatigue as the main concerns. However he is most worried about chemo brain. I think he would do much better with cabazitaxel.     His wife points out that he had a lot of difficulty with his steroids. He used to go in shopping sprees. I explained the rationale for prednisone. I personally do not feel that it is helpful after progression on abiraterone, though the trials have always included prednisone (the trials had been designed prior to approval of  abiraterone). I will use cabazitaxel 20 mg/m  without prednisone.     I will get restaging CT scans and bone scans to establish a new baseline. I will have him see Ms. Anay Dan or myself to review the scan results and start chemotherapy.     Over 45 min of direct face to face time spent with patient with more than 50% time spent in counseling and coordinating care.

## 2020-01-10 ENCOUNTER — HOSPITAL ENCOUNTER (OUTPATIENT)
Dept: NUCLEAR MEDICINE | Facility: CLINIC | Age: 55
Setting detail: NUCLEAR MEDICINE
Discharge: HOME | End: 2020-01-10
Attending: INTERNAL MEDICINE | Admitting: INTERNAL MEDICINE
Payer: COMMERCIAL

## 2020-01-10 ENCOUNTER — HOSPITAL ENCOUNTER (OUTPATIENT)
Dept: CT IMAGING | Facility: CLINIC | Age: 55
Discharge: HOME | End: 2020-01-10
Attending: INTERNAL MEDICINE | Admitting: INTERNAL MEDICINE
Payer: COMMERCIAL

## 2020-01-10 ENCOUNTER — HOSPITAL ENCOUNTER (OUTPATIENT)
Dept: NUCLEAR MEDICINE | Facility: CLINIC | Age: 55
Setting detail: NUCLEAR MEDICINE
End: 2020-01-10
Attending: INTERNAL MEDICINE
Payer: COMMERCIAL

## 2020-01-10 DIAGNOSIS — E03.4 HYPOTHYROIDISM DUE TO ACQUIRED ATROPHY OF THYROID: ICD-10-CM

## 2020-01-10 DIAGNOSIS — G62.9 NEUROPATHY: ICD-10-CM

## 2020-01-10 DIAGNOSIS — R61 NIGHT SWEATS: ICD-10-CM

## 2020-01-10 DIAGNOSIS — E03.9 HYPOTHYROIDISM, UNSPECIFIED TYPE: ICD-10-CM

## 2020-01-10 DIAGNOSIS — C79.51 BONE METASTASIS: ICD-10-CM

## 2020-01-10 DIAGNOSIS — C61 MALIGNANT NEOPLASM OF PROSTATE (H): ICD-10-CM

## 2020-01-10 PROCEDURE — A9503 TC99M MEDRONATE: HCPCS | Performed by: INTERNAL MEDICINE

## 2020-01-10 PROCEDURE — 34300033 ZZH RX 343: Performed by: INTERNAL MEDICINE

## 2020-01-10 PROCEDURE — 78306 BONE IMAGING WHOLE BODY: CPT

## 2020-01-10 PROCEDURE — 71260 CT THORAX DX C+: CPT

## 2020-01-10 PROCEDURE — 25000128 H RX IP 250 OP 636: Performed by: STUDENT IN AN ORGANIZED HEALTH CARE EDUCATION/TRAINING PROGRAM

## 2020-01-10 RX ORDER — IOPAMIDOL 755 MG/ML
122 INJECTION, SOLUTION INTRAVASCULAR ONCE
Status: COMPLETED | OUTPATIENT
Start: 2020-01-10 | End: 2020-01-10

## 2020-01-10 RX ORDER — TC 99M MEDRONATE 20 MG/10ML
25 INJECTION, POWDER, LYOPHILIZED, FOR SOLUTION INTRAVENOUS ONCE
Status: COMPLETED | OUTPATIENT
Start: 2020-01-10 | End: 2020-01-10

## 2020-01-10 RX ADMIN — TC 99M MEDRONATE 24.1 MCI.: 20 INJECTION, POWDER, LYOPHILIZED, FOR SOLUTION INTRAVENOUS at 12:29

## 2020-01-10 RX ADMIN — IOPAMIDOL 122 ML: 755 INJECTION, SOLUTION INTRAVENOUS at 12:54

## 2020-01-14 ENCOUNTER — ONCOLOGY VISIT (OUTPATIENT)
Dept: ONCOLOGY | Facility: CLINIC | Age: 55
End: 2020-01-14
Attending: INTERNAL MEDICINE
Payer: COMMERCIAL

## 2020-01-14 ENCOUNTER — APPOINTMENT (OUTPATIENT)
Dept: LAB | Facility: CLINIC | Age: 55
End: 2020-01-14
Attending: INTERNAL MEDICINE
Payer: COMMERCIAL

## 2020-01-14 VITALS
RESPIRATION RATE: 16 BRPM | HEART RATE: 82 BPM | BODY MASS INDEX: 29.45 KG/M2 | WEIGHT: 193.7 LBS | SYSTOLIC BLOOD PRESSURE: 134 MMHG | DIASTOLIC BLOOD PRESSURE: 91 MMHG | OXYGEN SATURATION: 98 % | TEMPERATURE: 98.3 F

## 2020-01-14 DIAGNOSIS — C79.51 BONE METASTASIS: ICD-10-CM

## 2020-01-14 DIAGNOSIS — C61 MALIGNANT NEOPLASM OF PROSTATE (H): Primary | ICD-10-CM

## 2020-01-14 DIAGNOSIS — E03.4 HYPOTHYROIDISM DUE TO ACQUIRED ATROPHY OF THYROID: ICD-10-CM

## 2020-01-14 DIAGNOSIS — C61 MALIGNANT NEOPLASM OF PROSTATE (H): ICD-10-CM

## 2020-01-14 LAB
ALBUMIN SERPL-MCNC: 4.4 G/DL (ref 3.4–5)
ALP SERPL-CCNC: 113 U/L (ref 40–150)
ALT SERPL W P-5'-P-CCNC: 64 U/L (ref 0–70)
ANION GAP SERPL CALCULATED.3IONS-SCNC: 6 MMOL/L (ref 3–14)
AST SERPL W P-5'-P-CCNC: 24 U/L (ref 0–45)
BASOPHILS # BLD AUTO: 0 10E9/L (ref 0–0.2)
BASOPHILS NFR BLD AUTO: 0.8 %
BILIRUB SERPL-MCNC: 0.6 MG/DL (ref 0.2–1.3)
BUN SERPL-MCNC: 23 MG/DL (ref 7–30)
CALCIUM SERPL-MCNC: 10.4 MG/DL (ref 8.5–10.1)
CHLORIDE SERPL-SCNC: 105 MMOL/L (ref 94–109)
CO2 SERPL-SCNC: 28 MMOL/L (ref 20–32)
CREAT SERPL-MCNC: 1.01 MG/DL (ref 0.66–1.25)
DIFFERENTIAL METHOD BLD: ABNORMAL
EOSINOPHIL # BLD AUTO: 0.1 10E9/L (ref 0–0.7)
EOSINOPHIL NFR BLD AUTO: 2.7 %
ERYTHROCYTE [DISTWIDTH] IN BLOOD BY AUTOMATED COUNT: 13.2 % (ref 10–15)
GFR SERPL CREATININE-BSD FRML MDRD: 84 ML/MIN/{1.73_M2}
GLUCOSE SERPL-MCNC: 103 MG/DL (ref 70–99)
HCT VFR BLD AUTO: 36.7 % (ref 40–53)
HGB BLD-MCNC: 12 G/DL (ref 13.3–17.7)
IMM GRANULOCYTES # BLD: 0 10E9/L (ref 0–0.4)
IMM GRANULOCYTES NFR BLD: 0.4 %
LDH SERPL L TO P-CCNC: 181 U/L (ref 85–227)
LYMPHOCYTES # BLD AUTO: 1.7 10E9/L (ref 0.8–5.3)
LYMPHOCYTES NFR BLD AUTO: 32.3 %
MCH RBC QN AUTO: 30.3 PG (ref 26.5–33)
MCHC RBC AUTO-ENTMCNC: 32.7 G/DL (ref 31.5–36.5)
MCV RBC AUTO: 93 FL (ref 78–100)
MONOCYTES # BLD AUTO: 0.4 10E9/L (ref 0–1.3)
MONOCYTES NFR BLD AUTO: 7.2 %
NEUTROPHILS # BLD AUTO: 3 10E9/L (ref 1.6–8.3)
NEUTROPHILS NFR BLD AUTO: 56.6 %
NRBC # BLD AUTO: 0 10*3/UL
NRBC BLD AUTO-RTO: 0 /100
PLATELET # BLD AUTO: 291 10E9/L (ref 150–450)
POTASSIUM SERPL-SCNC: 3.8 MMOL/L (ref 3.4–5.3)
PROT SERPL-MCNC: 8.2 G/DL (ref 6.8–8.8)
PSA SERPL-MCNC: 120 UG/L (ref 0–4)
RBC # BLD AUTO: 3.96 10E12/L (ref 4.4–5.9)
SODIUM SERPL-SCNC: 139 MMOL/L (ref 133–144)
WBC # BLD AUTO: 5.3 10E9/L (ref 4–11)

## 2020-01-14 PROCEDURE — 83615 LACTATE (LD) (LDH) ENZYME: CPT | Performed by: INTERNAL MEDICINE

## 2020-01-14 PROCEDURE — 96367 TX/PROPH/DG ADDL SEQ IV INF: CPT

## 2020-01-14 PROCEDURE — 84153 ASSAY OF PSA TOTAL: CPT | Performed by: INTERNAL MEDICINE

## 2020-01-14 PROCEDURE — 25000128 H RX IP 250 OP 636: Mod: ZF | Performed by: INTERNAL MEDICINE

## 2020-01-14 PROCEDURE — 96375 TX/PRO/DX INJ NEW DRUG ADDON: CPT

## 2020-01-14 PROCEDURE — 25000125 ZZHC RX 250: Mod: ZF | Performed by: INTERNAL MEDICINE

## 2020-01-14 PROCEDURE — 80053 COMPREHEN METABOLIC PANEL: CPT | Performed by: INTERNAL MEDICINE

## 2020-01-14 PROCEDURE — G0463 HOSPITAL OUTPT CLINIC VISIT: HCPCS | Mod: ZF

## 2020-01-14 PROCEDURE — 25800030 ZZH RX IP 258 OP 636: Mod: ZF | Performed by: INTERNAL MEDICINE

## 2020-01-14 PROCEDURE — 96413 CHEMO IV INFUSION 1 HR: CPT

## 2020-01-14 PROCEDURE — 96361 HYDRATE IV INFUSION ADD-ON: CPT

## 2020-01-14 PROCEDURE — 99215 OFFICE O/P EST HI 40 MIN: CPT | Mod: ZP | Performed by: INTERNAL MEDICINE

## 2020-01-14 PROCEDURE — 85025 COMPLETE CBC W/AUTO DIFF WBC: CPT | Performed by: INTERNAL MEDICINE

## 2020-01-14 RX ORDER — LORAZEPAM 0.5 MG/1
0.5 TABLET ORAL EVERY 4 HOURS PRN
Qty: 30 TABLET | Refills: 2 | Status: SHIPPED | OUTPATIENT
Start: 2020-01-14 | End: 2020-11-23

## 2020-01-14 RX ORDER — PROCHLORPERAZINE MALEATE 10 MG
10 TABLET ORAL EVERY 6 HOURS PRN
Qty: 30 TABLET | Refills: 2 | Status: SHIPPED | OUTPATIENT
Start: 2020-01-14 | End: 2020-08-24

## 2020-01-14 RX ADMIN — SODIUM CHLORIDE 1000 ML: 9 INJECTION, SOLUTION INTRAVENOUS at 12:48

## 2020-01-14 RX ADMIN — DIPHENHYDRAMINE HYDROCHLORIDE 25 MG: 50 INJECTION INTRAMUSCULAR; INTRAVENOUS at 14:37

## 2020-01-14 RX ADMIN — FAMOTIDINE 20 MG: 10 INJECTION INTRAVENOUS at 14:10

## 2020-01-14 RX ADMIN — DEXAMETHASONE SODIUM PHOSPHATE: 10 INJECTION, SOLUTION INTRAMUSCULAR; INTRAVENOUS at 14:14

## 2020-01-14 RX ADMIN — SODIUM CHLORIDE 42 MG: 900 INJECTION, SOLUTION INTRAVENOUS at 15:04

## 2020-01-14 RX ADMIN — SODIUM CHLORIDE 250 ML: 9 INJECTION, SOLUTION INTRAVENOUS at 14:07

## 2020-01-14 ASSESSMENT — PAIN SCALES - GENERAL: PAINLEVEL: NO PAIN (0)

## 2020-01-14 NOTE — PROGRESS NOTES
"Infusion Nursing Note:  Albert Amaya presents today for C1 D1 Cabazitaxel + 1L IVF.    Patient seen by provider today: Yes: Dr. Valladares   present during visit today: Not Applicable.    Note: Patient reportedly had vasovagal episode in lab during blood draw. Wife at side and per pt, he did not fully lose consciousness and is \"needle phobic\". Has not had his labs drawn in a while. Per [facility] Dr. Valladares/Lena Jiang RN 1/14/20, please give 1L NS bolus and see if he feels better after. If not, we will hold chemotherapy today.    Writer met with patient in clinic and brought him over to infusion via wheelchair. Patient states he already is starting to feel better. He states he did had not eaten since yesterday afternoon and was snow blowing this morning before coming in. While connecting patient to fluids, patient became anxious with seeing saline flush. Writer informed patient why we use the flush and he/his wife asked just to push the flush slowly. Pt tolerated flush well. Pt confirmed he is feeling well enough to proceed with chemo today. Reinforced chemotherapy teaching with patient and wife. Chemotherapy teaching previously done by Shirin Ulloa, RNCC. Has tolerated Taxotere without issue in the past (2016). Reviewed signs and symptoms of hypersensitivity reaction.    Cabazitaxel titration rates:  900 ml/hr x 14ml's  10ml/hr x 5 min  25ml/hr x 5 min   50ml/hr x 5 min   100ml/hr x 5 min   279ml/hr x remainder of infusion    Intravenous Access:  Peripheral IV placed.    Treatment Conditions:  Lab Results   Component Value Date    HGB 12.0 01/14/2020     Lab Results   Component Value Date    WBC 5.3 01/14/2020      Lab Results   Component Value Date    ANEU 3.0 01/14/2020     Lab Results   Component Value Date     01/14/2020      Lab Results   Component Value Date     01/14/2020                   Lab Results   Component Value Date    POTASSIUM 3.8 01/14/2020           Lab Results   Component " Value Date    MAG 2.2 12/21/2016            Lab Results   Component Value Date    CR 1.01 01/14/2020                   Lab Results   Component Value Date    WILL 10.4 01/14/2020                Lab Results   Component Value Date    BILITOTAL 0.6 01/14/2020           Lab Results   Component Value Date    ALBUMIN 4.4 01/14/2020                    Lab Results   Component Value Date    ALT 64 01/14/2020           Lab Results   Component Value Date    AST 24 01/14/2020       Results reviewed, labs MET treatment parameters, ok to proceed with treatment.      Post Infusion Assessment:  Patient tolerated infusion without incident.  Blood return noted pre and post infusion.  Site patent and intact, free from redness, edema or discomfort.  No evidence of extravasations.  Access discontinued per protocol.       Discharge Plan:   Patient prescribed Compazine and Ativan. Scripts given to patient by pharmacy staff with teaching.  Discharge instructions reviewed with: Patient and wife.  Patient and/or family verbalized understanding of discharge instructions and all questions answered.  Copy of AVS reviewed with patient and/or family.  Patient will return 2/4 for next appointment.  Patient discharged in stable condition accompanied by: wife.  Departure Mode: Wheelchair (pt felt drowsy from Benadryl).    Annamarie Lopez RN

## 2020-01-14 NOTE — LETTER
RE: Albert Amaya  [address] [facility] [place] [address]     Dear Colleague,    Thank you for referring your patient, Albert Amaya, to the [facility]. Please see a copy of my visit note below.    [facility]  FOLLOW-UP VISIT NOTE  Date of visit: Jan 14, 2020     Cancer History:   Metastatic Prostate CA treated    - s/p 6 cycles of taxotere 75mg/m2   - s/p orchiectomy on 3/18/2016   - s/p Provenge 5/13, 5/27, 6/10   - s/p Casodex 50 mg daily March/April   - s/p Zytiga    HPI: Albert is a 54 year old gentleman with metastatic prostate cancer.  Albert felt a cramp in his gluteus muscle and could barely walk after doing some remodeling at home and unloading heavy truck at work. He tried flexeril and prednisone initially but eventually presented to ED on 10/5/15. He feels that initially he was nearly labeled as drug seeker since he was in lot of discomfort and flexeril was not working. But during course of ED visits labs were drawn and he had marked elevation in Alk Phosphatase (over 1000). CT did suggest some ileus with some sclerotic bone lesions. He was admitted to the hospital. His PSA was checked and was markedly elevated at 5760 (10/6/15), repeat value 5563.     Urology and Medical Oncology consults were placed. He was unhappy with the progress in hospital and felt no better and left the following day on 10/6. He did follow up with Dr. Freire and had transrectal US guided biopsy of prostate on 10/8/15. They have the results through my chart which confirms prostate adenocarcinoma - enrico 4+3 involving majority (60-90%) portion of every single core.  He started on taxotere on 10/23 and completed 6 cycles of therapy in 2/2016.  He then underwent orchiectomy on 3/18/2016.       Throughout spring of 2016 Albert's PSA started to progress and after three elevations there was concern about him being hormone refractory.  He was started on Provenge and  enrolled in the trial including Indoximod. PSA trending up and started on Casodex mid March with progression. Switched to Zytiga 5/3/17.   He held his abiraterone in July 2019 with rising PSA and fatigue. He had a decline in PSA after holding his abiraterone. This has been on hold and he is being followed without any additional intervention. He had orchiectomy done previously and does not need ADT.       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     He is accompanied by his wife at this visit. He had a en episode of vaso-vagal syncope in the lab area during his blood draw. His blood pressure dropped about 30 mm of Hg. He is still dizzy. He had to lay down on exam table during the visit. He notes that he has dizziness off and on for the last month. He denies it being a vertigo feeling. He denies ear infection. He denies any other neurologic signs or symptoms other than tingling in his left arm. This is also episodic and not present all the time.     He is being seen with restaging scans with plans for chemotherapy to start today.      MEDS:   Current Outpatient Medications   Medication Sig     Acetaminophen (TYLENOL PO) Take 325 mg by mouth every 8 hours as needed for mild pain or fever Reported on 5/18/2017     amLODIPine (NORVASC) 5 MG tablet TAKE ONE TABLET BY MOUTH EVERY DAY     blood glucose monitoring (ACCU-CHEK MULTICLIX) lancets Use to test blood sugar twice times daily or as directed.     blood glucose monitoring (NO BRAND SPECIFIED) test strip Use to test blood sugars twice daily or as directed (fasting, rotate then second time - before lunch, before supper and bedtime) (Patient not taking: Reported on 8/12/2019)     diphenhydrAMINE (BENADRYL) 25 MG tablet Take 25 mg by mouth     EPINEPHrine (EPIPEN 2-CHARITY) 0.3 MG/0.3ML injection 2-pack Inject 0.3 mLs (0.3 mg) into the muscle once as needed for anaphylaxis (Patient not taking: Reported on 8/7/2019)      HYDROmorphone (DILAUDID) 2 MG tablet Take 1 tablet (2 mg) by mouth every 4 hours as needed for moderate to severe pain     IBUPROFEN PO Take by mouth as needed for moderate pain Reported on 5/18/2017     levothyroxine (SYNTHROID/LEVOTHROID) 88 MCG tablet Take 1 tablet (88 mcg) by mouth daily     metFORMIN (GLUCOPHAGE-XR) 750 MG 24 hr tablet Take 2 tablets (1,500 mg) by mouth daily (with dinner)     prochlorperazine (COMPAZINE) 5 MG tablet Take 1 tablet (5 mg) by mouth every 6 hours as needed for nausea or vomiting     valACYclovir (VALTREX) 1000 mg tablet Take 1,000 mg by mouth 3 times daily     No current facility-administered medications for this visit.         EXAM:  ECOG 1  Wt Readings from Last 4 Encounters:   01/07/20 90.3 kg (199 lb 1.6 oz)   11/06/19 90.4 kg (199 lb 4.8 oz)   09/18/19 88.8 kg (195 lb 12.8 oz)   08/12/19 88.9 kg (196 lb)   There were no vitals taken for this visit.  Patient alert and oriented x3.   PERRLA. EOMI. No scleral icterus noted. OP without thrush/sores.  Neck exam: Palpable 0.5 cm anterior cervical node on the left, rubbery, mobile, non-tender.   Heart: RRR no murmurs noted.   Lungs: clear to auscultation bilaterally.  No crackles or wheezing.   Abd: Normal bowel sounds.  No tenderness to palpation. No suprapubic tenderness.  No hepatomegaly.   Extremities: No lower extremity edema.   Back: No CVA tenderness.   Neuro: grossly intact.   Mood and affect is stable.     Labs/Imaging:  Recent Labs   Lab Test 01/07/20  1014 11/06/19  1338 09/18/19  1217 08/27/19  1600 08/07/19  1635    138 137 139 134   POTASSIUM 4.6 4.1 4.4 4.6 4.2   CHLORIDE 106 107 107 105 104   CO2 27 26 25 30 26   ANIONGAP 5 6 5 4 4   BUN 22 15 13 19 25   CR 0.82 0.93 0.84 1.01 0.79   * 94 108* 94 99   WILL 9.9 9.2 9.1 9.6 8.8     Recent Labs   Lab Test 12/21/16  1643 11/22/16  1221 10/26/16  1150 09/28/16  1137 08/17/16  1355  05/03/16  0955   MAG 2.2 2.4* 2.2 2.3 2.0   < > 2.0   PHOS  --   --   --   --    --   --  4.1    < > = values in this interval not displayed.     Recent Labs   Lab Test 01/07/20  1014 11/06/19  1338 09/18/19  1217 08/27/19  1600 08/07/19  1635   WBC 6.6 5.6 5.7 5.9 8.3   HGB 11.8* 11.3* 11.3* 11.5* 11.8*    278 270 291 287   MCV 93 92 92 92 95   NEUTROPHIL 51.5 50.1 52.1 49.9 65.9     Recent Labs   Lab Test 01/07/20  1014 11/06/19  1338 09/18/19  1217   BILITOTAL 0.4 0.6 0.5   ALKPHOS 102 51 54   ALT 47 32 27   AST 21 15 12   ALBUMIN 4.0 4.2 4.0    172 203     TSH   Date Value Ref Range Status   01/07/2020 10.16 (H) 0.40 - 4.00 mU/L Final   07/17/2019 1.29 0.40 - 4.00 mU/L Final   06/26/2019 2.04 0.40 - 4.00 mU/L Final     No results for input(s): CEA in the last 03137 hours.  Results for orders placed or performed during the hospital encounter of 01/10/20   CT Chest/Abdomen/Pelvis w Contrast    Narrative    CT CHEST/ABDOMEN/PELVIS W CONTRAST 1/10/2020 1:06 PM    History: Prostate cancer with extensive metastasis; Malignant neoplasm  of prostate (H); Bone metastasis (H); Hypothyroidism due to acquired  atrophy of thyroid; Hypothyroidism, unspecified type; Neuropathy;  Night sweats    Comparison: CT chest abdomen pelvis 6/26/2018 Nuclear medicine bone  scan 6/26/2019, 12/21/2018    Technique: Helical CT acquisition from the lung apices to the pubic  symphysis was obtained with intravenous contrast. Axial, coronal, and  sagittal reconstructions were obtained and reviewed.    Contrast: 122 cc of isovue 370    Findings:     CHEST:     Lungs: No pneumothorax, pleural effusion, focal airspace opacity, or  suspicious pulmonary nodule.  Airways: Central tracheobronchial tree is clear.  Vessels: Main pulmonary artery and aorta are normal in caliber. Normal  three-vessel arch.  Heart: Heart size is normal without pericardial effusion.  Lymph nodes: No suspicious mediastinal or hilar lymphadenopathy.  Thyroid: Within normal limits.  Esophagus: Within normal limits    ABDOMEN PELVIS:    Liver:  Normal parenchymal attenuation without focal mass. Subcapsular  hypoattenuation along the falciform ligament, likely focal fatty  infiltration.  Biliary system: Gallbladder is within normal limits. No intrahepatic  or extrahepatic biliary ductal dilatation.  Pancreas: No focal mass or dilation of the main pancreatic duct.  Stomach: Within normal limits.  Spleen: Within normal limits.  Adrenal glands: Within normal limits.  Kidneys: No focal mass, hydronephrosis, or stone.  Bladder: Within normal limits.  Reproductive organs: Within normal limits.  Colon: Scattered colonic diverticuli without adjacent fat stranding.  Moderate fecal burden throughout the colon.  Appendix: Within normal limits.  Small Bowel: Within normal limits.  Lymph nodes: No intra-abdominal or pelvic lymphadenopathy.  Vasculature: Within normal limits. Normal variants of replaced right  hepatic artery off the SMA.    Mild stranding within the central mesentery which is stable.    Soft tissues: No suspicious soft tissue mass or fluid collection.   Bones: Relatively diffuse sclerosis of the bones. This is  predominantly stable from previous examination, however there is  increase in size in focal sclerotic lesion in the right aspect of the  L4 vertebral body on series 3, image 414 measuring 1.6 x 1.2 cm,  previously 0.9 x 0.9 cm. There is a new hypoattenuating rim  surrounding this lesion. Similar appearance to a new sclerotic focus  within the inferior and left lateral aspect of the L4 vertebral body  on series 3, image 427 with a central sclerotic focus measuring  0.8-0.9 cm with a hyperattenuating rim. Multiple Schmorl's nodes with  a particularly deep Schmorl's node at T12. Moderate degenerative  changes at L4-5.      Impression    IMPRESSION: In this patient with history of prostate cancer status  post orchiectomy and hormone associated therapy:  1. New and enlarging sclerotic foci within the L4 vertebral body with  a surrounding new  hypoattenuating rim. These are favored to be new  metastatic lesions.  2. No other evidence for new recurrent or metastatic disease.    I have personally reviewed the examination and initial interpretation  and I agree with the findings.    PATRICIA WELDON MD     Recent Labs   Lab Test 01/07/20  1014 11/06/19  1338 09/18/19  1217 08/27/19  1600 08/07/19  1635   PSA 90.10* 52.80* 43.00* 38.20* 28.10*   ALKPHOS 102 51 54 51 53    172 203 189 190        ASSESSMENT/PLAN:  1. Metastatic prostate cancer with bony metastasis:   Currently taking abiraterone 1000 mg po q day and prednisone 5 mg po qday  Denosumab every 12 weeks   Newly diagnosed HTN and DM TII  ECOG PS 0    I had a lengthy discussion with Albert, who is accompanied by his wife Lorrie at this clinic visit.  Albert had a vasovagal episode while he was having his lab drawn.  He notes that they had to poke him pretty hard to get his venous access.  He was uncomfortable sitting and was lying down during the exam visit.  His systolic blood pressure had dropped by 30 mmHg.  He today noted that he has had dizziness off and on for the last month.  It is pretty hard to work up the dizziness, as this can come from a range of causes.  Dehydration often presents with dizziness.  He has been trying to take enough fluids and notes that he is dehydrated.  There are no other reasons for having autonomic dysfunction.  He does have diabetes and is on antihypertensives, which can contribute to dizziness.  However, he is taking only amlodipine and usually has a good blood pressure.  I will give him a bolus of 1 liter today, and we will watch out for his symptoms.  If he improves, then we could proceed with the planned chemotherapy.  If his symptoms persist, we will try to figure out additional workup, as this could be endocrine in nature or it could be from intracranial causes.      He has tingling in his left hand and arm.  This is the first time he has complained of  this.  This could be either from nonmalignant causes like degenerative disk disease of the cervical spine, or there could be a tumor pressing on one of the nerves in the area.  If his symptoms persist, I would get a cervical spine MRI done for further evaluation.      His PSA has continued to rise and he is aware of this.  His CT scan and bone scan have suggested progressive disease.  There was a lot of discussion and debate with him and his wife about what it means.  He pointed out that he has always had disease involving almost all of the bones, and so the reappearance of these lesions does not necessarily mean new disease.  His bone scan does show spots that are much more prominent now than in the past.  However, he is correct that almost every single bone had been affected by cancer.      I recommend having a Port-A-Cath placed for him, as he would likely need this chemotherapy for a long period of time.  This would be more for his ease and the difficulty with phlebotomy, like the vasovagal syncope today.  He would like to think more about it, and his wife is open to explaining him about it.      He will be seen either by Melanie or me prior to each infusion.  We will follow his response primarily based on his lab values.  Hopefully, with treatment he should actually feel a little better and PSA should get a lot better.     Over 45 min of direct face to face time spent with patient with more than 50% time spent in counseling and coordinating care.      Again, thank you for allowing me to participate in the care of your patient.      Sincerely,    Tyrel Valladares MD

## 2020-01-14 NOTE — PATIENT INSTRUCTIONS
Contact Numbers    [facility] [place] (for Scheduling/Triage/After Hours Nurse Line): [phone]    Please call the [facility] nurse triage or the after hours nurse line if you experience a temperature greater than or equal to 100.5, shaking chills, have uncontrolled nausea, vomiting and/or diarrhea, dizziness, lightheadedness, shortness of breath, chest pain, bleeding, unexplained bruising, or if you have any other new/concerning symptoms, questions or concerns.     If you are having any concerning symptoms or wish to speak to a provider before your next infusion visit, please call your care coordinator or triage to notify them so we can adequately serve you.     If you need a refill on a narcotic prescription or other medication, please call triage before your infusion appointment.       January 2020 Sunday Monday Tuesday Wednesday Thursday Friday Saturday                  1     2     3     4       5     6     7    [facility] LAB DRAW   9:45 AM   (15 min.)    [facility] LAB DRAW   [facility] Lab Draw    P RETURN  10:15 AM   (30 min.)   Tyrel Valladares MD   [facility] 8     9     10    NM INJ  12:00 PM   (15 min.)   [id number]   [facility], Nuclear Medicine    CT CHEST/ABDOMEN/PELVIS W  12:20 PM   (20 min.)   [id number]   [facility], CT    NM BONE SCAN WHOLE BODY   3:00 PM   (60 min.)   [id number]   [facility], Nuclear Medicine 11       12     13     14    P [facility] LAB DRAW  11:00 AM   (15 min.)    [facility] LAB DRAW   [facility] Lab Draw    [facility] RETURN  11:15 AM   (30 min.)   Tyrel Valladares MD   [facility] ONC INFUSION 120  12:30 PM   (120 min.)   [facility]ION   [facility] 15     16     17     18       19     20     21     22     23     24     25       26     27     28     29     30     31                      February 2020 Sunday Monday Tuesday Wednesday Thursday Friday Saturday                                  1       2     3     4    [facility] LAB DRAW   6:30 AM   (15 min.)    [facility] LAB DRAW   [facility] Lab Draw    P RETURN   6:45 AM   (50 min.)   Anay Dan PA-C   [facility]   8:00 AM   (120 min.)   [facility]   [facility] 5     6    [facility] NEW   9:25 AM   (80 min.)   Lynda Brown MD   [facility] 7     8       9     10     11     12     13     14     15       16     17     18     19     20     21     22       23     24    [facility] LAB DRAW  12:15 PM   (15 min.)    [facility] LAB DRAW   [facility] Lab Draw    [facility] RETURN  12:25 PM   (50 min.)   Anay Dan PA-C   [facility] ONC INFUSION 120   1:30 PM   (120 min.)    ONCOLOGY INFUSION   [facility] 25     26     27     28     29                     Lab Results:  Recent Results (from the past 12 hour(s))   Comprehensive metabolic panel    Collection Time: 01/14/20 11:40 AM   Result Value Ref Range    Sodium 139 133 - 144 mmol/L    Potassium 3.8 3.4 - 5.3 mmol/L    Chloride 105 94 - 109 mmol/L    Carbon Dioxide 28 20 - 32 mmol/L    Anion Gap 6 3 - 14 mmol/L    Glucose 103 (H) 70 - 99 mg/dL    Urea Nitrogen 23 7 - 30 mg/dL    Creatinine 1.01 0.66 - 1.25 mg/dL    GFR Estimate 84 >60 mL/min/[1.73_m2]    GFR Estimate If Black >90 >60 mL/min/[1.73_m2]    Calcium 10.4 (H) 8.5 - 10.1 mg/dL    Bilirubin Total 0.6 0.2 - 1.3 mg/dL    Albumin 4.4 3.4 - 5.0 g/dL    Protein Total 8.2 6.8 - 8.8 g/dL    Alkaline Phosphatase 113 40 - 150 U/L    ALT 64 0 - 70 U/L    AST 24 0 - 45 U/L   CBC with platelets differential    Collection Time: 01/14/20 11:40 AM   Result Value Ref Range    WBC 5.3 4.0 - 11.0 10e9/L    RBC Count 3.96 (L) 4.4 - 5.9 10e12/L    Hemoglobin 12.0 (L) 13.3 - 17.7 g/dL    Hematocrit 36.7 (L) 40.0 - 53.0 %    MCV 93 78 - 100 fl    MCH 30.3 26.5 - 33.0 pg    MCHC 32.7 31.5 - 36.5 g/dL     RDW 13.2 10.0 - 15.0 %    Platelet Count 291 150 - 450 10e9/L    Diff Method Automated Method     % Neutrophils 56.6 %    % Lymphocytes 32.3 %    % Monocytes 7.2 %    % Eosinophils 2.7 %    % Basophils 0.8 %    % Immature Granulocytes 0.4 %    Nucleated RBCs 0 0 /100    Absolute Neutrophil 3.0 1.6 - 8.3 10e9/L    Absolute Lymphocytes 1.7 0.8 - 5.3 10e9/L    Absolute Monocytes 0.4 0.0 - 1.3 10e9/L    Absolute Eosinophils 0.1 0.0 - 0.7 10e9/L    Absolute Basophils 0.0 0.0 - 0.2 10e9/L    Abs Immature Granulocytes 0.0 0 - 0.4 10e9/L    Absolute Nucleated RBC 0.0    PSA tumor marker    Collection Time: 01/14/20 11:40 AM   Result Value Ref Range    .00 (H) 0 - 4 ug/L   Lactate Dehydrogenase    Collection Time: 01/14/20 11:40 AM   Result Value Ref Range    Lactate Dehydrogenase 181 85 - 227 U/L

## 2020-01-14 NOTE — NURSING NOTE
Chief Complaint   Patient presents with     Blood Draw     Labs drawn via PIV by RN in lab. VS taken.      Labs drawn via peripheral IV. Vital signs taken. Checked into next appointment.   Netta Jo RN

## 2020-01-14 NOTE — PROGRESS NOTES
[facility]INIC  FOLLOW-UP VISIT NOTE  Date of visit: Jan 14, 2020     Cancer History:   Metastatic Prostate CA treated    - s/p 6 cycles of taxotere 75mg/m2   - s/p orchiectomy on 3/18/2016   - s/p Provenge 5/13, 5/27, 6/10   - s/p Casodex 50 mg daily March/April   - s/p Zytiga    HPI: Albert is a 54 year old gentleman with metastatic prostate cancer.  Albert felt a cramp in his gluteus muscle and could barely walk after doing some remodeling at home and unloading heavy truck at work. He tried flexeril and prednisone initially but eventually presented to ED on 10/5/15. He feels that initially he was nearly labeled as drug seeker since he was in lot of discomfort and flexeril was not working. But during course of ED visits labs were drawn and he had marked elevation in Alk Phosphatase (over 1000). CT did suggest some ileus with some sclerotic bone lesions. He was admitted to the hospital. His PSA was checked and was markedly elevated at 5760 (10/6/15), repeat value 5563.     Urology and Medical Oncology consults were placed. He was unhappy with the progress in hospital and felt no better and left the following day on 10/6. He did follow up with Dr. Freire and had transrectal US guided biopsy of prostate on 10/8/15. They have the results through my chart which confirms prostate adenocarcinoma - enrico 4+3 involving majority (60-90%) portion of every single core.  He started on taxotere on 10/23 and completed 6 cycles of therapy in 2/2016.  He then underwent orchiectomy on 3/18/2016.       Throughout spring of 2016 Albert's PSA started to progress and after three elevations there was concern about him being hormone refractory.  He was started on Provenge and enrolled in the trial including Indoximod. PSA trending up and started on Casodex mid March with progression. Switched to Zytiga 5/3/17.   He held his abiraterone in July 2019 with rising PSA and fatigue. He had a decline in PSA after  holding his abiraterone. This has been on hold and he is being followed without any additional intervention. He had orchiectomy done previously and does not need ADT.       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     He is accompanied by his wife at this visit. He had a en episode of vaso-vagal syncope in the lab area during his blood draw. His blood pressure dropped about 30 mm of Hg. He is still dizzy. He had to lay down on exam table during the visit. He notes that he has dizziness off and on for the last month. He denies it being a vertigo feeling. He denies ear infection. He denies any other neurologic signs or symptoms other than tingling in his left arm. This is also episodic and not present all the time.     He is being seen with restaging scans with plans for chemotherapy to start today.      MEDS:   Current Outpatient Medications   Medication Sig     Acetaminophen (TYLENOL PO) Take 325 mg by mouth every 8 hours as needed for mild pain or fever Reported on 5/18/2017     amLODIPine (NORVASC) 5 MG tablet TAKE ONE TABLET BY MOUTH EVERY DAY     blood glucose monitoring (ACCU-CHEK MULTICLIX) lancets Use to test blood sugar twice times daily or as directed.     blood glucose monitoring (NO BRAND SPECIFIED) test strip Use to test blood sugars twice daily or as directed (fasting, rotate then second time - before lunch, before supper and bedtime) (Patient not taking: Reported on 8/12/2019)     diphenhydrAMINE (BENADRYL) 25 MG tablet Take 25 mg by mouth     EPINEPHrine (EPIPEN 2-CHARITY) 0.3 MG/0.3ML injection 2-pack Inject 0.3 mLs (0.3 mg) into the muscle once as needed for anaphylaxis (Patient not taking: Reported on 8/7/2019)     HYDROmorphone (DILAUDID) 2 MG tablet Take 1 tablet (2 mg) by mouth every 4 hours as needed for moderate to severe pain     IBUPROFEN PO Take by mouth as needed for moderate pain Reported on 5/18/2017     levothyroxine (SYNTHROID/LEVOTHROID) 88  MCG tablet Take 1 tablet (88 mcg) by mouth daily     metFORMIN (GLUCOPHAGE-XR) 750 MG 24 hr tablet Take 2 tablets (1,500 mg) by mouth daily (with dinner)     prochlorperazine (COMPAZINE) 5 MG tablet Take 1 tablet (5 mg) by mouth every 6 hours as needed for nausea or vomiting     valACYclovir (VALTREX) 1000 mg tablet Take 1,000 mg by mouth 3 times daily     No current facility-administered medications for this visit.         EXAM:  ECOG 1  Wt Readings from Last 4 Encounters:   01/07/20 90.3 kg (199 lb 1.6 oz)   11/06/19 90.4 kg (199 lb 4.8 oz)   09/18/19 88.8 kg (195 lb 12.8 oz)   08/12/19 88.9 kg (196 lb)   There were no vitals taken for this visit.  Patient alert and oriented x3.   PERRLA. EOMI. No scleral icterus noted. OP without thrush/sores.  Neck exam: Palpable 0.5 cm anterior cervical node on the left, rubbery, mobile, non-tender.   Heart: RRR no murmurs noted.   Lungs: clear to auscultation bilaterally.  No crackles or wheezing.   Abd: Normal bowel sounds.  No tenderness to palpation. No suprapubic tenderness.  No hepatomegaly.   Extremities: No lower extremity edema.   Back: No CVA tenderness.   Neuro: grossly intact.   Mood and affect is stable.     Labs/Imaging:  Recent Labs   Lab Test 01/07/20  1014 11/06/19  1338 09/18/19  1217 08/27/19  1600 08/07/19  1635    138 137 139 134   POTASSIUM 4.6 4.1 4.4 4.6 4.2   CHLORIDE 106 107 107 105 104   CO2 27 26 25 30 26   ANIONGAP 5 6 5 4 4   BUN 22 15 13 19 25   CR 0.82 0.93 0.84 1.01 0.79   * 94 108* 94 99   WILL 9.9 9.2 9.1 9.6 8.8     Recent Labs   Lab Test 12/21/16  1643 11/22/16  1221 10/26/16  1150 09/28/16  1137 08/17/16  1355  05/03/16  0955   MAG 2.2 2.4* 2.2 2.3 2.0   < > 2.0   PHOS  --   --   --   --   --   --  4.1    < > = values in this interval not displayed.     Recent Labs   Lab Test 01/07/20  1014 11/06/19  1338 09/18/19  1217 08/27/19  1600 08/07/19  1635   WBC 6.6 5.6 5.7 5.9 8.3   HGB 11.8* 11.3* 11.3* 11.5* 11.8*    856 550  291 287   MCV 93 92 92 92 95   NEUTROPHIL 51.5 50.1 52.1 49.9 65.9     Recent Labs   Lab Test 01/07/20  1014 11/06/19  1338 09/18/19  1217   BILITOTAL 0.4 0.6 0.5   ALKPHOS 102 51 54   ALT 47 32 27   AST 21 15 12   ALBUMIN 4.0 4.2 4.0    172 203     TSH   Date Value Ref Range Status   01/07/2020 10.16 (H) 0.40 - 4.00 mU/L Final   07/17/2019 1.29 0.40 - 4.00 mU/L Final   06/26/2019 2.04 0.40 - 4.00 mU/L Final     No results for input(s): CEA in the last 17286 hours.  Results for orders placed or performed during the hospital encounter of 01/10/20   CT Chest/Abdomen/Pelvis w Contrast    Narrative    CT CHEST/ABDOMEN/PELVIS W CONTRAST 1/10/2020 1:06 PM    History: Prostate cancer with extensive metastasis; Malignant neoplasm  of prostate (H); Bone metastasis (H); Hypothyroidism due to acquired  atrophy of thyroid; Hypothyroidism, unspecified type; Neuropathy;  Night sweats    Comparison: CT chest abdomen pelvis 6/26/2018 Nuclear medicine bone  scan 6/26/2019, 12/21/2018    Technique: Helical CT acquisition from the lung apices to the pubic  symphysis was obtained with intravenous contrast. Axial, coronal, and  sagittal reconstructions were obtained and reviewed.    Contrast: 122 cc of isovue 370    Findings:     CHEST:     Lungs: No pneumothorax, pleural effusion, focal airspace opacity, or  suspicious pulmonary nodule.  Airways: Central tracheobronchial tree is clear.  Vessels: Main pulmonary artery and aorta are normal in caliber. Normal  three-vessel arch.  Heart: Heart size is normal without pericardial effusion.  Lymph nodes: No suspicious mediastinal or hilar lymphadenopathy.  Thyroid: Within normal limits.  Esophagus: Within normal limits    ABDOMEN PELVIS:    Liver: Normal parenchymal attenuation without focal mass. Subcapsular  hypoattenuation along the falciform ligament, likely focal fatty  infiltration.  Biliary system: Gallbladder is within normal limits. No intrahepatic  or extrahepatic biliary  ductal dilatation.  Pancreas: No focal mass or dilation of the main pancreatic duct.  Stomach: Within normal limits.  Spleen: Within normal limits.  Adrenal glands: Within normal limits.  Kidneys: No focal mass, hydronephrosis, or stone.  Bladder: Within normal limits.  Reproductive organs: Within normal limits.  Colon: Scattered colonic diverticuli without adjacent fat stranding.  Moderate fecal burden throughout the colon.  Appendix: Within normal limits.  Small Bowel: Within normal limits.  Lymph nodes: No intra-abdominal or pelvic lymphadenopathy.  Vasculature: Within normal limits. Normal variants of replaced right  hepatic artery off the SMA.    Mild stranding within the central mesentery which is stable.    Soft tissues: No suspicious soft tissue mass or fluid collection.   Bones: Relatively diffuse sclerosis of the bones. This is  predominantly stable from previous examination, however there is  increase in size in focal sclerotic lesion in the right aspect of the  L4 vertebral body on series 3, image 414 measuring 1.6 x 1.2 cm,  previously 0.9 x 0.9 cm. There is a new hypoattenuating rim  surrounding this lesion. Similar appearance to a new sclerotic focus  within the inferior and left lateral aspect of the L4 vertebral body  on series 3, image 427 with a central sclerotic focus measuring  0.8-0.9 cm with a hyperattenuating rim. Multiple Schmorl's nodes with  a particularly deep Schmorl's node at T12. Moderate degenerative  changes at L4-5.      Impression    IMPRESSION: In this patient with history of prostate cancer status  post orchiectomy and hormone associated therapy:  1. New and enlarging sclerotic foci within the L4 vertebral body with  a surrounding new hypoattenuating rim. These are favored to be new  metastatic lesions.  2. No other evidence for new recurrent or metastatic disease.    I have personally reviewed the examination and initial interpretation  and I agree with the findings.    PATRICIA  MD FATEMEH     Recent Labs   Lab Test 01/07/20  1014 11/06/19  1338 09/18/19  1217 08/27/19  1600 08/07/19  1635   PSA 90.10* 52.80* 43.00* 38.20* 28.10*   ALKPHOS 102 51 54 51 53    172 203 189 190        ASSESSMENT/PLAN:  1. Metastatic prostate cancer with bony metastasis:   Currently taking abiraterone 1000 mg po q day and prednisone 5 mg po qday  Denosumab every 12 weeks   Newly diagnosed HTN and DM TII  ECOG PS 0    I had a lengthy discussion with Albert, who is accompanied by his wife Lorrie at this clinic visit.  Albert had a vasovagal episode while he was having his lab drawn.  He notes that they had to poke him pretty hard to get his venous access.  He was uncomfortable sitting and was lying down during the exam visit.  His systolic blood pressure had dropped by 30 mmHg.  He today noted that he has had dizziness off and on for the last month.  It is pretty hard to work up the dizziness, as this can come from a range of causes.  Dehydration often presents with dizziness.  He has been trying to take enough fluids and notes that he is dehydrated.  There are no other reasons for having autonomic dysfunction.  He does have diabetes and is on antihypertensives, which can contribute to dizziness.  However, he is taking only amlodipine and usually has a good blood pressure.  I will give him a bolus of 1 liter today, and we will watch out for his symptoms.  If he improves, then we could proceed with the planned chemotherapy.  If his symptoms persist, we will try to figure out additional workup, as this could be endocrine in nature or it could be from intracranial causes.      He has tingling in his left hand and arm.  This is the first time he has complained of this.  This could be either from nonmalignant causes like degenerative disk disease of the cervical spine, or there could be a tumor pressing on one of the nerves in the area.  If his symptoms persist, I would get a cervical spine MRI done for  further evaluation.      His PSA has continued to rise and he is aware of this.  His CT scan and bone scan have suggested progressive disease.  There was a lot of discussion and debate with him and his wife about what it means.  He pointed out that he has always had disease involving almost all of the bones, and so the reappearance of these lesions does not necessarily mean new disease.  His bone scan does show spots that are much more prominent now than in the past.  However, he is correct that almost every single bone had been affected by cancer.      I recommend having a Port-A-Cath placed for him, as he would likely need this chemotherapy for a long period of time.  This would be more for his ease and the difficulty with phlebotomy, like the vasovagal syncope today.  He would like to think more about it, and his wife is open to explaining him about it.      He will be seen either by Melanie or me prior to each infusion.  We will follow his response primarily based on his lab values.  Hopefully, with treatment he should actually feel a little better and PSA should get a lot better.       Over 45 min of direct face to face time spent with patient with more than 50% time spent in counseling and coordinating care.

## 2020-01-14 NOTE — NURSING NOTE
"Oncology Rooming Note    January 14, 2020 11:55 AM   Albert Amaya is a 54 year old male who presents for:    Chief Complaint   Patient presents with     Blood Draw     Labs drawn via PIV by RN in lab. VS taken.      Oncology Clinic Visit     Return Prostate Ca     Initial Vitals: BP (!) 134/91   Pulse 82   Temp 98.3  F (36.8  C) (Oral)   Resp 16   Wt 87.9 kg (193 lb 11.2 oz)   SpO2 98%   BMI 29.45 kg/m   Estimated body mass index is 29.45 kg/m  as calculated from the following:    Height as of 8/12/19: 1.727 m (5' 8\").    Weight as of this encounter: 87.9 kg (193 lb 11.2 oz). Body surface area is 2.05 meters squared.  No Pain (0) Comment: Data Unavailable   No LMP for male patient.  Allergies reviewed: Yes  Medications reviewed: Yes    Medications: Medication refills not needed today.  Pharmacy name entered into [facility]: [facility] - [place], [place] - [address] [place] [address]    Clinical concerns: Patient had a syncopal episode in lab when the IV was placed. He is feeling a little better now; Scan results;      Cynthia Hawk CMA              "

## 2020-01-17 ENCOUNTER — TELEPHONE (OUTPATIENT)
Dept: ONCOLOGY | Facility: CLINIC | Age: 55
End: 2020-01-17

## 2020-01-17 NOTE — TELEPHONE ENCOUNTER
"Pt and spouse called in to triage reporting 2 days of central lower abd pain. Pt received C1 D1 Cabazitaxel on 1/14. Stated had one loose stool the day after, denied any nausea, vomiting, difficulty urinating or increased bloating, gas, distention. No skin changes such as rash, redness or bruising around abd. State pain is 2-3 constant, feels like \"a blow to the abd\" slight pain at lower back. Took one dilaudid at bedtime Wednesday, tried Tylenol, compazine and ginger yesterday, took lorazepam at night. Pushing fluids but appetite less than usual. Paged Dr. Valladares.    Per Dr. Valladares: continue to monitor, ensure good bowel habits and not constipated. If pain persists to Monday, see CHARLA in clinic. Called pt and spouse back with above information, both confirmed pt has been having daily bowel movements and verbalized understanding of instructions. Will monitor and seek care over the weekend if pain gets worse or follow-up in clinic as needed.  "

## 2020-01-21 ENCOUNTER — TELEPHONE (OUTPATIENT)
Dept: ONCOLOGY | Facility: CLINIC | Age: 55
End: 2020-01-21

## 2020-01-21 NOTE — TELEPHONE ENCOUNTER
"Pt and spouse called in to triage reporting blood in stool, larger amount than previously seen. Pt had C1 D1 Cabazitaxel on 1/14 and experienced lower abd pain for a few days afterward. He did notice a small amount of blood in stools on 1/16 and 1/17, then a normal stool 1/18 and 1/19. He is currently in [place] on business. Reported he had a formed, hard stool with some blood mixed in. 45 minutes later went to the bathroom again and saw andi blood in toilet bowl. Denied any acute abd pain, rectal pain, itching or history of hemorrhoids. Stated he's felt \"off\" all week, slightly dizzy but eating and drinking well. Supposed to be in [place] until Friday. Will send picture of stool he took to send spouse. Paged Dr. Valladares at 2:08 pm.    "

## 2020-01-21 NOTE — TELEPHONE ENCOUNTER
Per Dr. Valladares: seek care at ED for full work up. Pt given the choice to seek care locally or if willing to drive back home and be seen at [facility]. Dr. Valladares spoke with spouse Lorrie directly.

## 2020-01-22 ENCOUNTER — HOSPITAL ENCOUNTER (EMERGENCY)
Facility: CLINIC | Age: 55
Discharge: HOME | End: 2020-01-22
Attending: FAMILY MEDICINE | Admitting: FAMILY MEDICINE
Payer: COMMERCIAL

## 2020-01-22 ENCOUNTER — APPOINTMENT (OUTPATIENT)
Dept: CT IMAGING | Facility: CLINIC | Age: 55
End: 2020-01-22
Attending: FAMILY MEDICINE
Payer: COMMERCIAL

## 2020-01-22 VITALS
OXYGEN SATURATION: 98 % | SYSTOLIC BLOOD PRESSURE: 140 MMHG | RESPIRATION RATE: 16 BRPM | WEIGHT: 191.4 LBS | BODY MASS INDEX: 29.01 KG/M2 | HEART RATE: 67 BPM | HEIGHT: 68 IN | TEMPERATURE: 98.5 F | DIASTOLIC BLOOD PRESSURE: 94 MMHG

## 2020-01-22 DIAGNOSIS — K62.5 RECTAL BLEEDING: ICD-10-CM

## 2020-01-22 DIAGNOSIS — C61 MALIGNANT NEOPLASM OF PROSTATE (H): ICD-10-CM

## 2020-01-22 LAB
ABO + RH BLD: NORMAL
ABO + RH BLD: NORMAL
ALBUMIN SERPL-MCNC: 4.2 G/DL (ref 3.4–5)
ALP SERPL-CCNC: 101 U/L (ref 40–150)
ALT SERPL W P-5'-P-CCNC: 56 U/L (ref 0–70)
ANION GAP SERPL CALCULATED.3IONS-SCNC: 5 MMOL/L (ref 3–14)
APTT PPP: 29 SEC (ref 22–37)
AST SERPL W P-5'-P-CCNC: 21 U/L (ref 0–45)
BASOPHILS # BLD AUTO: 0 10E9/L (ref 0–0.2)
BASOPHILS NFR BLD AUTO: 1.1 %
BILIRUB SERPL-MCNC: 0.6 MG/DL (ref 0.2–1.3)
BLD GP AB SCN SERPL QL: NORMAL
BLOOD BANK CMNT PATIENT-IMP: NORMAL
BUN SERPL-MCNC: 19 MG/DL (ref 7–30)
CALCIUM SERPL-MCNC: 10.2 MG/DL (ref 8.5–10.1)
CHLORIDE SERPL-SCNC: 105 MMOL/L (ref 94–109)
CO2 SERPL-SCNC: 30 MMOL/L (ref 20–32)
CREAT SERPL-MCNC: 0.9 MG/DL (ref 0.66–1.25)
DIFFERENTIAL METHOD BLD: ABNORMAL
EOSINOPHIL # BLD AUTO: 0.1 10E9/L (ref 0–0.7)
EOSINOPHIL NFR BLD AUTO: 2.3 %
ERYTHROCYTE [DISTWIDTH] IN BLOOD BY AUTOMATED COUNT: 13.1 % (ref 10–15)
GFR SERPL CREATININE-BSD FRML MDRD: >90 ML/MIN/{1.73_M2}
GLUCOSE SERPL-MCNC: 115 MG/DL (ref 70–99)
HCT VFR BLD AUTO: 32.8 % (ref 40–53)
HGB BLD-MCNC: 10.8 G/DL (ref 13.3–17.7)
IMM GRANULOCYTES # BLD: 0 10E9/L (ref 0–0.4)
IMM GRANULOCYTES NFR BLD: 0.6 %
INR PPP: 1 (ref 0.86–1.14)
LIPASE SERPL-CCNC: 126 U/L (ref 73–393)
LYMPHOCYTES # BLD AUTO: 2.1 10E9/L (ref 0.8–5.3)
LYMPHOCYTES NFR BLD AUTO: 59.1 %
MCH RBC QN AUTO: 30.8 PG (ref 26.5–33)
MCHC RBC AUTO-ENTMCNC: 32.9 G/DL (ref 31.5–36.5)
MCV RBC AUTO: 93 FL (ref 78–100)
MONOCYTES # BLD AUTO: 0.2 10E9/L (ref 0–1.3)
MONOCYTES NFR BLD AUTO: 5.1 %
NEUTROPHILS # BLD AUTO: 1.1 10E9/L (ref 1.6–8.3)
NEUTROPHILS NFR BLD AUTO: 31.8 %
NRBC # BLD AUTO: 0 10*3/UL
NRBC BLD AUTO-RTO: 0 /100
PLATELET # BLD AUTO: 295 10E9/L (ref 150–450)
POTASSIUM SERPL-SCNC: 4.8 MMOL/L (ref 3.4–5.3)
PROT SERPL-MCNC: 7.6 G/DL (ref 6.8–8.8)
RBC # BLD AUTO: 3.51 10E12/L (ref 4.4–5.9)
SODIUM SERPL-SCNC: 140 MMOL/L (ref 133–144)
SPECIMEN EXP DATE BLD: NORMAL
WBC # BLD AUTO: 3.5 10E9/L (ref 4–11)

## 2020-01-22 PROCEDURE — 96360 HYDRATION IV INFUSION INIT: CPT | Performed by: FAMILY MEDICINE

## 2020-01-22 PROCEDURE — 25800030 ZZH RX IP 258 OP 636: Performed by: FAMILY MEDICINE

## 2020-01-22 PROCEDURE — 86900 BLOOD TYPING SEROLOGIC ABO: CPT | Performed by: FAMILY MEDICINE

## 2020-01-22 PROCEDURE — 85610 PROTHROMBIN TIME: CPT | Performed by: FAMILY MEDICINE

## 2020-01-22 PROCEDURE — 99284 EMERGENCY DEPT VISIT MOD MDM: CPT | Mod: Z6 | Performed by: FAMILY MEDICINE

## 2020-01-22 PROCEDURE — 80053 COMPREHEN METABOLIC PANEL: CPT | Performed by: FAMILY MEDICINE

## 2020-01-22 PROCEDURE — 86901 BLOOD TYPING SEROLOGIC RH(D): CPT | Performed by: FAMILY MEDICINE

## 2020-01-22 PROCEDURE — 83690 ASSAY OF LIPASE: CPT | Performed by: FAMILY MEDICINE

## 2020-01-22 PROCEDURE — 96361 HYDRATE IV INFUSION ADD-ON: CPT | Performed by: FAMILY MEDICINE

## 2020-01-22 PROCEDURE — 85730 THROMBOPLASTIN TIME PARTIAL: CPT | Performed by: FAMILY MEDICINE

## 2020-01-22 PROCEDURE — 85025 COMPLETE CBC W/AUTO DIFF WBC: CPT | Performed by: FAMILY MEDICINE

## 2020-01-22 PROCEDURE — 74177 CT ABD & PELVIS W/CONTRAST: CPT

## 2020-01-22 PROCEDURE — 25000128 H RX IP 250 OP 636: Performed by: STUDENT IN AN ORGANIZED HEALTH CARE EDUCATION/TRAINING PROGRAM

## 2020-01-22 PROCEDURE — 86850 RBC ANTIBODY SCREEN: CPT | Performed by: FAMILY MEDICINE

## 2020-01-22 PROCEDURE — 99285 EMERGENCY DEPT VISIT HI MDM: CPT | Mod: 25 | Performed by: FAMILY MEDICINE

## 2020-01-22 RX ORDER — LIDOCAINE 40 MG/G
CREAM TOPICAL
Status: DISCONTINUED | OUTPATIENT
Start: 2020-01-22 | End: 2020-01-22 | Stop reason: HOSPADM

## 2020-01-22 RX ORDER — SODIUM CHLORIDE 9 MG/ML
1000 INJECTION, SOLUTION INTRAVENOUS CONTINUOUS
Status: DISCONTINUED | OUTPATIENT
Start: 2020-01-22 | End: 2020-01-22 | Stop reason: HOSPADM

## 2020-01-22 RX ORDER — IOPAMIDOL 755 MG/ML
118 INJECTION, SOLUTION INTRAVASCULAR ONCE
Status: COMPLETED | OUTPATIENT
Start: 2020-01-22 | End: 2020-01-22

## 2020-01-22 RX ADMIN — IOPAMIDOL 118 ML: 755 INJECTION, SOLUTION INTRAVENOUS at 14:11

## 2020-01-22 RX ADMIN — SODIUM CHLORIDE 1000 ML: 9 INJECTION, SOLUTION INTRAVENOUS at 12:32

## 2020-01-22 ASSESSMENT — ENCOUNTER SYMPTOMS
ABDOMINAL PAIN: 1
SHORTNESS OF BREATH: 0
ACTIVITY CHANGE: 1
APPETITE CHANGE: 1
NAUSEA: 1
BRUISES/BLEEDS EASILY: 0
TROUBLE SWALLOWING: 0
BACK PAIN: 0
COUGH: 0
BLOOD IN STOOL: 1
LIGHT-HEADEDNESS: 1
DECREASED CONCENTRATION: 0
WEAKNESS: 1
CONSTIPATION: 0
CONFUSION: 0
FEVER: 0
SORE THROAT: 0
DYSPHORIC MOOD: 0
FATIGUE: 1
HEMATURIA: 0

## 2020-01-22 ASSESSMENT — MIFFLIN-ST. JEOR: SCORE: 1687.68

## 2020-01-22 NOTE — TELEPHONE ENCOUNTER
"Pt's SO called back asking for direction on \"what to do \" today. Reviewing notes from yesterday explained that Dr Valladares wanted the Pt to be evaluated in an ED. Caller expressed desire to go to [facility] ED and I offered to call ahead which the caller agreed to. Called [facility] ED and gave report to charge RN.  "

## 2020-01-22 NOTE — DISCHARGE INSTRUCTIONS
Home.  Your labs are stable slight decrease in the hemoglobin.  CT scan did not show any concerning causes.  Discussed with GI and oncology.  OK home with close observation and return if worse symptoms.  Follow up with Dr Valladares  Call GI clinic for follow up.  Return if any concerns.    Please make an appointment to follow up with GI Clinic (phone: [phone]) as soon as possible.    Results for orders placed or performed during the hospital encounter of 01/22/20   CT Abdomen Pelvis w Contrast     Status: None (Preliminary result)    Narrative    Preliminary Report - The following report is a preliminary  interpretation.      Impression    Impression: In this patient with history of prostate cancer status  post orchiectomy and hormone associated therapy:    1. No evidence of acute GI bleed or acute intestinal pathology.    2. Scattered colonic diverticula without evidence of diverticulitis.    3. Diffuse sclerotic appearance of the axial and appendicular skeleton  with focal sclerotic foci within the lower thoracic and lumbar  vertebrae, unchanged from 1/10/2020. See bone scan 1/10/2020 for  further evaluation.   CBC with platelets differential     Status: Abnormal   Result Value Ref Range    WBC 3.5 (L) 4.0 - 11.0 10e9/L    RBC Count 3.51 (L) 4.4 - 5.9 10e12/L    Hemoglobin 10.8 (L) 13.3 - 17.7 g/dL    Hematocrit 32.8 (L) 40.0 - 53.0 %    MCV 93 78 - 100 fl    MCH 30.8 26.5 - 33.0 pg    MCHC 32.9 31.5 - 36.5 g/dL    RDW 13.1 10.0 - 15.0 %    Platelet Count 295 150 - 450 10e9/L    Diff Method Automated Method     % Neutrophils 31.8 %    % Lymphocytes 59.1 %    % Monocytes 5.1 %    % Eosinophils 2.3 %    % Basophils 1.1 %    % Immature Granulocytes 0.6 %    Nucleated RBCs 0 0 /100    Absolute Neutrophil 1.1 (L) 1.6 - 8.3 10e9/L    Absolute Lymphocytes 2.1 0.8 - 5.3 10e9/L    Absolute Monocytes 0.2 0.0 - 1.3 10e9/L    Absolute Eosinophils 0.1 0.0 - 0.7 10e9/L    Absolute Basophils 0.0 0.0 - 0.2 10e9/L    Abs Immature  Granulocytes 0.0 0 - 0.4 10e9/L    Absolute Nucleated RBC 0.0    INR     Status: None   Result Value Ref Range    INR 1.00 0.86 - 1.14   Partial thromboplastin time     Status: None   Result Value Ref Range    PTT 29 22 - 37 sec   Comprehensive metabolic panel     Status: Abnormal   Result Value Ref Range    Sodium 140 133 - 144 mmol/L    Potassium 4.8 3.4 - 5.3 mmol/L    Chloride 105 94 - 109 mmol/L    Carbon Dioxide 30 20 - 32 mmol/L    Anion Gap 5 3 - 14 mmol/L    Glucose 115 (H) 70 - 99 mg/dL    Urea Nitrogen 19 7 - 30 mg/dL    Creatinine 0.90 0.66 - 1.25 mg/dL    GFR Estimate >90 >60 mL/min/[1.73_m2]    GFR Estimate If Black >90 >60 mL/min/[1.73_m2]    Calcium 10.2 (H) 8.5 - 10.1 mg/dL    Bilirubin Total 0.6 0.2 - 1.3 mg/dL    Albumin 4.2 3.4 - 5.0 g/dL    Protein Total 7.6 6.8 - 8.8 g/dL    Alkaline Phosphatase 101 40 - 150 U/L    ALT 56 0 - 70 U/L    AST 21 0 - 45 U/L   Lipase     Status: None   Result Value Ref Range    Lipase 126 73 - 393 U/L   ABO/Rh type and screen     Status: None   Result Value Ref Range    ABO A     RH(D) Neg     Antibody Screen Neg     Test Valid Only At          [facility]    Specimen Expires 01/25/2020

## 2020-01-22 NOTE — ED AVS SNAPSHOT
[facility], [place], Emergency Department  [address] [place] [id number]  Phone:  [phone]                                    Albert Amaya   MRN: [id number]    Department:  [facility], Emergency Department   Date of Visit:  1/22/2020           After Visit Summary Signature Page    I have received my discharge instructions, and my questions have been answered. I have discussed any challenges I see with this plan with the nurse or doctor.    ..........................................................................................................................................  Patient/Patient Representative Signature      ..........................................................................................................................................  Patient Representative Print Name and Relationship to Patient    ..................................................               ................................................  Date                                   Time    ..........................................................................................................................................  Reviewed by Signature/Title    ...................................................              ..............................................  Date                                               Time          22EPIC Rev 08/18

## 2020-01-22 NOTE — ED PROVIDER NOTES
History     Chief Complaint   Patient presents with     Rectal Bleeding     HPI  Albert Amaya is a 54 year old male who presents emergency room with his wife for evaluation of 1 week of intermittent rectal bleeding along with lower abdominal pain.  Patient history of stage IV prostate cancer that is been on treatment is on a new treatment started a week ago refer to clinic note.  Patient had some typical side effects with some nausea etc.  He had a week ago the treatment started and then developed a brief course of diarrhea with this noted some bright red blood and clots intermittently he will have normal stools and also he has had chronic constant lower below the umbilicus abdominal pain without radiation to the back no dysuric symptoms or rash.  No fevers reported.  No coughing chest pain shortness of breath.  Patient noted yesterday had a normal bowel movement and then 45 minutes after that had explosive blood and clots.  Today he had a small black and white stool.  No history peptic ulcer disease or colitis etc.  Now presents for evaluation feels slightly lightheaded.  No syncope noted. No hx of radiation tx.    I have reviewed the Medications, Allergies, Past Medical and Surgical History, and Social History in the Epic system.    Review of Systems   Constitutional: Positive for activity change, appetite change and fatigue. Negative for fever.   HENT: Negative for sore throat and trouble swallowing.    Eyes: Negative for visual disturbance.   Respiratory: Negative for cough and shortness of breath.    Cardiovascular: Negative for chest pain and leg swelling.   Gastrointestinal: Positive for abdominal pain, blood in stool and nausea. Negative for constipation.        Lower subumbilical abdominal pain mild constant not affected by any other activities.  Slight nausea noted   Genitourinary: Negative for hematuria.   Musculoskeletal: Negative for back pain.   Skin: Negative for rash.   Allergic/Immunologic:  "Positive for immunocompromised state.   Neurological: Positive for weakness and light-headedness. Negative for syncope.   Hematological: Does not bruise/bleed easily.   Psychiatric/Behavioral: Negative for confusion, decreased concentration and dysphoric mood.   All other systems reviewed and are negative.      Physical Exam   BP: (!) 140/94  Pulse: 67  Temp: 98.5  F (36.9  C)  Resp: 16  Height: 172.7 cm (5' 8\")  Weight: 86.8 kg (191 lb 6.4 oz)(scale)  SpO2: 98 %      Physical Exam  Vitals signs and nursing note reviewed.   Constitutional:       General: He is in acute distress.      Appearance: He is well-developed. He is not ill-appearing or diaphoretic.      Comments: Patient pleasant here in the ER sitting in a chair in triage.  Wife present also will give history.  Patient's not writhing in pain.  He did take a Dilaudid last night which he normally takes anyway did help the abdominal pain   HENT:      Head: Normocephalic and atraumatic.      Mouth/Throat:      Mouth: Mucous membranes are moist.   Eyes:      General: No scleral icterus.     Extraocular Movements: Extraocular movements intact.      Conjunctiva/sclera: Conjunctivae normal.      Pupils: Pupils are equal, round, and reactive to light.   Neck:      Musculoskeletal: Normal range of motion and neck supple. No neck rigidity.   Cardiovascular:      Rate and Rhythm: Normal rate and regular rhythm.   Pulmonary:      Effort: No respiratory distress.      Breath sounds: Normal breath sounds.   Abdominal:      General: There is no distension.      Palpations: Abdomen is soft. There is no mass.      Tenderness: There is abdominal tenderness. There is no guarding.      Comments: Minimal nonlocalizing tenderness without rebound guarding rigidity no mass   Musculoskeletal:         General: No swelling or tenderness.   Skin:     General: Skin is warm and dry.      Capillary Refill: Capillary refill takes less than 2 seconds.      Coloration: Skin is not jaundiced " or pale.      Findings: No rash.   Neurological:      General: No focal deficit present.      Mental Status: He is alert and oriented to person, place, and time.   Psychiatric:      Comments: Affect is appropriate.         ED Course        Procedures         Patient evaluated here in the ER triage area.  IV established liter fluid given.  Labs are drawn and reviewed hemoglobin is 10.8 slightly down.  Other neutrophil white count platelets are stable.  Liver function test renal functions etc. otherwise stable also.  CT scan was done of the abdomen with contrast without any significant findings of inflammatory bowel changes pneumatosis obstruction masses inflammatory changes etc.  Patient been here several hours would like to go home and follow-up with this he is not had any bleeding today at all.  Abdomen seemed relatively soft nonrigid no mass.  Discussed case with GI and also oncology.  At this point it does not appear that the mobile cause any bleeding like this it may cause some diarrhea  GI feels outpatient follow-up either flex sig or colonoscopy would be appropriate.  Patient appears stable would like to go home has not had any bleeding today previous colonoscopy showed hemorrhoids primarily could be the cause unclear at this point patient to be discharged will follow-up for lab recheck also return if worsening symptoms and discharged with wife.       Critical Care time:  none             Labs Ordered and Resulted from Time of ED Arrival Up to the Time of Departure from the ED   CBC WITH PLATELETS DIFFERENTIAL - Abnormal; Notable for the following components:       Result Value    WBC 3.5 (*)     RBC Count 3.51 (*)     Hemoglobin 10.8 (*)     Hematocrit 32.8 (*)     Absolute Neutrophil 1.1 (*)     All other components within normal limits   COMPREHENSIVE METABOLIC PANEL - Abnormal; Notable for the following components:    Glucose 115 (*)     Calcium 10.2 (*)     All other components within normal limits   INR    PARTIAL THROMBOPLASTIN TIME   LIPASE   ABO/RH TYPE AND SCREEN     Results for orders placed or performed during the hospital encounter of 01/22/20   CT Abdomen Pelvis w Contrast     Status: None    Narrative    Examination:  CT ABDOMEN PELVIS W CONTRAST 1/22/2020 2:26 PM     History: Stage 4 prostate cancer new chemo with lower ab pain with  intermittent blood with stool and per rectum. eval for bowel changes    Comparison: Bone scan 1/10/2020 CT CAP 1/10/2019    Technique: CT of the abdomen and pelvis were obtained without contrast  and with IV contrast during the late arterial and portal venous phase.  Sagittal and coronal reconstructions created and reviewed.    Contrast: iopamidol (ISOVUE-370) solution 118 mL    Findings:     Abdomen and pelvis: Normal hepatic parenchyma focal hypoattenuation  along the falciform ligament, likely representing focal fatty  deposition. Gallbladder is partially dilated and unremarkable. No  intra- or extrahepatic biliary dilation. Spleen, pancreas, and adrenal  glands are unremarkable. Kidneys demonstrate symmetric enhancement  without solid lesions, hydronephrosis, or nephrolithiasis. A few tiny  too small to characterize low density areas may represent small cysts.  Ureters and urinary bladder are unremarkable. Prostate is surgically  absent.    No evidence of acute GI bleed. Stomach and small intestine are  unremarkable. Scattered colonic diverticula without evidence of acute  diverticulitis. Appendix is visualized and normal in caliber.     No suspicious abdominal or pelvic lymphadenopathy. No free fluid. No  pneumoperitoneum.    Abdominal aorta is normal in caliber and patent. Celiac and mesenteric  artery origins are unremarkable. Replaced right hepatic artery off the  SMA, normal variant Portal veins and IVC are patent.    Lower thorax: Unremarkable.    Bones and soft tissues: No acute osseous abnormalities. Diffuse  sclerotic appearance of the axial and appendicular  skeleton. Unchanged  14 mm sclerotic lesion in the L4 vertebral body. Additional unchanged  sclerotic lesions in the T11 and T12 vertebral bodies. Multilevel  Schmorl's node deformities.      Impression    Impression: In this patient with history of prostate cancer status  post orchiectomy and hormone associated therapy:    1. No evidence of acute GI bleed or acute intestinal pathology.    2. Scattered colonic diverticula without evidence of diverticulitis.    3. Diffuse sclerotic appearance of the axial and appendicular skeleton  with focal sclerotic foci within the lower thoracic and lumbar  vertebrae, unchanged from 1/10/2020. See bone scan 1/10/2020 for  further evaluation.    I have personally reviewed the examination and initial interpretation  and I agree with the findings.    BABITA MURCIA MD   CBC with platelets differential     Status: Abnormal   Result Value Ref Range    WBC 3.5 (L) 4.0 - 11.0 10e9/L    RBC Count 3.51 (L) 4.4 - 5.9 10e12/L    Hemoglobin 10.8 (L) 13.3 - 17.7 g/dL    Hematocrit 32.8 (L) 40.0 - 53.0 %    MCV 93 78 - 100 fl    MCH 30.8 26.5 - 33.0 pg    MCHC 32.9 31.5 - 36.5 g/dL    RDW 13.1 10.0 - 15.0 %    Platelet Count 295 150 - 450 10e9/L    Diff Method Automated Method     % Neutrophils 31.8 %    % Lymphocytes 59.1 %    % Monocytes 5.1 %    % Eosinophils 2.3 %    % Basophils 1.1 %    % Immature Granulocytes 0.6 %    Nucleated RBCs 0 0 /100    Absolute Neutrophil 1.1 (L) 1.6 - 8.3 10e9/L    Absolute Lymphocytes 2.1 0.8 - 5.3 10e9/L    Absolute Monocytes 0.2 0.0 - 1.3 10e9/L    Absolute Eosinophils 0.1 0.0 - 0.7 10e9/L    Absolute Basophils 0.0 0.0 - 0.2 10e9/L    Abs Immature Granulocytes 0.0 0 - 0.4 10e9/L    Absolute Nucleated RBC 0.0    INR     Status: None   Result Value Ref Range    INR 1.00 0.86 - 1.14   Partial thromboplastin time     Status: None   Result Value Ref Range    PTT 29 22 - 37 sec   Comprehensive metabolic panel     Status: Abnormal   Result Value Ref Range     Sodium 140 133 - 144 mmol/L    Potassium 4.8 3.4 - 5.3 mmol/L    Chloride 105 94 - 109 mmol/L    Carbon Dioxide 30 20 - 32 mmol/L    Anion Gap 5 3 - 14 mmol/L    Glucose 115 (H) 70 - 99 mg/dL    Urea Nitrogen 19 7 - 30 mg/dL    Creatinine 0.90 0.66 - 1.25 mg/dL    GFR Estimate >90 >60 mL/min/[1.73_m2]    GFR Estimate If Black >90 >60 mL/min/[1.73_m2]    Calcium 10.2 (H) 8.5 - 10.1 mg/dL    Bilirubin Total 0.6 0.2 - 1.3 mg/dL    Albumin 4.2 3.4 - 5.0 g/dL    Protein Total 7.6 6.8 - 8.8 g/dL    Alkaline Phosphatase 101 40 - 150 U/L    ALT 56 0 - 70 U/L    AST 21 0 - 45 U/L   Lipase     Status: None   Result Value Ref Range    Lipase 126 73 - 393 U/L   ABO/Rh type and screen     Status: None   Result Value Ref Range    ABO A     RH(D) Neg     Antibody Screen Neg     Test Valid Only At          [facility]    Specimen Expires 01/25/2020      CT Abdomen Pelvis w Contrast   Final Result   Impression: In this patient with history of prostate cancer status   post orchiectomy and hormone associated therapy:      1. No evidence of acute GI bleed or acute intestinal pathology.      2. Scattered colonic diverticula without evidence of diverticulitis.      3. Diffuse sclerotic appearance of the axial and appendicular skeleton   with focal sclerotic foci within the lower thoracic and lumbar   vertebrae, unchanged from 1/10/2020. See bone scan 1/10/2020 for   further evaluation.      I have personally reviewed the examination and initial interpretation   and I agree with the findings.      BABITA MURCIA MD               Assessments & Plan (with Medical Decision Making)  54-year-old male history of stage IV prostate cancer started new chemo a week ago.  Patient had some diarrhea for few days but noted some blood in the stool has had this for the last week slight lightheadedness no fevers no history of radiation previous colonoscopy did show some hemorrhoids primarily patient having  normal stool today relatively without bleeding yesterday had normal stool and then 45 minutes later blood and clots.  No other significant bleeding noted since then vitally stable hemoglobin slightly down 10.8 compared to 12-week ago.  Platelets coags liver function tests white count etc. otherwise stable.  IV fluids given CT scan done without any inflammatory changes or any significant findings to be consistent with patient's symptoms discussed with oncology and GI.  At this point patient like to go home and follow-up as an outpatient with GI I did place referral consider flex sig or colonoscopy return if increased bleeding concerns etc.  Patient should follow-up with oncology recheck labs also next few days.         I have reviewed the nursing notes.    I have reviewed the findings, diagnosis, plan and need for follow up with the patient.    Discharge Medication List as of 1/22/2020  4:21 PM          Final diagnoses:   Rectal bleeding   Malignant neoplasm of prostate (H)       1/22/2020   [facility], [place], EMERGENCY DEPARTMENT    This note was created at least in part by the use of dragon voice dictation system. Inadvertent typographical errors may still exist.  Jaime Velasco MD.         Jaime Velasco MD  01/22/20 [id number]

## 2020-01-22 NOTE — ED TRIAGE NOTES
Patient presents to triage with c/o bloody stools. Patient reports multiple days of bloody stools, also reports abdominal pain since last chemo infusion. Hx of prostate cancer. Also reports feeling lightheaded.

## 2020-02-04 ENCOUNTER — APPOINTMENT (OUTPATIENT)
Dept: LAB | Facility: CLINIC | Age: 55
End: 2020-02-04
Attending: PHYSICIAN ASSISTANT
Payer: COMMERCIAL

## 2020-02-04 ENCOUNTER — INFUSION THERAPY VISIT (OUTPATIENT)
Dept: ONCOLOGY | Facility: CLINIC | Age: 55
End: 2020-02-04
Attending: PHYSICIAN ASSISTANT
Payer: COMMERCIAL

## 2020-02-04 VITALS
DIASTOLIC BLOOD PRESSURE: 89 MMHG | HEIGHT: 68 IN | RESPIRATION RATE: 16 BRPM | BODY MASS INDEX: 28.85 KG/M2 | TEMPERATURE: 97.8 F | SYSTOLIC BLOOD PRESSURE: 137 MMHG | HEART RATE: 88 BPM | WEIGHT: 190.4 LBS | OXYGEN SATURATION: 100 %

## 2020-02-04 DIAGNOSIS — C61 MALIGNANT NEOPLASM OF PROSTATE (H): ICD-10-CM

## 2020-02-04 DIAGNOSIS — E03.9 HYPOTHYROIDISM, UNSPECIFIED TYPE: ICD-10-CM

## 2020-02-04 DIAGNOSIS — R61 NIGHT SWEATS: ICD-10-CM

## 2020-02-04 DIAGNOSIS — G62.9 NEUROPATHY: ICD-10-CM

## 2020-02-04 DIAGNOSIS — E03.4 HYPOTHYROIDISM DUE TO ACQUIRED ATROPHY OF THYROID: ICD-10-CM

## 2020-02-04 DIAGNOSIS — C79.51 BONE METASTASIS: Primary | ICD-10-CM

## 2020-02-04 DIAGNOSIS — C79.51 BONE METASTASIS: ICD-10-CM

## 2020-02-04 LAB
ALBUMIN SERPL-MCNC: 4.1 G/DL (ref 3.4–5)
ALP SERPL-CCNC: 95 U/L (ref 40–150)
ALT SERPL W P-5'-P-CCNC: 32 U/L (ref 0–70)
ANION GAP SERPL CALCULATED.3IONS-SCNC: 4 MMOL/L (ref 3–14)
AST SERPL W P-5'-P-CCNC: 21 U/L (ref 0–45)
BASOPHILS # BLD AUTO: 0.1 10E9/L (ref 0–0.2)
BASOPHILS NFR BLD AUTO: 0.9 %
BILIRUB SERPL-MCNC: 0.5 MG/DL (ref 0.2–1.3)
BUN SERPL-MCNC: 22 MG/DL (ref 7–30)
CALCIUM SERPL-MCNC: 10.2 MG/DL (ref 8.5–10.1)
CHLORIDE SERPL-SCNC: 108 MMOL/L (ref 94–109)
CO2 SERPL-SCNC: 29 MMOL/L (ref 20–32)
CREAT SERPL-MCNC: 0.92 MG/DL (ref 0.66–1.25)
DIFFERENTIAL METHOD BLD: ABNORMAL
EOSINOPHIL # BLD AUTO: 0.1 10E9/L (ref 0–0.7)
EOSINOPHIL NFR BLD AUTO: 1.9 %
ERYTHROCYTE [DISTWIDTH] IN BLOOD BY AUTOMATED COUNT: 13 % (ref 10–15)
GFR SERPL CREATININE-BSD FRML MDRD: >90 ML/MIN/{1.73_M2}
GLUCOSE SERPL-MCNC: 105 MG/DL (ref 70–99)
HCT VFR BLD AUTO: 33.9 % (ref 40–53)
HGB BLD-MCNC: 11.2 G/DL (ref 13.3–17.7)
IMM GRANULOCYTES # BLD: 0 10E9/L (ref 0–0.4)
IMM GRANULOCYTES NFR BLD: 0.4 %
LDH SERPL L TO P-CCNC: 196 U/L (ref 85–227)
LYMPHOCYTES # BLD AUTO: 2.6 10E9/L (ref 0.8–5.3)
LYMPHOCYTES NFR BLD AUTO: 49 %
MCH RBC QN AUTO: 30.2 PG (ref 26.5–33)
MCHC RBC AUTO-ENTMCNC: 33 G/DL (ref 31.5–36.5)
MCV RBC AUTO: 91 FL (ref 78–100)
MONOCYTES # BLD AUTO: 0.8 10E9/L (ref 0–1.3)
MONOCYTES NFR BLD AUTO: 14.8 %
NEUTROPHILS # BLD AUTO: 1.8 10E9/L (ref 1.6–8.3)
NEUTROPHILS NFR BLD AUTO: 33 %
NRBC # BLD AUTO: 0 10*3/UL
NRBC BLD AUTO-RTO: 0 /100
PLATELET # BLD AUTO: 298 10E9/L (ref 150–450)
POTASSIUM SERPL-SCNC: 4.2 MMOL/L (ref 3.4–5.3)
PROT SERPL-MCNC: 7.4 G/DL (ref 6.8–8.8)
PSA SERPL-MCNC: 94.9 UG/L (ref 0–4)
RBC # BLD AUTO: 3.71 10E12/L (ref 4.4–5.9)
SODIUM SERPL-SCNC: 141 MMOL/L (ref 133–144)
WBC # BLD AUTO: 5.4 10E9/L (ref 4–11)

## 2020-02-04 PROCEDURE — 96372 THER/PROPH/DIAG INJ SC/IM: CPT | Mod: 59

## 2020-02-04 PROCEDURE — 96375 TX/PRO/DX INJ NEW DRUG ADDON: CPT

## 2020-02-04 PROCEDURE — 85025 COMPLETE CBC W/AUTO DIFF WBC: CPT | Performed by: INTERNAL MEDICINE

## 2020-02-04 PROCEDURE — 25000125 ZZHC RX 250: Mod: ZF | Performed by: PHYSICIAN ASSISTANT

## 2020-02-04 PROCEDURE — G0463 HOSPITAL OUTPT CLINIC VISIT: HCPCS | Mod: ZF

## 2020-02-04 PROCEDURE — 25000128 H RX IP 250 OP 636: Mod: JW,ZF | Performed by: INTERNAL MEDICINE

## 2020-02-04 PROCEDURE — 96377 APPLICATON ON-BODY INJECTOR: CPT | Mod: 59

## 2020-02-04 PROCEDURE — 25000128 H RX IP 250 OP 636: Mod: ZF | Performed by: PHYSICIAN ASSISTANT

## 2020-02-04 PROCEDURE — 80053 COMPREHEN METABOLIC PANEL: CPT | Performed by: INTERNAL MEDICINE

## 2020-02-04 PROCEDURE — 25000125 ZZHC RX 250: Mod: ZF | Performed by: INTERNAL MEDICINE

## 2020-02-04 PROCEDURE — 83615 LACTATE (LD) (LDH) ENZYME: CPT | Performed by: INTERNAL MEDICINE

## 2020-02-04 PROCEDURE — 96413 CHEMO IV INFUSION 1 HR: CPT

## 2020-02-04 PROCEDURE — 84153 ASSAY OF PSA TOTAL: CPT | Performed by: INTERNAL MEDICINE

## 2020-02-04 PROCEDURE — 99214 OFFICE O/P EST MOD 30 MIN: CPT | Mod: ZP | Performed by: PHYSICIAN ASSISTANT

## 2020-02-04 PROCEDURE — 25800030 ZZH RX IP 258 OP 636: Mod: ZF | Performed by: INTERNAL MEDICINE

## 2020-02-04 RX ORDER — METFORMIN HYDROCHLORIDE 750 MG/1
750 TABLET, EXTENDED RELEASE ORAL
Qty: 180 TABLET | Refills: 3 | COMMUNITY
Start: 2020-02-04 | End: 2020-09-18

## 2020-02-04 RX ORDER — LIDOCAINE/PRILOCAINE 2.5 %-2.5%
CREAM (GRAM) TOPICAL ONCE
Status: COMPLETED | OUTPATIENT
Start: 2020-02-04 | End: 2020-02-04

## 2020-02-04 RX ADMIN — SODIUM CHLORIDE 250 ML: 9 INJECTION, SOLUTION INTRAVENOUS at 09:13

## 2020-02-04 RX ADMIN — FAMOTIDINE 20 MG: 10 INJECTION INTRAVENOUS at 09:26

## 2020-02-04 RX ADMIN — DENOSUMAB 120 MG: 120 INJECTION SUBCUTANEOUS at 12:13

## 2020-02-04 RX ADMIN — DIPHENHYDRAMINE HYDROCHLORIDE 25 MG: 50 INJECTION INTRAMUSCULAR; INTRAVENOUS at 09:28

## 2020-02-04 RX ADMIN — PEGFILGRASTIM 6 MG: KIT SUBCUTANEOUS at 12:08

## 2020-02-04 RX ADMIN — SODIUM CHLORIDE 42 MG: 900 INJECTION, SOLUTION INTRAVENOUS at 10:14

## 2020-02-04 RX ADMIN — DEXAMETHASONE SODIUM PHOSPHATE: 10 INJECTION, SOLUTION INTRAMUSCULAR; INTRAVENOUS at 09:15

## 2020-02-04 RX ADMIN — LIDOCAINE AND PRILOCAINE: 25; 25 CREAM TOPICAL at 11:53

## 2020-02-04 ASSESSMENT — PAIN SCALES - GENERAL: PAINLEVEL: NO PAIN (0)

## 2020-02-04 ASSESSMENT — MIFFLIN-ST. JEOR: SCORE: 1683.15

## 2020-02-04 NOTE — PROGRESS NOTES
[facility]  FOLLOW-UP VISIT NOTE  Date of visit: Feb 4, 2020     Oncology Treatment History:   Metastatic Prostate CA treated    - s/p 6 cycles of taxotere 75mg/m2   - s/p orchiectomy on 3/18/2016   - s/p Provenge 5/13, 5/27, 6/10   - s/p Casodex 50 mg daily March/April   - s/p Zytiga 5/3/2017   - started cabazitaxel 1/14/2020    HPI: Albert is a 54 year old gentleman with metastatic prostate cancer.  Albert felt a cramp in his gluteus muscle and could barely walk after doing some remodeling at home and unloading heavy truck at work. He tried flexeril and prednisone initially but eventually presented to ED on 10/5/15. He feels that initially he was nearly labeled as drug seeker since he was in lot of discomfort and flexeril was not working. But during course of ED visits labs were drawn and he had marked elevation in Alk Phosphatase (over 1000). CT did suggest some ileus with some sclerotic bone lesions. He was admitted to the hospital. His PSA was checked and was markedly elevated at 5760 (10/6/15), repeat value 5563. He did follow up with Dr. Freire and had transrectal US guided biopsy of prostate on 10/8/15. They have the results through my chart which confirms prostate adenocarcinoma - enrico 4+3 involving majority (60-90%) portion of every single core.  He started on taxotere on 10/23 and completed 6 cycles of therapy in 2/2016.  He then underwent orchiectomy on 3/18/2016.       Throughout spring of 2016 Albert's PSA started to progress and after three elevations there was concern about him being hormone refractory.  He was started on Provenge and enrolled in the trial including Indoximod. PSA trending up and started on Casodex mid March with progression. Switched to Zytiga 5/3/17. He held his abiraterone in July 2019 with rising PSA and fatigue. He had a decline in his PSA after holding his abiraterone. This has been on hold and he is being followed without any additional  "intervention. He had orchiectomy done previously and does not need ADT. In January, his PSA was increasing and Dr Valladares started cabazitaxel.       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  He has some nausea for a few days after the cabazitaxel, controlled with compazine prn.  He had some BRBPR the following week, that was not associated with constipation.  He had an episode of feeling like he was \"punched\" in the lower abdomen and then had some looser stools with blood around the stool and in the toilet bowl.  He went to the ED, CT was benign and he was d/c'd home.  There has been no follow up and no further blood.  No neuropathy.  Overall feels better today, compared to how he was feeling prior to starting chemo (had been feeling really run down with progression of disease).  Does have some hip and shoulder pains- mostly this are significant when first getting up and go away with walking/moving, so he is unsure if they are arthritis or disease related.        MEDS:   Current Outpatient Medications   Medication Sig     Acetaminophen (TYLENOL PO) Take 325 mg by mouth every 8 hours as needed for mild pain or fever Reported on 5/18/2017     amLODIPine (NORVASC) 5 MG tablet TAKE ONE TABLET BY MOUTH EVERY DAY     blood glucose monitoring (ACCU-CHEK MULTICLIX) lancets Use to test blood sugar twice times daily or as directed.     blood glucose monitoring (NO BRAND SPECIFIED) test strip Use to test blood sugars twice daily or as directed (fasting, rotate then second time - before lunch, before supper and bedtime) (Patient not taking: Reported on 8/12/2019)     diphenhydrAMINE (BENADRYL) 25 MG tablet Take 25 mg by mouth     EPINEPHrine (EPIPEN 2-CHARITY) 0.3 MG/0.3ML injection 2-pack Inject 0.3 mLs (0.3 mg) into the muscle once as needed for anaphylaxis (Patient not taking: Reported on 1/14/2020)     HYDROmorphone (DILAUDID) 2 MG tablet Take 1 tablet (2 mg) by mouth every 4 hours as " needed for moderate to severe pain     IBUPROFEN PO Take by mouth as needed for moderate pain Reported on 2017     levothyroxine (SYNTHROID/LEVOTHROID) 88 MCG tablet Take 1 tablet (88 mcg) by mouth daily     LORazepam (ATIVAN) 0.5 MG tablet Take 1 tablet (0.5 mg) by mouth every 4 hours as needed (Anxiety, Nausea/Vomiting or Sleep)     metFORMIN (GLUCOPHAGE-XR) 750 MG 24 hr tablet Take 2 tablets (1,500 mg) by mouth daily (with dinner)     prochlorperazine (COMPAZINE) 10 MG tablet Take 1 tablet (10 mg) by mouth every 6 hours as needed (Nausea/Vomiting)     prochlorperazine (COMPAZINE) 5 MG tablet Take 1 tablet (5 mg) by mouth every 6 hours as needed for nausea or vomiting     valACYclovir (VALTREX) 1000 mg tablet Take 1,000 mg by mouth 3 times daily     No current facility-administered medications for this visit.         EXAM:  ECOG 1  Wt Readings from Last 4 Encounters:   20 86.8 kg (191 lb 6.4 oz)   20 87.9 kg (193 lb 11.2 oz)   20 90.3 kg (199 lb 1.6 oz)   19 90.4 kg (199 lb 4.8 oz)   There were no vitals taken for this visit.  Patient alert and oriented x3.   PERRLA. EOMI. No scleral icterus noted. OP without thrush/sores.  Neck exam: Palpable 0.5 cm anterior cervical node on the left, rubbery, mobile, non-tender.   Heart: RRR no murmurs noted.   Lungs: clear to auscultation bilaterally.  No crackles or wheezing.   Abd: Normal bowel sounds.  No tenderness to palpation. No suprapubic tenderness.  No hepatomegaly.   Extremities: No lower extremity edema.   Back: No CVA tenderness.   Neuro: grossly intact.   Mood and affect is stable.     Labs/Imagin2020 11:40 2020 12:25 2020 07:11   WBC 5.3 3.5 (L) 5.4   Hemoglobin 12.0 (L) 10.8 (L) 11.2 (L)   Hematocrit 36.7 (L) 32.8 (L) 33.9 (L)   Platelet Count 291 295 298   RBC Count 3.96 (L) 3.51 (L) 3.71 (L)   MCV 93 93 91   MCH 30.3 30.8 30.2   MCHC 32.7 32.9 33.0   RDW 13.2 13.1 13.0   Diff Method Automated Method Automated  Method Automated Method   % Neutrophils 56.6 31.8 33.0   % Lymphocytes 32.3 59.1 49.0   % Monocytes 7.2 5.1 14.8   % Eosinophils 2.7 2.3 1.9   % Basophils 0.8 1.1 0.9   % Immature Granulocytes 0.4 0.6 0.4   Nucleated RBCs 0 0 0   Absolute Neutrophil 3.0 1.1 (L) 1.8      1/14/2020 11:40 1/22/2020 12:25 2/4/2020 07:11   Sodium 139 140 141   Potassium 3.8 4.8 4.2   Chloride 105 105 108   Carbon Dioxide 28 30 29   Urea Nitrogen 23 19 22   Creatinine 1.01 0.90 0.92   GFR Estimate 84 >90 >90   GFR Estimate If Black >90 >90 >90   Calcium 10.4 (H) 10.2 (H) 10.2 (H)   Anion Gap 6 5 4   Albumin 4.4 4.2 4.1   Protein Total 8.2 7.6 7.4   Bilirubin Total 0.6 0.6 0.5   Alkaline Phosphatase 113 101 95   ALT 64 56 32   AST 24 21 21   Lactate Dehydrogenase 181  196      1/14/2020 11:40 2/4/2020 07:11   .00 (H) 94.90 (H)     ASSESSMENT/PLAN:  1. Metastatic prostate cancer with bony metastasis:   Currently on cabazitaxel, PSA dropped today  Denosumab, has been not getting since 3/2019   HTN and DM TII  ECOG PS 1    #mPC--started Cabazitaxel on 1/14/2020 due to worsening bone scan and PSA in early January.  Albert is feeling better, less fatigue and lethargy and his PSA has dropped which is encouraging.  Lorrie Price and I reviewed his ANC which dropped to 1.8 today, likely will become neutropenic with C2 and given we would like to keep him on schedule and avoid neutropenic complications, will give OnPro with subsequent cycles.  I'll see him back in 3 weeks.     #pain-intermittent arthralgias that improve with activity.  No constant bone pains at this time  --pall care with Dr Brown in Feb-they would like to reschedule this for a time that correlates with chemo    #BRBPR- was in the ED on 1/22, CT ABD showed no acute bleed or acute intestinal pathology.  Has not occurred since.  Unsure if this was an irritation of his internal hemorrhoids?  If occurs again, will order a colonoscopy.     #h/o vasovagal with prior labs, not  occurred again    #cervical radiculopathy, resolved since last visit     #bone-h/o Xgeva, stopped in March of 2019. Taking oral calcium/Vit D.  Will restart today and plan for every 3-4 months.  Ca marginally elevated today.     #endo--dropped to one pill once a day of metformin.  BS have been ~100    Melanie Dan PA-C

## 2020-02-04 NOTE — PATIENT INSTRUCTIONS
Contact Numbers    [facility] [place] [place] Main Line/TRIAGE: [phone]    Call with chills and/or temperature greater than or equal to 100.5, uncontrolled nausea/vomiting, diarrhea, constipation, dizziness, shortness of breath, chest pain, bleeding, unexplained bruising, or any new/concerning symptoms, questions/concerns.     If you are having any concerning symptoms or wish to speak to a provider before your next infusion visit, please call your care coordinator or triage to notify them so we can adequately serve you.       After Hours: [phone]    If after hours, weekends, or holidays, call main hospital  and ask for Oncology doctor on call.       February 2020 Sunday Monday Tuesday Wednesday Thursday Friday Saturday                                 1       2     3     4    [facility] DRAW   6:30 AM   (15 min.)    [facility] DRAW   [facility] Lab Draw    [facility] RETURN   6:45 AM   (50 min.)   Anay Dan PA-C   [facility] ONC INFUSION 120   8:00 AM   (120 min.)   [facility]   [facility] 5     6    [facility] NEW   9:25 AM   (80 min.)   Lynda Brown MD   [facility] 7     8       9     10     11     12     13     14     15       16     17     18     19     20     21     22       23     24    [facility] DRAW  12:15 PM   (15 min.)   [facility]   [facility] Lab Draw    P RETURN  12:25 PM   (50 min.)   Anay Dan PA-C   [facility] ONC INFUSION 120   1:30 PM   (120 min.)   [facility]   [facility] 25     26     27     28     29 March 2020 Sunday Monday Tuesday Wednesday Thursday Friday Saturday   1     2     3     4     5     6     7       8     9     10     11     12     13     14       15     16     17     18     19     20     21       22     23     24     25     26     27     28       29     30     31                                          Lab Results:  Recent Results (from the past 12 hour(s))   CBC with platelets differential    Collection Time: 02/04/20  7:11 AM   Result Value Ref Range    WBC 5.4 4.0 - 11.0 10e9/L    RBC Count 3.71 (L) 4.4 - 5.9 10e12/L    Hemoglobin 11.2 (L) 13.3 - 17.7 g/dL    Hematocrit 33.9 (L) 40.0 - 53.0 %    MCV 91 78 - 100 fl    MCH 30.2 26.5 - 33.0 pg    MCHC 33.0 31.5 - 36.5 g/dL    RDW 13.0 10.0 - 15.0 %    Platelet Count 298 150 - 450 10e9/L    Diff Method Automated Method     % Neutrophils 33.0 %    % Lymphocytes 49.0 %    % Monocytes 14.8 %    % Eosinophils 1.9 %    % Basophils 0.9 %    % Immature Granulocytes 0.4 %    Nucleated RBCs 0 0 /100    Absolute Neutrophil 1.8 1.6 - 8.3 10e9/L    Absolute Lymphocytes 2.6 0.8 - 5.3 10e9/L    Absolute Monocytes 0.8 0.0 - 1.3 10e9/L    Absolute Eosinophils 0.1 0.0 - 0.7 10e9/L    Absolute Basophils 0.1 0.0 - 0.2 10e9/L    Abs Immature Granulocytes 0.0 0 - 0.4 10e9/L    Absolute Nucleated RBC 0.0    Comprehensive metabolic panel    Collection Time: 02/04/20  7:11 AM   Result Value Ref Range    Sodium 141 133 - 144 mmol/L    Potassium 4.2 3.4 - 5.3 mmol/L    Chloride 108 94 - 109 mmol/L    Carbon Dioxide 29 20 - 32 mmol/L    Anion Gap 4 3 - 14 mmol/L    Glucose 105 (H) 70 - 99 mg/dL    Urea Nitrogen 22 7 - 30 mg/dL    Creatinine 0.92 0.66 - 1.25 mg/dL    GFR Estimate >90 >60 mL/min/[1.73_m2]    GFR Estimate If Black >90 >60 mL/min/[1.73_m2]    Calcium 10.2 (H) 8.5 - 10.1 mg/dL    Bilirubin Total 0.5 0.2 - 1.3 mg/dL    Albumin 4.1 3.4 - 5.0 g/dL    Protein Total 7.4 6.8 - 8.8 g/dL    Alkaline Phosphatase 95 40 - 150 U/L    ALT 32 0 - 70 U/L    AST 21 0 - 45 U/L   PSA tumor marker    Collection Time: 02/04/20  7:11 AM   Result Value Ref Range    PSA 94.90 (H) 0 - 4 ug/L   Lactate Dehydrogenase    Collection Time: 02/04/20  7:11 AM   Result Value Ref Range    Lactate Dehydrogenase 196 85 - 227 U/L

## 2020-02-04 NOTE — NURSING NOTE
Chief Complaint   Patient presents with     Blood Draw     labs drawn via piv start by rn. vs taken     Labs drawn via PIV start by rn in lab. Flushed with saline. Vitals taken. Pt checked in for next appointment.   Krysta Ruff RN

## 2020-02-04 NOTE — NURSING NOTE
"Oncology Rooming Note    February 4, 2020 7:21 AM   Albert Amaya is a 54 year old male who presents for:    Chief Complaint   Patient presents with     Blood Draw     labs drawn via piv start by rn. vs taken     Oncology Clinic Visit     Return; Met Prostate Ca     Initial Vitals: /89 (BP Location: Right arm, Patient Position: Sitting, Cuff Size: Adult Regular)   Pulse 88   Temp 97.8  F (36.6  C) (Oral)   Resp 16   Ht 1.727 m (5' 8\")   Wt 86.4 kg (190 lb 6.4 oz)   SpO2 100%   BMI 28.95 kg/m   Estimated body mass index is 28.95 kg/m  as calculated from the following:    Height as of this encounter: 1.727 m (5' 8\").    Weight as of this encounter: 86.4 kg (190 lb 6.4 oz). Body surface area is 2.04 meters squared.  No Pain (0) Comment: Data Unavailable   No LMP for male patient.  Allergies reviewed: Yes  Medications reviewed: Yes    Medications: Medication refills not needed today.  Pharmacy name entered into EPIC: [facility] - [place], [place] - [address] [place] [address]    Clinical concerns: No new concerns or questions.       Kelsey Samuels CMA              "

## 2020-02-04 NOTE — PROGRESS NOTES
Infusion Nursing Note:  Albert Amaya presents today for Cycle 2, day 1 Jevtana and Xgeva.    Patient seen by provider today: Yes: ELVIRA Castellano    Note: Patient presents to clinic today feeling well with no questions.  Pt did not request or require any intervention for pain today.  At time of Neulasta onbody, pt's wife reports pt has extreme phobia of needles and requests lidocaine prior to onbody placement.  Pt's wife reports has had vasovagal symptoms to pain from injections in past.    VORB 2/4/2020 1140 L ELVIRA Dan/ZENAIDA Pelaez RN: Add EMLA cream as needed prior to Neulasta OnBody    Ok per [place] pharmacist, to apply EMLA prior to onbody as long as skin is then cleaned.      Intravenous Access:  Peripheral IV placed.    Treatment Conditions:  Lab Results   Component Value Date    HGB 11.2 02/04/2020     Lab Results   Component Value Date    WBC 5.4 02/04/2020      Lab Results   Component Value Date    ANEU 1.8 02/04/2020     Lab Results   Component Value Date     02/04/2020      Lab Results   Component Value Date     02/04/2020                   Lab Results   Component Value Date    POTASSIUM 4.2 02/04/2020           Lab Results   Component Value Date    MAG 2.2 12/21/2016            Lab Results   Component Value Date    CR 0.92 02/04/2020                   Lab Results   Component Value Date    WILL 10.2 02/04/2020                Lab Results   Component Value Date    BILITOTAL 0.5 02/04/2020           Lab Results   Component Value Date    ALBUMIN 4.1 02/04/2020                    Lab Results   Component Value Date    ALT 32 02/04/2020           Lab Results   Component Value Date    AST 21 02/04/2020     Results reviewed, labs MET treatment parameters, ok to proceed with treatment.    Post Infusion Assessment:  Patient tolerated infusion without incident.  Blood return noted pre and post infusion.  Site patent and intact, free from redness, edema or discomfort.  No evidence of  extravasations.  Access discontinued per protocol.    Neulasta Onpro On-Body injector applied to LUE at 1210 with light facing down/toward elbow.  Writer discussed Neulasta injection would start on 2/5/2020 at 1515, approximately 27 hours after application applied today.  Written and Verbal instruction reviewed with patient.  Pt instructed when the dose delivery starts, it will take about 45 minutes to complete.  Pt aware Neulasta Onpro On-Body should have green flashing light and to call triage or on-call MD if injector flashes red or appears to be leaking. Pt aware to keep Onpro On-Body Neualsta 4 inches away from electrical equipment and to avoid showering 4 hours prior to injection.   Neulasta Onpro Lot number: [id number]    Discharge Plan:   Patient declined prescription refills.  Discharge instructions reviewed with: Patient.  Patient and/or family verbalized understanding of discharge instructions and all questions answered.  AVS to patient via [employer].  Patient will return 2/24/2020 for next appointment.   Patient discharged in stable condition accompanied by: wife.  Departure Mode: Ambulatory.  Face to face: 15 minutes    Ruthy Pelaez RN

## 2020-02-05 ENCOUNTER — TELEPHONE (OUTPATIENT)
Dept: ONCOLOGY | Facility: CLINIC | Age: 55
End: 2020-02-05

## 2020-02-05 NOTE — TELEPHONE ENCOUNTER
ONCOLOGY INTAKE: Records Information      APPT INFORMATION:  Referring provider:  Anay Dan PA-C  Referring provider s clinic:   ONCOLOGY ADULT  Reason for visit/diagnosis:  metastatic prostate cancer, aggressive disease, was diagnosed ~50 yo.   Has patient been notified of appointment date and time?: no    RECORDS INFORMATION:  Were the records received with the referral (via [employer])? No, Internal Referral    Has patient been seen for any external appt for this diagnosis? no    If yes, where? na    Has patient had any imaging or procedures outside of [facility] for this condition? no      If Yes, where? na    ADDITIONAL INFORMATION:  Spoke to pt, wants to wife to schedule appt, Called wife at [phone] and got VM.

## 2020-02-18 ENCOUNTER — MYC MEDICAL ADVICE (OUTPATIENT)
Dept: ONCOLOGY | Facility: CLINIC | Age: 55
End: 2020-02-18

## 2020-02-18 DIAGNOSIS — C61 MALIGNANT NEOPLASM OF PROSTATE (H): Primary | ICD-10-CM

## 2020-02-18 RX ORDER — VALACYCLOVIR HYDROCHLORIDE 1 G/1
1000 TABLET, FILM COATED ORAL 3 TIMES DAILY
Qty: 90 TABLET | Refills: 0 | Status: SHIPPED | OUTPATIENT
Start: 2020-02-18 | End: 2021-01-01

## 2020-02-23 DIAGNOSIS — C79.51 BONE METASTASIS: ICD-10-CM

## 2020-02-23 DIAGNOSIS — C61 MALIGNANT NEOPLASM OF PROSTATE (H): Primary | ICD-10-CM

## 2020-02-23 RX ORDER — NALOXONE HYDROCHLORIDE 0.4 MG/ML
.1-.4 INJECTION, SOLUTION INTRAMUSCULAR; INTRAVENOUS; SUBCUTANEOUS
Status: CANCELLED | OUTPATIENT
Start: 2020-02-24

## 2020-02-23 RX ORDER — ALBUTEROL SULFATE 0.83 MG/ML
2.5 SOLUTION RESPIRATORY (INHALATION)
Status: CANCELLED | OUTPATIENT
Start: 2020-02-24

## 2020-02-23 RX ORDER — MEPERIDINE HYDROCHLORIDE 25 MG/ML
25 INJECTION INTRAMUSCULAR; INTRAVENOUS; SUBCUTANEOUS EVERY 30 MIN PRN
Status: CANCELLED | OUTPATIENT
Start: 2020-02-24

## 2020-02-23 RX ORDER — EPINEPHRINE 0.3 MG/.3ML
0.3 INJECTION SUBCUTANEOUS EVERY 5 MIN PRN
Status: CANCELLED | OUTPATIENT
Start: 2020-02-24

## 2020-02-23 RX ORDER — ALBUTEROL SULFATE 90 UG/1
1-2 AEROSOL, METERED RESPIRATORY (INHALATION)
Status: CANCELLED
Start: 2020-02-24

## 2020-02-23 RX ORDER — LIDOCAINE/PRILOCAINE 2.5 %-2.5%
CREAM (GRAM) TOPICAL ONCE
Status: CANCELLED
Start: 2020-02-24

## 2020-02-23 RX ORDER — EPINEPHRINE 1 MG/ML
0.3 INJECTION, SOLUTION INTRAMUSCULAR; SUBCUTANEOUS EVERY 5 MIN PRN
Status: CANCELLED | OUTPATIENT
Start: 2020-02-24

## 2020-02-23 RX ORDER — SODIUM CHLORIDE 9 MG/ML
1000 INJECTION, SOLUTION INTRAVENOUS CONTINUOUS PRN
Status: CANCELLED
Start: 2020-02-24

## 2020-02-23 RX ORDER — DIPHENHYDRAMINE HYDROCHLORIDE 50 MG/ML
50 INJECTION INTRAMUSCULAR; INTRAVENOUS
Status: CANCELLED
Start: 2020-02-24

## 2020-02-23 RX ORDER — LORAZEPAM 2 MG/ML
0.5 INJECTION INTRAMUSCULAR EVERY 4 HOURS PRN
Status: CANCELLED
Start: 2020-02-24

## 2020-02-23 RX ORDER — METHYLPREDNISOLONE SODIUM SUCCINATE 125 MG/2ML
125 INJECTION, POWDER, LYOPHILIZED, FOR SOLUTION INTRAMUSCULAR; INTRAVENOUS
Status: CANCELLED
Start: 2020-02-24

## 2020-02-24 ENCOUNTER — ONCOLOGY VISIT (OUTPATIENT)
Dept: ONCOLOGY | Facility: CLINIC | Age: 55
End: 2020-02-24
Attending: PHYSICIAN ASSISTANT
Payer: COMMERCIAL

## 2020-02-24 ENCOUNTER — APPOINTMENT (OUTPATIENT)
Dept: LAB | Facility: CLINIC | Age: 55
End: 2020-02-24
Attending: INTERNAL MEDICINE
Payer: COMMERCIAL

## 2020-02-24 ENCOUNTER — INFUSION THERAPY VISIT (OUTPATIENT)
Dept: ONCOLOGY | Facility: CLINIC | Age: 55
End: 2020-02-24
Attending: INTERNAL MEDICINE
Payer: COMMERCIAL

## 2020-02-24 ENCOUNTER — TELEPHONE (OUTPATIENT)
Dept: ONCOLOGY | Facility: CLINIC | Age: 55
End: 2020-02-24

## 2020-02-24 VITALS
HEART RATE: 64 BPM | TEMPERATURE: 98.7 F | HEIGHT: 68 IN | SYSTOLIC BLOOD PRESSURE: 124 MMHG | DIASTOLIC BLOOD PRESSURE: 82 MMHG | BODY MASS INDEX: 28.49 KG/M2 | OXYGEN SATURATION: 98 % | RESPIRATION RATE: 16 BRPM | WEIGHT: 188 LBS

## 2020-02-24 DIAGNOSIS — D70.1 CHEMOTHERAPY-INDUCED NEUTROPENIA (H): Primary | ICD-10-CM

## 2020-02-24 DIAGNOSIS — C79.51 BONE METASTASIS: ICD-10-CM

## 2020-02-24 DIAGNOSIS — C79.51 BONE METASTASIS: Primary | ICD-10-CM

## 2020-02-24 DIAGNOSIS — T45.1X5A CHEMOTHERAPY-INDUCED NEUTROPENIA (H): ICD-10-CM

## 2020-02-24 DIAGNOSIS — C61 MALIGNANT NEOPLASM OF PROSTATE (H): ICD-10-CM

## 2020-02-24 DIAGNOSIS — D70.1 CHEMOTHERAPY-INDUCED NEUTROPENIA (H): ICD-10-CM

## 2020-02-24 DIAGNOSIS — E03.4 HYPOTHYROIDISM DUE TO ACQUIRED ATROPHY OF THYROID: ICD-10-CM

## 2020-02-24 DIAGNOSIS — T45.1X5A CHEMOTHERAPY-INDUCED NEUTROPENIA (H): Primary | ICD-10-CM

## 2020-02-24 LAB
ALBUMIN SERPL-MCNC: 4 G/DL (ref 3.4–5)
ALBUMIN SERPL-MCNC: 4.2 G/DL (ref 3.4–5)
ALP SERPL-CCNC: 128 U/L (ref 40–150)
ALP SERPL-CCNC: 128 U/L (ref 40–150)
ALT SERPL W P-5'-P-CCNC: 32 U/L (ref 0–70)
ALT SERPL W P-5'-P-CCNC: 34 U/L (ref 0–70)
ANION GAP SERPL CALCULATED.3IONS-SCNC: 5 MMOL/L (ref 3–14)
ANION GAP SERPL CALCULATED.3IONS-SCNC: 6 MMOL/L (ref 3–14)
AST SERPL W P-5'-P-CCNC: 15 U/L (ref 0–45)
AST SERPL W P-5'-P-CCNC: 36 U/L (ref 0–45)
BASOPHILS # BLD AUTO: 0 10E9/L (ref 0–0.2)
BASOPHILS NFR BLD AUTO: 0.4 %
BILIRUB SERPL-MCNC: 0.7 MG/DL (ref 0.2–1.3)
BILIRUB SERPL-MCNC: 0.8 MG/DL (ref 0.2–1.3)
BUN SERPL-MCNC: 14 MG/DL (ref 7–30)
BUN SERPL-MCNC: 15 MG/DL (ref 7–30)
CALCIUM SERPL-MCNC: 8.8 MG/DL (ref 8.5–10.1)
CALCIUM SERPL-MCNC: 9.1 MG/DL (ref 8.5–10.1)
CHLORIDE SERPL-SCNC: 108 MMOL/L (ref 94–109)
CHLORIDE SERPL-SCNC: 109 MMOL/L (ref 94–109)
CO2 SERPL-SCNC: 24 MMOL/L (ref 20–32)
CO2 SERPL-SCNC: 25 MMOL/L (ref 20–32)
CREAT SERPL-MCNC: 0.62 MG/DL (ref 0.66–1.25)
CREAT SERPL-MCNC: 0.7 MG/DL (ref 0.66–1.25)
DIFFERENTIAL METHOD BLD: ABNORMAL
EOSINOPHIL # BLD AUTO: 0.1 10E9/L (ref 0–0.7)
EOSINOPHIL NFR BLD AUTO: 1.8 %
ERYTHROCYTE [DISTWIDTH] IN BLOOD BY AUTOMATED COUNT: 14.1 % (ref 10–15)
GFR SERPL CREATININE-BSD FRML MDRD: >90 ML/MIN/{1.73_M2}
GFR SERPL CREATININE-BSD FRML MDRD: >90 ML/MIN/{1.73_M2}
GLUCOSE SERPL-MCNC: 101 MG/DL (ref 70–99)
GLUCOSE SERPL-MCNC: 95 MG/DL (ref 70–99)
HCT VFR BLD AUTO: 33.2 % (ref 40–53)
HGB BLD-MCNC: 11.2 G/DL (ref 13.3–17.7)
IMM GRANULOCYTES # BLD: 0 10E9/L (ref 0–0.4)
IMM GRANULOCYTES NFR BLD: 0.4 %
LYMPHOCYTES # BLD AUTO: 2.4 10E9/L (ref 0.8–5.3)
LYMPHOCYTES NFR BLD AUTO: 35.2 %
MCH RBC QN AUTO: 31.4 PG (ref 26.5–33)
MCHC RBC AUTO-ENTMCNC: 33.7 G/DL (ref 31.5–36.5)
MCV RBC AUTO: 93 FL (ref 78–100)
MONOCYTES # BLD AUTO: 0.5 10E9/L (ref 0–1.3)
MONOCYTES NFR BLD AUTO: 7.8 %
NEUTROPHILS # BLD AUTO: 3.6 10E9/L (ref 1.6–8.3)
NEUTROPHILS NFR BLD AUTO: 54.4 %
NRBC # BLD AUTO: 0 10*3/UL
NRBC BLD AUTO-RTO: 0 /100
PLATELET # BLD AUTO: 261 10E9/L (ref 150–450)
POTASSIUM SERPL-SCNC: 3.8 MMOL/L (ref 3.4–5.3)
POTASSIUM SERPL-SCNC: 5.4 MMOL/L (ref 3.4–5.3)
PROT SERPL-MCNC: 7.4 G/DL (ref 6.8–8.8)
PROT SERPL-MCNC: 7.4 G/DL (ref 6.8–8.8)
PSA SERPL-MCNC: 85.4 UG/L (ref 0–4)
RBC # BLD AUTO: 3.57 10E12/L (ref 4.4–5.9)
SODIUM SERPL-SCNC: 138 MMOL/L (ref 133–144)
SODIUM SERPL-SCNC: 139 MMOL/L (ref 133–144)
TSH SERPL DL<=0.005 MIU/L-ACNC: 0.75 MU/L (ref 0.4–4)
WBC # BLD AUTO: 6.7 10E9/L (ref 4–11)

## 2020-02-24 PROCEDURE — 25000128 H RX IP 250 OP 636: Mod: ZF | Performed by: PHYSICIAN ASSISTANT

## 2020-02-24 PROCEDURE — 99214 OFFICE O/P EST MOD 30 MIN: CPT | Mod: ZP | Performed by: PHYSICIAN ASSISTANT

## 2020-02-24 PROCEDURE — 84443 ASSAY THYROID STIM HORMONE: CPT | Performed by: PHYSICIAN ASSISTANT

## 2020-02-24 PROCEDURE — 85025 COMPLETE CBC W/AUTO DIFF WBC: CPT | Performed by: INTERNAL MEDICINE

## 2020-02-24 PROCEDURE — 96367 TX/PROPH/DG ADDL SEQ IV INF: CPT

## 2020-02-24 PROCEDURE — 96413 CHEMO IV INFUSION 1 HR: CPT

## 2020-02-24 PROCEDURE — G0463 HOSPITAL OUTPT CLINIC VISIT: HCPCS | Mod: ZF

## 2020-02-24 PROCEDURE — 80053 COMPREHEN METABOLIC PANEL: CPT | Performed by: INTERNAL MEDICINE

## 2020-02-24 PROCEDURE — 40000556 ZZH STATISTIC PERIPHERAL IV START W US GUIDANCE: Mod: ZF

## 2020-02-24 PROCEDURE — 25000125 ZZHC RX 250: Mod: ZF | Performed by: PHYSICIAN ASSISTANT

## 2020-02-24 PROCEDURE — 25800030 ZZH RX IP 258 OP 636: Mod: ZF | Performed by: PHYSICIAN ASSISTANT

## 2020-02-24 PROCEDURE — 96375 TX/PRO/DX INJ NEW DRUG ADDON: CPT

## 2020-02-24 PROCEDURE — 80053 COMPREHEN METABOLIC PANEL: CPT | Performed by: PHYSICIAN ASSISTANT

## 2020-02-24 PROCEDURE — 84153 ASSAY OF PSA TOTAL: CPT | Performed by: PHYSICIAN ASSISTANT

## 2020-02-24 RX ADMIN — DIPHENHYDRAMINE HYDROCHLORIDE 25 MG: 50 INJECTION INTRAMUSCULAR; INTRAVENOUS at 14:58

## 2020-02-24 RX ADMIN — FAMOTIDINE 20 MG: 10 INJECTION INTRAVENOUS at 14:54

## 2020-02-24 RX ADMIN — SODIUM CHLORIDE 42 MG: 9 INJECTION, SOLUTION INTRAVENOUS at 15:36

## 2020-02-24 RX ADMIN — DEXAMETHASONE SODIUM PHOSPHATE: 10 INJECTION, SOLUTION INTRAMUSCULAR; INTRAVENOUS at 15:17

## 2020-02-24 RX ADMIN — SODIUM CHLORIDE 250 ML: 9 INJECTION, SOLUTION INTRAVENOUS at 14:43

## 2020-02-24 ASSESSMENT — PAIN SCALES - GENERAL: PAINLEVEL: NO PAIN (0)

## 2020-02-24 ASSESSMENT — MIFFLIN-ST. JEOR: SCORE: 1672.13

## 2020-02-24 NOTE — PATIENT INSTRUCTIONS
Contact Numbers:   [facility] [place] Main Line: [phone]    Call triage to speak with triage if you are experiencing chills and/or temperature greater than or equal to 100.5, uncontrolled nausea/vomiting, diarrhea, constipation, dizziness, shortness of breath, chest pain, bleeding, unexplained bruising, or any new/concerning symptoms, questions/concerns.     If you are having any concerning symptoms or wish to speak to a provider before your next infusion visit, please call your care coordinator or triage to notify them so we can adequately serve you.     If you need a refill on a narcotic prescription, please call triage or your care coordinator before your infusion appointment.         February 2020 Sunday Monday Tuesday Wednesday Thursday Friday Saturday                                 1       2     3     4    [facility]   6:30 AM   (15 min.)    [facility] DRAW   [facility] Draw    [facility] RETURN   6:45 AM   (50 min.)   [facility]   8:00 AM   (120 min.)    ONCOLOGY INFUSION   [facility] 5     6     7     8       9     10     11     12     13     14     15       16     17     18     19     20     21     22       23     24    [facility]  12:15 PM   (15 min.)    [facility] DRAW   [facility] Draw    [facility] RETURN  12:25 PM   (50 min.)   Anay Dan PA-C   [facility]    [facility] ONC INFUSION 120   1:30 PM   (120 min.)    ONCOLOGY INFUSION   [facility] 25     26    [facility] ONC INFUSION 60   3:30 PM   (60 min.)    ONCOLOGY INFUSION   [facility] 27     28     29 March 2020 Sunday Monday Tuesday Wednesday Thursday Friday Saturday   1     2     3     4     5     6     7       8     9     10     11     12     13     14       15     16     17    [facility]  10:30 AM   (15 min.)    [facility] DRAW   M  [facility] Lab Draw    [facility] RETURN  10:45 AM   (30 min.)   Tyrel Valladares MD   [facility]    [facility] ONC INFUSION 120  12:00 PM   (120 min.)    ONCOLOGY INFUSION   [facility] 18     19     20     21       22     23     24     25     26     27     28       29     30     31                                         Lab Results:  Recent Results (from the past 12 hour(s))   Comprehensive metabolic panel    Collection Time: 02/24/20 12:23 PM   Result Value Ref Range    Sodium 138 133 - 144 mmol/L    Potassium 5.4 (H) 3.4 - 5.3 mmol/L    Chloride 109 94 - 109 mmol/L    Carbon Dioxide 24 20 - 32 mmol/L    Anion Gap 5 3 - 14 mmol/L    Glucose 95 70 - 99 mg/dL    Urea Nitrogen 14 7 - 30 mg/dL    Creatinine 0.62 (L) 0.66 - 1.25 mg/dL    GFR Estimate >90 >60 mL/min/[1.73_m2]    GFR Estimate If Black >90 >60 mL/min/[1.73_m2]    Calcium 9.1 8.5 - 10.1 mg/dL    Bilirubin Total 0.8 0.2 - 1.3 mg/dL    Albumin 4.2 3.4 - 5.0 g/dL    Protein Total 7.4 6.8 - 8.8 g/dL    Alkaline Phosphatase 128 40 - 150 U/L    ALT 34 0 - 70 U/L    AST 36 0 - 45 U/L   PSA tumor marker    Collection Time: 02/24/20 12:23 PM   Result Value Ref Range    PSA 85.40 (H) 0 - 4 ug/L   TSH with free T4 reflex    Collection Time: 02/24/20 12:23 PM   Result Value Ref Range    TSH 0.75 0.40 - 4.00 mU/L   CBC with platelets differential    Collection Time: 02/24/20 12:45 PM   Result Value Ref Range    WBC 6.7 4.0 - 11.0 10e9/L    RBC Count 3.57 (L) 4.4 - 5.9 10e12/L    Hemoglobin 11.2 (L) 13.3 - 17.7 g/dL    Hematocrit 33.2 (L) 40.0 - 53.0 %    MCV 93 78 - 100 fl    MCH 31.4 26.5 - 33.0 pg    MCHC 33.7 31.5 - 36.5 g/dL    RDW 14.1 10.0 - 15.0 %    Platelet Count 261 150 - 450 10e9/L    Diff Method Automated Method     % Neutrophils 54.4 %    % Lymphocytes 35.2 %    % Monocytes 7.8 %    % Eosinophils 1.8 %    % Basophils 0.4 %    % Immature Granulocytes 0.4 %    Nucleated RBCs 0 0 /100    Absolute Neutrophil 3.6 1.6 - 8.3  10e9/L    Absolute Lymphocytes 2.4 0.8 - 5.3 10e9/L    Absolute Monocytes 0.5 0.0 - 1.3 10e9/L    Absolute Eosinophils 0.1 0.0 - 0.7 10e9/L    Absolute Basophils 0.0 0.0 - 0.2 10e9/L    Abs Immature Granulocytes 0.0 0 - 0.4 10e9/L    Absolute Nucleated RBC 0.0    Comprehensive metabolic panel    Collection Time: 02/24/20  2:50 PM   Result Value Ref Range    Sodium 139 133 - 144 mmol/L    Potassium 3.8 3.4 - 5.3 mmol/L    Chloride 108 94 - 109 mmol/L    Carbon Dioxide 25 20 - 32 mmol/L    Anion Gap 6 3 - 14 mmol/L    Glucose 101 (H) 70 - 99 mg/dL    Urea Nitrogen 15 7 - 30 mg/dL    Creatinine 0.70 0.66 - 1.25 mg/dL    GFR Estimate >90 >60 mL/min/[1.73_m2]    GFR Estimate If Black >90 >60 mL/min/[1.73_m2]    Calcium 8.8 8.5 - 10.1 mg/dL    Bilirubin Total 0.7 0.2 - 1.3 mg/dL    Albumin 4.0 3.4 - 5.0 g/dL    Protein Total 7.4 6.8 - 8.8 g/dL    Alkaline Phosphatase 128 40 - 150 U/L    ALT 32 0 - 70 U/L    AST 15 0 - 45 U/L

## 2020-02-24 NOTE — PROGRESS NOTES
Infusion Nursing Note:  Albert Amaya presents today for cycle 3 day 1 Cabazitaxel.    Patient seen by provider today: Yes: Melanie FELIX   present during visit today: Not Applicable.    Note: TORB: Melanie FELIX/Barbara Armas RN  -ok for chemo today  -no neulasta onpro  -pt to go home with retail neulasta to self inject or to return to clinic on Wed or Thursday for it  -redraw CMP  Pt will return to clinic on Wednesday for neulasta injection, pt aware of appt at 3:30pm. CMP redrawn.    Intravenous Access:  Peripheral IV placed.    Treatment Conditions:  Lab Results   Component Value Date    HGB 11.2 02/24/2020     Lab Results   Component Value Date    WBC 6.7 02/24/2020      Lab Results   Component Value Date    ANEU 3.6 02/24/2020     Lab Results   Component Value Date     02/24/2020      Lab Results   Component Value Date     02/24/2020                   Lab Results   Component Value Date    POTASSIUM 3.8 02/24/2020           Lab Results   Component Value Date    MAG 2.2 12/21/2016            Lab Results   Component Value Date    CR 0.70 02/24/2020                   Lab Results   Component Value Date    WILL 8.8 02/24/2020                Lab Results   Component Value Date    BILITOTAL 0.7 02/24/2020           Lab Results   Component Value Date    ALBUMIN 4.0 02/24/2020                    Lab Results   Component Value Date    ALT 32 02/24/2020           Lab Results   Component Value Date    AST 15 02/24/2020       Results reviewed, labs MET treatment parameters, ok to proceed with treatment.      Post Infusion Assessment:  Patient tolerated infusion without incident.  Blood return noted pre and post infusion.  Site patent and intact, free from redness, edema or discomfort.  No evidence of extravasations.  Access discontinued per protocol.       Discharge Plan:   Patient declined prescription refills.  Discharge instructions reviewed with: Patient.  Patient and/or family verbalized  understanding of discharge instructions and all questions answered.  Copy of AVS reviewed with patient and/or family.  Patient will return 2/26 for neulasta and 3/17  for next infusion appointment.  Patient discharged in stable condition accompanied by: wife.  Departure Mode: Ambulatory.    Rocio Alvarado RN

## 2020-02-24 NOTE — PROGRESS NOTES
[facility] CANCER CLINIC  FOLLOW-UP VISIT NOTE  Date of visit: Feb 24, 2020     Oncology Treatment History:   Metastatic Prostate CA treated    - s/p 6 cycles of taxotere 75mg/m2   - s/p orchiectomy on 3/18/2016   - s/p Provenge 5/13, 5/27, 6/10   - s/p Casodex 50 mg daily March/April   - s/p Zytiga 5/3/2017   - started cabazitaxel 1/14/2020    HPI: Albert is a 54 year old gentleman with metastatic prostate cancer.  Albert felt a cramp in his gluteus muscle and could barely walk after doing some remodeling at home and unloading heavy truck at work. He tried flexeril and prednisone initially but eventually presented to ED on 10/5/15. He feels that initially he was nearly labeled as drug seeker since he was in lot of discomfort and flexeril was not working. But during course of ED visits labs were drawn and he had marked elevation in Alk Phosphatase (over 1000). CT did suggest some ileus with some sclerotic bone lesions. He was admitted to the hospital. His PSA was checked and was markedly elevated at 5760 (10/6/15), repeat value 5563. He did follow up with Dr. Freire and had transrectal US guided biopsy of prostate on 10/8/15. They have the results through my chart which confirms prostate adenocarcinoma - enrico 4+3 involving majority (60-90%) portion of every single core.  He started on taxotere on 10/23 and completed 6 cycles of therapy in 2/2016.  He then underwent orchiectomy on 3/18/2016.       Throughout spring of 2016 Albert's PSA started to progress and after three elevations there was concern about him being hormone refractory.  He was started on Provenge and enrolled in the trial including Indoximod. PSA trending up and started on Casodex mid March with progression. Switched to Zytiga 5/3/17. He held his abiraterone in July 2019 with rising PSA and fatigue. He had a decline in his PSA after holding his abiraterone. This has been on hold and he is being followed without any additional  intervention. He had orchiectomy done previously and does not need ADT. In January, his PSA was increasing and Dr Valladares started cabazitaxel.       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  He had some severe arthralgias after his Neulasta on pro - started on Thursday through Saturday- he was bedbound with bone pains.  Although he feels like he also had a low grade fever and was flu faustino.   This was over by Sunday and he was out snow blowing the driveway.  Uses compazine prn the first few days. No neuropathy, ongoing pains or rash.         MEDS:   Current Outpatient Medications   Medication Sig     Acetaminophen (TYLENOL PO) Take 325 mg by mouth every 8 hours as needed for mild pain or fever Reported on 5/18/2017     amLODIPine (NORVASC) 5 MG tablet TAKE ONE TABLET BY MOUTH EVERY DAY     blood glucose monitoring (ACCU-CHEK MULTICLIX) lancets Use to test blood sugar twice times daily or as directed.     diphenhydrAMINE (BENADRYL) 25 MG tablet Take 25 mg by mouth     HYDROmorphone (DILAUDID) 2 MG tablet Take 1 tablet (2 mg) by mouth every 4 hours as needed for moderate to severe pain     IBUPROFEN PO Take by mouth as needed for moderate pain Reported on 5/18/2017     levothyroxine (SYNTHROID/LEVOTHROID) 88 MCG tablet Take 1 tablet (88 mcg) by mouth daily     LORazepam (ATIVAN) 0.5 MG tablet Take 1 tablet (0.5 mg) by mouth every 4 hours as needed (Anxiety, Nausea/Vomiting or Sleep)     metFORMIN (GLUCOPHAGE-XR) 750 MG 24 hr tablet Take 1 tablet (750 mg) by mouth daily (with dinner)     prochlorperazine (COMPAZINE) 10 MG tablet Take 1 tablet (10 mg) by mouth every 6 hours as needed (Nausea/Vomiting)     valACYclovir (VALTREX) 1000 mg tablet Take 1 tablet (1,000 mg) by mouth 3 times daily     blood glucose monitoring (NO BRAND SPECIFIED) test strip Use to test blood sugars twice daily or as directed (fasting, rotate then second time - before lunch, before supper and bedtime)  "(Patient not taking: Reported on 2019)     EPINEPHrine (EPIPEN 2-CHARITY) 0.3 MG/0.3ML injection 2-pack Inject 0.3 mLs (0.3 mg) into the muscle once as needed for anaphylaxis (Patient not taking: Reported on 2020)     No current facility-administered medications for this visit.         EXAM:  ECOG 1  Wt Readings from Last 4 Encounters:   20 85.3 kg (188 lb)   20 86.4 kg (190 lb 6.4 oz)   20 86.8 kg (191 lb 6.4 oz)   20 87.9 kg (193 lb 11.2 oz)   /82 (BP Location: Right arm, Patient Position: Sitting, Cuff Size: Adult Regular)   Pulse 64   Temp 98.7  F (37.1  C) (Oral)   Resp 16   Ht 1.727 m (5' 7.99\")   Wt 85.3 kg (188 lb)   SpO2 98%   BMI 28.59 kg/m    Patient alert and oriented x3.   PERRLA. EOMI. No scleral icterus noted. OP without thrush/sores.  Neck exam: Palpable 0.5 cm anterior cervical node on the left, rubbery, mobile, non-tender.   Heart: RRR no murmurs noted.   Lungs: clear to auscultation bilaterally.  No crackles or wheezing.   Abd: Normal bowel sounds.  No tenderness to palpation. No suprapubic tenderness.  No hepatomegaly.   Extremities: No lower extremity edema.   Back: No CVA tenderness.   Neuro: grossly intact.   Mood and affect is stable.     Labs/Imagin/4/2020 07:11 2020 12:45   WBC 5.4 6.7   Hemoglobin 11.2 (L) 11.2 (L)   Hematocrit 33.9 (L) 33.2 (L)   Platelet Count 298 261   RBC Count 3.71 (L) 3.57 (L)   MCV 91 93   MCH 30.2 31.4   MCHC 33.0 33.7   RDW 13.0 14.1   Diff Method Automated Method Automated Method   % Neutrophils 33.0 54.4   % Lymphocytes 49.0 35.2   % Monocytes 14.8 7.8   % Eosinophils 1.9 1.8   % Basophils 0.9 0.4   % Immature Granulocytes 0.4 0.4   Nucleated RBCs 0 0   Absolute Neutrophil 1.8 3.6      2020 12:25 2020 07:11 2020 12:23   Sodium 140 141 138   Potassium 4.8 4.2 5.4 (H)   Chloride 105 108 109   Carbon Dioxide 30 29 24   Urea Nitrogen 19 22 14   Creatinine 0.90 0.92 0.62 (L)   GFR Estimate >90 >90 " >90   GFR Estimate If Black >90 >90 >90   Calcium 10.2 (H) 10.2 (H) 9.1   Anion Gap 5 4 5   Albumin 4.2 4.1 4.2   Protein Total 7.6 7.4 7.4   Bilirubin Total 0.6 0.5 0.8   Alkaline Phosphatase 101 95 128   ALT 56 32 34   AST 21 21 36      1/14/2020 11:40 2/4/2020 07:11 2/24/2020 12:23   .00 (H) 94.90 (H) 85.40 (H)     ASSESSMENT/PLAN:  1. Metastatic prostate cancer with bony metastasis:   Currently on cabazitaxel, PSA dropped today  Denosumab- should be on q3-4 month schedule  HTN and DM TII  ECOG PS 1    #mPC--started Cabazitaxel on 1/14/2020 due to worsening bone scan and PSA in early January. Last PSA had started to drop and Albert clinically is feeling better (less bone pains, better energy).  PSA improved today as well.  Will continue cabazitaxel - discussed neulasta support is important with this regimen.  Arthralgias and flu like syndrome could have been either the actual flu (had had a fever which would unlikely be from the injection) and/or some patients get more arthralgias when they get xgeva and neulasta at the same time.  Regardless, he would like to avoid the onPro and will come in to get the neulasta injection or we will try and get coverage at home for Lorrie to give to him.     #pain-intermittent arthralgias that improve with activity.  No constant bone pains at this time, since hes been on chemotherapy  --pall care with Dr Brown in Feb-they would like to reschedule this for a time that correlates with chemo    #BRBPR- was in the ED on 1/22, CT ABD showed no acute bleed or acute intestinal pathology.  Has not occurred since.  Unsure if this was an irritation of his internal hemorrhoids?  If occurs again, will order a colonoscopy.  Did not occur with C2    #h/o vasovagal with prior labs, not occurred again       #bone-h/o Xgeva, stopped in March of 2019. Taking oral calcium/Vit D.  Will restart today and plan for every 3-4 months.  Ca marginally elevated has now returned to normal  levels    #endo--dropped to one pill once a day of metformin.  BS have been ~100    Melanie Dan PA-C

## 2020-02-24 NOTE — NURSING NOTE
"Oncology Rooming Note    February 24, 2020 12:57 PM   Albert Amaya is a 54 year old male who presents for:    Chief Complaint   Patient presents with     Blood Draw     Labs drawn via PIV by Rn in lab. Line flushed with saline. VS taken.      Oncology Clinic Visit     Return visit; prostate cancer     Initial Vitals: /82 (BP Location: Right arm, Patient Position: Sitting, Cuff Size: Adult Regular)   Pulse 64   Temp 98.7  F (37.1  C) (Oral)   Resp 16   Ht 1.727 m (5' 7.99\")   Wt 85.3 kg (188 lb)   SpO2 98%   BMI 28.59 kg/m   Estimated body mass index is 28.59 kg/m  as calculated from the following:    Height as of this encounter: 1.727 m (5' 7.99\").    Weight as of this encounter: 85.3 kg (188 lb). Body surface area is 2.02 meters squared.  No Pain (0) Comment: Data Unavailable   No LMP for male patient.  Allergies reviewed: Yes  Medications reviewed: Yes    Medications: Medication refills not needed today.  Pharmacy name entered into [facility]: [facility] - [place], [place] - [address] [place] [address]    Clinical concerns: no new concerns today. Anay Dan was notified.      EVGENY VASQUEZ CMA            "

## 2020-02-24 NOTE — NURSING NOTE
Chief Complaint   Patient presents with     Blood Draw     Labs drawn via PIV by Rn in lab. Line flushed with saline. VS taken.        Linsey Rivers RN

## 2020-02-26 ENCOUNTER — ALLIED HEALTH/NURSE VISIT (OUTPATIENT)
Dept: ONCOLOGY | Facility: CLINIC | Age: 55
End: 2020-02-26
Attending: INTERNAL MEDICINE
Payer: COMMERCIAL

## 2020-02-26 VITALS
RESPIRATION RATE: 18 BRPM | SYSTOLIC BLOOD PRESSURE: 131 MMHG | HEART RATE: 70 BPM | DIASTOLIC BLOOD PRESSURE: 89 MMHG | TEMPERATURE: 98.1 F | OXYGEN SATURATION: 97 %

## 2020-02-26 DIAGNOSIS — D70.1 CHEMOTHERAPY-INDUCED NEUTROPENIA (H): Primary | ICD-10-CM

## 2020-02-26 DIAGNOSIS — C61 MALIGNANT NEOPLASM OF PROSTATE (H): ICD-10-CM

## 2020-02-26 DIAGNOSIS — T45.1X5A CHEMOTHERAPY-INDUCED NEUTROPENIA (H): Primary | ICD-10-CM

## 2020-02-26 DIAGNOSIS — C79.51 BONE METASTASIS: ICD-10-CM

## 2020-02-26 PROCEDURE — 25000128 H RX IP 250 OP 636: Mod: ZF | Performed by: PHYSICIAN ASSISTANT

## 2020-02-26 PROCEDURE — 96372 THER/PROPH/DIAG INJ SC/IM: CPT

## 2020-02-26 RX ADMIN — PEGFILGRASTIM 6 MG: 6 INJECTION SUBCUTANEOUS at 15:37

## 2020-02-26 ASSESSMENT — PAIN SCALES - GENERAL: PAINLEVEL: NO PAIN (0)

## 2020-02-26 NOTE — PROGRESS NOTES
Chief Complaint   Patient presents with     Imm/Inj     patient with Chemotherapy-induced neutropenia - here for vitals and a Neulasta injection     Patient arrived to clinic for a Neulasta injection today and has no specific complaints and has been feeling well.  Patient declined to speak with an RN today.   No results needed for treatment today.  Neulasta = injection given to Left Arm without incident and patient tolerated procedure well.  Patient is aware of future appointments - no AVS printed.

## 2020-03-09 ENCOUNTER — TELEPHONE (OUTPATIENT)
Dept: CARE COORDINATION | Facility: CLINIC | Age: 55
End: 2020-03-09

## 2020-03-10 ENCOUNTER — HEALTH MAINTENANCE LETTER (OUTPATIENT)
Age: 55
End: 2020-03-10

## 2020-03-17 ENCOUNTER — APPOINTMENT (OUTPATIENT)
Dept: LAB | Facility: CLINIC | Age: 55
End: 2020-03-17
Attending: INTERNAL MEDICINE
Payer: COMMERCIAL

## 2020-03-17 ENCOUNTER — INFUSION THERAPY VISIT (OUTPATIENT)
Dept: ONCOLOGY | Facility: CLINIC | Age: 55
End: 2020-03-17
Attending: PHYSICIAN ASSISTANT
Payer: COMMERCIAL

## 2020-03-17 ENCOUNTER — ONCOLOGY VISIT (OUTPATIENT)
Dept: ONCOLOGY | Facility: CLINIC | Age: 55
End: 2020-03-17
Attending: INTERNAL MEDICINE
Payer: COMMERCIAL

## 2020-03-17 VITALS
DIASTOLIC BLOOD PRESSURE: 77 MMHG | SYSTOLIC BLOOD PRESSURE: 130 MMHG | BODY MASS INDEX: 28.93 KG/M2 | WEIGHT: 190.2 LBS | TEMPERATURE: 98.1 F | OXYGEN SATURATION: 99 % | RESPIRATION RATE: 16 BRPM | HEART RATE: 65 BPM

## 2020-03-17 DIAGNOSIS — T45.1X5A CHEMOTHERAPY-INDUCED NEUTROPENIA (H): ICD-10-CM

## 2020-03-17 DIAGNOSIS — D70.1 CHEMOTHERAPY-INDUCED NEUTROPENIA (H): ICD-10-CM

## 2020-03-17 DIAGNOSIS — C79.51 BONE METASTASIS: Primary | ICD-10-CM

## 2020-03-17 DIAGNOSIS — E03.4 HYPOTHYROIDISM DUE TO ACQUIRED ATROPHY OF THYROID: ICD-10-CM

## 2020-03-17 DIAGNOSIS — C79.51 BONE METASTASIS: ICD-10-CM

## 2020-03-17 DIAGNOSIS — C61 MALIGNANT NEOPLASM OF PROSTATE (H): ICD-10-CM

## 2020-03-17 DIAGNOSIS — C61 MALIGNANT NEOPLASM OF PROSTATE (H): Primary | ICD-10-CM

## 2020-03-17 LAB
ALBUMIN SERPL-MCNC: 3.9 G/DL (ref 3.4–5)
ALP SERPL-CCNC: 118 U/L (ref 40–150)
ALT SERPL W P-5'-P-CCNC: 44 U/L (ref 0–70)
ANION GAP SERPL CALCULATED.3IONS-SCNC: 5 MMOL/L (ref 3–14)
AST SERPL W P-5'-P-CCNC: 18 U/L (ref 0–45)
BASOPHILS # BLD AUTO: 0.1 10E9/L (ref 0–0.2)
BASOPHILS NFR BLD AUTO: 0.7 %
BILIRUB SERPL-MCNC: 0.5 MG/DL (ref 0.2–1.3)
BUN SERPL-MCNC: 15 MG/DL (ref 7–30)
CALCIUM SERPL-MCNC: 8.9 MG/DL (ref 8.5–10.1)
CHLORIDE SERPL-SCNC: 109 MMOL/L (ref 94–109)
CO2 SERPL-SCNC: 25 MMOL/L (ref 20–32)
CREAT SERPL-MCNC: 0.68 MG/DL (ref 0.66–1.25)
DIFFERENTIAL METHOD BLD: ABNORMAL
EOSINOPHIL # BLD AUTO: 0.1 10E9/L (ref 0–0.7)
EOSINOPHIL NFR BLD AUTO: 1.7 %
ERYTHROCYTE [DISTWIDTH] IN BLOOD BY AUTOMATED COUNT: 15.7 % (ref 10–15)
GFR SERPL CREATININE-BSD FRML MDRD: >90 ML/MIN/{1.73_M2}
GLUCOSE SERPL-MCNC: 107 MG/DL (ref 70–99)
HCT VFR BLD AUTO: 32.8 % (ref 40–53)
HGB BLD-MCNC: 10.7 G/DL (ref 13.3–17.7)
IMM GRANULOCYTES # BLD: 0.1 10E9/L (ref 0–0.4)
IMM GRANULOCYTES NFR BLD: 1.3 %
LDH SERPL L TO P-CCNC: 216 U/L (ref 85–227)
LYMPHOCYTES # BLD AUTO: 2 10E9/L (ref 0.8–5.3)
LYMPHOCYTES NFR BLD AUTO: 28.5 %
MCH RBC QN AUTO: 31 PG (ref 26.5–33)
MCHC RBC AUTO-ENTMCNC: 32.6 G/DL (ref 31.5–36.5)
MCV RBC AUTO: 95 FL (ref 78–100)
MONOCYTES # BLD AUTO: 0.8 10E9/L (ref 0–1.3)
MONOCYTES NFR BLD AUTO: 10.6 %
NEUTROPHILS # BLD AUTO: 4.1 10E9/L (ref 1.6–8.3)
NEUTROPHILS NFR BLD AUTO: 57.2 %
NRBC # BLD AUTO: 0 10*3/UL
NRBC BLD AUTO-RTO: 0 /100
PLATELET # BLD AUTO: 257 10E9/L (ref 150–450)
POTASSIUM SERPL-SCNC: 4.5 MMOL/L (ref 3.4–5.3)
PROT SERPL-MCNC: 7.3 G/DL (ref 6.8–8.8)
PSA SERPL-MCNC: 58.9 UG/L (ref 0–4)
RBC # BLD AUTO: 3.45 10E12/L (ref 4.4–5.9)
SODIUM SERPL-SCNC: 139 MMOL/L (ref 133–144)
WBC # BLD AUTO: 7.1 10E9/L (ref 4–11)

## 2020-03-17 PROCEDURE — 80053 COMPREHEN METABOLIC PANEL: CPT | Performed by: PHYSICIAN ASSISTANT

## 2020-03-17 PROCEDURE — 25800030 ZZH RX IP 258 OP 636: Mod: ZF | Performed by: INTERNAL MEDICINE

## 2020-03-17 PROCEDURE — 85025 COMPLETE CBC W/AUTO DIFF WBC: CPT | Performed by: PHYSICIAN ASSISTANT

## 2020-03-17 PROCEDURE — 25000128 H RX IP 250 OP 636: Mod: ZF | Performed by: INTERNAL MEDICINE

## 2020-03-17 PROCEDURE — G0463 HOSPITAL OUTPT CLINIC VISIT: HCPCS | Mod: ZF

## 2020-03-17 PROCEDURE — 25000125 ZZHC RX 250: Mod: ZF | Performed by: INTERNAL MEDICINE

## 2020-03-17 PROCEDURE — 99215 OFFICE O/P EST HI 40 MIN: CPT | Mod: ZP | Performed by: INTERNAL MEDICINE

## 2020-03-17 PROCEDURE — 96413 CHEMO IV INFUSION 1 HR: CPT

## 2020-03-17 PROCEDURE — 40000556 ZZH STATISTIC PERIPHERAL IV START W US GUIDANCE: Mod: ZF

## 2020-03-17 PROCEDURE — 96375 TX/PRO/DX INJ NEW DRUG ADDON: CPT

## 2020-03-17 PROCEDURE — 84153 ASSAY OF PSA TOTAL: CPT | Performed by: PHYSICIAN ASSISTANT

## 2020-03-17 PROCEDURE — 83615 LACTATE (LD) (LDH) ENZYME: CPT | Performed by: INTERNAL MEDICINE

## 2020-03-17 PROCEDURE — 96367 TX/PROPH/DG ADDL SEQ IV INF: CPT

## 2020-03-17 RX ORDER — ALBUTEROL SULFATE 0.83 MG/ML
2.5 SOLUTION RESPIRATORY (INHALATION)
Status: CANCELLED | OUTPATIENT
Start: 2020-03-17

## 2020-03-17 RX ORDER — SODIUM CHLORIDE 9 MG/ML
1000 INJECTION, SOLUTION INTRAVENOUS CONTINUOUS PRN
Status: CANCELLED
Start: 2020-03-17

## 2020-03-17 RX ORDER — NALOXONE HYDROCHLORIDE 0.4 MG/ML
.1-.4 INJECTION, SOLUTION INTRAMUSCULAR; INTRAVENOUS; SUBCUTANEOUS
Status: CANCELLED | OUTPATIENT
Start: 2020-03-17

## 2020-03-17 RX ORDER — EPINEPHRINE 0.3 MG/.3ML
0.3 INJECTION SUBCUTANEOUS EVERY 5 MIN PRN
Status: CANCELLED | OUTPATIENT
Start: 2020-03-17

## 2020-03-17 RX ORDER — LORAZEPAM 2 MG/ML
0.5 INJECTION INTRAMUSCULAR EVERY 4 HOURS PRN
Status: CANCELLED
Start: 2020-03-17

## 2020-03-17 RX ORDER — MEPERIDINE HYDROCHLORIDE 25 MG/ML
25 INJECTION INTRAMUSCULAR; INTRAVENOUS; SUBCUTANEOUS EVERY 30 MIN PRN
Status: CANCELLED | OUTPATIENT
Start: 2020-03-17

## 2020-03-17 RX ORDER — DIPHENHYDRAMINE HYDROCHLORIDE 50 MG/ML
50 INJECTION INTRAMUSCULAR; INTRAVENOUS
Status: CANCELLED
Start: 2020-03-17

## 2020-03-17 RX ORDER — LIDOCAINE/PRILOCAINE 2.5 %-2.5%
CREAM (GRAM) TOPICAL ONCE
Status: CANCELLED
Start: 2020-03-17

## 2020-03-17 RX ORDER — EPINEPHRINE 1 MG/ML
0.3 INJECTION, SOLUTION INTRAMUSCULAR; SUBCUTANEOUS EVERY 5 MIN PRN
Status: CANCELLED | OUTPATIENT
Start: 2020-03-17

## 2020-03-17 RX ORDER — ALBUTEROL SULFATE 90 UG/1
1-2 AEROSOL, METERED RESPIRATORY (INHALATION)
Status: CANCELLED
Start: 2020-03-17

## 2020-03-17 RX ORDER — METHYLPREDNISOLONE SODIUM SUCCINATE 125 MG/2ML
125 INJECTION, POWDER, LYOPHILIZED, FOR SOLUTION INTRAMUSCULAR; INTRAVENOUS
Status: CANCELLED
Start: 2020-03-17

## 2020-03-17 RX ADMIN — DEXAMETHASONE SODIUM PHOSPHATE: 10 INJECTION, SOLUTION INTRAMUSCULAR; INTRAVENOUS at 12:29

## 2020-03-17 RX ADMIN — DIPHENHYDRAMINE HYDROCHLORIDE 25 MG: 50 INJECTION, SOLUTION INTRAMUSCULAR; INTRAVENOUS at 12:54

## 2020-03-17 RX ADMIN — FAMOTIDINE 20 MG: 10 INJECTION INTRAVENOUS at 12:50

## 2020-03-17 RX ADMIN — SODIUM CHLORIDE 42 MG: 9 INJECTION, SOLUTION INTRAVENOUS at 13:15

## 2020-03-17 RX ADMIN — SODIUM CHLORIDE 250 ML: 9 INJECTION, SOLUTION INTRAVENOUS at 12:29

## 2020-03-17 ASSESSMENT — PAIN SCALES - GENERAL: PAINLEVEL: NO PAIN (0)

## 2020-03-17 NOTE — LETTER
3/17/2020       RE: Albert Amaya  [address] [place] [id number]     Dear Colleague,    Thank you for referring your patient, Albert Amaya, to the [facility]CER [facility]. Please see a copy of my visit note below.    [facility] CANCER CLINIC  FOLLOW-UP VISIT NOTE  Date of visit: Mar 17, 2020     Oncology Treatment History:   Metastatic Prostate CA treated    - s/p 6 cycles of taxotere 75mg/m2   - s/p orchiectomy on 3/18/2016   - s/p Provenge 5/13, 5/27, 6/10   - s/p Casodex 50 mg daily March/April   - s/p Zytiga 5/3/2017   - started cabazitaxel 1/14/2020    HPI: Albert is a 54 year old gentleman with metastatic prostate cancer.  Albert felt a cramp in his gluteus muscle and could barely walk after doing some remodeling at home and unloading heavy truck at work. He tried flexeril and prednisone initially but eventually presented to ED on 10/5/15. He feels that initially he was nearly labeled as drug seeker since he was in lot of discomfort and flexeril was not working. But during course of ED visits labs were drawn and he had marked elevation in Alk Phosphatase (over 1000). CT did suggest some ileus with some sclerotic bone lesions. He was admitted to the hospital. His PSA was checked and was markedly elevated at 5760 (10/6/15), repeat value 5563. He did follow up with Dr. Freire and had transrectal US guided biopsy of prostate on 10/8/15. They have the results through my chart which confirms prostate adenocarcinoma - enrico 4+3 involving majority (60-90%) portion of every single core.  He started on taxotere on 10/23 and completed 6 cycles of therapy in 2/2016.  He then underwent orchiectomy on 3/18/2016.       Throughout spring of 2016 Albert's PSA started to progress and after three elevations there was concern about him being hormone refractory.  He was started on Provenge and enrolled in the trial including Indoximod. PSA trending up and started on Casodex mid March with  progression. Switched to Zytiga 5/3/17. He held his abiraterone in July 2019 with rising PSA and fatigue. He had a decline in his PSA after holding his abiraterone. This has been on hold and he is being followed without any additional intervention. He had orchiectomy done previously and does not need ADT. In January, his PSA was increasing and Dr Valladares started cabazitaxel.       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      His wife joined us over Facetime.     He had some severe arthralgias after his Neulasta on pro - started on Thursday through Saturday- he was bedbound with bone pains.  Although he feels like he also had a low grade fever and was flu faustino.   This was over by Sunday and he was out snow blowing the driveway.  Uses compazine prn the first few days. No neuropathy, ongoing pains or rash.         MEDS:   Current Outpatient Medications   Medication Sig     Acetaminophen (TYLENOL PO) Take 325 mg by mouth every 8 hours as needed for mild pain or fever Reported on 5/18/2017     amLODIPine (NORVASC) 5 MG tablet TAKE ONE TABLET BY MOUTH EVERY DAY     blood glucose monitoring (ACCU-CHEK MULTICLIX) lancets Use to test blood sugar twice times daily or as directed.     diphenhydrAMINE (BENADRYL) 25 MG tablet Take 25 mg by mouth     HYDROmorphone (DILAUDID) 2 MG tablet Take 1 tablet (2 mg) by mouth every 4 hours as needed for moderate to severe pain     IBUPROFEN PO Take by mouth as needed for moderate pain Reported on 5/18/2017     levothyroxine (SYNTHROID/LEVOTHROID) 88 MCG tablet Take 1 tablet (88 mcg) by mouth daily     LORazepam (ATIVAN) 0.5 MG tablet Take 1 tablet (0.5 mg) by mouth every 4 hours as needed (Anxiety, Nausea/Vomiting or Sleep)     metFORMIN (GLUCOPHAGE-XR) 750 MG 24 hr tablet Take 1 tablet (750 mg) by mouth daily (with dinner)     prochlorperazine (COMPAZINE) 10 MG tablet Take 1 tablet (10 mg) by mouth every 6 hours as needed (Nausea/Vomiting)      valACYclovir (VALTREX) 1000 mg tablet Take 1 tablet (1,000 mg) by mouth 3 times daily     blood glucose monitoring (NO BRAND SPECIFIED) test strip Use to test blood sugars twice daily or as directed (fasting, rotate then second time - before lunch, before supper and bedtime) (Patient not taking: Reported on 8/12/2019)     EPINEPHrine (EPIPEN 2-CHARITY) 0.3 MG/0.3ML injection 2-pack Inject 0.3 mLs (0.3 mg) into the muscle once as needed for anaphylaxis (Patient not taking: Reported on 1/14/2020)     No current facility-administered medications for this visit.         EXAM:  ECOG 1  Wt Readings from Last 4 Encounters:   03/17/20 86.3 kg (190 lb 3.2 oz)   02/24/20 85.3 kg (188 lb)   02/04/20 86.4 kg (190 lb 6.4 oz)   01/22/20 86.8 kg (191 lb 6.4 oz)   /77 (BP Location: Right arm, Patient Position: Sitting, Cuff Size: Adult Regular)   Pulse 65   Temp 98.1  F (36.7  C) (Oral)   Resp 16   Wt 86.3 kg (190 lb 3.2 oz)   SpO2 99%   BMI 28.93 kg/m    Patient alert and oriented x3.   PERRLA. EOMI. No scleral icterus noted. OP without thrush/sores.  Neck exam: Palpable 0.5 cm anterior cervical node on the left, rubbery, mobile, non-tender.   Heart: RRR no murmurs noted.   Lungs: clear to auscultation bilaterally.  No crackles or wheezing.   Abd: Normal bowel sounds.  No tenderness to palpation. No suprapubic tenderness.  No hepatomegaly.   Extremities: No lower extremity edema.   Back: No CVA tenderness.   Neuro: grossly intact.   Mood and affect is stable.     Labs/Imaging:      Recent Labs   Lab Test 03/17/20  1109 02/24/20  1450 02/24/20  1223 02/04/20  0711 01/22/20  1225    139 138 141 140   POTASSIUM 4.5 3.8 5.4* 4.2 4.8   CHLORIDE 109 108 109 108 105   CO2 25 25 24 29 30   ANIONGAP 5 6 5 4 5   BUN 15 15 14 22 19   CR 0.68 0.70 0.62* 0.92 0.90   * 101* 95 105* 115*   WILL 8.9 8.8 9.1 10.2* 10.2*     Recent Labs   Lab Test 12/21/16  1643 11/22/16  1221 10/26/16  1150 09/28/16  1137 08/17/16  1355   05/03/16  0955   MAG 2.2 2.4* 2.2 2.3 2.0   < > 2.0   PHOS  --   --   --   --   --   --  4.1    < > = values in this interval not displayed.     Recent Labs   Lab Test 03/17/20  1109 02/24/20  1245 02/04/20  0711 01/22/20  1225 01/14/20  1140   WBC 7.1 6.7 5.4 3.5* 5.3   HGB 10.7* 11.2* 11.2* 10.8* 12.0*    261 298 295 291   MCV 95 93 91 93 93   NEUTROPHIL 57.2 54.4 33.0 31.8 56.6     Recent Labs   Lab Test 03/17/20  1110 03/17/20  1109 02/24/20  1450 02/24/20  1223 02/04/20  0711  01/14/20  1140   BILITOTAL  --  0.5 0.7 0.8 0.5   < > 0.6   ALKPHOS  --  118 128 128 95   < > 113   ALT  --  44 32 34 32   < > 64   AST  --  18 15 36 21   < > 24   ALBUMIN  --  3.9 4.0 4.2 4.1   < > 4.4     --   --   --  196  --  181    < > = values in this interval not displayed.     TSH   Date Value Ref Range Status   02/24/2020 0.75 0.40 - 4.00 mU/L Final   01/07/2020 10.16 (H) 0.40 - 4.00 mU/L Final   07/17/2019 1.29 0.40 - 4.00 mU/L Final     No results for input(s): CEA in the last [id number] hours.  Results for orders placed or performed during the hospital encounter of 01/22/20   CT Abdomen Pelvis w Contrast    Narrative    Examination:  CT ABDOMEN PELVIS W CONTRAST 1/22/2020 2:26 PM     History: Stage 4 prostate cancer new chemo with lower ab pain with  intermittent blood with stool and per rectum. eval for bowel changes    Comparison: Bone scan 1/10/2020 CT CAP 1/10/2019    Technique: CT of the abdomen and pelvis were obtained without contrast  and with IV contrast during the late arterial and portal venous phase.  Sagittal and coronal reconstructions created and reviewed.    Contrast: iopamidol (ISOVUE-370) solution 118 mL    Findings:     Abdomen and pelvis: Normal hepatic parenchyma focal hypoattenuation  along the falciform ligament, likely representing focal fatty  deposition. Gallbladder is partially dilated and unremarkable. No  intra- or extrahepatic biliary dilation. Spleen, pancreas, and adrenal  glands are  unremarkable. Kidneys demonstrate symmetric enhancement  without solid lesions, hydronephrosis, or nephrolithiasis. A few tiny  too small to characterize low density areas may represent small cysts.  Ureters and urinary bladder are unremarkable. Prostate is surgically  absent.    No evidence of acute GI bleed. Stomach and small intestine are  unremarkable. Scattered colonic diverticula without evidence of acute  diverticulitis. Appendix is visualized and normal in caliber.     No suspicious abdominal or pelvic lymphadenopathy. No free fluid. No  pneumoperitoneum.    Abdominal aorta is normal in caliber and patent. Celiac and mesenteric  artery origins are unremarkable. Replaced right hepatic artery off the  SMA, normal variant Portal veins and IVC are patent.    Lower thorax: Unremarkable.    Bones and soft tissues: No acute osseous abnormalities. Diffuse  sclerotic appearance of the axial and appendicular skeleton. Unchanged  14 mm sclerotic lesion in the L4 vertebral body. Additional unchanged  sclerotic lesions in the T11 and T12 vertebral bodies. Multilevel  Schmorl's node deformities.      Impression    Impression: In this patient with history of prostate cancer status  post orchiectomy and hormone associated therapy:    1. No evidence of acute GI bleed or acute intestinal pathology.    2. Scattered colonic diverticula without evidence of diverticulitis.    3. Diffuse sclerotic appearance of the axial and appendicular skeleton  with focal sclerotic foci within the lower thoracic and lumbar  vertebrae, unchanged from 1/10/2020. See bone scan 1/10/2020 for  further evaluation.    I have personally reviewed the examination and initial interpretation  and I agree with the findings.    BABITA MURCIA MD     Recent Labs   Lab Test 03/17/20  1109 02/24/20  1223 02/04/20  0711 01/14/20  1140 01/07/20  1014  12/21/16  1643 11/22/16  1222  05/03/16  0955   PSA 58.90* 85.40* 94.90* 120.00* 90.10*   < > 60.68*  --    <  > 67.04*   TESTOSTTOTAL  --   --   --   --   --   --  <2.* 2*  --  <2.*    < > = values in this interval not displayed.     Recent Labs   Lab Test 03/17/20  1110 03/17/20  1109 02/24/20  1450 02/24/20  1223 02/04/20  0711 01/22/20  1225 01/14/20  1140 01/07/20  1014 11/06/19  1338   PSA  --  58.90*  --  85.40* 94.90*  --  120.00* 90.10* 52.80*   ALKPHOS  --  118 128 128 95 101 113 102 51     --   --   --  196  --  181 202 172        ASSESSMENT/PLAN:  1. Metastatic prostate cancer with bony metastasis:   Currently on cabazitaxel, PSA dropped today  Denosumab- should be on q3-4 month schedule  HTN and DM TII  ECOG PS 1    #mPC--started Cabazitaxel on 1/14/2020 due to worsening bone scan and rising PSA in early January.     Albert comes alone at this clinic visit as his wife had to stay in the car.  They were unhappy with this arrangement, but this has been the new policy starting today give the COVID-19 pandemic.  He is tolerating the current chemotherapy with cabazitaxel.  He does have some side effects including fatigue for a few days, diarrhea for a few days, altered taste and appetite.  He has bone pain from taking the Neulasta.  He was not convinced initially if he should be taking any additional chemotherapy.  However, after reviewing the PSA value which showed up later in the course of our visit, he changed his mind.  His PSA has significantly dropped from 85.4 on 02/24 to 58.9 today.  This is indeed a significant jump and is more than what was initially expected after flattening of the response at the last visit.  He today asked me to continue with therapy as current.  I would agree and will continue as long as he has stable disease and is not clearly progressing on this regimen.      I would not have him see a provider prior to the next infusion.  This would limit the number of interactions he has prior to his next cycle of chemotherapy.      He denies any pain at this time.      He had questions on the  use of prednisone along with cabazitaxel for the treatment of prostate cancer.  I did explain to him that prednisone has been present in the chemotherapy regimens as in the past it was the only active agent.  The studies tested the addition of mitoxantrone to prednisone as compared to prednisone and then later compared docetaxel with prednisone to mitoxantrone with prednisone.  Prednisone is a weak inhibitor of the androgen synthesis. It is unlikely that it would have any efficacy in the era where patients have progressed on potent androgen synthesis inhibitors like abiraterone.   I do not think he would respond to androgen axis manipulation by prednisone.  He notes that he had increased energy when he met someone after taking a dose of prednisone.  I do not doubt that.  However, this works only for a few times but not if he were to take it continuously.     #pain-intermittent arthralgias that improve with activity.  No constant bone pains at this time, since hes been on chemotherapy  --pall care with Dr Brown in Feb-they would like to reschedule this for a time that correlates with chemo    #BRBPR- was in the ED on 1/22, CT ABD showed no acute bleed or acute intestinal pathology.  Has not occurred since.  Unsure if this was an irritation of his internal hemorrhoids?  If occurs again, will order a colonoscopy.  Did not occur with C2    #h/o vasovagal with prior labs, not occurred again       #bone-h/o Xgeva, stopped in March of 2019. Taking oral calcium/Vit D.  Will restart today and plan for every 3-4 months.  Ca marginally elevated has now returned to normal levels    #endo--dropped to one pill once a day of metformin.  BS have been ~100; 107 g/dl today  Agree to proceed with current dose of metformin.     Over 45 min of direct face to face time spent with patient with more than 50% time spent in counseling and coordinating care.           Again, thank you for allowing me to participate in the care of your  patient.      Sincerely,    Tyrel Valladares MD

## 2020-03-17 NOTE — PROGRESS NOTES
Infusion Nursing Note:  Albert Amaya presents today for C4D1 Cabazitaxol.    Patient seen by provider today: No   present during visit today: Not Applicable.    Note: Patient reported to clinic today with no new complaints or concerns after seeing the provider.    Intravenous Access:  Peripheral IV placed.    Treatment Conditions:  Lab Results   Component Value Date    HGB 10.7 03/17/2020     Lab Results   Component Value Date    WBC 7.1 03/17/2020      Lab Results   Component Value Date    ANEU 4.1 03/17/2020     Lab Results   Component Value Date     03/17/2020      Lab Results   Component Value Date     03/17/2020                   Lab Results   Component Value Date    POTASSIUM 4.5 03/17/2020           Lab Results   Component Value Date    MAG 2.2 12/21/2016            Lab Results   Component Value Date    CR 0.68 03/17/2020                   Lab Results   Component Value Date    WILL 8.9 03/17/2020                Lab Results   Component Value Date    BILITOTAL 0.5 03/17/2020           Lab Results   Component Value Date    ALBUMIN 3.9 03/17/2020                    Lab Results   Component Value Date    ALT 44 03/17/2020           Lab Results   Component Value Date    AST 18 03/17/2020       Results reviewed, labs MET treatment parameters, ok to proceed with treatment.      Post Infusion Assessment:  Patient tolerated infusion without incident.  Blood return noted pre and post infusion.  Site patent and intact, free from redness, edema or discomfort.  No evidence of extravasations.  Access discontinued per protocol.       Discharge Plan:   Patient declined prescription refills.  Discharge instructions reviewed with: Patient.  Patient and/or family verbalized understanding of discharge instructions and all questions answered.  Copy of AVS reviewed with patient and/or family.  Patient will return 3/19/2020 for neulasta injection and 4/7/2020 for next infusion for next appointment.  Patient  discharged in stable condition accompanied by: self.  Departure Mode: Ambulatory.  Face to Face time: 0 minutes.    Darvin Wilkerson RN

## 2020-03-17 NOTE — NURSING NOTE
"Oncology Rooming Note    March 17, 2020 11:19 AM   Albert Amaya is a 54 year old male who presents for:    Chief Complaint   Patient presents with     Oncology Clinic Visit     Return Prostate Cancer     Blood Draw     Labs drawn via PIV placed by vascular access RN in lab. VS taken. Patient checked in for next appt.     Initial Vitals: /77 (BP Location: Right arm, Patient Position: Sitting, Cuff Size: Adult Regular)   Pulse 65   Temp 98.1  F (36.7  C) (Oral)   Resp 16   Wt 86.3 kg (190 lb 3.2 oz)   SpO2 99%   BMI 28.93 kg/m   Estimated body mass index is 28.93 kg/m  as calculated from the following:    Height as of 2/24/20: 1.727 m (5' 7.99\").    Weight as of this encounter: 86.3 kg (190 lb 3.2 oz). Body surface area is 2.03 meters squared.  No Pain (0) Comment: Data Unavailable   No LMP for male patient.  Allergies reviewed: Yes  Medications reviewed: Yes    Medications: Medication refills not needed today.  Pharmacy name entered into [facility]: [facility] - [place], [place] - [address] [place] [address]    Clinical concerns: No new concerns.         Cynthia Hawk CMA              "

## 2020-03-17 NOTE — NURSING NOTE
Chief Complaint   Patient presents with     Oncology Clinic Visit     Return Prostate Cancer     Blood Draw     Labs drawn via PIV placed by vascular access RN in lab. VS taken. Patient checked in for next appt.     Labs drawn from PIV placed by vascular access RN. Line flushed with saline. Vitals taken. Pt checked in for appointment.    Annie Jasmine RN

## 2020-03-17 NOTE — PATIENT INSTRUCTIONS
[facility] Main Line: [phone]    Call triage nurse with chills and/or temperature greater than or equal to 100.4, uncontrolled nausea/vomiting, diarrhea, constipation, dizziness, shortness of breath, chest pain, bleeding, unexplained bruising, or any new/concerning symptoms, questions/concerns.   If you are having any concerning symptoms or wish to speak to a provider before your next infusion visit, please call your care coordinator or triage to notify them so we can adequately serve you.   Nurse Triage line:  [phone]    If after hours, weekends, or holidays, call main hospital  and ask for Oncology doctor on call @ [phone]      March 2020 Sunday Monday Tuesday Wednesday Thursday Friday Saturday   1     2     3     4     5     6     7       8     9     10     11     12     13     14       15     16     17    [facility]  10:30 AM   (15 min.)    [facility]    [facility] RETURN  10:45 AM   (30 min.)   Tyrel Valladares MD   [facility]  12:00 PM   (120 min.)   [facility]   [facility] 18     19     20     21       22     23     24     25     26     27     28       29     30     31                                     April 2020 Sunday Monday Tuesday Wednesday Thursday Friday Saturday                  1     2     3     4       5     6     7    [facility]  10:00 AM   (15 min.)   [facility]    [facility] RETURN  10:15 AM   (30 min.)   Tyrel Valladares MD   [facility]    [facility] ONC INFUSION 120  11:00 AM   (120 min.)   [facility]ION   [facility] 8     9     10     11       12     13     14     15     16     17     18       19     20     21     22     23     24     25       26     27     28    [facility] D[facility]  12:00 PM   (15 min.)    [facility] DRAW   [facility]  Draw    [facility] ONC INFUSION 120  12:30 PM   (120 min.)    ONCOLOGY INFUSION   [facility] 29     30                               Lab Results:  Recent Results (from the past 12 hour(s))   PSA tumor marker    Collection Time: 03/17/20 11:09 AM   Result Value Ref Range    PSA 58.90 (H) 0 - 4 ug/L   CBC with platelets differential    Collection Time: 03/17/20 11:09 AM   Result Value Ref Range    WBC 7.1 4.0 - 11.0 10e9/L    RBC Count 3.45 (L) 4.4 - 5.9 10e12/L    Hemoglobin 10.7 (L) 13.3 - 17.7 g/dL    Hematocrit 32.8 (L) 40.0 - 53.0 %    MCV 95 78 - 100 fl    MCH 31.0 26.5 - 33.0 pg    MCHC 32.6 31.5 - 36.5 g/dL    RDW 15.7 (H) 10.0 - 15.0 %    Platelet Count 257 150 - 450 10e9/L    Diff Method Automated Method     % Neutrophils 57.2 %    % Lymphocytes 28.5 %    % Monocytes 10.6 %    % Eosinophils 1.7 %    % Basophils 0.7 %    % Immature Granulocytes 1.3 %    Nucleated RBCs 0 0 /100    Absolute Neutrophil 4.1 1.6 - 8.3 10e9/L    Absolute Lymphocytes 2.0 0.8 - 5.3 10e9/L    Absolute Monocytes 0.8 0.0 - 1.3 10e9/L    Absolute Eosinophils 0.1 0.0 - 0.7 10e9/L    Absolute Basophils 0.1 0.0 - 0.2 10e9/L    Abs Immature Granulocytes 0.1 0 - 0.4 10e9/L    Absolute Nucleated RBC 0.0    Comprehensive metabolic panel    Collection Time: 03/17/20 11:09 AM   Result Value Ref Range    Sodium 139 133 - 144 mmol/L    Potassium 4.5 3.4 - 5.3 mmol/L    Chloride 109 94 - 109 mmol/L    Carbon Dioxide 25 20 - 32 mmol/L    Anion Gap 5 3 - 14 mmol/L    Glucose 107 (H) 70 - 99 mg/dL    Urea Nitrogen 15 7 - 30 mg/dL    Creatinine 0.68 0.66 - 1.25 mg/dL    GFR Estimate >90 >60 mL/min/[1.73_m2]    GFR Estimate If Black >90 >60 mL/min/[1.73_m2]    Calcium 8.9 8.5 - 10.1 mg/dL    Bilirubin Total 0.5 0.2 - 1.3 mg/dL    Albumin 3.9 3.4 - 5.0 g/dL    Protein Total 7.3 6.8 - 8.8 g/dL    Alkaline Phosphatase 118 40 - 150 U/L    ALT 44 0 - 70 U/L    AST 18 0 - 45 U/L   Lactate Dehydrogenase    Collection Time: 03/17/20 11:10 AM    Result Value Ref Range    Lactate Dehydrogenase 216 85 - 227 U/L

## 2020-03-17 NOTE — PROGRESS NOTES
[facility] CANCER CLINIC  FOLLOW-UP VISIT NOTE  Date of visit: Mar 17, 2020     Oncology Treatment History:   Metastatic Prostate CA treated    - s/p 6 cycles of taxotere 75mg/m2   - s/p orchiectomy on 3/18/2016   - s/p Provenge 5/13, 5/27, 6/10   - s/p Casodex 50 mg daily March/April   - s/p Zytiga 5/3/2017   - started cabazitaxel 1/14/2020    HPI: Albert is a 54 year old gentleman with metastatic prostate cancer.  Albert felt a cramp in his gluteus muscle and could barely walk after doing some remodeling at home and unloading heavy truck at work. He tried flexeril and prednisone initially but eventually presented to ED on 10/5/15. He feels that initially he was nearly labeled as drug seeker since he was in lot of discomfort and flexeril was not working. But during course of ED visits labs were drawn and he had marked elevation in Alk Phosphatase (over 1000). CT did suggest some ileus with some sclerotic bone lesions. He was admitted to the hospital. His PSA was checked and was markedly elevated at 5760 (10/6/15), repeat value 5563. He did follow up with Dr. Freire and had transrectal US guided biopsy of prostate on 10/8/15. They have the results through my chart which confirms prostate adenocarcinoma - enrico 4+3 involving majority (60-90%) portion of every single core.  He started on taxotere on 10/23 and completed 6 cycles of therapy in 2/2016.  He then underwent orchiectomy on 3/18/2016.       Throughout spring of 2016 Albert's PSA started to progress and after three elevations there was concern about him being hormone refractory.  He was started on Provenge and enrolled in the trial including Indoximod. PSA trending up and started on Casodex mid March with progression. Switched to Zytiga 5/3/17. He held his abiraterone in July 2019 with rising PSA and fatigue. He had a decline in his PSA after holding his abiraterone. This has been on hold and he is being followed without any additional  intervention. He had orchiectomy done previously and does not need ADT. In January, his PSA was increasing and Dr Valladares started cabazitaxel.       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      His wife joined us over Facetime.     He had some severe arthralgias after his Neulasta on pro - started on Thursday through Saturday- he was bedbound with bone pains.  Although he feels like he also had a low grade fever and was flu faustino.   This was over by Sunday and he was out snow blowing the driveway.  Uses compazine prn the first few days. No neuropathy, ongoing pains or rash.         MEDS:   Current Outpatient Medications   Medication Sig     Acetaminophen (TYLENOL PO) Take 325 mg by mouth every 8 hours as needed for mild pain or fever Reported on 5/18/2017     amLODIPine (NORVASC) 5 MG tablet TAKE ONE TABLET BY MOUTH EVERY DAY     blood glucose monitoring (ACCU-CHEK MULTICLIX) lancets Use to test blood sugar twice times daily or as directed.     diphenhydrAMINE (BENADRYL) 25 MG tablet Take 25 mg by mouth     HYDROmorphone (DILAUDID) 2 MG tablet Take 1 tablet (2 mg) by mouth every 4 hours as needed for moderate to severe pain     IBUPROFEN PO Take by mouth as needed for moderate pain Reported on 5/18/2017     levothyroxine (SYNTHROID/LEVOTHROID) 88 MCG tablet Take 1 tablet (88 mcg) by mouth daily     LORazepam (ATIVAN) 0.5 MG tablet Take 1 tablet (0.5 mg) by mouth every 4 hours as needed (Anxiety, Nausea/Vomiting or Sleep)     metFORMIN (GLUCOPHAGE-XR) 750 MG 24 hr tablet Take 1 tablet (750 mg) by mouth daily (with dinner)     prochlorperazine (COMPAZINE) 10 MG tablet Take 1 tablet (10 mg) by mouth every 6 hours as needed (Nausea/Vomiting)     valACYclovir (VALTREX) 1000 mg tablet Take 1 tablet (1,000 mg) by mouth 3 times daily     blood glucose monitoring (NO BRAND SPECIFIED) test strip Use to test blood sugars twice daily or as directed (fasting, rotate then second time -  before lunch, before supper and bedtime) (Patient not taking: Reported on 8/12/2019)     EPINEPHrine (EPIPEN 2-CHARITY) 0.3 MG/0.3ML injection 2-pack Inject 0.3 mLs (0.3 mg) into the muscle once as needed for anaphylaxis (Patient not taking: Reported on 1/14/2020)     No current facility-administered medications for this visit.         EXAM:  ECOG 1  Wt Readings from Last 4 Encounters:   03/17/20 86.3 kg (190 lb 3.2 oz)   02/24/20 85.3 kg (188 lb)   02/04/20 86.4 kg (190 lb 6.4 oz)   01/22/20 86.8 kg (191 lb 6.4 oz)   /77 (BP Location: Right arm, Patient Position: Sitting, Cuff Size: Adult Regular)   Pulse 65   Temp 98.1  F (36.7  C) (Oral)   Resp 16   Wt 86.3 kg (190 lb 3.2 oz)   SpO2 99%   BMI 28.93 kg/m    Patient alert and oriented x3.   PERRLA. EOMI. No scleral icterus noted. OP without thrush/sores.  Neck exam: Palpable 0.5 cm anterior cervical node on the left, rubbery, mobile, non-tender.   Heart: RRR no murmurs noted.   Lungs: clear to auscultation bilaterally.  No crackles or wheezing.   Abd: Normal bowel sounds.  No tenderness to palpation. No suprapubic tenderness.  No hepatomegaly.   Extremities: No lower extremity edema.   Back: No CVA tenderness.   Neuro: grossly intact.   Mood and affect is stable.     Labs/Imaging:      Recent Labs   Lab Test 03/17/20  1109 02/24/20  1450 02/24/20  1223 02/04/20  0711 01/22/20  1225    139 138 141 140   POTASSIUM 4.5 3.8 5.4* 4.2 4.8   CHLORIDE 109 108 109 108 105   CO2 25 25 24 29 30   ANIONGAP 5 6 5 4 5   BUN 15 15 14 22 19   CR 0.68 0.70 0.62* 0.92 0.90   * 101* 95 105* 115*   WILL 8.9 8.8 9.1 10.2* 10.2*     Recent Labs   Lab Test 12/21/16  1643 11/22/16  1221 10/26/16  1150 09/28/16  1137 08/17/16  1355  05/03/16  0955   MAG 2.2 2.4* 2.2 2.3 2.0   < > 2.0   PHOS  --   --   --   --   --   --  4.1    < > = values in this interval not displayed.     Recent Labs   Lab Test 03/17/20  1109 02/24/20  1245 02/04/20  0711 01/22/20  1225  01/14/20  1140   WBC 7.1 6.7 5.4 3.5* 5.3   HGB 10.7* 11.2* 11.2* 10.8* 12.0*    261 298 295 291   MCV 95 93 91 93 93   NEUTROPHIL 57.2 54.4 33.0 31.8 56.6     Recent Labs   Lab Test 03/17/20  1110 03/17/20  1109 02/24/20  1450 02/24/20  1223 02/04/20  0711  01/14/20  1140   BILITOTAL  --  0.5 0.7 0.8 0.5   < > 0.6   ALKPHOS  --  118 128 128 95   < > 113   ALT  --  44 32 34 32   < > 64   AST  --  18 15 36 21   < > 24   ALBUMIN  --  3.9 4.0 4.2 4.1   < > 4.4     --   --   --  196  --  181    < > = values in this interval not displayed.     TSH   Date Value Ref Range Status   02/24/2020 0.75 0.40 - 4.00 mU/L Final   01/07/2020 10.16 (H) 0.40 - 4.00 mU/L Final   07/17/2019 1.29 0.40 - 4.00 mU/L Final     No results for input(s): CEA in the last 89922 hours.  Results for orders placed or performed during the hospital encounter of 01/22/20   CT Abdomen Pelvis w Contrast    Narrative    Examination:  CT ABDOMEN PELVIS W CONTRAST 1/22/2020 2:26 PM     History: Stage 4 prostate cancer new chemo with lower ab pain with  intermittent blood with stool and per rectum. eval for bowel changes    Comparison: Bone scan 1/10/2020 CT CAP 1/10/2019    Technique: CT of the abdomen and pelvis were obtained without contrast  and with IV contrast during the late arterial and portal venous phase.  Sagittal and coronal reconstructions created and reviewed.    Contrast: iopamidol (ISOVUE-370) solution 118 mL    Findings:     Abdomen and pelvis: Normal hepatic parenchyma focal hypoattenuation  along the falciform ligament, likely representing focal fatty  deposition. Gallbladder is partially dilated and unremarkable. No  intra- or extrahepatic biliary dilation. Spleen, pancreas, and adrenal  glands are unremarkable. Kidneys demonstrate symmetric enhancement  without solid lesions, hydronephrosis, or nephrolithiasis. A few tiny  too small to characterize low density areas may represent small cysts.  Ureters and urinary bladder  are unremarkable. Prostate is surgically  absent.    No evidence of acute GI bleed. Stomach and small intestine are  unremarkable. Scattered colonic diverticula without evidence of acute  diverticulitis. Appendix is visualized and normal in caliber.     No suspicious abdominal or pelvic lymphadenopathy. No free fluid. No  pneumoperitoneum.    Abdominal aorta is normal in caliber and patent. Celiac and mesenteric  artery origins are unremarkable. Replaced right hepatic artery off the  SMA, normal variant Portal veins and IVC are patent.    Lower thorax: Unremarkable.    Bones and soft tissues: No acute osseous abnormalities. Diffuse  sclerotic appearance of the axial and appendicular skeleton. Unchanged  14 mm sclerotic lesion in the L4 vertebral body. Additional unchanged  sclerotic lesions in the T11 and T12 vertebral bodies. Multilevel  Schmorl's node deformities.      Impression    Impression: In this patient with history of prostate cancer status  post orchiectomy and hormone associated therapy:    1. No evidence of acute GI bleed or acute intestinal pathology.    2. Scattered colonic diverticula without evidence of diverticulitis.    3. Diffuse sclerotic appearance of the axial and appendicular skeleton  with focal sclerotic foci within the lower thoracic and lumbar  vertebrae, unchanged from 1/10/2020. See bone scan 1/10/2020 for  further evaluation.    I have personally reviewed the examination and initial interpretation  and I agree with the findings.    BABITA MURCIA MD     Recent Labs   Lab Test 03/17/20  1109 02/24/20  1223 02/04/20  0711 01/14/20  1140 01/07/20  1014  12/21/16  1643 11/22/16  1222  05/03/16  0955   PSA 58.90* 85.40* 94.90* 120.00* 90.10*   < > 60.68*  --    < > 67.04*   TESTOSTTOTAL  --   --   --   --   --   --  <2.* 2*  --  <2.*    < > = values in this interval not displayed.     Recent Labs   Lab Test 03/17/20  1110 03/17/20  1109 02/24/20  1450 02/24/20  1223 02/04/20  0711  01/22/20  1225 01/14/20  1140 01/07/20  1014 11/06/19  1338   PSA  --  58.90*  --  85.40* 94.90*  --  120.00* 90.10* 52.80*   ALKPHOS  --  118 128 128 95 101 113 102 51     --   --   --  196  --  181 202 172        ASSESSMENT/PLAN:  1. Metastatic prostate cancer with bony metastasis:   Currently on cabazitaxel, PSA dropped today  Denosumab- should be on q3-4 month schedule  HTN and DM TII  ECOG PS 1    #mPC--started Cabazitaxel on 1/14/2020 due to worsening bone scan and rising PSA in early January.     Albert comes alone at this clinic visit as his wife had to stay in the car.  They were unhappy with this arrangement, but this has been the new policy starting today give the COVID-19 pandemic.  He is tolerating the current chemotherapy with cabazitaxel.  He does have some side effects including fatigue for a few days, diarrhea for a few days, altered taste and appetite.  He has bone pain from taking the Neulasta.  He was not convinced initially if he should be taking any additional chemotherapy.  However, after reviewing the PSA value which showed up later in the course of our visit, he changed his mind.  His PSA has significantly dropped from 85.4 on 02/24 to 58.9 today.  This is indeed a significant jump and is more than what was initially expected after flattening of the response at the last visit.  He today asked me to continue with therapy as current.  I would agree and will continue as long as he has stable disease and is not clearly progressing on this regimen.      I would not have him see a provider prior to the next infusion.  This would limit the number of interactions he has prior to his next cycle of chemotherapy.      He denies any pain at this time.      He had questions on the use of prednisone along with cabazitaxel for the treatment of prostate cancer.  I did explain to him that prednisone has been present in the chemotherapy regimens as in the past it was the only active agent.  The studies  tested the addition of mitoxantrone to prednisone as compared to prednisone and then later compared docetaxel with prednisone to mitoxantrone with prednisone.  Prednisone is a weak inhibitor of the androgen synthesis. It is unlikely that it would have any efficacy in the era where patients have progressed on potent androgen synthesis inhibitors like abiraterone.   I do not think he would respond to androgen axis manipulation by prednisone.  He notes that he had increased energy when he met someone after taking a dose of prednisone.  I do not doubt that.  However, this works only for a few times but not if he were to take it continuously.     #pain-intermittent arthralgias that improve with activity.  No constant bone pains at this time, since hes been on chemotherapy  --pall care with Dr Brown in Feb-they would like to reschedule this for a time that correlates with chemo    #BRBPR- was in the ED on 1/22, CT ABD showed no acute bleed or acute intestinal pathology.  Has not occurred since.  Unsure if this was an irritation of his internal hemorrhoids?  If occurs again, will order a colonoscopy.  Did not occur with C2    #h/o vasovagal with prior labs, not occurred again       #bone-h/o Xgeva, stopped in March of 2019. Taking oral calcium/Vit D.  Will restart today and plan for every 3-4 months.  Ca marginally elevated has now returned to normal levels    #endo--dropped to one pill once a day of metformin.  BS have been ~100; 107 g/dl today  Agree to proceed with current dose of metformin.     Over 45 min of direct face to face time spent with patient with more than 50% time spent in counseling and coordinating care.

## 2020-03-19 ENCOUNTER — ALLIED HEALTH/NURSE VISIT (OUTPATIENT)
Dept: ONCOLOGY | Facility: CLINIC | Age: 55
End: 2020-03-19
Payer: COMMERCIAL

## 2020-03-19 VITALS
RESPIRATION RATE: 16 BRPM | HEART RATE: 64 BPM | SYSTOLIC BLOOD PRESSURE: 123 MMHG | OXYGEN SATURATION: 97 % | WEIGHT: 200 LBS | BODY MASS INDEX: 30.42 KG/M2 | TEMPERATURE: 97.9 F | DIASTOLIC BLOOD PRESSURE: 76 MMHG

## 2020-03-19 DIAGNOSIS — C61 MALIGNANT NEOPLASM OF PROSTATE (H): ICD-10-CM

## 2020-03-19 DIAGNOSIS — C79.51 BONE METASTASIS: ICD-10-CM

## 2020-03-19 DIAGNOSIS — D70.1 CHEMOTHERAPY-INDUCED NEUTROPENIA (H): Primary | ICD-10-CM

## 2020-03-19 DIAGNOSIS — T45.1X5A CHEMOTHERAPY-INDUCED NEUTROPENIA (H): Primary | ICD-10-CM

## 2020-03-19 PROCEDURE — 25000128 H RX IP 250 OP 636: Mod: ZF | Performed by: INTERNAL MEDICINE

## 2020-03-19 PROCEDURE — 96372 THER/PROPH/DIAG INJ SC/IM: CPT

## 2020-03-19 RX ADMIN — PEGFILGRASTIM 6 MG: 6 INJECTION SUBCUTANEOUS at 13:01

## 2020-03-19 ASSESSMENT — PAIN SCALES - GENERAL: PAINLEVEL: NO PAIN (0)

## 2020-03-19 NOTE — NURSING NOTE
Patient presents to the [facility] Infusion for pegfilgrastim (NEULASTA) injection 6 mg. Order written by Dr. Valladares was completed today. Name and  were verified by patient. See MAR for medication details. Patient was asked if they have any new symptoms or questions/concerns. Patient declined to speak with an RN today. Medication was given in the following site: left arm.  Patient tolerated injection well and was discharged to home.  Gabby Crandall CMA

## 2020-04-06 ENCOUNTER — MYC MEDICAL ADVICE (OUTPATIENT)
Dept: ONCOLOGY | Facility: CLINIC | Age: 55
End: 2020-04-06

## 2020-04-07 ENCOUNTER — VIRTUAL VISIT (OUTPATIENT)
Dept: ONCOLOGY | Facility: CLINIC | Age: 55
End: 2020-04-07
Attending: INTERNAL MEDICINE
Payer: COMMERCIAL

## 2020-04-07 ENCOUNTER — APPOINTMENT (OUTPATIENT)
Dept: LAB | Facility: CLINIC | Age: 55
End: 2020-04-07
Attending: INTERNAL MEDICINE
Payer: COMMERCIAL

## 2020-04-07 VITALS
HEART RATE: 61 BPM | WEIGHT: 187.5 LBS | OXYGEN SATURATION: 100 % | RESPIRATION RATE: 18 BRPM | TEMPERATURE: 98 F | DIASTOLIC BLOOD PRESSURE: 85 MMHG | BODY MASS INDEX: 28.52 KG/M2 | SYSTOLIC BLOOD PRESSURE: 129 MMHG

## 2020-04-07 DIAGNOSIS — C61 MALIGNANT NEOPLASM OF PROSTATE (H): ICD-10-CM

## 2020-04-07 DIAGNOSIS — E03.4 HYPOTHYROIDISM DUE TO ACQUIRED ATROPHY OF THYROID: ICD-10-CM

## 2020-04-07 DIAGNOSIS — D70.1 CHEMOTHERAPY-INDUCED NEUTROPENIA (H): Primary | ICD-10-CM

## 2020-04-07 DIAGNOSIS — T45.1X5A CHEMOTHERAPY-INDUCED NEUTROPENIA (H): ICD-10-CM

## 2020-04-07 DIAGNOSIS — C79.51 BONE METASTASIS: ICD-10-CM

## 2020-04-07 DIAGNOSIS — D70.1 CHEMOTHERAPY-INDUCED NEUTROPENIA (H): ICD-10-CM

## 2020-04-07 DIAGNOSIS — T45.1X5A CHEMOTHERAPY-INDUCED NEUTROPENIA (H): Primary | ICD-10-CM

## 2020-04-07 DIAGNOSIS — C79.51 BONE METASTASIS: Primary | ICD-10-CM

## 2020-04-07 LAB
ALBUMIN SERPL-MCNC: 4.2 G/DL (ref 3.4–5)
ALP SERPL-CCNC: 111 U/L (ref 40–150)
ALT SERPL W P-5'-P-CCNC: 41 U/L (ref 0–70)
ANION GAP SERPL CALCULATED.3IONS-SCNC: 6 MMOL/L (ref 3–14)
AST SERPL W P-5'-P-CCNC: 18 U/L (ref 0–45)
BASOPHILS # BLD AUTO: 0.1 10E9/L (ref 0–0.2)
BASOPHILS NFR BLD AUTO: 0.6 %
BILIRUB SERPL-MCNC: 0.6 MG/DL (ref 0.2–1.3)
BUN SERPL-MCNC: 14 MG/DL (ref 7–30)
CALCIUM SERPL-MCNC: 9.3 MG/DL (ref 8.5–10.1)
CHLORIDE SERPL-SCNC: 108 MMOL/L (ref 94–109)
CO2 SERPL-SCNC: 24 MMOL/L (ref 20–32)
CREAT SERPL-MCNC: 0.77 MG/DL (ref 0.66–1.25)
DIFFERENTIAL METHOD BLD: ABNORMAL
EOSINOPHIL # BLD AUTO: 0.1 10E9/L (ref 0–0.7)
EOSINOPHIL NFR BLD AUTO: 1.4 %
ERYTHROCYTE [DISTWIDTH] IN BLOOD BY AUTOMATED COUNT: 16.5 % (ref 10–15)
GFR SERPL CREATININE-BSD FRML MDRD: >90 ML/MIN/{1.73_M2}
GLUCOSE SERPL-MCNC: 101 MG/DL (ref 70–99)
HCT VFR BLD AUTO: 33 % (ref 40–53)
HGB BLD-MCNC: 10.7 G/DL (ref 13.3–17.7)
IMM GRANULOCYTES # BLD: 0.1 10E9/L (ref 0–0.4)
IMM GRANULOCYTES NFR BLD: 1.7 %
LDH SERPL L TO P-CCNC: 230 U/L (ref 85–227)
LYMPHOCYTES # BLD AUTO: 2.2 10E9/L (ref 0.8–5.3)
LYMPHOCYTES NFR BLD AUTO: 28.3 %
MCH RBC QN AUTO: 31 PG (ref 26.5–33)
MCHC RBC AUTO-ENTMCNC: 32.4 G/DL (ref 31.5–36.5)
MCV RBC AUTO: 96 FL (ref 78–100)
MONOCYTES # BLD AUTO: 0.7 10E9/L (ref 0–1.3)
MONOCYTES NFR BLD AUTO: 9 %
NEUTROPHILS # BLD AUTO: 4.6 10E9/L (ref 1.6–8.3)
NEUTROPHILS NFR BLD AUTO: 59 %
NRBC # BLD AUTO: 0 10*3/UL
NRBC BLD AUTO-RTO: 0 /100
PLATELET # BLD AUTO: 228 10E9/L (ref 150–450)
POTASSIUM SERPL-SCNC: 4.3 MMOL/L (ref 3.4–5.3)
PROT SERPL-MCNC: 7.6 G/DL (ref 6.8–8.8)
PSA SERPL-MCNC: 48.9 UG/L (ref 0–4)
RBC # BLD AUTO: 3.45 10E12/L (ref 4.4–5.9)
SODIUM SERPL-SCNC: 138 MMOL/L (ref 133–144)
WBC # BLD AUTO: 7.8 10E9/L (ref 4–11)

## 2020-04-07 PROCEDURE — 25000128 H RX IP 250 OP 636: Mod: ZF | Performed by: INTERNAL MEDICINE

## 2020-04-07 PROCEDURE — 96377 APPLICATON ON-BODY INJECTOR: CPT | Mod: 59

## 2020-04-07 PROCEDURE — 84153 ASSAY OF PSA TOTAL: CPT | Performed by: INTERNAL MEDICINE

## 2020-04-07 PROCEDURE — 25000125 ZZHC RX 250: Mod: ZF | Performed by: INTERNAL MEDICINE

## 2020-04-07 PROCEDURE — 80053 COMPREHEN METABOLIC PANEL: CPT | Performed by: INTERNAL MEDICINE

## 2020-04-07 PROCEDURE — 99214 OFFICE O/P EST MOD 30 MIN: CPT | Mod: TEL | Performed by: INTERNAL MEDICINE

## 2020-04-07 PROCEDURE — 96413 CHEMO IV INFUSION 1 HR: CPT

## 2020-04-07 PROCEDURE — 96375 TX/PRO/DX INJ NEW DRUG ADDON: CPT

## 2020-04-07 PROCEDURE — 85025 COMPLETE CBC W/AUTO DIFF WBC: CPT | Performed by: INTERNAL MEDICINE

## 2020-04-07 PROCEDURE — 25800030 ZZH RX IP 258 OP 636: Mod: ZF | Performed by: INTERNAL MEDICINE

## 2020-04-07 PROCEDURE — 83615 LACTATE (LD) (LDH) ENZYME: CPT | Performed by: INTERNAL MEDICINE

## 2020-04-07 PROCEDURE — 40000556 ZZH STATISTIC PERIPHERAL IV START W US GUIDANCE: Mod: ZF

## 2020-04-07 RX ORDER — EPINEPHRINE 0.3 MG/.3ML
0.3 INJECTION SUBCUTANEOUS EVERY 5 MIN PRN
Status: CANCELLED | OUTPATIENT
Start: 2020-04-07

## 2020-04-07 RX ORDER — DIPHENHYDRAMINE HYDROCHLORIDE 50 MG/ML
50 INJECTION INTRAMUSCULAR; INTRAVENOUS
Status: CANCELLED
Start: 2020-04-07

## 2020-04-07 RX ORDER — SODIUM CHLORIDE 9 MG/ML
1000 INJECTION, SOLUTION INTRAVENOUS CONTINUOUS PRN
Status: CANCELLED
Start: 2020-04-07

## 2020-04-07 RX ORDER — LIDOCAINE/PRILOCAINE 2.5 %-2.5%
CREAM (GRAM) TOPICAL ONCE
Status: CANCELLED
Start: 2020-04-07

## 2020-04-07 RX ORDER — NALOXONE HYDROCHLORIDE 0.4 MG/ML
.1-.4 INJECTION, SOLUTION INTRAMUSCULAR; INTRAVENOUS; SUBCUTANEOUS
Status: CANCELLED | OUTPATIENT
Start: 2020-04-07

## 2020-04-07 RX ORDER — ALBUTEROL SULFATE 0.83 MG/ML
2.5 SOLUTION RESPIRATORY (INHALATION)
Status: CANCELLED | OUTPATIENT
Start: 2020-04-07

## 2020-04-07 RX ORDER — ALBUTEROL SULFATE 90 UG/1
1-2 AEROSOL, METERED RESPIRATORY (INHALATION)
Status: CANCELLED
Start: 2020-04-07

## 2020-04-07 RX ORDER — METHYLPREDNISOLONE SODIUM SUCCINATE 125 MG/2ML
125 INJECTION, POWDER, LYOPHILIZED, FOR SOLUTION INTRAMUSCULAR; INTRAVENOUS
Status: CANCELLED
Start: 2020-04-07

## 2020-04-07 RX ORDER — LORAZEPAM 2 MG/ML
0.5 INJECTION INTRAMUSCULAR EVERY 4 HOURS PRN
Status: CANCELLED
Start: 2020-04-07

## 2020-04-07 RX ORDER — EPINEPHRINE 1 MG/ML
0.3 INJECTION, SOLUTION INTRAMUSCULAR; SUBCUTANEOUS EVERY 5 MIN PRN
Status: CANCELLED | OUTPATIENT
Start: 2020-04-07

## 2020-04-07 RX ORDER — MEPERIDINE HYDROCHLORIDE 25 MG/ML
25 INJECTION INTRAMUSCULAR; INTRAVENOUS; SUBCUTANEOUS EVERY 30 MIN PRN
Status: CANCELLED | OUTPATIENT
Start: 2020-04-07

## 2020-04-07 RX ADMIN — DIPHENHYDRAMINE HYDROCHLORIDE 25 MG: 50 INJECTION INTRAMUSCULAR; INTRAVENOUS at 11:48

## 2020-04-07 RX ADMIN — FAMOTIDINE 20 MG: 10 INJECTION INTRAVENOUS at 11:23

## 2020-04-07 RX ADMIN — SODIUM CHLORIDE 42 MG: 9 INJECTION, SOLUTION INTRAVENOUS at 12:15

## 2020-04-07 RX ADMIN — PEGFILGRASTIM 6 MG: KIT SUBCUTANEOUS at 13:25

## 2020-04-07 RX ADMIN — SODIUM CHLORIDE 250 ML: 9 INJECTION, SOLUTION INTRAVENOUS at 11:22

## 2020-04-07 RX ADMIN — DEXAMETHASONE SODIUM PHOSPHATE: 10 INJECTION, SOLUTION INTRAMUSCULAR; INTRAVENOUS at 11:28

## 2020-04-07 ASSESSMENT — PAIN SCALES - GENERAL: PAINLEVEL: NO PAIN (0)

## 2020-04-07 NOTE — PROGRESS NOTES
Infusion Nursing Note:  Albert Amaya presents today for Cycle 5 Day 1 of Cabazitaxel and Neulasta Onpro.    Patient seen by provider today: Yes: Telephone visit with Dr. Valladares   present during visit today: Not Applicable.    Note: Patient usually return for Neulasta injection; however, with COVID infection risks will receive Neulasta Onpro today. RN review medication and side effects with patient.    Neulasta Onpro On-Body injector applied to left arm at 1:30PM with light facing down.  Writer discussed Neulasta injection would start on 4/8/20 at 4:30PM, approximately 27 hours after application applied today.  Written and Verbal instruction reviewed with patient.  Pt instructed when the dose delivery starts, it will take about 45 minutes to complete.  Pt aware Neulasta Onpro On-Body should have green flashing light and to call triage or on-call MD if injector flashes red or appears to be leaking. Pt aware to keep Onpro On-Body Neulasta 4 inches away from electrical equipment and to avoid showering 4 hours prior to injection.   Neulasta Onpro Lot number: [id number]    Intravenous Access:  Peripheral IV placed.    Treatment Conditions:  Lab Results   Component Value Date    HGB 10.7 04/07/2020     Lab Results   Component Value Date    WBC 7.8 04/07/2020      Lab Results   Component Value Date    ANEU 4.6 04/07/2020     Lab Results   Component Value Date     04/07/2020      Lab Results   Component Value Date     04/07/2020                   Lab Results   Component Value Date    POTASSIUM 4.3 04/07/2020           Lab Results   Component Value Date    MAG 2.2 12/21/2016            Lab Results   Component Value Date    CR 0.77 04/07/2020                   Lab Results   Component Value Date    WILL 9.3 04/07/2020                Lab Results   Component Value Date    BILITOTAL 0.6 04/07/2020           Lab Results   Component Value Date    ALBUMIN 4.2 04/07/2020                    Lab Results   Component  Value Date    ALT 41 04/07/2020           Lab Results   Component Value Date    AST 18 04/07/2020       Results reviewed, labs MET treatment parameters, ok to proceed with treatment.      Post Infusion Assessment:  Patient tolerated infusion without incident.  Blood return noted pre and post infusion.  Site patent and intact, free from redness, edema or discomfort.  No evidence of extravasations.  Access discontinued per protocol.       Discharge Plan:   Patient declined prescription refills.  Discharge instructions reviewed with: Patient.  Patient and/or family verbalized understanding of discharge instructions and all questions answered.  Copy of AVS reviewed with patient and/or family.  Patient will return 4/28/20 for next appointment.  Patient discharged in stable condition accompanied by: self.  Departure Mode: Ambulatory.    Noy Rosenberg RN

## 2020-04-07 NOTE — PATIENT INSTRUCTIONS
Contact Numbers  [facility]: [phone]        Please call the [facility] Triage line if you experience a temperature greater than or equal to 100.5, shaking chills, have uncontrolled nausea, vomiting and/or diarrhea, dizziness, shortness of breath, chest pain, bleeding, unexplained bruising, or if you have any other new/concerning symptoms, questions or concerns.     If it is after hours, weekends, or holidays, please call the main hospital  at  [phone] and ask to speak to the Oncology doctor on call.     If you are having any concerning symptoms or wish to speak to a provider before your next infusion visit, please call your care coordinator or triage to notify them so we can adequately serve you.     If you need a refill on a narcotic prescription or other medication, please call triage before your infusion appointment.     Lab Results:  Recent Results (from the past 12 hour(s))   PSA tumor marker    Collection Time: 04/07/20 10:28 AM   Result Value Ref Range    PSA 48.90 (H) 0 - 4 ug/L   CBC with platelets differential    Collection Time: 04/07/20 10:28 AM   Result Value Ref Range    WBC 7.8 4.0 - 11.0 10e9/L    RBC Count 3.45 (L) 4.4 - 5.9 10e12/L    Hemoglobin 10.7 (L) 13.3 - 17.7 g/dL    Hematocrit 33.0 (L) 40.0 - 53.0 %    MCV 96 78 - 100 fl    MCH 31.0 26.5 - 33.0 pg    MCHC 32.4 31.5 - 36.5 g/dL    RDW 16.5 (H) 10.0 - 15.0 %    Platelet Count 228 150 - 450 10e9/L    Diff Method Automated Method     % Neutrophils 59.0 %    % Lymphocytes 28.3 %    % Monocytes 9.0 %    % Eosinophils 1.4 %    % Basophils 0.6 %    % Immature Granulocytes 1.7 %    Nucleated RBCs 0 0 /100    Absolute Neutrophil 4.6 1.6 - 8.3 10e9/L    Absolute Lymphocytes 2.2 0.8 - 5.3 10e9/L    Absolute Monocytes 0.7 0.0 - 1.3 10e9/L    Absolute Eosinophils 0.1 0.0 - 0.7 10e9/L    Absolute Basophils 0.1 0.0 - 0.2 10e9/L    Abs Immature Granulocytes 0.1 0 - 0.4 10e9/L    Absolute Nucleated RBC 0.0    Comprehensive metabolic  panel    Collection Time: 04/07/20 10:28 AM   Result Value Ref Range    Sodium 138 133 - 144 mmol/L    Potassium 4.3 3.4 - 5.3 mmol/L    Chloride 108 94 - 109 mmol/L    Carbon Dioxide 24 20 - 32 mmol/L    Anion Gap 6 3 - 14 mmol/L    Glucose 101 (H) 70 - 99 mg/dL    Urea Nitrogen 14 7 - 30 mg/dL    Creatinine 0.77 0.66 - 1.25 mg/dL    GFR Estimate >90 >60 mL/min/[1.73_m2]    GFR Estimate If Black >90 >60 mL/min/[1.73_m2]    Calcium 9.3 8.5 - 10.1 mg/dL    Bilirubin Total 0.6 0.2 - 1.3 mg/dL    Albumin 4.2 3.4 - 5.0 g/dL    Protein Total 7.6 6.8 - 8.8 g/dL    Alkaline Phosphatase 111 40 - 150 U/L    ALT 41 0 - 70 U/L    AST 18 0 - 45 U/L   Lactate Dehydrogenase    Collection Time: 04/07/20 10:28 AM   Result Value Ref Range    Lactate Dehydrogenase 230 (H) 85 - 227 U/L

## 2020-04-07 NOTE — PROGRESS NOTES
Chief Complaint   Patient presents with     Blood Draw     Vitals, blood drawn and PIV was placed by WALDO Rahman RN. Pt checked into appt.      GLORIA Gonzales LPN

## 2020-04-07 NOTE — PROGRESS NOTES
"Subjective     Albert Amaya is a 54 year old male who is being evaluated via a billable telephone visit.      The patient has been notified of following:     \"This telephone visit will be conducted via a call between you and your physician/provider. We have found that certain health care needs can be provided without the need for a physical exam.  This service lets us provide the care you need with a short phone conversation.  If a prescription is necessary we can send it directly to your pharmacy.  If lab work is needed we can place an order for that and you can then stop by our lab to have the test done at a later time.    If during the course of the call the physician/provider feels a telephone visit is not appropriate, you will not be charged for this service.\"     Patient has given verbal consent for Telephone visit?  Yes    Albert Amaya complains of   Chief Complaint   Patient presents with     Telephone     Malignant neoplasm of prostate        ALLERGIES  Aspirin and Salicylates    I have reviewed and updated the patient's allergies and medication list.    Concerns:     Refills: N/A      TOSHIA Vanegas      Reviewed and updated as needed this visit by Provider         Review of Systems   Constitutional: Positive for appetite change and fatigue.   Gastrointestinal: Positive for diarrhea.   All other systems reviewed and are negative.         Objective   Reported vitals:  There were no vitals taken for this visit.   healthy, alert and no distress  Psych: Alert and oriented times 3; coherent speech, normal   rate and volume, able to articulate logical thoughts, able   to abstract reason, no tangential thoughts, no hallucinations   or delusions  His affect is pleasant     Recent Labs   Lab Test 04/07/20  1028 03/17/20  1109 02/24/20  1450 02/24/20  1223 02/04/20  0711    139 139 138 141   POTASSIUM 4.3 4.5 3.8 5.4* 4.2   CHLORIDE 108 109 108 109 108   CO2 24 25 25 24 29   ANIONGAP 6 5 6 5 4   BUN 14 " 15 15 14 22   CR 0.77 0.68 0.70 0.62* 0.92   * 107* 101* 95 105*   WILL 9.3 8.9 8.8 9.1 10.2*     Recent Labs   Lab Test 12/21/16  1643 11/22/16  1221 10/26/16  1150 09/28/16  1137 08/17/16  1355  05/03/16  0955   MAG 2.2 2.4* 2.2 2.3 2.0   < > 2.0   PHOS  --   --   --   --   --   --  4.1    < > = values in this interval not displayed.     Recent Labs   Lab Test 04/07/20  1028 03/17/20  1109 02/24/20  1245 02/04/20  0711 01/22/20  1225   WBC 7.8 7.1 6.7 5.4 3.5*   HGB 10.7* 10.7* 11.2* 11.2* 10.8*    257 261 298 295   MCV 96 95 93 91 93   NEUTROPHIL 59.0 57.2 54.4 33.0 31.8     Recent Labs   Lab Test 04/07/20  1028 03/17/20  1110 03/17/20  1109 02/24/20  1450  02/04/20  0711   BILITOTAL 0.6  --  0.5 0.7   < > 0.5   ALKPHOS 111  --  118 128   < > 95   ALT 41  --  44 32   < > 32   AST 18  --  18 15   < > 21   ALBUMIN 4.2  --  3.9 4.0   < > 4.1   * 216  --   --   --  196    < > = values in this interval not displayed.     TSH   Date Value Ref Range Status   02/24/2020 0.75 0.40 - 4.00 mU/L Final   01/07/2020 10.16 (H) 0.40 - 4.00 mU/L Final   07/17/2019 1.29 0.40 - 4.00 mU/L Final     No results for input(s): CEA in the last [id number] hours.  Results for orders placed or performed during the hospital encounter of 01/22/20   CT Abdomen Pelvis w Contrast    Narrative    Examination:  CT ABDOMEN PELVIS W CONTRAST 1/22/2020 2:26 PM     History: Stage 4 prostate cancer new chemo with lower ab pain with  intermittent blood with stool and per rectum. eval for bowel changes    Comparison: Bone scan 1/10/2020 CT CAP 1/10/2019    Technique: CT of the abdomen and pelvis were obtained without contrast  and with IV contrast during the late arterial and portal venous phase.  Sagittal and coronal reconstructions created and reviewed.    Contrast: iopamidol (ISOVUE-370) solution 118 mL    Findings:     Abdomen and pelvis: Normal hepatic parenchyma focal hypoattenuation  along the falciform ligament, likely representing  focal fatty  deposition. Gallbladder is partially dilated and unremarkable. No  intra- or extrahepatic biliary dilation. Spleen, pancreas, and adrenal  glands are unremarkable. Kidneys demonstrate symmetric enhancement  without solid lesions, hydronephrosis, or nephrolithiasis. A few tiny  too small to characterize low density areas may represent small cysts.  Ureters and urinary bladder are unremarkable. Prostate is surgically  absent.    No evidence of acute GI bleed. Stomach and small intestine are  unremarkable. Scattered colonic diverticula without evidence of acute  diverticulitis. Appendix is visualized and normal in caliber.     No suspicious abdominal or pelvic lymphadenopathy. No free fluid. No  pneumoperitoneum.    Abdominal aorta is normal in caliber and patent. Celiac and mesenteric  artery origins are unremarkable. Replaced right hepatic artery off the  SMA, normal variant Portal veins and IVC are patent.    Lower thorax: Unremarkable.    Bones and soft tissues: No acute osseous abnormalities. Diffuse  sclerotic appearance of the axial and appendicular skeleton. Unchanged  14 mm sclerotic lesion in the L4 vertebral body. Additional unchanged  sclerotic lesions in the T11 and T12 vertebral bodies. Multilevel  Schmorl's node deformities.      Impression    Impression: In this patient with history of prostate cancer status  post orchiectomy and hormone associated therapy:    1. No evidence of acute GI bleed or acute intestinal pathology.    2. Scattered colonic diverticula without evidence of diverticulitis.    3. Diffuse sclerotic appearance of the axial and appendicular skeleton  with focal sclerotic foci within the lower thoracic and lumbar  vertebrae, unchanged from 1/10/2020. See bone scan 1/10/2020 for  further evaluation.    I have personally reviewed the examination and initial interpretation  and I agree with the findings.    BABITA MURCIA MD     Recent Labs   Lab Test 04/07/20  1026  03/17/20  1109 02/24/20  1223 02/04/20  0711 01/14/20  1140  12/21/16  1643 11/22/16  1222  05/03/16  0955   PSA 48.90* 58.90* 85.40* 94.90* 120.00*   < > 60.68*  --    < > 67.04*   TESTOSTTOTAL  --   --   --   --   --   --  <2 2  --  <2    < > = values in this interval not displayed.     Recent Labs   Lab Test 04/07/20  1028 03/17/20  1110 03/17/20  1109 02/24/20  1450 02/24/20  1223 02/04/20  0711  01/14/20  1140 01/07/20  1014   PSA 48.90*  --  58.90*  --  85.40* 94.90*  --  120.00* 90.10*   ALKPHOS 111  --  118 128 128 95   < > 113 102   * 216  --   --   --  196  --  181 202    < > = values in this interval not displayed.      ASSESSMENT/PLAN:  1. Metastatic prostate cancer with bony metastasis:   Currently on cabazitaxel, PSA dropped today  Denosumab- should be on q3-4 month schedule  HTN and DM TII  ECOG PS 1     #mPC--started Cabazitaxel on 1/14/2020 due to worsening bone scan and rising PSA in early January.   Albert is tolerating the current chemotherapy without much difficulty.  He does have fatigue but finds this tolerable.  He has also noticed altered taste and appetite and diarrhea for a few days post chemotherapy.  He had been excised at the last visit due to the decline in his PSA.  His PSA from this clinic visit was pending at the time of our visit but has come back lower again.    We reviewed the risks, benefits and rationale of continuing chemotherapy.  This is particularly concerned due to the ongoing COVID 19 pandemic.  If there is a sudden surge in the number of COVID 19 cases within [place] in the next 3 weeks when he will be due for his next cycle of chemotherapy, I would defer that cycle.  We could even consider holding chemotherapy after that.  He has already received adequate number of cycles and today would be his cycle #5.    We will be continuing with his denosumab every 3 to 4 months as in the past.    He denies any pain at this time and has been doing much better.    Phone call  duration:  28 minutes    Tyrel Valladares    Hematologist and Medical Oncologist  [facility]

## 2020-04-11 ASSESSMENT — ENCOUNTER SYMPTOMS
FATIGUE: 1
DIARRHEA: 1
APPETITE CHANGE: 1

## 2020-04-13 ENCOUNTER — MYC MEDICAL ADVICE (OUTPATIENT)
Dept: ONCOLOGY | Facility: CLINIC | Age: 55
End: 2020-04-13

## 2020-04-24 ENCOUNTER — DOCUMENTATION ONLY (OUTPATIENT)
Dept: ONCOLOGY | Facility: CLINIC | Age: 55
End: 2020-04-24

## 2020-04-24 NOTE — PROGRESS NOTES
Travel Ins. forms received via [employer] message from [facility].      Forms to be completed and put in folder for provider to approve.    Fax #:  [phone]    Enriqueta Shabazz CMA ([facility])

## 2020-04-27 VITALS
HEART RATE: 78 BPM | OXYGEN SATURATION: 100 % | SYSTOLIC BLOOD PRESSURE: 130 MMHG | TEMPERATURE: 97.9 F | BODY MASS INDEX: 28.17 KG/M2 | DIASTOLIC BLOOD PRESSURE: 89 MMHG | WEIGHT: 185.2 LBS | RESPIRATION RATE: 16 BRPM

## 2020-04-27 DIAGNOSIS — C61 MALIGNANT NEOPLASM OF PROSTATE (H): ICD-10-CM

## 2020-04-27 DIAGNOSIS — E03.4 HYPOTHYROIDISM DUE TO ACQUIRED ATROPHY OF THYROID: ICD-10-CM

## 2020-04-27 DIAGNOSIS — C79.51 BONE METASTASIS: ICD-10-CM

## 2020-04-27 LAB
ALBUMIN SERPL-MCNC: 4.2 G/DL (ref 3.4–5)
ALP SERPL-CCNC: 112 U/L (ref 40–150)
ALT SERPL W P-5'-P-CCNC: 35 U/L (ref 0–70)
ANION GAP SERPL CALCULATED.3IONS-SCNC: 7 MMOL/L (ref 3–14)
AST SERPL W P-5'-P-CCNC: 13 U/L (ref 0–45)
BASOPHILS # BLD AUTO: 0 10E9/L (ref 0–0.2)
BASOPHILS NFR BLD AUTO: 0.5 %
BILIRUB SERPL-MCNC: 0.5 MG/DL (ref 0.2–1.3)
BUN SERPL-MCNC: 14 MG/DL (ref 7–30)
CALCIUM SERPL-MCNC: 9 MG/DL (ref 8.5–10.1)
CHLORIDE SERPL-SCNC: 108 MMOL/L (ref 94–109)
CO2 SERPL-SCNC: 26 MMOL/L (ref 20–32)
CREAT SERPL-MCNC: 0.78 MG/DL (ref 0.66–1.25)
DIFFERENTIAL METHOD BLD: ABNORMAL
EOSINOPHIL # BLD AUTO: 0.1 10E9/L (ref 0–0.7)
EOSINOPHIL NFR BLD AUTO: 0.8 %
ERYTHROCYTE [DISTWIDTH] IN BLOOD BY AUTOMATED COUNT: 16.1 % (ref 10–15)
GFR SERPL CREATININE-BSD FRML MDRD: >90 ML/MIN/{1.73_M2}
GLUCOSE SERPL-MCNC: 94 MG/DL (ref 70–99)
HCT VFR BLD AUTO: 32.7 % (ref 40–53)
HGB BLD-MCNC: 10.7 G/DL (ref 13.3–17.7)
IMM GRANULOCYTES # BLD: 0.1 10E9/L (ref 0–0.4)
IMM GRANULOCYTES NFR BLD: 1.1 %
LDH SERPL L TO P-CCNC: 212 U/L (ref 85–227)
LYMPHOCYTES # BLD AUTO: 2.2 10E9/L (ref 0.8–5.3)
LYMPHOCYTES NFR BLD AUTO: 28.2 %
MCH RBC QN AUTO: 31.3 PG (ref 26.5–33)
MCHC RBC AUTO-ENTMCNC: 32.7 G/DL (ref 31.5–36.5)
MCV RBC AUTO: 96 FL (ref 78–100)
MONOCYTES # BLD AUTO: 0.6 10E9/L (ref 0–1.3)
MONOCYTES NFR BLD AUTO: 7.9 %
NEUTROPHILS # BLD AUTO: 4.8 10E9/L (ref 1.6–8.3)
NEUTROPHILS NFR BLD AUTO: 61.5 %
NRBC # BLD AUTO: 0 10*3/UL
NRBC BLD AUTO-RTO: 0 /100
PLATELET # BLD AUTO: 232 10E9/L (ref 150–450)
POTASSIUM SERPL-SCNC: 4.6 MMOL/L (ref 3.4–5.3)
PROT SERPL-MCNC: 7.6 G/DL (ref 6.8–8.8)
PSA SERPL-MCNC: 37.5 UG/L (ref 0–4)
RBC # BLD AUTO: 3.42 10E12/L (ref 4.4–5.9)
SODIUM SERPL-SCNC: 140 MMOL/L (ref 133–144)
WBC # BLD AUTO: 7.9 10E9/L (ref 4–11)

## 2020-04-27 PROCEDURE — 84153 ASSAY OF PSA TOTAL: CPT | Performed by: INTERNAL MEDICINE

## 2020-04-27 PROCEDURE — 83615 LACTATE (LD) (LDH) ENZYME: CPT | Performed by: INTERNAL MEDICINE

## 2020-04-27 PROCEDURE — 80053 COMPREHEN METABOLIC PANEL: CPT | Performed by: INTERNAL MEDICINE

## 2020-04-27 PROCEDURE — 36415 COLL VENOUS BLD VENIPUNCTURE: CPT | Performed by: INTERNAL MEDICINE

## 2020-04-27 PROCEDURE — 85025 COMPLETE CBC W/AUTO DIFF WBC: CPT | Performed by: INTERNAL MEDICINE

## 2020-04-27 ASSESSMENT — PAIN SCALES - GENERAL: PAINLEVEL: NO PAIN (0)

## 2020-04-28 ENCOUNTER — VIRTUAL VISIT (OUTPATIENT)
Dept: ONCOLOGY | Facility: CLINIC | Age: 55
End: 2020-04-28
Attending: PHYSICIAN ASSISTANT
Payer: COMMERCIAL

## 2020-04-28 DIAGNOSIS — C61 MALIGNANT NEOPLASM OF PROSTATE (H): ICD-10-CM

## 2020-04-28 DIAGNOSIS — G62.9 NEUROPATHY: ICD-10-CM

## 2020-04-28 DIAGNOSIS — C79.51 BONE METASTASIS: ICD-10-CM

## 2020-04-28 DIAGNOSIS — E03.4 HYPOTHYROIDISM DUE TO ACQUIRED ATROPHY OF THYROID: ICD-10-CM

## 2020-04-28 DIAGNOSIS — D70.1 CHEMOTHERAPY-INDUCED NEUTROPENIA (H): ICD-10-CM

## 2020-04-28 DIAGNOSIS — E03.9 HYPOTHYROIDISM, UNSPECIFIED TYPE: Primary | ICD-10-CM

## 2020-04-28 DIAGNOSIS — T45.1X5A CHEMOTHERAPY-INDUCED NEUTROPENIA (H): ICD-10-CM

## 2020-04-28 DIAGNOSIS — R61 NIGHT SWEATS: ICD-10-CM

## 2020-04-28 PROCEDURE — 99215 OFFICE O/P EST HI 40 MIN: CPT | Mod: GT | Performed by: PHYSICIAN ASSISTANT

## 2020-04-28 PROCEDURE — 40000114 ZZH STATISTIC NO CHARGE CLINIC VISIT

## 2020-04-28 RX ORDER — ALBUTEROL SULFATE 0.83 MG/ML
2.5 SOLUTION RESPIRATORY (INHALATION)
Status: CANCELLED | OUTPATIENT
Start: 2020-04-28

## 2020-04-28 RX ORDER — SODIUM CHLORIDE 9 MG/ML
1000 INJECTION, SOLUTION INTRAVENOUS CONTINUOUS PRN
Status: CANCELLED
Start: 2020-04-28

## 2020-04-28 RX ORDER — ALBUTEROL SULFATE 90 UG/1
1-2 AEROSOL, METERED RESPIRATORY (INHALATION)
Status: CANCELLED
Start: 2020-04-28

## 2020-04-28 RX ORDER — LEVOTHYROXINE SODIUM 88 UG/1
88 TABLET ORAL DAILY
Qty: 30 TABLET | Refills: 11 | Status: SHIPPED | OUTPATIENT
Start: 2020-04-28 | End: 2021-01-01

## 2020-04-28 RX ORDER — NALOXONE HYDROCHLORIDE 0.4 MG/ML
.1-.4 INJECTION, SOLUTION INTRAMUSCULAR; INTRAVENOUS; SUBCUTANEOUS
Status: CANCELLED | OUTPATIENT
Start: 2020-04-28

## 2020-04-28 RX ORDER — EPINEPHRINE 1 MG/ML
0.3 INJECTION, SOLUTION INTRAMUSCULAR; SUBCUTANEOUS EVERY 5 MIN PRN
Status: CANCELLED | OUTPATIENT
Start: 2020-04-28

## 2020-04-28 RX ORDER — METHYLPREDNISOLONE SODIUM SUCCINATE 125 MG/2ML
125 INJECTION, POWDER, LYOPHILIZED, FOR SOLUTION INTRAMUSCULAR; INTRAVENOUS
Status: CANCELLED
Start: 2020-04-28

## 2020-04-28 RX ORDER — DIPHENHYDRAMINE HYDROCHLORIDE 50 MG/ML
50 INJECTION INTRAMUSCULAR; INTRAVENOUS
Status: CANCELLED
Start: 2020-04-28

## 2020-04-28 RX ORDER — EPINEPHRINE 0.3 MG/.3ML
0.3 INJECTION SUBCUTANEOUS EVERY 5 MIN PRN
Status: CANCELLED | OUTPATIENT
Start: 2020-04-28

## 2020-04-28 RX ORDER — MEPERIDINE HYDROCHLORIDE 25 MG/ML
25 INJECTION INTRAMUSCULAR; INTRAVENOUS; SUBCUTANEOUS EVERY 30 MIN PRN
Status: CANCELLED | OUTPATIENT
Start: 2020-04-28

## 2020-04-28 RX ORDER — LORAZEPAM 2 MG/ML
0.5 INJECTION INTRAMUSCULAR EVERY 4 HOURS PRN
Status: CANCELLED
Start: 2020-04-28

## 2020-04-28 RX ORDER — LIDOCAINE/PRILOCAINE 2.5 %-2.5%
CREAM (GRAM) TOPICAL ONCE
Status: CANCELLED
Start: 2020-04-28

## 2020-04-28 NOTE — PROGRESS NOTES
Travel Insurance paperwork completed, signed and faxed to [employer] @ [phone]. A copy was made, sent to scanning.    Successful transmission verified in Right Fax.    Pt was emailed a copy.      Enriqueta Shabazz CMA

## 2020-04-28 NOTE — PROGRESS NOTES
"Albert Amaya is a 54 year old male who is being evaluated via a billable telephone visit.      The patient has been notified of following:     \"This telephone visit will be conducted via a call between you and your physician/provider. We have found that certain health care needs can be provided without the need for a physical exam.  This service lets us provide the care you need with a short phone conversation.  If a prescription is necessary we can send it directly to your pharmacy.  If lab work is needed we can place an order for that and you can then stop by our lab to have the test done at a later time.    Telephone visits are billed at different rates depending on your insurance coverage. During this emergency period, for some insurers they may be billed the same as an in-person visit.  Please reach out to your insurance provider with any questions.    If during the course of the call the physician/provider feels a telephone visit is not appropriate, you will not be charged for this service.\"    Patient has given verbal consent for Telephone visit?  Yes    How would you like to obtain your AVS? MyChart     Refill on the Levothyroxine.    Please call [phone]    Cynthia Hawk [facility]    VIDEO VISIT  Apr 28, 2020    REASON FOR VISIT: [facility], here for chemo assessment    Oncology Treatment History:   Metastatic Prostate CA treated   - s/p 6 cycles of taxotere 75mg/m2   - s/p orchiectomy on 3/18/2016   - s/p Provenge 5/13, 5/27, 6/10   - s/p Casodex 50 mg daily March/April   - s/p Zytiga 5/3/2017   - started cabazitaxel 1/14/2020     HPI: Albert is a 54 year old gentleman with metastatic prostate cancer. Albert felt a cramp in his gluteus muscle and could barely walk after doing some remodeling at home and unloading heavy truck at work. He tried flexeril and prednisone initially but eventually presented to ED on 10/5/15. He feels that initially he was nearly labeled as drug seeker since he was in lot of discomfort " and flexeril was not working. But during course of ED visits labs were drawn and he had marked elevation in Alk Phosphatase (over 1000). CT did suggest some ileus with some sclerotic bone lesions. He was admitted to the hospital. His PSA was checked and was markedly elevated at 5760 (10/6/15), repeat value 5563. He did follow up with Dr. Freire and had transrectal US guided biopsy of prostate on 10/8/15. They have the results through my chart which confirms prostate adenocarcinoma - enrcio 4+3 involving majority (60-90%) portion of every single core. He started on taxotere on 10/23 and completed 6 cycles of therapy in 2/2016. He then underwent orchiectomy on 3/18/2016.   Throughout spring of 2016 Albert's PSA started to progress and after three elevations there was concern about him being hormone refractory. He was started on Provenge and enrolled in the trial including Indoximod. PSA trending up and started on Casodex mid March with progression. Switched to Zytiga 5/3/17. He held his abiraterone in July 2019 with rising PSA and fatigue. He had a decline in his PSA after holding his abiraterone. This has been on hold and he is being followed without any additional intervention. He had orchiectomy done previously and does not need ADT. In January, his PSA was increasing and Dr Valladares started cabazitaxel.     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 He doing really well with the Cabazitaxel.  The neulasta gives him bone aches and a flu like feeling for days 2-5.  He uses prednisone on the weekends as it makes him feel fantastic doing yard work.  He feels this is the mentally strongest he has been in 5 years. No pain. No neuropathy. Living with his mother right now as Lorrie is on a COVID unit at [facility].  They are trying to figure out how to navigate this.       MEDS:        Current Outpatient Medications   Medication Sig     Acetaminophen (TYLENOL PO) Take 325 mg by  mouth every 8 hours as needed for mild pain or fever Reported on 5/18/2017     amLODIPine (NORVASC) 5 MG tablet TAKE ONE TABLET BY MOUTH EVERY DAY     blood glucose monitoring (ACCU-CHEK MULTICLIX) lancets Use to test blood sugar twice times daily or as directed.     diphenhydrAMINE (BENADRYL) 25 MG tablet Take 25 mg by mouth     HYDROmorphone (DILAUDID) 2 MG tablet Take 1 tablet (2 mg) by mouth every 4 hours as needed for moderate to severe pain     IBUPROFEN PO Take by mouth as needed for moderate pain Reported on 5/18/2017     levothyroxine (SYNTHROID/LEVOTHROID) 88 MCG tablet Take 1 tablet (88 mcg) by mouth daily     LORazepam (ATIVAN) 0.5 MG tablet Take 1 tablet (0.5 mg) by mouth every 4 hours as needed (Anxiety, Nausea/Vomiting or Sleep)     metFORMIN (GLUCOPHAGE-XR) 750 MG 24 hr tablet Take 1 tablet (750 mg) by mouth daily (with dinner)     prochlorperazine (COMPAZINE) 10 MG tablet Take 1 tablet (10 mg) by mouth every 6 hours as needed (Nausea/Vomiting)     valACYclovir (VALTREX) 1000 mg tablet Take 1 tablet (1,000 mg) by mouth 3 times daily     blood glucose monitoring (NO BRAND SPECIFIED) test strip Use to test blood sugars twice daily or as directed (fasting, rotate then second time - before lunch, before supper and bedtime) (Patient not taking: Reported on 8/12/2019)     EPINEPHrine (EPIPEN 2-CHARITY) 0.3 MG/0.3ML injection 2-pack Inject 0.3 mLs (0.3 mg) into the muscle once as needed for anaphylaxis (Patient not taking: Reported on 1/14/2020)     Vitals 4/27/2020   Systolic 130   Diastolic 89   Pulse 78   Respiration 16   Temp 97.9   O2 100   Pain    Weight 185 lb 3.2 oz     Video physical exam  General: Patient appears well in no acute distress. Normal/obese/thin body habitus.  Skin: No visualized rash or lesions on visualized skin  Eyes: EOMI, no erythema, sclera icterus or discharge noted  Resp: Appears to be breathing comfortably without accessory muscle usage, speaking in full sentences, no cough  MSK:  Appears to have normal range of motion based on visualized movements  Neurologic: No apparent tremors, facial movements symmetric  Psych: affect bright, alert and oriented  The rest of a comprehensive physical examination is deferred due to PHE (public health emergency) video restrictions     4/27/2020 13:41   WBC 7.9   Hemoglobin 10.7 (L)   Hematocrit 32.7 (L)   Platelet Count 232   RBC Count 3.42 (L)   MCV 96   MCH 31.3   MCHC 32.7   RDW 16.1 (H)   Diff Method Automated Method   % Neutrophils 61.5   % Lymphocytes 28.2   % Monocytes 7.9   % Eosinophils 0.8   % Basophils 0.5   % Immature Granulocytes 1.1   Nucleated RBCs 0   Absolute Neutrophil 4.8        3/17/2020 11:09 4/7/2020 10:28 4/27/2020 13:41   PSA 58.90 (H) 48.90 (H) 37.50 (H)      4/27/2020 13:41   Sodium 140   Potassium 4.6   Chloride 108   Carbon Dioxide 26   Urea Nitrogen 14   Creatinine 0.78   GFR Estimate >90   GFR Estimate If Black >90   Calcium 9.0   Anion Gap 7   Albumin 4.2   Protein Total 7.6   Bilirubin Total 0.5   Alkaline Phosphatase 112   ALT 35   AST 13   Lactate Dehydrogenase 212     ASSESSMENT/PLAN:   1. Metastatic prostate cancer with bony metastasis:   Currently on cabazitaxel, PSA dropped today   Denosumab- should be on q3-4 month schedule   HTN and DM TII   ECOG PS 1       #mPC--started Cabazitaxel on 1/14/2020 due to worsening bone scan and rising PSA in early January. Albert was called over Doximitry and we had a discussion regarding how great it is to see his PSA continue to decline.  He is tolerating the cabazitaxel well, a few days of aches from the neulasta, but otherwise no nausea and no neuropathy. He is really excited and would like to continue for now.  Lorrie is on a COVID unit at [facility] and he is staying with his mother.  We discussed the long term implications of this and that if we could help any way to get her to a different unit so that he could safely move home, we would be in favor of that.  He'll continue with the  On Pro, so that we minimize his visits with the infusion room.  He is OK with this decision. He is intermittently using prednisone for energy and we are OK with that.     #pain-No constant bone pains at this time, since hes been on chemotherapy   --pall care with Dr Brown in Feb-they would like to reschedule this for a time that correlates with chemo   #BRBPR- was in the ED on 1/22, CT ABD showed no acute bleed or acute intestinal pathology. Has not occurred since. Unsure if this was an irritation of his internal hemorrhoids? If occurs again, will order a colonoscopy. Has not re-occurred  #h/o vasovagal with prior labs, not occurred again   #bone-h/o Xgeva, stopped in March of 2019. Taking oral calcium/Vit D. Will restart today and plan for every 3-4 months. Ca marginally elevated has now returned to normal levels.  Plan for May 2020.   #endo--dropped to one pill once a day of metformin. BS have been ~100;   Agree to proceed with current dose of metformin.       VIDEO visit via [employer]  Start: 11:20  End: 12:15    Melanie Dan PA-C

## 2020-04-29 ENCOUNTER — INFUSION THERAPY VISIT (OUTPATIENT)
Dept: ONCOLOGY | Facility: CLINIC | Age: 55
End: 2020-04-29
Attending: PHYSICIAN ASSISTANT
Payer: COMMERCIAL

## 2020-04-29 VITALS
TEMPERATURE: 98 F | SYSTOLIC BLOOD PRESSURE: 133 MMHG | RESPIRATION RATE: 16 BRPM | DIASTOLIC BLOOD PRESSURE: 88 MMHG | OXYGEN SATURATION: 99 % | HEART RATE: 65 BPM

## 2020-04-29 DIAGNOSIS — C61 MALIGNANT NEOPLASM OF PROSTATE (H): Primary | ICD-10-CM

## 2020-04-29 DIAGNOSIS — D70.1 CHEMOTHERAPY-INDUCED NEUTROPENIA (H): ICD-10-CM

## 2020-04-29 DIAGNOSIS — T45.1X5A CHEMOTHERAPY-INDUCED NEUTROPENIA (H): ICD-10-CM

## 2020-04-29 DIAGNOSIS — C79.51 BONE METASTASIS: ICD-10-CM

## 2020-04-29 PROCEDURE — 96367 TX/PROPH/DG ADDL SEQ IV INF: CPT

## 2020-04-29 PROCEDURE — 25800030 ZZH RX IP 258 OP 636: Mod: ZF | Performed by: PHYSICIAN ASSISTANT

## 2020-04-29 PROCEDURE — 40000556 ZZH STATISTIC PERIPHERAL IV START W US GUIDANCE: Mod: ZF

## 2020-04-29 PROCEDURE — 96417 CHEMO IV INFUS EACH ADDL SEQ: CPT

## 2020-04-29 PROCEDURE — 96377 APPLICATON ON-BODY INJECTOR: CPT | Mod: 59

## 2020-04-29 PROCEDURE — 25000128 H RX IP 250 OP 636: Mod: ZF | Performed by: PHYSICIAN ASSISTANT

## 2020-04-29 PROCEDURE — 25000125 ZZHC RX 250: Mod: ZF | Performed by: PHYSICIAN ASSISTANT

## 2020-04-29 PROCEDURE — 96413 CHEMO IV INFUSION 1 HR: CPT

## 2020-04-29 PROCEDURE — 96375 TX/PRO/DX INJ NEW DRUG ADDON: CPT

## 2020-04-29 RX ADMIN — SODIUM CHLORIDE 42 MG: 9 INJECTION, SOLUTION INTRAVENOUS at 14:22

## 2020-04-29 RX ADMIN — FAMOTIDINE 20 MG: 10 INJECTION INTRAVENOUS at 13:36

## 2020-04-29 RX ADMIN — DIPHENHYDRAMINE HYDROCHLORIDE 25 MG: 50 INJECTION, SOLUTION INTRAMUSCULAR; INTRAVENOUS at 13:59

## 2020-04-29 RX ADMIN — DEXAMETHASONE SODIUM PHOSPHATE: 10 INJECTION, SOLUTION INTRAMUSCULAR; INTRAVENOUS at 13:39

## 2020-04-29 RX ADMIN — PEGFILGRASTIM 6 MG: KIT SUBCUTANEOUS at 15:22

## 2020-04-29 RX ADMIN — SODIUM CHLORIDE 250 ML: 9 INJECTION, SOLUTION INTRAVENOUS at 13:23

## 2020-04-29 ASSESSMENT — PAIN SCALES - GENERAL: PAINLEVEL: NO PAIN (0)

## 2020-04-29 NOTE — PROGRESS NOTES
Infusion Nursing Note:  Albert Amaya presents today for C6 D1 Cabazitaxel and Neulasta On-Pro.    Patient seen by provider today: No   present during visit today: Not Applicable.    Note: No changes overnight since visit with telephone visit with ELVIRA Castellano yesterday.    Intravenous Access:  Peripheral IV placed via ultrasound.    Treatment Conditions:  Lab Results   Component Value Date    HGB 10.7 04/27/2020     Lab Results   Component Value Date    WBC 7.9 04/27/2020      Lab Results   Component Value Date    ANEU 4.8 04/27/2020     Lab Results   Component Value Date     04/27/2020      Lab Results   Component Value Date     04/27/2020                   Lab Results   Component Value Date    POTASSIUM 4.6 04/27/2020           Lab Results   Component Value Date    MAG 2.2 12/21/2016            Lab Results   Component Value Date    CR 0.78 04/27/2020                   Lab Results   Component Value Date    WILL 9.0 04/27/2020                Lab Results   Component Value Date    BILITOTAL 0.5 04/27/2020           Lab Results   Component Value Date    ALBUMIN 4.2 04/27/2020                    Lab Results   Component Value Date    ALT 35 04/27/2020           Lab Results   Component Value Date    AST 13 04/27/2020       Results reviewed, labs MET treatment parameters, ok to proceed with treatment.      Post Infusion Assessment:  Patient tolerated infusion without incident.  Blood return noted pre and post infusion.  Site patent and intact, free from redness, edema or discomfort.  No evidence of extravasations.  Access discontinued per protocol.     Neulasta Onpro On-Body injector applied to LUE at 1522 with light facing down.  Writer discussed Neulasta injection would start tomorrow 4/30 at 1822, approximately 27 hours after application applied today.  Written and Verbal instruction reviewed with patient.  Pt instructed when the dose delivery starts, it will take about 45 minutes to  complete.  Pt aware Neulasta Onpro On-Body should have green flashing light and to call triage or on-call MD if injector flashes red or appears to be leaking. Pt aware to keep Onpro On-Body Neulasta 4 inches away from electrical equipment and to avoid showering 4 hours prior to injection.   Neulasta Onpro Lot number: [id number]        Discharge Plan:   Patient declined prescription refills.  Discharge instructions reviewed with: Patient.  Patient and/or family verbalized understanding of discharge instructions and all questions answered.  AVS to patient via [employer].  Patient will return 5/20 for next appointment.   Patient discharged in stable condition accompanied by: self.  Departure Mode: Ambulatory.    Annamarie Lopez RN

## 2020-05-18 VITALS
DIASTOLIC BLOOD PRESSURE: 82 MMHG | HEART RATE: 82 BPM | RESPIRATION RATE: 16 BRPM | TEMPERATURE: 97.8 F | WEIGHT: 187.5 LBS | BODY MASS INDEX: 28.52 KG/M2 | SYSTOLIC BLOOD PRESSURE: 130 MMHG | OXYGEN SATURATION: 98 %

## 2020-05-18 DIAGNOSIS — C79.51 BONE METASTASIS: ICD-10-CM

## 2020-05-18 DIAGNOSIS — C61 MALIGNANT NEOPLASM OF PROSTATE (H): ICD-10-CM

## 2020-05-18 DIAGNOSIS — E03.4 HYPOTHYROIDISM DUE TO ACQUIRED ATROPHY OF THYROID: ICD-10-CM

## 2020-05-18 LAB
ALBUMIN SERPL-MCNC: 4 G/DL (ref 3.4–5)
ALP SERPL-CCNC: 104 U/L (ref 40–150)
ALT SERPL W P-5'-P-CCNC: 55 U/L (ref 0–70)
ANION GAP SERPL CALCULATED.3IONS-SCNC: 7 MMOL/L (ref 3–14)
AST SERPL W P-5'-P-CCNC: 19 U/L (ref 0–45)
BASOPHILS # BLD AUTO: 0 10E9/L (ref 0–0.2)
BASOPHILS NFR BLD AUTO: 0.2 %
BILIRUB SERPL-MCNC: 0.4 MG/DL (ref 0.2–1.3)
BUN SERPL-MCNC: 18 MG/DL (ref 7–30)
CALCIUM SERPL-MCNC: 9 MG/DL (ref 8.5–10.1)
CHLORIDE SERPL-SCNC: 102 MMOL/L (ref 94–109)
CO2 SERPL-SCNC: 24 MMOL/L (ref 20–32)
CREAT SERPL-MCNC: 0.76 MG/DL (ref 0.66–1.25)
DIFFERENTIAL METHOD BLD: ABNORMAL
EOSINOPHIL # BLD AUTO: 0.1 10E9/L (ref 0–0.7)
EOSINOPHIL NFR BLD AUTO: 1 %
ERYTHROCYTE [DISTWIDTH] IN BLOOD BY AUTOMATED COUNT: 15.7 % (ref 10–15)
GFR SERPL CREATININE-BSD FRML MDRD: >90 ML/MIN/{1.73_M2}
GLUCOSE SERPL-MCNC: 117 MG/DL (ref 70–99)
HCT VFR BLD AUTO: 32.3 % (ref 40–53)
HGB BLD-MCNC: 10.3 G/DL (ref 13.3–17.7)
IMM GRANULOCYTES # BLD: 0.1 10E9/L (ref 0–0.4)
IMM GRANULOCYTES NFR BLD: 1.5 %
LDH SERPL L TO P-CCNC: 203 U/L (ref 85–227)
LYMPHOCYTES # BLD AUTO: 2.2 10E9/L (ref 0.8–5.3)
LYMPHOCYTES NFR BLD AUTO: 27.1 %
MCH RBC QN AUTO: 31.9 PG (ref 26.5–33)
MCHC RBC AUTO-ENTMCNC: 31.9 G/DL (ref 31.5–36.5)
MCV RBC AUTO: 100 FL (ref 78–100)
MONOCYTES # BLD AUTO: 0.6 10E9/L (ref 0–1.3)
MONOCYTES NFR BLD AUTO: 6.8 %
NEUTROPHILS # BLD AUTO: 5.2 10E9/L (ref 1.6–8.3)
NEUTROPHILS NFR BLD AUTO: 63.4 %
NRBC # BLD AUTO: 0 10*3/UL
NRBC BLD AUTO-RTO: 0 /100
PLATELET # BLD AUTO: 195 10E9/L (ref 150–450)
POTASSIUM SERPL-SCNC: 3.8 MMOL/L (ref 3.4–5.3)
PROT SERPL-MCNC: 7.4 G/DL (ref 6.8–8.8)
PSA SERPL-MCNC: 29.4 UG/L (ref 0–4)
RBC # BLD AUTO: 3.23 10E12/L (ref 4.4–5.9)
SODIUM SERPL-SCNC: 134 MMOL/L (ref 133–144)
WBC # BLD AUTO: 8.2 10E9/L (ref 4–11)

## 2020-05-18 PROCEDURE — 83615 LACTATE (LD) (LDH) ENZYME: CPT | Performed by: INTERNAL MEDICINE

## 2020-05-18 PROCEDURE — 80053 COMPREHEN METABOLIC PANEL: CPT | Performed by: INTERNAL MEDICINE

## 2020-05-18 PROCEDURE — 85025 COMPLETE CBC W/AUTO DIFF WBC: CPT | Performed by: INTERNAL MEDICINE

## 2020-05-18 PROCEDURE — 84153 ASSAY OF PSA TOTAL: CPT | Performed by: INTERNAL MEDICINE

## 2020-05-18 PROCEDURE — 36415 COLL VENOUS BLD VENIPUNCTURE: CPT | Performed by: INTERNAL MEDICINE

## 2020-05-18 ASSESSMENT — PAIN SCALES - GENERAL: PAINLEVEL: NO PAIN (0)

## 2020-05-18 NOTE — NURSING NOTE
Chief Complaint   Patient presents with     Blood Draw     labs drawn with vpt by rn.  vs taken     Labs drawn with vpt by rn.  Pt tolerated well.  VS taken.    Sweetie Meehan RN

## 2020-05-19 ENCOUNTER — VIRTUAL VISIT (OUTPATIENT)
Dept: ONCOLOGY | Facility: CLINIC | Age: 55
End: 2020-05-19
Attending: INTERNAL MEDICINE
Payer: COMMERCIAL

## 2020-05-19 DIAGNOSIS — C79.51 BONE METASTASIS: ICD-10-CM

## 2020-05-19 DIAGNOSIS — C61 MALIGNANT NEOPLASM OF PROSTATE (H): ICD-10-CM

## 2020-05-19 DIAGNOSIS — T45.1X5A CHEMOTHERAPY-INDUCED NEUTROPENIA (H): Primary | ICD-10-CM

## 2020-05-19 DIAGNOSIS — N52.1 ERECTILE DYSFUNCTION DUE TO DISEASES CLASSIFIED ELSEWHERE: ICD-10-CM

## 2020-05-19 DIAGNOSIS — D70.1 CHEMOTHERAPY-INDUCED NEUTROPENIA (H): Primary | ICD-10-CM

## 2020-05-19 PROCEDURE — 99215 OFFICE O/P EST HI 40 MIN: CPT | Mod: GT | Performed by: PHYSICIAN ASSISTANT

## 2020-05-19 PROCEDURE — 40000114 ZZH STATISTIC NO CHARGE CLINIC VISIT

## 2020-05-19 RX ORDER — METHYLPREDNISOLONE SODIUM SUCCINATE 125 MG/2ML
125 INJECTION, POWDER, LYOPHILIZED, FOR SOLUTION INTRAMUSCULAR; INTRAVENOUS
Status: CANCELLED
Start: 2020-05-20

## 2020-05-19 RX ORDER — DIPHENHYDRAMINE HYDROCHLORIDE 50 MG/ML
50 INJECTION INTRAMUSCULAR; INTRAVENOUS
Status: CANCELLED
Start: 2020-05-20

## 2020-05-19 RX ORDER — LIDOCAINE/PRILOCAINE 2.5 %-2.5%
CREAM (GRAM) TOPICAL ONCE
Status: CANCELLED
Start: 2020-05-20

## 2020-05-19 RX ORDER — LORAZEPAM 2 MG/ML
0.5 INJECTION INTRAMUSCULAR EVERY 4 HOURS PRN
Status: CANCELLED
Start: 2020-05-20

## 2020-05-19 RX ORDER — MEPERIDINE HYDROCHLORIDE 25 MG/ML
25 INJECTION INTRAMUSCULAR; INTRAVENOUS; SUBCUTANEOUS EVERY 30 MIN PRN
Status: CANCELLED | OUTPATIENT
Start: 2020-05-20

## 2020-05-19 RX ORDER — NALOXONE HYDROCHLORIDE 0.4 MG/ML
.1-.4 INJECTION, SOLUTION INTRAMUSCULAR; INTRAVENOUS; SUBCUTANEOUS
Status: CANCELLED | OUTPATIENT
Start: 2020-05-20

## 2020-05-19 RX ORDER — EPINEPHRINE 1 MG/ML
0.3 INJECTION, SOLUTION INTRAMUSCULAR; SUBCUTANEOUS EVERY 5 MIN PRN
Status: CANCELLED | OUTPATIENT
Start: 2020-05-20

## 2020-05-19 RX ORDER — EPINEPHRINE 0.3 MG/.3ML
0.3 INJECTION SUBCUTANEOUS EVERY 5 MIN PRN
Status: CANCELLED | OUTPATIENT
Start: 2020-05-20

## 2020-05-19 RX ORDER — ALBUTEROL SULFATE 0.83 MG/ML
2.5 SOLUTION RESPIRATORY (INHALATION)
Status: CANCELLED | OUTPATIENT
Start: 2020-05-20

## 2020-05-19 RX ORDER — ALBUTEROL SULFATE 90 UG/1
1-2 AEROSOL, METERED RESPIRATORY (INHALATION)
Status: CANCELLED
Start: 2020-05-20

## 2020-05-19 RX ORDER — SODIUM CHLORIDE 9 MG/ML
1000 INJECTION, SOLUTION INTRAVENOUS CONTINUOUS PRN
Status: CANCELLED
Start: 2020-05-20

## 2020-05-19 NOTE — PROGRESS NOTES
"Albert Amaya is a 54 year old male who is being evaluated via a billable telephone visit.      The patient has been notified of following:     \"This telephone visit will be conducted via a call between you and your physician/provider. We have found that certain health care needs can be provided without the need for a physical exam.  This service lets us provide the care you need with a short phone conversation.  If a prescription is necessary we can send it directly to your pharmacy.  If lab work is needed we can place an order for that and you can then stop by our lab to have the test done at a later time.    Telephone visits are billed at different rates depending on your insurance coverage. During this emergency period, for some insurers they may be billed the same as an in-person visit.  Please reach out to your insurance provider with any questions.    If during the course of the call the physician/provider feels a telephone visit is not appropriate, you will not be charged for this service.\"    Patient has given verbal consent for Telephone visit?  Yes    What phone number would you like to be contacted at? [phone]    How would you like to obtain your AVS? MyChart     I have reviewed and updated the patient's allergies and medication list.    Concerns: Several he will talk to the provider about.    Refills: Dilaudid and Prednisone         Rufino Alfaro, EMT    VIDEO VISIT  May 19, 2020       REASON FOR VISIT: [facility], here for chemo assessment     Oncology Treatment History:   Metastatic Prostate CA treated   - s/p 6 cycles of taxotere 75mg/m2   - s/p orchiectomy on 3/18/2016   - s/p Provenge 5/13, 5/27, 6/10   - s/p Casodex 50 mg daily March/April   - s/p Zytiga 5/3/2017   - started cabazitaxel 1/14/2020      HPI: Albert is a 54 year old gentleman with metastatic prostate cancer. Albert felt a cramp in his gluteus muscle and could barely walk after doing some remodeling at home and unloading heavy truck at " work. He tried flexeril and prednisone initially but eventually presented to ED on 10/5/15. He feels that initially he was nearly labeled as drug seeker since he was in lot of discomfort and flexeril was not working. But during course of ED visits labs were drawn and he had marked elevation in Alk Phosphatase (over 1000). CT did suggest some ileus with some sclerotic bone lesions. He was admitted to the hospital. His PSA was checked and was markedly elevated at 5760 (10/6/15), repeat value 5563. He did follow up with Dr. Freire and had transrectal US guided biopsy of prostate on 10/8/15. They have the results through my chart which confirms prostate adenocarcinoma - enrico 4+3 involving majority (60-90%) portion of every single core. He started on taxotere on 10/23 and completed 6 cycles of therapy in 2/2016. He then underwent orchiectomy on 3/18/2016.   Throughout spring of 2016 Albert's PSA started to progress and after three elevations there was concern about him being hormone refractory. He was started on Provenge and enrolled in the trial including Indoximod. PSA trending up and started on Casodex mid March with progression. Switched to Zytiga 5/3/17. He held his abiraterone in July 2019 with rising PSA and fatigue. He had a decline in his PSA after holding his abiraterone. This has been on hold and he is being followed without any additional intervention. He had orchiectomy done previously and does not need ADT. In January, his PSA was increasing and Dr Valladares started cabazitaxel.      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 He doing really well with the Cabazitaxel.  The neulasta gives him bone aches and a flu like feeling for days 2-5. He takes claritin and dilaudid q12 hours for a couple days.   He uses prednisone on the weekends as it makes him feel fantastic doing yard work.  He feels this is the mentally strongest he has been in 5 years. No pain. Neuropathy  has just started- very faintly in the fingertips, no fine motor deficits.. He was living with his mother the last 6 weeks, but it was too mentally challenging with home life.  Lorrie, his wife is a RN at [employer] and does care for covid patients, but they are meticulous with staying safe at home.  Albert is feeling emotionally better just being with his family. Overall- this has been much easier than Taxotere, so he is willing to keep going.         MEDS:           Current Outpatient Medications   Medication Sig     Acetaminophen (TYLENOL PO) Take 325 mg by mouth every 8 hours as needed for mild pain or fever Reported on 5/18/2017     amLODIPine (NORVASC) 5 MG tablet TAKE ONE TABLET BY MOUTH EVERY DAY     blood glucose monitoring (ACCU-CHEK MULTICLIX) lancets Use to test blood sugar twice times daily or as directed.     diphenhydrAMINE (BENADRYL) 25 MG tablet Take 25 mg by mouth     HYDROmorphone (DILAUDID) 2 MG tablet Take 1 tablet (2 mg) by mouth every 4 hours as needed for moderate to severe pain     IBUPROFEN PO Take by mouth as needed for moderate pain Reported on 5/18/2017     levothyroxine (SYNTHROID/LEVOTHROID) 88 MCG tablet Take 1 tablet (88 mcg) by mouth daily     LORazepam (ATIVAN) 0.5 MG tablet Take 1 tablet (0.5 mg) by mouth every 4 hours as needed (Anxiety, Nausea/Vomiting or Sleep)     metFORMIN (GLUCOPHAGE-XR) 750 MG 24 hr tablet Take 1 tablet (750 mg) by mouth daily (with dinner)     prochlorperazine (COMPAZINE) 10 MG tablet Take 1 tablet (10 mg) by mouth every 6 hours as needed (Nausea/Vomiting)     valACYclovir (VALTREX) 1000 mg tablet Take 1 tablet (1,000 mg) by mouth 3 times daily     blood glucose monitoring (NO BRAND SPECIFIED) test strip Use to test blood sugars twice daily or as directed (fasting, rotate then second time - before lunch, before supper and bedtime) (Patient not taking: Reported on 8/12/2019)     EPINEPHrine (EPIPEN 2-CHARITY) 0.3 MG/0.3ML injection 2-pack Inject 0.3 mLs (0.3 mg)  into the muscle once as needed for anaphylaxis (Patient not taking: Reported on 1/14/2020)         4/7/2020 10:28 4/27/2020 13:41 5/18/2020 13:59   Sodium 138 140 134   Potassium 4.3 4.6 3.8   Chloride 108 108 102   Carbon Dioxide 24 26 24   Urea Nitrogen 14 14 18   Creatinine 0.77 0.78 0.76   GFR Estimate >90 >90 >90   GFR Estimate If Black >90 >90 >90   Calcium 9.3 9.0 9.0   Anion Gap 6 7 7   Albumin 4.2 4.2 4.0   Protein Total 7.6 7.6 7.4   Bilirubin Total 0.6 0.5 0.4   Alkaline Phosphatase 111 112 104   ALT 41 35 55   AST 18 13 19   Lactate Dehydrogenase 230 (H) 212 203   PSA 48.90 (H) 37.50 (H) 29.40 (H)   Glucose 101 (H) 94 117 (H)   WBC 7.8 7.9 8.2   Hemoglobin 10.7 (L) 10.7 (L) 10.3 (L)   Hematocrit 33.0 (L) 32.7 (L) 32.3 (L)   Platelet Count 228 232 195   RBC Count 3.45 (L) 3.42 (L) 3.23 (L)   MCV 96 96 100       ASSESSMENT/PLAN:   1. Metastatic prostate cancer with bony metastasis:   Currently on cabazitaxel, PSA dropped today   Denosumab- should be on q3-4 month schedule   HTN and DM TII   ECOG PS 1         #mPC--started Cabazitaxel on 1/14/2020 due to worsening bone scan and rising PSA in early January. Albert was called today and we had a discussion regarding how great it is to see his PSA continue to decline.  He is tolerating the cabazitaxel well, a few days of aches from the neulasta, but otherwise no nausea and no neuropathy. He is really excited and would like to continue for now.  Lorrie is on a COVID unit at [facility], Albert was initially staying at his mothers house, but now is home.  They are really working with trying to be cautious.   He'll continue with the On Pro, so that we minimize his visits with the infusion room.  He is OK with this decision. He is intermittently using prednisone for energy and we are OK with that.      #pain-No constant bone pains at this time, since hes been on chemotherapy   --pall care with Dr Brown in Feb-they would like to reschedule this for a time that correlates  with chemo   #BRBPR- was in the ED on 1/22, CT ABD showed no acute bleed or acute intestinal pathology. Has not occurred since. Unsure if this was an irritation of his internal hemorrhoids? If occurs again, will order a colonoscopy. Has not re-occurred  #bone-h/o Xgeva, stopped in March of 2019. Taking oral calcium/Vit D. Will restart today and plan for every 3-4 months. Ca marginally elevated has now returned to normal levels.  Plan for May 2020. (he would like to come in on a diff day and not do this the same time as chemo)  #endo--dropped to one pill once a day of metformin. BS have been ~100;   Agree to proceed with current dose of metformin.   #erectile dysfunction- will refer to Dr Toth in urology          Phone call duration: 44 minutes    Anay Dan PA-C

## 2020-05-20 ENCOUNTER — INFUSION THERAPY VISIT (OUTPATIENT)
Dept: ONCOLOGY | Facility: CLINIC | Age: 55
End: 2020-05-20
Attending: PHYSICIAN ASSISTANT
Payer: COMMERCIAL

## 2020-05-20 VITALS
DIASTOLIC BLOOD PRESSURE: 86 MMHG | OXYGEN SATURATION: 96 % | HEART RATE: 66 BPM | RESPIRATION RATE: 16 BRPM | SYSTOLIC BLOOD PRESSURE: 126 MMHG | TEMPERATURE: 98.4 F

## 2020-05-20 DIAGNOSIS — C79.51 BONE METASTASIS: Primary | ICD-10-CM

## 2020-05-20 DIAGNOSIS — D70.1 CHEMOTHERAPY-INDUCED NEUTROPENIA (H): ICD-10-CM

## 2020-05-20 DIAGNOSIS — C61 MALIGNANT NEOPLASM OF PROSTATE (H): ICD-10-CM

## 2020-05-20 DIAGNOSIS — T45.1X5A CHEMOTHERAPY-INDUCED NEUTROPENIA (H): ICD-10-CM

## 2020-05-20 PROCEDURE — 96377 APPLICATON ON-BODY INJECTOR: CPT | Mod: 59

## 2020-05-20 PROCEDURE — 25000128 H RX IP 250 OP 636: Mod: ZF | Performed by: PHYSICIAN ASSISTANT

## 2020-05-20 PROCEDURE — 96375 TX/PRO/DX INJ NEW DRUG ADDON: CPT

## 2020-05-20 PROCEDURE — 96367 TX/PROPH/DG ADDL SEQ IV INF: CPT

## 2020-05-20 PROCEDURE — 25000125 ZZHC RX 250: Mod: ZF | Performed by: PHYSICIAN ASSISTANT

## 2020-05-20 PROCEDURE — 25800030 ZZH RX IP 258 OP 636: Mod: ZF | Performed by: PHYSICIAN ASSISTANT

## 2020-05-20 PROCEDURE — 96413 CHEMO IV INFUSION 1 HR: CPT

## 2020-05-20 PROCEDURE — 40000556 ZZH STATISTIC PERIPHERAL IV START W US GUIDANCE: Mod: ZF

## 2020-05-20 RX ORDER — PREDNISONE 5 MG/1
5 TABLET ORAL PRN
COMMUNITY

## 2020-05-20 RX ADMIN — PEGFILGRASTIM 6 MG: KIT SUBCUTANEOUS at 13:16

## 2020-05-20 RX ADMIN — SODIUM CHLORIDE 42 MG: 9 INJECTION, SOLUTION INTRAVENOUS at 12:07

## 2020-05-20 RX ADMIN — DEXAMETHASONE SODIUM PHOSPHATE: 10 INJECTION, SOLUTION INTRAMUSCULAR; INTRAVENOUS at 11:38

## 2020-05-20 RX ADMIN — Medication 20 MG: at 11:18

## 2020-05-20 RX ADMIN — DIPHENHYDRAMINE HYDROCHLORIDE 25 MG: 50 INJECTION, SOLUTION INTRAMUSCULAR; INTRAVENOUS at 11:18

## 2020-05-20 RX ADMIN — SODIUM CHLORIDE 250 ML: 9 INJECTION, SOLUTION INTRAVENOUS at 11:18

## 2020-05-20 NOTE — PATIENT INSTRUCTIONS
[facility] Triage and after hours / weekends / holidays:  [phone]    Please call the triage or after hours line if you experience a temperature greater than or equal to 100.5, shaking chills, have uncontrolled nausea, vomiting and/or diarrhea, dizziness, shortness of breath, chest pain, bleeding, unexplained bruising, or if you have any other new/concerning symptoms, questions or concerns.      If you are having any concerning symptoms or wish to speak to a provider before your next infusion visit, please call your care coordinator or triage to notify them so we can adequately serve you.     If you need a refill on a narcotic prescription or other medication, please call before your infusion appointment.                 May 2020      Frank Monday Tuesday Wednesday Thursday Friday Saturday                            1     2       3     4     5     6     7     8     9       10     11     12     13     14     15     16       17     18    [facility] DRAW   2:30 PM   (15 min.)   [facility]   [facility] 19    VIDEO VISIT RETURN   6:45 AM   (50 min.)   Anay Dan PA-C M [facility] 20    [facility] INFUSION 120  10:30 AM   (120 min.)    ONCOLOGY INFUSION   [facility] 21     22     23       24     25     26     27     28    [facility] DRAW  12:30 PM   (15 min.)    [facility] DRAW   [facility] Draw    P [facility] FAST TRACK  12:45 PM   (15 min.)    ONCOLOGY INFUSION   [facility] 29     30       31 June 2020 Sunday Monday Tuesday Wednesday Thursday Friday Saturday        1     2     3     4     5     6       7     8    [facility] DRAW   2:30 PM   (15 min.)   [facility] DRAW   [facility] Draw 9    VIDEO VISIT RETURN   8:25 AM   (50 min.)   Anay Dan PA-C M [facility] 10    [facility] INFUSION 120   1:00  PM   (120 min.)    ONCOLOGY INFUSION   [facility] 11     12     13       14     15     16     17     18     19     20       21     22     23     24     25     26     27       28     29     30    [facility] LAB DRAW   2:30 PM   (15 min.)   [facility] LAB DRAW   [facility] Draw                                     Lab Results:  No results found for this or any previous visit (from the past 12 hour(s)).

## 2020-05-20 NOTE — PROGRESS NOTES
Infusion Nursing Note:  Albert Amaya presents today for Day 1 Cycle 7 Cabazitaxel, Neulasta On-pro application   Patient seen by provider today: No   present during visit today: Not Applicable.    Note: Patient presents to infusion feeling well. Patient denies pain and states no acute complaints or concerns not addressed during phone visit with Anay FELIX yesterday. Specifically, patient denies s/s of infection such as fever, shortness of breath, cough, chest pain, change in taste or smell. Pt requesting Xgeva to be scheduled next week as he did not tolerate Xgeva and Neulasta administration at the same time.    Intravenous Access:  Peripheral IV placed via Vascular Access    Treatment Conditions:  Lab Results   Component Value Date    HGB 10.3 05/18/2020     Lab Results   Component Value Date    WBC 8.2 05/18/2020      Lab Results   Component Value Date    ANEU 5.2 05/18/2020     Lab Results   Component Value Date     05/18/2020      Lab Results   Component Value Date     05/18/2020                   Lab Results   Component Value Date    POTASSIUM 3.8 05/18/2020           Lab Results   Component Value Date    MAG 2.2 12/21/2016            Lab Results   Component Value Date    CR 0.76 05/18/2020                   Lab Results   Component Value Date    WILL 9.0 05/18/2020                Lab Results   Component Value Date    BILITOTAL 0.4 05/18/2020           Lab Results   Component Value Date    ALBUMIN 4.0 05/18/2020                    Lab Results   Component Value Date    ALT 55 05/18/2020           Lab Results   Component Value Date    AST 19 05/18/2020       Results reviewed, labs MET treatment parameters, ok to proceed with treatment.      Post Infusion Assessment:  Patient tolerated infusion without incident.  Blood return noted pre and post infusion.  Site patent and intact, free from redness, edema or discomfort.  No evidence of extravasations.   IV access removed per  protocol    Neulasta Onpro On-Body injector applied to back of left arm at 1316 with light facing down  Writer discussed Neulasta injection would start on 5/21 at 1616, approximately 27 hours after application applied today.  Written and Verbal instruction reviewed with patient.  Pt instructed when the dose delivery starts, it will take about 45 minutes to complete.  Pt aware Neulasta Onpro On-Body should have green flashing light and to call triage or on-call MD if injector flashes red or appears to be leaking. Pt aware to keep Onpro On-Body Neualsta 4 inches away from electrical equipment and to avoid showering 4 hours prior to injection.   Neulasta Onpro Lot number: [id number]        Discharge Plan:   Patient declined prescription refills.  Discharge instructions reviewed with: Patient.  Patient and/or family verbalized understanding of discharge instructions and all questions answered.  Copy of AVS reviewed with patient and/or family.  Patient will return 5/28 for next appointment.  Patient discharged in stable condition accompanied by: self.  Departure Mode: Ambulatory.  Face to Face time: 0 minutes.    David Malloy RN

## 2020-05-22 ENCOUNTER — PRE VISIT (OUTPATIENT)
Dept: UROLOGY | Facility: CLINIC | Age: 55
End: 2020-05-22

## 2020-05-22 NOTE — TELEPHONE ENCOUNTER
Reason for Visit: Consult    Diagnosis: Erectile Dysfunction    Orders/Procedures/Records: in system    Kenyatta Haines LPN  05/22/20  4:44 PM

## 2020-05-25 ENCOUNTER — DOCUMENTATION ONLY (OUTPATIENT)
Dept: CARE COORDINATION | Facility: CLINIC | Age: 55
End: 2020-05-25

## 2020-05-28 ENCOUNTER — VIRTUAL VISIT (OUTPATIENT)
Dept: UROLOGY | Facility: CLINIC | Age: 55
End: 2020-05-28
Attending: PHYSICIAN ASSISTANT
Payer: COMMERCIAL

## 2020-05-28 DIAGNOSIS — C61 PROSTATE CANCER (H): ICD-10-CM

## 2020-05-28 DIAGNOSIS — N52.9 ERECTILE DYSFUNCTION, UNSPECIFIED ERECTILE DYSFUNCTION TYPE: Primary | ICD-10-CM

## 2020-05-28 RX ORDER — TADALAFIL 20 MG/1
20 TABLET ORAL DAILY PRN
Qty: 10 TABLET | Refills: 12 | Status: SHIPPED | OUTPATIENT
Start: 2020-05-28

## 2020-05-28 NOTE — NURSING NOTE
Chief Complaint   Patient presents with     RECHECK     erectile dysfunction consult     Pt unable to do video visit, driving. Converted to telephone.    There were no vitals taken for this visit. There is no height or weight on file to calculate BMI.    Patient Active Problem List   Diagnosis     Malignant neoplasm of prostate (H)     Bone metastasis (H)     Hypothyroidism due to acquired atrophy of thyroid     Diabetes mellitus, type II (H)     Elevated blood pressure reading without diagnosis of hypertension     Chemotherapy-induced neutropenia (H)       Allergies   Allergen Reactions     Aspirin Swelling     Tongue swelling     Salicylates Swelling     tongue       Current Outpatient Medications   Medication Sig Dispense Refill     Acetaminophen (TYLENOL PO) Take 325 mg by mouth every 8 hours as needed for mild pain or fever Reported on 5/18/2017       amLODIPine (NORVASC) 5 MG tablet TAKE ONE TABLET BY MOUTH EVERY DAY 90 tablet 1     blood glucose monitoring (ACCU-CHEK MULTICLIX) lancets Use to test blood sugar twice times daily or as directed. 102 each 3     blood glucose monitoring (NO BRAND SPECIFIED) test strip Use to test blood sugars twice daily or as directed (fasting, rotate then second time - before lunch, before supper and bedtime) 100 strip 3     diphenhydrAMINE (BENADRYL) 25 MG tablet Take 25 mg by mouth every 6 hours as needed        EPINEPHrine (EPIPEN 2-CHARITY) 0.3 MG/0.3ML injection 2-pack Inject 0.3 mLs (0.3 mg) into the muscle once as needed for anaphylaxis 0.6 mL 1     HYDROmorphone (DILAUDID) 2 MG tablet Take 1 tablet (2 mg) by mouth every 4 hours as needed for moderate to severe pain 60 tablet 0     IBUPROFEN PO Take by mouth as needed for moderate pain Reported on 5/18/2017       levothyroxine (SYNTHROID/LEVOTHROID) 88 MCG tablet Take 1 tablet (88 mcg) by mouth daily 30 tablet 11     LORazepam (ATIVAN) 0.5 MG tablet Take 1 tablet (0.5 mg) by mouth every 4 hours as needed (Anxiety,  "Nausea/Vomiting or Sleep) 30 tablet 2     metFORMIN (GLUCOPHAGE-XR) 750 MG 24 hr tablet Take 1 tablet (750 mg) by mouth daily (with dinner) 180 tablet 3     predniSONE (DELTASONE) 5 MG tablet Take 5 mg by mouth as needed Takes on wkds to do \"yardwork\"       prochlorperazine (COMPAZINE) 10 MG tablet Take 1 tablet (10 mg) by mouth every 6 hours as needed (Nausea/Vomiting) 30 tablet 2     valACYclovir (VALTREX) 1000 mg tablet Take 1 tablet (1,000 mg) by mouth 3 times daily 90 tablet 0       Social History     Tobacco Use     Smoking status: Never Smoker     Smokeless tobacco: Never Used   Substance Use Topics     Alcohol use: Not on file     Drug use: Not on file       Fely Nieto CMA  5/28/2020  3:03 PM     "

## 2020-05-28 NOTE — LETTER
"5/28/2020       RE: Albert Amaya  [address] [facility] [place] [address]     Dear Colleague,    Thank you for referring your patient, Albert Amaya, to the [facility] UROLOGY AND INST FOR PROSTATE AND UROLOGIC CANCERS at [facility]. Please see a copy of my visit note below.    Albert Amaya is a 54 year old male who is being evaluated via a billable telephone visit.      The patient has been notified of following:     \"This telephone visit will be conducted via a call between you and your physician/provider. We have found that certain health care needs can be provided without the need for a physical exam.  This service lets us provide the care you need with a short phone conversation.  If a prescription is necessary we can send it directly to your pharmacy.  If lab work is needed we can place an order for that and you can then stop by our lab to have the test done at a later time.    Telephone visits are billed at different rates depending on your insurance coverage. During this emergency period, for some insurers they may be billed the same as an in-person visit.  Please reach out to your insurance provider with any questions.    If during the course of the call the physician/provider feels a telephone visit is not appropriate, you will not be charged for this service.\"    Patient has given verbal consent for Telephone visit?  Yes    What phone number would you like to be contacted at? [phone]    How would you like to obtain your AVS? Namita    Phone call duration: 24 minutes, ending 3:53 PM       I am seeing Albert Amaya in consultation from Anay Dan  for evaluation of erectile dysfunction.    HPI:  Albert Amaya is a 54 year old male is a very nice man with complaints of erectile dysfunction.  Pt is status-post bilateral orchiectomy 2016 for metastatic prostate cancer with PSA about 6000.  Has been on chemo x 4 regimens over the past several years.  No " "history of XRT or prostatectomy.  He needs help with erections.  He is getting some partial erections.    He sometimes feels \"good\" and a partial erection is possible.  IV steroids help him feel better.  He occasionally feels some libido, but often libido is lousy.  He's tried sildenafil, but didn't tolerate it due to headache.    Has not tried GENA, Cialis, ICI, or IPP at this time.          Lab Results   Component Value Date    PSA 29.40 05/18/2020    PSA 37.50 04/27/2020    PSA 48.90 04/07/2020    PSA 58.90 03/17/2020    PSA 85.40 02/24/2020    PSA 94.90 02/04/2020    .00 01/14/2020    PSA 90.10 01/07/2020    PSA 52.80 11/06/2019    PSA 43.00 09/18/2019          REVIEW OF SYSTEMS:  General: negative  Skin: negative  Eyes: negative  Ears/Nose/Throat: negative  Respiratory: negative  Cardiovascular: negative  Gastrointestinal: negative  Genitourinary: see HPI  Musculoskeletal: negative  Neurologic: negative  Psychiatric: negative  Hematologic/Lymphatic/Immunologic: negative  Endocrine: negative    PAST MEDICAL HX:  Past Medical History:   Diagnosis Date     Cancer (H)        PAST SURG HX:  Past Surgical History:   Procedure Laterality Date     COLONOSCOPY N/A 3/28/2017    Procedure: COLONOSCOPY;  Surgeon: Guru Tomasz Liriano MD;  Location: [facility]     ORCHIECTOMY SCROTAL BILATERAL Bilateral 3/18/2016    Procedure: ORCHIECTOMY SCROTAL BILATERAL;  Surgeon: Jesus Champagne MD;  Location: [facility] OR     PROSTATE SURGERY  08/12/2019     TESTICLE SURGERY          FAMILY HX:  Family History   Problem Relation Age of Onset     Thyroid Disease Mother      Diabetes Father      Rheumatoid Arthritis Father      Heart Disease Father      Hypertension Father        SOCIAL HX:  Social History     Tobacco Use     Smoking status: Never Smoker     Smokeless tobacco: Never Used   Substance Use Topics     Alcohol use: Not on file     Drug use: Not on file       MEDICATIONS:  Current Outpatient Medications " "  Medication Sig     Acetaminophen (TYLENOL PO) Take 325 mg by mouth every 8 hours as needed for mild pain or fever Reported on 5/18/2017     amLODIPine (NORVASC) 5 MG tablet TAKE ONE TABLET BY MOUTH EVERY DAY     blood glucose monitoring (ACCU-CHEK MULTICLIX) lancets Use to test blood sugar twice times daily or as directed.     blood glucose monitoring (NO BRAND SPECIFIED) test strip Use to test blood sugars twice daily or as directed (fasting, rotate then second time - before lunch, before supper and bedtime)     diphenhydrAMINE (BENADRYL) 25 MG tablet Take 25 mg by mouth every 6 hours as needed      EPINEPHrine (EPIPEN 2-CHARITY) 0.3 MG/0.3ML injection 2-pack Inject 0.3 mLs (0.3 mg) into the muscle once as needed for anaphylaxis     HYDROmorphone (DILAUDID) 2 MG tablet Take 1 tablet (2 mg) by mouth every 4 hours as needed for moderate to severe pain     IBUPROFEN PO Take by mouth as needed for moderate pain Reported on 5/18/2017     levothyroxine (SYNTHROID/LEVOTHROID) 88 MCG tablet Take 1 tablet (88 mcg) by mouth daily     LORazepam (ATIVAN) 0.5 MG tablet Take 1 tablet (0.5 mg) by mouth every 4 hours as needed (Anxiety, Nausea/Vomiting or Sleep)     metFORMIN (GLUCOPHAGE-XR) 750 MG 24 hr tablet Take 1 tablet (750 mg) by mouth daily (with dinner)     predniSONE (DELTASONE) 5 MG tablet Take 5 mg by mouth as needed Takes on wkds to do \"yardwork\"     prochlorperazine (COMPAZINE) 10 MG tablet Take 1 tablet (10 mg) by mouth every 6 hours as needed (Nausea/Vomiting)     valACYclovir (VALTREX) 1000 mg tablet Take 1 tablet (1,000 mg) by mouth 3 times daily     No current facility-administered medications for this visit.        ALLERGIES:  Aspirin and Salicylates      GENERAL PHYSICAL EXAM:   Exam deferred over the phone.  Attitude and demeanor is pleasant, alert & oriented and conversant.  No gross thought process defects during casual conversation.     Imaging/labs:  Lab Results   Component Value Date    CR 0.76 05/18/2020 "    CR 0.78 04/27/2020    CR 0.77 04/07/2020     Lab Results   Component Value Date    PSA 29.40 05/18/2020    PSA 37.50 04/27/2020    PSA 48.90 04/07/2020    PSA 58.90 03/17/2020    PSA 85.40 02/24/2020    PSA 94.90 02/04/2020       ASSESSMENT:     Prostate cancer, metastatic. On systemic therapy.    Erectile dysfunction     Hypogonadism     PLAN:    Trial Cialis.  Discussed side effects and how to take.    Discussed intracavernosal injections or IPP as 2nd line options should Cialis not be effective.    Definitely would NOT recommend testosterone replacement therapy     Copied cc to Consulting provider Anay Dan     Thank-you for the kind consultation.  Arron Toth MD     Urological Surgeon

## 2020-05-28 NOTE — PROGRESS NOTES
"Albert Amaya is a 54 year old male who is being evaluated via a billable telephone visit.      The patient has been notified of following:     \"This telephone visit will be conducted via a call between you and your physician/provider. We have found that certain health care needs can be provided without the need for a physical exam.  This service lets us provide the care you need with a short phone conversation.  If a prescription is necessary we can send it directly to your pharmacy.  If lab work is needed we can place an order for that and you can then stop by our lab to have the test done at a later time.    Telephone visits are billed at different rates depending on your insurance coverage. During this emergency period, for some insurers they may be billed the same as an in-person visit.  Please reach out to your insurance provider with any questions.    If during the course of the call the physician/provider feels a telephone visit is not appropriate, you will not be charged for this service.\"    Patient has given verbal consent for Telephone visit?  Yes    What phone number would you like to be contacted at? [phone]    How would you like to obtain your AVS? Namita    Phone call duration: 24 minutes, ending 3:53 PM       I am seeing Albert Amaya in consultation from Anay Dan  for evaluation of erectile dysfunction.    HPI:  Albert Amaya is a 54 year old male is a very nice man with complaints of erectile dysfunction.  Pt is status-post bilateral orchiectomy 2016 for metastatic prostate cancer with PSA about 6000.  Has been on chemo x 4 regimens over the past several years.  No history of XRT or prostatectomy.  He needs help with erections.  He is getting some partial erections.    He sometimes feels \"good\" and a partial erection is possible.  IV steroids help him feel better.  He occasionally feels some libido, but often libido is lousy.  He's tried sildenafil, but didn't tolerate it due to " headache.    Has not tried GENA, Cialis, ICI, or IPP at this time.          Lab Results   Component Value Date    PSA 29.40 05/18/2020    PSA 37.50 04/27/2020    PSA 48.90 04/07/2020    PSA 58.90 03/17/2020    PSA 85.40 02/24/2020    PSA 94.90 02/04/2020    .00 01/14/2020    PSA 90.10 01/07/2020    PSA 52.80 11/06/2019    PSA 43.00 09/18/2019          REVIEW OF SYSTEMS:  General: negative  Skin: negative  Eyes: negative  Ears/Nose/Throat: negative  Respiratory: negative  Cardiovascular: negative  Gastrointestinal: negative  Genitourinary: see HPI  Musculoskeletal: negative  Neurologic: negative  Psychiatric: negative  Hematologic/Lymphatic/Immunologic: negative  Endocrine: negative    PAST MEDICAL HX:  Past Medical History:   Diagnosis Date     Cancer (H)        PAST SURG HX:  Past Surgical History:   Procedure Laterality Date     COLONOSCOPY N/A 3/28/2017    Procedure: COLONOSCOPY;  Surgeon: Guru Tomasz Liriano MD;  Location:  GI     ORCHIECTOMY SCROTAL BILATERAL Bilateral 3/18/2016    Procedure: ORCHIECTOMY SCROTAL BILATERAL;  Surgeon: Jesus Champagne MD;  Location: [facility] OR     PROSTATE SURGERY  08/12/2019     TESTICLE SURGERY          FAMILY HX:  Family History   Problem Relation Age of Onset     Thyroid Disease Mother      Diabetes Father      Rheumatoid Arthritis Father      Heart Disease Father      Hypertension Father        SOCIAL HX:  Social History     Tobacco Use     Smoking status: Never Smoker     Smokeless tobacco: Never Used   Substance Use Topics     Alcohol use: Not on file     Drug use: Not on file       MEDICATIONS:  Current Outpatient Medications   Medication Sig     Acetaminophen (TYLENOL PO) Take 325 mg by mouth every 8 hours as needed for mild pain or fever Reported on 5/18/2017     amLODIPine (NORVASC) 5 MG tablet TAKE ONE TABLET BY MOUTH EVERY DAY     blood glucose monitoring (ACCU-CHEK MULTICLIX) lancets Use to test blood sugar twice times daily or as  "directed.     blood glucose monitoring (NO BRAND SPECIFIED) test strip Use to test blood sugars twice daily or as directed (fasting, rotate then second time - before lunch, before supper and bedtime)     diphenhydrAMINE (BENADRYL) 25 MG tablet Take 25 mg by mouth every 6 hours as needed      EPINEPHrine (EPIPEN 2-CHARITY) 0.3 MG/0.3ML injection 2-pack Inject 0.3 mLs (0.3 mg) into the muscle once as needed for anaphylaxis     HYDROmorphone (DILAUDID) 2 MG tablet Take 1 tablet (2 mg) by mouth every 4 hours as needed for moderate to severe pain     IBUPROFEN PO Take by mouth as needed for moderate pain Reported on 5/18/2017     levothyroxine (SYNTHROID/LEVOTHROID) 88 MCG tablet Take 1 tablet (88 mcg) by mouth daily     LORazepam (ATIVAN) 0.5 MG tablet Take 1 tablet (0.5 mg) by mouth every 4 hours as needed (Anxiety, Nausea/Vomiting or Sleep)     metFORMIN (GLUCOPHAGE-XR) 750 MG 24 hr tablet Take 1 tablet (750 mg) by mouth daily (with dinner)     predniSONE (DELTASONE) 5 MG tablet Take 5 mg by mouth as needed Takes on wkds to do \"yardwork\"     prochlorperazine (COMPAZINE) 10 MG tablet Take 1 tablet (10 mg) by mouth every 6 hours as needed (Nausea/Vomiting)     valACYclovir (VALTREX) 1000 mg tablet Take 1 tablet (1,000 mg) by mouth 3 times daily     No current facility-administered medications for this visit.        ALLERGIES:  Aspirin and Salicylates      GENERAL PHYSICAL EXAM:   Exam deferred over the phone.  Attitude and demeanor is pleasant, alert & oriented and conversant.  No gross thought process defects during casual conversation.     Imaging/labs:  Lab Results   Component Value Date    CR 0.76 05/18/2020    CR 0.78 04/27/2020    CR 0.77 04/07/2020     Lab Results   Component Value Date    PSA 29.40 05/18/2020    PSA 37.50 04/27/2020    PSA 48.90 04/07/2020    PSA 58.90 03/17/2020    PSA 85.40 02/24/2020    PSA 94.90 02/04/2020       ASSESSMENT:     Prostate cancer, metastatic. On systemic therapy.    Erectile " dysfunction     Hypogonadism     PLAN:    Trial Cialis.  Discussed side effects and how to take.    Discussed intracavernosal injections or IPP as 2nd line options should Cialis not be effective.    Definitely would NOT recommend testosterone replacement therapy     Copied cc to Consulting provider Anay Dan     Thank-you for the kind consultation.  Arron Toth MD     Urological Surgeon

## 2020-06-08 VITALS
TEMPERATURE: 98.1 F | WEIGHT: 188 LBS | SYSTOLIC BLOOD PRESSURE: 131 MMHG | HEART RATE: 65 BPM | OXYGEN SATURATION: 98 % | RESPIRATION RATE: 16 BRPM | DIASTOLIC BLOOD PRESSURE: 84 MMHG | BODY MASS INDEX: 28.59 KG/M2

## 2020-06-08 DIAGNOSIS — E03.4 HYPOTHYROIDISM DUE TO ACQUIRED ATROPHY OF THYROID: ICD-10-CM

## 2020-06-08 DIAGNOSIS — C79.51 BONE METASTASIS: ICD-10-CM

## 2020-06-08 DIAGNOSIS — C61 MALIGNANT NEOPLASM OF PROSTATE (H): ICD-10-CM

## 2020-06-08 LAB
ALBUMIN SERPL-MCNC: 4 G/DL (ref 3.4–5)
ALP SERPL-CCNC: 93 U/L (ref 40–150)
ALT SERPL W P-5'-P-CCNC: 47 U/L (ref 0–70)
ANION GAP SERPL CALCULATED.3IONS-SCNC: 5 MMOL/L (ref 3–14)
AST SERPL W P-5'-P-CCNC: 21 U/L (ref 0–45)
BASOPHILS # BLD AUTO: 0 10E9/L (ref 0–0.2)
BASOPHILS NFR BLD AUTO: 0.4 %
BILIRUB SERPL-MCNC: 0.6 MG/DL (ref 0.2–1.3)
BUN SERPL-MCNC: 18 MG/DL (ref 7–30)
CALCIUM SERPL-MCNC: 9 MG/DL (ref 8.5–10.1)
CHLORIDE SERPL-SCNC: 108 MMOL/L (ref 94–109)
CO2 SERPL-SCNC: 28 MMOL/L (ref 20–32)
CREAT SERPL-MCNC: 0.75 MG/DL (ref 0.66–1.25)
DIFFERENTIAL METHOD BLD: ABNORMAL
EOSINOPHIL # BLD AUTO: 0.1 10E9/L (ref 0–0.7)
EOSINOPHIL NFR BLD AUTO: 0.6 %
ERYTHROCYTE [DISTWIDTH] IN BLOOD BY AUTOMATED COUNT: 14.9 % (ref 10–15)
GFR SERPL CREATININE-BSD FRML MDRD: >90 ML/MIN/{1.73_M2}
GLUCOSE SERPL-MCNC: 95 MG/DL (ref 70–99)
HCT VFR BLD AUTO: 30.9 % (ref 40–53)
HGB BLD-MCNC: 10.2 G/DL (ref 13.3–17.7)
IMM GRANULOCYTES # BLD: 0.1 10E9/L (ref 0–0.4)
IMM GRANULOCYTES NFR BLD: 1.3 %
LDH SERPL L TO P-CCNC: 216 U/L (ref 85–227)
LYMPHOCYTES # BLD AUTO: 2.4 10E9/L (ref 0.8–5.3)
LYMPHOCYTES NFR BLD AUTO: 29.4 %
MCH RBC QN AUTO: 31.9 PG (ref 26.5–33)
MCHC RBC AUTO-ENTMCNC: 33 G/DL (ref 31.5–36.5)
MCV RBC AUTO: 97 FL (ref 78–100)
MONOCYTES # BLD AUTO: 0.7 10E9/L (ref 0–1.3)
MONOCYTES NFR BLD AUTO: 8.4 %
NEUTROPHILS # BLD AUTO: 5 10E9/L (ref 1.6–8.3)
NEUTROPHILS NFR BLD AUTO: 59.9 %
NRBC # BLD AUTO: 0 10*3/UL
NRBC BLD AUTO-RTO: 0 /100
PLATELET # BLD AUTO: 200 10E9/L (ref 150–450)
POTASSIUM SERPL-SCNC: 4.7 MMOL/L (ref 3.4–5.3)
PROT SERPL-MCNC: 7.4 G/DL (ref 6.8–8.8)
PSA SERPL-MCNC: 30.2 UG/L (ref 0–4)
RBC # BLD AUTO: 3.2 10E12/L (ref 4.4–5.9)
SODIUM SERPL-SCNC: 140 MMOL/L (ref 133–144)
WBC # BLD AUTO: 8.3 10E9/L (ref 4–11)

## 2020-06-08 PROCEDURE — 80053 COMPREHEN METABOLIC PANEL: CPT | Performed by: INTERNAL MEDICINE

## 2020-06-08 PROCEDURE — 83615 LACTATE (LD) (LDH) ENZYME: CPT | Performed by: INTERNAL MEDICINE

## 2020-06-08 PROCEDURE — 85025 COMPLETE CBC W/AUTO DIFF WBC: CPT | Performed by: INTERNAL MEDICINE

## 2020-06-08 PROCEDURE — 84153 ASSAY OF PSA TOTAL: CPT | Performed by: INTERNAL MEDICINE

## 2020-06-08 PROCEDURE — 36415 COLL VENOUS BLD VENIPUNCTURE: CPT | Performed by: INTERNAL MEDICINE

## 2020-06-08 ASSESSMENT — PAIN SCALES - GENERAL: PAINLEVEL: NO PAIN (0)

## 2020-06-08 NOTE — NURSING NOTE
Chief Complaint   Patient presents with     Blood Draw     Labs drawn via VPT by RN in lab. VS taken.      Labs collected from venipuncture by RN. Vitals taken.     Ria BENITEZ RN PHN BSN  BMT/Oncology Lab

## 2020-06-09 ENCOUNTER — MYC REFILL (OUTPATIENT)
Dept: ONCOLOGY | Facility: CLINIC | Age: 55
End: 2020-06-09

## 2020-06-09 ENCOUNTER — VIRTUAL VISIT (OUTPATIENT)
Dept: ONCOLOGY | Facility: CLINIC | Age: 55
End: 2020-06-09
Attending: PHYSICIAN ASSISTANT
Payer: COMMERCIAL

## 2020-06-09 DIAGNOSIS — D70.1 CHEMOTHERAPY-INDUCED NEUTROPENIA (H): Primary | ICD-10-CM

## 2020-06-09 DIAGNOSIS — C61 MALIGNANT NEOPLASM OF PROSTATE (H): ICD-10-CM

## 2020-06-09 DIAGNOSIS — G62.9 NEUROPATHY: ICD-10-CM

## 2020-06-09 DIAGNOSIS — E03.4 HYPOTHYROIDISM DUE TO ACQUIRED ATROPHY OF THYROID: ICD-10-CM

## 2020-06-09 DIAGNOSIS — C79.51 BONE METASTASIS: ICD-10-CM

## 2020-06-09 DIAGNOSIS — T45.1X5A CHEMOTHERAPY-INDUCED NEUTROPENIA (H): Primary | ICD-10-CM

## 2020-06-09 DIAGNOSIS — R61 NIGHT SWEATS: ICD-10-CM

## 2020-06-09 DIAGNOSIS — E03.9 HYPOTHYROIDISM, UNSPECIFIED TYPE: ICD-10-CM

## 2020-06-09 PROCEDURE — 40001009 ZZH VIDEO/TELEPHONE VISIT; NO CHARGE

## 2020-06-09 PROCEDURE — 99214 OFFICE O/P EST MOD 30 MIN: CPT | Mod: TEL | Performed by: PHYSICIAN ASSISTANT

## 2020-06-09 RX ORDER — HYDROMORPHONE HYDROCHLORIDE 2 MG/1
2 TABLET ORAL EVERY 4 HOURS PRN
Qty: 60 TABLET | Refills: 0 | Status: SHIPPED | OUTPATIENT
Start: 2020-06-09 | End: 2020-08-24

## 2020-06-09 RX ORDER — ALBUTEROL SULFATE 0.83 MG/ML
2.5 SOLUTION RESPIRATORY (INHALATION)
Status: CANCELLED | OUTPATIENT
Start: 2020-06-10

## 2020-06-09 RX ORDER — ALBUTEROL SULFATE 90 UG/1
1-2 AEROSOL, METERED RESPIRATORY (INHALATION)
Status: CANCELLED
Start: 2020-06-10

## 2020-06-09 RX ORDER — EPINEPHRINE 0.3 MG/.3ML
0.3 INJECTION SUBCUTANEOUS EVERY 5 MIN PRN
Status: CANCELLED | OUTPATIENT
Start: 2020-06-10

## 2020-06-09 RX ORDER — LIDOCAINE/PRILOCAINE 2.5 %-2.5%
CREAM (GRAM) TOPICAL ONCE
Status: CANCELLED
Start: 2020-06-10

## 2020-06-09 RX ORDER — METHYLPREDNISOLONE SODIUM SUCCINATE 125 MG/2ML
125 INJECTION, POWDER, LYOPHILIZED, FOR SOLUTION INTRAMUSCULAR; INTRAVENOUS
Status: CANCELLED
Start: 2020-06-10

## 2020-06-09 RX ORDER — SODIUM CHLORIDE 9 MG/ML
1000 INJECTION, SOLUTION INTRAVENOUS CONTINUOUS PRN
Status: CANCELLED
Start: 2020-06-10

## 2020-06-09 RX ORDER — EPINEPHRINE 1 MG/ML
0.3 INJECTION, SOLUTION INTRAMUSCULAR; SUBCUTANEOUS EVERY 5 MIN PRN
Status: CANCELLED | OUTPATIENT
Start: 2020-06-10

## 2020-06-09 RX ORDER — NALOXONE HYDROCHLORIDE 0.4 MG/ML
.1-.4 INJECTION, SOLUTION INTRAMUSCULAR; INTRAVENOUS; SUBCUTANEOUS
Status: CANCELLED | OUTPATIENT
Start: 2020-06-10

## 2020-06-09 RX ORDER — DIPHENHYDRAMINE HYDROCHLORIDE 50 MG/ML
50 INJECTION INTRAMUSCULAR; INTRAVENOUS
Status: CANCELLED
Start: 2020-06-10

## 2020-06-09 RX ORDER — MEPERIDINE HYDROCHLORIDE 25 MG/ML
25 INJECTION INTRAMUSCULAR; INTRAVENOUS; SUBCUTANEOUS EVERY 30 MIN PRN
Status: CANCELLED | OUTPATIENT
Start: 2020-06-10

## 2020-06-09 RX ORDER — LORAZEPAM 2 MG/ML
0.5 INJECTION INTRAMUSCULAR EVERY 4 HOURS PRN
Status: CANCELLED
Start: 2020-06-10

## 2020-06-09 NOTE — TELEPHONE ENCOUNTER
Prescription approved by provider Tyrel Valladares MD. Rx e-prescribed to Pt pharmacy of choice.     EMANUEL PerezN, RN, OCN  RN Cancer Care Coordinator  [facility]  [phone]

## 2020-06-09 NOTE — LETTER
"    6/9/2020         RE: Albert Amaya  [address] [facility] [place] [address]        Dear Colleague,    Thank you for referring your patient, Albert Amaya, to the [facility]. Please see a copy of my visit note below.      Albert Amaya is a 54 year old male who is being evaluated via a billable telephone visit.      The patient has been notified of following:     \"This telephone visit will be conducted via a call between you and your physician/provider. We have found that certain health care needs can be provided without the need for a physical exam.  This service lets us provide the care you need with a short phone conversation.  If a prescription is necessary we can send it directly to your pharmacy.  If lab work is needed we can place an order for that and you can then stop by our lab to have the test done at a later time.    Telephone visits are billed at different rates depending on your insurance coverage. During this emergency period, for some insurers they may be billed the same as an in-person visit.  Please reach out to your insurance provider with any questions.    If during the course of the call the physician/provider feels a telephone visit is not appropriate, you will not be charged for this service.\"    Patient has given verbal consent for Telephone visit?  Yes    What phone number would you like to be contacted at? [phone]    How would you like to obtain your AVS? Namita     I have reviewed and updated the patient's allergies and medication list.    Concerns: Patient has no new concerns.      Refills: Norvasc and Dilauded        Rufino Alfaro, EMT    VIDEO VISIT  Jun 9, 2020          REASON FOR VISIT: [facility] [place], here for chemo assessment     Oncology Treatment History:   Metastatic Prostate CA treated   - s/p 6 cycles of taxotere 75mg/m2   - s/p orchiectomy on 3/18/2016   - s/p Provenge 5/13, 5/27, 6/10   - s/p Casodex 50 mg daily March/April   - s/p Zytiga 5/3/2017   - " started cabazitaxel 1/14/2020      HPI: Albert is a 54 year old gentleman with metastatic prostate cancer. Albert felt a cramp in his gluteus muscle and could barely walk after doing some remodeling at home and unloading heavy truck at work. He tried flexeril and prednisone initially but eventually presented to ED on 10/5/15. He feels that initially he was nearly labeled as drug seeker since he was in lot of discomfort and flexeril was not working. But during course of ED visits labs were drawn and he had marked elevation in Alk Phosphatase (over 1000). CT did suggest some ileus with some sclerotic bone lesions. He was admitted to the hospital. His PSA was checked and was markedly elevated at 5760 (10/6/15), repeat value 5563. He did follow up with Dr. Freire and had transrectal US guided biopsy of prostate on 10/8/15. They have the results through my chart which confirms prostate adenocarcinoma - enrico 4+3 involving majority (60-90%) portion of every single core. He started on taxotere on 10/23 and completed 6 cycles of therapy in 2/2016. He then underwent orchiectomy on 3/18/2016.   Throughout spring of 2016 Albert's PSA started to progress and after three elevations there was concern about him being hormone refractory. He was started on Provenge and enrolled in the trial including Indoximod. PSA trending up and started on Casodex mid March with progression. Switched to Zytiga 5/3/17. He held his abiraterone in July 2019 with rising PSA and fatigue. He had a decline in his PSA after holding his abiraterone. This has been on hold and he is being followed without any additional intervention. He had orchiectomy done previously and does not need ADT. In January, his PSA was increasing and Dr Valladares started cabazitaxel.      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 He doing really well with the Cabazitaxel.  The neulasta gives him bone aches and a flu like feeling for days  2-5. He takes claritin and dilaudid q12 hours for a couple days.   He uses rarely uses prednisone on the weekends as it makes him feel fantastic doing yard work (maybe 1-2 x month).  He feels this is the mentally strongest he has been in 5 years. No pain. Neuropathy has just started- very faintly in the fingertips, no fine motor deficits. He was living with his mother during the first 6 weeks of quarentine, but it was too mentally challenging with home life.  Lorrie, his wife is a RN at [employer] [facility] and does care for covid patients, but they are meticulous with staying safe at home.  Albert is feeling emotionally better just being with his family. Overall- this has been much easier than Taxotere, so he is willing to keep going. He questions what things will look like in the next year.         MEDS:           Current Outpatient Medications   Medication Sig     Acetaminophen (TYLENOL PO) Take 325 mg by mouth every 8 hours as needed for mild pain or fever Reported on 5/18/2017     amLODIPine (NORVASC) 5 MG tablet TAKE ONE TABLET BY MOUTH EVERY DAY     blood glucose monitoring (ACCU-CHEK MULTICLIX) lancets Use to test blood sugar twice times daily or as directed.     diphenhydrAMINE (BENADRYL) 25 MG tablet Take 25 mg by mouth     HYDROmorphone (DILAUDID) 2 MG tablet Take 1 tablet (2 mg) by mouth every 4 hours as needed for moderate to severe pain     IBUPROFEN PO Take by mouth as needed for moderate pain Reported on 5/18/2017     levothyroxine (SYNTHROID/LEVOTHROID) 88 MCG tablet Take 1 tablet (88 mcg) by mouth daily     LORazepam (ATIVAN) 0.5 MG tablet Take 1 tablet (0.5 mg) by mouth every 4 hours as needed (Anxiety, Nausea/Vomiting or Sleep)     metFORMIN (GLUCOPHAGE-XR) 750 MG 24 hr tablet Take 1 tablet (750 mg) by mouth daily (with dinner)     prochlorperazine (COMPAZINE) 10 MG tablet Take 1 tablet (10 mg) by mouth every 6 hours as needed (Nausea/Vomiting)     valACYclovir (VALTREX) 1000 mg tablet Take 1 tablet (1,000  mg) by mouth 3 times daily     blood glucose monitoring (NO BRAND SPECIFIED) test strip Use to test blood sugars twice daily or as directed (fasting, rotate then second time - before lunch, before supper and bedtime) (Patient not taking: Reported on 8/12/2019)     EPINEPHrine (EPIPEN 2-CHARITY) 0.3 MG/0.3ML injection 2-pack Inject 0.3 mLs (0.3 mg) into the muscle once as needed for anaphylaxis (Patient not taking: Reported on 1/14/2020)         4/27/2020 13:41 5/18/2020 13:59 6/8/2020 15:11   WBC 7.9 8.2 8.3   Hemoglobin 10.7 (L) 10.3 (L) 10.2 (L)   Hematocrit 32.7 (L) 32.3 (L) 30.9 (L)   Platelet Count 232 195 200   RBC Count 3.42 (L) 3.23 (L) 3.20 (L)   MCV 96 100 97      6/8/2020 15:11   Sodium 140   Potassium 4.7   Chloride 108   Carbon Dioxide 28   Urea Nitrogen 18   Creatinine 0.75   GFR Estimate >90   GFR Estimate If Black >90   Calcium 9.0   Anion Gap 5   Albumin 4.0   Protein Total 7.4   Bilirubin Total 0.6   Alkaline Phosphatase 93   ALT 47   AST 21   Lactate Dehydrogenase 216        3/17/2020 11:09 4/7/2020 10:28 4/27/2020 13:41 5/18/2020 13:59 6/8/2020 15:11   PSA 58.90 (H) 48.90 (H) 37.50 (H) 29.40 (H) 30.20 (H)     ASSESSMENT/PLAN:   1. Metastatic prostate cancer with bony metastasis:   Currently on cabazitaxel, PSA dropped today   Denosumab- should be on q3-4 month schedule   HTN and DM TII   ECOG PS 1         #mPC--started Cabazitaxel on 1/14/2020 due to worsening bone scan and rising PSA in early January. Albert was called today and we had a discussion regarding how great it is to see his PSA continue to decline and likely, now stabilize.  He is tolerating the cabazitaxel well, a few days of aches from the neulasta, but otherwise no nausea and no neuropathy. He is really excited and would like to continue for now.  Lorrie is on a COVID unit at [facility], Albert was initially staying at his mothers house, but now is home.  They are really working with trying to be cautious.   He'll continue with the On Pro,  so that we minimize his visits with the infusion room.  He is OK with this decision. He is intermittently using prednisone for energy and we are OK with that.  We discussed the future and possibly if his PSA remains stable, we could stretch out the cycles, but we'll decide this later this summer, if a true plateau is noted.      #pain-No constant bone pains at this time, since hes been on chemotherapy. Uses dilaudid for body aches from the OnPro after treatment  #bone-h/o Xgeva, stopped in March of 2019. Taking oral calcium/Vit D. I think an every 6 month interval makes sense.  Plan for August 2020. (he would like to come in on a diff day and not do this the same time as chemo)  #endo--dropped to one pill once a day of metformin. BS have been ~100;    #erectile dysfunction- met with Dr Toth in urology, has cialis to try  #genetics- Albert and I discussed a genetic referral, give his age at diagnosis           Phone call duration: 30 minutes    Anay Dan PA-C        Again, thank you for allowing me to participate in the care of your patient.        Sincerely,        Anay Dan PA-C

## 2020-06-09 NOTE — PROGRESS NOTES
"  Albert Amaya is a 54 year old male who is being evaluated via a billable telephone visit.      The patient has been notified of following:     \"This telephone visit will be conducted via a call between you and your physician/provider. We have found that certain health care needs can be provided without the need for a physical exam.  This service lets us provide the care you need with a short phone conversation.  If a prescription is necessary we can send it directly to your pharmacy.  If lab work is needed we can place an order for that and you can then stop by our lab to have the test done at a later time.    Telephone visits are billed at different rates depending on your insurance coverage. During this emergency period, for some insurers they may be billed the same as an in-person visit.  Please reach out to your insurance provider with any questions.    If during the course of the call the physician/provider feels a telephone visit is not appropriate, you will not be charged for this service.\"    Patient has given verbal consent for Telephone visit?  Yes    What phone number would you like to be contacted at? [phone]    How would you like to obtain your AVS? MyChart     I have reviewed and updated the patient's allergies and medication list.    Concerns: Patient has no new concerns.      Refills: Norvasc and Dilauded        Rufino Alfaro, EMT    VIDEO VISIT  Jun 9, 2020          REASON FOR VISIT: [facility] [place], here for chemo assessment     Oncology Treatment History:   Metastatic Prostate CA treated   - s/p 6 cycles of taxotere 75mg/m2   - s/p orchiectomy on 3/18/2016   - s/p Provenge 5/13, 5/27, 6/10   - s/p Casodex 50 mg daily March/April   - s/p Zytiga 5/3/2017   - started cabazitaxel 1/14/2020      HPI: Albert is a 54 year old gentleman with metastatic prostate cancer. Albert felt a cramp in his gluteus muscle and could barely walk after doing some remodeling at home and unloading heavy truck at work. He tried " flexeril and prednisone initially but eventually presented to ED on 10/5/15. He feels that initially he was nearly labeled as drug seeker since he was in lot of discomfort and flexeril was not working. But during course of ED visits labs were drawn and he had marked elevation in Alk Phosphatase (over 1000). CT did suggest some ileus with some sclerotic bone lesions. He was admitted to the hospital. His PSA was checked and was markedly elevated at 5760 (10/6/15), repeat value 5563. He did follow up with Dr. Freire and had transrectal US guided biopsy of prostate on 10/8/15. They have the results through my chart which confirms prostate adenocarcinoma - enrico 4+3 involving majority (60-90%) portion of every single core. He started on taxotere on 10/23 and completed 6 cycles of therapy in 2/2016. He then underwent orchiectomy on 3/18/2016.   Throughout spring of 2016 Albert's PSA started to progress and after three elevations there was concern about him being hormone refractory. He was started on Provenge and enrolled in the trial including Indoximod. PSA trending up and started on Casodex mid March with progression. Switched to Zytiga 5/3/17. He held his abiraterone in July 2019 with rising PSA and fatigue. He had a decline in his PSA after holding his abiraterone. This has been on hold and he is being followed without any additional intervention. He had orchiectomy done previously and does not need ADT. In January, his PSA was increasing and Dr Valladares started cabazitaxel.      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 He doing really well with the Cabazitaxel.  The neulasta gives him bone aches and a flu like feeling for days 2-5. He takes claritin and dilaudid q12 hours for a couple days.   He uses rarely uses prednisone on the weekends as it makes him feel fantastic doing yard work (maybe 1-2 x month).  He feels this is the mentally strongest he has been in 5 years. No  pain. Neuropathy has just started- very faintly in the fingertips, no fine motor deficits. He was living with his mother during the first 6 weeks of quarentine, but it was too mentally challenging with home life.  Lorrie, his wife is a RN at [employer] and does care for covid patients, but they are meticulous with staying safe at home.  Albert is feeling emotionally better just being with his family. Overall- this has been much easier than Taxotere, so he is willing to keep going. He questions what things will look like in the next year.         MEDS:           Current Outpatient Medications   Medication Sig     Acetaminophen (TYLENOL PO) Take 325 mg by mouth every 8 hours as needed for mild pain or fever Reported on 5/18/2017     amLODIPine (NORVASC) 5 MG tablet TAKE ONE TABLET BY MOUTH EVERY DAY     blood glucose monitoring (ACCU-CHEK MULTICLIX) lancets Use to test blood sugar twice times daily or as directed.     diphenhydrAMINE (BENADRYL) 25 MG tablet Take 25 mg by mouth     HYDROmorphone (DILAUDID) 2 MG tablet Take 1 tablet (2 mg) by mouth every 4 hours as needed for moderate to severe pain     IBUPROFEN PO Take by mouth as needed for moderate pain Reported on 5/18/2017     levothyroxine (SYNTHROID/LEVOTHROID) 88 MCG tablet Take 1 tablet (88 mcg) by mouth daily     LORazepam (ATIVAN) 0.5 MG tablet Take 1 tablet (0.5 mg) by mouth every 4 hours as needed (Anxiety, Nausea/Vomiting or Sleep)     metFORMIN (GLUCOPHAGE-XR) 750 MG 24 hr tablet Take 1 tablet (750 mg) by mouth daily (with dinner)     prochlorperazine (COMPAZINE) 10 MG tablet Take 1 tablet (10 mg) by mouth every 6 hours as needed (Nausea/Vomiting)     valACYclovir (VALTREX) 1000 mg tablet Take 1 tablet (1,000 mg) by mouth 3 times daily     blood glucose monitoring (NO BRAND SPECIFIED) test strip Use to test blood sugars twice daily or as directed (fasting, rotate then second time - before lunch, before supper and bedtime) (Patient not taking: Reported on  8/12/2019)     EPINEPHrine (EPIPEN 2-CHARITY) 0.3 MG/0.3ML injection 2-pack Inject 0.3 mLs (0.3 mg) into the muscle once as needed for anaphylaxis (Patient not taking: Reported on 1/14/2020)         4/27/2020 13:41 5/18/2020 13:59 6/8/2020 15:11   WBC 7.9 8.2 8.3   Hemoglobin 10.7 (L) 10.3 (L) 10.2 (L)   Hematocrit 32.7 (L) 32.3 (L) 30.9 (L)   Platelet Count 232 195 200   RBC Count 3.42 (L) 3.23 (L) 3.20 (L)   MCV 96 100 97      6/8/2020 15:11   Sodium 140   Potassium 4.7   Chloride 108   Carbon Dioxide 28   Urea Nitrogen 18   Creatinine 0.75   GFR Estimate >90   GFR Estimate If Black >90   Calcium 9.0   Anion Gap 5   Albumin 4.0   Protein Total 7.4   Bilirubin Total 0.6   Alkaline Phosphatase 93   ALT 47   AST 21   Lactate Dehydrogenase 216        3/17/2020 11:09 4/7/2020 10:28 4/27/2020 13:41 5/18/2020 13:59 6/8/2020 15:11   PSA 58.90 (H) 48.90 (H) 37.50 (H) 29.40 (H) 30.20 (H)     ASSESSMENT/PLAN:   1. Metastatic prostate cancer with bony metastasis:   Currently on cabazitaxel, PSA dropped today   Denosumab- should be on q3-4 month schedule   HTN and DM TII   ECOG PS 1         #mPC--started Cabazitaxel on 1/14/2020 due to worsening bone scan and rising PSA in early January. Albert was called today and we had a discussion regarding how great it is to see his PSA continue to decline and likely, now stabilize.  He is tolerating the cabazitaxel well, a few days of aches from the neulasta, but otherwise no nausea and no neuropathy. He is really excited and would like to continue for now.  Lorrie is on a COVID unit at [facility], Albert was initially staying at his mothers house, but now is home.  They are really working with trying to be cautious.   He'll continue with the On Pro, so that we minimize his visits with the infusion room.  He is OK with this decision. He is intermittently using prednisone for energy and we are OK with that.  We discussed the future and possibly if his PSA remains stable, we could stretch out the  cycles, but we'll decide this later this summer, if a true plateau is noted.      #pain-No constant bone pains at this time, since hes been on chemotherapy. Uses dilaudid for body aches from the OnPro after treatment  #bone-h/o Xgeva, stopped in March of 2019. Taking oral calcium/Vit D. I think an every 6 month interval makes sense.  Plan for August 2020. (he would like to come in on a diff day and not do this the same time as chemo)  #endo--dropped to one pill once a day of metformin. BS have been ~100;    #erectile dysfunction- met with Dr Toth in urology, has cialis to try  #genetics- Albert and I discussed a genetic referral, give his age at diagnosis           Phone call duration: 30 minutes    Anay Dan PA-C

## 2020-06-09 NOTE — TELEPHONE ENCOUNTER
Pending Prescriptions:                       Disp   Refills    HYDROmorphone (DILAUDID) 2 MG tablet      60 tab*0            Sig: Take 1 tablet (2 mg) by mouth every 4 hours as           needed for moderate to severe pain      Last Written Prescription Date:  9/18/2019  Last Fill Quantity: 60,   # refills: 0  Last Office Visit: 5/28/2020  Future Office visit:       Routing refill request to provider for review/approval

## 2020-06-10 ENCOUNTER — INFUSION THERAPY VISIT (OUTPATIENT)
Dept: ONCOLOGY | Facility: CLINIC | Age: 55
End: 2020-06-10
Attending: INTERNAL MEDICINE
Payer: COMMERCIAL

## 2020-06-10 ENCOUNTER — TELEPHONE (OUTPATIENT)
Dept: ONCOLOGY | Facility: CLINIC | Age: 55
End: 2020-06-10

## 2020-06-10 VITALS
DIASTOLIC BLOOD PRESSURE: 83 MMHG | HEART RATE: 78 BPM | SYSTOLIC BLOOD PRESSURE: 139 MMHG | OXYGEN SATURATION: 97 % | RESPIRATION RATE: 16 BRPM | TEMPERATURE: 98.6 F

## 2020-06-10 DIAGNOSIS — I10 ESSENTIAL HYPERTENSION: ICD-10-CM

## 2020-06-10 DIAGNOSIS — C61 MALIGNANT NEOPLASM OF PROSTATE (H): ICD-10-CM

## 2020-06-10 DIAGNOSIS — C79.51 BONE METASTASIS: ICD-10-CM

## 2020-06-10 DIAGNOSIS — D70.1 CHEMOTHERAPY-INDUCED NEUTROPENIA (H): Primary | ICD-10-CM

## 2020-06-10 DIAGNOSIS — T45.1X5A CHEMOTHERAPY-INDUCED NEUTROPENIA (H): Primary | ICD-10-CM

## 2020-06-10 PROCEDURE — 25800030 ZZH RX IP 258 OP 636: Mod: ZF | Performed by: PHYSICIAN ASSISTANT

## 2020-06-10 PROCEDURE — 96377 APPLICATON ON-BODY INJECTOR: CPT | Mod: 59

## 2020-06-10 PROCEDURE — 96375 TX/PRO/DX INJ NEW DRUG ADDON: CPT

## 2020-06-10 PROCEDURE — 25000128 H RX IP 250 OP 636: Mod: ZF | Performed by: PHYSICIAN ASSISTANT

## 2020-06-10 PROCEDURE — 96413 CHEMO IV INFUSION 1 HR: CPT

## 2020-06-10 PROCEDURE — 25000125 ZZHC RX 250: Mod: ZF | Performed by: PHYSICIAN ASSISTANT

## 2020-06-10 PROCEDURE — 40000556 ZZH STATISTIC PERIPHERAL IV START W US GUIDANCE: Mod: ZF

## 2020-06-10 RX ADMIN — DIPHENHYDRAMINE HYDROCHLORIDE 25 MG: 50 INJECTION, SOLUTION INTRAMUSCULAR; INTRAVENOUS at 13:36

## 2020-06-10 RX ADMIN — SODIUM CHLORIDE 250 ML: 9 INJECTION, SOLUTION INTRAVENOUS at 13:28

## 2020-06-10 RX ADMIN — SODIUM CHLORIDE 42 MG: 900 INJECTION, SOLUTION INTRAVENOUS at 14:17

## 2020-06-10 RX ADMIN — FAMOTIDINE 20 MG: 10 INJECTION INTRAVENOUS at 13:31

## 2020-06-10 RX ADMIN — DEXAMETHASONE SODIUM PHOSPHATE: 10 INJECTION, SOLUTION INTRAMUSCULAR; INTRAVENOUS at 13:52

## 2020-06-10 RX ADMIN — PEGFILGRASTIM 6 MG: KIT SUBCUTANEOUS at 15:27

## 2020-06-10 ASSESSMENT — PAIN SCALES - GENERAL: PAINLEVEL: NO PAIN (0)

## 2020-06-10 NOTE — PATIENT INSTRUCTIONS
Contact Numbers  [facility]: [phone]        Please call the [facility] Triage line if you experience a temperature greater than or equal to 100.5, shaking chills, have uncontrolled nausea, vomiting and/or diarrhea, dizziness, shortness of breath, chest pain, bleeding, unexplained bruising, or if you have any other new/concerning symptoms, questions or concerns.     If it is after hours, weekends, or holidays, please call the main hospital  at  [phone] and ask to speak to the Oncology doctor on call.     If you are having any concerning symptoms or wish to speak to a provider before your next infusion visit, please call your care coordinator or triage to notify them so we can adequately serve you.     If you need a refill on a narcotic prescription or other medication, please call triage before your infusion appointment.

## 2020-06-10 NOTE — PROGRESS NOTES
Infusion Nursing Note:  Albert Amaya presents today for Cycle 8 Day 1 of Cabazitaxel and Neulasta Onpro.    Patient seen by provider today: No   present during visit today: Not Applicable.    Note: Patient reports feeling well today. Denies any new issues or concerns since talking with ELVIRA Castellano yesterday. Specifically denies any chills, fevers, shortness of breath, dizziness, or respiratory symptoms.    Intravenous Access:  Peripheral IV placed by Vascular access    Treatment Conditions:  Lab Results   Component Value Date    HGB 10.2 06/08/2020     Lab Results   Component Value Date    WBC 8.3 06/08/2020      Lab Results   Component Value Date    ANEU 5.0 06/08/2020     Lab Results   Component Value Date     06/08/2020      Lab Results   Component Value Date     06/08/2020                   Lab Results   Component Value Date    POTASSIUM 4.7 06/08/2020           Lab Results   Component Value Date    MAG 2.2 12/21/2016            Lab Results   Component Value Date    CR 0.75 06/08/2020                   Lab Results   Component Value Date    WILL 9.0 06/08/2020                Lab Results   Component Value Date    BILITOTAL 0.6 06/08/2020           Lab Results   Component Value Date    ALBUMIN 4.0 06/08/2020                    Lab Results   Component Value Date    ALT 47 06/08/2020           Lab Results   Component Value Date    AST 21 06/08/2020       Results reviewed from 6/8, labs MET treatment parameters, ok to proceed with treatment.      Post Infusion Assessment:  Patient tolerated infusion without incident.  Blood return noted pre and post infusion.  Site patent and intact, free from redness, edema or discomfort.  No evidence of extravasations.  Access discontinued per protocol.     Neulasta Onpro On-Body injector applied to left arm at 3:30PM with light facing down.  Writer discussed Neulasta injection would start on 6/11 at 6:30PM, approximately 27 hours after application  applied today.  Written and Verbal instruction reviewed with patient.  Pt instructed when the dose delivery starts, it will take about 45 minutes to complete.  Pt aware Neulasta Onpro On-Body should have green flashing light and to call triage or on-call MD if injector flashes red or appears to be leaking. Pt aware to keep Onpro On-Body Neulasta 4 inches away from electrical equipment and to avoid showering 4 hours prior to injection.         Discharge Plan:   Patient declined prescription refills.  Discharge instructions reviewed with: Patient.  Patient and/or family verbalized understanding of discharge instructions and all questions answered.  Copy of AVS reviewed with patient and/or family.  Patient will return 7/1/20 for next appointment.  Patient discharged in stable condition accompanied by: self.  Departure Mode: Ambulatory.    Noy Rosenberg RN

## 2020-06-10 NOTE — TELEPHONE ENCOUNTER
I spoke with the patient and he said he wants to check with his insurance before he schedules his appointment. I gave him our number and he said he will call back.

## 2020-06-12 RX ORDER — AMLODIPINE BESYLATE 5 MG/1
TABLET ORAL
Qty: 90 TABLET | Refills: 1 | Status: SHIPPED | OUTPATIENT
Start: 2020-06-12 | End: 2020-09-25

## 2020-06-12 NOTE — PROGRESS NOTES
Patient's wife called to discuss the imaging results from [facility].  RNCC provided what information she could.      CT of abd/pelvis:   CONCLUSION:     1.  Worsened, diffuse skeletal metastases.    2.  No abdominal pelvic traumatic injury of the abdomen and pelvis.     Writer was unable to provide any information on what radiologist was comparing the metatases to.     Patient's wife was very understanding and stating writer answered her questions.  She denies any other questions or concerns at this time.     Shirin Lagunas, RN BSN   [facility]  Nurse Coordinator        
Home

## 2020-06-30 VITALS
SYSTOLIC BLOOD PRESSURE: 123 MMHG | BODY MASS INDEX: 28.09 KG/M2 | WEIGHT: 184.7 LBS | OXYGEN SATURATION: 99 % | RESPIRATION RATE: 14 BRPM | HEART RATE: 72 BPM | TEMPERATURE: 98.2 F | DIASTOLIC BLOOD PRESSURE: 90 MMHG

## 2020-06-30 DIAGNOSIS — C61 MALIGNANT NEOPLASM OF PROSTATE (H): ICD-10-CM

## 2020-06-30 DIAGNOSIS — E03.4 HYPOTHYROIDISM DUE TO ACQUIRED ATROPHY OF THYROID: ICD-10-CM

## 2020-06-30 DIAGNOSIS — C79.51 BONE METASTASIS: Primary | ICD-10-CM

## 2020-06-30 LAB
ALBUMIN SERPL-MCNC: 4.2 G/DL (ref 3.4–5)
ALP SERPL-CCNC: 92 U/L (ref 40–150)
ALT SERPL W P-5'-P-CCNC: 33 U/L (ref 0–70)
ANION GAP SERPL CALCULATED.3IONS-SCNC: 5 MMOL/L (ref 3–14)
AST SERPL W P-5'-P-CCNC: 14 U/L (ref 0–45)
BASOPHILS # BLD AUTO: 0 10E9/L (ref 0–0.2)
BASOPHILS NFR BLD AUTO: 0.3 %
BILIRUB SERPL-MCNC: 0.6 MG/DL (ref 0.2–1.3)
BUN SERPL-MCNC: 17 MG/DL (ref 7–30)
CALCIUM SERPL-MCNC: 9 MG/DL (ref 8.5–10.1)
CHLORIDE SERPL-SCNC: 108 MMOL/L (ref 94–109)
CO2 SERPL-SCNC: 26 MMOL/L (ref 20–32)
CREAT SERPL-MCNC: 0.81 MG/DL (ref 0.66–1.25)
DIFFERENTIAL METHOD BLD: ABNORMAL
EOSINOPHIL # BLD AUTO: 0 10E9/L (ref 0–0.7)
EOSINOPHIL NFR BLD AUTO: 0.4 %
ERYTHROCYTE [DISTWIDTH] IN BLOOD BY AUTOMATED COUNT: 14 % (ref 10–15)
GFR SERPL CREATININE-BSD FRML MDRD: >90 ML/MIN/{1.73_M2}
GLUCOSE SERPL-MCNC: 102 MG/DL (ref 70–99)
HCT VFR BLD AUTO: 33.3 % (ref 40–53)
HGB BLD-MCNC: 10.7 G/DL (ref 13.3–17.7)
IMM GRANULOCYTES # BLD: 0.1 10E9/L (ref 0–0.4)
IMM GRANULOCYTES NFR BLD: 1.1 %
LDH SERPL L TO P-CCNC: 212 U/L (ref 85–227)
LYMPHOCYTES # BLD AUTO: 2.2 10E9/L (ref 0.8–5.3)
LYMPHOCYTES NFR BLD AUTO: 29.2 %
MCH RBC QN AUTO: 32.1 PG (ref 26.5–33)
MCHC RBC AUTO-ENTMCNC: 32.1 G/DL (ref 31.5–36.5)
MCV RBC AUTO: 100 FL (ref 78–100)
MONOCYTES # BLD AUTO: 0.5 10E9/L (ref 0–1.3)
MONOCYTES NFR BLD AUTO: 6.5 %
NEUTROPHILS # BLD AUTO: 4.7 10E9/L (ref 1.6–8.3)
NEUTROPHILS NFR BLD AUTO: 62.5 %
NRBC # BLD AUTO: 0 10*3/UL
NRBC BLD AUTO-RTO: 0 /100
PLATELET # BLD AUTO: 227 10E9/L (ref 150–450)
POTASSIUM SERPL-SCNC: 4 MMOL/L (ref 3.4–5.3)
PROT SERPL-MCNC: 7.6 G/DL (ref 6.8–8.8)
PSA SERPL-MCNC: 27.6 UG/L (ref 0–4)
RBC # BLD AUTO: 3.33 10E12/L (ref 4.4–5.9)
SODIUM SERPL-SCNC: 139 MMOL/L (ref 133–144)
WBC # BLD AUTO: 7.6 10E9/L (ref 4–11)

## 2020-06-30 PROCEDURE — 83615 LACTATE (LD) (LDH) ENZYME: CPT | Performed by: INTERNAL MEDICINE

## 2020-06-30 PROCEDURE — 84153 ASSAY OF PSA TOTAL: CPT | Performed by: INTERNAL MEDICINE

## 2020-06-30 PROCEDURE — 36415 COLL VENOUS BLD VENIPUNCTURE: CPT | Performed by: INTERNAL MEDICINE

## 2020-06-30 PROCEDURE — 80053 COMPREHEN METABOLIC PANEL: CPT | Performed by: INTERNAL MEDICINE

## 2020-06-30 PROCEDURE — 85025 COMPLETE CBC W/AUTO DIFF WBC: CPT | Performed by: INTERNAL MEDICINE

## 2020-06-30 ASSESSMENT — PAIN SCALES - GENERAL: PAINLEVEL: NO PAIN (0)

## 2020-06-30 NOTE — NURSING NOTE
Chief Complaint   Patient presents with     Blood Draw     Labs drawn via  by RN in lab. VS taken.      Netta Jo RN

## 2020-07-01 ENCOUNTER — VIRTUAL VISIT (OUTPATIENT)
Dept: ONCOLOGY | Facility: CLINIC | Age: 55
End: 2020-07-01
Attending: PHYSICIAN ASSISTANT
Payer: COMMERCIAL

## 2020-07-01 ENCOUNTER — MYC MEDICAL ADVICE (OUTPATIENT)
Dept: ONCOLOGY | Facility: CLINIC | Age: 55
End: 2020-07-01

## 2020-07-01 DIAGNOSIS — C79.51 BONE METASTASIS: ICD-10-CM

## 2020-07-01 DIAGNOSIS — C61 MALIGNANT NEOPLASM OF PROSTATE (H): Primary | ICD-10-CM

## 2020-07-01 DIAGNOSIS — T63.441D BEE STING REACTION, ACCIDENTAL OR UNINTENTIONAL, SUBSEQUENT ENCOUNTER: Primary | ICD-10-CM

## 2020-07-01 PROCEDURE — 99215 OFFICE O/P EST HI 40 MIN: CPT | Mod: TEL | Performed by: PHYSICIAN ASSISTANT

## 2020-07-01 PROCEDURE — 40000114 ZZH STATISTIC NO CHARGE CLINIC VISIT

## 2020-07-01 RX ORDER — SODIUM CHLORIDE 9 MG/ML
1000 INJECTION, SOLUTION INTRAVENOUS CONTINUOUS PRN
Status: CANCELLED
Start: 2020-07-06

## 2020-07-01 RX ORDER — NALOXONE HYDROCHLORIDE 0.4 MG/ML
.1-.4 INJECTION, SOLUTION INTRAMUSCULAR; INTRAVENOUS; SUBCUTANEOUS
Status: CANCELLED | OUTPATIENT
Start: 2020-07-06

## 2020-07-01 RX ORDER — LORAZEPAM 2 MG/ML
0.5 INJECTION INTRAMUSCULAR EVERY 4 HOURS PRN
Status: CANCELLED
Start: 2020-07-06

## 2020-07-01 RX ORDER — EPINEPHRINE 1 MG/ML
0.3 INJECTION, SOLUTION INTRAMUSCULAR; SUBCUTANEOUS EVERY 5 MIN PRN
Status: CANCELLED | OUTPATIENT
Start: 2020-07-06

## 2020-07-01 RX ORDER — EPINEPHRINE 0.3 MG/.3ML
0.3 INJECTION SUBCUTANEOUS EVERY 5 MIN PRN
Status: CANCELLED | OUTPATIENT
Start: 2020-07-06

## 2020-07-01 RX ORDER — DIPHENHYDRAMINE HYDROCHLORIDE 50 MG/ML
50 INJECTION INTRAMUSCULAR; INTRAVENOUS
Status: CANCELLED
Start: 2020-07-06

## 2020-07-01 RX ORDER — LIDOCAINE/PRILOCAINE 2.5 %-2.5%
CREAM (GRAM) TOPICAL ONCE
Status: CANCELLED
Start: 2020-07-06

## 2020-07-01 RX ORDER — DEXAMETHASONE 2 MG/1
TABLET ORAL
Qty: 8 TABLET | Refills: 3 | Status: SHIPPED | OUTPATIENT
Start: 2020-07-01 | End: 2021-01-01

## 2020-07-01 RX ORDER — MEPERIDINE HYDROCHLORIDE 25 MG/ML
25 INJECTION INTRAMUSCULAR; INTRAVENOUS; SUBCUTANEOUS EVERY 30 MIN PRN
Status: CANCELLED | OUTPATIENT
Start: 2020-07-06

## 2020-07-01 RX ORDER — METHYLPREDNISOLONE SODIUM SUCCINATE 125 MG/2ML
125 INJECTION, POWDER, LYOPHILIZED, FOR SOLUTION INTRAMUSCULAR; INTRAVENOUS
Status: CANCELLED
Start: 2020-07-06

## 2020-07-01 RX ORDER — ALBUTEROL SULFATE 90 UG/1
1-2 AEROSOL, METERED RESPIRATORY (INHALATION)
Status: CANCELLED
Start: 2020-07-06

## 2020-07-01 RX ORDER — ALBUTEROL SULFATE 0.83 MG/ML
2.5 SOLUTION RESPIRATORY (INHALATION)
Status: CANCELLED | OUTPATIENT
Start: 2020-07-06

## 2020-07-01 ASSESSMENT — PAIN SCALES - GENERAL: PAINLEVEL: NO PAIN (0)

## 2020-07-01 NOTE — PROGRESS NOTES
"Albert Amaya is a 54 year old male who is being evaluated via a billable telephone visit.      The patient has been notified of following:     \"This telephone visit will be conducted via a call between you and your physician/provider. We have found that certain health care needs can be provided without the need for a physical exam.  This service lets us provide the care you need with a short phone conversation.  If a prescription is necessary we can send it directly to your pharmacy.  If lab work is needed we can place an order for that and you can then stop by our lab to have the test done at a later time.    Telephone visits are billed at different rates depending on your insurance coverage. During this emergency period, for some insurers they may be billed the same as an in-person visit.  Please reach out to your insurance provider with any questions.    If during the course of the call the physician/provider feels a telephone visit is not appropriate, you will not be charged for this service.\"    Patient has given verbal consent for Telephone visit?  Yes    What phone number would you like to be contacted at? [phone]     How would you like to obtain your AVS? MyChart     I have reviewed and updated the patient's allergies and medication list.    Concerns: No concerns  Refills: No refills needed.     Vitals - Patient Reported  Weight (Patient Reported): 83.5 kg (184 lb)  Height (Patient Reported): 1.7 cm (0.68\")  BMI (Based on Pt Reported Ht/Wt): 191920.67      Patient reported no pain today.        [facility]    VIDEO VISIT  Jul 1, 2020             [place] [facility] [place]     Oncology Treatment History:   Metastatic Prostate CA treated   - s/p 6 cycles of taxotere 75mg/m2   - s/p orchiectomy on 3/18/2016   - s/p Provenge 5/13, 5/27, 6/10   - s/p Casodex 50 mg daily March/April   - s/p Zytiga 5/3/2017   - started cabazitaxel 1/14/2020      HPI: Albert is a 54 year old " gentleman with metastatic prostate cancer. Albert felt a cramp in his gluteus muscle and could barely walk after doing some remodeling at home and unloading heavy truck at work. He tried flexeril and prednisone initially but eventually presented to ED on 10/5/15. He feels that initially he was nearly labeled as drug seeker since he was in lot of discomfort and flexeril was not working. But during course of ED visits labs were drawn and he had marked elevation in Alk Phosphatase (over 1000). CT did suggest some ileus with some sclerotic bone lesions. He was admitted to the hospital. His PSA was checked and was markedly elevated at 5760 (10/6/15), repeat value 5563. He did follow up with Dr. Freire and had transrectal US guided biopsy of prostate on 10/8/15. They have the results through my chart which confirms prostate adenocarcinoma - enrico 4+3 involving majority (60-90%) portion of every single core. He started on taxotere on 10/23 and completed 6 cycles of therapy in 2/2016. He then underwent orchiectomy on 3/18/2016.   Throughout spring of 2016 Albert's PSA started to progress and after three elevations there was concern about him being hormone refractory. He was started on Provenge and enrolled in the trial including Indoximod. PSA trending up and started on Casodex mid March with progression. Switched to Zytiga 5/3/17. He held his abiraterone in July 2019 with rising PSA and fatigue. He had a decline in his PSA after holding his abiraterone. This has been on hold and he is being followed without any additional intervention. He had orchiectomy done previously and does not need ADT. In January, his PSA was increasing and Dr Valladares started cabazitaxel.      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 He doing really well with the Cabazitaxel.  The neulasta gives him bone aches and a flu like feeling for days 2-5. He takes claritin and dilaudid q12 hours for a couple  days. He will occasionally use compazine as well.  During these few days he is able to get out and do things out of the house for a couple hours, but often will be sedentary and napping during this time frame.  The second and third week he feels very good, back to baseline. He uses rarely uses prednisone on the weekends as it makes him feel fantastic doing yard work (maybe 1-2 x month).  He feels this is the mentally strongest he has been in 5 years. No pain. Neuropathy has just started- very faintly in the fingertips, no fine motor deficits. He was living with his mother during the first 6 weeks of quarentine, but it was too mentally challenging with home life.  Lorrie, his wife is a RN at [employer] and does care for covid patients, but they are meticulous with staying safe at home.  Albert is feeling emotionally better just being with his family. Overall- this has been much easier than Taxotere, so he is willing to keep going but given the stability of his PSA, he would like to delay chemo today and come back on 7/6.         MEDS:           Current Outpatient Medications   Medication Sig     Acetaminophen (TYLENOL PO) Take 325 mg by mouth every 8 hours as needed for mild pain or fever Reported on 5/18/2017     amLODIPine (NORVASC) 5 MG tablet TAKE ONE TABLET BY MOUTH EVERY DAY     blood glucose monitoring (ACCU-CHEK MULTICLIX) lancets Use to test blood sugar twice times daily or as directed.     diphenhydrAMINE (BENADRYL) 25 MG tablet Take 25 mg by mouth     HYDROmorphone (DILAUDID) 2 MG tablet Take 1 tablet (2 mg) by mouth every 4 hours as needed for moderate to severe pain     IBUPROFEN PO Take by mouth as needed for moderate pain Reported on 5/18/2017     levothyroxine (SYNTHROID/LEVOTHROID) 88 MCG tablet Take 1 tablet (88 mcg) by mouth daily     LORazepam (ATIVAN) 0.5 MG tablet Take 1 tablet (0.5 mg) by mouth every 4 hours as needed (Anxiety, Nausea/Vomiting or Sleep)     metFORMIN (GLUCOPHAGE-XR) 750 MG 24 hr  tablet Take 1 tablet (750 mg) by mouth daily (with dinner)     prochlorperazine (COMPAZINE) 10 MG tablet Take 1 tablet (10 mg) by mouth every 6 hours as needed (Nausea/Vomiting)     valACYclovir (VALTREX) 1000 mg tablet Take 1 tablet (1,000 mg) by mouth 3 times daily     blood glucose monitoring (NO BRAND SPECIFIED) test strip Use to test blood sugars twice daily or as directed (fasting, rotate then second time - before lunch, before supper and bedtime) (Patient not taking: Reported on 8/12/2019)     EPINEPHrine (EPIPEN 2-CHARITY) 0.3 MG/0.3ML injection 2-pack Inject 0.3 mLs (0.3 mg) into the muscle once as needed for anaphylaxis (Patient not taking: Reported on 1/14/2020)      4/7/2020 10:28 4/27/2020 13:41 5/18/2020 13:59 6/8/2020 15:11 6/30/2020 12:48   Sodium 138 140 134 140 139   Potassium 4.3 4.6 3.8 4.7 4.0   Chloride 108 108 102 108 108   Carbon Dioxide 24 26 24 28 26   Urea Nitrogen 14 14 18 18 17   Creatinine 0.77 0.78 0.76 0.75 0.81   GFR Estimate >90 >90 >90 >90 >90   GFR Estimate If Black >90 >90 >90 >90 >90   Calcium 9.3 9.0 9.0 9.0 9.0   Anion Gap 6 7 7 5 5   Albumin 4.2 4.2 4.0 4.0 4.2   Protein Total 7.6 7.6 7.4 7.4 7.6   Bilirubin Total 0.6 0.5 0.4 0.6 0.6   Alkaline Phosphatase 111 112 104 93 92   ALT 41 35 55 47 33   AST 18 13 19 21 14   Lactate Dehydrogenase 230 (H) 212 203 216 212   PSA 48.90 (H) 37.50 (H) 29.40 (H) 30.20 (H) 27.60 (H)   Glucose 101 (H) 94 117 (H) 95 102 (H)   WBC 7.8 7.9 8.2 8.3 7.6   Hemoglobin 10.7 (L) 10.7 (L) 10.3 (L) 10.2 (L) 10.7 (L)   Hematocrit 33.0 (L) 32.7 (L) 32.3 (L) 30.9 (L) 33.3 (L)   Platelet Count 228 232 195 200 227   RBC Count 3.45 (L) 3.42 (L) 3.23 (L) 3.20 (L) 3.33 (L)   MCV 96 96 100 97 100   MCH 31.0 31.3 31.9 31.9 32.1   MCHC 32.4 32.7 31.9 33.0 32.1   RDW 16.5 (H) 16.1 (H) 15.7 (H) 14.9 14.0   Diff Method Automated Method Automated Method Automated Method Automated Method Automated Method   % Neutrophils 59.0 61.5 63.4 59.9 62.5   % Lymphocytes 28.3 28.2  27.1 29.4 29.2   % Monocytes 9.0 7.9 6.8 8.4 6.5   % Eosinophils 1.4 0.8 1.0 0.6 0.4   % Basophils 0.6 0.5 0.2 0.4 0.3   % Immature Granulocytes 1.7 1.1 1.5 1.3 1.1   Nucleated RBCs 0 0 0 0 0   Absolute Neutrophil 4.6 4.8 5.2 5.0 4.7          ASSESSMENT/PLAN:   1. Metastatic prostate cancer with bony metastasis:   Currently on cabazitaxel, PSA dropped today   Denosumab- should be on q3-4 month schedule   HTN and DM TII   ECOG PS 1         #mPC--started Cabazitaxel on 1/14/2020 due to worsening bone scan and rising PSA in early January. Albert was called today and we had a discussion regarding how great it is to see his PSA continue to decline and likely, now stabilize.  He is tolerating the cabazitaxel well, a few days of aches from the neulasta, but otherwise no nausea and no neuropathy. He is really excited and would like to continue for now.  Lorrie is on a COVID unit at [facility], Albert was initially staying at his mothers house, but now is home.  They are really working with trying to be cautious.   He'll continue with the On Pro, so that we minimize his visits with the infusion room.  He is OK with this decision. He is intermittently using prednisone for energy and we are OK with that.  We discussed the future and possibly if his PSA remains stable, we could stretch out the cycles, he would like a longer 4th of July weekend, thus we'll reschedule him for 7/6.      We discussed taking a session off regarding the Neulasta.  His counts have been really stable and he does suffer with bone pains and flu like feelings on days 3-6 and I think we can safely go off for a cycle and re-assess.  We also discussed days 2-4 of low dose Dex for the nausea/weakness/fatigue- in an attempt to help him feel well through the cycles.      #pain-No constant bone pains at this time, since hes been on chemotherapy. Uses dilaudid for body aches from the OnPro after treatment  #bone-h/o Xgeva, stopped in March of 2019. Taking oral  calcium/Vit D. I think an every 3-4 months makes sense. (he would like to come in on a diff day and not do this the same time as chemo)  #endo--dropped to one pill once a day of metformin. BS have been ~100;    #erectile dysfunction- met with Dr Toth in urology, has cialis to try  #genetics- Albert and I discussed a genetic referral, give his age at diagnosis          Phone call duration: 45 minutes    Anay Dan PA-C

## 2020-07-02 ENCOUNTER — ONCOLOGY VISIT (OUTPATIENT)
Dept: ONCOLOGY | Facility: CLINIC | Age: 55
End: 2020-07-02
Attending: INTERNAL MEDICINE
Payer: COMMERCIAL

## 2020-07-02 VITALS
HEART RATE: 67 BPM | SYSTOLIC BLOOD PRESSURE: 126 MMHG | TEMPERATURE: 98.5 F | DIASTOLIC BLOOD PRESSURE: 86 MMHG | BODY MASS INDEX: 28.17 KG/M2 | OXYGEN SATURATION: 98 % | RESPIRATION RATE: 18 BRPM | WEIGHT: 185.2 LBS

## 2020-07-02 DIAGNOSIS — C61 MALIGNANT NEOPLASM OF PROSTATE (H): Primary | ICD-10-CM

## 2020-07-02 DIAGNOSIS — C79.51 BONE METASTASIS: ICD-10-CM

## 2020-07-02 PROCEDURE — 96372 THER/PROPH/DIAG INJ SC/IM: CPT

## 2020-07-02 PROCEDURE — 25000128 H RX IP 250 OP 636: Mod: ZF | Performed by: INTERNAL MEDICINE

## 2020-07-02 PROCEDURE — 96401 CHEMO ANTI-NEOPL SQ/IM: CPT

## 2020-07-02 RX ADMIN — DENOSUMAB 120 MG: 120 INJECTION SUBCUTANEOUS at 08:16

## 2020-07-02 ASSESSMENT — PAIN SCALES - GENERAL: PAINLEVEL: NO PAIN (0)

## 2020-07-02 NOTE — LETTER
7/2/2020         RE: Albert Amaya  [address] [facility] [place] [id number]        Dear Colleague,    Thank you for referring your patient, Albert Amaya, to the [facility]. Please see a copy of my visit note below.    Chief Complaint   Patient presents with     Imm/Inj     Patient with Bone metastasis - here for vitals and an Xgeva injection     Patient arrived to clinic for an Xgeva injection today and has no specific complaints and has been feeling well.  Patient declined to speak with an RN today.   Lab Results   Component Value Date     06/30/2020                   Lab Results   Component Value Date    POTASSIUM 4.0 06/30/2020           Lab Results   Component Value Date    MAG 2.2 12/21/2016            Lab Results   Component Value Date    CR 0.81 06/30/2020                   Lab Results   Component Value Date    WILL 9.0 06/30/2020                Lab Results   Component Value Date    BILITOTAL 0.6 06/30/2020           Lab Results   Component Value Date    ALBUMIN 4.2 06/30/2020                    Lab Results   Component Value Date    ALT 33 06/30/2020           Lab Results   Component Value Date    AST 14 06/30/2020       Results reviewed, labs MET treatment parameters, ok to proceed with treatment.  Xgeva injection given to Left Arm without incident and patient tolerated procedure well.  Patient is aware of future appointments on Monday.        Again, thank you for allowing me to participate in the care of your patient.        Sincerely,        [facility]

## 2020-07-02 NOTE — PROGRESS NOTES
Chief Complaint   Patient presents with     Imm/Inj     Patient with Bone metastasis - here for vitals and an Xgeva injection     Patient arrived to clinic for an Xgeva injection today and has no specific complaints and has been feeling well.  Patient declined to speak with an RN today.   Lab Results   Component Value Date     06/30/2020                   Lab Results   Component Value Date    POTASSIUM 4.0 06/30/2020           Lab Results   Component Value Date    MAG 2.2 12/21/2016            Lab Results   Component Value Date    CR 0.81 06/30/2020                   Lab Results   Component Value Date    WILL 9.0 06/30/2020                Lab Results   Component Value Date    BILITOTAL 0.6 06/30/2020           Lab Results   Component Value Date    ALBUMIN 4.2 06/30/2020                    Lab Results   Component Value Date    ALT 33 06/30/2020           Lab Results   Component Value Date    AST 14 06/30/2020       Results reviewed, labs MET treatment parameters, ok to proceed with treatment.  Xgeva injection given to Left Arm without incident and patient tolerated procedure well.  Patient is aware of future appointments on Monday.

## 2020-07-06 ENCOUNTER — INFUSION THERAPY VISIT (OUTPATIENT)
Dept: ONCOLOGY | Facility: CLINIC | Age: 55
End: 2020-07-06
Attending: INTERNAL MEDICINE
Payer: COMMERCIAL

## 2020-07-06 ENCOUNTER — APPOINTMENT (OUTPATIENT)
Dept: LAB | Facility: CLINIC | Age: 55
End: 2020-07-06
Attending: INTERNAL MEDICINE
Payer: COMMERCIAL

## 2020-07-06 VITALS
BODY MASS INDEX: 27.45 KG/M2 | TEMPERATURE: 98.7 F | WEIGHT: 180.5 LBS | RESPIRATION RATE: 16 BRPM | DIASTOLIC BLOOD PRESSURE: 84 MMHG | OXYGEN SATURATION: 99 % | HEART RATE: 60 BPM | SYSTOLIC BLOOD PRESSURE: 133 MMHG

## 2020-07-06 DIAGNOSIS — C79.51 BONE METASTASIS: ICD-10-CM

## 2020-07-06 DIAGNOSIS — C61 MALIGNANT NEOPLASM OF PROSTATE (H): Primary | ICD-10-CM

## 2020-07-06 DIAGNOSIS — E03.4 HYPOTHYROIDISM DUE TO ACQUIRED ATROPHY OF THYROID: ICD-10-CM

## 2020-07-06 LAB
ALBUMIN SERPL-MCNC: 4 G/DL (ref 3.4–5)
ALP SERPL-CCNC: 73 U/L (ref 40–150)
ALT SERPL W P-5'-P-CCNC: 31 U/L (ref 0–70)
ANION GAP SERPL CALCULATED.3IONS-SCNC: 5 MMOL/L (ref 3–14)
AST SERPL W P-5'-P-CCNC: 16 U/L (ref 0–45)
BASOPHILS # BLD AUTO: 0 10E9/L (ref 0–0.2)
BASOPHILS NFR BLD AUTO: 0.4 %
BILIRUB SERPL-MCNC: 0.9 MG/DL (ref 0.2–1.3)
BUN SERPL-MCNC: 14 MG/DL (ref 7–30)
CALCIUM SERPL-MCNC: 8.6 MG/DL (ref 8.5–10.1)
CHLORIDE SERPL-SCNC: 109 MMOL/L (ref 94–109)
CO2 SERPL-SCNC: 25 MMOL/L (ref 20–32)
CREAT SERPL-MCNC: 0.75 MG/DL (ref 0.66–1.25)
DIFFERENTIAL METHOD BLD: ABNORMAL
EOSINOPHIL # BLD AUTO: 0 10E9/L (ref 0–0.7)
EOSINOPHIL NFR BLD AUTO: 0.4 %
ERYTHROCYTE [DISTWIDTH] IN BLOOD BY AUTOMATED COUNT: 13.6 % (ref 10–15)
GFR SERPL CREATININE-BSD FRML MDRD: >90 ML/MIN/{1.73_M2}
GLUCOSE SERPL-MCNC: 91 MG/DL (ref 70–99)
HCT VFR BLD AUTO: 29.7 % (ref 40–53)
HGB BLD-MCNC: 9.9 G/DL (ref 13.3–17.7)
IMM GRANULOCYTES # BLD: 0 10E9/L (ref 0–0.4)
IMM GRANULOCYTES NFR BLD: 0.4 %
LDH SERPL L TO P-CCNC: 192 U/L (ref 85–227)
LYMPHOCYTES # BLD AUTO: 1.8 10E9/L (ref 0.8–5.3)
LYMPHOCYTES NFR BLD AUTO: 34.3 %
MCH RBC QN AUTO: 32 PG (ref 26.5–33)
MCHC RBC AUTO-ENTMCNC: 33.3 G/DL (ref 31.5–36.5)
MCV RBC AUTO: 96 FL (ref 78–100)
MONOCYTES # BLD AUTO: 0.5 10E9/L (ref 0–1.3)
MONOCYTES NFR BLD AUTO: 8.8 %
NEUTROPHILS # BLD AUTO: 3 10E9/L (ref 1.6–8.3)
NEUTROPHILS NFR BLD AUTO: 55.7 %
NRBC # BLD AUTO: 0 10*3/UL
NRBC BLD AUTO-RTO: 0 /100
PLATELET # BLD AUTO: 234 10E9/L (ref 150–450)
POTASSIUM SERPL-SCNC: 3.6 MMOL/L (ref 3.4–5.3)
PROT SERPL-MCNC: 7.4 G/DL (ref 6.8–8.8)
PSA SERPL-MCNC: 24.8 UG/L (ref 0–4)
RBC # BLD AUTO: 3.09 10E12/L (ref 4.4–5.9)
SODIUM SERPL-SCNC: 139 MMOL/L (ref 133–144)
WBC # BLD AUTO: 5.3 10E9/L (ref 4–11)

## 2020-07-06 PROCEDURE — 83615 LACTATE (LD) (LDH) ENZYME: CPT | Performed by: INTERNAL MEDICINE

## 2020-07-06 PROCEDURE — 84153 ASSAY OF PSA TOTAL: CPT | Performed by: INTERNAL MEDICINE

## 2020-07-06 PROCEDURE — 96413 CHEMO IV INFUSION 1 HR: CPT

## 2020-07-06 PROCEDURE — 80053 COMPREHEN METABOLIC PANEL: CPT | Performed by: INTERNAL MEDICINE

## 2020-07-06 PROCEDURE — 25800030 ZZH RX IP 258 OP 636: Mod: ZF | Performed by: PHYSICIAN ASSISTANT

## 2020-07-06 PROCEDURE — 96375 TX/PRO/DX INJ NEW DRUG ADDON: CPT

## 2020-07-06 PROCEDURE — 25000128 H RX IP 250 OP 636: Mod: ZF | Performed by: PHYSICIAN ASSISTANT

## 2020-07-06 PROCEDURE — 25000125 ZZHC RX 250: Mod: ZF | Performed by: PHYSICIAN ASSISTANT

## 2020-07-06 PROCEDURE — 85025 COMPLETE CBC W/AUTO DIFF WBC: CPT | Performed by: INTERNAL MEDICINE

## 2020-07-06 PROCEDURE — 96367 TX/PROPH/DG ADDL SEQ IV INF: CPT

## 2020-07-06 RX ADMIN — DEXAMETHASONE SODIUM PHOSPHATE: 10 INJECTION, SOLUTION INTRAMUSCULAR; INTRAVENOUS at 13:09

## 2020-07-06 RX ADMIN — SODIUM CHLORIDE 250 ML: 9 INJECTION, SOLUTION INTRAVENOUS at 13:09

## 2020-07-06 RX ADMIN — Medication 20 MG: at 13:11

## 2020-07-06 RX ADMIN — SODIUM CHLORIDE 42 MG: 900 INJECTION, SOLUTION INTRAVENOUS at 13:57

## 2020-07-06 RX ADMIN — DIPHENHYDRAMINE HYDROCHLORIDE 25 MG: 50 INJECTION, SOLUTION INTRAMUSCULAR; INTRAVENOUS at 13:36

## 2020-07-06 ASSESSMENT — PAIN SCALES - GENERAL: PAINLEVEL: NO PAIN (0)

## 2020-07-06 NOTE — NURSING NOTE
Chief Complaint   Patient presents with     Blood Draw     Labs drawn via /PIV placed by WOODY Lim in lab. VS taken.     Porsche Youngblood RN

## 2020-07-06 NOTE — PROGRESS NOTES
Infusion Nursing Note:  Albert Amaya presents today for Day 1 Cycle 9 Cabazitaxel.    Patient seen by provider today: No   present during visit today: Not Applicable.    Note: Patient presents to infusion feeling well. Patient denies pain and states no acute complaints or concerns needing to be addressed today. Specifically, patient denies s/s of infection such as fever, shortness of breath, cough, chest pain, or changes in taste/smell. Per progress note from Anay FELIX on 7/1, no Neulasta this cycle which patient also confirmed. Education given on new medication PO Dex with patient verbalizing understanding of how and when to take medication.     Intravenous Access:  Peripheral IV placed Via Vascular Access    Treatment Conditions:  Lab Results   Component Value Date    HGB 9.9 07/06/2020     Lab Results   Component Value Date    WBC 5.3 07/06/2020      Lab Results   Component Value Date    ANEU 3.0 07/06/2020     Lab Results   Component Value Date     07/06/2020      Lab Results   Component Value Date     07/06/2020                   Lab Results   Component Value Date    POTASSIUM 3.6 07/06/2020           Lab Results   Component Value Date    MAG 2.2 12/21/2016            Lab Results   Component Value Date    CR 0.75 07/06/2020                   Lab Results   Component Value Date    WILL 8.6 07/06/2020                Lab Results   Component Value Date    BILITOTAL 0.9 07/06/2020           Lab Results   Component Value Date    ALBUMIN 4.0 07/06/2020                    Lab Results   Component Value Date    ALT 31 07/06/2020           Lab Results   Component Value Date    AST 16 07/06/2020       Results reviewed, labs MET treatment parameters, ok to proceed with treatment.      Post Infusion Assessment:  Patient tolerated infusion without incident.  Blood return noted pre and post infusion.  Site patent and intact, free from redness, edema or discomfort.  No evidence of  extravasations.  Access discontinued per protocol.       Discharge Plan:   Prescription refills given for Decadron.  Discharge instructions reviewed with: Patient.  Patient and/or family verbalized understanding of discharge instructions and all questions answered.  Copy of AVS reviewed with patient and/or family.  Patient will return 7/28 for next appointment.  Patient discharged in stable condition accompanied by: self.  Departure Mode: Ambulatory.  Face to Face time: 0 minutes.    Daivd Malloy RN

## 2020-07-06 NOTE — PATIENT INSTRUCTIONS
[facility] Triage and after hours / weekends / holidays:  [phone]    Please call the triage or after hours line if you experience a temperature greater than or equal to 100.5, shaking chills, have uncontrolled nausea, vomiting and/or diarrhea, dizziness, shortness of breath, chest pain, bleeding, unexplained bruising, or if you have any other new/concerning symptoms, questions or concerns.      If you are having any concerning symptoms or wish to speak to a provider before your next infusion visit, please call your care coordinator or triage to notify them so we can adequately serve you.     If you need a refill on a narcotic prescription or other medication, please call before your infusion appointment.                 July 2020 Sunday Monday Tuesday Wednesday Thursday Friday Saturday                  1    TELEPHONE VISIT RETURN   8:40 AM   (50 min.)   Anay Dan PA-C   [facility] ONC INFUSION 120  12:00 PM   (120 min.)    ONCOLOGY INFUSION   [facility] 2    [facility]C FAST TRACK   7:45 AM   (15 min.)    ONCOLOGY INFUSION   [facility] 3     4       5     6    [facility] DRAW  11:30 AM   (15 min.)    [facility]    [facility] ONC INFUSION 120  12:00 PM   (120 min.)    ONCOLOGY INFUSION   [facility] 7     8     9     10     11       12     13     14     15     16     17     18       19     20     21     22     23     24     25       26     27    [facility] DRAW  12:45 PM   (15 min.)   [facility] DRAW   [facility]    TELEPHONE VISIT NEW  11:00 AM   (60 min.)   Tyrel Valladares MD   [facility] ONC INFUSION 120   1:30 PM   (120 min.)    ONCOLOGY INFUSION   [facility] 29 30 31 August 2020 Sunday Monday Tuesday Wednesday Thursday Friday Saturday                                  1       2     3     4     5     6     7     8       9     10     11     12     13     14     15       16     17     18     19     20     21     22       23     24     25     26     27     28     29       30     31                                              Lab Results:  Recent Results (from the past 12 hour(s))   PSA tumor marker    Collection Time: 07/06/20 12:32 PM   Result Value Ref Range    PSA 24.80 (H) 0 - 4 ug/L   Lactate Dehydrogenase    Collection Time: 07/06/20 12:32 PM   Result Value Ref Range    Lactate Dehydrogenase 192 85 - 227 U/L   Comprehensive metabolic panel    Collection Time: 07/06/20 12:32 PM   Result Value Ref Range    Sodium 139 133 - 144 mmol/L    Potassium 3.6 3.4 - 5.3 mmol/L    Chloride 109 94 - 109 mmol/L    Carbon Dioxide 25 20 - 32 mmol/L    Anion Gap 5 3 - 14 mmol/L    Glucose 91 70 - 99 mg/dL    Urea Nitrogen 14 7 - 30 mg/dL    Creatinine 0.75 0.66 - 1.25 mg/dL    GFR Estimate >90 >60 mL/min/[1.73_m2]    GFR Estimate If Black >90 >60 mL/min/[1.73_m2]    Calcium 8.6 8.5 - 10.1 mg/dL    Bilirubin Total 0.9 0.2 - 1.3 mg/dL    Albumin 4.0 3.4 - 5.0 g/dL    Protein Total 7.4 6.8 - 8.8 g/dL    Alkaline Phosphatase 73 40 - 150 U/L    ALT 31 0 - 70 U/L    AST 16 0 - 45 U/L   CBC with platelets differential    Collection Time: 07/06/20 12:32 PM   Result Value Ref Range    WBC 5.3 4.0 - 11.0 10e9/L    RBC Count 3.09 (L) 4.4 - 5.9 10e12/L    Hemoglobin 9.9 (L) 13.3 - 17.7 g/dL    Hematocrit 29.7 (L) 40.0 - 53.0 %    MCV 96 78 - 100 fl    MCH 32.0 26.5 - 33.0 pg    MCHC 33.3 31.5 - 36.5 g/dL    RDW 13.6 10.0 - 15.0 %    Platelet Count 234 150 - 450 10e9/L    Diff Method Automated Method     % Neutrophils 55.7 %    % Lymphocytes 34.3 %    % Monocytes 8.8 %    % Eosinophils 0.4 %    % Basophils 0.4 %    % Immature Granulocytes 0.4 %    Nucleated RBCs 0 0 /100    Absolute Neutrophil 3.0 1.6 - 8.3 10e9/L    Absolute Lymphocytes 1.8 0.8 - 5.3 10e9/L    Absolute Monocytes 0.5 0.0 -  1.3 10e9/L    Absolute Eosinophils 0.0 0.0 - 0.7 10e9/L    Absolute Basophils 0.0 0.0 - 0.2 10e9/L    Abs Immature Granulocytes 0.0 0 - 0.4 10e9/L    Absolute Nucleated RBC 0.0

## 2020-07-27 VITALS
WEIGHT: 183.4 LBS | SYSTOLIC BLOOD PRESSURE: 129 MMHG | DIASTOLIC BLOOD PRESSURE: 87 MMHG | TEMPERATURE: 98.2 F | RESPIRATION RATE: 16 BRPM | OXYGEN SATURATION: 96 % | HEART RATE: 66 BPM | BODY MASS INDEX: 27.89 KG/M2

## 2020-07-27 DIAGNOSIS — C79.51 BONE METASTASIS: Primary | ICD-10-CM

## 2020-07-27 DIAGNOSIS — E03.4 HYPOTHYROIDISM DUE TO ACQUIRED ATROPHY OF THYROID: ICD-10-CM

## 2020-07-27 DIAGNOSIS — C61 MALIGNANT NEOPLASM OF PROSTATE (H): ICD-10-CM

## 2020-07-27 LAB
ALBUMIN SERPL-MCNC: 4.2 G/DL (ref 3.4–5)
ALP SERPL-CCNC: 66 U/L (ref 40–150)
ALT SERPL W P-5'-P-CCNC: 34 U/L (ref 0–70)
ANION GAP SERPL CALCULATED.3IONS-SCNC: 4 MMOL/L (ref 3–14)
AST SERPL W P-5'-P-CCNC: 16 U/L (ref 0–45)
BASOPHILS # BLD AUTO: 0 10E9/L (ref 0–0.2)
BASOPHILS NFR BLD AUTO: 0.7 %
BILIRUB SERPL-MCNC: 0.6 MG/DL (ref 0.2–1.3)
BUN SERPL-MCNC: 16 MG/DL (ref 7–30)
CALCIUM SERPL-MCNC: 8.8 MG/DL (ref 8.5–10.1)
CHLORIDE SERPL-SCNC: 108 MMOL/L (ref 94–109)
CO2 SERPL-SCNC: 27 MMOL/L (ref 20–32)
CREAT SERPL-MCNC: 0.77 MG/DL (ref 0.66–1.25)
DIFFERENTIAL METHOD BLD: ABNORMAL
EOSINOPHIL # BLD AUTO: 0 10E9/L (ref 0–0.7)
EOSINOPHIL NFR BLD AUTO: 0.7 %
ERYTHROCYTE [DISTWIDTH] IN BLOOD BY AUTOMATED COUNT: 12.8 % (ref 10–15)
GFR SERPL CREATININE-BSD FRML MDRD: >90 ML/MIN/{1.73_M2}
GLUCOSE SERPL-MCNC: 92 MG/DL (ref 70–99)
HCT VFR BLD AUTO: 34.6 % (ref 40–53)
HGB BLD-MCNC: 11.4 G/DL (ref 13.3–17.7)
IMM GRANULOCYTES # BLD: 0 10E9/L (ref 0–0.4)
IMM GRANULOCYTES NFR BLD: 0.2 %
LDH SERPL L TO P-CCNC: 179 U/L (ref 85–227)
LYMPHOCYTES # BLD AUTO: 2 10E9/L (ref 0.8–5.3)
LYMPHOCYTES NFR BLD AUTO: 50.1 %
MCH RBC QN AUTO: 31.1 PG (ref 26.5–33)
MCHC RBC AUTO-ENTMCNC: 32.9 G/DL (ref 31.5–36.5)
MCV RBC AUTO: 94 FL (ref 78–100)
MONOCYTES # BLD AUTO: 0.5 10E9/L (ref 0–1.3)
MONOCYTES NFR BLD AUTO: 11.1 %
NEUTROPHILS # BLD AUTO: 1.5 10E9/L (ref 1.6–8.3)
NEUTROPHILS NFR BLD AUTO: 37.2 %
NRBC # BLD AUTO: 0 10*3/UL
NRBC BLD AUTO-RTO: 0 /100
PLATELET # BLD AUTO: 262 10E9/L (ref 150–450)
POTASSIUM SERPL-SCNC: 4.4 MMOL/L (ref 3.4–5.3)
PROT SERPL-MCNC: 7.5 G/DL (ref 6.8–8.8)
PSA SERPL-MCNC: 27.5 UG/L (ref 0–4)
RBC # BLD AUTO: 3.67 10E12/L (ref 4.4–5.9)
SODIUM SERPL-SCNC: 139 MMOL/L (ref 133–144)
WBC # BLD AUTO: 4.1 10E9/L (ref 4–11)

## 2020-07-27 PROCEDURE — 84153 ASSAY OF PSA TOTAL: CPT | Performed by: INTERNAL MEDICINE

## 2020-07-27 PROCEDURE — 83615 LACTATE (LD) (LDH) ENZYME: CPT | Performed by: INTERNAL MEDICINE

## 2020-07-27 PROCEDURE — 80053 COMPREHEN METABOLIC PANEL: CPT | Performed by: INTERNAL MEDICINE

## 2020-07-27 PROCEDURE — 85025 COMPLETE CBC W/AUTO DIFF WBC: CPT | Performed by: INTERNAL MEDICINE

## 2020-07-27 ASSESSMENT — PAIN SCALES - GENERAL: PAINLEVEL: NO PAIN (0)

## 2020-07-27 NOTE — NURSING NOTE
Chief Complaint   Patient presents with     Blood Draw     labs drawn by venipuncture by rn in lab.  vital signs taken.     Labs drawn by venipuncture by RN in lab.  Vital signs taken.     Ele Simon RN

## 2020-07-28 ENCOUNTER — VIRTUAL VISIT (OUTPATIENT)
Dept: ONCOLOGY | Facility: CLINIC | Age: 55
End: 2020-07-28
Attending: INTERNAL MEDICINE
Payer: COMMERCIAL

## 2020-07-28 DIAGNOSIS — C79.51 BONE METASTASIS: ICD-10-CM

## 2020-07-28 DIAGNOSIS — D70.1 CHEMOTHERAPY-INDUCED NEUTROPENIA (H): Primary | ICD-10-CM

## 2020-07-28 DIAGNOSIS — T45.1X5A CHEMOTHERAPY-INDUCED NEUTROPENIA (H): Primary | ICD-10-CM

## 2020-07-28 DIAGNOSIS — C61 MALIGNANT NEOPLASM OF PROSTATE (H): ICD-10-CM

## 2020-07-28 PROCEDURE — 40001009 ZZH VIDEO/TELEPHONE VISIT; NO CHARGE

## 2020-07-28 PROCEDURE — 99215 OFFICE O/P EST HI 40 MIN: CPT | Mod: GT | Performed by: INTERNAL MEDICINE

## 2020-07-28 RX ORDER — DIPHENHYDRAMINE HYDROCHLORIDE 50 MG/ML
50 INJECTION INTRAMUSCULAR; INTRAVENOUS
Status: CANCELLED
Start: 2020-08-03

## 2020-07-28 RX ORDER — LIDOCAINE/PRILOCAINE 2.5 %-2.5%
CREAM (GRAM) TOPICAL ONCE
Status: CANCELLED
Start: 2020-08-03

## 2020-07-28 RX ORDER — METHYLPREDNISOLONE SODIUM SUCCINATE 125 MG/2ML
125 INJECTION, POWDER, LYOPHILIZED, FOR SOLUTION INTRAMUSCULAR; INTRAVENOUS
Status: CANCELLED
Start: 2020-08-03

## 2020-07-28 RX ORDER — MEPERIDINE HYDROCHLORIDE 25 MG/ML
25 INJECTION INTRAMUSCULAR; INTRAVENOUS; SUBCUTANEOUS EVERY 30 MIN PRN
Status: CANCELLED | OUTPATIENT
Start: 2020-08-03

## 2020-07-28 RX ORDER — SODIUM CHLORIDE 9 MG/ML
1000 INJECTION, SOLUTION INTRAVENOUS CONTINUOUS PRN
Status: CANCELLED
Start: 2020-08-03

## 2020-07-28 RX ORDER — NALOXONE HYDROCHLORIDE 0.4 MG/ML
.1-.4 INJECTION, SOLUTION INTRAMUSCULAR; INTRAVENOUS; SUBCUTANEOUS
Status: CANCELLED | OUTPATIENT
Start: 2020-08-03

## 2020-07-28 RX ORDER — LORAZEPAM 2 MG/ML
0.5 INJECTION INTRAMUSCULAR EVERY 4 HOURS PRN
Status: CANCELLED
Start: 2020-08-03

## 2020-07-28 RX ORDER — ALBUTEROL SULFATE 0.83 MG/ML
2.5 SOLUTION RESPIRATORY (INHALATION)
Status: CANCELLED | OUTPATIENT
Start: 2020-08-03

## 2020-07-28 RX ORDER — EPINEPHRINE 1 MG/ML
0.3 INJECTION, SOLUTION INTRAMUSCULAR; SUBCUTANEOUS EVERY 5 MIN PRN
Status: CANCELLED | OUTPATIENT
Start: 2020-08-03

## 2020-07-28 RX ORDER — ALBUTEROL SULFATE 90 UG/1
1-2 AEROSOL, METERED RESPIRATORY (INHALATION)
Status: CANCELLED
Start: 2020-08-03

## 2020-07-28 RX ORDER — EPINEPHRINE 0.3 MG/.3ML
0.3 INJECTION SUBCUTANEOUS EVERY 5 MIN PRN
Status: CANCELLED | OUTPATIENT
Start: 2020-08-03

## 2020-07-28 ASSESSMENT — PAIN SCALES - GENERAL: PAINLEVEL: NO PAIN (0)

## 2020-07-28 NOTE — LETTER
7/28/2020         RE: Albert Amaya  [address] [facility] [place] [address]        Dear Colleague,    Thank you for referring your patient, Albert Amaya, to the [facility]. Please see a copy of my visit note below.    Albert Amaya is a 54 year old male who is being evaluated via a billable telephone visit.        Patient did not have any vitals to report.  0/10 pain scale.     No additional concerns.     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 Albert felt a cramp in his gluteus muscle and could barely walk after doing some remodeling at home and unloading heavy truck at work. He tried flexeril and prednisone initially but eventually presented to ED on 10/5/15. He feels that initially he was nearly labeled as drug seeker since he was in lot of discomfort and flexeril was not working. But during course of ED visits labs were drawn and he had marked elevation in Alk Phosphatase (over 1000). CT did suggest some ileus with some sclerotic bone lesions. He was admitted to the hospital. His PSA was checked and was markedly elevated at 5760 (10/6/15), repeat value 5563. He did follow up with Dr. Freire and had transrectal US guided biopsy of prostate on 10/8/15. They have the results through my chart which confirms prostate adenocarcinoma - enrico 4+3 involving majority (60-90%) portion of every single core. He started on taxotere on 10/23 and completed 6 cycles of therapy in 2/2016. He then underwent orchiectomy on 3/18/2016.   Throughout  spring of 2016 Albert's PSA started to progress and after three elevations there was concern about him being hormone refractory. He was started on Provenge and enrolled in the trial including Indoximod. PSA trending up and started on Casodex mid March with progression. Switched to Zytiga 5/3/17. He held his abiraterone in July 2019 with rising PSA and fatigue. He had a decline in his PSA after holding his abiraterone. This has been on hold and he is being followed without any additional intervention. He had orchiectomy done previously and does not need ADT. In January, his PSA was increasing and Dr Valladares started cabazitaxel.      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  He is being accompanied by his wife at this clinic visit.    He was initially scheduled for a telephone visit but we decided to convert it to in person.    He does have fatigue from his ongoing chemotherapy.  He had several days after receiving Neulasta.  At the last cycle Melanie made adjustments including additional dexamethasone and skipping Neulasta.  He notes that 3 days after his chemotherapy he worked in his daughter's garage for 3 days.  He almost put in about 40 hours during this time.  He was on his back for the next several days in a row.  He tends to recover by the end of second week after chemotherapy.    His taste has been affected with therapy and he is not eating much.  He has been consciously eating to maintain his weight.  He notes that he has been losing 1 pound with every cycle of chemotherapy.    His wife has noticed decline in his memory.    For a few days he had a pain in his right hamstring.  He felt that this was more like tendon related pain that felt sharp and closer to his knees.  He has used Tylenol and found it helpful.       MEDS:   Current Outpatient Medications   Medication Sig     Acetaminophen (TYLENOL PO) Take 325 mg by mouth every 8 hours as needed for mild pain or fever  "Reported on 5/18/2017     amLODIPine (NORVASC) 5 MG tablet TAKE ONE TABLET BY MOUTH EVERY DAY     blood glucose monitoring (ACCU-CHEK MULTICLIX) lancets Use to test blood sugar twice times daily or as directed.     blood glucose monitoring (NO BRAND SPECIFIED) test strip Use to test blood sugars twice daily or as directed (fasting, rotate then second time - before lunch, before supper and bedtime)     dexamethasone (DECADRON) 2 MG tablet Take 4 mg on day 2, 2 mg on day 3 and 1 mg on day 4 - of each chemo cycle     diphenhydrAMINE (BENADRYL) 25 MG tablet Take 25 mg by mouth every 6 hours as needed      EPINEPHrine (EPIPEN 2-CHARITY) 0.3 MG/0.3ML injection 2-pack Inject 0.3 mLs (0.3 mg) into the muscle once as needed for anaphylaxis     HYDROmorphone (DILAUDID) 2 MG tablet Take 1 tablet (2 mg) by mouth every 4 hours as needed for moderate to severe pain     IBUPROFEN PO Take by mouth as needed for moderate pain Reported on 5/18/2017     levothyroxine (SYNTHROID/LEVOTHROID) 88 MCG tablet Take 1 tablet (88 mcg) by mouth daily     LORazepam (ATIVAN) 0.5 MG tablet Take 1 tablet (0.5 mg) by mouth every 4 hours as needed (Anxiety, Nausea/Vomiting or Sleep)     metFORMIN (GLUCOPHAGE-XR) 750 MG 24 hr tablet Take 1 tablet (750 mg) by mouth daily (with dinner)     predniSONE (DELTASONE) 5 MG tablet Take 5 mg by mouth as needed Takes on wkds to do \"yardwork\"     prochlorperazine (COMPAZINE) 10 MG tablet Take 1 tablet (10 mg) by mouth every 6 hours as needed (Nausea/Vomiting)     tadalafil (CIALIS) 20 MG tablet Take 1 tablet (20 mg) by mouth daily as needed (take 2 hours before intercourse.)     valACYclovir (VALTREX) 1000 mg tablet Take 1 tablet (1,000 mg) by mouth 3 times daily     No current facility-administered medications for this visit.       GENERAL/CONSTITUTIONAL: No acute distress.  EYES: Pupils are equal, round, and react to light and accommodation. Extraocular movements intact.  No scleral icterus.  ENT/MOUTH: Neck " supple. Oropharynx clear, no mucositis.  LYMPH: No anterior cervical, posterior cervical, supraclavicular, axillary or inguinal adenopathy.   RESPIRATORY: Clear to auscultation bilaterally. No crackles or wheezing.   CARDIOVASCULAR: Regular rate and rhythm without murmurs, gallops, or rubs.  GASTROINTESTINAL: No hepatosplenomegaly, masses, or tenderness. The patient has normal bowel sounds. No guarding.  No distention.  : Deferred  MUSCULOSKELETAL: Warm and well-perfused, no cyanosis, clubbing, or edema.  NEUROLOGIC: Cranial nerves II-XII are intact. Alert, oriented, answers questions appropriately. No focal neurologic deficit  INTEGUMENTARY: No rashes or jaundice.  GAIT: Not assessed    Recent Labs   Lab Test 07/27/20  1254 07/06/20  1232 06/30/20  1248 06/08/20  1511 05/18/20  1359    139 139 140 134   POTASSIUM 4.4 3.6 4.0 4.7 3.8   CHLORIDE 108 109 108 108 102   CO2 27 25 26 28 24   ANIONGAP 4 5 5 5 7   BUN 16 14 17 18 18   CR 0.77 0.75 0.81 0.75 0.76   GLC 92 91 102* 95 117*   WILL 8.8 8.6 9.0 9.0 9.0     Recent Labs   Lab Test 12/21/16  1643 11/22/16  1221 10/26/16  1150 09/28/16  1137 08/17/16  1355  05/03/16  0955   MAG 2.2 2.4* 2.2 2.3 2.0   < > 2.0   PHOS  --   --   --   --   --   --  4.1    < > = values in this interval not displayed.     Recent Labs   Lab Test 07/27/20  1254 07/06/20  1232 06/30/20  1248 06/08/20  1511 05/18/20  1359   WBC 4.1 5.3 7.6 8.3 8.2   HGB 11.4* 9.9* 10.7* 10.2* 10.3*    234 227 200 195   MCV 94 96 100 97 100   NEUTROPHIL 37.2 55.7 62.5 59.9 63.4     Recent Labs   Lab Test 07/27/20  1254 07/06/20  1232 06/30/20  1248   BILITOTAL 0.6 0.9 0.6   ALKPHOS 66 73 92   ALT 34 31 33   AST 16 16 14   ALBUMIN 4.2 4.0 4.2    192 212     TSH   Date Value Ref Range Status   02/24/2020 0.75 0.40 - 4.00 mU/L Final   01/07/2020 10.16 (H) 0.40 - 4.00 mU/L Final   07/17/2019 1.29 0.40 - 4.00 mU/L Final     No results for input(s): CEA in the last 98057 hours.  Results for  orders placed or performed during the hospital encounter of 01/22/20   CT Abdomen Pelvis w Contrast    Narrative    Examination:  CT ABDOMEN PELVIS W CONTRAST 1/22/2020 2:26 PM     History: Stage 4 prostate cancer new chemo with lower ab pain with  intermittent blood with stool and per rectum. eval for bowel changes    Comparison: Bone scan 1/10/2020 CT CAP 1/10/2019    Technique: CT of the abdomen and pelvis were obtained without contrast  and with IV contrast during the late arterial and portal venous phase.  Sagittal and coronal reconstructions created and reviewed.    Contrast: iopamidol (ISOVUE-370) solution 118 mL    Findings:     Abdomen and pelvis: Normal hepatic parenchyma focal hypoattenuation  along the falciform ligament, likely representing focal fatty  deposition. Gallbladder is partially dilated and unremarkable. No  intra- or extrahepatic biliary dilation. Spleen, pancreas, and adrenal  glands are unremarkable. Kidneys demonstrate symmetric enhancement  without solid lesions, hydronephrosis, or nephrolithiasis. A few tiny  too small to characterize low density areas may represent small cysts.  Ureters and urinary bladder are unremarkable. Prostate is surgically  absent.    No evidence of acute GI bleed. Stomach and small intestine are  unremarkable. Scattered colonic diverticula without evidence of acute  diverticulitis. Appendix is visualized and normal in caliber.     No suspicious abdominal or pelvic lymphadenopathy. No free fluid. No  pneumoperitoneum.    Abdominal aorta is normal in caliber and patent. Celiac and mesenteric  artery origins are unremarkable. Replaced right hepatic artery off the  SMA, normal variant Portal veins and IVC are patent.    Lower thorax: Unremarkable.    Bones and soft tissues: No acute osseous abnormalities. Diffuse  sclerotic appearance of the axial and appendicular skeleton. Unchanged  14 mm sclerotic lesion in the L4 vertebral body. Additional unchanged  sclerotic  lesions in the T11 and T12 vertebral bodies. Multilevel  Schmorl's node deformities.      Impression    Impression: In this patient with history of prostate cancer status  post orchiectomy and hormone associated therapy:    1. No evidence of acute GI bleed or acute intestinal pathology.    2. Scattered colonic diverticula without evidence of diverticulitis.    3. Diffuse sclerotic appearance of the axial and appendicular skeleton  with focal sclerotic foci within the lower thoracic and lumbar  vertebrae, unchanged from 1/10/2020. See bone scan 1/10/2020 for  further evaluation.    I have personally reviewed the examination and initial interpretation  and I agree with the findings.    BABITA MURCIA MD      Recent Labs   Lab Test 07/27/20  1254 07/06/20  1232 06/30/20  1248 06/08/20  1511 05/18/20  1359   PSA 27.50* 24.80* 27.60* 30.20* 29.40*   ALKPHOS 66 73 92 93 104    192 212 216 203          ASSESSMENT/PLAN:   1. Metastatic prostate cancer with bony metastasis:   Currently on cabazitaxel, PSA dropped today   Denosumab- should be on q3-4 month schedule   HTN and DM TII   ECOG PS 1         #mPC--started Cabazitaxel on 1/14/2020 due to worsening bone scan and rising PSA in early January.   Albert was seen in person along with his wife Lorrie, a registered nurse.  We had not met in person since January and he wanted to defer his chemotherapy which was scheduled for today.  There has been a small uptake in his PSA value and so I changed his visit to in person.    He has significant fatigue while on chemotherapy.  This can last up to 2 weeks after receiving his chemotherapy.  He has altered taste, diminished appetite and weight loss while on chemotherapy.  He did have some diarrhea at the start of chemotherapy but none recently.  Off-and-on he has pain which does not seem to be persistent.  He explained of pain in his back and in his right hamstring.  He is worried that he is losing his muscle mass.  He  admits that he has not been as active as he used to be in the past.    Despite all of the above he is pretty excited about being on chemotherapy.  He notes that his side effects are nothing like what he had while he was on docetaxel.  He is excited about the PSA response.    He would like to continue on the current chemotherapy regimen but has some suggestions for me based on his previous discussions with Melanie.  He would like to do Neulasta with every alternate cycle.  He would like to get a CBC drawn at the end of 3 weeks and then decide if you would like the chemotherapy or defer it by additional week.  I am not necessarily worried about his neutrophil count of 1500.  We could actually continue with chemotherapy even today with these counts.  Every 4-week dosing utilizing the same dose of chemotherapy will overall give lower dose.  This will be better tolerated but he could eventually progress on therapy a little sooner.  He would like to defer chemotherapy by a week as this will give him additional quality days.  If this was the goal we could switch him to every 4-week schedule.  However he is not mentally ready for this option either.    I reviewed the option of completely eliminating Neulasta from his chemotherapy regimen.  We could use prophylactic antibiotics during the second week of the cycle.  He is relatively young and should handle brief periods of neutropenia very well.  They were not excited about this option and would like to continue with Neulasta but would like to do this every alternate cycle.    Albert volunteered to participate in clinical trials.  He expressed interest in intravenous vitamin C.  He spoke to 1 of the patients waiting in the lobby who had gained a lot of benefit from intravenous vitamin C.  I explained him that we still have approved lines of therapy that are an option for him.  After the current chemotherapy with cabazitaxel, I plan to switch him to enzalutamide.  When he starts  progressing on enzalutamide we could add radium 223 to his regimen.  Beyond that I will select regimens based on phase 2 clinical trials that have shown promise.  I am not aware of any clinical indication for using intravenous vitamin C which has been widely promoted by qujosephs and charlatans for all malignancies without any scientific basis. Anti-oxidants with role in cancer prevention would still have little value in advanced malignancies and indeed if truly effective can interfere with therapies that rely on oxidative damage.  I would not be opposed to use it as part of a systematic legitimate clinical trial or if he would like to pursue it with someone with enough expertise on topic.     He would like to avoid denosumab on the same days as his chemotherapy as it ends up being too much for him and he has noticed terrible side effects. He would rather have it on his off days.     He would like to have his next cycle starting coming Monday in 6 days. He would not need provider visit prior to this. He would have labs in 3 weeks from that for following cycle and visit with tentative schedule for chemotherapy. He would decide about continuing or deferring chemotherapy at that time.     Over 45 min of direct face to face time spent with patient with more than 50% time spent in counseling and coordinating care.          Again, thank you for allowing me to participate in the care of your patient.        Sincerely,        Tyrel Valladares MD

## 2020-07-29 NOTE — PROGRESS NOTES
Jul 28, 2020         REASON FOR VISIT: Castration resistant metastatic prostate cancer being followed while on active chemotherapy     Oncology Treatment History:   Metastatic Prostate CA treated   - s/p 6 cycles of taxotere 75mg/m2   - s/p orchiectomy on 3/18/2016   - s/p Provenge 5/13, 5/27, 6/10   - s/p Casodex 50 mg daily March/April   - s/p Zytiga 5/3/2017   - started cabazitaxel 1/14/2020      HPI: Albert is a 54 year old gentleman with metastatic prostate cancer. Albert felt a cramp in his gluteus muscle and could barely walk after doing some remodeling at home and unloading heavy truck at work. He tried flexeril and prednisone initially but eventually presented to ED on 10/5/15. He feels that initially he was nearly labeled as drug seeker since he was in lot of discomfort and flexeril was not working. But during course of ED visits labs were drawn and he had marked elevation in Alk Phosphatase (over 1000). CT did suggest some ileus with some sclerotic bone lesions. He was admitted to the hospital. His PSA was checked and was markedly elevated at 5760 (10/6/15), repeat value 5563. He did follow up with Dr. Freire and had transrectal US guided biopsy of prostate on 10/8/15. They have the results through my chart which confirms prostate adenocarcinoma - enrico 4+3 involving majority (60-90%) portion of every single core. He started on taxotere on 10/23 and completed 6 cycles of therapy in 2/2016. He then underwent orchiectomy on 3/18/2016.   Throughout spring of 2016 Albert's PSA started to progress and after three elevations there was concern about him being hormone refractory. He was started on Provenge and enrolled in the trial including Indoximod. PSA trending up and started on Casodex mid March with progression. Switched to Zytiga 5/3/17. He held his abiraterone in July 2019 with rising PSA and fatigue. He had a decline in his PSA after holding his abiraterone. This has been on hold and he is being followed  without any additional intervention. He had orchiectomy done previously and does not need ADT. In January, his PSA was increasing and Dr Valladares started cabazitaxel.      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  He is being accompanied by his wife at this clinic visit.    He was initially scheduled for a telephone visit but we decided to convert it to in person.    He does have fatigue from his ongoing chemotherapy.  He had several days after receiving Neulasta.  At the last cycle Melanie made adjustments including additional dexamethasone and skipping Neulasta.  He notes that 3 days after his chemotherapy he worked in his daughter's garage for 3 days.  He almost put in about 40 hours during this time.  He was on his back for the next several days in a row.  He tends to recover by the end of second week after chemotherapy.    His taste has been affected with therapy and he is not eating much.  He has been consciously eating to maintain his weight.  He notes that he has been losing 1 pound with every cycle of chemotherapy.    His wife has noticed decline in his memory.    For a few days he had a pain in his right hamstring.  He felt that this was more like tendon related pain that felt sharp and closer to his knees.  He has used Tylenol and found it helpful.       MEDS:   Current Outpatient Medications   Medication Sig     Acetaminophen (TYLENOL PO) Take 325 mg by mouth every 8 hours as needed for mild pain or fever Reported on 5/18/2017     amLODIPine (NORVASC) 5 MG tablet TAKE ONE TABLET BY MOUTH EVERY DAY     blood glucose monitoring (ACCU-CHEK MULTICLIX) lancets Use to test blood sugar twice times daily or as directed.     blood glucose monitoring (NO BRAND SPECIFIED) test strip Use to test blood sugars twice daily or as directed (fasting, rotate then second time - before lunch, before supper and bedtime)     dexamethasone (DECADRON) 2 MG tablet Take 4 mg on day 2, 2 mg  "on day 3 and 1 mg on day 4 - of each chemo cycle     diphenhydrAMINE (BENADRYL) 25 MG tablet Take 25 mg by mouth every 6 hours as needed      EPINEPHrine (EPIPEN 2-CHARITY) 0.3 MG/0.3ML injection 2-pack Inject 0.3 mLs (0.3 mg) into the muscle once as needed for anaphylaxis     HYDROmorphone (DILAUDID) 2 MG tablet Take 1 tablet (2 mg) by mouth every 4 hours as needed for moderate to severe pain     IBUPROFEN PO Take by mouth as needed for moderate pain Reported on 5/18/2017     levothyroxine (SYNTHROID/LEVOTHROID) 88 MCG tablet Take 1 tablet (88 mcg) by mouth daily     LORazepam (ATIVAN) 0.5 MG tablet Take 1 tablet (0.5 mg) by mouth every 4 hours as needed (Anxiety, Nausea/Vomiting or Sleep)     metFORMIN (GLUCOPHAGE-XR) 750 MG 24 hr tablet Take 1 tablet (750 mg) by mouth daily (with dinner)     predniSONE (DELTASONE) 5 MG tablet Take 5 mg by mouth as needed Takes on wkds to do \"yardwork\"     prochlorperazine (COMPAZINE) 10 MG tablet Take 1 tablet (10 mg) by mouth every 6 hours as needed (Nausea/Vomiting)     tadalafil (CIALIS) 20 MG tablet Take 1 tablet (20 mg) by mouth daily as needed (take 2 hours before intercourse.)     valACYclovir (VALTREX) 1000 mg tablet Take 1 tablet (1,000 mg) by mouth 3 times daily     No current facility-administered medications for this visit.       GENERAL/CONSTITUTIONAL: No acute distress.  EYES: Pupils are equal, round, and react to light and accommodation. Extraocular movements intact.  No scleral icterus.  ENT/MOUTH: Neck supple. Oropharynx clear, no mucositis.  LYMPH: No anterior cervical, posterior cervical, supraclavicular, axillary or inguinal adenopathy.   RESPIRATORY: Clear to auscultation bilaterally. No crackles or wheezing.   CARDIOVASCULAR: Regular rate and rhythm without murmurs, gallops, or rubs.  GASTROINTESTINAL: No hepatosplenomegaly, masses, or tenderness. The patient has normal bowel sounds. No guarding.  No distention.  : Deferred  MUSCULOSKELETAL: Warm and " well-perfused, no cyanosis, clubbing, or edema.  NEUROLOGIC: Cranial nerves II-XII are intact. Alert, oriented, answers questions appropriately. No focal neurologic deficit  INTEGUMENTARY: No rashes or jaundice.  GAIT: Not assessed    Recent Labs   Lab Test 07/27/20  1254 07/06/20  1232 06/30/20  1248 06/08/20  1511 05/18/20  1359    139 139 140 134   POTASSIUM 4.4 3.6 4.0 4.7 3.8   CHLORIDE 108 109 108 108 102   CO2 27 25 26 28 24   ANIONGAP 4 5 5 5 7   BUN 16 14 17 18 18   CR 0.77 0.75 0.81 0.75 0.76   GLC 92 91 102* 95 117*   WILL 8.8 8.6 9.0 9.0 9.0     Recent Labs   Lab Test 12/21/16  1643 11/22/16  1221 10/26/16  1150 09/28/16  1137 08/17/16  1355  05/03/16  0955   MAG 2.2 2.4* 2.2 2.3 2.0   < > 2.0   PHOS  --   --   --   --   --   --  4.1    < > = values in this interval not displayed.     Recent Labs   Lab Test 07/27/20  1254 07/06/20  1232 06/30/20  1248 06/08/20  1511 05/18/20  1359   WBC 4.1 5.3 7.6 8.3 8.2   HGB 11.4* 9.9* 10.7* 10.2* 10.3*    234 227 200 195   MCV 94 96 100 97 100   NEUTROPHIL 37.2 55.7 62.5 59.9 63.4     Recent Labs   Lab Test 07/27/20  1254 07/06/20  1232 06/30/20  1248   BILITOTAL 0.6 0.9 0.6   ALKPHOS 66 73 92   ALT 34 31 33   AST 16 16 14   ALBUMIN 4.2 4.0 4.2    192 212     TSH   Date Value Ref Range Status   02/24/2020 0.75 0.40 - 4.00 mU/L Final   01/07/2020 10.16 (H) 0.40 - 4.00 mU/L Final   07/17/2019 1.29 0.40 - 4.00 mU/L Final     No results for input(s): CEA in the last 11978 hours.  Results for orders placed or performed during the hospital encounter of 01/22/20   CT Abdomen Pelvis w Contrast    Narrative    Examination:  CT ABDOMEN PELVIS W CONTRAST 1/22/2020 2:26 PM     History: Stage 4 prostate cancer new chemo with lower ab pain with  intermittent blood with stool and per rectum. eval for bowel changes    Comparison: Bone scan 1/10/2020 CT CAP 1/10/2019    Technique: CT of the abdomen and pelvis were obtained without contrast  and with IV contrast  during the late arterial and portal venous phase.  Sagittal and coronal reconstructions created and reviewed.    Contrast: iopamidol (ISOVUE-370) solution 118 mL    Findings:     Abdomen and pelvis: Normal hepatic parenchyma focal hypoattenuation  along the falciform ligament, likely representing focal fatty  deposition. Gallbladder is partially dilated and unremarkable. No  intra- or extrahepatic biliary dilation. Spleen, pancreas, and adrenal  glands are unremarkable. Kidneys demonstrate symmetric enhancement  without solid lesions, hydronephrosis, or nephrolithiasis. A few tiny  too small to characterize low density areas may represent small cysts.  Ureters and urinary bladder are unremarkable. Prostate is surgically  absent.    No evidence of acute GI bleed. Stomach and small intestine are  unremarkable. Scattered colonic diverticula without evidence of acute  diverticulitis. Appendix is visualized and normal in caliber.     No suspicious abdominal or pelvic lymphadenopathy. No free fluid. No  pneumoperitoneum.    Abdominal aorta is normal in caliber and patent. Celiac and mesenteric  artery origins are unremarkable. Replaced right hepatic artery off the  SMA, normal variant Portal veins and IVC are patent.    Lower thorax: Unremarkable.    Bones and soft tissues: No acute osseous abnormalities. Diffuse  sclerotic appearance of the axial and appendicular skeleton. Unchanged  14 mm sclerotic lesion in the L4 vertebral body. Additional unchanged  sclerotic lesions in the T11 and T12 vertebral bodies. Multilevel  Schmorl's node deformities.      Impression    Impression: In this patient with history of prostate cancer status  post orchiectomy and hormone associated therapy:    1. No evidence of acute GI bleed or acute intestinal pathology.    2. Scattered colonic diverticula without evidence of diverticulitis.    3. Diffuse sclerotic appearance of the axial and appendicular skeleton  with focal sclerotic foci  within the lower thoracic and lumbar  vertebrae, unchanged from 1/10/2020. See bone scan 1/10/2020 for  further evaluation.    I have personally reviewed the examination and initial interpretation  and I agree with the findings.    BABITA MURCIA MD      Recent Labs   Lab Test 07/27/20  1254 07/06/20  1232 06/30/20  1248 06/08/20  1511 05/18/20  1359   PSA 27.50* 24.80* 27.60* 30.20* 29.40*   ALKPHOS 66 73 92 93 104    192 212 216 203          ASSESSMENT/PLAN:   1. Metastatic prostate cancer with bony metastasis:   Currently on cabazitaxel, PSA dropped today   Denosumab- should be on q3-4 month schedule   HTN and DM TII   ECOG PS 1         #mPC--started Cabazitaxel on 1/14/2020 due to worsening bone scan and rising PSA in early January.   Albert was seen in person along with his wife Lorrie, a registered nurse.  We had not met in person since January and he wanted to defer his chemotherapy which was scheduled for today.  There has been a small uptake in his PSA value and so I changed his visit to in person.    He has significant fatigue while on chemotherapy.  This can last up to 2 weeks after receiving his chemotherapy.  He has altered taste, diminished appetite and weight loss while on chemotherapy.  He did have some diarrhea at the start of chemotherapy but none recently.  Off-and-on he has pain which does not seem to be persistent.  He explained of pain in his back and in his right hamstring.  He is worried that he is losing his muscle mass.  He admits that he has not been as active as he used to be in the past.    Despite all of the above he is pretty excited about being on chemotherapy.  He notes that his side effects are nothing like what he had while he was on docetaxel.  He is excited about the PSA response.    He would like to continue on the current chemotherapy regimen but has some suggestions for me based on his previous discussions with Melanie.  He would like to do Neulasta with every  alternate cycle.  He would like to get a CBC drawn at the end of 3 weeks and then decide if you would like the chemotherapy or defer it by additional week.  I am not necessarily worried about his neutrophil count of 1500.  We could actually continue with chemotherapy even today with these counts.  Every 4-week dosing utilizing the same dose of chemotherapy will overall give lower dose.  This will be better tolerated but he could eventually progress on therapy a little sooner.  He would like to defer chemotherapy by a week as this will give him additional quality days.  If this was the goal we could switch him to every 4-week schedule.  However he is not mentally ready for this option either.    I reviewed the option of completely eliminating Neulasta from his chemotherapy regimen.  We could use prophylactic antibiotics during the second week of the cycle.  He is relatively young and should handle brief periods of neutropenia very well.  They were not excited about this option and would like to continue with Neulasta but would like to do this every alternate cycle.    Albert volunteered to participate in clinical trials.  He expressed interest in intravenous vitamin C.  He spoke to 1 of the patients waiting in the lobby who had gained a lot of benefit from intravenous vitamin C.  I explained him that we still have approved lines of therapy that are an option for him.  After the current chemotherapy with cabazitaxel, I plan to switch him to enzalutamide.  When he starts progressing on enzalutamide we could add radium 223 to his regimen.  Beyond that I will select regimens based on phase 2 clinical trials that have shown promise.  I am not aware of any clinical indication for using intravenous vitamin C which has been widely promoted by qujosephs and nas for all malignancies without any scientific basis. Anti-oxidants with role in cancer prevention would still have little value in advanced malignancies and indeed if  truly effective can interfere with therapies that rely on oxidative damage.  I would not be opposed to use it as part of a systematic legitimate clinical trial or if he would like to pursue it with someone with enough expertise on topic.     He would like to avoid denosumab on the same days as his chemotherapy as it ends up being too much for him and he has noticed terrible side effects. He would rather have it on his off days.     He would like to have his next cycle starting coming Monday in 6 days. He would not need provider visit prior to this. He would have labs in 3 weeks from that for following cycle and visit with tentative schedule for chemotherapy. He would decide about continuing or deferring chemotherapy at that time.     Over 45 min of direct face to face time spent with patient with more than 50% time spent in counseling and coordinating care.

## 2020-07-31 ENCOUNTER — TELEPHONE (OUTPATIENT)
Dept: ONCOLOGY | Facility: CLINIC | Age: 55
End: 2020-07-31

## 2020-07-31 NOTE — TELEPHONE ENCOUNTER
Family Member FMLA forms received via fax from [employer].      Forms to be completed and put in folder for provider to approve.    Fax #:  [phone] Attn: Benita Vega   Phone #: [phone]    Cassidy Piedra, [facility]

## 2020-07-31 NOTE — TELEPHONE ENCOUNTER
Per message below attempted to call Lorrie at requested number.  Not available, left message to call back.     ----- Message from Leonie Galvan sent at 7/31/2020  8:48 AM CDT -----  Regarding: FMLA - FOR WIFE Jose Garcia called to update her FMLA - just needs to change some dates.    Does she need to refax them or can you just change dates?      Can you call her please ?   [phone]

## 2020-08-03 ENCOUNTER — INFUSION THERAPY VISIT (OUTPATIENT)
Dept: ONCOLOGY | Facility: CLINIC | Age: 55
End: 2020-08-03
Attending: INTERNAL MEDICINE
Payer: COMMERCIAL

## 2020-08-03 ENCOUNTER — DOCUMENTATION ONLY (OUTPATIENT)
Dept: ONCOLOGY | Facility: CLINIC | Age: 55
End: 2020-08-03

## 2020-08-03 VITALS
SYSTOLIC BLOOD PRESSURE: 135 MMHG | TEMPERATURE: 98 F | BODY MASS INDEX: 27.74 KG/M2 | RESPIRATION RATE: 16 BRPM | DIASTOLIC BLOOD PRESSURE: 91 MMHG | OXYGEN SATURATION: 100 % | HEART RATE: 58 BPM | WEIGHT: 182.4 LBS

## 2020-08-03 DIAGNOSIS — E03.4 HYPOTHYROIDISM DUE TO ACQUIRED ATROPHY OF THYROID: ICD-10-CM

## 2020-08-03 DIAGNOSIS — T45.1X5A CHEMOTHERAPY-INDUCED NEUTROPENIA (H): ICD-10-CM

## 2020-08-03 DIAGNOSIS — D70.1 CHEMOTHERAPY-INDUCED NEUTROPENIA (H): ICD-10-CM

## 2020-08-03 DIAGNOSIS — C61 MALIGNANT NEOPLASM OF PROSTATE (H): Primary | ICD-10-CM

## 2020-08-03 DIAGNOSIS — C79.51 BONE METASTASIS: ICD-10-CM

## 2020-08-03 LAB
ALBUMIN SERPL-MCNC: 4.3 G/DL (ref 3.4–5)
ALP SERPL-CCNC: 67 U/L (ref 40–150)
ALT SERPL W P-5'-P-CCNC: 28 U/L (ref 0–70)
ANION GAP SERPL CALCULATED.3IONS-SCNC: 9 MMOL/L (ref 3–14)
AST SERPL W P-5'-P-CCNC: 16 U/L (ref 0–45)
BASOPHILS # BLD AUTO: 0 10E9/L (ref 0–0.2)
BASOPHILS NFR BLD AUTO: 0.6 %
BILIRUB SERPL-MCNC: 0.6 MG/DL (ref 0.2–1.3)
BUN SERPL-MCNC: 14 MG/DL (ref 7–30)
CALCIUM SERPL-MCNC: 8.8 MG/DL (ref 8.5–10.1)
CHLORIDE SERPL-SCNC: 106 MMOL/L (ref 94–109)
CO2 SERPL-SCNC: 24 MMOL/L (ref 20–32)
CREAT SERPL-MCNC: 0.73 MG/DL (ref 0.66–1.25)
DIFFERENTIAL METHOD BLD: ABNORMAL
EOSINOPHIL # BLD AUTO: 0 10E9/L (ref 0–0.7)
EOSINOPHIL NFR BLD AUTO: 0.5 %
ERYTHROCYTE [DISTWIDTH] IN BLOOD BY AUTOMATED COUNT: 12.7 % (ref 10–15)
GFR SERPL CREATININE-BSD FRML MDRD: >90 ML/MIN/{1.73_M2}
GLUCOSE SERPL-MCNC: 81 MG/DL (ref 70–99)
HCT VFR BLD AUTO: 34 % (ref 40–53)
HGB BLD-MCNC: 11.3 G/DL (ref 13.3–17.7)
IMM GRANULOCYTES # BLD: 0.1 10E9/L (ref 0–0.4)
IMM GRANULOCYTES NFR BLD: 1 %
LDH SERPL L TO P-CCNC: 202 U/L (ref 85–227)
LYMPHOCYTES # BLD AUTO: 2.5 10E9/L (ref 0.8–5.3)
LYMPHOCYTES NFR BLD AUTO: 39.8 %
MCH RBC QN AUTO: 31 PG (ref 26.5–33)
MCHC RBC AUTO-ENTMCNC: 33.2 G/DL (ref 31.5–36.5)
MCV RBC AUTO: 93 FL (ref 78–100)
MONOCYTES # BLD AUTO: 0.4 10E9/L (ref 0–1.3)
MONOCYTES NFR BLD AUTO: 6.9 %
NEUTROPHILS # BLD AUTO: 3.2 10E9/L (ref 1.6–8.3)
NEUTROPHILS NFR BLD AUTO: 51.2 %
NRBC # BLD AUTO: 0 10*3/UL
NRBC BLD AUTO-RTO: 0 /100
PLATELET # BLD AUTO: 270 10E9/L (ref 150–450)
POTASSIUM SERPL-SCNC: 3.8 MMOL/L (ref 3.4–5.3)
PROT SERPL-MCNC: 7.5 G/DL (ref 6.8–8.8)
PSA SERPL-MCNC: 29.5 UG/L (ref 0–4)
RBC # BLD AUTO: 3.64 10E12/L (ref 4.4–5.9)
SODIUM SERPL-SCNC: 139 MMOL/L (ref 133–144)
WBC # BLD AUTO: 6.2 10E9/L (ref 4–11)

## 2020-08-03 PROCEDURE — 25000128 H RX IP 250 OP 636: Mod: ZF | Performed by: INTERNAL MEDICINE

## 2020-08-03 PROCEDURE — 40000556 ZZH STATISTIC PERIPHERAL IV START W US GUIDANCE

## 2020-08-03 PROCEDURE — 96375 TX/PRO/DX INJ NEW DRUG ADDON: CPT

## 2020-08-03 PROCEDURE — 83615 LACTATE (LD) (LDH) ENZYME: CPT | Performed by: INTERNAL MEDICINE

## 2020-08-03 PROCEDURE — 96413 CHEMO IV INFUSION 1 HR: CPT

## 2020-08-03 PROCEDURE — 85025 COMPLETE CBC W/AUTO DIFF WBC: CPT | Performed by: INTERNAL MEDICINE

## 2020-08-03 PROCEDURE — 25000128 H RX IP 250 OP 636: Mod: ZF | Performed by: PHYSICIAN ASSISTANT

## 2020-08-03 PROCEDURE — 80053 COMPREHEN METABOLIC PANEL: CPT | Performed by: INTERNAL MEDICINE

## 2020-08-03 PROCEDURE — 25000125 ZZHC RX 250: Mod: ZF | Performed by: INTERNAL MEDICINE

## 2020-08-03 PROCEDURE — 25800030 ZZH RX IP 258 OP 636: Mod: ZF | Performed by: INTERNAL MEDICINE

## 2020-08-03 PROCEDURE — 84153 ASSAY OF PSA TOTAL: CPT | Performed by: INTERNAL MEDICINE

## 2020-08-03 PROCEDURE — 96377 APPLICATON ON-BODY INJECTOR: CPT | Mod: 59

## 2020-08-03 RX ADMIN — Medication 20 MG: at 14:52

## 2020-08-03 RX ADMIN — SODIUM CHLORIDE 250 ML: 9 INJECTION, SOLUTION INTRAVENOUS at 14:52

## 2020-08-03 RX ADMIN — SODIUM CHLORIDE 42 MG: 900 INJECTION, SOLUTION INTRAVENOUS at 15:41

## 2020-08-03 RX ADMIN — PEGFILGRASTIM 6 MG: KIT SUBCUTANEOUS at 17:11

## 2020-08-03 RX ADMIN — DEXAMETHASONE SODIUM PHOSPHATE: 10 INJECTION, SOLUTION INTRAMUSCULAR; INTRAVENOUS at 15:07

## 2020-08-03 RX ADMIN — DIPHENHYDRAMINE HYDROCHLORIDE 25 MG: 50 INJECTION, SOLUTION INTRAMUSCULAR; INTRAVENOUS at 14:55

## 2020-08-03 ASSESSMENT — PAIN SCALES - GENERAL: PAINLEVEL: NO PAIN (0)

## 2020-08-03 NOTE — PROGRESS NOTES
Rapid Response Epic Documentation     Situation:  Pt had syncopal type episode with blank stare in lab. RRT called.      Objective: Pt had a near syncopal episode.        Assessment: Pt A&Ox4 reclined in lab chair. Pt had NS infusing via IV upon medic arrival.  Pt stated he saw white light and knew he was going to pass out.           BP:  126/71  Pulse: 61  Respiration: 16  SPO2: 94 %  Mental Status: Alert  CMS: Intact  Stroke Scale: Negative  EKG: Not Performed      Treatment: Pt moved via wheelchair to [address] for infusion. Pt had no complaints. Care transferred to RN.    Medication: Normal Saline by RN      Location:      [facility]    Disposition:      Transfered to ATC [address]    Protocol Used:     Other Syncopal

## 2020-08-03 NOTE — PROGRESS NOTES
Neulasta Onpro On-Body injector applied to left upper arm at 8/3/20 1715H  with light facing elbow  Writer discussed Neulasta injection would start on 8/4/20 at 2015H, approximately 27 hours after application applied today. Verbal instruction reviewed with patient.  Pt instructed when the dose delivery starts, it will take about 45 minutes to complete.  Pt aware Neulasta Onpro On-Body should have green flashing light and to call triage or on-call MD if injector flashes red or appears to be leaking. Pt aware to keep Onpro On-Body Neulasta 4 inches away from electrical equipment and to avoid showering 4 hours prior to injection.   Neulasta Onpro Lot number: See MAR    TORB: 8/3/20/Anay FELIX/Janee Rodriguez RN/ May give OnBody Neulasta today.

## 2020-08-03 NOTE — NURSING NOTE
Chief Complaint   Patient presents with     Blood Draw     Weight and VS checked.  PIV placed by vascular access.  Labs drawn without difficulty.  PIV saline locked.     About 1-2 minutes after patient's PIV was placed by vascular access, he slumped in the chair was not immediately arousable.  Patient also appeared pale and diaphoretic.  Writer and patient's spouse shook patient and he woke up slowly.  Patient was placed supine in the lab chair, IV fluids were initiated, and RRT was paged.  Patient's blood pressure was 94/47 (down from 138/88 when he arrived).  He quickly recovered.  BP was up to 126/71 within a couple of minutes.  Patient was escorted in a wheelchair to infusion by RRT Paramedic.    LINSEY ZABALA RN on 8/3/2020 at 1:27 PM

## 2020-08-03 NOTE — PATIENT INSTRUCTIONS
[facility] Triage and after hours / weekends / holidays:  [phone]    Please call the triage or after hours line if you experience a temperature greater than or equal to 100.5, shaking chills, have uncontrolled nausea, vomiting and/or diarrhea, dizziness, shortness of breath, chest pain, bleeding, unexplained bruising, or if you have any other new/concerning symptoms, questions or concerns.      If you are having any concerning symptoms or wish to speak to a provider before your next infusion visit, please call your care coordinator or triage to notify them so we can adequately serve you.     If you need a refill on a narcotic prescription or other medication, please call before your infusion appointment.                 August 2020 Sunday Monday Tuesday Wednesday Thursday Friday Saturday                                 1       2     3    [facility] DRAW  12:45 PM   (15 min.)    [facility] DRAW   [facility] Lab Draw    [facility] ONC INFUSION 120   1:30 PM   (120 min.)    ONCOLOGY INFUSION   [facility] Cancer [facility] 4     5     6     7     8       9     10     11     12     13     14     15       16     17     18     19     20     21     22       23     24    [facility] DRAW   7:00 AM   (15 min.)    [facility] DRAW   [facility] Lab Draw    VIDEO VISIT RETURN   8:25 AM   (50 min.)   Anay Dan PA-C   [facility] 25    [facility] ONC INFUSION 120  12:00 PM   (120 min.)    ONCOLOGY INFUSION   [facility] 26     27     28     29       30     31 September 2020 Sunday Monday Tuesday Wednesday Thursday Friday Saturday             1     2     3     4     5       6     7     8     9     10     11     12       13     14     15     16     17     18     19       20     21     22     23     24     25     26       27     28     29     30                                   Recent Results (from the past 24  hour(s))   CBC with platelets differential    Collection Time: 08/03/20  1:54 PM   Result Value Ref Range    WBC 6.2 4.0 - 11.0 10e9/L    RBC Count 3.64 (L) 4.4 - 5.9 10e12/L    Hemoglobin 11.3 (L) 13.3 - 17.7 g/dL    Hematocrit 34.0 (L) 40.0 - 53.0 %    MCV 93 78 - 100 fl    MCH 31.0 26.5 - 33.0 pg    MCHC 33.2 31.5 - 36.5 g/dL    RDW 12.7 10.0 - 15.0 %    Platelet Count 270 150 - 450 10e9/L    Diff Method Automated Method     % Neutrophils 51.2 %    % Lymphocytes 39.8 %    % Monocytes 6.9 %    % Eosinophils 0.5 %    % Basophils 0.6 %    % Immature Granulocytes 1.0 %    Nucleated RBCs 0 0 /100    Absolute Neutrophil 3.2 1.6 - 8.3 10e9/L    Absolute Lymphocytes 2.5 0.8 - 5.3 10e9/L    Absolute Monocytes 0.4 0.0 - 1.3 10e9/L    Absolute Eosinophils 0.0 0.0 - 0.7 10e9/L    Absolute Basophils 0.0 0.0 - 0.2 10e9/L    Abs Immature Granulocytes 0.1 0 - 0.4 10e9/L    Absolute Nucleated RBC 0.0    Comprehensive metabolic panel    Collection Time: 08/03/20  1:54 PM   Result Value Ref Range    Sodium 139 133 - 144 mmol/L    Potassium 3.8 3.4 - 5.3 mmol/L    Chloride 106 94 - 109 mmol/L    Carbon Dioxide 24 20 - 32 mmol/L    Anion Gap 9 3 - 14 mmol/L    Glucose 81 70 - 99 mg/dL    Urea Nitrogen 14 7 - 30 mg/dL    Creatinine 0.73 0.66 - 1.25 mg/dL    GFR Estimate >90 >60 mL/min/[1.73_m2]    GFR Estimate If Black >90 >60 mL/min/[1.73_m2]    Calcium 8.8 8.5 - 10.1 mg/dL    Bilirubin Total 0.6 0.2 - 1.3 mg/dL    Albumin 4.3 3.4 - 5.0 g/dL    Protein Total 7.5 6.8 - 8.8 g/dL    Alkaline Phosphatase 67 40 - 150 U/L    ALT 28 0 - 70 U/L    AST 16 0 - 45 U/L   PSA tumor marker    Collection Time: 08/03/20  1:54 PM   Result Value Ref Range    PSA 29.50 (H) 0 - 4 ug/L   Lactate Dehydrogenase    Collection Time: 08/03/20  1:54 PM   Result Value Ref Range    Lactate Dehydrogenase 202 85 - 227 U/L

## 2020-08-03 NOTE — PROGRESS NOTES
Infusion Nursing Note:  Albert Amaya presents today for Cycle 10 Day 1 Cabazitaxel.    Patient seen by provider today: No   present during visit today: Not Applicable.    Note: Patient arrived to infusion by wheelchair. He had a syncopal episode in lab following IV placement, see lab note for details. Pt reports this has happened a couple of times previously with needle sticks. He is feeling better on arrival to infusion. Denies any dizziness currently, BP WNL. ELVIRA Castellano notified. Otherwise pt denies any new complaints following his office visit with Dr. Valladares on 7/28. Labs are within limits for treatment today. Pt tolerated treatment without incident and was discharged in stable condition.     Intravenous Access:  Peripheral IV placed by vascular access in lab.    Treatment Conditions:  Lab Results   Component Value Date    HGB 11.3 08/03/2020     Lab Results   Component Value Date    WBC 6.2 08/03/2020      Lab Results   Component Value Date    ANEU 3.2 08/03/2020     Lab Results   Component Value Date     08/03/2020      Lab Results   Component Value Date     08/03/2020                   Lab Results   Component Value Date    POTASSIUM 3.8 08/03/2020           Lab Results   Component Value Date    CR 0.73 08/03/2020                   Lab Results   Component Value Date    WILL 8.8 08/03/2020                Lab Results   Component Value Date    BILITOTAL 0.6 08/03/2020           Lab Results   Component Value Date    ALBUMIN 4.3 08/03/2020                    Lab Results   Component Value Date    ALT 28 08/03/2020           Lab Results   Component Value Date    AST 16 08/03/2020       Results reviewed, labs MET treatment parameters, ok to proceed with treatment.      Post Infusion Assessment:  Patient tolerated infusion without incident.  Blood return noted pre and post infusion.  Site patent and intact, free from redness, edema or discomfort.  No evidence of extravasations.  Access  discontinued per protocol.       Discharge Plan:   Patient declined prescription refills.  Discharge instructions reviewed with: Patient.  Patient and/or family verbalized understanding of discharge instructions and all questions answered.  Copy of AVS reviewed with patient and/or family.  Patient will return 8/24 for next provider visit then 8/25 for next infusion appointment.  Patient discharged in stable condition accompanied by: self.  Departure Mode: Ambulatory.    Mouna Lam RN

## 2020-08-05 NOTE — TELEPHONE ENCOUNTER
Patient's wife, Jose, called back.  The forms were to extend intermittent leave to help care for patient.     Family member FMLA forms filled out and put in providers folder for review and signature.      Cassidy Piedra, CMA

## 2020-08-05 NOTE — TELEPHONE ENCOUNTER
Left message for wife Jose to call back.  We need to know the following for her [facility] paperwork:    1. Intermittent or continuous leave?   2. If continuous leave what is the start/end dates.   3. If intermittent: what dates will you need the intermittent leave to go for? Start/end.   4. Intermittent: how often do you need time off and for how long?     Cassidy Piedra, CMA

## 2020-08-05 NOTE — TELEPHONE ENCOUNTER
Family member FMLA paperwork completed, checked for accuracy, signed and faxed to Attn: Benita Vega @ [phone]. A copy was made, placed in family forms file bin and original mailed to patient at home address.    Successful transmission verified in Right Fax.      Cassidy Piedra, CMA

## 2020-08-18 ENCOUNTER — PATIENT OUTREACH (OUTPATIENT)
Dept: ONCOLOGY | Facility: CLINIC | Age: 55
End: 2020-08-18

## 2020-08-18 NOTE — PROGRESS NOTES
Oncology RN Care Coordination Note:     Vinay called the clinic and spoke with a clinic coordinator telling them she was hoping myself or Melanie Dan PA-C was able to call her today to discuss a few questions.concerns she has.      Upon calling Lorrie, she states that she is doing ok, but Albert has been quite irritable lately.  She notes that he has been doing his usual routine, but he has been quite flippant with her, he isn't being honest with the kids regarding his health/prognosis, etc.  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  So, she did that, set up a nice dinner, a wine cellar showing, the president had been showing Lorrie some pictures of the new house he had purchase and Albert asked if he could see the pictures, Lorrie asked him to wait until they were done explaining everything, then it was time to move onto the wine cellar tour.  Albert got upset with her on the ride home as he had felt that she was trying to stifle the evening or something.      I explained to Lorrie that he is going through a lot of emotions and he is trying to determine the best plan for himself.  She knows this as she is a nurse herself.  She said she understands that, but she is hoping that he will come to terms and he honest with their kids as he keeps telling them that he is ok, but they can tell that he is functionally declining and he is not maintaining as he once was.      Lorrie is looking for some guidance and hope that she can get some kind of timeline from Melanie or Dr. Valladares regarding Albert's health, she is realistic in regards to his prognosis, but she and Albert both want to know what the future holds, they don't want it to be sugar coated.     I have updated Melanie and let her know that Lorrie would still appreciate a phone call.    Shirin Lagunas, RN BSN   [facility]  Nurse Coordinator

## 2020-08-19 NOTE — PROGRESS NOTES
Oncology RN Care Coordination Note:     This writer called Lorrie back to discuss our conversation from yesterday further.  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  He apologized to her for making those comments and she said that he has been communicating better since.     Per Melanie Dan PA-C appointment on Monday 8/24/2020 will be face to face, Lorrie would prefer if the labs could be later in the morning, I have sent this request to scheduling.      Shirin Lagunas, RN BSN   [facility]  Nurse Coordinator

## 2020-08-21 DIAGNOSIS — C79.51 BONE METASTASIS: ICD-10-CM

## 2020-08-21 DIAGNOSIS — C61 MALIGNANT NEOPLASM OF PROSTATE (H): ICD-10-CM

## 2020-08-21 DIAGNOSIS — E03.4 HYPOTHYROIDISM DUE TO ACQUIRED ATROPHY OF THYROID: ICD-10-CM

## 2020-08-21 LAB
ALBUMIN SERPL-MCNC: 4.1 G/DL (ref 3.4–5)
ALP SERPL-CCNC: 95 U/L (ref 40–150)
ALT SERPL W P-5'-P-CCNC: 70 U/L (ref 0–70)
ANION GAP SERPL CALCULATED.3IONS-SCNC: 7 MMOL/L (ref 3–14)
AST SERPL W P-5'-P-CCNC: 20 U/L (ref 0–45)
BASOPHILS # BLD AUTO: 0 10E9/L (ref 0–0.2)
BASOPHILS NFR BLD AUTO: 0.3 %
BILIRUB SERPL-MCNC: 0.5 MG/DL (ref 0.2–1.3)
BUN SERPL-MCNC: 19 MG/DL (ref 7–30)
CALCIUM SERPL-MCNC: 9.1 MG/DL (ref 8.5–10.1)
CHLORIDE SERPL-SCNC: 105 MMOL/L (ref 94–109)
CO2 SERPL-SCNC: 26 MMOL/L (ref 20–32)
CREAT SERPL-MCNC: 0.82 MG/DL (ref 0.66–1.25)
DIFFERENTIAL METHOD BLD: ABNORMAL
EOSINOPHIL # BLD AUTO: 0 10E9/L (ref 0–0.7)
EOSINOPHIL NFR BLD AUTO: 0.4 %
ERYTHROCYTE [DISTWIDTH] IN BLOOD BY AUTOMATED COUNT: 13.2 % (ref 10–15)
GFR SERPL CREATININE-BSD FRML MDRD: >90 ML/MIN/{1.73_M2}
GLUCOSE SERPL-MCNC: 104 MG/DL (ref 70–99)
HCT VFR BLD AUTO: 36.2 % (ref 40–53)
HGB BLD-MCNC: 12 G/DL (ref 13.3–17.7)
IMM GRANULOCYTES # BLD: 0.2 10E9/L (ref 0–0.4)
IMM GRANULOCYTES NFR BLD: 1.6 %
LDH SERPL L TO P-CCNC: 211 U/L (ref 85–227)
LYMPHOCYTES # BLD AUTO: 2.4 10E9/L (ref 0.8–5.3)
LYMPHOCYTES NFR BLD AUTO: 25 %
MCH RBC QN AUTO: 30.5 PG (ref 26.5–33)
MCHC RBC AUTO-ENTMCNC: 33.1 G/DL (ref 31.5–36.5)
MCV RBC AUTO: 92 FL (ref 78–100)
MONOCYTES # BLD AUTO: 0.6 10E9/L (ref 0–1.3)
MONOCYTES NFR BLD AUTO: 6.3 %
NEUTROPHILS # BLD AUTO: 6.4 10E9/L (ref 1.6–8.3)
NEUTROPHILS NFR BLD AUTO: 66.4 %
NRBC # BLD AUTO: 0 10*3/UL
NRBC BLD AUTO-RTO: 0 /100
PLATELET # BLD AUTO: 230 10E9/L (ref 150–450)
POTASSIUM SERPL-SCNC: 4.6 MMOL/L (ref 3.4–5.3)
PROT SERPL-MCNC: 7.8 G/DL (ref 6.8–8.8)
PSA SERPL-MCNC: 29.9 UG/L (ref 0–4)
RBC # BLD AUTO: 3.93 10E12/L (ref 4.4–5.9)
SODIUM SERPL-SCNC: 138 MMOL/L (ref 133–144)
WBC # BLD AUTO: 9.7 10E9/L (ref 4–11)

## 2020-08-21 PROCEDURE — 83615 LACTATE (LD) (LDH) ENZYME: CPT | Performed by: INTERNAL MEDICINE

## 2020-08-21 PROCEDURE — 85025 COMPLETE CBC W/AUTO DIFF WBC: CPT | Performed by: INTERNAL MEDICINE

## 2020-08-21 PROCEDURE — 84153 ASSAY OF PSA TOTAL: CPT | Performed by: INTERNAL MEDICINE

## 2020-08-21 PROCEDURE — 80053 COMPREHEN METABOLIC PANEL: CPT | Performed by: INTERNAL MEDICINE

## 2020-08-21 PROCEDURE — 84443 ASSAY THYROID STIM HORMONE: CPT | Performed by: INTERNAL MEDICINE

## 2020-08-21 NOTE — NURSING NOTE
Chief Complaint   Patient presents with     Blood Draw     Labs drawn via  by RN in lab. Lab only appt.     Porsche Youngblood RN

## 2020-08-24 ENCOUNTER — ONCOLOGY VISIT (OUTPATIENT)
Dept: ONCOLOGY | Facility: CLINIC | Age: 55
End: 2020-08-24
Attending: PHYSICIAN ASSISTANT
Payer: COMMERCIAL

## 2020-08-24 VITALS
SYSTOLIC BLOOD PRESSURE: 157 MMHG | WEIGHT: 183.6 LBS | RESPIRATION RATE: 16 BRPM | HEART RATE: 67 BPM | BODY MASS INDEX: 27.83 KG/M2 | DIASTOLIC BLOOD PRESSURE: 101 MMHG | OXYGEN SATURATION: 97 % | TEMPERATURE: 98.1 F | HEIGHT: 68 IN

## 2020-08-24 DIAGNOSIS — D70.1 CHEMOTHERAPY-INDUCED NEUTROPENIA (H): ICD-10-CM

## 2020-08-24 DIAGNOSIS — G62.9 NEUROPATHY: ICD-10-CM

## 2020-08-24 DIAGNOSIS — R61 NIGHT SWEATS: ICD-10-CM

## 2020-08-24 DIAGNOSIS — E03.9 HYPOTHYROIDISM, UNSPECIFIED TYPE: ICD-10-CM

## 2020-08-24 DIAGNOSIS — E03.4 HYPOTHYROIDISM DUE TO ACQUIRED ATROPHY OF THYROID: ICD-10-CM

## 2020-08-24 DIAGNOSIS — T45.1X5A CHEMOTHERAPY-INDUCED NEUTROPENIA (H): ICD-10-CM

## 2020-08-24 DIAGNOSIS — C61 MALIGNANT NEOPLASM OF PROSTATE (H): Primary | ICD-10-CM

## 2020-08-24 DIAGNOSIS — C79.51 BONE METASTASIS: ICD-10-CM

## 2020-08-24 LAB — TSH SERPL DL<=0.005 MIU/L-ACNC: 2.68 MU/L (ref 0.4–4)

## 2020-08-24 PROCEDURE — G0463 HOSPITAL OUTPT CLINIC VISIT: HCPCS | Mod: ZF

## 2020-08-24 PROCEDURE — 99215 OFFICE O/P EST HI 40 MIN: CPT | Mod: ZP | Performed by: PHYSICIAN ASSISTANT

## 2020-08-24 RX ORDER — SODIUM CHLORIDE 9 MG/ML
1000 INJECTION, SOLUTION INTRAVENOUS CONTINUOUS PRN
Status: CANCELLED
Start: 2020-08-28

## 2020-08-24 RX ORDER — LORAZEPAM 2 MG/ML
0.5 INJECTION INTRAMUSCULAR EVERY 4 HOURS PRN
Status: CANCELLED
Start: 2020-08-28

## 2020-08-24 RX ORDER — HYDROMORPHONE HYDROCHLORIDE 2 MG/1
2 TABLET ORAL EVERY 4 HOURS PRN
Qty: 60 TABLET | Refills: 0 | Status: SHIPPED | OUTPATIENT
Start: 2020-08-24 | End: 2021-01-01

## 2020-08-24 RX ORDER — DIPHENHYDRAMINE HYDROCHLORIDE 50 MG/ML
50 INJECTION INTRAMUSCULAR; INTRAVENOUS
Status: CANCELLED
Start: 2020-08-28

## 2020-08-24 RX ORDER — LIDOCAINE/PRILOCAINE 2.5 %-2.5%
CREAM (GRAM) TOPICAL ONCE
Status: CANCELLED
Start: 2020-08-28

## 2020-08-24 RX ORDER — ALBUTEROL SULFATE 0.83 MG/ML
2.5 SOLUTION RESPIRATORY (INHALATION)
Status: CANCELLED | OUTPATIENT
Start: 2020-08-28

## 2020-08-24 RX ORDER — ALBUTEROL SULFATE 90 UG/1
1-2 AEROSOL, METERED RESPIRATORY (INHALATION)
Status: CANCELLED
Start: 2020-08-28

## 2020-08-24 RX ORDER — NALOXONE HYDROCHLORIDE 0.4 MG/ML
.1-.4 INJECTION, SOLUTION INTRAMUSCULAR; INTRAVENOUS; SUBCUTANEOUS
Status: CANCELLED | OUTPATIENT
Start: 2020-08-28

## 2020-08-24 RX ORDER — MEPERIDINE HYDROCHLORIDE 25 MG/ML
25 INJECTION INTRAMUSCULAR; INTRAVENOUS; SUBCUTANEOUS EVERY 30 MIN PRN
Status: CANCELLED | OUTPATIENT
Start: 2020-08-28

## 2020-08-24 RX ORDER — EPINEPHRINE 1 MG/ML
0.3 INJECTION, SOLUTION INTRAMUSCULAR; SUBCUTANEOUS EVERY 5 MIN PRN
Status: CANCELLED | OUTPATIENT
Start: 2020-08-28

## 2020-08-24 RX ORDER — EPINEPHRINE 0.3 MG/.3ML
0.3 INJECTION SUBCUTANEOUS EVERY 5 MIN PRN
Status: CANCELLED | OUTPATIENT
Start: 2020-08-28

## 2020-08-24 RX ORDER — PROCHLORPERAZINE MALEATE 10 MG
10 TABLET ORAL EVERY 6 HOURS PRN
Qty: 30 TABLET | Refills: 2 | Status: SHIPPED | OUTPATIENT
Start: 2020-08-24 | End: 2021-01-01

## 2020-08-24 RX ORDER — METHYLPREDNISOLONE SODIUM SUCCINATE 125 MG/2ML
125 INJECTION, POWDER, LYOPHILIZED, FOR SOLUTION INTRAMUSCULAR; INTRAVENOUS
Status: CANCELLED
Start: 2020-08-28

## 2020-08-24 ASSESSMENT — MIFFLIN-ST. JEOR: SCORE: 1652.17

## 2020-08-24 ASSESSMENT — PAIN SCALES - GENERAL: PAINLEVEL: NO PAIN (0)

## 2020-08-24 NOTE — LETTER
8/24/2020         RE: Albert Amaya  [address] [address]        Dear Colleague,    Thank you for referring your patient, Albert Amaya, to the [facility]. Please see a copy of my visit note below.      Aug 24, 2020       REASON FOR VISIT: mPC, here for chemo assessment     Oncology Treatment History:   Metastatic Prostate CA treated   - s/p 6 cycles of taxotere 75mg/m2   - s/p orchiectomy on 3/18/2016   - s/p Provenge 5/13, 5/27, 6/10   - s/p Casodex 50 mg daily March/April   - s/p Zytiga 5/3/2017   - started cabazitaxel 1/14/2020      HPI: Albert is a 54 year old gentleman with metastatic prostate cancer. Albert felt a cramp in his gluteus muscle and could barely walk after doing some remodeling at home and unloading heavy truck at work. He tried flexeril and prednisone initially but eventually presented to ED on 10/5/15. He feels that initially he was nearly labeled as drug seeker since he was in lot of discomfort and flexeril was not working. But during course of ED visits labs were drawn and he had marked elevation in Alk Phosphatase (over 1000). CT did suggest some ileus with some sclerotic bone lesions. He was admitted to the hospital. His PSA was checked and was markedly elevated at 5760 (10/6/15), repeat value 5563. He did follow up with Dr. Freire and had transrectal US guided biopsy of prostate on 10/8/15. They have the results through my chart which confirms prostate adenocarcinoma - enrico 4+3 involving majority (60-90%) portion of every single core. He started on taxotere on 10/23 and completed 6 cycles of therapy in 2/2016. He then underwent orchiectomy on 3/18/2016.   Throughout spring of 2016 Albert's PSA started to progress and after three elevations there was concern about him being hormone refractory. He was started on Provenge and enrolled in the trial including Indoximod. PSA trending up and started on Casodex mid March with progression. Switched to  Zytiga 5/3/17. He held his abiraterone in July 2019 with rising PSA and fatigue. He had a decline in his PSA after holding his abiraterone. This has been on hold and he is being followed without any additional intervention. He had orchiectomy done previously and does not need ADT. In January, his PSA was increasing and Dr Valladares started cabazitaxel.      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  They had a few concerns- Albert is getting more fatigued with chemo.  He is still able to work full time, but overall doing less physical work at home and taking more naps.  In the past, he would use prednisone prn for extra energy, but this hasn't even worked.  He feels cranky and beaver at times as well, admits when he's tired this is worse.  The neulasta gives him bone aches and a flu like feeling for days 2-5. He takes claritin and dilaudid q12 hours for a couple days. He will occasionally use compazine as well.  During these few days he is able to get out and do things out of the house for a couple hours, but often will be sedentary and napping during this time frame.  The second and third week he feels better, but the last two cycles, this didn't really give him a break.  He has noticed a slow flow with urination, but no other urinary symptoms.         MEDS:           Current Outpatient Medications   Medication Sig     Acetaminophen (TYLENOL PO) Take 325 mg by mouth every 8 hours as needed for mild pain or fever Reported on 5/18/2017     amLODIPine (NORVASC) 5 MG tablet TAKE ONE TABLET BY MOUTH EVERY DAY     blood glucose monitoring (ACCU-CHEK MULTICLIX) lancets Use to test blood sugar twice times daily or as directed.     diphenhydrAMINE (BENADRYL) 25 MG tablet Take 25 mg by mouth     HYDROmorphone (DILAUDID) 2 MG tablet Take 1 tablet (2 mg) by mouth every 4 hours as needed for moderate to severe pain     IBUPROFEN PO Take by  mouth as needed for moderate pain Reported on 5/18/2017     levothyroxine (SYNTHROID/LEVOTHROID) 88 MCG tablet Take 1 tablet (88 mcg) by mouth daily     LORazepam (ATIVAN) 0.5 MG tablet Take 1 tablet (0.5 mg) by mouth every 4 hours as needed (Anxiety, Nausea/Vomiting or Sleep)     metFORMIN (GLUCOPHAGE-XR) 750 MG 24 hr tablet Take 1 tablet (750 mg) by mouth daily (with dinner)     prochlorperazine (COMPAZINE) 10 MG tablet Take 1 tablet (10 mg) by mouth every 6 hours as needed (Nausea/Vomiting)     valACYclovir (VALTREX) 1000 mg tablet Take 1 tablet (1,000 mg) by mouth 3 times daily     blood glucose monitoring (NO BRAND SPECIFIED) test strip Use to test blood sugars twice daily or as directed (fasting, rotate then second time - before lunch, before supper and bedtime) (Patient not taking: Reported on 8/12/2019)     EPINEPHrine (EPIPEN 2-CHARITY) 0.3 MG/0.3ML injection 2-pack Inject 0.3 mLs (0.3 mg) into the muscle once as needed for anaphylaxis (Patient not taking: Reported on 1/14/2020)        7/27/2020 12:54 8/3/2020 13:54 8/21/2020 12:16   Sodium 139 139 138   Potassium 4.4 3.8 4.6   Chloride 108 106 105   Carbon Dioxide 27 24 26   Urea Nitrogen 16 14 19   Creatinine 0.77 0.73 0.82   GFR Estimate >90 >90 >90   GFR Estimate If Black >90 >90 >90   Calcium 8.8 8.8 9.1   Anion Gap 4 9 7   Albumin 4.2 4.3 4.1   Protein Total 7.5 7.5 7.8   Bilirubin Total 0.6 0.6 0.5   Alkaline Phosphatase 66 67 95   ALT 34 28 70   AST 16 16 20   Lactate Dehydrogenase 179 202 211   PSA 27.50 (H) 29.50 (H) 29.90 (H)   TSH   2.68   Glucose 92 81 104 (H)   WBC 4.1 6.2 9.7   Hemoglobin 11.4 (L) 11.3 (L) 12.0 (L)   Hematocrit 34.6 (L) 34.0 (L) 36.2 (L)   Platelet Count 262 270 230   RBC Count 3.67 (L) 3.64 (L) 3.93 (L)   MCV 94 93 92       ASSESSMENT/PLAN:   1. Metastatic prostate cancer with bony metastasis:   Currently on cabazitaxel, PSA dropped today   Denosumab- should be on q3-4 month schedule   HTN and DM TII   ECOG PS 1          #mPC--started Cabazitaxel on 1/14/2020 due to worsening bone scan and rising PSA in early January. Albert has had an excellent response to Jevtana, however he is starting to get progressive fatigue.  The second and third week recovery, the last two months have not occurred.  Lorrie finds him more sedentary and tired, he admits he has been more crabby as well.  He has noticed slow flow, but no other new concerning symptoms.  He would like a slightly longer cycle 23 or 24 days just to allow his WBC count to be high enough to not need Neulasta and to enjoy a few extra days of the break.  We will get some baseline images now to ensure with the new fatigue we are not missing a neuroendocrine transformation, but unlikely.  We discussed continuing on this until his disease becomes worse and then considering Xtandi/Xofigo in the future.   -OK for Jevtana on 8/28 (I will call him the next week with images), no neulasta needed     #pain-No constant bone pains at this time, since hes been on chemotherapy. Uses dilaudid for body aches from the OnPro after treatment  #bone-h/o Xgeva, stopped in March of 2019. Taking oral calcium/Vit D. I think an every 3-4 months makes sense. (he would like to come in on a diff day and not do this the same time as chemo)  #endo--dropped to one pill once a day of metformin. BS have been ~100;    #erectile dysfunction- met with Dr Toth in urology, has cialis to try  #genetics- Albert and I discussed a genetic referral, give his age at diagnosis       Anay Dan PA-C          Over 45 min of direct face to face time spent with patient with more than 50% time spent in counseling and coordinating care.          Again, thank you for allowing me to participate in the care of your patient.        Sincerely,        Anay Dan PA-C

## 2020-08-24 NOTE — NURSING NOTE
"Oncology Rooming Note    August 24, 2020 9:08 AM   Albert Amaya is a 54 year old male who presents for:    Chief Complaint   Patient presents with     Oncology Clinic Visit     Return; Malignant Neoplasm of Prostate     Initial Vitals: BP (!) 157/101 (BP Location: Right arm, Patient Position: Sitting, Cuff Size: Adult Regular)   Pulse 67   Temp 98.1  F (36.7  C) (Oral)   Resp 16   Ht 1.727 m (5' 7.99\")   Wt 83.3 kg (183 lb 9.6 oz)   SpO2 97%   BMI 27.92 kg/m   Estimated body mass index is 27.92 kg/m  as calculated from the following:    Height as of this encounter: 1.727 m (5' 7.99\").    Weight as of this encounter: 83.3 kg (183 lb 9.6 oz). Body surface area is 2 meters squared.  No Pain (0) Comment: Data Unavailable   No LMP for male patient.  Allergies reviewed: Yes  Medications reviewed: Yes    Medications: MEDICATION REFILLS NEEDED TODAY. Provider was notified.  Pharmacy name entered into [facility]:    [facility] - [place], [place] - [id number] - [place], [place] - [address] AT [facility] [address]    Clinical concerns:  Needs refills of Hydromorphone and compazine.       Cassidy Najera CMA              " no

## 2020-08-24 NOTE — PROGRESS NOTES
Aug 24, 2020       REASON FOR VISIT: mPC, here for chemo assessment     Oncology Treatment History:   Metastatic Prostate CA treated   - s/p 6 cycles of taxotere 75mg/m2   - s/p orchiectomy on 3/18/2016   - s/p Provenge 5/13, 5/27, 6/10   - s/p Casodex 50 mg daily March/April   - s/p Zytiga 5/3/2017   - started cabazitaxel 1/14/2020      HPI: Albert is a 54 year old gentleman with metastatic prostate cancer. Albert felt a cramp in his gluteus muscle and could barely walk after doing some remodeling at home and unloading heavy truck at work. He tried flexeril and prednisone initially but eventually presented to ED on 10/5/15. He feels that initially he was nearly labeled as drug seeker since he was in lot of discomfort and flexeril was not working. But during course of ED visits labs were drawn and he had marked elevation in Alk Phosphatase (over 1000). CT did suggest some ileus with some sclerotic bone lesions. He was admitted to the hospital. His PSA was checked and was markedly elevated at 5760 (10/6/15), repeat value 5563. He did follow up with Dr. Freire and had transrectal US guided biopsy of prostate on 10/8/15. They have the results through my chart which confirms prostate adenocarcinoma - enrico 4+3 involving majority (60-90%) portion of every single core. He started on taxotere on 10/23 and completed 6 cycles of therapy in 2/2016. He then underwent orchiectomy on 3/18/2016.   Throughout spring of 2016 Albert's PSA started to progress and after three elevations there was concern about him being hormone refractory. He was started on Provenge and enrolled in the trial including Indoximod. PSA trending up and started on Casodex mid March with progression. Switched to Zytiga 5/3/17. He held his abiraterone in July 2019 with rising PSA and fatigue. He had a decline in his PSA after holding his abiraterone. This has been on hold and he is being followed without any additional intervention. He had orchiectomy done  previously and does not need ADT. In January, his PSA was increasing and Dr Valladares started cabazitaxel.      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  They had a few concerns- Albert is getting more fatigued with chemo.  He is still able to work full time, but overall doing less physical work at home and taking more naps.  In the past, he would use prednisone prn for extra energy, but this hasn't even worked.  He feels cranky and beaver at times as well, admits when he's tired this is worse.  The neulasta gives him bone aches and a flu like feeling for days 2-5. He takes claritin and dilaudid q12 hours for a couple days. He will occasionally use compazine as well.  During these few days he is able to get out and do things out of the house for a couple hours, but often will be sedentary and napping during this time frame.  The second and third week he feels better, but the last two cycles, this didn't really give him a break.  He has noticed a slow flow with urination, but no other urinary symptoms.         MEDS:           Current Outpatient Medications   Medication Sig     Acetaminophen (TYLENOL PO) Take 325 mg by mouth every 8 hours as needed for mild pain or fever Reported on 5/18/2017     amLODIPine (NORVASC) 5 MG tablet TAKE ONE TABLET BY MOUTH EVERY DAY     blood glucose monitoring (ACCU-CHEK MULTICLIX) lancets Use to test blood sugar twice times daily or as directed.     diphenhydrAMINE (BENADRYL) 25 MG tablet Take 25 mg by mouth     HYDROmorphone (DILAUDID) 2 MG tablet Take 1 tablet (2 mg) by mouth every 4 hours as needed for moderate to severe pain     IBUPROFEN PO Take by mouth as needed for moderate pain Reported on 5/18/2017     levothyroxine (SYNTHROID/LEVOTHROID) 88 MCG tablet Take 1 tablet (88 mcg) by mouth daily     LORazepam (ATIVAN) 0.5 MG tablet Take 1 tablet (0.5 mg) by mouth every 4 hours as needed  (Anxiety, Nausea/Vomiting or Sleep)     metFORMIN (GLUCOPHAGE-XR) 750 MG 24 hr tablet Take 1 tablet (750 mg) by mouth daily (with dinner)     prochlorperazine (COMPAZINE) 10 MG tablet Take 1 tablet (10 mg) by mouth every 6 hours as needed (Nausea/Vomiting)     valACYclovir (VALTREX) 1000 mg tablet Take 1 tablet (1,000 mg) by mouth 3 times daily     blood glucose monitoring (NO BRAND SPECIFIED) test strip Use to test blood sugars twice daily or as directed (fasting, rotate then second time - before lunch, before supper and bedtime) (Patient not taking: Reported on 8/12/2019)     EPINEPHrine (EPIPEN 2-CHARITY) 0.3 MG/0.3ML injection 2-pack Inject 0.3 mLs (0.3 mg) into the muscle once as needed for anaphylaxis (Patient not taking: Reported on 1/14/2020)        7/27/2020 12:54 8/3/2020 13:54 8/21/2020 12:16   Sodium 139 139 138   Potassium 4.4 3.8 4.6   Chloride 108 106 105   Carbon Dioxide 27 24 26   Urea Nitrogen 16 14 19   Creatinine 0.77 0.73 0.82   GFR Estimate >90 >90 >90   GFR Estimate If Black >90 >90 >90   Calcium 8.8 8.8 9.1   Anion Gap 4 9 7   Albumin 4.2 4.3 4.1   Protein Total 7.5 7.5 7.8   Bilirubin Total 0.6 0.6 0.5   Alkaline Phosphatase 66 67 95   ALT 34 28 70   AST 16 16 20   Lactate Dehydrogenase 179 202 211   PSA 27.50 (H) 29.50 (H) 29.90 (H)   TSH   2.68   Glucose 92 81 104 (H)   WBC 4.1 6.2 9.7   Hemoglobin 11.4 (L) 11.3 (L) 12.0 (L)   Hematocrit 34.6 (L) 34.0 (L) 36.2 (L)   Platelet Count 262 270 230   RBC Count 3.67 (L) 3.64 (L) 3.93 (L)   MCV 94 93 92       ASSESSMENT/PLAN:   1. Metastatic prostate cancer with bony metastasis:   Currently on cabazitaxel, PSA dropped today   Denosumab- should be on q3-4 month schedule   HTN and DM TII   ECOG PS 1         #mPC--started Cabazitaxel on 1/14/2020 due to worsening bone scan and rising PSA in early January. Albert has had an excellent response to Jevtana, however he is starting to get progressive fatigue.  The second and third week recovery, the last two  months have not occurred.  Lorrie finds him more sedentary and tired, he admits he has been more crabby as well.  He has noticed slow flow, but no other new concerning symptoms.  He would like a slightly longer cycle 23 or 24 days just to allow his WBC count to be high enough to not need Neulasta and to enjoy a few extra days of the break.  We will get some baseline images now to ensure with the new fatigue we are not missing a neuroendocrine transformation, but unlikely.  We discussed continuing on this until his disease becomes worse and then considering Xtandi/Xofigo in the future.   -OK for Zev on 8/28 (I will call him the next week with images), no neulasta needed     #pain-No constant bone pains at this time, since hes been on chemotherapy. Uses dilaudid for body aches from the OnPro after treatment  #bone-h/o Xgeva, stopped in March of 2019. Taking oral calcium/Vit D. I think an every 3-4 months makes sense. (he would like to come in on a diff day and not do this the same time as chemo)  #endo--dropped to one pill once a day of metformin. BS have been ~100;    #erectile dysfunction- met with Dr Toth in urology, has cialis to try  #genetics- Albert and I discussed a genetic referral, give his age at diagnosis       Anay Dan PA-C          Over 45 min of direct face to face time spent with patient with more than 50% time spent in counseling and coordinating care.

## 2020-08-25 ENCOUNTER — TELEPHONE (OUTPATIENT)
Dept: ONCOLOGY | Facility: CLINIC | Age: 55
End: 2020-08-25

## 2020-08-25 NOTE — TELEPHONE ENCOUNTER
Prior Authorization Retail Medication Request    Medication/Dose:   ICD code (if different than what is on RX):    Previously Tried and Failed:    Rationale:      Insurance Name:  [email or web address]  Insurance ID:  [id number]       Pharmacy Information (if different than what is on RX)  Name:    Phone:

## 2020-08-25 NOTE — TELEPHONE ENCOUNTER
PA Initiation    Medication: Hydromorphone HCI 2mg tabs -   Insurance Company: [employer] - Phone [phone] Fax [phone]  Pharmacy Filling the Rx: [id number] [place], [place] - [address] AT [facility] OF [address]  Filling Pharmacy Phone: [phone]  Filling Pharmacy Fax: [phone]  Start Date: 8/25/2020

## 2020-08-28 ENCOUNTER — APPOINTMENT (OUTPATIENT)
Dept: LAB | Facility: CLINIC | Age: 55
End: 2020-08-28
Attending: INTERNAL MEDICINE
Payer: COMMERCIAL

## 2020-08-28 ENCOUNTER — HOSPITAL ENCOUNTER (OUTPATIENT)
Dept: CT IMAGING | Facility: CLINIC | Age: 55
End: 2020-08-28
Attending: PHYSICIAN ASSISTANT
Payer: COMMERCIAL

## 2020-08-28 ENCOUNTER — INFUSION THERAPY VISIT (OUTPATIENT)
Dept: ONCOLOGY | Facility: CLINIC | Age: 55
End: 2020-08-28
Attending: INTERNAL MEDICINE
Payer: COMMERCIAL

## 2020-08-28 ENCOUNTER — HOSPITAL ENCOUNTER (OUTPATIENT)
Dept: NUCLEAR MEDICINE | Facility: CLINIC | Age: 55
Setting detail: NUCLEAR MEDICINE
End: 2020-08-28
Attending: PHYSICIAN ASSISTANT
Payer: COMMERCIAL

## 2020-08-28 VITALS
HEART RATE: 78 BPM | OXYGEN SATURATION: 97 % | RESPIRATION RATE: 16 BRPM | TEMPERATURE: 99 F | DIASTOLIC BLOOD PRESSURE: 79 MMHG | SYSTOLIC BLOOD PRESSURE: 130 MMHG

## 2020-08-28 DIAGNOSIS — C79.51 BONE METASTASIS: ICD-10-CM

## 2020-08-28 DIAGNOSIS — C61 MALIGNANT NEOPLASM OF PROSTATE (H): ICD-10-CM

## 2020-08-28 DIAGNOSIS — D70.1 CHEMOTHERAPY-INDUCED NEUTROPENIA (H): ICD-10-CM

## 2020-08-28 DIAGNOSIS — T45.1X5A CHEMOTHERAPY-INDUCED NEUTROPENIA (H): ICD-10-CM

## 2020-08-28 DIAGNOSIS — C79.51 BONE METASTASIS: Primary | ICD-10-CM

## 2020-08-28 LAB
ALBUMIN SERPL-MCNC: 4 G/DL (ref 3.4–5)
ALP SERPL-CCNC: 77 U/L (ref 40–150)
ALT SERPL W P-5'-P-CCNC: 34 U/L (ref 0–70)
ANION GAP SERPL CALCULATED.3IONS-SCNC: 6 MMOL/L (ref 3–14)
AST SERPL W P-5'-P-CCNC: 13 U/L (ref 0–45)
BILIRUB SERPL-MCNC: 0.7 MG/DL (ref 0.2–1.3)
BUN SERPL-MCNC: 10 MG/DL (ref 7–30)
CALCIUM SERPL-MCNC: 8.9 MG/DL (ref 8.5–10.1)
CHLORIDE SERPL-SCNC: 109 MMOL/L (ref 94–109)
CO2 SERPL-SCNC: 25 MMOL/L (ref 20–32)
CREAT SERPL-MCNC: 0.79 MG/DL (ref 0.66–1.25)
DIFFERENTIAL METHOD BLD: ABNORMAL
EOSINOPHIL # BLD AUTO: 0.1 10E9/L (ref 0–0.7)
EOSINOPHIL NFR BLD AUTO: 1 %
ERYTHROCYTE [DISTWIDTH] IN BLOOD BY AUTOMATED COUNT: 13.7 % (ref 10–15)
GFR SERPL CREATININE-BSD FRML MDRD: >90 ML/MIN/{1.73_M2}
GLUCOSE SERPL-MCNC: 100 MG/DL (ref 70–99)
HCT VFR BLD AUTO: 35 % (ref 40–53)
HGB BLD-MCNC: 11.2 G/DL (ref 13.3–17.7)
LYMPHOCYTES # BLD AUTO: 2 10E9/L (ref 0.8–5.3)
LYMPHOCYTES NFR BLD AUTO: 17 %
MCH RBC QN AUTO: 30.2 PG (ref 26.5–33)
MCHC RBC AUTO-ENTMCNC: 32 G/DL (ref 31.5–36.5)
MCV RBC AUTO: 94 FL (ref 78–100)
MONOCYTES # BLD AUTO: 1 10E9/L (ref 0–1.3)
MONOCYTES NFR BLD AUTO: 9 %
NEUTROPHILS # BLD AUTO: 8.4 10E9/L (ref 1.6–8.3)
NEUTROPHILS NFR BLD AUTO: 73 %
PLATELET # BLD AUTO: 302 10E9/L (ref 150–450)
POTASSIUM SERPL-SCNC: 4.2 MMOL/L (ref 3.4–5.3)
PROT SERPL-MCNC: 7.1 G/DL (ref 6.8–8.8)
PSA SERPL-MCNC: 28.9 UG/L (ref 0–4)
RBC # BLD AUTO: 3.71 10E12/L (ref 4.4–5.9)
SODIUM SERPL-SCNC: 140 MMOL/L (ref 133–144)
WBC # BLD AUTO: 11.5 10E9/L (ref 4–11)

## 2020-08-28 PROCEDURE — 36415 COLL VENOUS BLD VENIPUNCTURE: CPT | Performed by: PHYSICIAN ASSISTANT

## 2020-08-28 PROCEDURE — 25800030 ZZH RX IP 258 OP 636: Mod: ZF | Performed by: PHYSICIAN ASSISTANT

## 2020-08-28 PROCEDURE — 85025 COMPLETE CBC W/AUTO DIFF WBC: CPT | Performed by: PHYSICIAN ASSISTANT

## 2020-08-28 PROCEDURE — 25000125 ZZHC RX 250: Mod: ZF | Performed by: PHYSICIAN ASSISTANT

## 2020-08-28 PROCEDURE — 71260 CT THORAX DX C+: CPT

## 2020-08-28 PROCEDURE — 84153 ASSAY OF PSA TOTAL: CPT | Performed by: PHYSICIAN ASSISTANT

## 2020-08-28 PROCEDURE — 25000128 H RX IP 250 OP 636: Performed by: STUDENT IN AN ORGANIZED HEALTH CARE EDUCATION/TRAINING PROGRAM

## 2020-08-28 PROCEDURE — A9503 TC99M MEDRONATE: HCPCS | Performed by: PHYSICIAN ASSISTANT

## 2020-08-28 PROCEDURE — 78306 BONE IMAGING WHOLE BODY: CPT

## 2020-08-28 PROCEDURE — 34300033 ZZH RX 343: Performed by: PHYSICIAN ASSISTANT

## 2020-08-28 PROCEDURE — 96375 TX/PRO/DX INJ NEW DRUG ADDON: CPT | Mod: 59

## 2020-08-28 PROCEDURE — 40000556 ZZH STATISTIC PERIPHERAL IV START W US GUIDANCE

## 2020-08-28 PROCEDURE — 25000128 H RX IP 250 OP 636: Mod: JW,ZF | Performed by: PHYSICIAN ASSISTANT

## 2020-08-28 PROCEDURE — 96413 CHEMO IV INFUSION 1 HR: CPT

## 2020-08-28 PROCEDURE — 80053 COMPREHEN METABOLIC PANEL: CPT | Performed by: PHYSICIAN ASSISTANT

## 2020-08-28 RX ORDER — IOPAMIDOL 755 MG/ML
112 INJECTION, SOLUTION INTRAVASCULAR ONCE
Status: COMPLETED | OUTPATIENT
Start: 2020-08-28 | End: 2020-08-28

## 2020-08-28 RX ORDER — TC 99M MEDRONATE 20 MG/10ML
20-30 INJECTION, POWDER, LYOPHILIZED, FOR SOLUTION INTRAVENOUS ONCE
Status: COMPLETED | OUTPATIENT
Start: 2020-08-28 | End: 2020-08-28

## 2020-08-28 RX ADMIN — DEXAMETHASONE SODIUM PHOSPHATE: 10 INJECTION, SOLUTION INTRAMUSCULAR; INTRAVENOUS at 13:56

## 2020-08-28 RX ADMIN — TC 99M MEDRONATE 23.4 MCI.: 20 INJECTION, POWDER, LYOPHILIZED, FOR SOLUTION INTRAVENOUS at 09:00

## 2020-08-28 RX ADMIN — DIPHENHYDRAMINE HYDROCHLORIDE 25 MG: 50 INJECTION, SOLUTION INTRAMUSCULAR; INTRAVENOUS at 13:33

## 2020-08-28 RX ADMIN — IOPAMIDOL 112 ML: 755 INJECTION, SOLUTION INTRAVENOUS at 09:32

## 2020-08-28 RX ADMIN — Medication 20 MG: at 13:32

## 2020-08-28 RX ADMIN — SODIUM CHLORIDE 42 MG: 900 INJECTION, SOLUTION INTRAVENOUS at 14:20

## 2020-08-28 RX ADMIN — SODIUM CHLORIDE 250 ML: 9 INJECTION, SOLUTION INTRAVENOUS at 13:32

## 2020-08-28 ASSESSMENT — PAIN SCALES - GENERAL: PAINLEVEL: NO PAIN (0)

## 2020-08-28 NOTE — TELEPHONE ENCOUNTER
Prior Authorization Approval    Medication: Hydromorphone HCI 2mg tabs - APPROVED was approved on 8/25/2020  Effective: 8/25/2020 to 8/25/2021  Reference #:    Approved Dose/Quantity:   Insurance Company: FP Complete - Phone [phone] Fax [phone]  Expected CoPay:    Pharmacy Filling the Rx: [id number] [place], [place] - [address] AT [facility] [place] [address]  Pharmacy Notified: Yes  Patient Notified: Comment:  **Instructed pharmacy to notify patient when script is ready to /ship.**

## 2020-08-28 NOTE — PROGRESS NOTES
Infusion Nursing Note:  Albert Amaya presents today for Cycle 11, Day 1 Cabazitaxel.    Patient seen by provider today: No   present during visit today: Not Applicable.    Note: Pt assessed upon arrival to infusion suite. Denies fever, chills, SOB, change in cough or other signs/symptoms of infection. No new issues or symptoms to report.     Intravenous Access:  Peripheral IV placed.    Treatment Conditions:  Lab Results   Component Value Date    HGB 11.2 08/28/2020     Lab Results   Component Value Date    WBC 11.5 08/28/2020      Lab Results   Component Value Date    ANEU 8.4 08/28/2020     Lab Results   Component Value Date     08/28/2020      Lab Results   Component Value Date     08/28/2020                   Lab Results   Component Value Date    POTASSIUM 4.2 08/28/2020           Lab Results   Component Value Date    MAG 2.2 12/21/2016            Lab Results   Component Value Date    CR 0.79 08/28/2020                   Lab Results   Component Value Date    WILL 8.9 08/28/2020                Lab Results   Component Value Date    BILITOTAL 0.7 08/28/2020           Lab Results   Component Value Date    ALBUMIN 4.0 08/28/2020                    Lab Results   Component Value Date    ALT 34 08/28/2020           Lab Results   Component Value Date    AST 13 08/28/2020     Results reviewed, labs MET treatment parameters, ok to proceed with treatment.    Post Infusion Assessment:  Patient tolerated infusion without incident.  Blood return noted pre and post infusion.  Site patent and intact, free from redness, edema or discomfort.  No evidence of extravasations.  Access discontinued per protocol.     Discharge Plan:   Prescription refills given for Dexamethasone.  AVS to patient via [employer].  Patient will return 9/21/20 for next appointment.   Patient discharged in stable condition accompanied by: self.  Departure Mode: Ambulatory.    Kimberly Lion RN

## 2020-09-01 DIAGNOSIS — C61 MALIGNANT NEOPLASM OF PROSTATE (H): ICD-10-CM

## 2020-09-01 DIAGNOSIS — T45.1X5A CHEMOTHERAPY-INDUCED NEUTROPENIA (H): Primary | ICD-10-CM

## 2020-09-01 DIAGNOSIS — C79.51 BONE METASTASIS: ICD-10-CM

## 2020-09-01 DIAGNOSIS — D70.1 CHEMOTHERAPY-INDUCED NEUTROPENIA (H): Primary | ICD-10-CM

## 2020-09-01 RX ORDER — NALOXONE HYDROCHLORIDE 0.4 MG/ML
.1-.4 INJECTION, SOLUTION INTRAMUSCULAR; INTRAVENOUS; SUBCUTANEOUS
Status: CANCELLED | OUTPATIENT
Start: 2020-09-25

## 2020-09-01 RX ORDER — LORAZEPAM 2 MG/ML
0.5 INJECTION INTRAMUSCULAR EVERY 4 HOURS PRN
Status: CANCELLED
Start: 2020-09-25

## 2020-09-01 RX ORDER — ALBUTEROL SULFATE 0.83 MG/ML
2.5 SOLUTION RESPIRATORY (INHALATION)
Status: CANCELLED | OUTPATIENT
Start: 2020-09-25

## 2020-09-01 RX ORDER — EPINEPHRINE 1 MG/ML
0.3 INJECTION, SOLUTION INTRAMUSCULAR; SUBCUTANEOUS EVERY 5 MIN PRN
Status: CANCELLED | OUTPATIENT
Start: 2020-09-25

## 2020-09-01 RX ORDER — EPINEPHRINE 0.3 MG/.3ML
0.3 INJECTION SUBCUTANEOUS EVERY 5 MIN PRN
Status: CANCELLED | OUTPATIENT
Start: 2020-09-25

## 2020-09-01 RX ORDER — DIPHENHYDRAMINE HYDROCHLORIDE 50 MG/ML
50 INJECTION INTRAMUSCULAR; INTRAVENOUS
Status: CANCELLED
Start: 2020-09-25

## 2020-09-01 RX ORDER — ALBUTEROL SULFATE 90 UG/1
1-2 AEROSOL, METERED RESPIRATORY (INHALATION)
Status: CANCELLED
Start: 2020-09-25

## 2020-09-01 RX ORDER — SODIUM CHLORIDE 9 MG/ML
1000 INJECTION, SOLUTION INTRAVENOUS CONTINUOUS PRN
Status: CANCELLED
Start: 2020-09-25

## 2020-09-01 RX ORDER — MEPERIDINE HYDROCHLORIDE 25 MG/ML
25 INJECTION INTRAMUSCULAR; INTRAVENOUS; SUBCUTANEOUS EVERY 30 MIN PRN
Status: CANCELLED | OUTPATIENT
Start: 2020-09-25

## 2020-09-01 RX ORDER — METHYLPREDNISOLONE SODIUM SUCCINATE 125 MG/2ML
125 INJECTION, POWDER, LYOPHILIZED, FOR SOLUTION INTRAMUSCULAR; INTRAVENOUS
Status: CANCELLED
Start: 2020-09-25

## 2020-09-01 RX ORDER — LIDOCAINE/PRILOCAINE 2.5 %-2.5%
CREAM (GRAM) TOPICAL ONCE
Status: CANCELLED
Start: 2020-09-25

## 2020-09-06 ENCOUNTER — NURSE TRIAGE (OUTPATIENT)
Dept: NURSING | Facility: CLINIC | Age: 55
End: 2020-09-06

## 2020-09-06 NOTE — TELEPHONE ENCOUNTER
"Patient was bit and stung by bees multiple times almost 2 weeks ago. He received chemo and was given steroid and benadryl with his treatments. Yesterday bee stings/bite marks \"are coming back\" His right ear is red and swollen. This was the same ear he was stung by a bee. In the last 30 minutes his hearing in his right ear is gone. No complaints of tongue swelling or difficulty breathing.     Advised per protocol to see a physician within 4 hours. Protocol and care advice reviewed.     Annamarie Chen RN/[facility] Nurse Advisors      COVID 19 Nurse Triage Plan/Patient Instructions    Please be aware that novel coronavirus (COVID-19) may be circulating in the community. If you develop symptoms such as fever, cough, or SOB or if you have concerns about the presence of another infection including coronavirus (COVID-19), please contact your health care provider or visit [email or web address].     Disposition/Instructions    In-Person Visit with provider recommended. Reference Visit Selection Guide.    Thank you for taking steps to prevent the spread of this virus.  o Limit your contact with others.  o Wear a simple mask to cover your cough.  o Wash your hands well and often.    Resources    [facility]: About COVID-19: [email or web address]    CDC: What to Do If You're Sick: www.cdc.gov/coronavirus/2019-ncov/about/steps-when-sick.html    CDC: Ending Home Isolation: www.cdc.gov/coronavirus/2019-ncov/hcp/disposition-in-home-patients.html     CDC: Caring for Someone: www.cdc.gov/coronavirus/2019-ncov/if-you-are-sick/care-for-someone.html     [facility]: Interim Guidance for Hospital Discharge to Home: www.health.[facility].[place].us/diseases/coronavirus/hcp/hospdischarge.pdf    [facility] clinical trials (COVID-19 research studies): clinicalaffairs.[facility]/umn-clinical-trials     Below are the COVID-19 hotlines at the [place] Department of Health ([facility]). Interpreters are available.   o For health " questions: Call 396-635-6306 or 1-151.293.2196 (7 a.m. to 7 p.m.)  o For questions about schools and childcare: Call 703-590-9208 or 1-327.120.2711 (7 a.m. to 7 p.m.)     Additional Information    Negative: Patient sounds very sick or weak to the triager    Negative: [1] Ringing in the ears (tinnitus) AND [2] taking aspirin AND [3] dosage sounds high (i.e., > 1500 mg/day)    [1] Hearing loss in one or both ears AND [2] sudden onset AND [3] present now    Protocols used: HEARING LOSS OR CHANGE-A-AH

## 2020-09-08 ENCOUNTER — NURSE TRIAGE (OUTPATIENT)
Dept: NURSING | Facility: CLINIC | Age: 55
End: 2020-09-08

## 2020-09-08 ENCOUNTER — TELEPHONE (OUTPATIENT)
Dept: ONCOLOGY | Facility: CLINIC | Age: 55
End: 2020-09-08

## 2020-09-08 RX ORDER — TRIAMCINOLONE ACETONIDE 1 MG/G
CREAM TOPICAL 2 TIMES DAILY
Qty: 30 G | Refills: 0 | Status: SHIPPED | OUTPATIENT
Start: 2020-09-08

## 2020-09-08 NOTE — TELEPHONE ENCOUNTER
I received a triage call from Zee in [facility] oncology service line triage [phone].  I called Albert back and spoke with his wife Lorrie who is a nurse.  Albert is a 54-year-old man with metastatic prostate cancer with bone metastases most recently treated with cabazitaxel since January.  He received his last dose on August 28.  He suffered multiple bee stings August 26 on his right ear right elbow left thigh and ankle and had significant swelling and some lip swelling but no stridor or shortness of breath.  His wife reports that he has persistent swelling at the sites of the bee stings.  He did receive Benadryl and dexamethasone at the time of his last cabazitaxel treatment August 28, according to his wife.  He did not receive Neulasta after the chemotherapy because his neutrophil count was adequate.  This evening he has some flushing particularly over the sites of the bee stings.  None of the bee stings are in his face.  He has no stridor his wife took his blood pressure which was 140/98 which is high for him.  She wanted to know whether to bring him to the emergency room.  He does appear to be hemodynamically stable at this time and has no breathing problems or stridor.    I discussed that the most likely problem is continued inflammation at the sites of the bee stings and that given that he is stable this evening it be reasonable to try topical steroids to treat this local regional skin problem.  I prescribed triamcinolone cream 0.1% 2 times daily, not to be applied to the face and recommended that they call tomorrow to make an appointment to be seen by an CHARLA or Dr. Valladares in clinic. I did not recommend systemic steroids.  All of Lorrie's questions were answered.  I will send a note to Dr. Edwards and to Melanie Dan..    Papo Butcher MD

## 2020-09-08 NOTE — TELEPHONE ENCOUNTER
About 2 weeks ago he was attacked by ground bees and he was stung about 8 times and they had to go to the ED because the toxins keep flaring and not clearing his system due to the Chemo.  He had 10 mg of steroids on Sunday, and has been taking benadryl ever since. They are requesting to talk to Anay or Dr Valladares. They are gone from the clinic. Page out to oncology Dr Butcher at 5:35 Dr Butcher called back at 5:40, report given, he will call wife and talk with both of them.    Zee White RN/ [facility] Nurse Ad[facility]        Additional Information    Health Information question, no triage required and triager able to answer question    Protocols used: INFORMATION ONLY CALL-A-AH

## 2020-09-09 ENCOUNTER — PATIENT OUTREACH (OUTPATIENT)
Dept: ONCOLOGY | Facility: CLINIC | Age: 55
End: 2020-09-09

## 2020-09-09 NOTE — PROGRESS NOTES
Oncology RN Care Coordination Note:     Per Dr. Valladares, he doesn't think this is related to his cabazitaxel, he advised Albert continue to use oral steroids and Benadryl as needed and to monitor the sting sites.  Albert will see Melanie Dan PA-C prior to his infusion on 9/21/2020.      I relayed this information to Lorrie, she will keep us updated if there are any changes in his condition or the bee sting sites.     Shirin Lagunas, RN BSN   [facility]  Nurse Coordinator

## 2020-09-09 NOTE — PROGRESS NOTES
Oncology RN Care Coordination Note:     Patient and his wife spoke with the on-call triage line last night and spoke with Dr. Butcher the on-call provider as well.  Albert was stung by osvaldo 2 weeks ago and is still dealing with some trouble from this.  Melanie Dan PA-C asked if I could call and follow up with Albert and Lorrie.     I attempted to reach Albert first, it went to his voicemail.  Attempted to reach Lorrie, it went to her voicemail.  I advised she call the clinic and speak with triage to determine next steps.    Shirin Lagunas, RN BSN   [facility]  Nurse Coordinator

## 2020-09-18 ENCOUNTER — MYC REFILL (OUTPATIENT)
Dept: OTHER | Age: 55
End: 2020-09-18

## 2020-09-18 DIAGNOSIS — G62.9 NEUROPATHY: ICD-10-CM

## 2020-09-18 DIAGNOSIS — E03.4 HYPOTHYROIDISM DUE TO ACQUIRED ATROPHY OF THYROID: ICD-10-CM

## 2020-09-18 DIAGNOSIS — R61 NIGHT SWEATS: ICD-10-CM

## 2020-09-18 DIAGNOSIS — C61 MALIGNANT NEOPLASM OF PROSTATE (H): ICD-10-CM

## 2020-09-18 DIAGNOSIS — C79.51 BONE METASTASIS: ICD-10-CM

## 2020-09-18 DIAGNOSIS — E03.9 HYPOTHYROIDISM, UNSPECIFIED TYPE: ICD-10-CM

## 2020-09-21 DIAGNOSIS — C79.51 BONE METASTASIS: ICD-10-CM

## 2020-09-21 DIAGNOSIS — R61 NIGHT SWEATS: ICD-10-CM

## 2020-09-21 DIAGNOSIS — E03.4 HYPOTHYROIDISM DUE TO ACQUIRED ATROPHY OF THYROID: ICD-10-CM

## 2020-09-21 DIAGNOSIS — G62.9 NEUROPATHY: ICD-10-CM

## 2020-09-21 DIAGNOSIS — C61 MALIGNANT NEOPLASM OF PROSTATE (H): ICD-10-CM

## 2020-09-21 DIAGNOSIS — E03.9 HYPOTHYROIDISM, UNSPECIFIED TYPE: ICD-10-CM

## 2020-09-21 RX ORDER — METFORMIN HYDROCHLORIDE 750 MG/1
750 TABLET, EXTENDED RELEASE ORAL
Qty: 180 TABLET | Refills: 3 | Status: SHIPPED | OUTPATIENT
Start: 2020-09-21 | End: 2021-01-01

## 2020-09-21 NOTE — TELEPHONE ENCOUNTER
Last filled 1/30/2019 by Dr. Valladares for one year, updated 2/4/2020 as historical. Unsure who has filled medication this year, per note was Endo managing, not in care everywhere.    Last OV note 8/24: #endo--dropped to one pill once a day of metformin. BS have been ~100;

## 2020-09-22 RX ORDER — METFORMIN HYDROCHLORIDE 750 MG/1
TABLET, EXTENDED RELEASE ORAL
Qty: 180 TABLET | Refills: 3 | OUTPATIENT
Start: 2020-09-22

## 2020-09-25 ENCOUNTER — APPOINTMENT (OUTPATIENT)
Dept: LAB | Facility: CLINIC | Age: 55
End: 2020-09-25
Attending: PHYSICIAN ASSISTANT
Payer: COMMERCIAL

## 2020-09-25 ENCOUNTER — INFUSION THERAPY VISIT (OUTPATIENT)
Dept: ONCOLOGY | Facility: CLINIC | Age: 55
End: 2020-09-25
Attending: PHYSICIAN ASSISTANT
Payer: COMMERCIAL

## 2020-09-25 VITALS
SYSTOLIC BLOOD PRESSURE: 142 MMHG | TEMPERATURE: 98.3 F | BODY MASS INDEX: 28.27 KG/M2 | WEIGHT: 185.9 LBS | DIASTOLIC BLOOD PRESSURE: 91 MMHG | HEART RATE: 61 BPM | OXYGEN SATURATION: 97 % | RESPIRATION RATE: 16 BRPM

## 2020-09-25 DIAGNOSIS — C79.51 BONE METASTASIS: Primary | ICD-10-CM

## 2020-09-25 DIAGNOSIS — R53.0 NEOPLASTIC MALIGNANT RELATED FATIGUE: Primary | ICD-10-CM

## 2020-09-25 DIAGNOSIS — C61 MALIGNANT NEOPLASM OF PROSTATE (H): ICD-10-CM

## 2020-09-25 DIAGNOSIS — T45.1X5A CHEMOTHERAPY-INDUCED NEUTROPENIA (H): ICD-10-CM

## 2020-09-25 DIAGNOSIS — I10 ESSENTIAL HYPERTENSION: ICD-10-CM

## 2020-09-25 DIAGNOSIS — D70.1 CHEMOTHERAPY-INDUCED NEUTROPENIA (H): ICD-10-CM

## 2020-09-25 LAB
ALBUMIN SERPL-MCNC: 3.9 G/DL (ref 3.4–5)
ALP SERPL-CCNC: 52 U/L (ref 40–150)
ALT SERPL W P-5'-P-CCNC: 26 U/L (ref 0–70)
ANION GAP SERPL CALCULATED.3IONS-SCNC: 6 MMOL/L (ref 3–14)
AST SERPL W P-5'-P-CCNC: 13 U/L (ref 0–45)
BASOPHILS # BLD AUTO: 0 10E9/L (ref 0–0.2)
BASOPHILS NFR BLD AUTO: 0.8 %
BILIRUB SERPL-MCNC: 0.6 MG/DL (ref 0.2–1.3)
BUN SERPL-MCNC: 14 MG/DL (ref 7–30)
CALCIUM SERPL-MCNC: 9.2 MG/DL (ref 8.5–10.1)
CHLORIDE SERPL-SCNC: 109 MMOL/L (ref 94–109)
CO2 SERPL-SCNC: 27 MMOL/L (ref 20–32)
CREAT SERPL-MCNC: 0.82 MG/DL (ref 0.66–1.25)
DIFFERENTIAL METHOD BLD: ABNORMAL
EOSINOPHIL # BLD AUTO: 0.1 10E9/L (ref 0–0.7)
EOSINOPHIL NFR BLD AUTO: 2.3 %
ERYTHROCYTE [DISTWIDTH] IN BLOOD BY AUTOMATED COUNT: 14 % (ref 10–15)
GFR SERPL CREATININE-BSD FRML MDRD: >90 ML/MIN/{1.73_M2}
GLUCOSE SERPL-MCNC: 109 MG/DL (ref 70–99)
HCT VFR BLD AUTO: 34.8 % (ref 40–53)
HGB BLD-MCNC: 11.4 G/DL (ref 13.3–17.7)
IMM GRANULOCYTES # BLD: 0 10E9/L (ref 0–0.4)
IMM GRANULOCYTES NFR BLD: 0.8 %
LYMPHOCYTES # BLD AUTO: 1.9 10E9/L (ref 0.8–5.3)
LYMPHOCYTES NFR BLD AUTO: 35.5 %
MCH RBC QN AUTO: 30.1 PG (ref 26.5–33)
MCHC RBC AUTO-ENTMCNC: 32.8 G/DL (ref 31.5–36.5)
MCV RBC AUTO: 92 FL (ref 78–100)
MONOCYTES # BLD AUTO: 0.3 10E9/L (ref 0–1.3)
MONOCYTES NFR BLD AUTO: 6.5 %
NEUTROPHILS # BLD AUTO: 2.8 10E9/L (ref 1.6–8.3)
NEUTROPHILS NFR BLD AUTO: 54.1 %
NRBC # BLD AUTO: 0 10*3/UL
NRBC BLD AUTO-RTO: 0 /100
PLATELET # BLD AUTO: 270 10E9/L (ref 150–450)
POTASSIUM SERPL-SCNC: 4 MMOL/L (ref 3.4–5.3)
PROT SERPL-MCNC: 7.2 G/DL (ref 6.8–8.8)
PSA SERPL-MCNC: 33.8 UG/L (ref 0–4)
RBC # BLD AUTO: 3.79 10E12/L (ref 4.4–5.9)
SODIUM SERPL-SCNC: 142 MMOL/L (ref 133–144)
WBC # BLD AUTO: 5.2 10E9/L (ref 4–11)

## 2020-09-25 PROCEDURE — 96375 TX/PRO/DX INJ NEW DRUG ADDON: CPT

## 2020-09-25 PROCEDURE — 85025 COMPLETE CBC W/AUTO DIFF WBC: CPT | Performed by: PHYSICIAN ASSISTANT

## 2020-09-25 PROCEDURE — 25800030 ZZH RX IP 258 OP 636: Mod: ZF | Performed by: PHYSICIAN ASSISTANT

## 2020-09-25 PROCEDURE — 25000128 H RX IP 250 OP 636: Mod: ZF | Performed by: PHYSICIAN ASSISTANT

## 2020-09-25 PROCEDURE — 99214 OFFICE O/P EST MOD 30 MIN: CPT | Mod: ZP | Performed by: PHYSICIAN ASSISTANT

## 2020-09-25 PROCEDURE — 84153 ASSAY OF PSA TOTAL: CPT | Performed by: PHYSICIAN ASSISTANT

## 2020-09-25 PROCEDURE — 25000125 ZZHC RX 250: Mod: ZF | Performed by: PHYSICIAN ASSISTANT

## 2020-09-25 PROCEDURE — 80053 COMPREHEN METABOLIC PANEL: CPT | Performed by: PHYSICIAN ASSISTANT

## 2020-09-25 PROCEDURE — 96413 CHEMO IV INFUSION 1 HR: CPT

## 2020-09-25 PROCEDURE — 96367 TX/PROPH/DG ADDL SEQ IV INF: CPT

## 2020-09-25 RX ORDER — METHYLPHENIDATE HYDROCHLORIDE 5 MG/1
5 TABLET ORAL 2 TIMES DAILY
Qty: 30 TABLET | Refills: 0 | Status: SHIPPED | OUTPATIENT
Start: 2020-09-25 | End: 2021-01-01

## 2020-09-25 RX ORDER — AMLODIPINE BESYLATE 5 MG/1
5 TABLET ORAL DAILY
Qty: 90 TABLET | Refills: 1 | Status: SHIPPED | OUTPATIENT
Start: 2020-09-25 | End: 2021-01-01

## 2020-09-25 RX ORDER — EPINEPHRINE 0.3 MG/.3ML
0.3 INJECTION SUBCUTANEOUS
Qty: 0.6 ML | Refills: 1 | Status: SHIPPED | OUTPATIENT
Start: 2020-09-25

## 2020-09-25 RX ADMIN — DEXAMETHASONE SODIUM PHOSPHATE: 10 INJECTION, SOLUTION INTRAMUSCULAR; INTRAVENOUS at 12:29

## 2020-09-25 RX ADMIN — SODIUM CHLORIDE 250 ML: 9 INJECTION, SOLUTION INTRAVENOUS at 12:10

## 2020-09-25 RX ADMIN — SODIUM CHLORIDE 42 MG: 900 INJECTION, SOLUTION INTRAVENOUS at 12:47

## 2020-09-25 RX ADMIN — DIPHENHYDRAMINE HYDROCHLORIDE 25 MG: 50 INJECTION INTRAMUSCULAR; INTRAVENOUS at 12:13

## 2020-09-25 RX ADMIN — FAMOTIDINE 20 MG: 10 INJECTION INTRAVENOUS at 12:10

## 2020-09-25 ASSESSMENT — PAIN SCALES - GENERAL: PAINLEVEL: NO PAIN (0)

## 2020-09-25 NOTE — PATIENT INSTRUCTIONS
Contact Numbers  [facility]: [phone] (for symptom and scheduling needs)    Please call the [facility] Triage line if you experience a temperature greater than or equal to 100.4, shaking chills, have uncontrolled nausea, vomiting and/or diarrhea, dizziness, shortness of breath, chest pain, bleeding, unexplained bruising, or if you have any other new/concerning symptoms, questions or concerns.     If you are having any concerning symptoms or wish to speak to a provider before your next infusion visit, please call your care coordinator or triage to notify them so we can adequately serve you.     If you need a refill on a narcotic prescription or other medication, please call triage before your infusion appointment.          September 2020 Sunday Monday Tuesday Wednesday Thursday Friday Saturday             1     2     3     4     5       6     7     8     9     10     11     12       13     14     15     16     17     18     19       20     21     22     23     24     25    [facility] DRAW   9:45 AM   (15 min.)   [facility]   [facility] Lab Draw    [facility] ONC INFUSION 120  10:00 AM   (120 min.)    ONCOLOGY INFUSION   [facility]    [facility] RETURN  10:05 AM   (50 min.)   Anay Dan PA-C   [facility] 26       27     28     29     30                                October 2020 Sunday Monday Tuesday Wednesday Thursday Friday Saturday                       1     2     3       4     5     6     7     8     9     10       11     12     13     14     15     16    [facility] DRAW   9:45 AM   (15 min.)   [facility]   [facility] Lab Draw    [facility] RETURN  10:05 AM   (50 min.)   Anay Dan PA-C   [facility]  11:00 AM   (120 min.)    ONCOLOGY INFUSION   [facility] 17       18     19     20     21     22     23     24       25     26     27     28     29     30      31                     Lab Results:  Recent Results (from the past 12 hour(s))   CBC with platelets differential    Collection Time: 09/25/20 10:11 AM   Result Value Ref Range    WBC 5.2 4.0 - 11.0 10e9/L    RBC Count 3.79 (L) 4.4 - 5.9 10e12/L    Hemoglobin 11.4 (L) 13.3 - 17.7 g/dL    Hematocrit 34.8 (L) 40.0 - 53.0 %    MCV 92 78 - 100 fl    MCH 30.1 26.5 - 33.0 pg    MCHC 32.8 31.5 - 36.5 g/dL    RDW 14.0 10.0 - 15.0 %    Platelet Count 270 150 - 450 10e9/L    Diff Method Automated Method     % Neutrophils 54.1 %    % Lymphocytes 35.5 %    % Monocytes 6.5 %    % Eosinophils 2.3 %    % Basophils 0.8 %    % Immature Granulocytes 0.8 %    Nucleated RBCs 0 0 /100    Absolute Neutrophil 2.8 1.6 - 8.3 10e9/L    Absolute Lymphocytes 1.9 0.8 - 5.3 10e9/L    Absolute Monocytes 0.3 0.0 - 1.3 10e9/L    Absolute Eosinophils 0.1 0.0 - 0.7 10e9/L    Absolute Basophils 0.0 0.0 - 0.2 10e9/L    Abs Immature Granulocytes 0.0 0 - 0.4 10e9/L    Absolute Nucleated RBC 0.0    Comprehensive metabolic panel    Collection Time: 09/25/20 10:11 AM   Result Value Ref Range    Sodium 142 133 - 144 mmol/L    Potassium 4.0 3.4 - 5.3 mmol/L    Chloride 109 94 - 109 mmol/L    Carbon Dioxide 27 20 - 32 mmol/L    Anion Gap 6 3 - 14 mmol/L    Glucose 109 (H) 70 - 99 mg/dL    Urea Nitrogen 14 7 - 30 mg/dL    Creatinine 0.82 0.66 - 1.25 mg/dL    GFR Estimate >90 >60 mL/min/[1.73_m2]    GFR Estimate If Black >90 >60 mL/min/[1.73_m2]    Calcium 9.2 8.5 - 10.1 mg/dL    Bilirubin Total 0.6 0.2 - 1.3 mg/dL    Albumin 3.9 3.4 - 5.0 g/dL    Protein Total 7.2 6.8 - 8.8 g/dL    Alkaline Phosphatase 52 40 - 150 U/L    ALT 26 0 - 70 U/L    AST 13 0 - 45 U/L

## 2020-09-25 NOTE — PROGRESS NOTES
Infusion Nursing Note:  Albert Amaya presents today for Cycle 12 Day 1 Cabazitaxel.    Patient seen by provider today: Yes: ELVIRA Jara    present during visit today: Not Applicable.    Note: Patient arrives to infusion today feeling well. Patient denies any new concerns or complaints today. He states that his swelling from his bee stings a few weeks ago is resolved. Patient assessed by ELVIRA Jara during infusion appointment today.    Intravenous Access:  Peripheral IV placed.    Treatment Conditions:  Lab Results   Component Value Date    HGB 11.4 09/25/2020     Lab Results   Component Value Date    WBC 5.2 09/25/2020      Lab Results   Component Value Date    ANEU 2.8 09/25/2020     Lab Results   Component Value Date     09/25/2020      Lab Results   Component Value Date     09/25/2020                   Lab Results   Component Value Date    POTASSIUM 4.0 09/25/2020           Lab Results   Component Value Date    MAG 2.2 12/21/2016            Lab Results   Component Value Date    CR 0.82 09/25/2020                   Lab Results   Component Value Date    WILL 9.2 09/25/2020                Lab Results   Component Value Date    BILITOTAL 0.6 09/25/2020           Lab Results   Component Value Date    ALBUMIN 3.9 09/25/2020                    Lab Results   Component Value Date    ALT 26 09/25/2020           Lab Results   Component Value Date    AST 13 09/25/2020       Results reviewed, labs MET treatment parameters, ok to proceed with treatment.    Post Infusion Assessment:  Patient tolerated infusion without incident.  Blood return noted pre and post infusion.  Site patent and intact, free from redness, edema or discomfort.  No evidence of extravasations.  Access discontinued per protocol.     Discharge Plan:   Prescription refills given for Amlodipine.  Discharge instructions reviewed with: Patient.  Patient and/or family verbalized understanding of discharge instructions and  all questions answered.  Copy of AVS reviewed with patient and/or family.  Patient will return 10/16 for next appointment.  Patient discharged in stable condition accompanied by: self.  Departure Mode: Ambulatory.    Belgica Lemus RN

## 2020-09-25 NOTE — PROGRESS NOTES
Sep 25, 2020          REASON FOR VISIT: mPC, here for chemo assessment     Oncology Treatment History:   Metastatic Prostate CA treated   - s/p 6 cycles of taxotere 75mg/m2   - s/p orchiectomy on 3/18/2016   - s/p Provenge 5/13, 5/27, 6/10   - s/p Casodex 50 mg daily March/April   - s/p Zytiga 5/3/2017   - started cabazitaxel 1/14/2020      HPI: Albert is a 54 year old gentleman with metastatic prostate cancer. Albert felt a cramp in his gluteus muscle and could barely walk after doing some remodeling at home and unloading heavy truck at work. He tried flexeril and prednisone initially but eventually presented to ED on 10/5/15. He feels that initially he was nearly labeled as drug seeker since he was in lot of discomfort and flexeril was not working. But during course of ED visits labs were drawn and he had marked elevation in Alk Phosphatase (over 1000). CT did suggest some ileus with some sclerotic bone lesions. He was admitted to the hospital. His PSA was checked and was markedly elevated at 5760 (10/6/15), repeat value 5563. He did follow up with Dr. Freire and had transrectal US guided biopsy of prostate on 10/8/15. They have the results through my chart which confirms prostate adenocarcinoma - enrico 4+3 involving majority (60-90%) portion of every single core. He started on taxotere on 10/23 and completed 6 cycles of therapy in 2/2016. He then underwent orchiectomy on 3/18/2016.   Throughout spring of 2016 Albert's PSA started to progress and after three elevations there was concern about him being hormone refractory. He was started on Provenge and enrolled in the trial including Indoximod. PSA trending up and started on Casodex mid March with progression. Switched to Zytiga 5/3/17. He held his abiraterone in July 2019 with rising PSA and fatigue. He had a decline in his PSA after holding his abiraterone. This has been on hold and he is being followed without any additional intervention. He had orchiectomy done  previously and does not need ADT. In January, his PSA was increasing and Dr Valladares started cabazitaxel.      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  They had a few concerns- Albert is getting more fatigued with chemo.  He is still able to work full time, but overall doing less physical work at home and taking more naps. The last few cycles have been very notable that he gets more fatigued from physical activity.  He feels cranky and beaver at times as well, admits when he's tired this is worse.  The neulasta gives him bone aches and a flu like feeling for days 2-5. Its been better when we have held this. He will occasionally use compazine as well.  During these few days he is able to get out and do things out of the house for a couple hours, but often will be sedentary and napping during this time frame.  The second and third week he feels better, but the last two cycles, this didn't really give him a break.  He has noticed a slow flow with urination, but no other urinary symptoms. He had a bee sting this last cycle, which really complicated this cycle.  His right ear and left leg intermittently were flaring the entire month.  He had one week off this week just to try and get over this aggressive reaction.         MEDS:           Current Outpatient Medications   Medication Sig     Acetaminophen (TYLENOL PO) Take 325 mg by mouth every 8 hours as needed for mild pain or fever Reported on 5/18/2017     amLODIPine (NORVASC) 5 MG tablet TAKE ONE TABLET BY MOUTH EVERY DAY     blood glucose monitoring (ACCU-CHEK MULTICLIX) lancets Use to test blood sugar twice times daily or as directed.     diphenhydrAMINE (BENADRYL) 25 MG tablet Take 25 mg by mouth     HYDROmorphone (DILAUDID) 2 MG tablet Take 1 tablet (2 mg) by mouth every 4 hours as needed for moderate to severe pain     IBUPROFEN PO Take by mouth as needed for moderate pain  Reported on 5/18/2017     levothyroxine (SYNTHROID/LEVOTHROID) 88 MCG tablet Take 1 tablet (88 mcg) by mouth daily     LORazepam (ATIVAN) 0.5 MG tablet Take 1 tablet (0.5 mg) by mouth every 4 hours as needed (Anxiety, Nausea/Vomiting or Sleep)     metFORMIN (GLUCOPHAGE-XR) 750 MG 24 hr tablet Take 1 tablet (750 mg) by mouth daily (with dinner)     prochlorperazine (COMPAZINE) 10 MG tablet Take 1 tablet (10 mg) by mouth every 6 hours as needed (Nausea/Vomiting)     valACYclovir (VALTREX) 1000 mg tablet Take 1 tablet (1,000 mg) by mouth 3 times daily     blood glucose monitoring (NO BRAND SPECIFIED) test strip Use to test blood sugars twice daily or as directed (fasting, rotate then second time - before lunch, before supper and bedtime) (Patient not taking: Reported on 8/12/2019)     EPINEPHrine (EPIPEN 2-CHARITY) 0.3 MG/0.3ML injection 2-pack Inject 0.3 mLs (0.3 mg) into the muscle once as needed for anaphylaxis (Patient not taking: Reported on 1/14/2020)    EXAM: BP (!) 142/91   Pulse 61   Temp 98.3  F (36.8  C) (Oral)   Resp 16   Wt 84.3 kg (185 lb 14.4 oz)   SpO2 97%   BMI 28.27 kg/m    Wt Readings from Last 4 Encounters:   09/25/20 84.3 kg (185 lb 14.4 oz)   08/24/20 83.3 kg (183 lb 9.6 oz)   08/03/20 82.7 kg (182 lb 6.4 oz)   07/27/20 83.2 kg (183 lb 6.4 oz)     Vital signs were reviewed.   Patient alert and oriented x3.   PERRLA. EOMI. No scleral icterus noted. OP without thrush/sores.  Neck exam: No palpable cervical, supraclavicular or axillary nodes bilaterally.   Heart: RRR no murmurs noted.   Lungs: clear to auscultation bilaterally.  No crackles or wheezing.   Abd: positive bowel sounds in all four quadrants.  No tenderness to palpation.  No hepatomegaly.   Extremities: No lower extremity edema.   Neuro: grossly intact.   Mood and affect is stable.   Skin: right ear shows a few scabs, no swelling.  Left leg shows scabs from bee sting, but no infection/inflammation noted today      9/25/2020 10:11    Sodium 142   Potassium 4.0   Chloride 109   Carbon Dioxide 27   Urea Nitrogen 14   Creatinine 0.82   GFR Estimate >90   GFR Estimate If Black >90   Calcium 9.2   Anion Gap 6   Albumin 3.9   Protein Total 7.2   Bilirubin Total 0.6   Alkaline Phosphatase 52   ALT 26   AST 13   PSA 33.80 (H)   Glucose 109 (H)   WBC 5.2   Hemoglobin 11.4 (L)   Hematocrit 34.8 (L)   Platelet Count 270   RBC Count 3.79 (L)   MCV 92        8/3/2020 13:54 8/21/2020 12:16 8/28/2020 08:45 9/25/2020 10:11   PSA 29.50 (H) 29.90 (H) 28.90 (H) 33.80 (H)      ASSESSMENT/PLAN:   1. Metastatic prostate cancer with bony metastasis:   Currently on cabazitaxel, PSA stable  Denosumab- should be on q3-4 month schedule   HTN and DM TII   ECOG PS 1         #mPC--started Cabazitaxel on 1/14/2020 due to worsening bone scan and rising PSA in early January. Albert has had an excellent response to Jevtana, however he is starting to get progressive fatigue.  The second and third week recovery, the last two months have not occurred.  Lorrie finds him more sedentary and tired, he admits he has been more crabby as well.  We did obtain new imaging in August, just to ensure we weren't missing a neuroendocrine transformation, or progressive disease, but they showed improved disease.  Albert and I discussed that at this time he can choose to back off on the chemotherapy but ultimately, its what is controlling his disease.  It makes better sense to try and manage the fatigue.  We reviewed trying Ritalin for chemo/cancer induced fatigue.  They were willing to try.  We discussed continuing on this until his disease becomes worse and then considering Xtandi/Xofigo in the future.   -OK for Jevtana, no neulasta needed     #pain-No constant bone pains at this time, since hes been on chemotherapy. Uses dilaudid for body aches from the OnPro after treatment  #bone-h/o Xgeva, stopped in March of 2019. Taking oral calcium/Vit D. I think an every 3-4 months makes sense. (he would  like to come in on a diff day and not do this the same time as chemo)  #endo--dropped to one pill once a day of metformin. BS have been ~100;    #erectile dysfunction- met with Dr Toth in urology, has cialis to try  #genetics- Albert and I discussed a genetic referral, give his age at diagnosis, not discussed again today        Anay Dan PA-C       Over 35 min of direct face to face time spent with patient with more than 50% time spent in counseling and coordinating care.

## 2020-09-28 ENCOUNTER — TELEPHONE (OUTPATIENT)
Dept: ONCOLOGY | Facility: CLINIC | Age: 55
End: 2020-09-28

## 2020-09-28 NOTE — TELEPHONE ENCOUNTER
Call received from patient's spouse, Lorrie. She states [facility] has not yet received Metformin script signed on 9/21/20. Chart reviewed, script signed as local print. Rx called in per order to [facility] and spouse notified.

## 2020-10-19 ENCOUNTER — HOSPITAL ENCOUNTER (OUTPATIENT)
Facility: CLINIC | Age: 55
Setting detail: SPECIMEN
Discharge: HOME | End: 2020-10-19
Admitting: PHYSICIAN ASSISTANT
Payer: COMMERCIAL

## 2020-10-19 ENCOUNTER — APPOINTMENT (OUTPATIENT)
Dept: LAB | Facility: CLINIC | Age: 55
End: 2020-10-19
Attending: INTERNAL MEDICINE
Payer: COMMERCIAL

## 2020-10-19 DIAGNOSIS — C61 MALIGNANT NEOPLASM OF PROSTATE (H): Primary | ICD-10-CM

## 2020-10-19 LAB
ALBUMIN SERPL-MCNC: 4 G/DL (ref 3.4–5)
ALP SERPL-CCNC: 52 U/L (ref 40–150)
ALT SERPL W P-5'-P-CCNC: 26 U/L (ref 0–70)
ANION GAP SERPL CALCULATED.3IONS-SCNC: 7 MMOL/L (ref 3–14)
AST SERPL W P-5'-P-CCNC: 11 U/L (ref 0–45)
BASOPHILS # BLD AUTO: 0 10E9/L (ref 0–0.2)
BASOPHILS NFR BLD AUTO: 0.7 %
BILIRUB SERPL-MCNC: 0.6 MG/DL (ref 0.2–1.3)
BUN SERPL-MCNC: 15 MG/DL (ref 7–30)
CALCIUM SERPL-MCNC: 8.9 MG/DL (ref 8.5–10.1)
CHLORIDE SERPL-SCNC: 109 MMOL/L (ref 94–109)
CO2 SERPL-SCNC: 26 MMOL/L (ref 20–32)
CREAT SERPL-MCNC: 0.83 MG/DL (ref 0.66–1.25)
DIFFERENTIAL METHOD BLD: ABNORMAL
EOSINOPHIL # BLD AUTO: 0.1 10E9/L (ref 0–0.7)
EOSINOPHIL NFR BLD AUTO: 1.5 %
ERYTHROCYTE [DISTWIDTH] IN BLOOD BY AUTOMATED COUNT: 14.6 % (ref 10–15)
GFR SERPL CREATININE-BSD FRML MDRD: >90 ML/MIN/{1.73_M2}
GLUCOSE SERPL-MCNC: 145 MG/DL (ref 70–99)
HCT VFR BLD AUTO: 35.4 % (ref 40–53)
HGB BLD-MCNC: 11.5 G/DL (ref 13.3–17.7)
IMM GRANULOCYTES # BLD: 0 10E9/L (ref 0–0.4)
IMM GRANULOCYTES NFR BLD: 0.4 %
LYMPHOCYTES # BLD AUTO: 2.1 10E9/L (ref 0.8–5.3)
LYMPHOCYTES NFR BLD AUTO: 39.4 %
MCH RBC QN AUTO: 29.6 PG (ref 26.5–33)
MCHC RBC AUTO-ENTMCNC: 32.5 G/DL (ref 31.5–36.5)
MCV RBC AUTO: 91 FL (ref 78–100)
MONOCYTES # BLD AUTO: 0.4 10E9/L (ref 0–1.3)
MONOCYTES NFR BLD AUTO: 7.4 %
NEUTROPHILS # BLD AUTO: 2.7 10E9/L (ref 1.6–8.3)
NEUTROPHILS NFR BLD AUTO: 50.6 %
NRBC # BLD AUTO: 0 10*3/UL
NRBC BLD AUTO-RTO: 0 /100
PLATELET # BLD AUTO: 292 10E9/L (ref 150–450)
POTASSIUM SERPL-SCNC: 4.2 MMOL/L (ref 3.4–5.3)
PROT SERPL-MCNC: 7.4 G/DL (ref 6.8–8.8)
PSA SERPL-MCNC: 33.5 UG/L (ref 0–4)
RBC # BLD AUTO: 3.89 10E12/L (ref 4.4–5.9)
SODIUM SERPL-SCNC: 142 MMOL/L (ref 133–144)
WBC # BLD AUTO: 5.4 10E9/L (ref 4–11)

## 2020-10-19 PROCEDURE — 36415 COLL VENOUS BLD VENIPUNCTURE: CPT

## 2020-10-19 PROCEDURE — 84153 ASSAY OF PSA TOTAL: CPT | Mod: TEL | Performed by: PHYSICIAN ASSISTANT

## 2020-10-19 PROCEDURE — 85025 COMPLETE CBC W/AUTO DIFF WBC: CPT | Mod: TEL | Performed by: PHYSICIAN ASSISTANT

## 2020-10-19 PROCEDURE — 80053 COMPREHEN METABOLIC PANEL: CPT | Mod: TEL | Performed by: PHYSICIAN ASSISTANT

## 2020-10-19 ASSESSMENT — PAIN SCALES - GENERAL: PAINLEVEL: NO PAIN (0)

## 2020-10-20 ENCOUNTER — VIRTUAL VISIT (OUTPATIENT)
Dept: ONCOLOGY | Facility: CLINIC | Age: 55
End: 2020-10-20
Attending: PHYSICIAN ASSISTANT
Payer: COMMERCIAL

## 2020-10-20 VITALS
RESPIRATION RATE: 18 BRPM | HEART RATE: 63 BPM | TEMPERATURE: 96.3 F | DIASTOLIC BLOOD PRESSURE: 89 MMHG | WEIGHT: 189.3 LBS | SYSTOLIC BLOOD PRESSURE: 135 MMHG | BODY MASS INDEX: 28.79 KG/M2 | OXYGEN SATURATION: 98 %

## 2020-10-20 DIAGNOSIS — T45.1X5A CHEMOTHERAPY-INDUCED NEUTROPENIA (H): ICD-10-CM

## 2020-10-20 DIAGNOSIS — C79.51 BONE METASTASIS: Primary | ICD-10-CM

## 2020-10-20 DIAGNOSIS — D70.1 CHEMOTHERAPY-INDUCED NEUTROPENIA (H): ICD-10-CM

## 2020-10-20 DIAGNOSIS — C61 MALIGNANT NEOPLASM OF PROSTATE (H): ICD-10-CM

## 2020-10-20 PROCEDURE — 99214 OFFICE O/P EST MOD 30 MIN: CPT | Mod: TEL | Performed by: PHYSICIAN ASSISTANT

## 2020-10-20 PROCEDURE — 999N001193 HC VIDEO/TELEPHONE VISIT; NO CHARGE

## 2020-10-20 PROCEDURE — 36415 COLL VENOUS BLD VENIPUNCTURE: CPT

## 2020-10-20 NOTE — PROGRESS NOTES
"Albert Amaya is a 54 year old male who is being evaluated via a billable telephone visit.      The patient has been notified of following:     \"This telephone visit will be conducted via a call between you and your physician/provider. We have found that certain health care needs can be provided without the need for a physical exam.  This service lets us provide the care you need with a short phone conversation.  If a prescription is necessary we can send it directly to your pharmacy.  If lab work is needed we can place an order for that and you can then stop by our lab to have the test done at a later time.    Telephone visits are billed at different rates depending on your insurance coverage. During this emergency period, for some insurers they may be billed the same as an in-person visit.  Please reach out to your insurance provider with any questions.    If during the course of the call the physician/provider feels a telephone visit is not appropriate, you will not be charged for this service.\"    Patient has given verbal consent for Telephone visit?  Yes    What phone number would you like to be contacted at? [phone]    How would you like to obtain your AVS? Namita Amezquita, [facility]    Oct 20, 2020       [place] FOR VISIT[place] [facility]     Oncology Treatment History:   Metastatic Prostate CA treated   - s/p 6 cycles of taxotere 75mg/m2   - s/p orchiectomy on 3/18/2016   - s/p Provenge 5/13, 5/27, 6/10   - s/p Casodex 50 mg daily March/April   - s/p Zytiga 5/3/2017   - started cabazitaxel 1/14/2020      HPI: Albert is a 54 year old gentleman with metastatic prostate cancer. Albert felt a cramp in his gluteus muscle and could barely walk after doing some remodeling at home and unloading heavy truck at work. He tried flexeril and prednisone initially but eventually presented to ED on 10/5/15. He feels that initially he was nearly labeled as drug seeker since he was in lot of discomfort and " flexeril was not working. But during course of ED visits labs were drawn and he had marked elevation in Alk Phosphatase (over 1000). CT did suggest some ileus with some sclerotic bone lesions. He was admitted to the hospital. His PSA was checked and was markedly elevated at 5760 (10/6/15), repeat value 5563. He did follow up with Dr. Freire and had transrectal US guided biopsy of prostate on 10/8/15. They have the results through my chart which confirms prostate adenocarcinoma - enrico 4+3 involving majority (60-90%) portion of every single core. He started on taxotere on 10/23 and completed 6 cycles of therapy in 2/2016. He then underwent orchiectomy on 3/18/2016.   Throughout spring of 2016 Albert's PSA started to progress and after three elevations there was concern about him being hormone refractory. He was started on Provenge and enrolled in the trial including Indoximod. PSA trending up and started on Casodex mid March with progression. Switched to Zytiga 5/3/17. He held his abiraterone in July 2019 with rising PSA and fatigue. He had a decline in his PSA after holding his abiraterone. This has been on hold and he is being followed without any additional intervention. He had orchiectomy done previously and does not need ADT. In January, his PSA was increasing and Dr Valladares started cabazitaxel.      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  The first few days after chemo he still has moderate fatigue, some bone pains, buzzy from the steroids and flu like feeling for days 2-4.  He did have some localized chest pain in the middle of the night. It seemed to radiate to the back, seemed to resolve with heartburn medication.  He had used oral Dex afterwards to help with the aches/nausea, so that may have contributed.  Used Tylenol, Ibu and some prn dilaudid. He feels overall this was a better cycle for him.  He  tried to take it easy and not overdo it with work and house projects.  Wife Lorrie feels the Dex made his mood swings stronger.  Wonders if the Dex was helpful or not.          MEDS:           Current Outpatient Medications   Medication Sig     Acetaminophen (TYLENOL PO) Take 325 mg by mouth every 8 hours as needed for mild pain or fever Reported on 5/18/2017     amLODIPine (NORVASC) 5 MG tablet TAKE ONE TABLET BY MOUTH EVERY DAY     blood glucose monitoring (ACCU-CHEK MULTICLIX) lancets Use to test blood sugar twice times daily or as directed.     diphenhydrAMINE (BENADRYL) 25 MG tablet Take 25 mg by mouth     HYDROmorphone (DILAUDID) 2 MG tablet Take 1 tablet (2 mg) by mouth every 4 hours as needed for moderate to severe pain     IBUPROFEN PO Take by mouth as needed for moderate pain Reported on 5/18/2017     levothyroxine (SYNTHROID/LEVOTHROID) 88 MCG tablet Take 1 tablet (88 mcg) by mouth daily     LORazepam (ATIVAN) 0.5 MG tablet Take 1 tablet (0.5 mg) by mouth every 4 hours as needed (Anxiety, Nausea/Vomiting or Sleep)     metFORMIN (GLUCOPHAGE-XR) 750 MG 24 hr tablet Take 1 tablet (750 mg) by mouth daily (with dinner)     prochlorperazine (COMPAZINE) 10 MG tablet Take 1 tablet (10 mg) by mouth every 6 hours as needed (Nausea/Vomiting)     valACYclovir (VALTREX) 1000 mg tablet Take 1 tablet (1,000 mg) by mouth 3 times daily     blood glucose monitoring (NO BRAND SPECIFIED) test strip Use to test blood sugars twice daily or as directed (fasting, rotate then second time - before lunch, before supper and bedtime) (Patient not taking: Reported on 8/12/2019)     EPINEPHrine (EPIPEN 2-CHARITY) 0.3 MG/0.3ML injection 2-pack Inject 0.3 mLs (0.3 mg) into the muscle once as needed for anaphylaxis (Patient not taking: Reported on 1/14/2020)    EXAM: BP (!) 142/91   Pulse 61   Temp 98.3  F (36.8  C) (Oral)   Resp 16   Wt 84.3 kg (185 lb 14.4 oz)   SpO2 97%   BMI 28.27 kg/m        Wt Readings from Last 4 Encounters:    09/25/20 84.3 kg (185 lb 14.4 oz)   08/24/20 83.3 kg (183 lb 9.6 oz)   08/03/20 82.7 kg (182 lb 6.4 oz)   07/27/20 83.2 kg (183 lb 6.4 oz)      Voice is clear and strong. No audible stridor, wheezing, or respiratory distress. The remainder of PE was deferred due to PHE.        10/19/2020 12:28   Sodium 142   Potassium 4.2   Chloride 109   Carbon Dioxide 26   Urea Nitrogen 15   Creatinine 0.83   GFR Estimate >90   GFR Estimate If Black >90   Calcium 8.9   Anion Gap 7   Albumin 4.0   Protein Total 7.4   Bilirubin Total 0.6   Alkaline Phosphatase 52   ALT 26   AST 11   PSA 33.50 (H)   Glucose 145 (H)   WBC 5.4   Hemoglobin 11.5 (L)   Hematocrit 35.4 (L)   Platelet Count 292   RBC Count 3.89 (L)   MCV 91        8/21/2020 12:16 8/28/2020 08:45 9/25/2020 10:11 10/19/2020 12:28   PSA 29.90 (H) 28.90 (H) 33.80 (H) 33.50 (H)       ASSESSMENT/PLAN:   1. Metastatic prostate cancer with bony metastasis:   Currently on cabazitaxel, PSA stable  Denosumab- should be on q3-4 month schedule   HTN and DM TII   ECOG PS 1   Coping with terminal cancer        #mPC--started Cabazitaxel on 1/14/2020 due to worsening bone scan and rising PSA in early January. Albert has had an excellent response to Jevtana, however he is starting to get progressive fatigue.  The second and third week recovery, the last two months have not occurred.  Lorrie finds him more sedentary and tired, he admits he has been more crabby as well.  We did obtain new imaging in August, just to ensure we weren't missing a neuroendocrine transformation, or progressive disease, but they showed improved disease.  Albert and I discussed that at this time he can choose to back off on the chemotherapy but ultimately, its what is controlling his disease.  It makes better sense to try and manage the fatigue and other side effects. We discussed continuing on this until his disease becomes worse and then considering Xtandi/Xofigo in the future.   -OK for Jevtana, no neulasta  "needed    #fatigue- This last cycle he did try using the Ritalin a few times and it seemed helpful.  We discussed fatigue management the first week after chemo, but then using the Ritalin the second week to try and get him faster back \"on track\".  He was open to this. We reviewed trying Ritalin for chemo/cancer induced fatigue.  They were willing to try.    #pain-No constant bone pains at this time, since hes been on chemotherapy. However has flu-like body aches after chemo.  We had been trying some oral Dex, but they are not sure it has made that much of a difference and the effect on his mood caused irritability and mood swings.  We will continue with the IV Dex on day 1 and no longer use oral dex the first few days.    #bone-h/o Xgeva, stopped in March of 2019. Taking oral calcium/Vit D. I think an every 3-4 months makes sense. (he would like to come in on a diff day and not do this the same time as chemo)  #endo--dropped to one pill once a day of metformin. BS have been ~100;    #erectile dysfunction- met with Dr Toth in urology, has cialis to try  #genetics- Albert and I discussed a genetic referral, give his age at diagnosis, not discussed again today  #coping- Albert and Lorrie and I have had long conversations that while Albert has had good treatment responses, we are nearing the end of FDA approved agents.  He has still been working full time and maintaining a good ECOG of 1.  They are very aware of his terminal diagnosis, we discussed adding in our palliative SW for counseling.    #insomnia first few nights 2/2 to dex infusion.  Will try benadryl, melatonin or lorazepam prn        Phone call duration: 58 minutes    Anay Dan PA-C      "

## 2020-10-21 ENCOUNTER — INFUSION THERAPY VISIT (OUTPATIENT)
Dept: ONCOLOGY | Facility: CLINIC | Age: 55
End: 2020-10-21
Attending: INTERNAL MEDICINE
Payer: COMMERCIAL

## 2020-10-21 VITALS
HEART RATE: 65 BPM | OXYGEN SATURATION: 95 % | SYSTOLIC BLOOD PRESSURE: 139 MMHG | DIASTOLIC BLOOD PRESSURE: 93 MMHG | TEMPERATURE: 98.1 F

## 2020-10-21 DIAGNOSIS — T45.1X5A CHEMOTHERAPY-INDUCED NEUTROPENIA (H): ICD-10-CM

## 2020-10-21 DIAGNOSIS — C79.51 BONE METASTASIS: ICD-10-CM

## 2020-10-21 DIAGNOSIS — C61 MALIGNANT NEOPLASM OF PROSTATE (H): Primary | ICD-10-CM

## 2020-10-21 DIAGNOSIS — D70.1 CHEMOTHERAPY-INDUCED NEUTROPENIA (H): ICD-10-CM

## 2020-10-21 PROCEDURE — 250N000009 HC RX 250: Performed by: PHYSICIAN ASSISTANT

## 2020-10-21 PROCEDURE — 258N000003 HC RX IP 258 OP 636: Performed by: PHYSICIAN ASSISTANT

## 2020-10-21 PROCEDURE — 96367 TX/PROPH/DG ADDL SEQ IV INF: CPT

## 2020-10-21 PROCEDURE — 250N000011 HC RX IP 250 OP 636: Mod: JW | Performed by: PHYSICIAN ASSISTANT

## 2020-10-21 PROCEDURE — 96375 TX/PRO/DX INJ NEW DRUG ADDON: CPT

## 2020-10-21 PROCEDURE — 96413 CHEMO IV INFUSION 1 HR: CPT

## 2020-10-21 PROCEDURE — 999N000127 HC STATISTIC PERIPHERAL IV START W US GUIDANCE

## 2020-10-21 PROCEDURE — 99207 PR NO CHARGE LOS: CPT

## 2020-10-21 RX ORDER — SODIUM CHLORIDE 9 MG/ML
1000 INJECTION, SOLUTION INTRAVENOUS CONTINUOUS PRN
Status: CANCELLED
Start: 2020-10-21

## 2020-10-21 RX ORDER — LORAZEPAM 2 MG/ML
0.5 INJECTION INTRAMUSCULAR EVERY 4 HOURS PRN
Status: CANCELLED
Start: 2020-10-21

## 2020-10-21 RX ORDER — ALBUTEROL SULFATE 90 UG/1
1-2 AEROSOL, METERED RESPIRATORY (INHALATION)
Status: CANCELLED
Start: 2020-10-21

## 2020-10-21 RX ORDER — LIDOCAINE/PRILOCAINE 2.5 %-2.5%
CREAM (GRAM) TOPICAL ONCE
Status: CANCELLED
Start: 2020-10-21 | End: 2020-10-21

## 2020-10-21 RX ORDER — NALOXONE HYDROCHLORIDE 0.4 MG/ML
.1-.4 INJECTION, SOLUTION INTRAMUSCULAR; INTRAVENOUS; SUBCUTANEOUS
Status: CANCELLED | OUTPATIENT
Start: 2020-10-21

## 2020-10-21 RX ORDER — EPINEPHRINE 0.3 MG/.3ML
0.3 INJECTION SUBCUTANEOUS EVERY 5 MIN PRN
Status: CANCELLED | OUTPATIENT
Start: 2020-10-21

## 2020-10-21 RX ORDER — ALBUTEROL SULFATE 0.83 MG/ML
2.5 SOLUTION RESPIRATORY (INHALATION)
Status: CANCELLED | OUTPATIENT
Start: 2020-10-21

## 2020-10-21 RX ORDER — MEPERIDINE HYDROCHLORIDE 25 MG/ML
25 INJECTION INTRAMUSCULAR; INTRAVENOUS; SUBCUTANEOUS EVERY 30 MIN PRN
Status: CANCELLED | OUTPATIENT
Start: 2020-10-21

## 2020-10-21 RX ORDER — DIPHENHYDRAMINE HYDROCHLORIDE 50 MG/ML
50 INJECTION INTRAMUSCULAR; INTRAVENOUS
Status: CANCELLED
Start: 2020-10-21

## 2020-10-21 RX ORDER — EPINEPHRINE 1 MG/ML
0.3 INJECTION, SOLUTION INTRAMUSCULAR; SUBCUTANEOUS EVERY 5 MIN PRN
Status: CANCELLED | OUTPATIENT
Start: 2020-10-21

## 2020-10-21 RX ORDER — METHYLPREDNISOLONE SODIUM SUCCINATE 125 MG/2ML
125 INJECTION, POWDER, LYOPHILIZED, FOR SOLUTION INTRAMUSCULAR; INTRAVENOUS
Status: CANCELLED
Start: 2020-10-21

## 2020-10-21 RX ADMIN — DIPHENHYDRAMINE HYDROCHLORIDE 25 MG: 50 INJECTION, SOLUTION INTRAMUSCULAR; INTRAVENOUS at 11:47

## 2020-10-21 RX ADMIN — DEXAMETHASONE SODIUM PHOSPHATE: 10 INJECTION, SOLUTION INTRAMUSCULAR; INTRAVENOUS at 12:20

## 2020-10-21 RX ADMIN — Medication 20 MG: at 11:47

## 2020-10-21 RX ADMIN — SODIUM CHLORIDE 42 MG: 900 INJECTION, SOLUTION INTRAVENOUS at 12:51

## 2020-10-21 RX ADMIN — SODIUM CHLORIDE 250 ML: 9 INJECTION, SOLUTION INTRAVENOUS at 11:47

## 2020-10-21 ASSESSMENT — PAIN SCALES - GENERAL: PAINLEVEL: NO PAIN (0)

## 2020-10-21 NOTE — PATIENT INSTRUCTIONS
[facility] Triage and after hours / weekends / holidays:  [phone]    Please call the triage or after hours line if you experience a temperature greater than or equal to 100.5, shaking chills, have uncontrolled nausea, vomiting and/or diarrhea, dizziness, shortness of breath, chest pain, bleeding, unexplained bruising, or if you have any other new/concerning symptoms, questions or concerns.      If you are having any concerning symptoms or wish to speak to a provider before your next infusion visit, please call your care coordinator or triage to notify them so we can adequately serve you.     If you need a refill on a narcotic prescription or other medication, please call before your infusion appointment.

## 2020-10-21 NOTE — PROGRESS NOTES
Infusion Nursing Note:  Albert Amaya presents today for C13D1 Cabazitaxel.    Patient seen by provider today: No- seen by ELVIRA Navarro    present during visit today: Not Applicable.    Note: VSS, denies pain. Pt stated he has no new changes since clinic appointment with Melanie yesterday 10/20. Denies nausea/ SOB. Messaged scheduling to set up appointment for Xgeva injection for Monday 10/26- per pt preference he gets Xgeva on non-chemo days.     Intravenous Access:  Peripheral IV placed.    Treatment Conditions:  Lab Results   Component Value Date    HGB 11.5 10/19/2020     Lab Results   Component Value Date    WBC 5.4 10/19/2020      Lab Results   Component Value Date    ANEU 2.7 10/19/2020     Lab Results   Component Value Date     10/19/2020      Lab Results   Component Value Date     10/19/2020                   Lab Results   Component Value Date    POTASSIUM 4.2 10/19/2020           Lab Results   Component Value Date    MAG 2.2 12/21/2016            Lab Results   Component Value Date    CR 0.83 10/19/2020                   Lab Results   Component Value Date    WILL 8.9 10/19/2020                Lab Results   Component Value Date    BILITOTAL 0.6 10/19/2020           Lab Results   Component Value Date    ALBUMIN 4.0 10/19/2020                    Lab Results   Component Value Date    ALT 26 10/19/2020           Lab Results   Component Value Date    AST 11 10/19/2020       Results reviewed, labs MET treatment parameters, ok to proceed with treatment.      Post Infusion Assessment:  Patient tolerated infusion without incident.  Blood return noted pre and post infusion.       Discharge Plan:   Patient declined prescription refills.  Patient and/or family verbalized understanding of discharge instructions and all questions answered.  Copy of AVS reviewed with patient and/or family.  Patient will return 11/16 for next infusion appointment.  Face to Face time: 0.    Anastacia Spears  RN

## 2020-11-03 NOTE — PROGRESS NOTES
[facility] RHEUMATOLOGY        NEW PATIENT      Subjective:       Patient ID:   NAME: Racheal Donaldson : 1982     38 y.o. female    Referring Doc: No ref. provider found  Other Physicians:    Chief Complaint:  Abnormal labs      History of Present Illness:     New patient with DVT  L POST  tibial vein  In Oct  2018. Has been on blood thinners since.Had a + anticardiolipin ( IgM,sl positive done in 2019)  Has had several episodes of syncope since 2019, starting about 6 months after DVT episode.Under the care of cardiologist ,Dr Monique.CHELE non revealing, as per patient.  Has seen 3  neurologist ( Dr Jose Cisneros,Chan,Alexandre) for poss seizures, has had hand tremors since 2019)  Several dxs entertained: psychosomatic,seizures,, migraine HA's,MELAS,. ELAINE in progress  ? Raynaud's. Mottled skin on and off ( livedo like)  No hx serositis  Has had hx anemia ,on iron supplements  Episodes of hypotension ( better since on Adderall  No mucosal ulcerations or significant sicca symptoms.  She has chronic fatigue.  Denies headaches.  No mucosal ulcerations.  No history of renal or hepatic disease.  ROS:   GEN: no fevers+ night sweats or significant weight changes   +  Fatigue ( no interrupted sleep or insomnia)  HEENT: no HA's, ( last year had R acute visual loss lasting a few minutes) , no mouth ulcers, no sicca symptoms except occ in eyes ( able to wear contacts), no scalp tenderness, jaw claudication  CV: no CP, SOB, PND, IYRE or orthopnea,+ occ  palpitations  PULM:no SOB, cough, hemoptysis, sputum or pleuritic pain  GI: no abdominal pain, nausea, vomiting, constipation, diarrhea, melanotic stools, BRBPR, or hematemesis, no dysphagia  : no hematuria, dysuria  NEURO:no paresthesias, headaches, visual disturbances, muscle weakness  SKIN:  no rashes  Except for Livedo changes) ,No  erythema, bruising, or swelling, no Raynauds, no photosensitivity  MUSCULOSKELETAL:no joint swelling, no prolonged AM stiffness,  "Albert Amaya is a 54 year old male who is being evaluated via a billable telephone visit.      The patient has been notified of following:     \"This telephone visit will be conducted via a call between you and your physician/provider. We have found that certain health care needs can be provided without the need for a physical exam.  This service lets us provide the care you need with a short phone conversation.  If a prescription is necessary we can send it directly to your pharmacy.  If lab work is needed we can place an order for that and you can then stop by our lab to have the test done at a later time.    Telephone visits are billed at different rates depending on your insurance coverage. During this emergency period, for some insurers they may be billed the same as an in-person visit.  Please reach out to your insurance provider with any questions.    If during the course of the call the physician/provider feels a telephone visit is not appropriate, you will not be charged for this service.\"    Patient has given verbal consent for Telephone visit?  Yes    What phone number would you like to be contacted at? [phone]    How would you like to obtain your AVS? Namita    Patient did not have any vitals to report.  0/10 pain scale.     No additional concerns.     Cassidy Piedra [facility]     " no back pain   PSYCH:   - Insomnia,  - depression,  anxiety  Hx of 3 miscarriages at 1st trimester. Last one 2011  Medications:    Current Outpatient Medications:     albuterol (PROVENTIL/VENTOLIN HFA) 90 mcg/actuation inhaler, Inhale 2 puffs into the lungs every 4 to 6 hours as needed for Wheezing or Shortness of Breath. Rescue, Disp: , Rfl:     ALPRAZolam (XANAX) 1 MG tablet, Take 2 mg by mouth nightly as needed for Anxiety., Disp: , Rfl:     ALPRAZolam (XANAX) 2 MG Tab, Take 2 mg by mouth once daily. 1 tab 1-2 times daily PRN, Disp: , Rfl: 3    apixaban (ELIQUIS) 2.5 mg Tab, Take 2.5 mg by mouth 2 (two) times daily. , Disp: , Rfl:     coenzyme Q10 100 mg capsule, Take 100 mg by mouth once daily. , Disp: , Rfl:     COMBIVENT RESPIMAT  mcg/actuation inhaler, , Disp: , Rfl:     dextroamphetamine-amphetamine (ADDERALL) 15 mg tablet, Take 15 mg by mouth 3 (three) times daily. Takes once a day, Disp: , Rfl:     FLUoxetine 20 MG capsule, Take 20 mg by mouth once daily., Disp: , Rfl: 6    HYDROcodone-acetaminophen (NORCO)  mg per tablet, Take 1 tablet by mouth. , Disp: , Rfl:     lamoTRIgine (LAMICTAL) 25 MG tablet, , Disp: , Rfl:     ondansetron (ZOFRAN) 4 MG tablet, Take 1 tablet (4 mg total) by mouth every 6 (six) hours as needed for Nausea., Disp: 60 tablet, Rfl: 1    predniSONE (DELTASONE) 20 MG tablet, , Disp: , Rfl:     promethazine (PHENERGAN) 25 MG tablet, , Disp: , Rfl:     riboflavin, vitamin B2, 400 mg Tab, Take 400 mg by mouth once daily. , Disp: , Rfl:     spironolactone (ALDACTONE) 50 MG tablet, Take 50 mg by mouth daily as needed. , Disp: , Rfl:     tiZANidine 4 mg Cap, Take 1 capsule by mouth once daily. 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    Physical Examination:   GEN: no apparent distress, comfortable; AAOx3  SKIN: sl livedo hands,feet,Lower legs, no lesions, no sclerodactyly or induration, no Raynaud's, no periungual erythema  HEAD: normal  EYES: no pallor, no icterus, PERRLA  ENT:  no thrush,no mucosal dryness or ulcerations  NECK: no masses, thyroid normal, trachea midline, no LAD/LN's, supple  CV:   S1 and S2 regular, no murmurs, gallop or rubs  CHEST: Normal respiratory effort;  normal breath sounds; no rubs, no wheezes, no crackles.   ABDOM: nontender and nondistended; soft; ; no rebound/guarding,no masses  MUSC/Skeletal: ROM normal; no crepitus; joints without synovitis, no deformities   EXTREM: no clubbing, cyanosis, edema, normal pulses.  NEURO: grossly intact; motorWNL; AAOx3; no tremors  PSYCH: normal mood, affect and behavior  LYMPH: normal cervical, supraclavicular            Labs:   @RESUFAST(WBC,HGB,HCT,MCV,PLT)  )@RESUFAST(NA,K,CL,CO2,GLU,BUN,Creatinine,Calcium,PROT,Albumin,Bilitot,Alkphos,AST,ALT,JAY,Sed Rate,CRP,RF,CCP)      Radiology/Diagnostic Studies:    I have reviewed all available labs and XRay reports  Has had a negative JAY.  No SSA noted.  Has negative anti Akbar and anti RNP antibody negative double-stranded DNA.  CBC CMP overall normal 1 report of slight hypocalcemia.  Has had normal TSH  Assessment/Plan:   38 y.o. female with history of DVT and slightly positive anticardiolipin antibodies.  She is on anticoagulant  History of syncopal " episodes/ hypotension with head injuries.  Unclear etiology.  Workup by Neurology pending        PLAN:  CBC CMP JAY SSA, inflammatory markers ,UA, beta 2 glycoprotein ;repeat anticardiolipin    Follow-up in 3 weeks    Discussion:     I have explained all of the above in detail and the patient understands all of the current recommendation(s). I have answered all of their questions to the best of my ability and to their complete satisfaction.      I have reviewed the risks and benefits of the medication in detail with patient, who understands and wishes to proceed. Printed information regarding the disease and/or medication was also provided.        RTC        Electronically signed by Michael Robles MD

## 2020-11-12 ENCOUNTER — ONCOLOGY VISIT (OUTPATIENT)
Dept: ONCOLOGY | Facility: CLINIC | Age: 55
End: 2020-11-12
Attending: INTERNAL MEDICINE
Payer: COMMERCIAL

## 2020-11-12 VITALS
DIASTOLIC BLOOD PRESSURE: 90 MMHG | OXYGEN SATURATION: 98 % | TEMPERATURE: 98.6 F | SYSTOLIC BLOOD PRESSURE: 135 MMHG | HEART RATE: 69 BPM

## 2020-11-12 DIAGNOSIS — C79.51 BONE METASTASIS: ICD-10-CM

## 2020-11-12 DIAGNOSIS — C61 MALIGNANT NEOPLASM OF PROSTATE (H): Primary | ICD-10-CM

## 2020-11-12 PROCEDURE — 250N000011 HC RX IP 250 OP 636: Performed by: INTERNAL MEDICINE

## 2020-11-12 PROCEDURE — 96372 THER/PROPH/DIAG INJ SC/IM: CPT | Performed by: INTERNAL MEDICINE

## 2020-11-12 PROCEDURE — 96372 THER/PROPH/DIAG INJ SC/IM: CPT

## 2020-11-12 RX ADMIN — DENOSUMAB 120 MG: 120 INJECTION SUBCUTANEOUS at 15:12

## 2020-11-12 ASSESSMENT — PAIN SCALES - GENERAL: PAINLEVEL: NO PAIN (0)

## 2020-11-12 NOTE — LETTER
2020         RE: Albert Amaya  [address] [id number]        Dear Colleague,    Thank you for referring your patient, Albert Amaya, to the [facility]. Please see a copy of my visit note below.    20 SIENA FELIX / Maria Luisa Jiang RN:  No labs needed today.  OK to use CMP from 10/19/20 for Xgeva.  Ca: 8.9,  Albumin: 4.0,  Creatinine: 0.83    Patient presents to the [facility] for denosumab (XGEVA) injection 120 mg. Order written by Dr. Valladares was completed today. Name and  were verified by patient. See MAR for medication details. Patient was asked if they have any new symptoms or questions/concerns. Medication was given in the following site: left arm. Patient tolerated injection well and was discharged to home.    -Lynda CARDENAS CMA      Again, thank you for allowing me to participate in the care of your patient.        Sincerely,        [facility]

## 2020-11-12 NOTE — PROGRESS NOTES
11/12/20 SIENA FELIX / Maria Luisa Jiang RN:  No labs needed today.  OK to use CMP from 10/19/20 for Xgeva.  Ca: 8.9,  Albumin: 4.0,  Creatinine: 0.83

## 2020-11-12 NOTE — PROGRESS NOTES
Patient presents to the [facility] for denosumab (XGEVA) injection 120 mg. Order written by Dr. Valladares was completed today. Name and  were verified by patient. See MAR for medication details. Patient was asked if they have any new symptoms or questions/concerns. Medication was given in the following site: left arm. Patient tolerated injection well and was discharged to home.    -Lynda CARDENAS, MASOUD

## 2020-11-13 DIAGNOSIS — D70.1 CHEMOTHERAPY-INDUCED NEUTROPENIA (H): Primary | ICD-10-CM

## 2020-11-13 DIAGNOSIS — C61 MALIGNANT NEOPLASM OF PROSTATE (H): ICD-10-CM

## 2020-11-13 DIAGNOSIS — C79.51 BONE METASTASIS: ICD-10-CM

## 2020-11-13 DIAGNOSIS — T45.1X5A CHEMOTHERAPY-INDUCED NEUTROPENIA (H): Primary | ICD-10-CM

## 2020-11-13 RX ORDER — ALBUTEROL SULFATE 90 UG/1
1-2 AEROSOL, METERED RESPIRATORY (INHALATION)
Status: CANCELLED
Start: 2020-01-01

## 2020-11-13 RX ORDER — LIDOCAINE/PRILOCAINE 2.5 %-2.5%
CREAM (GRAM) TOPICAL ONCE
Status: CANCELLED
Start: 2020-01-01 | End: 2020-01-01

## 2020-11-13 RX ORDER — SODIUM CHLORIDE 9 MG/ML
1000 INJECTION, SOLUTION INTRAVENOUS CONTINUOUS PRN
Status: CANCELLED
Start: 2020-11-16

## 2020-11-13 RX ORDER — SODIUM CHLORIDE 9 MG/ML
1000 INJECTION, SOLUTION INTRAVENOUS CONTINUOUS PRN
Status: CANCELLED
Start: 2020-01-01

## 2020-11-13 RX ORDER — ALBUTEROL SULFATE 0.83 MG/ML
2.5 SOLUTION RESPIRATORY (INHALATION)
Status: CANCELLED | OUTPATIENT
Start: 2020-01-01

## 2020-11-13 RX ORDER — ALBUTEROL SULFATE 0.83 MG/ML
2.5 SOLUTION RESPIRATORY (INHALATION)
Status: CANCELLED | OUTPATIENT
Start: 2020-11-16

## 2020-11-13 RX ORDER — NALOXONE HYDROCHLORIDE 0.4 MG/ML
.1-.4 INJECTION, SOLUTION INTRAMUSCULAR; INTRAVENOUS; SUBCUTANEOUS
Status: CANCELLED | OUTPATIENT
Start: 2020-11-16

## 2020-11-13 RX ORDER — EPINEPHRINE 1 MG/ML
0.3 INJECTION, SOLUTION, CONCENTRATE INTRAVENOUS EVERY 5 MIN PRN
Status: CANCELLED | OUTPATIENT
Start: 2020-11-16

## 2020-11-13 RX ORDER — MEPERIDINE HYDROCHLORIDE 25 MG/ML
25 INJECTION INTRAMUSCULAR; INTRAVENOUS; SUBCUTANEOUS EVERY 30 MIN PRN
Status: CANCELLED | OUTPATIENT
Start: 2020-11-16

## 2020-11-13 RX ORDER — LORAZEPAM 2 MG/ML
0.5 INJECTION INTRAMUSCULAR EVERY 4 HOURS PRN
Status: CANCELLED
Start: 2020-11-16

## 2020-11-13 RX ORDER — ALBUTEROL SULFATE 90 UG/1
1-2 AEROSOL, METERED RESPIRATORY (INHALATION)
Status: CANCELLED
Start: 2020-11-16

## 2020-11-13 RX ORDER — LORAZEPAM 2 MG/ML
0.5 INJECTION INTRAMUSCULAR EVERY 4 HOURS PRN
Status: CANCELLED
Start: 2020-01-01

## 2020-11-13 RX ORDER — EPINEPHRINE 0.3 MG/.3ML
0.3 INJECTION SUBCUTANEOUS EVERY 5 MIN PRN
Status: CANCELLED | OUTPATIENT
Start: 2020-01-01

## 2020-11-13 RX ORDER — EPINEPHRINE 0.3 MG/.3ML
0.3 INJECTION SUBCUTANEOUS EVERY 5 MIN PRN
Status: CANCELLED | OUTPATIENT
Start: 2020-11-16

## 2020-11-13 RX ORDER — LIDOCAINE/PRILOCAINE 2.5 %-2.5%
CREAM (GRAM) TOPICAL ONCE
Status: CANCELLED
Start: 2020-11-16 | End: 2020-11-13

## 2020-11-13 RX ORDER — MEPERIDINE HYDROCHLORIDE 25 MG/ML
25 INJECTION INTRAMUSCULAR; INTRAVENOUS; SUBCUTANEOUS EVERY 30 MIN PRN
Status: CANCELLED | OUTPATIENT
Start: 2020-01-01

## 2020-11-13 RX ORDER — EPINEPHRINE 1 MG/ML
0.3 INJECTION, SOLUTION, CONCENTRATE INTRAVENOUS EVERY 5 MIN PRN
Status: CANCELLED | OUTPATIENT
Start: 2020-01-01

## 2020-11-13 RX ORDER — DIPHENHYDRAMINE HYDROCHLORIDE 50 MG/ML
50 INJECTION INTRAMUSCULAR; INTRAVENOUS
Status: CANCELLED
Start: 2020-11-16

## 2020-11-13 RX ORDER — DIPHENHYDRAMINE HYDROCHLORIDE 50 MG/ML
50 INJECTION INTRAMUSCULAR; INTRAVENOUS
Status: CANCELLED
Start: 2020-01-01

## 2020-11-13 RX ORDER — NALOXONE HYDROCHLORIDE 0.4 MG/ML
.1-.4 INJECTION, SOLUTION INTRAMUSCULAR; INTRAVENOUS; SUBCUTANEOUS
Status: CANCELLED | OUTPATIENT
Start: 2020-01-01

## 2020-11-16 ENCOUNTER — INFUSION THERAPY VISIT (OUTPATIENT)
Dept: ONCOLOGY | Facility: CLINIC | Age: 55
End: 2020-11-16
Attending: INTERNAL MEDICINE
Payer: COMMERCIAL

## 2020-11-16 VITALS
HEART RATE: 76 BPM | SYSTOLIC BLOOD PRESSURE: 119 MMHG | OXYGEN SATURATION: 96 % | RESPIRATION RATE: 18 BRPM | TEMPERATURE: 98.3 F | WEIGHT: 185.9 LBS | BODY MASS INDEX: 28.27 KG/M2 | DIASTOLIC BLOOD PRESSURE: 80 MMHG

## 2020-11-16 DIAGNOSIS — D70.1 CHEMOTHERAPY-INDUCED NEUTROPENIA (H): ICD-10-CM

## 2020-11-16 DIAGNOSIS — C79.51 BONE METASTASIS: Primary | ICD-10-CM

## 2020-11-16 DIAGNOSIS — T45.1X5A CHEMOTHERAPY-INDUCED NEUTROPENIA (H): ICD-10-CM

## 2020-11-16 DIAGNOSIS — C61 MALIGNANT NEOPLASM OF PROSTATE (H): ICD-10-CM

## 2020-11-16 LAB
ALBUMIN SERPL-MCNC: 4 G/DL (ref 3.4–5)
ALP SERPL-CCNC: 55 U/L (ref 40–150)
ALT SERPL W P-5'-P-CCNC: 31 U/L (ref 0–70)
ANION GAP SERPL CALCULATED.3IONS-SCNC: 5 MMOL/L (ref 3–14)
AST SERPL W P-5'-P-CCNC: 13 U/L (ref 0–45)
BASOPHILS # BLD AUTO: 0.1 10E9/L (ref 0–0.2)
BASOPHILS NFR BLD AUTO: 0.9 %
BILIRUB SERPL-MCNC: 0.7 MG/DL (ref 0.2–1.3)
BUN SERPL-MCNC: 22 MG/DL (ref 7–30)
CALCIUM SERPL-MCNC: 9.1 MG/DL (ref 8.5–10.1)
CHLORIDE SERPL-SCNC: 108 MMOL/L (ref 94–109)
CO2 SERPL-SCNC: 27 MMOL/L (ref 20–32)
CREAT SERPL-MCNC: 0.81 MG/DL (ref 0.66–1.25)
DIFFERENTIAL METHOD BLD: ABNORMAL
EOSINOPHIL # BLD AUTO: 0.1 10E9/L (ref 0–0.7)
EOSINOPHIL NFR BLD AUTO: 1.5 %
ERYTHROCYTE [DISTWIDTH] IN BLOOD BY AUTOMATED COUNT: 15 % (ref 10–15)
GFR SERPL CREATININE-BSD FRML MDRD: >90 ML/MIN/{1.73_M2}
GLUCOSE SERPL-MCNC: 128 MG/DL (ref 70–99)
HCT VFR BLD AUTO: 36.2 % (ref 40–53)
HGB BLD-MCNC: 11.6 G/DL (ref 13.3–17.7)
IMM GRANULOCYTES # BLD: 0 10E9/L (ref 0–0.4)
IMM GRANULOCYTES NFR BLD: 0.4 %
LYMPHOCYTES # BLD AUTO: 2.1 10E9/L (ref 0.8–5.3)
LYMPHOCYTES NFR BLD AUTO: 38.3 %
MCH RBC QN AUTO: 29.3 PG (ref 26.5–33)
MCHC RBC AUTO-ENTMCNC: 32 G/DL (ref 31.5–36.5)
MCV RBC AUTO: 91 FL (ref 78–100)
MONOCYTES # BLD AUTO: 0.4 10E9/L (ref 0–1.3)
MONOCYTES NFR BLD AUTO: 6.9 %
NEUTROPHILS # BLD AUTO: 2.8 10E9/L (ref 1.6–8.3)
NEUTROPHILS NFR BLD AUTO: 52 %
NRBC # BLD AUTO: 0 10*3/UL
NRBC BLD AUTO-RTO: 0 /100
PLATELET # BLD AUTO: 304 10E9/L (ref 150–450)
POTASSIUM SERPL-SCNC: 4 MMOL/L (ref 3.4–5.3)
PROT SERPL-MCNC: 7.4 G/DL (ref 6.8–8.8)
PSA SERPL-MCNC: 39.7 UG/L (ref 0–4)
RBC # BLD AUTO: 3.96 10E12/L (ref 4.4–5.9)
SODIUM SERPL-SCNC: 140 MMOL/L (ref 133–144)
WBC # BLD AUTO: 5.4 10E9/L (ref 4–11)

## 2020-11-16 PROCEDURE — 258N000003 HC RX IP 258 OP 636: Performed by: INTERNAL MEDICINE

## 2020-11-16 PROCEDURE — 85025 COMPLETE CBC W/AUTO DIFF WBC: CPT | Performed by: INTERNAL MEDICINE

## 2020-11-16 PROCEDURE — 96413 CHEMO IV INFUSION 1 HR: CPT

## 2020-11-16 PROCEDURE — 84153 ASSAY OF PSA TOTAL: CPT | Performed by: INTERNAL MEDICINE

## 2020-11-16 PROCEDURE — 96375 TX/PRO/DX INJ NEW DRUG ADDON: CPT

## 2020-11-16 PROCEDURE — 36415 COLL VENOUS BLD VENIPUNCTURE: CPT

## 2020-11-16 PROCEDURE — 250N000009 HC RX 250: Performed by: INTERNAL MEDICINE

## 2020-11-16 PROCEDURE — 250N000011 HC RX IP 250 OP 636: Performed by: INTERNAL MEDICINE

## 2020-11-16 PROCEDURE — 80053 COMPREHEN METABOLIC PANEL: CPT | Performed by: INTERNAL MEDICINE

## 2020-11-16 RX ADMIN — DIPHENHYDRAMINE HYDROCHLORIDE 25 MG: 50 INJECTION, SOLUTION INTRAMUSCULAR; INTRAVENOUS at 14:13

## 2020-11-16 RX ADMIN — FAMOTIDINE 20 MG: 10 INJECTION INTRAVENOUS at 13:50

## 2020-11-16 RX ADMIN — SODIUM CHLORIDE 42 MG: 900 INJECTION, SOLUTION INTRAVENOUS at 14:49

## 2020-11-16 RX ADMIN — DEXAMETHASONE SODIUM PHOSPHATE: 10 INJECTION, SOLUTION INTRAMUSCULAR; INTRAVENOUS at 13:56

## 2020-11-16 RX ADMIN — SODIUM CHLORIDE 250 ML: 9 INJECTION, SOLUTION INTRAVENOUS at 13:50

## 2020-11-16 ASSESSMENT — PAIN SCALES - GENERAL: PAINLEVEL: NO PAIN (0)

## 2020-11-16 NOTE — PROGRESS NOTES
Infusion Nursing Note:  Albert Amaya presents today for Cycle 14 Day 1 Cabazitaxel.    Patient seen by provider today: No   present during visit today: Not Applicable.    Note: Pt arrives to infusion today feeling well. Only complaint is neuropathy in his bilateral fingertips. He states it is worse than last infusion. Not interfering with function. Offered to talk to Dr. Valladares about dose reduction. Pt declined. No other new complaints or concerns.     Intravenous Access:  Peripheral IV placed.    Treatment Conditions:  Lab Results   Component Value Date    HGB 11.6 11/16/2020     Lab Results   Component Value Date    WBC 5.4 11/16/2020      Lab Results   Component Value Date    ANEU 2.8 11/16/2020     Lab Results   Component Value Date     11/16/2020      Lab Results   Component Value Date     11/16/2020                   Lab Results   Component Value Date    POTASSIUM 4.0 11/16/2020           Lab Results   Component Value Date    MAG 2.2 12/21/2016            Lab Results   Component Value Date    CR 0.81 11/16/2020                   Lab Results   Component Value Date    WILL 9.1 11/16/2020                Lab Results   Component Value Date    BILITOTAL 0.7 11/16/2020           Lab Results   Component Value Date    ALBUMIN 4.0 11/16/2020                    Lab Results   Component Value Date    ALT 31 11/16/2020           Lab Results   Component Value Date    AST 13 11/16/2020     Results reviewed, labs MET treatment parameters, ok to proceed with treatment.    Post Infusion Assessment:  Patient tolerated infusion without incident.  Blood return noted pre and post infusion.  Site patent and intact, free from redness, edema or discomfort.  No evidence of extravasations.  Access discontinued per protocol.     Discharge Plan:   Patient declined prescription refills.  AVS to patient via [place].  Patient will return 11/17 with Dr. Valladares for next appointment.   Patient discharged in stable condition  accompanied by: self.  Departure Mode: Ambulatory.  Face to Face time: 0.    Bebe Rios RN

## 2020-11-17 ENCOUNTER — VIRTUAL VISIT (OUTPATIENT)
Dept: ONCOLOGY | Facility: CLINIC | Age: 55
End: 2020-11-17
Attending: INTERNAL MEDICINE
Payer: COMMERCIAL

## 2020-11-17 DIAGNOSIS — C79.51 BONE METASTASIS: ICD-10-CM

## 2020-11-17 DIAGNOSIS — C61 MALIGNANT NEOPLASM OF PROSTATE (H): Primary | ICD-10-CM

## 2020-11-17 PROCEDURE — 99214 OFFICE O/P EST MOD 30 MIN: CPT | Mod: TEL | Performed by: INTERNAL MEDICINE

## 2020-11-17 NOTE — LETTER
"    11/17/2020         RE: Albert Amaya  [address] [place] [id number]        Dear Colleague,    Thank you for referring your patient, Albert Amaya, to the [facility]. Please see a copy of my visit note below.    Albert Amaya is a 54 year old male who is being evaluated via a billable telephone visit.      The patient has been notified of following:     \"This telephone visit will be conducted via a call between you and your physician/provider. We have found that certain health care needs can be provided without the need for a physical exam.  This service lets us provide the care you need with a short phone conversation.  If a prescription is necessary we can send it directly to your pharmacy.  If lab work is needed we can place an order for that and you can then stop by our lab to have the test done at a later time.    Telephone visits are billed at different rates depending on your insurance coverage. During this emergency period, for some insurers they may be billed the same as an in-person visit.  Please reach out to your insurance provider with any questions.    If during the course of the call the physician/provider feels a telephone visit is not appropriate, you will not be charged for this service.\"    Patient has given verbal consent for Telephone visit?  Yes    What phone number would you like to be contacted at? [phone]    How would you like to obtain your AVS? Namita English [facility]          Nov 17, 2020       REASON FOR VISIT: mPC, here for chemo assessment     Oncology Treatment History:   Metastatic Prostate CA treated   - s/p 6 cycles of taxotere 75mg/m2   - s/p orchiectomy on 3/18/2016   - s/p Provenge 5/13, 5/27, 6/10   - s/p Casodex 50 mg daily March/April   - s/p Zytiga 5/3/2017   - started cabazitaxel 1/14/2020      HPI: Albert is a 54 year old gentleman with metastatic prostate cancer. Albert felt a cramp in his gluteus muscle and could " barely walk after doing some remodeling at home and unloading heavy truck at work. He tried flexeril and prednisone initially but eventually presented to ED on 10/5/15. He feels that initially he was nearly labeled as drug seeker since he was in lot of discomfort and flexeril was not working. But during course of ED visits labs were drawn and he had marked elevation in Alk Phosphatase (over 1000). CT did suggest some ileus with some sclerotic bone lesions. He was admitted to the hospital. His PSA was checked and was markedly elevated at 5760 (10/6/15), repeat value 5563. He did follow up with Dr. Freire and had transrectal US guided biopsy of prostate on 10/8/15. They have the results through my chart which confirms prostate adenocarcinoma - enrico 4+3 involving majority (60-90%) portion of every single core. He started on taxotere on 10/23 and completed 6 cycles of therapy in 2/2016. He then underwent orchiectomy on 3/18/2016.   Throughout spring of 2016 Albert's PSA started to progress and after three elevations there was concern about him being hormone refractory. He was started on Provenge and enrolled in the trial including Indoximod. PSA trending up and started on Casodex mid March with progression. Switched to Zytiga 5/3/17. He held his abiraterone in July 2019 with rising PSA and fatigue. He had a decline in his PSA after holding his abiraterone. This has been on hold and he is being followed without any additional intervention. He had orchiectomy done previously and does not need ADT. In January, his PSA was increasing and Dr Valladares started cabazitaxel.      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      Albert again noted that he is doing well with the chemotherapy. Lorrie was there to fill in on details. He is doing better with the fatigue on every 4 weeks dosing. He has tingling in all digits of his  "both hands which was previously limited to his thumbs. He has pain in both his hips, shoulders and neck. He has been using tylenol and dilaudid for pain which seems to be controlling the pain. He is doing much better off steroids than he did on steroids. He has been losing weight but there has been a redistribution of weight on his body.        MEDS:   Current Outpatient Medications   Medication Sig     Acetaminophen (TYLENOL PO) Take 325 mg by mouth every 8 hours as needed for mild pain or fever Reported on 5/18/2017     amLODIPine (NORVASC) 5 MG tablet Take 1 tablet (5 mg) by mouth daily     blood glucose monitoring (ACCU-CHEK MULTICLIX) lancets Use to test blood sugar twice times daily or as directed.     blood glucose monitoring (NO BRAND SPECIFIED) test strip Use to test blood sugars twice daily or as directed (fasting, rotate then second time - before lunch, before supper and bedtime)     diphenhydrAMINE (BENADRYL) 25 MG tablet Take 25 mg by mouth every 6 hours as needed      EPINEPHrine (EPIPEN 2-CHARITY) 0.3 MG/0.3ML injection 2-pack Inject 0.3 mLs (0.3 mg) into the muscle once as needed for anaphylaxis     HYDROmorphone (DILAUDID) 2 MG tablet Take 1 tablet (2 mg) by mouth every 4 hours as needed for moderate to severe pain     IBUPROFEN PO Take by mouth as needed for moderate pain Reported on 5/18/2017     levothyroxine (SYNTHROID/LEVOTHROID) 88 MCG tablet Take 1 tablet (88 mcg) by mouth daily     LORazepam (ATIVAN) 0.5 MG tablet Take 1 tablet (0.5 mg) by mouth every 4 hours as needed (Anxiety, Nausea/Vomiting or Sleep)     metFORMIN (GLUCOPHAGE-XR) 750 MG 24 hr tablet Take 1 tablet (750 mg) by mouth daily (with dinner)     predniSONE (DELTASONE) 5 MG tablet Take 5 mg by mouth as needed Takes on wkds to do \"yardwork\"     prochlorperazine (COMPAZINE) 10 MG tablet Take 1 tablet (10 mg) by mouth every 6 hours as needed (Nausea/Vomiting)     tadalafil (CIALIS) 20 MG tablet Take 1 tablet (20 mg) by mouth daily as " needed (take 2 hours before intercourse.)     triamcinolone (KENALOG) 0.1 % external cream Apply topically 2 times daily     valACYclovir (VALTREX) 1000 mg tablet Take 1 tablet (1,000 mg) by mouth 3 times daily     dexamethasone (DECADRON) 2 MG tablet Take 4 mg on day 2, 2 mg on day 3 and 1 mg on day 4 - of each chemo cycle (Patient not taking: Reported on 11/16/2020)     methylphenidate (RITALIN) 5 MG tablet Take 1 tablet (5 mg) by mouth 2 times daily (Patient not taking: Reported on 11/16/2020)     No current facility-administered medications for this visit.          Voice is clear and strong. No audible stridor, wheezing, or respiratory distress. The remainder of PE was deferred due to PHE.     Recent Labs   Lab Test 11/16/20  1257 10/19/20  1228 09/25/20  1011 08/28/20  0845 08/21/20  1216    142 142 140 138   POTASSIUM 4.0 4.2 4.0 4.2 4.6   CHLORIDE 108 109 109 109 105   CO2 27 26 27 25 26   ANIONGAP 5 7 6 6 7   BUN 22 15 14 10 19   CR 0.81 0.83 0.82 0.79 0.82   * 145* 109* 100* 104*   WILL 9.1 8.9 9.2 8.9 9.1     Recent Labs   Lab Test 12/21/16  1643 11/22/16  1221 10/26/16  1150 09/28/16  1137 08/17/16  1355 05/03/16  0955 05/03/16  0955   MAG 2.2 2.4* 2.2 2.3 2.0   < > 2.0   PHOS  --   --   --   --   --   --  4.1    < > = values in this interval not displayed.     Recent Labs   Lab Test 11/16/20  1257 10/19/20  1228 09/25/20  1011 08/28/20  0845 08/21/20  1216   WBC 5.4 5.4 5.2 11.5* 9.7   HGB 11.6* 11.5* 11.4* 11.2* 12.0*    292 270 302 230   MCV 91 91 92 94 92   NEUTROPHIL 52.0 50.6 54.1 73.0 66.4     Recent Labs   Lab Test 11/16/20  1257 10/19/20  1228 09/25/20  1011 08/21/20  1216 08/21/20  1216 08/03/20  1354 07/27/20  1254   BILITOTAL 0.7 0.6 0.6   < > 0.5 0.6 0.6   ALKPHOS 55 52 52   < > 95 67 66   ALT 31 26 26   < > 70 28 34   AST 13 11 13   < > 20 16 16   ALBUMIN 4.0 4.0 3.9   < > 4.1 4.3 4.2   LDH  --   --   --   --  211 202 179    < > = values in this interval not displayed.      TSH   Date Value Ref Range Status   08/21/2020 2.68 0.40 - 4.00 mU/L Final   02/24/2020 0.75 0.40 - 4.00 mU/L Final   01/07/2020 10.16 (H) 0.40 - 4.00 mU/L Final     No results for input(s): CEA in the last 40024 hours.  Results for orders placed or performed during the hospital encounter of 08/28/20   CT Chest/Abdomen/Pelvis w Contrast    Narrative    EXAMINATION: CT CHEST/ABDOMEN/PELVIS W CONTRAST, 8/28/2020 10:12 AM    TECHNIQUE:  Helical CT images from the thoracic inlet through the  symphysis pubis were obtained  with contrast. Contrast dose: iopamidol  (ISOVUE-370) solution 100 mL    COMPARISON: CT 1/22/2020, 1/10/2020, bone scan 1/10/2020    HISTORY: mPC to bones, PSA dramatically improved and now plateaued,  reassess disease status; Malignant neoplasm of prostate (H); Bone  metastasis (H)    FINDINGS:    Chest: The thyroid is unremarkable. No abnormal thoracic  lymphadenopathy. Heart size upper limits of normal. Minimal  pericardial effusion. No central pulmonary embolism. The esophagus is  unremarkable.    The central tracheobronchial tree there is patent. No pleural  effusion. No concerning pulmonary nodule.    Abdomen and pelvis: Minimal hepatic hypodensity along the falciform  ligament compatible with focal fatty deposition, similar to prior. The  spleen, gallbladder, pancreas, adrenals, and kidneys are unremarkable.  No abnormally dilated loops of large or small bowel. No free fluid in  the pelvis. Bladder moderately distended and unremarkable in  appearance. Normal appendix. Normal abdominal aorta. No abnormal  retroperitoneal, pelvic, or mesenteric lymphadenopathy.    Bones and soft tissues: Diffuse sclerotic metastatic disease  throughout the visualized axial and appendicular skeleton. This does  not appear substantially changed compared to CT 1/10/2020.      Impression    IMPRESSION: This patient with a history of prostate cancer status post  orchiectomy on hormone associated therapy:  Diffuse  sclerotic metastatic disease in the visualized axial and  appendicular skeleton without substantial change compared to CT  1/10/2020. Bone scan same day pending.     CELSO HENSLEY MD (Joe)       Lab Test 11/16/20  1257 10/19/20  1228 09/25/20  1011 08/28/20  0845 08/21/20  1216 12/21/16  1643 12/21/16  1643 11/22/16  1222   PSA 39.70* 33.50* 33.80* 28.90* 29.90*   < > 60.68*  --    TESTOSTTOTAL  --   --   --   --   --   --  <2* 2*     Recent Labs   Lab Test 11/16/20  1257 10/19/20  1228 09/25/20  1011 08/28/20  0845 08/21/20  1216 08/03/20  1354 07/27/20  1254 07/06/20  1232 06/30/20  1248   PSA 39.70* 33.50* 33.80* 28.90* 29.90* 29.50* 27.50* 24.80* 27.60*   ALKPHOS 55 52 52 77 95 67 66 73 92   LDH  --   --   --   --  211 202 179 192 212          ASSESSMENT/PLAN:   1. Metastatic prostate cancer with bony metastasis:   Currently on cabazitaxel, PSA stable  Denosumab- should be on q3-4 month schedule   HTN and DM TII   ECOG PS 1   Coping with terminal cancer        #mPC--started Cabazitaxel on 1/14/2020 due to worsening bone scan and rising PSA in early January. Albert has had an excellent response to cabazitaxel and has been on this therapy for nearly an year.       We have changed his schedule to every 4 weeks from every 3 weeks dosing. He continues to have relatively stable disease despite this change. His PSA had dropped down to 24.8 in July and was 29 in August and now it is up to 39. Though it has gone up during this time, it is merely 25% rise in over 3 months time. He is tolerating the q4 week dosing much better. However clinically it appears that he is having some increased pain. This is a little concerning. If he has clinical progression, then irrespective of his PSA values, we would switch him to a different regimen.     His last scans are from 8/28/20 which had been stable. He has extensive bony metastasis and both CT scans and bone scans have not been very reflective of disease change. He has  difficulty getting the scans and Lorrie notes that even the scans take a toll on him.     He had a lot of difficulty with steroids and is doing better without them. It is good to avoid steroids.     We reviewed the option of every 3 week dosing. I would not make the change right before the holiday season. Besides I am not sure that it would have a high chance of improved response.     I do not feel that his neuropathy is due to cabazitaxel though it can be. Neuropathy has been reported with cabazitaxel but all patients on cabazitaxel study had previous docetaxel as per inclusion criteria. I have not seen neuropathy with this agent. This could also represent DJD at his cervical vertebrae.     I will refill his pain medications. After a lot of discussion we have decided to stay course for next couple of months. He would have a q 4 weekly dose in mid December and then I will see him in January prior to his subsequent dose.     We reviewed the other treatment options for his prostate cancer.     He had orchiectomy done and we have spaced out his denosumab to roughly every 3 months and his last dose was on 11/12/20      Over 25 min spent with patient over telephone with more than 50% time spent in counseling and coordinating care.      Tyrel Valladares    Hematologist and Medical Oncologist  [facility]         Again, thank you for allowing me to participate in the care of your patient.        Sincerely,        Tyrel Valladares MD

## 2020-11-17 NOTE — PROGRESS NOTES
"Albert Amaya is a 54 year old male who is being evaluated via a billable telephone visit.      The patient has been notified of following:     \"This telephone visit will be conducted via a call between you and your physician/provider. We have found that certain health care needs can be provided without the need for a physical exam.  This service lets us provide the care you need with a short phone conversation.  If a prescription is necessary we can send it directly to your pharmacy.  If lab work is needed we can place an order for that and you can then stop by our lab to have the test done at a later time.    Telephone visits are billed at different rates depending on your insurance coverage. During this emergency period, for some insurers they may be billed the same as an in-person visit.  Please reach out to your insurance provider with any questions.    If during the course of the call the physician/provider feels a telephone visit is not appropriate, you will not be charged for this service.\"    Patient has given verbal consent for Telephone visit?  Yes    What phone number would you like to be contacted at? [phone]    How would you like to obtain your AVS? Namita FUCHS      "

## 2020-11-17 NOTE — PROGRESS NOTES
Nov 17, 2020       REASON FOR VISIT: mPC, here for chemo assessment     Oncology Treatment History:   Metastatic Prostate CA treated   - s/p 6 cycles of taxotere 75mg/m2   - s/p orchiectomy on 3/18/2016   - s/p Provenge 5/13, 5/27, 6/10   - s/p Casodex 50 mg daily March/April   - s/p Zytiga 5/3/2017   - started cabazitaxel 1/14/2020      HPI: Albert is a 54 year old gentleman with metastatic prostate cancer. Albert felt a cramp in his gluteus muscle and could barely walk after doing some remodeling at home and unloading heavy truck at work. He tried flexeril and prednisone initially but eventually presented to ED on 10/5/15. He feels that initially he was nearly labeled as drug seeker since he was in lot of discomfort and flexeril was not working. But during course of ED visits labs were drawn and he had marked elevation in Alk Phosphatase (over 1000). CT did suggest some ileus with some sclerotic bone lesions. He was admitted to the hospital. His PSA was checked and was markedly elevated at 5760 (10/6/15), repeat value 5563. He did follow up with Dr. Freire and had transrectal US guided biopsy of prostate on 10/8/15. They have the results through my chart which confirms prostate adenocarcinoma - enrico 4+3 involving majority (60-90%) portion of every single core. He started on taxotere on 10/23 and completed 6 cycles of therapy in 2/2016. He then underwent orchiectomy on 3/18/2016.   Throughout spring of 2016 Albert's PSA started to progress and after three elevations there was concern about him being hormone refractory. He was started on Provenge and enrolled in the trial including Indoximod. PSA trending up and started on Casodex mid March with progression. Switched to Zytiga 5/3/17. He held his abiraterone in July 2019 with rising PSA and fatigue. He had a decline in his PSA after holding his abiraterone. This has been on hold and he is being followed without any additional intervention. He had orchiectomy done  previously and does not need ADT. In January, his PSA was increasing and Dr Valladares started cabazitaxel.      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      Albert again noted that he is doing well with the chemotherapy. Lorrie was there to fill in on details. He is doing better with the fatigue on every 4 weeks dosing. He has tingling in all digits of his both hands which was previously limited to his thumbs. He has pain in both his hips, shoulders and neck. He has been using tylenol and dilaudid for pain which seems to be controlling the pain. He is doing much better off steroids than he did on steroids. He has been losing weight but there has been a redistribution of weight on his body.        MEDS:   Current Outpatient Medications   Medication Sig     Acetaminophen (TYLENOL PO) Take 325 mg by mouth every 8 hours as needed for mild pain or fever Reported on 5/18/2017     amLODIPine (NORVASC) 5 MG tablet Take 1 tablet (5 mg) by mouth daily     blood glucose monitoring (ACCU-CHEK MULTICLIX) lancets Use to test blood sugar twice times daily or as directed.     blood glucose monitoring (NO BRAND SPECIFIED) test strip Use to test blood sugars twice daily or as directed (fasting, rotate then second time - before lunch, before supper and bedtime)     diphenhydrAMINE (BENADRYL) 25 MG tablet Take 25 mg by mouth every 6 hours as needed      EPINEPHrine (EPIPEN 2-CHARITY) 0.3 MG/0.3ML injection 2-pack Inject 0.3 mLs (0.3 mg) into the muscle once as needed for anaphylaxis     HYDROmorphone (DILAUDID) 2 MG tablet Take 1 tablet (2 mg) by mouth every 4 hours as needed for moderate to severe pain     IBUPROFEN PO Take by mouth as needed for moderate pain Reported on 5/18/2017     levothyroxine (SYNTHROID/LEVOTHROID) 88 MCG tablet Take 1 tablet (88 mcg) by mouth daily     LORazepam (ATIVAN) 0.5 MG tablet Take 1 tablet (0.5 mg) by  "mouth every 4 hours as needed (Anxiety, Nausea/Vomiting or Sleep)     metFORMIN (GLUCOPHAGE-XR) 750 MG 24 hr tablet Take 1 tablet (750 mg) by mouth daily (with dinner)     predniSONE (DELTASONE) 5 MG tablet Take 5 mg by mouth as needed Takes on wkds to do \"yardwork\"     prochlorperazine (COMPAZINE) 10 MG tablet Take 1 tablet (10 mg) by mouth every 6 hours as needed (Nausea/Vomiting)     tadalafil (CIALIS) 20 MG tablet Take 1 tablet (20 mg) by mouth daily as needed (take 2 hours before intercourse.)     triamcinolone (KENALOG) 0.1 % external cream Apply topically 2 times daily     valACYclovir (VALTREX) 1000 mg tablet Take 1 tablet (1,000 mg) by mouth 3 times daily     dexamethasone (DECADRON) 2 MG tablet Take 4 mg on day 2, 2 mg on day 3 and 1 mg on day 4 - of each chemo cycle (Patient not taking: Reported on 11/16/2020)     methylphenidate (RITALIN) 5 MG tablet Take 1 tablet (5 mg) by mouth 2 times daily (Patient not taking: Reported on 11/16/2020)     No current facility-administered medications for this visit.          Voice is clear and strong. No audible stridor, wheezing, or respiratory distress. The remainder of PE was deferred due to PHE.     Recent Labs   Lab Test 11/16/20  1257 10/19/20  1228 09/25/20  1011 08/28/20  0845 08/21/20  1216    142 142 140 138   POTASSIUM 4.0 4.2 4.0 4.2 4.6   CHLORIDE 108 109 109 109 105   CO2 27 26 27 25 26   ANIONGAP 5 7 6 6 7   BUN 22 15 14 10 19   CR 0.81 0.83 0.82 0.79 0.82   * 145* 109* 100* 104*   WILL 9.1 8.9 9.2 8.9 9.1     Recent Labs   Lab Test 12/21/16  1643 11/22/16  1221 10/26/16  1150 09/28/16  1137 08/17/16  1355 05/03/16  0955 05/03/16  0955   MAG 2.2 2.4* 2.2 2.3 2.0   < > 2.0   PHOS  --   --   --   --   --   --  4.1    < > = values in this interval not displayed.     Recent Labs   Lab Test 11/16/20  1257 10/19/20  1228 09/25/20  1011 08/28/20  0845 08/21/20  1216   WBC 5.4 5.4 5.2 11.5* 9.7   HGB 11.6* 11.5* 11.4* 11.2* 12.0*    319 270 " 302 230   MCV 91 91 92 94 92   NEUTROPHIL 52.0 50.6 54.1 73.0 66.4     Recent Labs   Lab Test 11/16/20  1257 10/19/20  1228 09/25/20  1011 08/21/20  1216 08/21/20  1216 08/03/20  1354 07/27/20  1254   BILITOTAL 0.7 0.6 0.6   < > 0.5 0.6 0.6   ALKPHOS 55 52 52   < > 95 67 66   ALT 31 26 26   < > 70 28 34   AST 13 11 13   < > 20 16 16   ALBUMIN 4.0 4.0 3.9   < > 4.1 4.3 4.2   LDH  --   --   --   --  211 202 179    < > = values in this interval not displayed.     TSH   Date Value Ref Range Status   08/21/2020 2.68 0.40 - 4.00 mU/L Final   02/24/2020 0.75 0.40 - 4.00 mU/L Final   01/07/2020 10.16 (H) 0.40 - 4.00 mU/L Final     No results for input(s): CEA in the last 02604 hours.  Results for orders placed or performed during the hospital encounter of 08/28/20   CT Chest/Abdomen/Pelvis w Contrast    Narrative    EXAMINATION: CT CHEST/ABDOMEN/PELVIS W CONTRAST, 8/28/2020 10:12 AM    TECHNIQUE:  Helical CT images from the thoracic inlet through the  symphysis pubis were obtained  with contrast. Contrast dose: iopamidol  (ISOVUE-370) solution 100 mL    COMPARISON: CT 1/22/2020, 1/10/2020, bone scan 1/10/2020    HISTORY: mPC to bones, PSA dramatically improved and now plateaued,  reassess disease status; Malignant neoplasm of prostate (H); Bone  metastasis (H)    FINDINGS:    Chest: The thyroid is unremarkable. No abnormal thoracic  lymphadenopathy. Heart size upper limits of normal. Minimal  pericardial effusion. No central pulmonary embolism. The esophagus is  unremarkable.    The central tracheobronchial tree there is patent. No pleural  effusion. No concerning pulmonary nodule.    Abdomen and pelvis: Minimal hepatic hypodensity along the falciform  ligament compatible with focal fatty deposition, similar to prior. The  spleen, gallbladder, pancreas, adrenals, and kidneys are unremarkable.  No abnormally dilated loops of large or small bowel. No free fluid in  the pelvis. Bladder moderately distended and unremarkable  in  appearance. Normal appendix. Normal abdominal aorta. No abnormal  retroperitoneal, pelvic, or mesenteric lymphadenopathy.    Bones and soft tissues: Diffuse sclerotic metastatic disease  throughout the visualized axial and appendicular skeleton. This does  not appear substantially changed compared to CT 1/10/2020.      Impression    IMPRESSION: This patient with a history of prostate cancer status post  orchiectomy on hormone associated therapy:  Diffuse sclerotic metastatic disease in the visualized axial and  appendicular skeleton without substantial change compared to CT  1/10/2020. Bone scan same day pending.     CELSO HENSLEY MD (Joe)       Lab Test 11/16/20  1257 10/19/20  1228 09/25/20  1011 08/28/20  0845 08/21/20  1216 12/21/16  1643 12/21/16  1643 11/22/16  1222   PSA 39.70* 33.50* 33.80* 28.90* 29.90*   < > 60.68*  --    TESTOSTTOTAL  --   --   --   --   --   --  <2* 2*     Recent Labs   Lab Test 11/16/20  1257 10/19/20  1228 09/25/20  1011 08/28/20  0845 08/21/20  1216 08/03/20  1354 07/27/20  1254 07/06/20  1232 06/30/20  1248   PSA 39.70* 33.50* 33.80* 28.90* 29.90* 29.50* 27.50* 24.80* 27.60*   ALKPHOS 55 52 52 77 95 67 66 73 92   LDH  --   --   --   --  211 202 179 192 212          ASSESSMENT/PLAN:   1. Metastatic prostate cancer with bony metastasis:   Currently on cabazitaxel, PSA stable  Denosumab- should be on q3-4 month schedule   HTN and DM TII   ECOG PS 1   Coping with terminal cancer        #mPC--started Cabazitaxel on 1/14/2020 due to worsening bone scan and rising PSA in early January. Albert has had an excellent response to cabazitaxel and has been on this therapy for nearly an year.       We have changed his schedule to every 4 weeks from every 3 weeks dosing. He continues to have relatively stable disease despite this change. His PSA had dropped down to 24.8 in July and was 29 in August and now it is up to 39. Though it has gone up during this time, it is merely 25% rise in over  3 months time. He is tolerating the q4 week dosing much better. However clinically it appears that he is having some increased pain. This is a little concerning. If he has clinical progression, then irrespective of his PSA values, we would switch him to a different regimen.     His last scans are from 8/28/20 which had been stable. He has extensive bony metastasis and both CT scans and bone scans have not been very reflective of disease change. He has difficulty getting the scans and Lorrie notes that even the scans take a toll on him.     He had a lot of difficulty with steroids and is doing better without them. It is good to avoid steroids.     We reviewed the option of every 3 week dosing. I would not make the change right before the holiday season. Besides I am not sure that it would have a high chance of improved response.     I do not feel that his neuropathy is due to cabazitaxel though it can be. Neuropathy has been reported with cabazitaxel but all patients on cabazitaxel study had previous docetaxel as per inclusion criteria. I have not seen neuropathy with this agent. This could also represent DJD at his cervical vertebrae.     I will refill his pain medications. After a lot of discussion we have decided to stay course for next couple of months. He would have a q 4 weekly dose in mid December and then I will see him in January prior to his subsequent dose.     We reviewed the other treatment options for his prostate cancer.     He had orchiectomy done and we have spaced out his denosumab to roughly every 3 months and his last dose was on 11/12/20      Over 25 min spent with patient over telephone with more than 50% time spent in counseling and coordinating care.      Tyrel Valladares    Hematologist and Medical Oncologist  [facility]

## 2020-11-21 ENCOUNTER — VIRTUAL VISIT (OUTPATIENT)
Dept: PALLIATIVE CARE | Facility: CLINIC | Age: 55
End: 2020-11-21
Attending: SOCIAL WORKER
Payer: COMMERCIAL

## 2020-11-21 DIAGNOSIS — Z51.5 ENCOUNTER FOR PALLIATIVE CARE: Primary | ICD-10-CM

## 2020-11-21 PROCEDURE — 999N001193 HC VIDEO/TELEPHONE VISIT; NO CHARGE

## 2020-11-21 NOTE — LETTER
2020       RE: Albert Amaya  [address] [place] [id number]     Dear Colleague,    Thank you for referring your patient, Albert Amaya, to the [facility] at [facility]. Please see a copy of my visit note below.    Palliative Care Counseling Contact    Data: Scheduled for initial assessment today    Intervention: Attempted video outreach, called home and cell, left message on cell giving call back and rescheduling numbers    Response/Assessment: No show    Plan: Remain available    YUIMKO Miller, ANIBAL  Palliative Care Counseling Services  [facility]  Office Phone: [phone]  [phone] ([facility]) or [phone] ([facility])                Again, thank you for allowing me to participate in the care of your patient.      Sincerely,    ANIBAL Conner

## 2020-11-21 NOTE — LETTER
Date:January 28, 2021      Provider requested that no letter be sent. Do not send.       [facility] Information

## 2020-11-22 NOTE — PROGRESS NOTES
Palliative Care Counseling Contact    Data: Scheduled for initial assessment today    Intervention: Attempted video outreach, called home and cell, left message on cell giving call back and rescheduling numbers    Response/Assessment: No show    Plan: Remain available    YUMIKO Miller, [facility]  Palliative Care Counseling Services  [facility]  Office Phone: [phone]  [phone] ([facility]) or [phone] (Alex)

## 2020-11-23 ENCOUNTER — MYC REFILL (OUTPATIENT)
Dept: ONCOLOGY | Facility: CLINIC | Age: 55
End: 2020-11-23

## 2020-11-23 DIAGNOSIS — C79.51 BONE METASTASIS: ICD-10-CM

## 2020-11-23 DIAGNOSIS — C61 MALIGNANT NEOPLASM OF PROSTATE (H): ICD-10-CM

## 2020-11-23 RX ORDER — LORAZEPAM 0.5 MG/1
0.5 TABLET ORAL EVERY 4 HOURS PRN
Qty: 30 TABLET | Refills: 2 | Status: SHIPPED | OUTPATIENT
Start: 2020-11-23

## 2020-12-14 NOTE — PROGRESS NOTES
"Albert Amaya is a 54 year old male who is being evaluated via a billable telephone visit.      The patient has been notified of following:     \"This telephone visit will be conducted via a call between you and your physician/provider. We have found that certain health care needs can be provided without the need for a physical exam.  This service lets us provide the care you need with a short phone conversation.  If a prescription is necessary we can send it directly to your pharmacy.  If lab work is needed we can place an order for that and you can then stop by our lab to have the test done at a later time.    Telephone visits are billed at different rates depending on your insurance coverage. During this emergency period, for some insurers they may be billed the same as an in-person visit.  Please reach out to your insurance provider with any questions.    If during the course of the call the physician/provider feels a telephone visit is not appropriate, you will not be charged for this service.\"    Patient has given verbal consent for Telephone visit?  Yes    What phone number would you like to be contacted at? [phone]    How would you like to obtain your AVS? Namita Somers, [facility] December 14, 2020  3:38 PM       Dec 14, 2020  PHONE        REASON FOR VISIT: [facility], here for chemo assessment     Oncology Treatment History:   Metastatic Prostate CA treated   - s/p 6 cycles of taxotere 75mg/m2   - s/p orchiectomy on 3/18/2016   - s/p Provenge 5/13, 5/27, 6/10   - s/p Casodex 50 mg daily March/April   - s/p Zytiga 5/3/2017   - started cabazitaxel 1/14/2020      HPI: Albert is a 54 year old gentleman with metastatic prostate cancer. Albert felt a cramp in his gluteus muscle and could barely walk after doing some remodeling at home and unloading heavy truck at work. He tried flexeril and prednisone initially but eventually presented to ED on 10/5/15. He feels that initially he was nearly labeled as drug " seeker since he was in lot of discomfort and flexeril was not working. But during course of ED visits labs were drawn and he had marked elevation in Alk Phosphatase (over 1000). CT did suggest some ileus with some sclerotic bone lesions. He was admitted to the hospital. His PSA was checked and was markedly elevated at 5760 (10/6/15), repeat value 5563. He did follow up with Dr. Freire and had transrectal US guided biopsy of prostate on 10/8/15. They have the results through my chart which confirms prostate adenocarcinoma - enrico 4+3 involving majority (60-90%) portion of every single core. He started on taxotere on 10/23 and completed 6 cycles of therapy in 2/2016. He then underwent orchiectomy on 3/18/2016.   Throughout spring of 2016 Albert's PSA started to progress and after three elevations there was concern about him being hormone refractory. He was started on Provenge and enrolled in the trial including Indoximod. PSA trending up and started on Casodex mid March with progression. Switched to Zytiga 5/3/17. He held his abiraterone in July 2019 with rising PSA and fatigue. He had a decline in his PSA after holding his abiraterone. This has been on hold and he is being followed without any additional intervention. He had orchiectomy done previously and does not need ADT. In January, his PSA was increasing and Dr Valladares started cabazitaxel.      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  The first few days after chemo he still has moderate fatigue, nausea and insomnia.  Takes Ibuprofen PM the first couple days.  Occasionally has some bone pains but this is better being off the Neulasta. Not taking the steroids after chemo- his mood has been much better, less emotional and mood swings.  No GERD persistent nausea.  Eating well.  He feels overall this was a better cycle for him.  He over did it with house projects around thanksgiving and this caused some  bone pains for the rest of the week (hips/knees).  No daily pain - unrelated to him doing hard work around the house. He still is working fulltime.  Enjoying his sweet baby grand-daughter.        MEDS:           Current Outpatient Medications   Medication Sig     Acetaminophen (TYLENOL PO) Take 325 mg by mouth every 8 hours as needed for mild pain or fever Reported on 5/18/2017     amLODIPine (NORVASC) 5 MG tablet TAKE ONE TABLET BY MOUTH EVERY DAY     blood glucose monitoring (ACCU-CHEK MULTICLIX) lancets Use to test blood sugar twice times daily or as directed.     diphenhydrAMINE (BENADRYL) 25 MG tablet Take 25 mg by mouth     HYDROmorphone (DILAUDID) 2 MG tablet Take 1 tablet (2 mg) by mouth every 4 hours as needed for moderate to severe pain     IBUPROFEN PO Take by mouth as needed for moderate pain Reported on 5/18/2017     levothyroxine (SYNTHROID/LEVOTHROID) 88 MCG tablet Take 1 tablet (88 mcg) by mouth daily     LORazepam (ATIVAN) 0.5 MG tablet Take 1 tablet (0.5 mg) by mouth every 4 hours as needed (Anxiety, Nausea/Vomiting or Sleep)     metFORMIN (GLUCOPHAGE-XR) 750 MG 24 hr tablet Take 1 tablet (750 mg) by mouth daily (with dinner)     prochlorperazine (COMPAZINE) 10 MG tablet Take 1 tablet (10 mg) by mouth every 6 hours as needed (Nausea/Vomiting)     valACYclovir (VALTREX) 1000 mg tablet Take 1 tablet (1,000 mg) by mouth 3 times daily     blood glucose monitoring (NO BRAND SPECIFIED) test strip Use to test blood sugars twice daily or as directed (fasting, rotate then second time - before lunch, before supper and bedtime) (Patient not taking: Reported on 8/12/2019)     EPINEPHrine (EPIPEN 2-CHARITY) 0.3 MG/0.3ML injection 2-pack Inject 0.3 mLs (0.3 mg) into the muscle once as needed for anaphylaxis (Patient not taking: Reported on 1/14/2020)    EXAM:    Voice is clear and strong. No audible stridor, wheezing, or respiratory distress. The remainder of PE was deferred due to PHE.      LABS:  "12/15/2020    ASSESSMENT/PLAN:   1. Metastatic prostate cancer with bony metastasis:   Currently on cabazitaxel, PSA stable, slightly increased recently  Denosumab- should be on q3-4 month schedule   HTN and DM TII   ECOG PS 1   Coping with terminal cancer        #mPC--started Cabazitaxel on 1/14/2020 due to worsening bone scan and rising PSA in early January. Albert has had an excellent response to Jevtana, however he is starting to get progressive fatigue.  We did obtain new imaging in August, just to ensure we weren't missing a neuroendocrine transformation, or progressive disease, but they showed improved disease.  Albert and I discussed that at this time he can choose to back off on the chemotherapy but ultimately, its what is controlling his disease.  It makes better sense to try and manage the fatigue and other side effects. We cut out Neulasta and some of the post chemo steroids- this has helped. We discussed continuing on this until his disease becomes worse and then considering Xtandi/Xofigo in the future.   -OK for Jevtana, no neulasta needed- will await PSA prior to 12/15 chemo and change January lab to the day before, given there is some slight progression     #fatigue- Ritalin prn, chemo at 4 week intervals has helped   #pain-intermittent hip/knee pains- unsure if \"over doing it\" or 2/2 to disease.  Will await PSA on 12/15   #bone-h/o Xgeva, stopped in March of 2019. Taking oral calcium/Vit D. I think an every 3-4 months makes sense. (he would like to come in on a diff day and not do this the same time as chemo)  #endo--dropped to one pill once a day of metformin. BS have been ~100;    #erectile dysfunction- met with Dr Toth in urology, has cialis to try  #genetics- referral was made to palliative SW for counseling- don't see this was scheduled?         Phone call duration: 25 minutes    Melanie Dan PA-C       9/25/2020 10:11 10/19/2020 12:28 11/16/2020 12:57 12/15/2020 09:29   PSA 33.80 (H) 33.50 (H) " 39.70 (H) 40.00 (H)     I called Albert with his PSA results.  We decided given his PSA is stable- we'll continue with chemo.   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 She agreed that they would be interested in connecting with [facility].  We discussed his neuropathy does not require a dose modification.  Additional 35 min spent with wife today. LGR

## 2020-12-15 NOTE — PROGRESS NOTES
Infusion Nursing Note:  Albert Amaya presents today for Day 1 Cycle 15 Rashawna  Patient seen by provider today: Yes: Anay FELIX via phone encounter   present during visit today: Not Applicable.    Note: Patient presents to infusion feeling well. Pt states he talked with Anay FELIX yesterday and today stating no acute complaints or concerns not addressed with PA. Specifically, patient denies s/s of infection such as fever, shortness of breath, cough, chest pain, sore throat, or changes in taste/smell.    Intravenous Access:  Peripheral IV placed via Vascular Access    Treatment Conditions:  Lab Results   Component Value Date    HGB 11.6 12/15/2020     Lab Results   Component Value Date    WBC 6.0 12/15/2020      Lab Results   Component Value Date    ANEU 3.2 12/15/2020     Lab Results   Component Value Date     12/15/2020      Lab Results   Component Value Date     12/15/2020                   Lab Results   Component Value Date    POTASSIUM 5.3 12/15/2020           Lab Results   Component Value Date    MAG 2.2 12/21/2016            Lab Results   Component Value Date    CR 0.88 12/15/2020                   Lab Results   Component Value Date    WILL 9.0 12/15/2020                Lab Results   Component Value Date    BILITOTAL 0.5 12/15/2020           Lab Results   Component Value Date    ALBUMIN 3.8 12/15/2020                    Lab Results   Component Value Date    ALT 28 12/15/2020           Lab Results   Component Value Date    AST 16 12/15/2020       Results reviewed, labs MET treatment parameters, ok to proceed with treatment.      Post Infusion Assessment:  Patient tolerated infusion without incident.  Blood return noted pre and post infusion.  Site patent and intact, free from redness, edema or discomfort.  No evidence of extravasations.  Access discontinued per protocol.       Discharge Plan:   Patient declined prescription refills.  Discharge instructions reviewed with:  Patient.  Patient and/or family verbalized understanding of discharge instructions and all questions answered.  Copy of AVS reviewed with patient and/or family.  Patient will return 1/12 for next appointment.  Patient discharged in stable condition accompanied by: self.  Departure Mode: Ambulatory.  Face to Face time: 0 minutes .    David Malloy RN

## 2020-12-15 NOTE — NURSING NOTE
Chief Complaint   Patient presents with     Blood Draw     Labs drawn via   by RN in lab. Lab only.      Linsey Rivers RN

## 2020-12-15 NOTE — PATIENT INSTRUCTIONS
[facility] Triage and after hours / weekends / holidays:  [phone]    Please call the triage or after hours line if you experience a temperature greater than or equal to 100.5, shaking chills, have uncontrolled nausea, vomiting and/or diarrhea, dizziness, shortness of breath, chest pain, bleeding, unexplained bruising, or if you have any other new/concerning symptoms, questions or concerns.      If you are having any concerning symptoms or wish to speak to a provider before your next infusion visit, please call your care coordinator or triage to notify them so we can adequately serve you.     If you need a refill on a narcotic prescription or other medication, please call before your infusion appointment.                 December 2020 Sunday Monday Tuesday Wednesday Thursday Friday Saturday             1     2     3     4     5       6     7     8     9     10     11     12       13     14    TELEPHONE VISIT RETURN   3:50 PM   (50 min.)   Anay Dan PA-C   [facility] 15    LAB PERIPHERAL   9:00 AM   (15 min.)   [facility] DRAW   [facility] ONC INFUSION 120   1:00 PM   (120 min.)    ONCOLOGY INFUSION   [facility] 16     17     18     19       20     21     22     23     24  Happy Birthday!     25     26       27     28     29 30 31 January 2021 Sunday Monday Tuesday Wednesday Thursday Friday Saturday                            1     2       3     4     5     6     7     8     9       10     11    LAB PERIPHERAL   9:30 AM   (15 min.)    [facility] DRAW   [facility] 12    TELEPHONE VISIT RETURN  12:30 PM   (30 min.)   Tyrel Valladares MD   [facility] ONC INFUSION 120   1:30 PM   (120 min.)    ONCOLOGY INFUSION   [facility] 13     14     15     16       17      18     19     20     21     22     23       24     25     26     27     28     29     30       31                                                   Lab Results:  Recent Results (from the past 12 hour(s))   CBC with platelets differential    Collection Time: 12/15/20  9:29 AM   Result Value Ref Range    WBC 6.0 4.0 - 11.0 10e9/L    RBC Count 3.88 (L) 4.4 - 5.9 10e12/L    Hemoglobin 11.6 (L) 13.3 - 17.7 g/dL    Hematocrit 35.7 (L) 40.0 - 53.0 %    MCV 92 78 - 100 fl    MCH 29.9 26.5 - 33.0 pg    MCHC 32.5 31.5 - 36.5 g/dL    RDW 14.2 10.0 - 15.0 %    Platelet Count 291 150 - 450 10e9/L    Diff Method Automated Method     % Neutrophils 53.3 %    % Lymphocytes 36.7 %    % Monocytes 7.4 %    % Eosinophils 1.5 %    % Basophils 0.8 %    % Immature Granulocytes 0.3 %    Nucleated RBCs 0 0 /100    Absolute Neutrophil 3.2 1.6 - 8.3 10e9/L    Absolute Lymphocytes 2.2 0.8 - 5.3 10e9/L    Absolute Monocytes 0.4 0.0 - 1.3 10e9/L    Absolute Eosinophils 0.1 0.0 - 0.7 10e9/L    Absolute Basophils 0.1 0.0 - 0.2 10e9/L    Abs Immature Granulocytes 0.0 0 - 0.4 10e9/L    Absolute Nucleated RBC 0.0    PSA tumor marker    Collection Time: 12/15/20  9:29 AM   Result Value Ref Range    PSA 40.00 (H) 0 - 4 ug/L   Comprehensive metabolic panel    Collection Time: 12/15/20  9:29 AM   Result Value Ref Range    Sodium 141 133 - 144 mmol/L    Potassium 5.3 3.4 - 5.3 mmol/L    Chloride 110 (H) 94 - 109 mmol/L    Carbon Dioxide 27 20 - 32 mmol/L    Anion Gap 4 3 - 14 mmol/L    Glucose 113 (H) 70 - 99 mg/dL    Urea Nitrogen 18 7 - 30 mg/dL    Creatinine 0.88 0.66 - 1.25 mg/dL    GFR Estimate >90 >60 mL/min/[1.73_m2]    GFR Estimate If Black >90 >60 mL/min/[1.73_m2]    Calcium 9.0 8.5 - 10.1 mg/dL    Bilirubin Total 0.5 0.2 - 1.3 mg/dL    Albumin 3.8 3.4 - 5.0 g/dL    Protein Total 7.4 6.8 - 8.8 g/dL    Alkaline Phosphatase 60 40 - 150 U/L    ALT 28 0 - 70 U/L    AST 16 0 - 45 U/L   Hemoglobin A1c    Collection Time: 12/15/20  9:29 AM   Result Value  Ref Range    Hemoglobin A1C 6.1 (H) 0 - 5.6 %   Cortisol    Collection Time: 12/15/20  9:29 AM   Result Value Ref Range    Cortisol Serum 10.6 4 - 22 ug/dL   TSH with free T4 reflex    Collection Time: 12/15/20  9:29 AM   Result Value Ref Range    TSH 2.69 0.40 - 4.00 mU/L

## 2021-01-01 ENCOUNTER — APPOINTMENT (OUTPATIENT)
Dept: RADIATION ONCOLOGY | Facility: CLINIC | Age: 56
End: 2021-01-01
Attending: RADIOLOGY
Payer: COMMERCIAL

## 2021-01-01 ENCOUNTER — ONCOLOGY VISIT (OUTPATIENT)
Dept: ONCOLOGY | Facility: CLINIC | Age: 56
End: 2021-01-01
Attending: PHYSICIAN ASSISTANT
Payer: COMMERCIAL

## 2021-01-01 ENCOUNTER — TELEPHONE (OUTPATIENT)
Dept: ONCOLOGY | Facility: CLINIC | Age: 56
End: 2021-01-01

## 2021-01-01 ENCOUNTER — INFUSION THERAPY VISIT (OUTPATIENT)
Dept: ONCOLOGY | Facility: CLINIC | Age: 56
End: 2021-01-01
Attending: INTERNAL MEDICINE
Payer: COMMERCIAL

## 2021-01-01 ENCOUNTER — OFFICE VISIT (OUTPATIENT)
Dept: RADIATION ONCOLOGY | Facility: CLINIC | Age: 56
End: 2021-01-01
Attending: PHYSICIAN ASSISTANT
Payer: COMMERCIAL

## 2021-01-01 ENCOUNTER — PATIENT OUTREACH (OUTPATIENT)
Dept: ONCOLOGY | Facility: CLINIC | Age: 56
End: 2021-01-01

## 2021-01-01 ENCOUNTER — ONCOLOGY VISIT (OUTPATIENT)
Dept: RADIATION ONCOLOGY | Facility: CLINIC | Age: 56
End: 2021-01-01

## 2021-01-01 ENCOUNTER — HEALTH MAINTENANCE LETTER (OUTPATIENT)
Age: 56
End: 2021-01-01

## 2021-01-01 ENCOUNTER — APPOINTMENT (OUTPATIENT)
Dept: LAB | Facility: CLINIC | Age: 56
End: 2021-01-01
Attending: INTERNAL MEDICINE
Payer: COMMERCIAL

## 2021-01-01 ENCOUNTER — DOCUMENTATION ONLY (OUTPATIENT)
Dept: ONCOLOGY | Facility: CLINIC | Age: 56
End: 2021-01-01

## 2021-01-01 ENCOUNTER — APPOINTMENT (OUTPATIENT)
Dept: LAB | Facility: CLINIC | Age: 56
End: 2021-01-01
Attending: PHYSICIAN ASSISTANT
Payer: COMMERCIAL

## 2021-01-01 ENCOUNTER — TELEPHONE (OUTPATIENT)
Dept: PALLIATIVE CARE | Facility: CLINIC | Age: 56
End: 2021-01-01

## 2021-01-01 ENCOUNTER — HOSPITAL ENCOUNTER (OUTPATIENT)
Dept: CT IMAGING | Facility: CLINIC | Age: 56
Discharge: HOME | End: 2021-07-13
Attending: RADIOLOGY | Admitting: RADIOLOGY
Payer: COMMERCIAL

## 2021-01-01 ENCOUNTER — DOCUMENTATION ONLY (OUTPATIENT)
Dept: RADIATION ONCOLOGY | Facility: CLINIC | Age: 56
End: 2021-01-01

## 2021-01-01 ENCOUNTER — VIRTUAL VISIT (OUTPATIENT)
Dept: PALLIATIVE CARE | Facility: CLINIC | Age: 56
End: 2021-01-01
Attending: SOCIAL WORKER
Payer: COMMERCIAL

## 2021-01-01 VITALS — WEIGHT: 187 LBS | BODY MASS INDEX: 28.44 KG/M2

## 2021-01-01 VITALS
OXYGEN SATURATION: 98 % | TEMPERATURE: 98.2 F | HEART RATE: 69 BPM | RESPIRATION RATE: 17 BRPM | BODY MASS INDEX: 28.59 KG/M2 | WEIGHT: 188 LBS | SYSTOLIC BLOOD PRESSURE: 124 MMHG | DIASTOLIC BLOOD PRESSURE: 86 MMHG

## 2021-01-01 VITALS
OXYGEN SATURATION: 97 % | RESPIRATION RATE: 16 BRPM | HEART RATE: 73 BPM | TEMPERATURE: 98.1 F | BODY MASS INDEX: 28.12 KG/M2 | SYSTOLIC BLOOD PRESSURE: 139 MMHG | WEIGHT: 184.9 LBS | DIASTOLIC BLOOD PRESSURE: 98 MMHG

## 2021-01-01 VITALS
DIASTOLIC BLOOD PRESSURE: 98 MMHG | OXYGEN SATURATION: 98 % | SYSTOLIC BLOOD PRESSURE: 158 MMHG | HEART RATE: 57 BPM | WEIGHT: 185.6 LBS | RESPIRATION RATE: 16 BRPM | BODY MASS INDEX: 28.23 KG/M2

## 2021-01-01 VITALS — DIASTOLIC BLOOD PRESSURE: 95 MMHG | BODY MASS INDEX: 28.14 KG/M2 | SYSTOLIC BLOOD PRESSURE: 158 MMHG | WEIGHT: 185 LBS

## 2021-01-01 VITALS
DIASTOLIC BLOOD PRESSURE: 93 MMHG | RESPIRATION RATE: 16 BRPM | WEIGHT: 187.1 LBS | BODY MASS INDEX: 28.46 KG/M2 | SYSTOLIC BLOOD PRESSURE: 134 MMHG | HEART RATE: 78 BPM | OXYGEN SATURATION: 98 % | TEMPERATURE: 98.2 F

## 2021-01-01 DIAGNOSIS — C61 MALIGNANT NEOPLASM OF PROSTATE (H): ICD-10-CM

## 2021-01-01 DIAGNOSIS — C61 PROSTATE CANCER METASTATIC TO BONE (H): Primary | ICD-10-CM

## 2021-01-01 DIAGNOSIS — C79.51 BONE METASTASIS: ICD-10-CM

## 2021-01-01 DIAGNOSIS — C61 MALIGNANT NEOPLASM OF PROSTATE (H): Primary | ICD-10-CM

## 2021-01-01 DIAGNOSIS — I10 ESSENTIAL HYPERTENSION: ICD-10-CM

## 2021-01-01 DIAGNOSIS — D70.1 CHEMOTHERAPY-INDUCED NEUTROPENIA (H): ICD-10-CM

## 2021-01-01 DIAGNOSIS — R53.0 NEOPLASTIC MALIGNANT RELATED FATIGUE: ICD-10-CM

## 2021-01-01 DIAGNOSIS — Z51.5 ENCOUNTER FOR PALLIATIVE CARE: Primary | ICD-10-CM

## 2021-01-01 DIAGNOSIS — E03.9 HYPOTHYROIDISM, UNSPECIFIED TYPE: ICD-10-CM

## 2021-01-01 DIAGNOSIS — C79.51 PROSTATE CANCER METASTATIC TO BONE (H): Primary | ICD-10-CM

## 2021-01-01 DIAGNOSIS — R61 NIGHT SWEATS: ICD-10-CM

## 2021-01-01 DIAGNOSIS — E03.4 HYPOTHYROIDISM DUE TO ACQUIRED ATROPHY OF THYROID: ICD-10-CM

## 2021-01-01 DIAGNOSIS — G62.9 NEUROPATHY: ICD-10-CM

## 2021-01-01 DIAGNOSIS — T45.1X5A CHEMOTHERAPY-INDUCED NEUTROPENIA (H): Primary | ICD-10-CM

## 2021-01-01 DIAGNOSIS — C61 PROSTATE CANCER METASTATIC TO BONE (H): ICD-10-CM

## 2021-01-01 DIAGNOSIS — F43.29 ADJUSTMENT DISORDER WITH MIXED EMOTIONAL FEATURES: Primary | ICD-10-CM

## 2021-01-01 DIAGNOSIS — T45.1X5A CHEMOTHERAPY-INDUCED NEUTROPENIA (H): ICD-10-CM

## 2021-01-01 DIAGNOSIS — F43.29 ADJUSTMENT DISORDER WITH MIXED EMOTIONAL FEATURES: ICD-10-CM

## 2021-01-01 DIAGNOSIS — D70.1 CHEMOTHERAPY-INDUCED NEUTROPENIA (H): Primary | ICD-10-CM

## 2021-01-01 DIAGNOSIS — C79.51 PROSTATE CANCER METASTATIC TO BONE (H): ICD-10-CM

## 2021-01-01 LAB
ALBUMIN SERPL-MCNC: 4 G/DL (ref 3.4–5)
ALBUMIN SERPL-MCNC: 4 G/DL (ref 3.4–5)
ALBUMIN SERPL-MCNC: 4.1 G/DL (ref 3.4–5)
ALBUMIN SERPL-MCNC: 4.2 G/DL (ref 3.4–5)
ALBUMIN SERPL-MCNC: 4.2 G/DL (ref 3.4–5)
ALP SERPL-CCNC: 107 U/L (ref 40–150)
ALP SERPL-CCNC: 60 U/L (ref 40–150)
ALP SERPL-CCNC: 67 U/L (ref 40–150)
ALP SERPL-CCNC: 70 U/L (ref 40–150)
ALP SERPL-CCNC: 76 U/L (ref 40–150)
ALT SERPL W P-5'-P-CCNC: 27 U/L (ref 0–70)
ALT SERPL W P-5'-P-CCNC: 29 U/L (ref 0–70)
ALT SERPL W P-5'-P-CCNC: 32 U/L (ref 0–70)
ALT SERPL W P-5'-P-CCNC: 32 U/L (ref 0–70)
ALT SERPL W P-5'-P-CCNC: 46 U/L (ref 0–70)
ANION GAP SERPL CALCULATED.3IONS-SCNC: 6 MMOL/L (ref 3–14)
ANION GAP SERPL CALCULATED.3IONS-SCNC: 7 MMOL/L (ref 3–14)
AST SERPL W P-5'-P-CCNC: 12 U/L (ref 0–45)
AST SERPL W P-5'-P-CCNC: 13 U/L (ref 0–45)
AST SERPL W P-5'-P-CCNC: 19 U/L (ref 0–45)
AST SERPL W P-5'-P-CCNC: 21 U/L (ref 0–45)
AST SERPL W P-5'-P-CCNC: 21 U/L (ref 0–45)
BASOPHILS # BLD AUTO: 0 10E9/L (ref 0–0.2)
BASOPHILS # BLD AUTO: 0.1 10E9/L (ref 0–0.2)
BASOPHILS NFR BLD AUTO: 0.4 %
BASOPHILS NFR BLD AUTO: 0.5 %
BASOPHILS NFR BLD AUTO: 0.5 %
BASOPHILS NFR BLD AUTO: 0.6 %
BASOPHILS NFR BLD AUTO: 0.6 %
BILIRUB SERPL-MCNC: 0.5 MG/DL (ref 0.2–1.3)
BILIRUB SERPL-MCNC: 0.6 MG/DL (ref 0.2–1.3)
BILIRUB SERPL-MCNC: 0.8 MG/DL (ref 0.2–1.3)
BUN SERPL-MCNC: 12 MG/DL (ref 7–30)
BUN SERPL-MCNC: 15 MG/DL (ref 7–30)
BUN SERPL-MCNC: 18 MG/DL (ref 7–30)
BUN SERPL-MCNC: 19 MG/DL (ref 7–30)
BUN SERPL-MCNC: 20 MG/DL (ref 7–30)
CALCIUM SERPL-MCNC: 9.3 MG/DL (ref 8.5–10.1)
CALCIUM SERPL-MCNC: 9.4 MG/DL (ref 8.5–10.1)
CALCIUM SERPL-MCNC: 9.5 MG/DL (ref 8.5–10.1)
CALCIUM SERPL-MCNC: 9.6 MG/DL (ref 8.5–10.1)
CALCIUM SERPL-MCNC: 9.7 MG/DL (ref 8.5–10.1)
CHLORIDE SERPL-SCNC: 105 MMOL/L (ref 94–109)
CHLORIDE SERPL-SCNC: 106 MMOL/L (ref 94–109)
CHLORIDE SERPL-SCNC: 106 MMOL/L (ref 94–109)
CHLORIDE SERPL-SCNC: 107 MMOL/L (ref 94–109)
CHLORIDE SERPL-SCNC: 109 MMOL/L (ref 94–109)
CO2 SERPL-SCNC: 25 MMOL/L (ref 20–32)
CO2 SERPL-SCNC: 26 MMOL/L (ref 20–32)
CO2 SERPL-SCNC: 27 MMOL/L (ref 20–32)
CREAT SERPL-MCNC: 0.74 MG/DL (ref 0.66–1.25)
CREAT SERPL-MCNC: 0.84 MG/DL (ref 0.66–1.25)
CREAT SERPL-MCNC: 0.85 MG/DL (ref 0.66–1.25)
CREAT SERPL-MCNC: 0.85 MG/DL (ref 0.66–1.25)
CREAT SERPL-MCNC: 0.9 MG/DL (ref 0.66–1.25)
DIFFERENTIAL METHOD BLD: ABNORMAL
EOSINOPHIL # BLD AUTO: 0.1 10E9/L (ref 0–0.7)
EOSINOPHIL # BLD AUTO: 0.2 10E9/L (ref 0–0.7)
EOSINOPHIL # BLD AUTO: 0.2 10E9/L (ref 0–0.7)
EOSINOPHIL NFR BLD AUTO: 1.5 %
EOSINOPHIL NFR BLD AUTO: 1.6 %
EOSINOPHIL NFR BLD AUTO: 2.3 %
EOSINOPHIL NFR BLD AUTO: 2.6 %
EOSINOPHIL NFR BLD AUTO: 3.5 %
ERYTHROCYTE [DISTWIDTH] IN BLOOD BY AUTOMATED COUNT: 13.7 % (ref 10–15)
ERYTHROCYTE [DISTWIDTH] IN BLOOD BY AUTOMATED COUNT: 13.8 % (ref 10–15)
ERYTHROCYTE [DISTWIDTH] IN BLOOD BY AUTOMATED COUNT: 13.9 % (ref 10–15)
ERYTHROCYTE [DISTWIDTH] IN BLOOD BY AUTOMATED COUNT: 14.6 % (ref 10–15)
ERYTHROCYTE [DISTWIDTH] IN BLOOD BY AUTOMATED COUNT: 14.6 % (ref 10–15)
GFR SERPL CREATININE-BSD FRML MDRD: >90 ML/MIN/{1.73_M2}
GLUCOSE SERPL-MCNC: 100 MG/DL (ref 70–99)
GLUCOSE SERPL-MCNC: 102 MG/DL (ref 70–99)
GLUCOSE SERPL-MCNC: 105 MG/DL (ref 70–99)
GLUCOSE SERPL-MCNC: 117 MG/DL (ref 70–99)
GLUCOSE SERPL-MCNC: 87 MG/DL (ref 70–99)
HCT VFR BLD AUTO: 34.9 % (ref 40–53)
HCT VFR BLD AUTO: 35.2 % (ref 40–53)
HCT VFR BLD AUTO: 35.8 % (ref 40–53)
HCT VFR BLD AUTO: 37.1 % (ref 40–53)
HCT VFR BLD AUTO: 37.4 % (ref 40–53)
HGB BLD-MCNC: 11.5 G/DL (ref 13.3–17.7)
HGB BLD-MCNC: 11.7 G/DL (ref 13.3–17.7)
HGB BLD-MCNC: 12 G/DL (ref 13.3–17.7)
HGB BLD-MCNC: 12.2 G/DL (ref 13.3–17.7)
HGB BLD-MCNC: 12.3 G/DL (ref 13.3–17.7)
IMM GRANULOCYTES # BLD: 0 10E9/L (ref 0–0.4)
IMM GRANULOCYTES NFR BLD: 0.3 %
IMM GRANULOCYTES NFR BLD: 0.4 %
LDH SERPL L TO P-CCNC: 171 U/L (ref 85–227)
LDH SERPL L TO P-CCNC: 182 U/L (ref 85–227)
LDH SERPL L TO P-CCNC: 198 U/L (ref 85–227)
LDH SERPL L TO P-CCNC: 200 U/L (ref 85–227)
LYMPHOCYTES # BLD AUTO: 2.3 10E9/L (ref 0.8–5.3)
LYMPHOCYTES # BLD AUTO: 2.4 10E9/L (ref 0.8–5.3)
LYMPHOCYTES # BLD AUTO: 2.6 10E9/L (ref 0.8–5.3)
LYMPHOCYTES # BLD AUTO: 2.7 10E9/L (ref 0.8–5.3)
LYMPHOCYTES # BLD AUTO: 2.7 10E9/L (ref 0.8–5.3)
LYMPHOCYTES NFR BLD AUTO: 34 %
LYMPHOCYTES NFR BLD AUTO: 38 %
LYMPHOCYTES NFR BLD AUTO: 38.5 %
LYMPHOCYTES NFR BLD AUTO: 39.3 %
LYMPHOCYTES NFR BLD AUTO: 42.2 %
MCH RBC QN AUTO: 29.7 PG (ref 26.5–33)
MCH RBC QN AUTO: 29.9 PG (ref 26.5–33)
MCH RBC QN AUTO: 29.9 PG (ref 26.5–33)
MCH RBC QN AUTO: 30.3 PG (ref 26.5–33)
MCH RBC QN AUTO: 30.3 PG (ref 26.5–33)
MCHC RBC AUTO-ENTMCNC: 32.6 G/DL (ref 31.5–36.5)
MCHC RBC AUTO-ENTMCNC: 33 G/DL (ref 31.5–36.5)
MCHC RBC AUTO-ENTMCNC: 33.2 G/DL (ref 31.5–36.5)
MCHC RBC AUTO-ENTMCNC: 33.2 G/DL (ref 31.5–36.5)
MCHC RBC AUTO-ENTMCNC: 33.5 G/DL (ref 31.5–36.5)
MCV RBC AUTO: 90 FL (ref 78–100)
MCV RBC AUTO: 90 FL (ref 78–100)
MCV RBC AUTO: 91 FL (ref 78–100)
MONOCYTES # BLD AUTO: 0.4 10E9/L (ref 0–1.3)
MONOCYTES # BLD AUTO: 0.5 10E9/L (ref 0–1.3)
MONOCYTES # BLD AUTO: 0.6 10E9/L (ref 0–1.3)
MONOCYTES NFR BLD AUTO: 6.5 %
MONOCYTES NFR BLD AUTO: 6.9 %
MONOCYTES NFR BLD AUTO: 7 %
MONOCYTES NFR BLD AUTO: 7.3 %
MONOCYTES NFR BLD AUTO: 7.5 %
NEUTROPHILS # BLD AUTO: 2.7 10E9/L (ref 1.6–8.3)
NEUTROPHILS # BLD AUTO: 3.1 10E9/L (ref 1.6–8.3)
NEUTROPHILS # BLD AUTO: 3.4 10E9/L (ref 1.6–8.3)
NEUTROPHILS # BLD AUTO: 3.5 10E9/L (ref 1.6–8.3)
NEUTROPHILS # BLD AUTO: 4.4 10E9/L (ref 1.6–8.3)
NEUTROPHILS NFR BLD AUTO: 48.4 %
NEUTROPHILS NFR BLD AUTO: 49.8 %
NEUTROPHILS NFR BLD AUTO: 51.1 %
NEUTROPHILS NFR BLD AUTO: 51.7 %
NEUTROPHILS NFR BLD AUTO: 56.1 %
NRBC # BLD AUTO: 0 10*3/UL
NRBC BLD AUTO-RTO: 0 /100
PLATELET # BLD AUTO: 284 10E9/L (ref 150–450)
PLATELET # BLD AUTO: 305 10E9/L (ref 150–450)
PLATELET # BLD AUTO: 306 10E9/L (ref 150–450)
PLATELET # BLD AUTO: 323 10E9/L (ref 150–450)
PLATELET # BLD AUTO: 338 10E9/L (ref 150–450)
POTASSIUM SERPL-SCNC: 3.7 MMOL/L (ref 3.4–5.3)
POTASSIUM SERPL-SCNC: 4 MMOL/L (ref 3.4–5.3)
POTASSIUM SERPL-SCNC: 4.2 MMOL/L (ref 3.4–5.3)
POTASSIUM SERPL-SCNC: 4.4 MMOL/L (ref 3.4–5.3)
POTASSIUM SERPL-SCNC: 4.5 MMOL/L (ref 3.4–5.3)
PROT SERPL-MCNC: 7.4 G/DL (ref 6.8–8.8)
PROT SERPL-MCNC: 7.7 G/DL (ref 6.8–8.8)
PROT SERPL-MCNC: 7.7 G/DL (ref 6.8–8.8)
PROT SERPL-MCNC: 7.9 G/DL (ref 6.8–8.8)
PROT SERPL-MCNC: 8 G/DL (ref 6.8–8.8)
PSA SERPL-MCNC: 111 UG/L (ref 0–4)
PSA SERPL-MCNC: 139 UG/L (ref 0–4)
PSA SERPL-MCNC: 41.6 UG/L (ref 0–4)
PSA SERPL-MCNC: 50.1 UG/L (ref 0–4)
PSA SERPL-MCNC: 63.9 UG/L (ref 0–4)
RBC # BLD AUTO: 3.85 10E12/L (ref 4.4–5.9)
RBC # BLD AUTO: 3.86 10E12/L (ref 4.4–5.9)
RBC # BLD AUTO: 3.96 10E12/L (ref 4.4–5.9)
RBC # BLD AUTO: 4.11 10E12/L (ref 4.4–5.9)
RBC # BLD AUTO: 4.12 10E12/L (ref 4.4–5.9)
SODIUM SERPL-SCNC: 138 MMOL/L (ref 133–144)
SODIUM SERPL-SCNC: 138 MMOL/L (ref 133–144)
SODIUM SERPL-SCNC: 139 MMOL/L (ref 133–144)
SODIUM SERPL-SCNC: 140 MMOL/L (ref 133–144)
SODIUM SERPL-SCNC: 141 MMOL/L (ref 133–144)
WBC # BLD AUTO: 5.7 10E9/L (ref 4–11)
WBC # BLD AUTO: 6.1 10E9/L (ref 4–11)
WBC # BLD AUTO: 6.8 10E9/L (ref 4–11)
WBC # BLD AUTO: 6.8 10E9/L (ref 4–11)
WBC # BLD AUTO: 7.9 10E9/L (ref 4–11)

## 2021-01-01 PROCEDURE — G0463 HOSPITAL OUTPT CLINIC VISIT: HCPCS

## 2021-01-01 PROCEDURE — 77412 RADIATION TX DELIVERY LVL 3: CPT | Performed by: RADIOLOGY

## 2021-01-01 PROCEDURE — 99215 OFFICE O/P EST HI 40 MIN: CPT | Mod: TEL | Performed by: INTERNAL MEDICINE

## 2021-01-01 PROCEDURE — 96413 CHEMO IV INFUSION 1 HR: CPT

## 2021-01-01 PROCEDURE — 80053 COMPREHEN METABOLIC PANEL: CPT | Performed by: INTERNAL MEDICINE

## 2021-01-01 PROCEDURE — 99215 OFFICE O/P EST HI 40 MIN: CPT | Performed by: PHYSICIAN ASSISTANT

## 2021-01-01 PROCEDURE — 74177 CT ABD & PELVIS W/CONTRAST: CPT | Mod: 26 | Performed by: RADIOLOGY

## 2021-01-01 PROCEDURE — 250N000011 HC RX IP 250 OP 636: Performed by: RADIOLOGY

## 2021-01-01 PROCEDURE — 77427 RADIATION TX MANAGEMENT X5: CPT | Mod: GC | Performed by: RADIOLOGY

## 2021-01-01 PROCEDURE — 77402 RADIATION TX DELIVERY LVL 1: CPT | Performed by: RADIOLOGY

## 2021-01-01 PROCEDURE — 77280 THER RAD SIMULAJ FIELD SMPL: CPT | Performed by: RADIOLOGY

## 2021-01-01 PROCEDURE — 99207 PR NO CHARGE LOS: CPT

## 2021-01-01 PROCEDURE — 85025 COMPLETE CBC W/AUTO DIFF WBC: CPT | Performed by: INTERNAL MEDICINE

## 2021-01-01 PROCEDURE — 77334 RADIATION TREATMENT AID(S): CPT | Performed by: RADIOLOGY

## 2021-01-01 PROCEDURE — 258N000003 HC RX IP 258 OP 636: Performed by: INTERNAL MEDICINE

## 2021-01-01 PROCEDURE — 96367 TX/PROPH/DG ADDL SEQ IV INF: CPT

## 2021-01-01 PROCEDURE — 77290 THER RAD SIMULAJ FIELD CPLX: CPT | Performed by: RADIOLOGY

## 2021-01-01 PROCEDURE — 99214 OFFICE O/P EST MOD 30 MIN: CPT | Mod: TEL | Performed by: PHYSICIAN ASSISTANT

## 2021-01-01 PROCEDURE — 77387 GUIDANCE FOR RADJ TX DLVR: CPT | Performed by: RADIOLOGY

## 2021-01-01 PROCEDURE — 999N001193 HC VIDEO/TELEPHONE VISIT; NO CHARGE

## 2021-01-01 PROCEDURE — 71260 CT THORAX DX C+: CPT

## 2021-01-01 PROCEDURE — 999N000127 HC STATISTIC PERIPHERAL IV START W US GUIDANCE

## 2021-01-01 PROCEDURE — 250N000009 HC RX 250: Performed by: INTERNAL MEDICINE

## 2021-01-01 PROCEDURE — 83615 LACTATE (LD) (LDH) ENZYME: CPT | Performed by: INTERNAL MEDICINE

## 2021-01-01 PROCEDURE — 90847 FAMILY PSYTX W/PT 50 MIN: CPT | Mod: TEL | Performed by: SOCIAL WORKER

## 2021-01-01 PROCEDURE — 77280 THER RAD SIMULAJ FIELD SMPL: CPT | Mod: 26 | Performed by: RADIOLOGY

## 2021-01-01 PROCEDURE — 77290 THER RAD SIMULAJ FIELD CPLX: CPT | Mod: 26 | Performed by: RADIOLOGY

## 2021-01-01 PROCEDURE — 77014 PR CT GUIDE FOR PLACEMENT RADIATION THERAPY FIELDS: CPT | Mod: 26 | Performed by: RADIOLOGY

## 2021-01-01 PROCEDURE — 96375 TX/PRO/DX INJ NEW DRUG ADDON: CPT

## 2021-01-01 PROCEDURE — 36415 COLL VENOUS BLD VENIPUNCTURE: CPT

## 2021-01-01 PROCEDURE — 71260 CT THORAX DX C+: CPT | Mod: 26 | Performed by: RADIOLOGY

## 2021-01-01 PROCEDURE — 84153 ASSAY OF PSA TOTAL: CPT | Performed by: INTERNAL MEDICINE

## 2021-01-01 PROCEDURE — 250N000011 HC RX IP 250 OP 636: Mod: JW | Performed by: INTERNAL MEDICINE

## 2021-01-01 PROCEDURE — 77336 RADIATION PHYSICS CONSULT: CPT | Performed by: RADIOLOGY

## 2021-01-01 PROCEDURE — 77334 RADIATION TREATMENT AID(S): CPT | Mod: 26 | Performed by: RADIOLOGY

## 2021-01-01 PROCEDURE — G0463 HOSPITAL OUTPT CLINIC VISIT: HCPCS | Performed by: RADIOLOGY

## 2021-01-01 RX ORDER — SODIUM CHLORIDE 9 MG/ML
1000 INJECTION, SOLUTION INTRAVENOUS CONTINUOUS PRN
Status: CANCELLED
Start: 2021-01-01

## 2021-01-01 RX ORDER — EPINEPHRINE 1 MG/ML
0.3 INJECTION, SOLUTION INTRAMUSCULAR; SUBCUTANEOUS EVERY 5 MIN PRN
Status: CANCELLED | OUTPATIENT
Start: 2021-01-01

## 2021-01-01 RX ORDER — NALOXONE HYDROCHLORIDE 0.4 MG/ML
.1-.4 INJECTION, SOLUTION INTRAMUSCULAR; INTRAVENOUS; SUBCUTANEOUS
Status: CANCELLED | OUTPATIENT
Start: 2021-01-01

## 2021-01-01 RX ORDER — IOPAMIDOL 755 MG/ML
114 INJECTION, SOLUTION INTRAVASCULAR ONCE
Status: COMPLETED | OUTPATIENT
Start: 2021-01-01 | End: 2021-01-01

## 2021-01-01 RX ORDER — LORAZEPAM 2 MG/ML
0.5 INJECTION INTRAMUSCULAR EVERY 4 HOURS PRN
Status: CANCELLED
Start: 2021-01-01

## 2021-01-01 RX ORDER — ALBUTEROL SULFATE 0.83 MG/ML
2.5 SOLUTION RESPIRATORY (INHALATION)
Status: CANCELLED | OUTPATIENT
Start: 2021-01-01

## 2021-01-01 RX ORDER — MEPERIDINE HYDROCHLORIDE 25 MG/ML
25 INJECTION INTRAMUSCULAR; INTRAVENOUS; SUBCUTANEOUS EVERY 30 MIN PRN
Status: CANCELLED | OUTPATIENT
Start: 2021-01-01

## 2021-01-01 RX ORDER — AMLODIPINE BESYLATE 5 MG/1
5 TABLET ORAL DAILY
Qty: 90 TABLET | Refills: 1 | Status: SHIPPED | OUTPATIENT
Start: 2021-01-01

## 2021-01-01 RX ORDER — LEVOTHYROXINE SODIUM 88 UG/1
88 TABLET ORAL DAILY
Qty: 30 TABLET | Refills: 11 | Status: SHIPPED | OUTPATIENT
Start: 2021-01-01

## 2021-01-01 RX ORDER — EPINEPHRINE 0.3 MG/.3ML
0.3 INJECTION SUBCUTANEOUS EVERY 5 MIN PRN
Status: CANCELLED | OUTPATIENT
Start: 2021-01-01

## 2021-01-01 RX ORDER — METHYLPHENIDATE HYDROCHLORIDE 5 MG/1
5 TABLET ORAL 2 TIMES DAILY
Qty: 30 TABLET | Refills: 0 | Status: SHIPPED | OUTPATIENT
Start: 2021-01-01 | End: 2021-01-01

## 2021-01-01 RX ORDER — ESCITALOPRAM OXALATE 10 MG/1
10 TABLET ORAL DAILY
Qty: 30 TABLET | Refills: 6 | Status: SHIPPED | OUTPATIENT
Start: 2021-01-01

## 2021-01-01 RX ORDER — METHYLPHENIDATE HYDROCHLORIDE 5 MG/1
5 TABLET ORAL 2 TIMES DAILY
Qty: 60 TABLET | Refills: 0 | Status: SHIPPED | OUTPATIENT
Start: 2021-01-01

## 2021-01-01 RX ORDER — HYDROMORPHONE HYDROCHLORIDE 2 MG/1
4 TABLET ORAL
COMMUNITY
Start: 2021-01-01

## 2021-01-01 RX ORDER — LEVOTHYROXINE SODIUM 88 UG/1
88 TABLET ORAL DAILY
Qty: 30 TABLET | Refills: 11 | Status: CANCELLED | OUTPATIENT
Start: 2021-01-01

## 2021-01-01 RX ORDER — METHADONE HYDROCHLORIDE 5 MG/1
2.5 TABLET ORAL
COMMUNITY
Start: 2021-01-01

## 2021-01-01 RX ORDER — HYDROMORPHONE HYDROCHLORIDE 2 MG/1
2 TABLET ORAL EVERY 4 HOURS PRN
Qty: 60 TABLET | Refills: 0 | Status: SHIPPED | OUTPATIENT
Start: 2021-01-01 | End: 2021-01-01 | Stop reason: DRUGHIGH

## 2021-01-01 RX ORDER — LIDOCAINE/PRILOCAINE 2.5 %-2.5%
CREAM (GRAM) TOPICAL ONCE
Status: CANCELLED
Start: 2021-01-01 | End: 2021-01-01

## 2021-01-01 RX ORDER — DIPHENHYDRAMINE HYDROCHLORIDE 50 MG/ML
50 INJECTION INTRAMUSCULAR; INTRAVENOUS
Status: CANCELLED
Start: 2021-01-01

## 2021-01-01 RX ORDER — ALBUTEROL SULFATE 90 UG/1
1-2 AEROSOL, METERED RESPIRATORY (INHALATION)
Status: CANCELLED
Start: 2021-01-01

## 2021-01-01 RX ORDER — METHYLPREDNISOLONE SODIUM SUCCINATE 125 MG/2ML
125 INJECTION, POWDER, LYOPHILIZED, FOR SOLUTION INTRAMUSCULAR; INTRAVENOUS
Status: CANCELLED
Start: 2021-01-01

## 2021-01-01 RX ORDER — METFORMIN HYDROCHLORIDE 750 MG/1
750 TABLET, EXTENDED RELEASE ORAL
Qty: 180 TABLET | Refills: 3 | Status: SHIPPED | OUTPATIENT
Start: 2021-01-01

## 2021-01-01 RX ORDER — ESCITALOPRAM OXALATE 10 MG/1
TABLET ORAL
Qty: 30 TABLET | Refills: 3 | Status: SHIPPED | OUTPATIENT
Start: 2021-01-01 | End: 2021-01-01

## 2021-01-01 RX ADMIN — FAMOTIDINE 20 MG: 10 INJECTION, SOLUTION INTRAVENOUS at 16:08

## 2021-01-01 RX ADMIN — DEXAMETHASONE SODIUM PHOSPHATE: 10 INJECTION, SOLUTION INTRAMUSCULAR; INTRAVENOUS at 16:21

## 2021-01-01 RX ADMIN — SODIUM CHLORIDE 250 ML: 9 INJECTION, SOLUTION INTRAVENOUS at 16:08

## 2021-01-01 RX ADMIN — SODIUM CHLORIDE 42 MG: 900 INJECTION, SOLUTION INTRAVENOUS at 16:55

## 2021-01-01 RX ADMIN — IOPAMIDOL 114 ML: 755 INJECTION, SOLUTION INTRAVENOUS at 11:07

## 2021-01-01 RX ADMIN — DIPHENHYDRAMINE HYDROCHLORIDE 25 MG: 50 INJECTION, SOLUTION INTRAMUSCULAR; INTRAVENOUS at 16:38

## 2021-01-01 ASSESSMENT — PAIN SCALES - GENERAL
PAINLEVEL: NO PAIN (0)

## 2021-01-01 ASSESSMENT — ENCOUNTER SYMPTOMS
GASTROINTESTINAL NEGATIVE: 1
BACK PAIN: 1
DEPRESSION: 1
RESPIRATORY NEGATIVE: 1
CARDIOVASCULAR NEGATIVE: 1
NERVOUS/ANXIOUS: 1
EYES NEGATIVE: 1
NEUROLOGICAL NEGATIVE: 1

## 2021-01-11 NOTE — LETTER
1/11/2021         RE: Albert Amaya  [address] [address]        Dear Colleague,    Thank you for referring your patient, Albert Amaya, to the [facility]. Please see a copy of my visit note below.    Chief Complaint   Patient presents with     Blood Draw     Vitals and blood drawn via VPT by LPN.      JIC tubes drawn and sent to 1st floor lab. IB was sent to luz maria Gonzales LPN      Again, thank you for allowing me to participate in the care of your patient.        Sincerely,        [facility] Lab Draw

## 2021-01-11 NOTE — PROGRESS NOTES
Chief Complaint   Patient presents with     Blood Draw     Vitals and blood drawn via VPT by LPN.      JIC tubes drawn and sent to 1st floor lab. IB was sent to provider.     GLORIA Gonzales LPN

## 2021-01-12 NOTE — PROGRESS NOTES
Albert is a 55 year old who is being evaluated via a billable telephone visit.      What phone number would you like to be contacted at? [phone]  How would you like to obtain your AVS? Namita  Phone call duration: 4 minutes

## 2021-01-12 NOTE — LETTER
1/12/2021         RE: Albert Amaya  [address] [facility] [place] [address]        Dear Colleague,    Thank you for referring your patient, Albert Amaya, to the [facility]. Please see a copy of my visit note below.    Albert is a 55 year old who is being evaluated via a billable telephone visit.      What phone number would you like to be contacted at? [phone]  How would you like to obtain your AVS? MyChart  Phone call duration: 4 minutes      Jan 12, 2021  PHONE        REASON FOR VISIT: mPC, here for chemo assessment     Oncology Treatment History:   Metastatic Prostate CA treated   - s/p 6 cycles of taxotere 75mg/m2   - s/p orchiectomy on 3/18/2016   - s/p Provenge 5/13, 5/27, 6/10   - s/p Casodex 50 mg daily March/April   - s/p Zytiga 5/3/2017   - started cabazitaxel 1/14/2020      HPI: Albert is a 54 year old gentleman with metastatic prostate cancer. Albert felt a cramp in his gluteus muscle and could barely walk after doing some remodeling at home and unloading heavy truck at work. He tried flexeril and prednisone initially but eventually presented to ED on 10/5/15. He feels that initially he was nearly labeled as drug seeker since he was in lot of discomfort and flexeril was not working. But during course of ED visits labs were drawn and he had marked elevation in Alk Phosphatase (over 1000). CT did suggest some ileus with some sclerotic bone lesions. He was admitted to the hospital. His PSA was checked and was markedly elevated at 5760 (10/6/15), repeat value 5563. He did follow up with Dr. Freire and had transrectal US guided biopsy of prostate on 10/8/15. They have the results through my chart which confirms prostate adenocarcinoma - enrico 4+3 involving majority (60-90%) portion of every single core. He started on taxotere on 10/23 and completed 6 cycles of therapy in 2/2016. He then underwent orchiectomy on 3/18/2016.   Throughout spring of 2016 Albert's PSA started  to progress and after three elevations there was concern about him being hormone refractory. He was started on Provenge and enrolled in the trial including Indoximod. PSA trending up and started on Casodex mid March with progression. Switched to Zytiga 5/3/17. He held his abiraterone in July 2019 with rising PSA and fatigue. He had a decline in his PSA after holding his abiraterone. This has been on hold and he is being followed without any additional intervention. He had orchiectomy done previously and does not need ADT. In January 2020, his PSA was increasing and he was started on cabazitaxel.      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      Both Albert and his wife were quite unhappy with the telephone visit rather than in person. He wanted to see me in person along with his wife.     He has noticed increasing neuropathy with ongoing chemotherapy. He has pain that radiates from both his shoulders down to arms .     He has marked fatigue, poor appetite for 2-3 weeks after his chemotherapy. He feels that chemotherapy is quite significantly affecting his life style.      MEDS:   Current Outpatient Medications   Medication Sig     Acetaminophen (TYLENOL PO) Take 325 mg by mouth every 8 hours as needed for mild pain or fever Reported on 5/18/2017     amLODIPine (NORVASC) 5 MG tablet Take 1 tablet (5 mg) by mouth daily     blood glucose monitoring (ACCU-CHEK MULTICLIX) lancets Use to test blood sugar twice times daily or as directed.     blood glucose monitoring (NO BRAND SPECIFIED) test strip Use to test blood sugars twice daily or as directed (fasting, rotate then second time - before lunch, before supper and bedtime)     dexamethasone (DECADRON) 2 MG tablet Take 4 mg on day 2, 2 mg on day 3 and 1 mg on day 4 - of each chemo cycle (Patient not taking: Reported on 11/16/2020)     diphenhydrAMINE (BENADRYL) 25 MG tablet Take 25 mg by mouth  "every 6 hours as needed      EPINEPHrine (EPIPEN 2-CHARITY) 0.3 MG/0.3ML injection 2-pack Inject 0.3 mLs (0.3 mg) into the muscle once as needed for anaphylaxis     HYDROmorphone (DILAUDID) 2 MG tablet Take 1 tablet (2 mg) by mouth every 4 hours as needed for moderate to severe pain     IBUPROFEN PO Take by mouth as needed for moderate pain Reported on 5/18/2017     levothyroxine (SYNTHROID/LEVOTHROID) 88 MCG tablet Take 1 tablet (88 mcg) by mouth daily     LORazepam (ATIVAN) 0.5 MG tablet Take 1 tablet (0.5 mg) by mouth every 4 hours as needed (Anxiety, Nausea/Vomiting or Sleep)     metFORMIN (GLUCOPHAGE-XR) 750 MG 24 hr tablet Take 1 tablet (750 mg) by mouth daily (with dinner)     methylphenidate (RITALIN) 5 MG tablet Take 1 tablet (5 mg) by mouth 2 times daily (Patient not taking: Reported on 11/16/2020)     predniSONE (DELTASONE) 5 MG tablet Take 5 mg by mouth as needed Takes on wkds to do \"yardwork\"     prochlorperazine (COMPAZINE) 10 MG tablet Take 1 tablet (10 mg) by mouth every 6 hours as needed (Nausea/Vomiting)     tadalafil (CIALIS) 20 MG tablet Take 1 tablet (20 mg) by mouth daily as needed (take 2 hours before intercourse.)     triamcinolone (KENALOG) 0.1 % external cream Apply topically 2 times daily     valACYclovir (VALTREX) 1000 mg tablet Take 1 tablet (1,000 mg) by mouth 3 times daily     No current facility-administered medications for this visit.          EXAM:    Voice is clear and strong. No audible stridor, wheezing, or respiratory distress. The remainder of PE was deferred due to PHE.      LABS:   Recent Labs   Lab Test 01/11/21  0948 12/15/20  0929 11/16/20  1257 10/19/20  1228 09/25/20  1011    141 140 142 142   POTASSIUM 4.4 5.3 4.0 4.2 4.0   CHLORIDE 105 110* 108 109 109   CO2 27 27 27 26 27   ANIONGAP 6 4 5 7 6   BUN 20 18 22 15 14   CR 0.74 0.88 0.81 0.83 0.82   * 113* 128* 145* 109*   WILL 9.5 9.0 9.1 8.9 9.2        Recent Labs   Lab Test 01/11/21  0948 12/15/20  0929 " 11/16/20  1257 10/19/20  1228 09/25/20  1011   WBC 7.9 6.0 5.4 5.4 5.2   HGB 12.2* 11.6* 11.6* 11.5* 11.4*    291 304 292 270   MCV 91 92 91 91 92   NEUTROPHIL 56.1 53.3 52.0 50.6 54.1     Lab Test 01/11/21  0948 12/15/20  0929 11/16/20  1257 08/21/20  1216 08/21/20  1216 08/03/20  1354   BILITOTAL 0.5 0.5 0.7   < > 0.5 0.6   ALKPHOS 70 60 55   < > 95 67   ALT 27 28 31   < > 70 28   AST 12 16 13   < > 20 16   ALBUMIN 4.2 3.8 4.0   < > 4.1 4.3   LDH  --   --   --   --  211 202     TSH   Date Value Ref Range Status   12/15/2020 2.69 0.40 - 4.00 mU/L Final   08/21/2020 2.68 0.40 - 4.00 mU/L Final   02/24/2020 0.75 0.40 - 4.00 mU/L Final     No results for input(s): CEA in the last 87364 hours.  Results for orders placed or performed during the hospital encounter of 08/28/20   CT Chest/Abdomen/Pelvis w Contrast    Narrative    EXAMINATION: CT CHEST/ABDOMEN/PELVIS W CONTRAST, 8/28/2020 10:12 AM    TECHNIQUE:  Helical CT images from the thoracic inlet through the  symphysis pubis were obtained  with contrast. Contrast dose: iopamidol  (ISOVUE-370) solution 100 mL    COMPARISON: CT 1/22/2020, 1/10/2020, bone scan 1/10/2020    HISTORY: mPC to bones, PSA dramatically improved and now plateaued,  reassess disease status; Malignant neoplasm of prostate (H); Bone  metastasis (H)    FINDINGS:    Chest: The thyroid is unremarkable. No abnormal thoracic  lymphadenopathy. Heart size upper limits of normal. Minimal  pericardial effusion. No central pulmonary embolism. The esophagus is  unremarkable.    The central tracheobronchial tree there is patent. No pleural  effusion. No concerning pulmonary nodule.    Abdomen and pelvis: Minimal hepatic hypodensity along the falciform  ligament compatible with focal fatty deposition, similar to prior. The  spleen, gallbladder, pancreas, adrenals, and kidneys are unremarkable.  No abnormally dilated loops of large or small bowel. No free fluid in  the pelvis. Bladder moderately  distended and unremarkable in  appearance. Normal appendix. Normal abdominal aorta. No abnormal  retroperitoneal, pelvic, or mesenteric lymphadenopathy.    Bones and soft tissues: Diffuse sclerotic metastatic disease  throughout the visualized axial and appendicular skeleton. This does  not appear substantially changed compared to CT 1/10/2020.      Impression    IMPRESSION: This patient with a history of prostate cancer status post  orchiectomy on hormone associated therapy:  Diffuse sclerotic metastatic disease in the visualized axial and  appendicular skeleton without substantial change compared to CT  1/10/2020. Bone scan same day pending.     CELSO HENSLEY MD (Joe)     Lab Test 01/11/21  0948 12/15/20  0929 11/16/20  1257 10/19/20  1228 09/25/20  1011 12/21/16  1643 12/21/16  1643   PSA 41.60* 40.00* 39.70* 33.50* 33.80*   < > 60.68*        PSA trend over last year since starting cabazitaxel    ASSESSMENT/PLAN:   1. Metastatic prostate cancer with bony metastasis:   Currently on cabazitaxel, PSA stable, slightly increased recently  Denosumab- should be on q3-4 month schedule   HTN and DM TII   ECOG PS 1   Coping with terminal cancer        #mPC--started Cabazitaxel on 1/14/2020 due to worsening bone scan and rising PSA in early January 2020. Albert has had an excellent response to cabazitaxel, however he is starting to get progressive fatigue.  We did obtain new imaging in August, just to ensure we weren't missing a neuroendocrine transformation, or progressive disease, but they showed improved disease.  We have already spaced out his infusions to every 4 weeks.     His disease is well controlled with stable PSA so far. However he is not very happy with how he is feeling. He is looking forward for a treatment break. He has increasing neuropathy in both his arms which extends from his shoulder to his hands. Cabazitaxel is not associated with significant neuropathy unlike docetaxel where neuropathy his  clearly the dose limiting side effect. Besides his neuropathy is not characteristic of drug induced side effect. I fear that this could even be related to cervical spine disease - degenerative joint disease. I offered that we image him for this. However he was reluctant to proceed with imaging at this time.      He is not ready for next cycle of chemotherapy as he feels marked fatigue despite being a month out from last infusion. We have decided to defer his chemotherapy by 2 weeks. I would try and see him prior to next cycle of chemotherapy.     His wife would like to attend the visit and infusion. He does get vaso-vagal syncope with any vascular access. She described how he passed out last time and the staff was not attentive to this. She would like to be by his side during the infusion. I reviewed [facility] institutional policy in this regard. We do not have enough space in our infusion rooms to allow family members to be there safely. The number of cases of COVID 19 have continued to peak and we are still facing higher number of daily deaths from this infection. This is not a time to let the guard down. She notes that she is a nurse and has been directly caring for patients. She understands all the recommendations as good as anyone. She has been taking all due precautions. She points out that with her masks on, she would not add to increase in risk. This sets a wrong precedent by allowing for exceptions. I did review this with our higher ups and her request was not accepted.     Very soon with more and more vaccinations, things would keep getting better and it is also likely that we might be done with our chemotherapy sessions for him at this time. He has not had enzalutamide as yet for his prostate cancer. We could transition him to enzalutamide at this time. He would also be a candidate for radium 223 therapy.     45 minutes spent on the date of the encounter doing chart review, history and exam, documentation and  further activities as noted above         Again, thank you for allowing me to participate in the care of your patient.        Sincerely,        Tyrel Valladares MD

## 2021-01-13 NOTE — PROGRESS NOTES
Jan 12, 2021  PHONE        REASON FOR VISIT: [facility] [place] assessment     Oncology Treatment History:   Metastatic Prostate CA treated   - s/p 6 cycles of taxotere 75mg/m2   - s/p orchiectomy on 3/18/2016   - s/p Provenge 5/13, 5/27, 6/10   - s/p Casodex 50 mg daily March/April   - s/p Zytiga 5/3/2017   - started cabazitaxel 1/14/2020      HPI: Albert is a 54 year old gentleman with metastatic prostate cancer. Albert felt a cramp in his gluteus muscle and could barely walk after doing some remodeling at home and unloading heavy truck at work. He tried flexeril and prednisone initially but eventually presented to ED on 10/5/15. He feels that initially he was nearly labeled as drug seeker since he was in lot of discomfort and flexeril was not working. But during course of ED visits labs were drawn and he had marked elevation in Alk Phosphatase (over 1000). CT did suggest some ileus with some sclerotic bone lesions. He was admitted to the hospital. His PSA was checked and was markedly elevated at 5760 (10/6/15), repeat value 5563. He did follow up with Dr. Freire and had transrectal US guided biopsy of prostate on 10/8/15. They have the results through my chart which confirms prostate adenocarcinoma - enrico 4+3 involving majority (60-90%) portion of every single core. He started on taxotere on 10/23 and completed 6 cycles of therapy in 2/2016. He then underwent orchiectomy on 3/18/2016.   Throughout spring of 2016 Albert's PSA started to progress and after three elevations there was concern about him being hormone refractory. He was started on Provenge and enrolled in the trial including Indoximod. PSA trending up and started on Casodex mid March with progression. Switched to Zytiga 5/3/17. He held his abiraterone in July 2019 with rising PSA and fatigue. He had a decline in his PSA after holding his abiraterone. This has been on hold and he is being followed without any additional intervention. He had orchiectomy  done previously and does not need ADT. In January 2020, his PSA was increasing and he was started on cabazitaxel.      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      Both Albert and his wife were quite unhappy with the telephone visit rather than in person. He wanted to see me in person along with his wife.     He has noticed increasing neuropathy with ongoing chemotherapy. He has pain that radiates from both his shoulders down to arms .     He has marked fatigue, poor appetite for 2-3 weeks after his chemotherapy. He feels that chemotherapy is quite significantly affecting his life style.      MEDS:   Current Outpatient Medications   Medication Sig     Acetaminophen (TYLENOL PO) Take 325 mg by mouth every 8 hours as needed for mild pain or fever Reported on 5/18/2017     amLODIPine (NORVASC) 5 MG tablet Take 1 tablet (5 mg) by mouth daily     blood glucose monitoring (ACCU-CHEK MULTICLIX) lancets Use to test blood sugar twice times daily or as directed.     blood glucose monitoring (NO BRAND SPECIFIED) test strip Use to test blood sugars twice daily or as directed (fasting, rotate then second time - before lunch, before supper and bedtime)     dexamethasone (DECADRON) 2 MG tablet Take 4 mg on day 2, 2 mg on day 3 and 1 mg on day 4 - of each chemo cycle (Patient not taking: Reported on 11/16/2020)     diphenhydrAMINE (BENADRYL) 25 MG tablet Take 25 mg by mouth every 6 hours as needed      EPINEPHrine (EPIPEN 2-CHARITY) 0.3 MG/0.3ML injection 2-pack Inject 0.3 mLs (0.3 mg) into the muscle once as needed for anaphylaxis     HYDROmorphone (DILAUDID) 2 MG tablet Take 1 tablet (2 mg) by mouth every 4 hours as needed for moderate to severe pain     IBUPROFEN PO Take by mouth as needed for moderate pain Reported on 5/18/2017     levothyroxine (SYNTHROID/LEVOTHROID) 88 MCG tablet Take 1 tablet (88 mcg) by mouth daily     LORazepam (ATIVAN) 0.5 MG tablet  "Take 1 tablet (0.5 mg) by mouth every 4 hours as needed (Anxiety, Nausea/Vomiting or Sleep)     metFORMIN (GLUCOPHAGE-XR) 750 MG 24 hr tablet Take 1 tablet (750 mg) by mouth daily (with dinner)     methylphenidate (RITALIN) 5 MG tablet Take 1 tablet (5 mg) by mouth 2 times daily (Patient not taking: Reported on 11/16/2020)     predniSONE (DELTASONE) 5 MG tablet Take 5 mg by mouth as needed Takes on wkds to do \"yardwork\"     prochlorperazine (COMPAZINE) 10 MG tablet Take 1 tablet (10 mg) by mouth every 6 hours as needed (Nausea/Vomiting)     tadalafil (CIALIS) 20 MG tablet Take 1 tablet (20 mg) by mouth daily as needed (take 2 hours before intercourse.)     triamcinolone (KENALOG) 0.1 % external cream Apply topically 2 times daily     valACYclovir (VALTREX) 1000 mg tablet Take 1 tablet (1,000 mg) by mouth 3 times daily     No current facility-administered medications for this visit.          EXAM:    Voice is clear and strong. No audible stridor, wheezing, or respiratory distress. The remainder of PE was deferred due to PHE.      LABS:   Recent Labs   Lab Test 01/11/21  0948 12/15/20  0929 11/16/20  1257 10/19/20  1228 09/25/20  1011    141 140 142 142   POTASSIUM 4.4 5.3 4.0 4.2 4.0   CHLORIDE 105 110* 108 109 109   CO2 27 27 27 26 27   ANIONGAP 6 4 5 7 6   BUN 20 18 22 15 14   CR 0.74 0.88 0.81 0.83 0.82   * 113* 128* 145* 109*   WILL 9.5 9.0 9.1 8.9 9.2        Recent Labs   Lab Test 01/11/21  0948 12/15/20  0929 11/16/20  1257 10/19/20  1228 09/25/20  1011   WBC 7.9 6.0 5.4 5.4 5.2   HGB 12.2* 11.6* 11.6* 11.5* 11.4*    291 304 292 270   MCV 91 92 91 91 92   NEUTROPHIL 56.1 53.3 52.0 50.6 54.1     Lab Test 01/11/21  0948 12/15/20  0929 11/16/20  1257 08/21/20  1216 08/21/20  1216 08/03/20  1354   BILITOTAL 0.5 0.5 0.7   < > 0.5 0.6   ALKPHOS 70 60 55   < > 95 67   ALT 27 28 31   < > 70 28   AST 12 16 13   < > 20 16   ALBUMIN 4.2 3.8 4.0   < > 4.1 4.3   LDH  --   --   --   --  211 202     TSH "   Date Value Ref Range Status   12/15/2020 2.69 0.40 - 4.00 mU/L Final   08/21/2020 2.68 0.40 - 4.00 mU/L Final   02/24/2020 0.75 0.40 - 4.00 mU/L Final     No results for input(s): CEA in the last 09485 hours.  Results for orders placed or performed during the hospital encounter of 08/28/20   CT Chest/Abdomen/Pelvis w Contrast    Narrative    EXAMINATION: CT CHEST/ABDOMEN/PELVIS W CONTRAST, 8/28/2020 10:12 AM    TECHNIQUE:  Helical CT images from the thoracic inlet through the  symphysis pubis were obtained  with contrast. Contrast dose: iopamidol  (ISOVUE-370) solution 100 mL    COMPARISON: CT 1/22/2020, 1/10/2020, bone scan 1/10/2020    HISTORY: mPC to bones, PSA dramatically improved and now plateaued,  reassess disease status; Malignant neoplasm of prostate (H); Bone  metastasis (H)    FINDINGS:    Chest: The thyroid is unremarkable. No abnormal thoracic  lymphadenopathy. Heart size upper limits of normal. Minimal  pericardial effusion. No central pulmonary embolism. The esophagus is  unremarkable.    The central tracheobronchial tree there is patent. No pleural  effusion. No concerning pulmonary nodule.    Abdomen and pelvis: Minimal hepatic hypodensity along the falciform  ligament compatible with focal fatty deposition, similar to prior. The  spleen, gallbladder, pancreas, adrenals, and kidneys are unremarkable.  No abnormally dilated loops of large or small bowel. No free fluid in  the pelvis. Bladder moderately distended and unremarkable in  appearance. Normal appendix. Normal abdominal aorta. No abnormal  retroperitoneal, pelvic, or mesenteric lymphadenopathy.    Bones and soft tissues: Diffuse sclerotic metastatic disease  throughout the visualized axial and appendicular skeleton. This does  not appear substantially changed compared to CT 1/10/2020.      Impression    IMPRESSION: This patient with a history of prostate cancer status post  orchiectomy on hormone associated therapy:  Diffuse sclerotic  metastatic disease in the visualized axial and  appendicular skeleton without substantial change compared to CT  1/10/2020. Bone scan same day pending.     CELSO HENSLEY MD (Joe)     Lab Test 01/11/21  0948 12/15/20  0929 11/16/20  1257 10/19/20  1228 09/25/20  1011 12/21/16  1643 12/21/16  1643   PSA 41.60* 40.00* 39.70* 33.50* 33.80*   < > 60.68*        PSA trend over last year since starting cabazitaxel    ASSESSMENT/PLAN:   1. Metastatic prostate cancer with bony metastasis:   Currently on cabazitaxel, PSA stable, slightly increased recently  Denosumab- should be on q3-4 month schedule   HTN and DM TII   ECOG PS 1   Coping with terminal cancer        #mPC--started Cabazitaxel on 1/14/2020 due to worsening bone scan and rising PSA in early January 2020. Albert has had an excellent response to cabazitaxel, however he is starting to get progressive fatigue.  We did obtain new imaging in August, just to ensure we weren't missing a neuroendocrine transformation, or progressive disease, but they showed improved disease.  We have already spaced out his infusions to every 4 weeks.     His disease is well controlled with stable PSA so far. However he is not very happy with how he is feeling. He is looking forward for a treatment break. He has increasing neuropathy in both his arms which extends from his shoulder to his hands. Cabazitaxel is not associated with significant neuropathy unlike docetaxel where neuropathy his clearly the dose limiting side effect. Besides his neuropathy is not characteristic of drug induced side effect. I fear that this could even be related to cervical spine disease - degenerative joint disease. I offered that we image him for this. However he was reluctant to proceed with imaging at this time.      He is not ready for next cycle of chemotherapy as he feels marked fatigue despite being a month out from last infusion. We have decided to defer his chemotherapy by 2 weeks. I would try and  see him prior to next cycle of chemotherapy.     His wife would like to attend the visit and infusion. He does get vaso-vagal syncope with any vascular access. She described how he passed out last time and the staff was not attentive to this. She would like to be by his side during the infusion. I reviewed [facility] institutional policy in this regard. We do not have enough space in our infusion rooms to allow family members to be there safely. The number of cases of COVID 19 have continued to peak and we are still facing higher number of daily deaths from this infection. This is not a time to let the guard down. She notes that she is a nurse and has been directly caring for patients. She understands all the recommendations as good as anyone. She has been taking all due precautions. She points out that with her masks on, she would not add to increase in risk. This sets a wrong precedent by allowing for exceptions. I did review this with our higher ups and her request was not accepted.     Very soon with more and more vaccinations, things would keep getting better and it is also likely that we might be done with our chemotherapy sessions for him at this time. He has not had enzalutamide as yet for his prostate cancer. We could transition him to enzalutamide at this time. He would also be a candidate for radium 223 therapy.     45 minutes spent on the date of the encounter doing chart review, history and exam, documentation and further activities as noted above

## 2021-01-28 NOTE — NURSING NOTE
Chief Complaint   Patient presents with     Blood Draw     IV placed, blood drawn, vitals taken     Labs drawn via IV placed by WOODY Lim in left arm.    Stephanie Lisa MA

## 2021-01-28 NOTE — PROGRESS NOTES
Infusion Nursing Note:  Albert Amaya presents today for cycle 16 day 1 Jetvana.    Patient seen by provider today: No   present during visit today: Not Applicable.    Note: Pt arrives feeling well, states he had a break from chemo and his body feels recovered from it. He offers no new complaints/concerns.      Intravenous Access:  Peripheral IV placed.    Treatment Conditions:  Lab Results   Component Value Date    HGB 11.5 01/28/2021     Lab Results   Component Value Date    WBC 6.8 01/28/2021      Lab Results   Component Value Date    ANEU 3.5 01/28/2021     Lab Results   Component Value Date     01/28/2021      Lab Results   Component Value Date     01/28/2021                   Lab Results   Component Value Date    POTASSIUM 3.7 01/28/2021           Lab Results   Component Value Date    MAG 2.2 12/21/2016            Lab Results   Component Value Date    CR 0.85 01/28/2021                   Lab Results   Component Value Date    WILL 9.3 01/28/2021                Lab Results   Component Value Date    BILITOTAL 0.6 01/28/2021           Lab Results   Component Value Date    ALBUMIN 4.0 01/28/2021                    Lab Results   Component Value Date    ALT 29 01/28/2021           Lab Results   Component Value Date    AST 13 01/28/2021       Results reviewed, labs MET treatment parameters, ok to proceed with treatment.      Post Infusion Assessment:  Patient tolerated infusion without incident.  Blood return noted pre and post infusion.  Site patent and intact, free from redness, edema or discomfort.  No evidence of extravasations.  Access discontinued per protocol.       Discharge Plan:   Patient declined prescription refills.  Discharge instructions reviewed with: Patient.  Patient and/or family verbalized understanding of discharge instructions and all questions answered.  Copy of AVS reviewed with patient and/or family.  Patient will return 2/9/21 for next appointment.  Patient discharged  in stable condition accompanied by: self.  Departure Mode: Ambulatory.    Rocio Alvarado RN

## 2021-01-28 NOTE — PATIENT INSTRUCTIONS
Contact Numbers:   [facility] [place] Main Line: [phone]    Call triage to speak with triage if you are experiencing chills and/or temperature greater than or equal to 100.5, uncontrolled nausea/vomiting, diarrhea, constipation, dizziness, shortness of breath, chest pain, bleeding, unexplained bruising, or any new/concerning symptoms, questions/concerns.     If you are having any concerning symptoms or wish to speak to a provider before your next infusion visit, please call your care coordinator or triage to notify them so we can adequately serve you.     If you need a refill on a medication or narcotic prescription, please call triage or your care coordinator before your infusion appointment.                 January 2021 Sunday Monday Tuesday Wednesday Thursday Friday Saturday                            1     2       3     4     5     6     7     8     9       10     11    LAB PERIPHERAL   9:30 AM   (15 min.)    [facility] DRAW   [facility] 12    TELEPHONE VISIT RETURN  12:30 PM   (30 min.)   Tyrel Valladares MD   [facility] 13     14     15     16       17     18     19     20     21     22     23       24     25     26     27     28    LAB PERIPHERAL   2:00 PM   (15 min.)    [facility]RAW   [facility]    [facility]C INFUSION 120   3:30 PM   (120 min.)    ONCOLOGY INFUSION   [facility] 29 30 31 February 2021 Sunday Monday Tuesday Wednesday Thursday Friday Saturday        1     2     3     4     5     6    TELEPHONE VISIT RETURN  12:00 PM   (60 min.)   Liberty Brenner LICSW   [facility]   7     8    LAB PERIPHERAL   9:30 AM   (15 min.)    [facility] DRAW   [facility] 9    TELEPHONE VISIT RETURN   9:30 AM   (50 min.)   Anay Dan PA-C   [facility]  [facility]    [facility] ON[facility] INFUSION 120  12:00 PM   (120 min.)   [facility]FUSION   [facility] 10     11     12     13       14     15     16     17     18     19    LAB PERIPHERAL  10:00 AM   (15 min.)   [facility] D[facility]   [facility] 20       21     22     23    [facility] RETURN  10:45 AM   (30 min.)   Tyrel Valladares MD   [facility] 24     25     26     27       28                                                   Lab Results:  Recent Results (from the past 12 hour(s))   PSA tumor marker    Collection Time: 01/28/21  1:59 PM   Result Value Ref Range    PSA 50.10 (H) 0 - 4 ug/L   Lactate Dehydrogenase    Collection Time: 01/28/21  1:59 PM   Result Value Ref Range    Lactate Dehydrogenase 171 85 - 227 U/L   Comprehensive metabolic panel    Collection Time: 01/28/21  1:59 PM   Result Value Ref Range    Sodium 139 133 - 144 mmol/L    Potassium 3.7 3.4 - 5.3 mmol/L    Chloride 106 94 - 109 mmol/L    Carbon Dioxide 25 20 - 32 mmol/L    Anion Gap 7 3 - 14 mmol/L    Glucose 87 70 - 99 mg/dL    Urea Nitrogen 18 7 - 30 mg/dL    Creatinine 0.85 0.66 - 1.25 mg/dL    GFR Estimate >90 >60 mL/min/[1.73_m2]    GFR Estimate If Black >90 >60 mL/min/[1.73_m2]    Calcium 9.3 8.5 - 10.1 mg/dL    Bilirubin Total 0.6 0.2 - 1.3 mg/dL    Albumin 4.0 3.4 - 5.0 g/dL    Protein Total 7.4 6.8 - 8.8 g/dL    Alkaline Phosphatase 60 40 - 150 U/L    ALT 29 0 - 70 U/L    AST 13 0 - 45 U/L   CBC with platelets differential    Collection Time: 01/28/21  1:59 PM   Result Value Ref Range    WBC 6.8 4.0 - 11.0 10e9/L    RBC Count 3.85 (L) 4.4 - 5.9 10e12/L    Hemoglobin 11.5 (L) 13.3 - 17.7 g/dL    Hematocrit 34.9 (L) 40.0 - 53.0 %    MCV 91 78 - 100 fl    MCH 29.9 26.5 - 33.0 pg    MCHC 33.0 31.5 - 36.5 g/dL    RDW 14.6 10.0 - 15.0 %    Platelet Count 284 150 - 450 10e9/L    Diff Method Automated Method     % Neutrophils 51.7 %    % Lymphocytes 38.0 %     % Monocytes 7.0 %    % Eosinophils 2.6 %    % Basophils 0.4 %    % Immature Granulocytes 0.3 %    Nucleated RBCs 0 0 /100    Absolute Neutrophil 3.5 1.6 - 8.3 10e9/L    Absolute Lymphocytes 2.6 0.8 - 5.3 10e9/L    Absolute Monocytes 0.5 0.0 - 1.3 10e9/L    Absolute Eosinophils 0.2 0.0 - 0.7 10e9/L    Absolute Basophils 0.0 0.0 - 0.2 10e9/L    Abs Immature Granulocytes 0.0 0 - 0.4 10e9/L    Absolute Nucleated RBC 0.0

## 2021-02-05 NOTE — PROGRESS NOTES
Oncology RN Care Coordination Note:     Outgoing Call:  This writer received a voicemail from Melanie Dan PA-C requesting I call Albert to check in and see how he was doing and to discuss his upcoming schedule.  I attempted to reach him, however I received his voicemail.  I let Albert know his upcoming appointment with Melanie is too early for his infusion, it was a left over appointment from before his 2-week treatment holiday.  I let him know we would be doing some adjusting of his appointments and that I'd follow up with a [facility] message as well.     Shirin Lagunas, RN BSN   [facility]  Nurse Coordinator

## 2021-02-06 NOTE — PROGRESS NOTES
Palliative Care Counseling Services - Initial Assessment (partial)    PLEASE NOTE:  THIS IS A MENTAL HEALTH NOTE.  OTHER PROVIDERS VIEWING THIS NOTE SHOULD USE THIS ONLY FOR UNDERSTANDING THE CONTEXT OF THE PATIENT S EXPERIENCE.  TOPICS DESCRIBED IN THIS NOTE SHOULD NOT BE REFERENCED TO THE PATIENT BY MEDICAL PROVIDERS    Albert is a 55 year old man with prostate cancer metastatic to bone, seen today for initial palliative care counseling assessment by phone, per his request. Wife Lorrie participates at his invitation.     Note: Although this was our first visit, Albert and Lorrie were focused on some immediate concerns, included related to goals of care, so we did not complete the full assessment today, and this visit is coded as simply a psychotherapy visit (13384). Further assessment to come at following visits.     Referred by: ELVIRA Castellano    Presenting Concerns: Coping with illness    Preferred Name: Albert    Mental Status Exam: (List all that apply)      Appearance: Appropriate      Eye Contact: Good       Orientation: Yes, x4      Mood: expressive, some worries      Affect: Appropriate      Thought Content: Clear         Thought Form: Logical      Psychomotor Behavior: Normal    Family:       Marital status:    Years :     Years together: 28 years     Name of spouse/partner: Lorrie      Children: daughter  3 children - adults 28 24 and 21      Parents: mother is living, dad is ,  at age 70, complicated cardiovascular disease including stroke, may have also had prostate cancer but autopsy not done - Mom is elderly - line of women who live into 100s      Siblings: 2 sisters and a brother, I am youngest      Other:     Support system: Family and Friends - other important friends    Living situation: house    Employment history:      Current employment status: working      Kind of work:  Salesman, 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     Medical History/Issues (patient account):   More than 5 year experience  Oct 2015 dx  Prostate cancer  Was in bones already  Had third highest PSA my doctor had seen  Mets throughout entire body, tumors on skull, holes in spine, tumors on hips, ribs  Now we are contemplating the ending of the last treatment  Chemo  No longer working and side effects are too severe  Trying to decide whether/when to stop treatment  Tearful.    Lorrie -   Myriad emotions and reactions to the treatment  It is hard on him  The younger you are the harder the treatment on you  Most people the tx does not work  Has been on this chemo for about 13 months  We see where the treatment is worse than the actual disease, and emotional rollercoaster that goes with it, which neither he nor I like  PSA was 41 - 51  A significant jump  This last time delayed chemo, 7 weeks since last treatment  Saw normalizing as further from the treatment  Albert feeling good, not feeling sick  Then did chemo 9 days ago  He is again feeling \"pushed down\" - not feeling good, angst and pain, emotional up and down  Land with me because he is not feeling well    Lorrie multiple stressors, sometimes hard to cope with - being a nurse - ground zero for covid19 - holding pt's hands, last meal before intubation, taking someone off tx for them to die - just took on a new position, which is really good - left bedside. Has been a charge nurse for 12 years. People have " "been mean. Manager said something unkind around when [employer] . Dad has dementia. Now he is behaving differently, inhibitions are gone, physically inappropriate, watching him mentally fail, broke ankle this past week, advocating. Mom falling apart d/t dad. Albert and I helping, taxes us and we don't deal with our own things. Brother who has undx MI reached out to Albert, kicked out of house, Albert said he could stay with us, \"weederall\" detox, manic. Could not even come home without brother being intense, felt like a prisoner in own home. Last weekend, Albert and brother altercation, Lorrie told him not welcome. Now staying with mother, dad to be there soon. Has also dealt with 2 other siblings with MI, who deal with it and take meds.  Lorrie has some very close friends and finds great solace in their support.   Family - we tend to be the helpers/caregivers.  Exercise is helpful for Lorrie and it is time alone      Pain/Discomfort Issues: Feeling sick after chemo, severe fatigue    Health related areas of loss, grief:     Coping: Prior to cancer, I was a 2 Tylenol per year person.     One year ago we started this treatment. PSA from 90 - 130 in 7 days (last ), chemo - first session kicked my ass, things got better. PSA down to 23. MDs ecstatic. I asked why. Told I was 1 of 3 seeing this response. Most are on this med 3 - 6 months and die 6 month after. So right now content that I bought a year.     I see the end coming. I see the world orienting to future. My wife lives day to day. This different perspective is a strength for us as a couple, complement each other.     Now opposing views in this situation.     MD 'wait for the spike, it will happen\" - harmful - scared every blood work. Loss of control.    Discussing when to stop tx if it is harder on him than the actual disease.     Albert has always shared he wants to be upright, moving, doing things, on his accord not cancer's accord. Hard to relate to each " "other.     Sleep: up and down  Up to drink coffee with wife each morning, watch sunrise in summer, contemplation.  Gets to see wife wake up - like another sunrise.   It makes me pretty happy. I've been  to one of the most beautiful women ever - I have been blessed.  At work, good at what I do, in control of what I do. Gets away with things not general in industry - Have fostered long working relationships. I hold relationships dear. Main focus of my energy.  This is who I was before 5 years ago - re-experienced that in the past month. I can see at that time the person that my wife lost. That makes me sad.   Deneen sucks to see the changes. Not depression, it is reality. Realizing that relationship we had isn't the same, for many reasons. Part of it is the chemicals that have affected me and changed me. Only she has seen and known. I think she lost part of me a long time ago. Because of that, who we are as a couple is different than 5 years ago.     Sexual Health/Intimacy: not discussed today    Mental Health History and Current Review of Symptoms (patient account):     Counseling in past?:  no   Medications for anxiety, depression or other mental health concern in past?: yes -  effexor for 6 months, which just about made me go nuts, 4 years ago  Drugs for neuropathy, for hotflashes, thyroid issues, diabetes, oral steroids  Counseling or medications currently?:  no     Depression diagnosis or symptoms in past?: yes    Yes, seasonal, cabin fever  Prior to cancer    Depression symptoms currently?:  Hit rock bottom  Mentioned to [facility], and they were saying \"Dr Valladares is not your PCP\"  Spiralled down [place], went cold turkey, felt this was chemically induced  Affected rel with wife adversely for a long time    No anxiety    Further mental health assessment to be completed at future visit.     Psychological Trauma and/or Major Losses:   Cancer experiences  Recent family experiences    CD and safety concerns not " assessed today    Shannon/Spirituality:  Identify with a specific Mormonism: yes - Congregational  Actively practicing: yes  Shannon community: no , [place] ideologically I severed ties with them, [place] setting, unique  Identify as spiritual: yes  How find expression: beliefs    Hope:      What do you hope for:     My hope is inward. I have lived much of my life taking care of family, children, and others. My hope has been I want to spoil my wife. I want her to her think this relationship she had with me was worth of her love. Remaining time: me honoring who we were as a  couple. My hope is that the external of our lives can be pushed aside, and focus coming months - process of elevating who we were as a couple.     Internal Resources (positive memories, current sources of josafat): love of work, care for wife and family    Perceived Needs: Albert and Lorrie are receptive to counseling and support as they consider important medical decision making in context of family stressors.     Resource needs/Referrals: no     Assessment:  Albert presents as a reflective and caring man, facing metastatic prostate cancer. Coping concerns include: decision making, differences of perspective with wife, wife's recent family stressors, feelings of distance and desire to deepen connection with wife . Did not complete mental health component of the visit today, due to other very relevant and appropriate concerns of the patient and his wife. Further visits planned.        Intervention: Initial palliative care counseling / clinical social work evaluation was conducted.  Palliative Care Counseling interventions available were discussed, including counseling related to serious illness, behavioral interventions for symptom management, consultation regarding goals of care/health care directive/POLST, and other interventions specific to the patient's situation or concerns.     Plan: Return in 2 - 4 weeks    DSM5  Diagnoses:   Deferred    Time Spent with Patient/Family: 50 minutes  (Start 12:05pm, end 12:55pm)    [facility]    DO NOT SEND ANY LETTERS

## 2021-02-06 NOTE — LETTER
2021       RE: Albert Amaya  [address] [facility] [id number]     Dear Colleague,    Thank you for referring your patient, Albert Amaya, to the [facility] at [facility]. Please see a copy of my visit note below.    Palliative Care Counseling Services - Initial Assessment (partial)    PLEASE NOTE:  THIS IS A MENTAL HEALTH NOTE.  OTHER PROVIDERS VIEWING THIS NOTE SHOULD USE THIS ONLY FOR UNDERSTANDING THE CONTEXT OF THE PATIENT S EXPERIENCE.  TOPICS DESCRIBED IN THIS NOTE SHOULD NOT BE REFERENCED TO THE PATIENT BY MEDICAL PROVIDERS    Albert is a 55 year old man with prostate cancer metastatic to bone, seen today for initial palliative care counseling assessment by phone, per his request. Wife Lorrie participates at his invitation.     Note: Although this was our first visit, Albert and Lorrie were focused on some immediate concerns, included related to goals of care, so we did not complete the full assessment today, and this visit is coded as simply a psychotherapy visit (95161). Further assessment to come at following visits.     Referred by: ELVIRA Castellano    Presenting Concerns: Coping with illness    Preferred Name: Albert    Mental Status Exam: (List all that apply)      Appearance: Appropriate      Eye Contact: Good       Orientation: Yes, x4      Mood: expressive, some worries      Affect: Appropriate      Thought Content: Clear         Thought Form: Logical      Psychomotor Behavior: Normal    Family:       Marital status:    Years :     Years together: 28 years     Name of spouse/partner: Lorrie      Children: daughter  3 children - adults 28 24 and 21      Parents: mother is living, dad is ,  at age 70, complicated cardiovascular disease including stroke, may have also had prostate cancer but autopsy not done - Mom is elderly - line of women who live into 100s      Siblings: 2 sisters  "and a brother, I am youngest      Other:     Support system: Family and Friends - other important friends    Living situation: house    Employment history:      Current employment status: working      Kind of work:  Salesman, 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     Medical History/Issues (patient account):   More than 5 year experience  Oct 2015 dx  Prostate cancer  Was in bones already  Had third highest PSA my doctor had seen  Mets throughout entire body, tumors on skull, holes in spine, tumors on hips, ribs  Now we are contemplating the ending of the last treatment  Chemo  No longer working and side effects are too severe  Trying to decide whether/when to stop treatment  Tearful.    Lorrie -   Myriad emotions and reactions to the treatment  It is hard on him  The younger you are the harder the treatment on you  Most people the tx does not work  Has been on this chemo for about 13 months  We see where the treatment is worse than the actual disease, and emotional rollercoaster that goes with it, which neither he nor I like  PSA was 41 - 51  A significant jump  This last time delayed chemo, 7 weeks since last treatment  Saw normalizing as further from the treatment  Albert feeling good, not feeling sick  Then did chemo 9 days ago  He is again feeling \"pushed down\" - not feeling good, angst and pain, emotional up and down  Land with me because he is not feeling " "well    Lorrie multiple stressors, sometimes hard to cope with - being a nurse - ground zero for covid19 - holding pt's hands, last meal before intubation, taking someone off tx for them to die - just took on a new position, which is really good - left bedside. Has been a charge nurse for 12 years. People have been mean. Manager said something unkind around when [employer] . Dad has dementia. Now he is behaving differently, inhibitions are gone, physically inappropriate, watching him mentally fail, broke ankle this past week, advocating. Mom falling apart d/t dad. Albert and I helping, taxes us and we don't deal with our own things. Brother who has undx MI reached out to Albert, kicked out of house, Albert said he could stay with us, \"weederall\" detox, manic. Could not even come home without brother being intense, felt like a prisoner in own home. Last weekend, Albert and brother altercation, Lorrie told him not welcome. Now staying with mother, dad to be there soon. Has also dealt with 2 other siblings with MI, who deal with it and take meds.  Lorrie has some very close friends and finds great solace in their support.   Family - we tend to be the helpers/caregivers.  Exercise is helpful for Lorrie and it is time alone      Pain/Discomfort Issues: Feeling sick after chemo, severe fatigue    Health related areas of loss, grief:     Coping: Prior to cancer, I was a 2 Tylenol per year person.     One year ago we started this treatment. PSA from 90 - 130 in 7 days (last ), chemo - first session kicked my ass, things got better. PSA down to 23. MDs ecstatic. I asked why. Told I was 1 of 3 seeing this response. Most are on this med 3 - 6 months and die 6 month after. So right now content that I bought a year.     I see the end coming. I see the world orienting to future. My wife lives day to day. This different perspective is a strength for us as a couple, complement each other.     Now opposing views in this " "situation.     MD 'wait for the spike, it will happen\" - harmful - scared every blood work. Loss of control.    Discussing when to stop tx if it is harder on him than the actual disease.     Albert has always shared he wants to be upright, moving, doing things, on his accord not cancer's accord. Hard to relate to each other.     Sleep: up and down  Up to drink coffee with wife each morning, watch sunrise in summer, contemplation.  Gets to see wife wake up - like another sunrise.   It makes me pretty happy. I've been  to one of the most beautiful women ever - I have been blessed.  At work, good at what I do, in control of what I do. Gets away with things not general in industry - Have fostered long working relationships. I hold relationships dear. Main focus of my energy.  This is who I was before 5 years ago - re-experienced that in the past month. I can see at that time the person that my wife lost. That makes me sad.   Deneen sucks to see the changes. Not depression, it is reality. Realizing that relationship we had isn't the same, for many reasons. Part of it is the chemicals that have affected me and changed me. Only she has seen and known. I think she lost part of me a long time ago. Because of that, who we are as a couple is different than 5 years ago.     Sexual Health/Intimacy: not discussed today    Mental Health History and Current Review of Symptoms (patient account):     Counseling in past?:  no   Medications for anxiety, depression or other mental health concern in past?: yes -  effexor for 6 months, which just about made me go nuts, 4 years ago  Drugs for neuropathy, for hotflashes, thyroid issues, diabetes, oral steroids  Counseling or medications currently?:  no     Depression diagnosis or symptoms in past?: yes    Yes, seasonal, cabin fever  Prior to cancer    Depression symptoms currently?:  Hit rock bottom  Mentioned to [facility], and they were saying \"Dr Valladares is not your PCP\"  Spiralled down " [place], went cold turkey, felt this was chemically induced  Affected rel with wife adversely for a long time    No anxiety    Further mental health assessment to be completed at future visit.     Psychological Trauma and/or Major Losses:   Cancer experiences  Recent family experiences    CD and safety concerns not assessed today    Shannon/Spirituality:  Identify with a specific Mu-ism: yes - Sikhism  Actively practicing: yes  Shannon community: no , [place] I severed ties with them, [place] setting, unique  Identify as spiritual: yes  How find expression: beliefs    Hope:      What do you hope for:     My hope is inward. I have lived much of my life taking care of family, children, and others. My hope has been I want to spoil my wife. I want her to her think this relationship she had with me was worth of her love. Remaining time: me honoring who we were as a  couple. My hope is that the external of our lives can be pushed aside, and focus coming months - process of elevating who we were as a couple.     Internal Resources (positive memories, current sources of josafat): love of work, care for wife and family    Perceived Needs: Albert and Lorrie are receptive to counseling and support as they consider important medical decision making in context of family stressors.     Resource needs/Referrals: no     Assessment:  Albert presents as a reflective and caring man, facing metastatic prostate cancer. Coping concerns include: decision making, differences of perspective with wife, wife's recent family stressors, feelings of distance and desire to deepen connection with wife . Did not complete mental health component of the visit today, due to other very relevant and appropriate concerns of the patient and his wife. Further visits planned.        Intervention: Initial palliative care counseling / clinical social work evaluation was conducted.  Palliative Care Counseling  interventions available were discussed, including counseling related to serious illness, behavioral interventions for symptom management, consultation regarding goals of care/health care directive/POLST, and other interventions specific to the patient's situation or concerns.     Plan: Return in 2 - 4 weeks    DSM5 Diagnoses:   Deferred    Time Spent with Patient/Family: 50 minutes  (Start 12:05pm, end 12:55pm)    ANIBAL Conner    DO NOT SEND ANY LETTERS            Again, thank you for allowing me to participate in the care of your patient.      Sincerely,    ANIBAL Conner

## 2021-02-06 NOTE — LETTER
Date:August 3, 2021      Provider requested that no letter be sent. Do not send.       [facility]

## 2021-02-09 NOTE — PROGRESS NOTES
"Oncology RN Care Coordination Note:     Incoming Call:  This writer received a voicemail from this patient's wife, Lorrie, stating she would like a call back to chat about something that took place over the weekend.     Outgoing Call:  This writer returned Lorrie's calls and she states on Saturday she and Albert had a long conversation and she told him \"if he wanted to stop treatment that was ok.\"  She said \"I was worried it wasn't the right thing to say.\"  Lorrie is also an RN and she struggles with wanting to push Albert to choose life and to continue to push for living, but on the same hand she sees that he has been sick and his PSA is rising, so there's this push and pull happening.  This writer listened to Lorrie vent, I reassured her it sounds like she is saying and doing everything right.  Albert has decided he wants a 45 day chemo/CSC holiday, he doesn't want to hear from anyone for 45 days.  Lorrie said they will be doing some short trips and Albert will see how he feels.  She said he is very content with life and where things are at, he lives each day wondering if it might be his last.  Lorrie is concerned what will happen without treatment for 45 days, unfortunately we don't know the answer to that.  Encouraged Lorrie to call with any questions or concerns.      Shirin Lagunas, RN BSN   [facility]  Nurse Coordinator        "

## 2021-02-09 NOTE — PROGRESS NOTES
Albert is a 55 year old who is being evaluated via a billable telephone visit.      What phone number would you like to be contacted at? [phone]  How would you like to obtain your AVS? Namita Somers, [facility] February 9, 2021  9:19 AM     Feb 9, 2021    PHONE        REASON FOR VISIT: mPC, here for chemo assessment     Oncology Treatment History:   Metastatic Prostate CA treated   - s/p 6 cycles of taxotere 75mg/m2   - s/p orchiectomy on 3/18/2016   - s/p Provenge 5/13, 5/27, 6/10   - s/p Casodex 50 mg daily March/April   - s/p Zytiga 5/3/2017   - started cabazitaxel 1/14/2020      HPI: Albert is a 54 year old gentleman with metastatic prostate cancer. Albert felt a cramp in his gluteus muscle and could barely walk after doing some remodeling at home and unloading heavy truck at work. He tried flexeril and prednisone initially but eventually presented to ED on 10/5/15. He feels that initially he was nearly labeled as drug seeker since he was in lot of discomfort and flexeril was not working. But during course of ED visits labs were drawn and he had marked elevation in Alk Phosphatase (over 1000). CT did suggest some ileus with some sclerotic bone lesions. He was admitted to the hospital. His PSA was checked and was markedly elevated at 5760 (10/6/15), repeat value 5563. He did follow up with Dr. Freire and had transrectal US guided biopsy of prostate on 10/8/15. They have the results through my chart which confirms prostate adenocarcinoma - enrico 4+3 involving majority (60-90%) portion of every single core. He started on taxotere on 10/23 and completed 6 cycles of therapy in 2/2016. He then underwent orchiectomy on 3/18/2016.   Throughout spring of 2016 Albert's PSA started to progress and after three elevations there was concern about him being hormone refractory. He was started on Provenge and enrolled in the trial including Indoximod. PSA trending up and started on Casodex mid March with progression.  "Switched to Zytiga 5/3/17. He held his abiraterone in July 2019 with rising PSA and fatigue. He had a decline in his PSA after holding his abiraterone. This has been on hold and he is being followed without any additional intervention. He had orchiectomy done previously and does not need ADT. In January 2020, his PSA was increasing and he was started on cabazitaxel.      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       Albert and I reviewed his last conversation with Dr. Valladares.  He took the month of December off of chemotherapy given he was having progressive neuropathy in his hands.  After about a month break from chemotherapy the neuropathy improved and he was feeling quite well again.  He states Lorrie noted \"you are starting to look more like yourself again \".  This was reassuring to Albert and because he was feeling well he decided to do another cycle of chemotherapy.  Very quickly the fatigue arthralgias mental fog returned.  After this reoccurring toxicity, it became very clear to him that he was not interested in doing any more chemo.    Albert and Lorrie were able to meet with Danielle Brenner a week ago.  It was a very emotional process for Albert and Lorrie.  Since it was their first visit it was more of an intake session but very emotional for them nonetheless.  They do feel like it would be beneficial for them to continue seeing Danielle each month.  Albert expresses he feels that Lorrie also needs to talk to her own therapist as well.  Albert continues to feel like he is emotionally in a very good place.  He feels very satisfied with his life and his family.  He states having the break between December and January was very refreshing for him.  He wishes to have a break.  Do you need something     MEDS:        Current Outpatient Medications   Medication Sig     Acetaminophen (TYLENOL PO) Take 325 mg by mouth every 8 hours as needed for mild " "pain or fever Reported on 5/18/2017     amLODIPine (NORVASC) 5 MG tablet Take 1 tablet (5 mg) by mouth daily     blood glucose monitoring (ACCU-CHEK MULTICLIX) lancets Use to test blood sugar twice times daily or as directed.     blood glucose monitoring (NO BRAND SPECIFIED) test strip Use to test blood sugars twice daily or as directed (fasting, rotate then second time - before lunch, before supper and bedtime)     dexamethasone (DECADRON) 2 MG tablet Take 4 mg on day 2, 2 mg on day 3 and 1 mg on day 4 - of each chemo cycle (Patient not taking: Reported on 11/16/2020)     diphenhydrAMINE (BENADRYL) 25 MG tablet Take 25 mg by mouth every 6 hours as needed      EPINEPHrine (EPIPEN 2-CHARITY) 0.3 MG/0.3ML injection 2-pack Inject 0.3 mLs (0.3 mg) into the muscle once as needed for anaphylaxis     HYDROmorphone (DILAUDID) 2 MG tablet Take 1 tablet (2 mg) by mouth every 4 hours as needed for moderate to severe pain     IBUPROFEN PO Take by mouth as needed for moderate pain Reported on 5/18/2017     levothyroxine (SYNTHROID/LEVOTHROID) 88 MCG tablet Take 1 tablet (88 mcg) by mouth daily     LORazepam (ATIVAN) 0.5 MG tablet Take 1 tablet (0.5 mg) by mouth every 4 hours as needed (Anxiety, Nausea/Vomiting or Sleep)     metFORMIN (GLUCOPHAGE-XR) 750 MG 24 hr tablet Take 1 tablet (750 mg) by mouth daily (with dinner)     methylphenidate (RITALIN) 5 MG tablet Take 1 tablet (5 mg) by mouth 2 times daily (Patient not taking: Reported on 11/16/2020)     predniSONE (DELTASONE) 5 MG tablet Take 5 mg by mouth as needed Takes on wkds to do \"yardwork\"     prochlorperazine (COMPAZINE) 10 MG tablet Take 1 tablet (10 mg) by mouth every 6 hours as needed (Nausea/Vomiting)     tadalafil (CIALIS) 20 MG tablet Take 1 tablet (20 mg) by mouth daily as needed (take 2 hours before intercourse.)     triamcinolone (KENALOG) 0.1 % external cream Apply topically 2 times daily     valACYclovir (VALTREX) 1000 mg tablet Take 1 tablet (1,000 mg) by mouth 3 " times daily      No current facility-administered medications for this visit.           EXAM:     Voice is clear and strong. No audible stridor, wheezing, or respiratory distress. The remainder of PE was deferred due to PHE.      LABS:       ASSESSMENT/PLAN:   1. Metastatic prostate cancer with bony metastasis:   Currently on cabazitaxel, PSA stable, slightly increased recently  Denosumab- should be on q3-4 month schedule   HTN and DM TII   ECOG PS 1   Coping with terminal cancer        #mPC--started Cabazitaxel on 1/14/2020 due to worsening bone scan and rising PSA in early January 2020. Albert has had an excellent response to cabazitaxel, however he is starting to get progressive fatigue.  We did obtain new imaging in August, just to ensure we weren't missing a neuroendocrine transformation, or progressive disease, but they showed improved disease.  We have already spaced out his infusions to every 4 weeks.      His disease has been well controlled this last year, however toxicities (fatigue, brain fog, aches, neuropathy) have grown to be more extensive and longer lasting.  He feels stopping the chemotherapy would be in his best interest.  He is less focused on time, more focused on feeling better.  He would like a 45 day break from the [facility] including labs, therapy and provider visits, with the exception of meeting with Danielle.  I will will schedule a return appt in 6-7 weeks.       5 minutes spent on the date of the encounter doing lab and imaging review, in addition to 35 minutes spent on the phone with the patient.       Melanie Dan PA-C

## 2021-03-29 NOTE — PROGRESS NOTES
"Oncology RN Care Coordination Note:     Incoming Call:  This writer received another message from Lorrie stating she was available to talk now.     Outgoing Call:  This writer returned Lorrie's call, she states Albert has been bringing up some difficult conversations with her.  He said it's approximately 16 weeks for things to take a turn after he stops his Jevtana.  She said he is starting to make preparations for when he is really sick and if he might need to stay with his mom, to give Lorrie some respite.      This writer and Lorrie talked for approximately 15 minutes, she states\" Albert is ready for a palliative care referral, when I clarify if she wants hospice or palliative care, she says hospice, but she isn't sure how he will respond to that word.\"    Lorrie requested an appointment for him to see Melanie Dan PA-C and then a referral for hospice.  This writer will coordinate those needs with scheduling.   Shirin Lagunas RN BSN   [facility]  Nurse Coordinator        "

## 2021-03-29 NOTE — PROGRESS NOTES
Oncology RN Care Coordination Note:     Incoming Call:  Patient's wife left a voicemail requesting a call back.      Outgoing Call:  This writer attempted to reach Lorrie (on both phone numbers she left), she didn't answer either.  Writer will attempt again at a later date to reach Lorrie.    Shirin Lagunas, RN BSN   [facility]  Nurse Coordinator

## 2021-03-30 NOTE — TELEPHONE ENCOUNTER
"Angelina from [facility] called and stated she had Albert on the phone. He was looking for a visit with Danielle. I offered the patient a video appointment on 4/26. He stated he babysit's his grand kids and needs a later in the afternoon appointment. Angelina asked if he would like her to call Samantha. I was able to pull up all of the locations and offered Albert May 6th in the afternoon per his request. Albert then got upset and said he needed to \"bow-out\" and go. He did not want to schedule the appointment. He said he was given 5 months to live and would rather go to a bar instead. Angelina and I both apologized and tried telling him we were trying to help him and the patient hung up on us.     Blank Roberson    "

## 2021-03-31 NOTE — Clinical Note
Hi Dr. Valladares,I did Albert's prostate biopsy today. He did well with no issues. I will let you follow up with him for the results and will see him back if needed.Thanks, Jerod Sanchez M.D.Cell: [phone]  Bactrim Pregnancy And Lactation Text: This medication is Pregnancy Category D and is known to cause fetal risk.  It is also excreted in breast milk.

## 2021-04-02 NOTE — NURSING NOTE
"Oncology Rooming Note    April 2, 2021 11:36 AM   Albert Amaya is a 55 year old male who presents for:    Chief Complaint   Patient presents with     Blood Draw     Labs drawn via  by rn in lab. VS taken.     Oncology Clinic Visit     Malignant neoplasm of prostate (H); Bone metastasis (H)      Initial Vitals: BP (!) 139/98 (BP Location: Right arm, Patient Position: Sitting, Cuff Size: Adult Regular)   Pulse 73   Temp 98.1  F (36.7  C) (Oral)   Resp 16   Wt 83.9 kg (184 lb 14.4 oz)   SpO2 97%   BMI 28.12 kg/m   Estimated body mass index is 28.12 kg/m  as calculated from the following:    Height as of 8/24/20: 1.727 m (5' 7.99\").    Weight as of this encounter: 83.9 kg (184 lb 14.4 oz). Body surface area is 2.01 meters squared.  No Pain (0) Comment: Data Unavailable   No LMP for male patient.  Allergies reviewed: Yes  Medications reviewed: Yes    Medications: MEDICATION REFILLS NEEDED TODAY. Provider was notified. refill needed Hydromorphone, Meythlphenidate, metformin and amlodipine  Pharmacy name entered into [facility]:    [facility] - [place], [place] - [address] [id number] - [place], [place] - [address] AT [facility] [address]    Clinical concerns: None.       Linda Ho MA            "

## 2021-04-02 NOTE — PROGRESS NOTES
"Message received from RNCC:    \"Please fax the palliative care referral to [facility] @ [phone]\"    Order faxed.    Cassidy Najera CMA    "

## 2021-04-02 NOTE — LETTER
4/2/2021         RE: Albert Amaya  [address] [place] [address]        Dear Colleague,    Thank you for referring your patient, Albert Amaya, to the [facility]. Please see a copy of my visit note below.        Apr 2, 2021          REASON FOR VISIT: mPC, here to discuss POC     Oncology Treatment History:   Metastatic Prostate CA treated   - s/p 6 cycles of taxotere 75mg/m2   - s/p orchiectomy on 3/18/2016   - s/p Provenge 5/13, 5/27, 6/10   - s/p Casodex 50 mg daily March/April   - s/p Zytiga 5/3/2017   - started cabazitaxel 1/14/2020   - 2/2/21- treatment break     HPI: Albert is a 54 year old gentleman with metastatic prostate cancer. Albert felt a cramp in his gluteus muscle and could barely walk after doing some remodeling at home and unloading heavy truck at work. He tried flexeril and prednisone initially but eventually presented to ED on 10/5/15. He feels that initially he was nearly labeled as drug seeker since he was in lot of discomfort and flexeril was not working. But during course of ED visits labs were drawn and he had marked elevation in Alk Phosphatase (over 1000). CT did suggest some ileus with some sclerotic bone lesions. He was admitted to the hospital. His PSA was checked and was markedly elevated at 5760 (10/6/15), repeat value 5563. He did follow up with Dr. Freire and had transrectal US guided biopsy of prostate on 10/8/15. They have the results through my chart which confirms prostate adenocarcinoma - enrico 4+3 involving majority (60-90%) portion of every single core. He started on taxotere on 10/23 and completed 6 cycles of therapy in 2/2016. He then underwent orchiectomy on 3/18/2016.   Throughout spring of 2016 Albert's PSA started to progress and after three elevations there was concern about him being hormone refractory. He was started on Provenge and enrolled in the trial including Indoximod. PSA trending up and started on Casodex mid  March with progression. Switched to Zytiga 5/3/17. He held his abiraterone in July 2019 with rising PSA and fatigue. He had a decline in his PSA after holding his abiraterone. This has been on hold and he is being followed without any additional intervention. He had orchiectomy done previously and does not need ADT. In January 2020, his PSA was increasing and he was started on cabazitaxel.      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   He has been off therapy for the last 8 weeks.  And most aspects as far as his mental and physical symptoms from chemotherapy these are all improved.  After Jevtana he was having fatigue , aches and fuzzy thinking for days afterwards.  He was also starting to have neuropathy.  Essentially all of this post chemo toxicity has resolved.    He has truly enjoyed and not coming to the cancer center for the last 8 weeks.  He has a new grandbaby and he Lorrie been working on projects together.  He has had some fatigue that is been intermittent.  The Ritalin has been helping.  He is emotional with where he is at in his disease process.  He has been doing a ton of writing.  Specifically he is writing poetry, writing letters to Lorrie, the children as well as childhood stories.  This has been very therapeutic for him he gets out quite a bit of tears with this process.  He is having some emotional ups and downs during the day.  Sometimes if it is a hard moment he may say something that somewhat biting to Lorrie.  Lorrie feels like she is in an ok place she is seeing a therapist.  Albert and I discussed starting a mood medication that would maybe even out some of these extremes but still allow him to feel which he was okay with trying.  They are disappointed that our palliative program has not been able to help support them.  There have been some delays with scheduling and availability.  We agreed on making  "a referral to health [facility].     MEDS:        Current Outpatient Medications   Medication Sig     Acetaminophen (TYLENOL PO) Take 325 mg by mouth every 8 hours as needed for mild pain or fever Reported on 5/18/2017     amLODIPine (NORVASC) 5 MG tablet Take 1 tablet (5 mg) by mouth daily     blood glucose monitoring (ACCU-CHEK MULTICLIX) lancets Use to test blood sugar twice times daily or as directed.     blood glucose monitoring (NO BRAND SPECIFIED) test strip Use to test blood sugars twice daily or as directed (fasting, rotate then second time - before lunch, before supper and bedtime)     dexamethasone (DECADRON) 2 MG tablet Take 4 mg on day 2, 2 mg on day 3 and 1 mg on day 4 - of each chemo cycle (Patient not taking: Reported on 11/16/2020)     diphenhydrAMINE (BENADRYL) 25 MG tablet Take 25 mg by mouth every 6 hours as needed      EPINEPHrine (EPIPEN 2-HCARITY) 0.3 MG/0.3ML injection 2-pack Inject 0.3 mLs (0.3 mg) into the muscle once as needed for anaphylaxis     HYDROmorphone (DILAUDID) 2 MG tablet Take 1 tablet (2 mg) by mouth every 4 hours as needed for moderate to severe pain     IBUPROFEN PO Take by mouth as needed for moderate pain Reported on 5/18/2017     levothyroxine (SYNTHROID/LEVOTHROID) 88 MCG tablet Take 1 tablet (88 mcg) by mouth daily     LORazepam (ATIVAN) 0.5 MG tablet Take 1 tablet (0.5 mg) by mouth every 4 hours as needed (Anxiety, Nausea/Vomiting or Sleep)     metFORMIN (GLUCOPHAGE-XR) 750 MG 24 hr tablet Take 1 tablet (750 mg) by mouth daily (with dinner)     methylphenidate (RITALIN) 5 MG tablet Take 1 tablet (5 mg) by mouth 2 times daily (Patient not taking: Reported on 11/16/2020)     predniSONE (DELTASONE) 5 MG tablet Take 5 mg by mouth as needed Takes on wkds to do \"yardwork\"     prochlorperazine (COMPAZINE) 10 MG tablet Take 1 tablet (10 mg) by mouth every 6 hours as needed (Nausea/Vomiting)     tadalafil (CIALIS) 20 MG tablet Take 1 tablet (20 mg) by mouth daily as needed (take 2 " hours before intercourse.)     triamcinolone (KENALOG) 0.1 % external cream Apply topically 2 times daily     valACYclovir (VALTREX) 1000 mg tablet Take 1 tablet (1,000 mg) by mouth 3 times daily      No current facility-administered medications for this visit.           EXAM:   BP (!) 139/98 (BP Location: Right arm, Patient Position: Sitting, Cuff Size: Adult Regular)   Pulse 73   Temp 98.1  F (36.7  C) (Oral)   Resp 16   Wt 83.9 kg (184 lb 14.4 oz)   SpO2 97%   BMI 28.12 kg/m    Wt Readings from Last 4 Encounters:   04/02/21 83.9 kg (184 lb 14.4 oz)   01/28/21 85.3 kg (188 lb)   01/11/21 84.9 kg (187 lb 1.6 oz)   12/15/20 83.5 kg (184 lb 1.6 oz)     Vital signs were reviewed.   Patient alert and oriented x3.   PERRLA. EOMI. No scleral icterus noted. OP without thrush/sores.  Neck exam: No palpable cervical, supraclavicular or axillary nodes bilaterally.   Heart: RRR no murmurs noted.   Lungs: clear to auscultation bilaterally.  No crackles or wheezing.   Abd: positive bowel sounds in all four quadrants.  No tenderness to palpation.  No hepatomegaly.   Extremities: No lower extremity edema.   Neuro: grossly intact.   Mood and affect is stable.     LABS:      1/11/2021 09:48 1/28/2021 13:59 4/2/2021 11:07   Sodium 138 139 138   Potassium 4.4 3.7 4.0   Chloride 105 106 106   Carbon Dioxide 27 25 25   Urea Nitrogen 20 18 12   Creatinine 0.74 0.85 0.85   GFR Estimate >90 >90 >90   GFR Estimate If Black >90 >90 >90   Calcium 9.5 9.3 9.7   Anion Gap 6 7 7   Albumin 4.2 4.0 4.2   Protein Total 8.0 7.4 7.9   Bilirubin Total 0.5 0.6 0.8   Alkaline Phosphatase 70 60 67   ALT 27 29 46   AST 12 13 21   Lactate Dehydrogenase  171 198   PSA 41.60 (H) 50.10 (H) 63.90 (H)   Glucose 117 (H) 87 105 (H)   WBC 7.9 6.8 5.7   Hemoglobin 12.2 (L) 11.5 (L) 12.3 (L)   Hematocrit 37.4 (L) 34.9 (L) 37.1 (L)   Platelet Count 338 284 305   RBC Count 4.11 (L) 3.85 (L) 4.12 (L)   MCV 91 91 90     ASSESSMENT/PLAN:   1. Metastatic prostate  cancer with bony metastasis:    Was on cabazitaxel, PSA stable, slightly increased recently--now currently on a treatment break  Denosumab- should be on q6 month schedule   HTN and DM TII   ECOG PS 1   Coping with terminal cancer        #mPC-Albert has been on a treatment break for the last 8 weeks.  His PSA has continued to go up.  Cabazitaxel is no longer helpful.  In the past Albert has not been interested in taking Xtandi.  Overall we had a long conversation today that the side effects from the drugs have been's able to prolong his life.  He has been very grateful for this.  He states seeing his grandchild born, celebrating his 25th wedding anniversary with Lorrie, and now seeing his son graduate have all been huge milestones for him and he is very grateful for this.  However the toxicities from therapy have been somewhat disabling at times.  The brain fog and extreme fatigue from the cabazitaxel is now resolved.  He overall physically is feeling fairly well.  He wishes to remain off treatment for now.  He really would like to have more help with palliative care and we will make a referral to [facility] for help with this.    We spent a long time today talking about his mental health.  Carson Price is using some of his emotional grief over this stage of his life to write letters to his family and childhood memories, which have been very helpful for him, he also is having some emotional exhaustion.  I do think that a small antidepressant would help some of the highs and lows that are coming with grief.  He was willing to go forward with this.  He has admitted that previously he had some suicidal ideation with no intent to carry out a plan.  He relates this mostly to the fact that he does not want to be a burden on his family.  He does not have any intent to carry this out.  We discussed that the first 2 weeks of starting an SSRI can improve people's fatigue but not quite helped the depression and that he needs to  watch this very carefully.  He promised both Lorrie and I that he will be honest if he is having any thoughts of suicidal ideation.  We will start at 10 mg of Lexapro and he will take the first few days with just a half of a pill.    We discussed continuing to seek out getting a Covid vaccination.  We also discussed using Ritalin couple times a week to help with some of his fatigue and to use less of as needed prednisone given this may cause some highs and lows as well.    He'll RTC in 8 weeks with labs.       Melanie Dan PA-C    60 minutes spent on the date of the encounter doing interpretation of tests, patient visit and documentation         Again, thank you for allowing me to participate in the care of your patient.        Sincerely,        Anay Dan PA-C

## 2021-04-02 NOTE — PROGRESS NOTES
Apr 2, 2021          REASON FOR VISIT: mPC, here to discuss POC     Oncology Treatment History:   Metastatic Prostate CA treated   - s/p 6 cycles of taxotere 75mg/m2   - s/p orchiectomy on 3/18/2016   - s/p Provenge 5/13, 5/27, 6/10   - s/p Casodex 50 mg daily March/April   - s/p Zytiga 5/3/2017   - started cabazitaxel 1/14/2020   - 2/2/21- treatment break     HPI: Albert is a 54 year old gentleman with metastatic prostate cancer. Albert felt a cramp in his gluteus muscle and could barely walk after doing some remodeling at home and unloading heavy truck at work. He tried flexeril and prednisone initially but eventually presented to ED on 10/5/15. He feels that initially he was nearly labeled as drug seeker since he was in lot of discomfort and flexeril was not working. But during course of ED visits labs were drawn and he had marked elevation in Alk Phosphatase (over 1000). CT did suggest some ileus with some sclerotic bone lesions. He was admitted to the hospital. His PSA was checked and was markedly elevated at 5760 (10/6/15), repeat value 5563. He did follow up with Dr. Freire and had transrectal US guided biopsy of prostate on 10/8/15. They have the results through my chart which confirms prostate adenocarcinoma - enrico 4+3 involving majority (60-90%) portion of every single core. He started on taxotere on 10/23 and completed 6 cycles of therapy in 2/2016. He then underwent orchiectomy on 3/18/2016.   Throughout spring of 2016 Albert's PSA started to progress and after three elevations there was concern about him being hormone refractory. He was started on Provenge and enrolled in the trial including Indoximod. PSA trending up and started on Casodex mid March with progression. Switched to Zytiga 5/3/17. He held his abiraterone in July 2019 with rising PSA and fatigue. He had a decline in his PSA after holding his abiraterone. This has been on hold and he is being followed without any additional intervention.  He had orchiectomy done previously and does not need ADT. In January 2020, his PSA was increasing and he was started on cabazitaxel.      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   He has been off therapy for the last 8 weeks.  And most aspects as far as his mental and physical symptoms from chemotherapy these are all improved.  After Jevtana he was having fatigue , aches and fuzzy thinking for days afterwards.  He was also starting to have neuropathy.  Essentially all of this post chemo toxicity has resolved.    He has truly enjoyed and not coming to the cancer center for the last 8 weeks.  He has a new grandbaby and he Lorrie been working on projects together.  He has had some fatigue that is been intermittent.  The Ritalin has been helping.  He is emotional with where he is at in his disease process.  He has been doing a ton of writing.  Specifically he is writing poetry, writing letters to Lorrie, the children as well as childhood stories.  This has been very therapeutic for him he gets out quite a bit of tears with this process.  He is having some emotional ups and downs during the day.  Sometimes if it is a hard moment he may say something that somewhat biting to Lorrie.  Lorrie feels like she is in an ok place she is seeing a therapist.  Albert and I discussed starting a mood medication that would maybe even out some of these extremes but still allow him to feel which he was okay with trying.  They are disappointed that our palliative program has not been able to help support them.  There have been some delays with scheduling and availability.  We agreed on making a referral to [facility].     MEDS:        Current Outpatient Medications   Medication Sig     Acetaminophen (TYLENOL PO) Take 325 mg by mouth every 8 hours as needed for mild pain or fever Reported on 5/18/2017     amLODIPine (NORVASC) 5 MG tablet Take 1  "tablet (5 mg) by mouth daily     blood glucose monitoring (ACCU-CHEK MULTICLIX) lancets Use to test blood sugar twice times daily or as directed.     blood glucose monitoring (NO BRAND SPECIFIED) test strip Use to test blood sugars twice daily or as directed (fasting, rotate then second time - before lunch, before supper and bedtime)     dexamethasone (DECADRON) 2 MG tablet Take 4 mg on day 2, 2 mg on day 3 and 1 mg on day 4 - of each chemo cycle (Patient not taking: Reported on 11/16/2020)     diphenhydrAMINE (BENADRYL) 25 MG tablet Take 25 mg by mouth every 6 hours as needed      EPINEPHrine (EPIPEN 2-CHARITY) 0.3 MG/0.3ML injection 2-pack Inject 0.3 mLs (0.3 mg) into the muscle once as needed for anaphylaxis     HYDROmorphone (DILAUDID) 2 MG tablet Take 1 tablet (2 mg) by mouth every 4 hours as needed for moderate to severe pain     IBUPROFEN PO Take by mouth as needed for moderate pain Reported on 5/18/2017     levothyroxine (SYNTHROID/LEVOTHROID) 88 MCG tablet Take 1 tablet (88 mcg) by mouth daily     LORazepam (ATIVAN) 0.5 MG tablet Take 1 tablet (0.5 mg) by mouth every 4 hours as needed (Anxiety, Nausea/Vomiting or Sleep)     metFORMIN (GLUCOPHAGE-XR) 750 MG 24 hr tablet Take 1 tablet (750 mg) by mouth daily (with dinner)     methylphenidate (RITALIN) 5 MG tablet Take 1 tablet (5 mg) by mouth 2 times daily (Patient not taking: Reported on 11/16/2020)     predniSONE (DELTASONE) 5 MG tablet Take 5 mg by mouth as needed Takes on wkds to do \"yardwork\"     prochlorperazine (COMPAZINE) 10 MG tablet Take 1 tablet (10 mg) by mouth every 6 hours as needed (Nausea/Vomiting)     tadalafil (CIALIS) 20 MG tablet Take 1 tablet (20 mg) by mouth daily as needed (take 2 hours before intercourse.)     triamcinolone (KENALOG) 0.1 % external cream Apply topically 2 times daily     valACYclovir (VALTREX) 1000 mg tablet Take 1 tablet (1,000 mg) by mouth 3 times daily      No current facility-administered medications for this visit.  "          EXAM:   BP (!) 139/98 (BP Location: Right arm, Patient Position: Sitting, Cuff Size: Adult Regular)   Pulse 73   Temp 98.1  F (36.7  C) (Oral)   Resp 16   Wt 83.9 kg (184 lb 14.4 oz)   SpO2 97%   BMI 28.12 kg/m    Wt Readings from Last 4 Encounters:   04/02/21 83.9 kg (184 lb 14.4 oz)   01/28/21 85.3 kg (188 lb)   01/11/21 84.9 kg (187 lb 1.6 oz)   12/15/20 83.5 kg (184 lb 1.6 oz)     Vital signs were reviewed.   Patient alert and oriented x3.   PERRLA. EOMI. No scleral icterus noted. OP without thrush/sores.  Neck exam: No palpable cervical, supraclavicular or axillary nodes bilaterally.   Heart: RRR no murmurs noted.   Lungs: clear to auscultation bilaterally.  No crackles or wheezing.   Abd: positive bowel sounds in all four quadrants.  No tenderness to palpation.  No hepatomegaly.   Extremities: No lower extremity edema.   Neuro: grossly intact.   Mood and affect is stable.     LABS:      1/11/2021 09:48 1/28/2021 13:59 4/2/2021 11:07   Sodium 138 139 138   Potassium 4.4 3.7 4.0   Chloride 105 106 106   Carbon Dioxide 27 25 25   Urea Nitrogen 20 18 12   Creatinine 0.74 0.85 0.85   GFR Estimate >90 >90 >90   GFR Estimate If Black >90 >90 >90   Calcium 9.5 9.3 9.7   Anion Gap 6 7 7   Albumin 4.2 4.0 4.2   Protein Total 8.0 7.4 7.9   Bilirubin Total 0.5 0.6 0.8   Alkaline Phosphatase 70 60 67   ALT 27 29 46   AST 12 13 21   Lactate Dehydrogenase  171 198   PSA 41.60 (H) 50.10 (H) 63.90 (H)   Glucose 117 (H) 87 105 (H)   WBC 7.9 6.8 5.7   Hemoglobin 12.2 (L) 11.5 (L) 12.3 (L)   Hematocrit 37.4 (L) 34.9 (L) 37.1 (L)   Platelet Count 338 284 305   RBC Count 4.11 (L) 3.85 (L) 4.12 (L)   MCV 91 91 90     ASSESSMENT/PLAN:   1. Metastatic prostate cancer with bony metastasis:    Was on cabazitaxel, PSA stable, slightly increased recently--now currently on a treatment break  Denosumab- should be on q6 month schedule   HTN and DM TII   ECOG PS 1   Coping with terminal cancer        #mPC-Albert has been on a  treatment break for the last 8 weeks.  His PSA has continued to go up.  Cabazitaxel is no longer helpful.  In the past Albert has not been interested in taking Xtandi.  Overall we had a long conversation today that the side effects from the drugs have been's able to prolong his life.  He has been very grateful for this.  He states seeing his grandchild born, celebrating his 25th wedding anniversary with Lorrie, and now seeing his son graduate have all been huge milestones for him and he is very grateful for this.  However the toxicities from therapy have been somewhat disabling at times.  The brain fog and extreme fatigue from the cabazitaxel is now resolved.  He overall physically is feeling fairly well.  He wishes to remain off treatment for now.  He really would like to have more help with palliative care and we will make a referral to [facility] for help with this.    We spent a long time today talking about his mental health.  Well Albert is using some of his emotional grief over this stage of his life to write letters to his family and childhood memories, which have been very helpful for him, he also is having some emotional exhaustion.  I do think that a small antidepressant would help some of the highs and lows that are coming with grief.  He was willing to go forward with this.  He has admitted that previously he had some suicidal ideation with no intent to carry out a plan.  He relates this mostly to the fact that he does not want to be a burden on his family.  He does not have any intent to carry this out.  We discussed that the first 2 weeks of starting an SSRI can improve people's fatigue but not quite helped the depression and that he needs to watch this very carefully.  He promised both Lorrie and BEN that he will be honest if he is having any thoughts of suicidal ideation.  We will start at 10 mg of Lexapro and he will take the first few days with just a half of a pill.    We discussed continuing  to seek out getting a Covid vaccination.  We also discussed using Ritalin couple times a week to help with some of his fatigue and to use less of as needed prednisone given this may cause some highs and lows as well.    He'll RTC in 8 weeks with labs.       Melanie Dan PA-C    60 minutes spent on the date of the encounter doing interpretation of tests, patient visit and documentation

## 2021-04-02 NOTE — NURSING NOTE
Chief Complaint   Patient presents with     Blood Draw     Labs drawn via  by rn in lab. VS taken.     Labs collected from venipuncture by RN. Vitals taken. Checked in for appointment(s).    Jerry Parikh RN

## 2021-04-19 NOTE — TELEPHONE ENCOUNTER
Palliative Care Counseling Contact    Data: Albert requested outreach once I was back from medical leave.     Intervention: Call to assess current needs    Response/Assessment: Albert reported he has connected with a team at another health system, including counseling resources. No current needs identified.     Plan: Remain available as needed.     YUMIKO Miller, [facility]  Palliative Care Counseling Services  [facility]  Office Phone: [phone]  [phone] ([facility]) or [phone] (Alex)

## 2021-05-24 NOTE — TELEPHONE ENCOUNTER
[facility] Refill Request    Date of last oncology visit: 4/6/21  Provider: Jacquelyn  Date of next oncology visit: 6/1/21  Provider: Sy    Notes related to prescription from last visit:   none    Routed to provider

## 2021-06-07 NOTE — NURSING NOTE
Chief Complaint   Patient presents with     Blood Draw     Labs drawn via  by RN in lab.      Netta Jo RN

## 2021-06-28 NOTE — NURSING NOTE
"Oncology Rooming Note    June 28, 2021 4:23 PM   Albert Amaya is a 55 year old male who presents for:    Chief Complaint   Patient presents with     Blood Draw     Labs drawn via  by RN. VS taken.     Oncology Clinic Visit     Pt is here for a rtn for Prostate Cancer     Initial Vitals: Blood Pressure (Abnormal) 158/98   Pulse 57   Respiration 16   Weight 84.2 kg (185 lb 9.6 oz)   Oxygen Saturation 98%   Body Mass Index 28.23 kg/m   Estimated body mass index is 28.23 kg/m  as calculated from the following:    Height as of 8/24/20: 1.727 m (5' 7.99\").    Weight as of this encounter: 84.2 kg (185 lb 9.6 oz). Body surface area is 2.01 meters squared.  No Pain (0) Comment: Data Unavailable   No LMP for male patient.  Allergies reviewed: Yes  Medications reviewed: Yes    Medications: Medication refills not needed today.  Pharmacy name entered into [facility]:    [facility] - [place], [place] - [address] [id number] - [place], [place] - [address] AT [facility] [address]    Clinical concerns: none       Ana Blandon MA            "

## 2021-06-28 NOTE — NURSING NOTE
Chief Complaint   Patient presents with     Blood Draw     Labs drawn via  by RN. VS taken.     Labs drawn with vpt by rn.  Pt tolerated well.  VS taken.     Rhett Del Castillo RN

## 2021-06-29 NOTE — PROGRESS NOTES
Jun 28, 2021          REASON FOR VISIT: mPC, here for palliative care visit     Oncology Treatment History:   Metastatic Prostate CA treated   - s/p 6 cycles of taxotere 75mg/m2   - s/p orchiectomy on 3/18/2016   - s/p Provenge 5/13, 5/27, 6/10   - s/p Casodex 50 mg daily March/April   - s/p Zytiga 5/3/2017   - started cabazitaxel 1/14/2020   - 2/2/21- treatment break     HPI: Albert is a 54 year old gentleman with metastatic prostate cancer. Albert felt a cramp in his gluteus muscle and could barely walk after doing some remodeling at home and unloading heavy truck at work. He tried flexeril and prednisone initially but eventually presented to ED on 10/5/15. He feels that initially he was nearly labeled as drug seeker since he was in lot of discomfort and flexeril was not working. But during course of ED visits labs were drawn and he had marked elevation in Alk Phosphatase (over 1000). CT did suggest some ileus with some sclerotic bone lesions. He was admitted to the hospital. His PSA was checked and was markedly elevated at 5760 (10/6/15), repeat value 5563. He did follow up with Dr. Freire and had transrectal US guided biopsy of prostate on 10/8/15. They have the results through my chart which confirms prostate adenocarcinoma - enrico 4+3 involving majority (60-90%) portion of every single core. He started on taxotere on 10/23 and completed 6 cycles of therapy in 2/2016. He then underwent orchiectomy on 3/18/2016.   Throughout spring of 2016 Albert's PSA started to progress and after three elevations there was concern about him being hormone refractory. He was started on Provenge and enrolled in the trial including Indoximod. PSA trending up and started on Casodex mid March with progression. Switched to Zytiga 5/3/17. He held his abiraterone in July 2019 with rising PSA and fatigue. He had a decline in his PSA after holding his abiraterone. This has been on hold and he is being followed without any additional  intervention. He had orchiectomy done previously and does not need ADT. In January 2020, his PSA was increasing and he was started on cabazitaxel.      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   He has been off therapy since January.   And most aspects as far as his mental and physical symptoms from chemotherapy these are all improved.  After Jevtana he was having fatigue , aches and fuzzy thinking for days afterwards.  He was also starting to have neuropathy.  Essentially all of this post chemo toxicity has resolved.    He has truly enjoyed and not coming to the cancer center for the last 5 months.  In fact, it was emotional for him to be here today.  He has a new grandbaby and he Lorrie been working on projects together. He is working out at [employer] during the week, still working and he actually rode 26 miles the other day.   He has had some fatigue that is been intermittent.  The Ritalin has been helping, he takes this intermittently.  He is emotional with where he is at in his disease process.  He was doing a ton of writing.  Specifically he is writing poetry, writing letters to Lorrie, the children as well as childhood stories.  He tells me today, he stopped doing this.  The Lexapro has softened the emotional ups/downs and he has taken a pause on the writing.  He was taking 5 mg of the Lexapro, but has escalated to 10 mg.  He has been hesitant to take more- he doesn't want to be numb to this process.     He is engaged with the palliative program at [facility].  They came out some in May to the home, but he took a break.  Now Albert has been agreeable to see a MD for medication management and have them come out in July for some home counseling.  He feels mostly peace about where he is at, but sometimes it sounds like, conversations can be very painful for he and Lorrie.  They are both trying to navigate  through this painful time.     He is taking Ibu for low back pain ~2 a day with good relief.  Its consistently around L4 and sometimes across his whole pelvis.     MEDS:   Current Outpatient Medications   Medication Sig Dispense Refill     Acetaminophen (TYLENOL PO) Take 325 mg by mouth every 8 hours as needed for mild pain or fever Reported on 5/18/2017       amLODIPine (NORVASC) 5 MG tablet Take 1 tablet (5 mg) by mouth daily 90 tablet 1     diphenhydrAMINE (BENADRYL) 25 MG tablet Take 25 mg by mouth every 6 hours as needed        EPINEPHrine (EPIPEN 2-CHARITY) 0.3 MG/0.3ML injection 2-pack Inject 0.3 mLs (0.3 mg) into the muscle once as needed for anaphylaxis 0.6 mL 1     escitalopram (LEXAPRO) 10 MG tablet Take 1/2 tab daily for 5 days, then increase to full tab indefinitely. 30 tablet 3     HYDROmorphone (DILAUDID) 2 MG tablet Take 1 tablet (2 mg) by mouth every 4 hours as needed for moderate to severe pain 60 tablet 0     IBUPROFEN PO Take by mouth as needed for moderate pain Reported on 5/18/2017       levothyroxine (SYNTHROID/LEVOTHROID) 88 MCG tablet Take 1 tablet (88 mcg) by mouth daily 30 tablet 11     LORazepam (ATIVAN) 0.5 MG tablet Take 1 tablet (0.5 mg) by mouth every 4 hours as needed (Anxiety, Nausea/Vomiting or Sleep) 30 tablet 2     metFORMIN (GLUCOPHAGE-XR) 750 MG 24 hr tablet Take 1 tablet (750 mg) by mouth daily (with dinner) 180 tablet 3     methylphenidate (RITALIN) 5 MG tablet Take 1 tablet (5 mg) by mouth 2 times daily 60 tablet 0     blood glucose monitoring (ACCU-CHEK MULTICLIX) lancets Use to test blood sugar twice times daily or as directed. (Patient not taking: Reported on 4/2/2021) 102 each 3     blood glucose monitoring (NO BRAND SPECIFIED) test strip Use to test blood sugars twice daily or as directed (fasting, rotate then second time - before lunch, before supper and bedtime) (Patient not taking: Reported on 4/2/2021) 100 strip 3     predniSONE (DELTASONE) 5 MG tablet Take 5 mg by  "mouth as needed Takes on wkds to do \"yardwork\"       tadalafil (CIALIS) 20 MG tablet Take 1 tablet (20 mg) by mouth daily as needed (take 2 hours before intercourse.) (Patient not taking: Reported on 4/2/2021) 10 tablet 12     triamcinolone (KENALOG) 0.1 % external cream Apply topically 2 times daily (Patient not taking: Reported on 4/2/2021) 30 g 0           EXAM:   BP (!) 158/98   Pulse 57   Resp 16   Wt 84.2 kg (185 lb 9.6 oz)   SpO2 98%   BMI 28.23 kg/m    Wt Readings from Last 4 Encounters:   06/28/21 84.2 kg (185 lb 9.6 oz)   04/02/21 83.9 kg (184 lb 14.4 oz)   01/28/21 85.3 kg (188 lb)   01/11/21 84.9 kg (187 lb 1.6 oz)     Vital signs were reviewed.   Patient alert and oriented x3.   Painful area is around L4, not to palpation  Extremities: No lower extremity edema.   Neuro: grossly intact.   Mood and affect was grateful and tearful today     LABS:   4/2/2021 11:07 6/7/2021 11:03 6/28/2021 11:14   Sodium 138 141 140   Potassium 4.0 4.5 4.2   Chloride 106 109 107   Carbon Dioxide 25 25 26   Urea Nitrogen 12 15 19   Creatinine 0.85 0.84 0.90   GFR Estimate >90 >90 >90   GFR Estimate If Black >90 >90 >90   Calcium 9.7 9.6 9.4   Anion Gap 7 7 7   Albumin 4.2 4.1 4.0   Protein Total 7.9 7.7 7.7   Bilirubin Total 0.8 0.6 0.6   Alkaline Phosphatase 67 76 107   ALT 46 32 32   AST 21 19 21   Lactate Dehydrogenase 198 200 182   PSA 63.90 (H) 111.00 (H) 139.00 (H)   Glucose 105 (H) 100 (H) 102 (H)   WBC 5.7 6.1 6.8   Hemoglobin 12.3 (L) 11.7 (L) 12.0 (L)   Hematocrit 37.1 (L) 35.2 (L) 35.8 (L)   Platelet Count 305 306 323   RBC Count 4.12 (L) 3.86 (L) 3.96 (L)   MCV 90 91 90     ASSESSMENT/PLAN:   1. Metastatic prostate cancer with bony metastasis:    Was on cabazitaxel, PSA stable, slightly increased recently--now currently on a treatment break  Denosumab- should be on q6 month schedule   HTN and DM TII   ECOG PS 1   Coping with terminal cancer        #mPC-Albert has been on a treatment break for the last 5 months. "  His PSA has continued to go up.  He is no longer interested in treatment of his cancer.  We did discuss helping manage his painful bone metastasis.  I do think palliative radiation to the L4 area and possibly his upper pelvis would help mitigate the daily pain he is having.  He is willing to meet with radiation for help with pain.      Overall we had a long conversation today that the side effects from the drugs have been's able to prolong his life.  He has been very grateful for this.  He states seeing his grandchild born, celebrating his 25th wedding anniversary with Lorrie, and now seeing his son graduate have all been huge milestones for him and he is very grateful for this.  However the toxicities from therapy have been somewhat disabling at times.  The brain fog and extreme fatigue from the cabazitaxel is now resolved.  He overall physically is feeling fairly well.  He wishes to remain off treatment for now.  He really would like to have more help with palliative care which is set for July.    We spent a long time today talking about his mental health.  Carson Priec is using some of his emotional grief over this stage of his life to write letters to his family and childhood memories, which have been very helpful for him, he also is having some emotional exhaustion. The Lexapro hasbeen helpful, he finally was able to start the 10 mg.  He is going to see a palliative MD through  as well for medication management.  He continues to use Ritalin, although more sparingly now.      I will stay in touch with him, but on his own terms.  Coming to the [facility] was emotional for him today.   We discussed his labs are stable today, his PSA continues the same trajectory.  We discussed what the next few months could look like for him.        Melanie Dan PA-C    80 minutes spent on the date of the encounter doing interpretation of tests, patient visit and documentation

## 2021-07-07 NOTE — PROGRESS NOTES
RADIATION ONCOLOGY CONSULT NOTE  Date of Visit: 2021    Albert Amaya  MRN: [id number]  : 1965     Albert Amaya is being seen today for initial consultation at the request of Anay Dan.    Diagnosis: Metastatic prostate cancer    HISTORY OF PRESENT ILLNESS:  Mr. Amaya is a 55 year old man with diagnosis of metastatic prostate cancer and painful bone metastases.    The patient was initially diagnosed with prostate cancer with extensive skeletal metastases after presenting with gluteal pain in 2015. Workup at that time revealed elevated PSA of 5760. US guided biopsy of prostate 10/8/15 demonstrated [place] 4+3 disease involving all cores. He established care with Dr. Valladares 10/14/15 and was started on taxotere and completed 6 cycles in 2016. He then underwent orchiectomy 3/18/16.     PSA was progressive in Spring 2016 and he was started on Provenge in May, s/p treatment , , 6/10. Casodex was then started 2017 and later switched to Zytiga on 5/3/17. Zytiga was then held given fatigue. Given rise in PSA and progression on bone scan, cabazitaxel was started on 20. This was poorly tolerated given toxicities involving fatigue, brain fog, neuropathy. The patient elected to stop all treatments in 2021.     PSA has been rising steadily since 2021, which was 63.9 on 21 and 111 on 21. Most recent PSA on 21 was 139. He saw Melanie Dan PA-C on 21 and reported lower back / pelvic pain. He was thus referred here to consider palliative radiation therapy.     Today, the patient describes an intense, localized, stabbing pain directly over his lower lumbar spine that is non-radiating. The pain is exacerbated by activity, and is worst in the hours immediately after being physically active. The pain is 8/10 at worst and 3-4/10 at best. He currently takes ibuprofen as needed and escalates to dilaudid and then methadone if the pain persists. 4-6  weeks ago, he also had localized pain over his thoracic spine at the level of the scapulas, which he had since forgotten about until his wife brought it up as a concern in today's visit. He has no pain, numbness, or tingling in his extremities. He has softer stools at baseline, but reports no recent changes in his bowel or bladder habits. His primary concern today is whether palliative radiation will cause long-lasting side effects that will negatively impact his quality of life. His ideal goal would be for radiation to bring his pain from 6-8/10 to 3-4/10 without long term side effects.    REVIEW OF SYSTEMS: A 14 point review of systems was obtained. Pertinent findings are noted in the HPI and are otherwise unremarkable.     CHEMOTHERAPY HISTORY: As above    PACEMAKER: None    PAST RADIATION THERAPY HISTORY: None    PAST MEDICAL HISTORY:  Past Medical History:   Diagnosis Date     Cancer (H)      PAST SURGICAL HISTORY:  Past Surgical History:   Procedure Laterality Date     COLONOSCOPY N/A 3/28/2017    Procedure: COLONOSCOPY;  Surgeon: Guru Tomasz Liriano MD;  Location:  GI     ORCHIECTOMY SCROTAL BILATERAL Bilateral 3/18/2016    Procedure: ORCHIECTOMY SCROTAL BILATERAL;  Surgeon: Jesus Champagne MD;  Location: [facility] OR     PROSTATE SURGERY  08/12/2019     TESTICLE SURGERY       ALLERGIES:  Allergies as of 07/09/2021 - Reviewed 07/09/2021   Allergen Reaction Noted     Aspirin Swelling 10/14/2015     Salicylates Swelling 03/22/2007     MEDICATIONS:  Current Outpatient Medications   Medication Sig Dispense Refill     HYDROmorphone (DILAUDID) 2 MG tablet Take 4 mg by mouth       methadone (DOLOPHINE) 5 MG tablet Take 2.5 mg by mouth       Acetaminophen (TYLENOL PO) Take 325 mg by mouth every 8 hours as needed for mild pain or fever Reported on 5/18/2017       amLODIPine (NORVASC) 5 MG tablet Take 1 tablet (5 mg) by mouth daily 90 tablet 1     blood glucose monitoring (ACCU-CHEK MULTICLIX)  "lancets Use to test blood sugar twice times daily or as directed. (Patient not taking: Reported on 4/2/2021) 102 each 3     blood glucose monitoring (NO BRAND SPECIFIED) test strip Use to test blood sugars twice daily or as directed (fasting, rotate then second time - before lunch, before supper and bedtime) (Patient not taking: Reported on 4/2/2021) 100 strip 3     diphenhydrAMINE (BENADRYL) 25 MG tablet Take 25 mg by mouth every 6 hours as needed        EPINEPHrine (EPIPEN 2-CHARITY) 0.3 MG/0.3ML injection 2-pack Inject 0.3 mLs (0.3 mg) into the muscle once as needed for anaphylaxis 0.6 mL 1     escitalopram (LEXAPRO) 10 MG tablet Take 1/2 tab daily for 5 days, then increase to full tab indefinitely. 30 tablet 3     IBUPROFEN PO Take by mouth as needed for moderate pain Reported on 5/18/2017       levothyroxine (SYNTHROID/LEVOTHROID) 88 MCG tablet Take 1 tablet (88 mcg) by mouth daily 30 tablet 11     LORazepam (ATIVAN) 0.5 MG tablet Take 1 tablet (0.5 mg) by mouth every 4 hours as needed (Anxiety, Nausea/Vomiting or Sleep) 30 tablet 2     metFORMIN (GLUCOPHAGE-XR) 750 MG 24 hr tablet Take 1 tablet (750 mg) by mouth daily (with dinner) 180 tablet 3     methylphenidate (RITALIN) 5 MG tablet Take 1 tablet (5 mg) by mouth 2 times daily 60 tablet 0     predniSONE (DELTASONE) 5 MG tablet Take 5 mg by mouth as needed Takes on wkds to do \"yardwork\"       tadalafil (CIALIS) 20 MG tablet Take 1 tablet (20 mg) by mouth daily as needed (take 2 hours before intercourse.) (Patient not taking: Reported on 4/2/2021) 10 tablet 12     triamcinolone (KENALOG) 0.1 % external cream Apply topically 2 times daily (Patient not taking: Reported on 4/2/2021) 30 g 0        FAMILY HISTORY:  Family History   Problem Relation Age of Onset     Thyroid Disease Mother      Diabetes Father      Rheumatoid Arthritis Father      Heart Disease Father      Hypertension Father        SOCIAL HISTORY:  Social History     Socioeconomic History     Marital " status:      Spouse name: Not on file     Number of children: Not on file     Years of education: Not on file     Highest education level: Not on file   Occupational History     Not on file   Social Needs     Financial resource strain: Not on file     Food insecurity     Worry: Not on file     Inability: Not on file     Transportation needs     Medical: Not on file     Non-medical: Not on file   Tobacco Use     Smoking status: Never Smoker     Smokeless tobacco: Never Used   Substance and Sexual Activity     Alcohol use: Not on file     Drug use: Not on file     Sexual activity: Not on file   Lifestyle     Physical activity     Days per week: Not on file     Minutes per session: Not on file     Stress: Not on file   Relationships     Social connections     Talks on phone: Not on file     Gets together: Not on file     Attends Orthodox service: Not on file     Active member of club or organization: Not on file     Attends meetings of clubs or organizations: Not on file     Relationship status: Not on file     Intimate partner violence     Fear of current or ex partner: Not on file     Emotionally abused: Not on file     Physically abused: Not on file     Forced sexual activity: Not on file   Other Topics Concern     Parent/sibling w/ CABG, MI or angioplasty before 65F 55M? Not Asked   Social History Narrative     Not on file       PHYSICAL EXAM:  VITALS: BP (!) 158/95   Wt 83.9 kg (185 lb)   BMI 28.14 kg/m    GEN: Appears well, alert, oriented, and in NAD  HEENT: EOMI, normal conjunctiva, MMM  NECK: Supple, full ROM  CV: Warm and well-perfused  RESP: Breathing comfortably on room air, no audible wheezes  ABDOMEN: Non-distended  SKIN: Normal color and turgor  NEURO: No focal deficits observed, normal gait  MSK: Lumbar spinous processes non-tender to palpation, able to pinpoint pain at about L3-4.  PSYCH: Appropriate mood and affect  Extremities: No peripheral edema no loss of muscle bulk along lower  extremities    IMAGING:  CT C/A/P 8/28/2020  FINDINGS: Diffuse sclerotic metastatic disease  throughout the visualized axial and appendicular skeleton. This does  not appear substantially changed compared to CT 1/10/2020.    Bone Scan 8/28/2020  FINDINGS: Whole body planar images demonstrate resolution of the  previously seen uptake to the midline calvarium. Decreased uptake to  the spinal lesions in the cervical, thoracic, and lumbar spine, with  greatest residual uptake to T11 and L4, as well as smaller lesions in  the mid cervical spine and upper thoracic spine. Please note that  degree of uptake does not correspond directly with the extensive  sclerosis seen on same day CT. Decreased uptake to the left posterior  rib lesions. No new lesion demonstrated.    ECOG PERFORMANCE STATUS: 1    IMPRESSION: Mr. Amaya is a 55 year old man with diagnosis of metastatic prostate cancer and diffuse bone metastases in the axial and appendicular skeleton who is presenting today to discuss palliative radiotherapy.     RECOMMENDATION:  Mr. Amaya localizes pain to directly over the lumbar spine. Although pain is non-reproducible with palpation, he is able to localize the area that gives him trouble. Last CT CAP from 8/28/20 shows significant metastatic disease in the L4 vertebral body. However, updated imaging will be required to clinically correlate his symptoms to pain prior to moving forward with radiation therapy.    Palliative radiation therapy to the lumbar spine was discussed with the patient, including goal for pain relief. Potential toxicities of treatment were outlined, including but not limited to, fatigue, loose stools and skin changes. Various approaches to treatment were discussed, including 1 fraction versus 5 fraction treatment, with our recommendation being 5 fractions to limit the daily dose to surrounding structures yet give a higher overall dose of radiation to provide longer lasting pain relief.    The  patient is interested in palliative radiation therapy but is concerned about potential side effects. We will proceed with diagnostic CT CAP and follow-up with a virtual visit after that to revisit the role of radiotherapy. The patient and his wife were encouraged to ask questions, and all their questions were answered to their satisfaction.    The patient was seen and discussed with attending, Dr. Rader. Thank you for involving us in the care of this patient.     Cynthia Ackerman MD PGY-2  Radiation Oncology    Mr. Amaya was seen and examined by me. Note above by Dr. Ackerman was reviewed and edited by me and reflects our mutual findings and plan of care.    75 minutes spent on the date of the encounter doing chart review, history and exam, documentation and further activities per the note.    Jesenia Rader MD  Department of Radiation Oncology  [facility]

## 2021-07-09 NOTE — PROGRESS NOTES
HPI  INITIAL PATIENT ASSESSMENT    Diagnosis: Metastatic Prostate Cancer    Prior radiation therapy: None    Prior chemotherapy: See record  NoPrior hormonal therapy:No    Pain Eval:  Denies    Psychosocial  Living arrangements: Lives with family  Fall Risk: independent   referral needs: Not needed    Advanced Directive: No  Implantable Cardiac Device? No    Nurse face-to-face time: Level 5:  over 15 min face to face time    Review of Systems   Constitutional: Positive for malaise/fatigue.   HENT: Negative.    Eyes: Negative.    Respiratory: Negative.    Cardiovascular: Negative.    Gastrointestinal: Negative.    Genitourinary: Negative.    Musculoskeletal: Positive for back pain and joint pain.   Skin: Negative.    Neurological: Negative.    Endo/Heme/Allergies: Negative.    Psychiatric/Behavioral: Positive for depression. The patient is nervous/anxious.

## 2021-07-09 NOTE — LETTER
2021         RE: Albert Amaya  [address] [place] [address]        Dear Colleague,    Thank you for referring your patient, Albert Amaya, to the [facility] RADIATION ONCOLOGY. Please see a copy of my visit note below.    RADIATION ONCOLOGY CONSULT NOTE  Date of Visit: 2021    Albert Amaya  MRN: [id number]  : 1965     Albert Amaya is being seen today for initial consultation at the request of Anay Dan.    Diagnosis: Metastatic prostate cancer    HISTORY OF PRESENT ILLNESS:  Mr. Amaya is a 55 year old man with diagnosis of metastatic prostate cancer and painful bone metastases.    The patient was initially diagnosed with prostate cancer with extensive skeletal metastases after presenting with gluteal pain in 2015. Workup at that time revealed elevated PSA of 5760. US guided biopsy of prostate 10/8/15 demonstrated [place] 4+3 disease involving all cores. He established care with Dr. Valladares 10/14/15 and was started on taxotere and completed 6 cycles in 2016. He then underwent orchiectomy 3/18/16.     PSA was progressive in Spring 2016 and he was started on Provenge in May, s/p treatment , , 6/10. Casodex was then started 2017 and later switched to Zytiga on 5/3/17. Zytiga was then held given fatigue. Given rise in PSA and progression on bone scan, cabazitaxel was started on 20. This was poorly tolerated given toxicities involving fatigue, brain fog, neuropathy. The patient elected to stop all treatments in 2021.     PSA has been rising steadily since 2021, which was 63.9 on 21 and 111 on 21. Most recent PSA on 21 was 139. He saw Melanie Dan PA-C on 21 and reported lower back / pelvic pain. He was thus referred here to consider palliative radiation therapy.     Today, the patient describes an intense, localized, stabbing pain directly over his lower lumbar spine that is non-radiating. The  pain is exacerbated by activity, and is worst in the hours immediately after being physically active. The pain is 8/10 at worst and 3-4/10 at best. He currently takes ibuprofen as needed and escalates to dilaudid and then methadone if the pain persists. 4-6 weeks ago, he also had localized pain over his thoracic spine at the level of the scapulas, which he had since forgotten about until his wife brought it up as a concern in today's visit. He has no pain, numbness, or tingling in his extremities. He has softer stools at baseline, but reports no recent changes in his bowel or bladder habits. His primary concern today is whether palliative radiation will cause long-lasting side effects that will negatively impact his quality of life. His ideal goal would be for radiation to bring his pain from 6-8/10 to 3-4/10 without long term side effects.    REVIEW OF SYSTEMS: A 14 point review of systems was obtained. Pertinent findings are noted in the HPI and are otherwise unremarkable.     CHEMOTHERAPY HISTORY: As above    PACEMAKER: None    PAST RADIATION THERAPY HISTORY: None    PAST MEDICAL HISTORY:  Past Medical History:   Diagnosis Date     Cancer (H)      PAST SURGICAL HISTORY:  Past Surgical History:   Procedure Laterality Date     COLONOSCOPY N/A 3/28/2017    Procedure: COLONOSCOPY;  Surgeon: Guru Tomasz Liriano MD;  Location: [facility]     ORCHIECTOMY SCROTAL BILATERAL Bilateral 3/18/2016    Procedure: ORCHIECTOMY SCROTAL BILATERAL;  Surgeon: Jesus Champagne MD;  Location: [facility] OR     PROSTATE SURGERY  08/12/2019     TESTICLE SURGERY       ALLERGIES:  Allergies as of 07/09/2021 - Reviewed 07/09/2021   Allergen Reaction Noted     Aspirin Swelling 10/14/2015     Salicylates Swelling 03/22/2007     MEDICATIONS:  Current Outpatient Medications   Medication Sig Dispense Refill     HYDROmorphone (DILAUDID) 2 MG tablet Take 4 mg by mouth       methadone (DOLOPHINE) 5 MG tablet Take 2.5 mg by mouth        "Acetaminophen (TYLENOL PO) Take 325 mg by mouth every 8 hours as needed for mild pain or fever Reported on 5/18/2017       amLODIPine (NORVASC) 5 MG tablet Take 1 tablet (5 mg) by mouth daily 90 tablet 1     blood glucose monitoring (ACCU-CHEK MULTICLIX) lancets Use to test blood sugar twice times daily or as directed. (Patient not taking: Reported on 4/2/2021) 102 each 3     blood glucose monitoring (NO BRAND SPECIFIED) test strip Use to test blood sugars twice daily or as directed (fasting, rotate then second time - before lunch, before supper and bedtime) (Patient not taking: Reported on 4/2/2021) 100 strip 3     diphenhydrAMINE (BENADRYL) 25 MG tablet Take 25 mg by mouth every 6 hours as needed        EPINEPHrine (EPIPEN 2-CHARTIY) 0.3 MG/0.3ML injection 2-pack Inject 0.3 mLs (0.3 mg) into the muscle once as needed for anaphylaxis 0.6 mL 1     escitalopram (LEXAPRO) 10 MG tablet Take 1/2 tab daily for 5 days, then increase to full tab indefinitely. 30 tablet 3     IBUPROFEN PO Take by mouth as needed for moderate pain Reported on 5/18/2017       levothyroxine (SYNTHROID/LEVOTHROID) 88 MCG tablet Take 1 tablet (88 mcg) by mouth daily 30 tablet 11     LORazepam (ATIVAN) 0.5 MG tablet Take 1 tablet (0.5 mg) by mouth every 4 hours as needed (Anxiety, Nausea/Vomiting or Sleep) 30 tablet 2     metFORMIN (GLUCOPHAGE-XR) 750 MG 24 hr tablet Take 1 tablet (750 mg) by mouth daily (with dinner) 180 tablet 3     methylphenidate (RITALIN) 5 MG tablet Take 1 tablet (5 mg) by mouth 2 times daily 60 tablet 0     predniSONE (DELTASONE) 5 MG tablet Take 5 mg by mouth as needed Takes on wkds to do \"yardwork\"       tadalafil (CIALIS) 20 MG tablet Take 1 tablet (20 mg) by mouth daily as needed (take 2 hours before intercourse.) (Patient not taking: Reported on 4/2/2021) 10 tablet 12     triamcinolone (KENALOG) 0.1 % external cream Apply topically 2 times daily (Patient not taking: Reported on 4/2/2021) 30 g 0        FAMILY " HISTORY:  Family History   Problem Relation Age of Onset     Thyroid Disease Mother      Diabetes Father      Rheumatoid Arthritis Father      Heart Disease Father      Hypertension Father        SOCIAL HISTORY:  Social History     Socioeconomic History     Marital status:      Spouse name: Not on file     Number of children: Not on file     Years of education: Not on file     Highest education level: Not on file   Occupational History     Not on file   Social Needs     Financial resource strain: Not on file     Food insecurity     Worry: Not on file     Inability: Not on file     Transportation needs     Medical: Not on file     Non-medical: Not on file   Tobacco Use     Smoking status: Never Smoker     Smokeless tobacco: Never Used   Substance and Sexual Activity     Alcohol use: Not on file     Drug use: Not on file     Sexual activity: Not on file   Lifestyle     Physical activity     Days per week: Not on file     Minutes per session: Not on file     Stress: Not on file   Relationships     Social connections     Talks on phone: Not on file     Gets together: Not on file     Attends Caodaism service: Not on file     Active member of club or organization: Not on file     Attends meetings of clubs or organizations: Not on file     Relationship status: Not on file     Intimate partner violence     Fear of current or ex partner: Not on file     Emotionally abused: Not on file     Physically abused: Not on file     Forced sexual activity: Not on file   Other Topics Concern     Parent/sibling w/ CABG, MI or angioplasty before 65F 55M? Not Asked   Social History Narrative     Not on file       PHYSICAL EXAM:  VITALS: BP (!) 158/95   Wt 83.9 kg (185 lb)   BMI 28.14 kg/m    GEN: Appears well, alert, oriented, and in NAD  HEENT: EOMI, normal conjunctiva, MMM  NECK: Supple, full ROM  CV: Warm and well-perfused  RESP: Breathing comfortably on room air, no audible wheezes  ABDOMEN: Non-distended  SKIN: Normal color  and turgor  NEURO: No focal deficits observed, normal gait  MSK: Lumbar spinous processes non-tender to palpation, able to pinpoint pain at about L3-4.  PSYCH: Appropriate mood and affect  Extremities: No peripheral edema no loss of muscle bulk along lower extremities    IMAGING:  CT C/A/P 8/28/2020  FINDINGS: Diffuse sclerotic metastatic disease  throughout the visualized axial and appendicular skeleton. This does  not appear substantially changed compared to CT 1/10/2020.    Bone Scan 8/28/2020  FINDINGS: Whole body planar images demonstrate resolution of the  previously seen uptake to the midline calvarium. Decreased uptake to  the spinal lesions in the cervical, thoracic, and lumbar spine, with  greatest residual uptake to T11 and L4, as well as smaller lesions in  the mid cervical spine and upper thoracic spine. Please note that  degree of uptake does not correspond directly with the extensive  sclerosis seen on same day CT. Decreased uptake to the left posterior  rib lesions. No new lesion demonstrated.    ECOG PERFORMANCE STATUS: 1    IMPRESSION: Mr. Amaya is a 55 year old man with diagnosis of metastatic prostate cancer and diffuse bone metastases in the axial and appendicular skeleton who is presenting today to discuss palliative radiotherapy.     RECOMMENDATION:  Mr. Amaya localizes pain to directly over the lumbar spine. Although pain is non-reproducible with palpation, he is able to localize the area that gives him trouble. Last CT CAP from 8/28/20 shows significant metastatic disease in the L4 vertebral body. However, updated imaging will be required to clinically correlate his symptoms to pain prior to moving forward with radiation therapy.    Palliative radiation therapy to the lumbar spine was discussed with the patient, including goal for pain relief. Potential toxicities of treatment were outlined, including but not limited to, fatigue, loose stools and skin changes. Various approaches to  treatment were discussed, including 1 fraction versus 5 fraction treatment, with our recommendation being 5 fractions to limit the daily dose to surrounding structures yet give a higher overall dose of radiation to provide longer lasting pain relief.    The patient is interested in palliative radiation therapy but is concerned about potential side effects. We will proceed with diagnostic CT CAP and follow-up with a virtual visit after that to revisit the role of radiotherapy. The patient and his wife were encouraged to ask questions, and all their questions were answered to their satisfaction.    The patient was seen and discussed with attending, Dr. Rader. Thank you for involving us in the care of this patient.     Cynthia Ackerman MD PGY-2  Radiation Oncology    Mr. Amaya was seen and examined by me. Note above by Dr. Ackerman was reviewed and edited by me and reflects our mutual findings and plan of care.    75 minutes spent on the date of the encounter doing chart review, history and exam, documentation and further activities per the note.    Jesenia Rader MD  Department of Radiation Oncology  [facility]              HPI  INITIAL PATIENT ASSESSMENT    Diagnosis: Metastatic Prostate Cancer    Prior radiation therapy: None    Prior chemotherapy: See record  NoPrior hormonal therapy:No    Pain Eval:  Denies    Psychosocial  Living arrangements: Lives with family  Fall Risk: independent   referral needs: Not needed    Advanced Directive: No  Implantable Cardiac Device? No    Nurse face-to-face time: Level 5:  over 15 min face to face time    Review of Systems   Constitutional: Positive for malaise/fatigue.   HENT: Negative.    Eyes: Negative.    Respiratory: Negative.    Cardiovascular: Negative.    Gastrointestinal: Negative.    Genitourinary: Negative.    Musculoskeletal: Positive for back pain and joint pain.   Skin: Negative.    Neurological: Negative.     Endo/Heme/Allergies: Negative.    Psychiatric/Behavioral: Positive for depression. The patient is nervous/anxious.                    Again, thank you for allowing me to participate in the care of your patient.        Sincerely,        Jesenia Rader MD

## 2021-07-09 NOTE — LETTER
2021      RE: Albert Amaya  [address] [facility] [address]       RADIATION ONCOLOGY CONSULT NOTE  Date of Visit: 2021    Albert Amaya  MRN: [id number]  : 1965     Albert Amaya is being seen today for initial consultation at the request of Anay Dan.    Diagnosis: Metastatic prostate cancer    HISTORY OF PRESENT ILLNESS:  Mr. Amaya is a 55 year old man with diagnosis of metastatic prostate cancer and painful bone metastases.    The patient was initially diagnosed with prostate cancer with extensive skeletal metastases after presenting with gluteal pain in 2015. Workup at that time revealed elevated PSA of 5760. US guided biopsy of prostate 10/8/15 demonstrated Jimenez 4+3 disease involving all cores. He established care with Dr. Valladares 10/14/15 and was started on taxotere and completed 6 cycles in 2016. He then underwent orchiectomy 3/18/16.     PSA was progressive in Spring 2016 and he was started on Provenge in May, s/p treatment , , 6/10. Casodex was then started 2017 and later switched to Zytiga on 5/3/17. Zytiga was then held given fatigue. Given rise in PSA and progression on bone scan, cabazitaxel was started on 20. This was poorly tolerated given toxicities involving fatigue, brain fog, neuropathy. The patient elected to stop all treatments in 2021.     PSA has been rising steadily since 2021, which was 63.9 on 21 and 111 on 21. Most recent PSA on 21 was 139. He saw Melanie Dan PA-C on 21 and reported lower back / pelvic pain. He was thus referred here to consider palliative radiation therapy.     Today, the patient describes an intense, localized, stabbing pain directly over his lower lumbar spine that is non-radiating. The pain is exacerbated by activity, and is worst in the hours immediately after being physically active. The pain is 8/10 at worst and 3-4/10 at best. He currently takes ibuprofen  as needed and escalates to dilaudid and then methadone if the pain persists. 4-6 weeks ago, he also had localized pain over his thoracic spine at the level of the scapulas, which he had since forgotten about until his wife brought it up as a concern in today's visit. He has no pain, numbness, or tingling in his extremities. He has softer stools at baseline, but reports no recent changes in his bowel or bladder habits. His primary concern today is whether palliative radiation will cause long-lasting side effects that will negatively impact his quality of life. His ideal goal would be for radiation to bring his pain from 6-8/10 to 3-4/10 without long term side effects.    REVIEW OF SYSTEMS: A 14 point review of systems was obtained. Pertinent findings are noted in the HPI and are otherwise unremarkable.     CHEMOTHERAPY HISTORY: As above    PACEMAKER: None    PAST RADIATION THERAPY HISTORY: None    PAST MEDICAL HISTORY:  Past Medical History:   Diagnosis Date     Cancer (H)      PAST SURGICAL HISTORY:  Past Surgical History:   Procedure Laterality Date     COLONOSCOPY N/A 3/28/2017    Procedure: COLONOSCOPY;  Surgeon: Guru Tomasz Liriano MD;  Location: [facility]     ORCHIECTOMY SCROTAL BILATERAL Bilateral 3/18/2016    Procedure: ORCHIECTOMY SCROTAL BILATERAL;  Surgeon: Jesus Champagne MD;  Location: [facility]     PROSTATE SURGERY  08/12/2019     TESTICLE SURGERY       ALLERGIES:  Allergies as of 07/09/2021 - Reviewed 07/09/2021   Allergen Reaction Noted     Aspirin Swelling 10/14/2015     Salicylates Swelling 03/22/2007     MEDICATIONS:  Current Outpatient Medications   Medication Sig Dispense Refill     HYDROmorphone (DILAUDID) 2 MG tablet Take 4 mg by mouth       methadone (DOLOPHINE) 5 MG tablet Take 2.5 mg by mouth       Acetaminophen (TYLENOL PO) Take 325 mg by mouth every 8 hours as needed for mild pain or fever Reported on 5/18/2017       amLODIPine (NORVASC) 5 MG tablet Take 1 tablet (5 mg)  "by mouth daily 90 tablet 1     blood glucose monitoring (ACCU-CHEK MULTICLIX) lancets Use to test blood sugar twice times daily or as directed. (Patient not taking: Reported on 4/2/2021) 102 each 3     blood glucose monitoring (NO BRAND SPECIFIED) test strip Use to test blood sugars twice daily or as directed (fasting, rotate then second time - before lunch, before supper and bedtime) (Patient not taking: Reported on 4/2/2021) 100 strip 3     diphenhydrAMINE (BENADRYL) 25 MG tablet Take 25 mg by mouth every 6 hours as needed        EPINEPHrine (EPIPEN 2-CHARITY) 0.3 MG/0.3ML injection 2-pack Inject 0.3 mLs (0.3 mg) into the muscle once as needed for anaphylaxis 0.6 mL 1     escitalopram (LEXAPRO) 10 MG tablet Take 1/2 tab daily for 5 days, then increase to full tab indefinitely. 30 tablet 3     IBUPROFEN PO Take by mouth as needed for moderate pain Reported on 5/18/2017       levothyroxine (SYNTHROID/LEVOTHROID) 88 MCG tablet Take 1 tablet (88 mcg) by mouth daily 30 tablet 11     LORazepam (ATIVAN) 0.5 MG tablet Take 1 tablet (0.5 mg) by mouth every 4 hours as needed (Anxiety, Nausea/Vomiting or Sleep) 30 tablet 2     metFORMIN (GLUCOPHAGE-XR) 750 MG 24 hr tablet Take 1 tablet (750 mg) by mouth daily (with dinner) 180 tablet 3     methylphenidate (RITALIN) 5 MG tablet Take 1 tablet (5 mg) by mouth 2 times daily 60 tablet 0     predniSONE (DELTASONE) 5 MG tablet Take 5 mg by mouth as needed Takes on wkds to do \"yardwork\"       tadalafil (CIALIS) 20 MG tablet Take 1 tablet (20 mg) by mouth daily as needed (take 2 hours before intercourse.) (Patient not taking: Reported on 4/2/2021) 10 tablet 12     triamcinolone (KENALOG) 0.1 % external cream Apply topically 2 times daily (Patient not taking: Reported on 4/2/2021) 30 g 0        FAMILY HISTORY:  Family History   Problem Relation Age of Onset     Thyroid Disease Mother      Diabetes Father      Rheumatoid Arthritis Father      Heart Disease Father      Hypertension Father  "       SOCIAL HISTORY:  Social History     Socioeconomic History     Marital status:      Spouse name: Not on file     Number of children: Not on file     Years of education: Not on file     Highest education level: Not on file   Occupational History     Not on file   Social Needs     Financial resource strain: Not on file     Food insecurity     Worry: Not on file     Inability: Not on file     Transportation needs     Medical: Not on file     Non-medical: Not on file   Tobacco Use     Smoking status: Never Smoker     Smokeless tobacco: Never Used   Substance and Sexual Activity     Alcohol use: Not on file     Drug use: Not on file     Sexual activity: Not on file   Lifestyle     Physical activity     Days per week: Not on file     Minutes per session: Not on file     Stress: Not on file   Relationships     Social connections     Talks on phone: Not on file     Gets together: Not on file     Attends Taoist service: Not on file     Active member of club or organization: Not on file     Attends meetings of clubs or organizations: Not on file     Relationship status: Not on file     Intimate partner violence     Fear of current or ex partner: Not on file     Emotionally abused: Not on file     Physically abused: Not on file     Forced sexual activity: Not on file   Other Topics Concern     Parent/sibling w/ CABG, MI or angioplasty before 65F 55M? Not Asked   Social History Narrative     Not on file       PHYSICAL EXAM:  VITALS: BP (!) 158/95   Wt 83.9 kg (185 lb)   BMI 28.14 kg/m    GEN: Appears well, alert, oriented, and in NAD  HEENT: EOMI, normal conjunctiva, MMM  NECK: Supple, full ROM  CV: Warm and well-perfused  RESP: Breathing comfortably on room air, no audible wheezes  ABDOMEN: Non-distended  SKIN: Normal color and turgor  NEURO: No focal deficits observed, normal gait  MSK: Lumbar spinous processes non-tender to palpation, able to pinpoint pain at about L3-4.  PSYCH: Appropriate mood and  affect  Extremities: No peripheral edema no loss of muscle bulk along lower extremities    IMAGING:  CT C/A/P 8/28/2020  FINDINGS: Diffuse sclerotic metastatic disease  throughout the visualized axial and appendicular skeleton. This does  not appear substantially changed compared to CT 1/10/2020.    Bone Scan 8/28/2020  FINDINGS: Whole body planar images demonstrate resolution of the  previously seen uptake to the midline calvarium. Decreased uptake to  the spinal lesions in the cervical, thoracic, and lumbar spine, with  greatest residual uptake to T11 and L4, as well as smaller lesions in  the mid cervical spine and upper thoracic spine. Please note that  degree of uptake does not correspond directly with the extensive  sclerosis seen on same day CT. Decreased uptake to the left posterior  rib lesions. No new lesion demonstrated.    ECOG PERFORMANCE STATUS: 1    IMPRESSION: Mr. Amaya is a 55 year old man with diagnosis of metastatic prostate cancer and diffuse bone metastases in the axial and appendicular skeleton who is presenting today to discuss palliative radiotherapy.     RECOMMENDATION:  Mr. Amaya localizes pain to directly over the lumbar spine. Although pain is non-reproducible with palpation, he is able to localize the area that gives him trouble. Last CT CAP from 8/28/20 shows significant metastatic disease in the L4 vertebral body. However, updated imaging will be required to clinically correlate his symptoms to pain prior to moving forward with radiation therapy.    Palliative radiation therapy to the lumbar spine was discussed with the patient, including goal for pain relief. Potential toxicities of treatment were outlined, including but not limited to, fatigue, loose stools and skin changes. Various approaches to treatment were discussed, including 1 fraction versus 5 fraction treatment, with our recommendation being 5 fractions to limit the daily dose to surrounding structures yet give a  higher overall dose of radiation to provide longer lasting pain relief.    The patient is interested in palliative radiation therapy but is concerned about potential side effects. We will proceed with diagnostic CT CAP and follow-up with a virtual visit after that to revisit the role of radiotherapy. The patient and his wife were encouraged to ask questions, and all their questions were answered to their satisfaction.    The patient was seen and discussed with attending, Dr. Rader. Thank you for involving us in the care of this patient.     Cynthia Ackerman MD PGY-2  Radiation Oncology    Mr. Amaya was seen and examined by me. Note above by Dr. Ackerman was reviewed and edited by me and reflects our mutual findings and plan of care.    75 minutes spent on the date of the encounter doing chart review, history and exam, documentation and further activities per the note.    Jesenia Rader MD  Department of Radiation Oncology  [facility]              HPI  INITIAL PATIENT ASSESSMENT    Diagnosis: Metastatic Prostate Cancer    Prior radiation therapy: None    Prior chemotherapy: See record  NoPrior hormonal therapy:No    Pain Eval:  Denies    Psychosocial  Living arrangements: Lives with family  Fall Risk: independent   referral needs: Not needed    Advanced Directive: No  Implantable Cardiac Device? No    Nurse face-to-face time: Level 5:  over 15 min face to face time    Review of Systems   Constitutional: Positive for malaise/fatigue.   HENT: Negative.    Eyes: Negative.    Respiratory: Negative.    Cardiovascular: Negative.    Gastrointestinal: Negative.    Genitourinary: Negative.    Musculoskeletal: Positive for back pain and joint pain.   Skin: Negative.    Neurological: Negative.    Endo/Heme/Allergies: Negative.    Psychiatric/Behavioral: Positive for depression. The patient is nervous/anxious.        Jesenia Rader MD

## 2021-07-13 NOTE — PROGRESS NOTES
[facility], [place]  Radiation Oncology Follow-up Note  2021    Albret Amaya  MRN: [id number]  : 1965    DIAGNOSIS: Metastatic prostate cancer     SUBJECTIVE:   Mr. Amaya is a 55 year old man with diagnosis of metastatic prostate cancer and painful bone metastases (lower back pain). He was initially seen in consultation 21 and completed a CT C/A/P 21, which demonstrated dense sclerotic mets throughout the entire axial and appendicular skeleton and not significantly changed from 2020.     Today, he presents for telephone follow-up to again discuss palliative radiotherapy.     OBJECTIVE:   Telephone visit. Fluent speech. Alert and oriented.     CT C/A/P 21  IMPRESSION:  Dense sclerotic metastases throughout the entire visualized axial and  appendicular skeleton are not significantly changed since prior CT.    IMPRESSION: Albert Amaya is a 55 year old man with metastatic prostate cancer and lower back pain associated with bone mets seen on CT.      RECOMMENDATIONS:     We again discussed palliative radiotherapy to the lower lumbar spine, L3-L5 today. He wishes to proceed with CT simulation and will return to clinic next week. Plan for 20 Gy in 5 fractions.    Patient was seen with Dr. Rader.     Cynthia Ackerman MD PGY-2  Radiation Oncology, [facility]    Mr. Amaya was seen and examined by me. Note above by Dr. Ackerman was reviewed and edited by me and reflects our mutual findings and plan of care.    20 minutes spent on the date of the encounter doing chart review, history and exam, documentation and further activities per the note    Jesenia Rader MD  Department of Radiation Oncology  [facility]

## 2021-07-14 NOTE — LETTER
2021         RE: Albert Amaya  [address] [facility] [place] [address]        Dear Colleague,    Thank you for referring your patient, Albert Amaya, to the [facility] RADIATION ONCOLOGY. Please see a copy of my visit note below.    [facility], [place]  Radiation Oncology Follow-up Note  2021    Albert Amaya  MRN: [id number]  : 1965    DIAGNOSIS: Metastatic prostate cancer     SUBJECTIVE:   Mr. Amaya is a 55 year old man with diagnosis of metastatic prostate cancer and painful bone metastases (lower back pain). He was initially seen in consultation 21 and completed a CT C/A/P 21, which demonstrated dense sclerotic mets throughout the entire axial and appendicular skeleton and not significantly changed from 2020.     Today, he presents for telephone follow-up to again discuss palliative radiotherapy.     OBJECTIVE:   Telephone visit. Fluent speech. Alert and oriented.     CT C/A/P 21  IMPRESSION:  Dense sclerotic metastases throughout the entire visualized axial and  appendicular skeleton are not significantly changed since prior CT.    IMPRESSION: Albert Amaya is a 55 year old man with metastatic prostate cancer and lower back pain associated with bone mets seen on CT.      RECOMMENDATIONS:     We again discussed palliative radiotherapy to the lower lumbar spine, L3-L5 today. He wishes to proceed with CT simulation and will return to clinic next week. Plan for 20 Gy in 5 fractions.    Patient was seen with Dr. Rader.     Cynthia Ackerman MD PGY-2  Radiation Oncology, [facility]    Mr. Amaya was seen and examined by me. Note above by Dr. Ackerman was reviewed and edited by me and reflects our mutual findings and plan of care.    20 minutes spent on the date of the encounter doing chart review, history and exam, documentation and further activities per the note    Jesenia Rader MD  Department of Radiation  Oncology  [facility]

## 2021-07-19 NOTE — PROGRESS NOTES
Radiation Therapy Patient Education    Person involved with teaching: Patient    Patient educational needs for self management of treatment-related side effects assessment completed.  [facility] Patient Ed tab contains Patient Learning Assessment    Education Materials Given  Radiation Therapy and You    Educational Topics Discussed  Side effects expected, Pain management and When to call MD/RN    Response To Teaching  Verbalizes understanding    GYN Only  Vaginal Dilator-given and educated: N/A    Referrals sent: None    Chemotherapy?  No

## 2021-07-19 NOTE — PROGRESS NOTES
[facility], [place]  Radiation Oncology Follow-up Note  2021    Albert Amaya  MRN: [id number]  : 1965    DIAGNOSIS: Metastatic prostate cancer     SUBJECTIVE:   Mr. Amaya is a 55 year old man with diagnosis of metastatic prostate cancer and painful bone metastases (lower back pain). He was initially seen in consultation 21 and completed a CT C/A/P 21, which demonstrated dense sclerotic mets throughout the entire axial and appendicular skeleton and not significantly changed from 2020.     Today, he presents to undergo CT simulation.    OBJECTIVE:   GEN: Appears well, alert, oriented, and in NAD  HEENT: EOMI, normal conjunctiva, MMM  NECK: Supple, full ROM  CV: Warm and well-perfused  RESP: Breathing comfortably on room air, no audible wheezes  ABDOMEN: Non-distended  SKIN: Normal color and turgor  NEURO: No focal deficits observed, normal gait  PSYCH: Appropriate mood and affect    CT C/A/P 21  IMPRESSION:  Dense sclerotic metastases throughout the entire visualized axial and  appendicular skeleton are not significantly changed since prior CT.    IMPRESSION: Albert Amaya is a 55 year old man with metastatic prostate cancer and lower back pain associated with lumbar bone mets seen on CT.      RECOMMENDATIONS:     We again discussed palliative radiotherapy to the lower lumbar spine, L3-L5 today. Informed consent was obtained. Plan for 20 Gy in 5 fractions.    Patient was seen with Dr. Rader.     Cynthia Ackerman MD PGY3  Radiation Oncology    Mr. Amaya was seen and examined by me. Note above by Dr. Ackerman was reviewed and edited by me and reflects our mutual findings and plan of care.    20 minutes spent on the date of the encounter doing chart review, history, documentation, review of recent imaging and further activities per the note.    Jesenia Rader MD  Department of Radiation Oncology  [facility]

## 2021-07-19 NOTE — LETTER
2021         RE: Albert Amaya  [address] [facility] [address]        Dear Colleague,    Thank you for referring your patient, Albert Amaya, to the [facility] RADIATION ONCOLOGY. Please see a copy of my visit note below.    [facility], [place]  Radiation Oncology Follow-up Note  2021    Albert Amaya  MRN: [id number]  : 1965    DIAGNOSIS: Metastatic prostate cancer     SUBJECTIVE:   Mr. Amaya is a 55 year old man with diagnosis of metastatic prostate cancer and painful bone metastases (lower back pain). He was initially seen in consultation 21 and completed a CT C/A/P 21, which demonstrated dense sclerotic mets throughout the entire axial and appendicular skeleton and not significantly changed from 2020.     Today, he presents to undergo CT simulation.    OBJECTIVE:   GEN: Appears well, alert, oriented, and in NAD  HEENT: EOMI, normal conjunctiva, MMM  NECK: Supple, full ROM  CV: Warm and well-perfused  RESP: Breathing comfortably on room air, no audible wheezes  ABDOMEN: Non-distended  SKIN: Normal color and turgor  NEURO: No focal deficits observed, normal gait  PSYCH: Appropriate mood and affect    CT C/A/P 21  IMPRESSION:  Dense sclerotic metastases throughout the entire visualized axial and  appendicular skeleton are not significantly changed since prior CT.    IMPRESSION: Albert Amaya is a 55 year old man with metastatic prostate cancer and lower back pain associated with lumbar bone mets seen on CT.      RECOMMENDATIONS:     We again discussed palliative radiotherapy to the lower lumbar spine, L3-L5 today. Informed consent was obtained. Plan for 20 Gy in 5 fractions.    Patient was seen with Dr. Rader.     Cynthia Ackerman MD PGY3  Radiation Oncology    Mr. Amaya was seen and examined by me. Note above by Dr. Ackerman was reviewed and edited by me and reflects our mutual findings and plan of care.    20  minutes spent on the date of the encounter doing chart review, history, documentation, review of recent imaging and further activities per the note.    Jesenia Rader MD  Department of Radiation Oncology  [facility]              Radiation Therapy Patient Education    Person involved with teaching: Patient    Patient educational needs for self management of treatment-related side effects assessment completed.  [facility] Patient Ed tab contains Patient Learning Assessment    Education Materials Given  Radiation Therapy and You    Educational Topics Discussed  Side effects expected, Pain management and When to call MD/RN    Response To Teaching  Verbalizes understanding    GYN Only  Vaginal Dilator-given and educated: N/A    Referrals sent: None    Chemotherapy?  No        Again, thank you for allowing me to participate in the care of your patient.        Sincerely,        Jesenia Rader MD

## 2021-07-19 NOTE — LETTER
7/19/2021         RE: Albert Amaya  [address] [facility] [address]        Dear Colleague,    Thank you for referring your patient, Albert Amaya, to the [facility] RADIATION ONCOLOGY. Please see a copy of my visit note below.    A radiation therapy treatment planning simulation was performed.  Please see the [employer] record for documentation.    Jesenia Rader MD  Radiation Oncology      Again, thank you for allowing me to participate in the care of your patient.        Sincerely,        Jesenia Rader MD

## 2021-07-23 NOTE — PROGRESS NOTES
A radiation therapy treatment planning simulation was performed.  Please see the [employer] record for documentation.    Jesenia Rader MD  Radiation Oncology

## 2021-07-23 NOTE — PROGRESS NOTES
Attempted to reach pt/wife. No answer. LMTC to follow-up on My Chart message asking for Melanie's input on pt's current condition

## 2021-07-27 NOTE — PROGRESS NOTES
RADIATION ONCOLOGY WEEKLY ON TREATMENT VISIT         Albert Amaya      Date: 2021   MRN: [id number]   : 1965    Diagnosis: Metastatic Cancer    Reason for Visit:  On Radiation Treatment Visit     Treatment Summary to Date   L spine Current Dose: 800/2000 cGy Fractions: 2/5      Chemotherapy  Chemo concurrent with radx?: No    Subjective: Albert is tolerating radiotherapy well. He reports he took a 3 hour nap yesterday afternoon, which is unusual for him.     Nursing ROS:   Nutrition Alteration  Diet Type: Patient's Preference  Skin  Skin Reaction: 0 - No changes     Gastrointestinal  Nausea: 0 - None     Psychosocial  Mood - Anxiety: 1 - Mild mood alteration  Pain Assessment  0-10 Pain Scale: 3    Treatment Breaks: None  ED visits / Hospitalizations: None    Objective:   Wt 84.8 kg (187 lb)   BMI 28.44 kg/m    Gen: Appears well, in no acute distress  Skin: No erythema    Labs: No new studies    Assessment:    Tolerating radiation therapy well.  All questions and concerns addressed.    Toxicities:  Fatigue: Grade 1: Fatigue relieved by rest  Dermatitis: Grade 0: No toxicity     Plan:   1. Continue current therapy.        [facility]q chart and setup information reviewed  CBCT reviewed    Medication Review  Med list reviewed with patient?: Yes    Educational Topic Discussed  Education Instructions: Reviewed    Cynthia Ackerman MD PGY-3  Radiation Oncology  [facility]    Mr. Amaya was seen and examined by me. Note above by Dr. Ackerman was reviewed and edited by me and reflects our mutual findings and plan of care.    Jesenia Rader MD  Department of Radiation Oncology  [facility]

## 2021-07-27 NOTE — LETTER
2021         RE: Albert Amaya  [address] [facility] [place] [address]        Dear Colleague,    Thank you for referring your patient, Albert Amaya, to the [facility] RADIATION ONCOLOGY. Please see a copy of my visit note below.    RADIATION ONCOLOGY WEEKLY ON TREATMENT VISIT         Albert Amaya      Date: 2021   MRN: [id number]   : 1965    Diagnosis: Metastatic Cancer    Reason for Visit:  On Radiation Treatment Visit     Treatment Summary to Date   L spine Current Dose: 800/2000 cGy Fractions: 2/5      Chemotherapy  Chemo concurrent with radx?: No    Subjective: Albert is tolerating radiotherapy well. He reports he took a 3 hour nap yesterday afternoon, which is unusual for him.     Nursing ROS:   Nutrition Alteration  Diet Type: Patient's Preference  Skin  Skin Reaction: 0 - No changes     Gastrointestinal  Nausea: 0 - None     Psychosocial  Mood - Anxiety: 1 - Mild mood alteration  Pain Assessment  0-10 Pain Scale: 3    Treatment Breaks: None  ED visits / Hospitalizations: None    Objective:   Wt 84.8 kg (187 lb)   BMI 28.44 kg/m    Gen: Appears well, in no acute distress  Skin: No erythema    Labs: No new studies    Assessment:    Tolerating radiation therapy well.  All questions and concerns addressed.    Toxicities:  Fatigue: Grade 1: Fatigue relieved by rest  Dermatitis: Grade 0: No toxicity     Plan:   1. Continue current therapy.        Mosaiq chart and setup information reviewed  CBCT reviewed    Medication Review  Med list reviewed with patient?: Yes    Educational Topic Discussed  Education Instructions: Reviewed    Cynthia Ackerman MD PGY-3  Radiation Oncology  [facility]    Mr. Amaya was seen and examined by me. Note above by Dr. Ackerman was reviewed and edited by me and reflects our mutual findings and plan of care.    Jesenia Rader MD  Department of Radiation Oncology  [facility]

## 2021-08-15 NOTE — PROCEDURES
Radiotherapy Treatment Summary          Date of Report: 2021    PATIENT: PILAR AMAYA  MEDICAL RECORD NO: [id number]  : 1965    DIAGNOSIS: Prostate adenocarcinoma, Stage IV - C79.51 Secondary malignant neoplasm of bone  INTENT OF RADIOTHERAPY: Palliative  CONCURRENT SYSTEMIC THERAPY: None                 Treatment Summary:  Details of the treatments summarized below are found in records kept in the Department of Radiation Oncology at [facility].  Radiation Oncology - Course: 1:   Treatment Site  Current Dose Modality From To Elapsed Days Fx.  L3-L5                  1,200 cGy 18 X  7/26/2021  2021  17  3    Dose per Fraction: 400 cGy       Total Dose:  1,200 cGy        COMMENTS:                      Mr. Amaya is a 55 year old man with diagnosis of metastatic castration resistant prostate cancer (stopped all systemic treatment 2021 per patient preference) and painful bone metastases notably of the lower back. CT CAP 21 demonstrated bone lesions of the lower lumbar spine which correlated with the lower back pain. Palliative radiotherapy involving 2,000 cGy in 5 fractions was recommended. The patient completed the first 3 fractions and elected to cancel the remaining 2 fractions. He experienced grade 1 fatigue during treatment.     ED visits/hospitalizations: None    Missed treatments: Patient cancelled the last 2 treatments    Acute Toxicity Profile by CTC v5.0: Grade 1 fatigue    PAIN MANAGEMENT: No changes to pain management during radiotherapy.                              FOLLOW UP PLAN:    1. Follow-up here PRN.   2. Follow-up with Melanie Dan as scheduled.                       Resident Physician: Cynthia Ackerman M.D.   Staff Physician: Jesenia Rader M.D.  Physicist: Papo Champagne MS    CC: Anay Dan

## 2021-08-23 NOTE — TELEPHONE ENCOUNTER
Social Work Note: Telephone Call  Oncology Clinic    Data/Intervention:  Patient Name:  Albert Amaya  /Age:  1965 (54 year old)    Call From:  Social work received phone call from patient and wife.  Reason for Call:  Questions about SSDI and financial resources    Assessment:  Patient and wife are preparing to apply for SSDI and indicated having questions about process.  SW gave education on social security website, helpful links in preparing for application, eligibility through compassionate allowance list, waiting times, etc.  Contact information for both social security as well as [employer] as additional resources provided as well.  Patient and wife also expressed concerns about managing household expenses if pt must cut his working hours.  He stated he is working part time currently but receiving full time pay.  He does not have any short term disability benefit through his employer.  SW gave suggestions for how to find financial grants, assisted in completing [employer] application for [employer] over the phone.  SW also discussed [employer] meal delivery program to which patient and wife were interested in completing referral at next appt. Plan was made to meet during next infusion to complete referral and check in.    Plan:  SW will check in with patient and wife during next infusion.  Primary sources of anxiety for family are currently financial, they appear to be planning appropriately for the future and are focused on completing SSDI application. SW will continue to follow and is available to assist with needs.     Soo Yeon Han, MSW, [facility]  Pager: [phone]  Phone: [phone]          
smoking

## 2021-10-01 NOTE — CONFIDENTIAL NOTE
escitalopram (LEXAPRO) 10 MG tablet  Last prescribing provider: CHRISTINE Dan     Last clinic visit date: 8/26/21 CHRISTINE Dan     Any missed appointments or no-shows since last clinic visit?: No     Recommendations for requested medication (if none, N/A): Copied from chart note 6/28/21 CHRISTINE Dan   The Lexapro has softened the emotional ups/downs and he has taken a pause on the writing. He was taking 5 mg of the Lexapro, but has escalated to 10 mg. He has been hesitant to take more- he doesn't want to be numb to this process.       Next clinic visit date: Not yet scheduled     Any other pertinent information (if none, N/A): N/A

## 2021-11-28 ENCOUNTER — HEALTH MAINTENANCE LETTER (OUTPATIENT)
Age: 56
End: 2021-11-28

## 2023-08-05 NOTE — PROGRESS NOTES
Albert is a 55 year old who is being evaluated via a billable telephone visit.      What phone number would you like to be contacted at? [phone]  How would you like to obtain your AVS? [facility] FUCH[facility]    [facility] [place] [facility] [employer] [facility], requested a call back another date, will reschedule with patient.  Beni   
None
